# Patient Record
Sex: FEMALE | Race: WHITE | Employment: OTHER | URBAN - METROPOLITAN AREA
[De-identification: names, ages, dates, MRNs, and addresses within clinical notes are randomized per-mention and may not be internally consistent; named-entity substitution may affect disease eponyms.]

---

## 2017-01-30 PROBLEM — M81.0 OSTEOPOROSIS: Status: ACTIVE | Noted: 2017-01-30

## 2017-01-30 PROBLEM — Z13.820 OSTEOPOROSIS SCREENING: Status: ACTIVE | Noted: 2017-01-30

## 2017-02-14 ENCOUNTER — OFFICE VISIT (OUTPATIENT)
Dept: ORTHOPEDIC SURGERY | Age: 72
End: 2017-02-14

## 2017-02-14 DIAGNOSIS — M17.0 PRIMARY OSTEOARTHRITIS OF BOTH KNEES: Primary | Chronic | ICD-10-CM

## 2017-02-14 PROCEDURE — 1123F ACP DISCUSS/DSCN MKR DOCD: CPT | Performed by: ORTHOPAEDIC SURGERY

## 2017-02-14 PROCEDURE — 3017F COLORECTAL CA SCREEN DOC REV: CPT | Performed by: ORTHOPAEDIC SURGERY

## 2017-02-14 PROCEDURE — G8399 PT W/DXA RESULTS DOCUMENT: HCPCS | Performed by: ORTHOPAEDIC SURGERY

## 2017-02-14 PROCEDURE — 20610 DRAIN/INJ JOINT/BURSA W/O US: CPT | Performed by: ORTHOPAEDIC SURGERY

## 2017-02-14 PROCEDURE — 99213 OFFICE O/P EST LOW 20 MIN: CPT | Performed by: ORTHOPAEDIC SURGERY

## 2017-02-14 PROCEDURE — G8419 CALC BMI OUT NRM PARAM NOF/U: HCPCS | Performed by: ORTHOPAEDIC SURGERY

## 2017-02-14 PROCEDURE — 3014F SCREEN MAMMO DOC REV: CPT | Performed by: ORTHOPAEDIC SURGERY

## 2017-02-14 PROCEDURE — 4040F PNEUMOC VAC/ADMIN/RCVD: CPT | Performed by: ORTHOPAEDIC SURGERY

## 2017-02-14 PROCEDURE — G8484 FLU IMMUNIZE NO ADMIN: HCPCS | Performed by: ORTHOPAEDIC SURGERY

## 2017-02-14 PROCEDURE — G8598 ASA/ANTIPLAT THER USED: HCPCS | Performed by: ORTHOPAEDIC SURGERY

## 2017-02-14 PROCEDURE — 1090F PRES/ABSN URINE INCON ASSESS: CPT | Performed by: ORTHOPAEDIC SURGERY

## 2017-02-14 PROCEDURE — G8427 DOCREV CUR MEDS BY ELIG CLIN: HCPCS | Performed by: ORTHOPAEDIC SURGERY

## 2017-02-14 PROCEDURE — 1036F TOBACCO NON-USER: CPT | Performed by: ORTHOPAEDIC SURGERY

## 2017-02-15 ENCOUNTER — TELEPHONE (OUTPATIENT)
Dept: ORTHOPEDIC SURGERY | Age: 72
End: 2017-02-15

## 2017-02-21 ENCOUNTER — OFFICE VISIT (OUTPATIENT)
Dept: ORTHOPEDIC SURGERY | Age: 72
End: 2017-02-21

## 2017-02-21 DIAGNOSIS — M17.12 PRIMARY LOCALIZED OSTEOARTHROSIS, LOWER LEG, LEFT: Primary | ICD-10-CM

## 2017-02-21 DIAGNOSIS — M17.0 PRIMARY OSTEOARTHRITIS OF BOTH KNEES: Chronic | ICD-10-CM

## 2017-02-21 PROCEDURE — 1036F TOBACCO NON-USER: CPT | Performed by: ORTHOPAEDIC SURGERY

## 2017-02-21 PROCEDURE — 20610 DRAIN/INJ JOINT/BURSA W/O US: CPT | Performed by: ORTHOPAEDIC SURGERY

## 2017-02-28 ENCOUNTER — OFFICE VISIT (OUTPATIENT)
Dept: ORTHOPEDIC SURGERY | Age: 72
End: 2017-02-28

## 2017-02-28 DIAGNOSIS — M17.0 PRIMARY OSTEOARTHRITIS OF BOTH KNEES: Primary | ICD-10-CM

## 2017-02-28 PROCEDURE — 1036F TOBACCO NON-USER: CPT | Performed by: ORTHOPAEDIC SURGERY

## 2017-02-28 PROCEDURE — 20610 DRAIN/INJ JOINT/BURSA W/O US: CPT | Performed by: ORTHOPAEDIC SURGERY

## 2017-03-14 ENCOUNTER — OFFICE VISIT (OUTPATIENT)
Dept: ORTHOPEDIC SURGERY | Age: 72
End: 2017-03-14

## 2017-03-14 DIAGNOSIS — M17.0 PRIMARY OSTEOARTHRITIS OF BOTH KNEES: Primary | ICD-10-CM

## 2017-03-14 PROCEDURE — 20610 DRAIN/INJ JOINT/BURSA W/O US: CPT | Performed by: ORTHOPAEDIC SURGERY

## 2017-03-14 PROCEDURE — 1036F TOBACCO NON-USER: CPT | Performed by: ORTHOPAEDIC SURGERY

## 2017-03-21 ENCOUNTER — OFFICE VISIT (OUTPATIENT)
Dept: ORTHOPEDIC SURGERY | Age: 72
End: 2017-03-21

## 2017-03-21 DIAGNOSIS — M17.0 PRIMARY OSTEOARTHRITIS OF BOTH KNEES: Primary | Chronic | ICD-10-CM

## 2017-03-21 PROCEDURE — 1036F TOBACCO NON-USER: CPT | Performed by: ORTHOPAEDIC SURGERY

## 2017-03-21 PROCEDURE — 20610 DRAIN/INJ JOINT/BURSA W/O US: CPT | Performed by: ORTHOPAEDIC SURGERY

## 2017-07-31 PROBLEM — N95.2 VAGINAL ATROPHY: Status: ACTIVE | Noted: 2017-07-31

## 2017-08-18 RX ORDER — FLUTICASONE PROPIONATE 50 MCG
2 SPRAY, SUSPENSION (ML) NASAL DAILY
Qty: 1 BOTTLE | Refills: 5 | Status: ON HOLD | OUTPATIENT
Start: 2017-08-18 | End: 2019-01-01 | Stop reason: CLARIF

## 2017-10-03 ENCOUNTER — OFFICE VISIT (OUTPATIENT)
Dept: ORTHOPEDIC SURGERY | Age: 72
End: 2017-10-03

## 2017-10-03 VITALS — WEIGHT: 166.01 LBS | HEIGHT: 60 IN | BODY MASS INDEX: 32.59 KG/M2

## 2017-10-03 DIAGNOSIS — M17.0 PRIMARY OSTEOARTHRITIS OF BOTH KNEES: Primary | ICD-10-CM

## 2017-10-03 PROCEDURE — 20610 DRAIN/INJ JOINT/BURSA W/O US: CPT | Performed by: ORTHOPAEDIC SURGERY

## 2017-10-03 PROCEDURE — 1036F TOBACCO NON-USER: CPT | Performed by: ORTHOPAEDIC SURGERY

## 2017-10-03 NOTE — PROGRESS NOTES
Recommendation is for viscosupplementation using Supartz . Both knees were injected with 2 ml of Supartz from an anterolateral joint line approach using a 25-gauge needle under sterile Betadine prep, using ethyl chloride as a topical refrigerant, for a diagnosis of osteoarthritis. The patient appeared to tolerate it well. The patient should return here each week for the next four weeks also.

## 2017-10-03 NOTE — MR AVS SNAPSHOT
your BMI, the greater your risk of heart disease, high blood pressure, type 2 diabetes, stroke, gallstones, arthritis, sleep apnea, and certain cancers. BMI is not perfect. It may overestimate body fat in athletes and people who are more muscular. Even a small weight loss (between 5 and 10 percent of your current weight) by decreasing your calorie intake and becoming more physically active will help lower your risk of developing or worsening diseases associated with obesity. Learn more at: Travel.ruco.uk             Medications and Orders      Your Current Medications Are              fluticasone (FLONASE) 50 MCG/ACT nasal spray 2 sprays by Nasal route daily 2 sprays each side of nose    doxycycline monohydrate (MONODOX) 100 MG capsule Take 1 capsule by mouth 2 times daily    alendronate (FOSAMAX) 70 MG tablet Take 1 tablet by mouth every 7 days    guanFACINE (TENEX) 1 MG tablet Take 1 tablet by mouth nightly    isosorbide mononitrate (IMDUR) 60 MG CR tablet Take 60 mg by mouth daily    atorvastatin (LIPITOR) 40 MG tablet Take 40 mg by mouth daily    albuterol sulfate  (90 BASE) MCG/ACT inhaler Inhale 2 puffs into the lungs every 4 hours as needed    insulin glargine (LANTUS) 100 UNIT/ML injection vial Inject into the skin nightly    glucose blood VI test strips (ASCENSIA AUTODISC VI;ONE TOUCH ULTRA TEST VI) strip 1 each by Does not apply route    Insulin Pen Needle 31G X 5 MM MISC For use with Levermir and Novolog    valsartan (DIOVAN) 320 MG tablet Take 320 mg by mouth    Potassium Chloride ER 8 MEQ CPCR Take by mouth. insulin aspart (NOVOLOG) 100 UNIT/ML injection Inject 16 Units into the skin 3 times daily (before meals). nitroGLYCERIN (NITROSTAT) 0.4 MG SL tablet Place 1 tablet under the tongue every 5 minutes as needed for Chest pain. Cholecalciferol (VITAMIN D3) 2000 UNITS CAPS Take  by mouth. metoprolol (TOPROL XL) 100 MG XL tablet Take 50 mg by mouth daily. furosemide (LASIX) 20 MG tablet Take 20 mg by mouth daily. hydrOXYzine (VISTARIL) 50 MG capsule Take 50 mg by mouth 4 times daily as needed. diazepam (VALIUM) 5 MG tablet Take 5 mg by mouth as needed. B Complex-C-Iron (SUPER B-COMPLEX/IRON/VITAMIN C PO) Take 1 tablet by mouth daily. aspirin 81 MG EC tablet Take 81 mg by mouth daily.       Allergies              Dilaudid [Hydromorphone Hcl] Other (See Comments)    Pt states it made her crazy    Flexeril [Cyclobenzaprine Hcl]     Hydromorphone     Penicillins     Soma [Carisoprodol]     Sulfa Antibiotics          Additional Information        Basic Information     Date Of Birth Sex Race Ethnicity Preferred Language    1945 Female White Non-/Non  English      Problem List as of 10/3/2017  Date Reviewed: 10/3/2017                Near syncope    Chest pain    Coronary artery disease    Acute asthma exacerbation    Uncontrolled hypertension    Asthma    Vaginal atrophy    Primary osteoarthritis of both knees    Osteoporosis screening    Osteoporosis    History of endometrial cancer    Menopausal state    Chronic pain of left knee    Chronic pain of right knee    S/P total hysterectomy and BSO (bilateral salpingo-oophorectomy)    Post-operative state    Drainage from wound    S/P hysterectomy with oophorectomy    Endometrial adenocarcinoma (HCC)    Osteoarthritis of spine with radiculopathy, lumbar region    Pain of both hip joints    Spinal stenosis, lumbar region, with neurogenic claudication    Right sided sciatica    Tibialis tendinitis    Primary localized osteoarthrosis, lower leg    Perennial allergic rhinitis    Osteoarthritis of both knees (Chronic)    Chest pain    HTN (hypertension) (Chronic)    CAD (coronary artery disease) (Chronic)    Lipids abnormal (Chronic)    DM (diabetes mellitus) ([facility] [address]    Vertigo      Immunizations as of 10/3/2017     Name Date Influenza Virus Vaccine 9/1/2015, 10/14/2014, 10/23/2012    Influenza Whole 11/1/2011    Influenza, High Dose 10/13/2016    Pneumococcal 13-valent Conjugate ([id number]) 9/1/2015    Pneumococcal Conjugate 7-valent 11/1/2011, 10/1/2009    Tdap (Boostrix, Adacel) 5/13/2012      Preventive Care        Date Due    Diabetic Foot Exam 7/8/1954    Eye Exam By An Eye Doctor 7/8/1954    Colonoscopy 7/8/1994    Zoster Vaccine 7/8/2004    Urine Check For Kidney Problems 7/23/2013    Hemoglobin A1C (Test For Long-Term Glucose Control) 3/14/2015    Cholesterol Screening 3/14/2015    Yearly Flu Vaccine (1) 9/1/2017    Mammograms are recommended every 2 years for low/average risk patients aged 48 - 69, and every year for high risk patients per updated national guidelines. However these guidelines can be individualized by your provider. 2/8/2018    Tetanus Combination Vaccine (2 - Td) 5/13/2022            Stylefiet Signup           Our records indicate that you have an active [employer] account. You can view your After Visit Summary by going to [email or web address] and logging in with your Padcom username and password. If you don't have a Padcom username and password but a parent or guardian has access to your record, the parent or guardian should login with their own Pad[employer] username and password and access your record to view the After Visit Summary. Additional Information  If you have questions, please contact the physician practice where you receive care. Remember, Pad[employer] is NOT to be used for urgent needs. For medical emergencies, dial 911. For questions regarding your [employer] account call [phone]. If you have a clinical question, please call your doctor's office.

## 2017-10-05 ENCOUNTER — TELEPHONE (OUTPATIENT)
Dept: ORTHOPEDIC SURGERY | Age: 72
End: 2017-10-05

## 2017-10-10 ENCOUNTER — OFFICE VISIT (OUTPATIENT)
Dept: ORTHOPEDIC SURGERY | Age: 72
End: 2017-10-10

## 2017-10-10 DIAGNOSIS — M17.0 PRIMARY OSTEOARTHRITIS OF BOTH KNEES: Primary | Chronic | ICD-10-CM

## 2017-10-10 PROCEDURE — 20610 DRAIN/INJ JOINT/BURSA W/O US: CPT | Performed by: ORTHOPAEDIC SURGERY

## 2017-10-10 PROCEDURE — 1036F TOBACCO NON-USER: CPT | Performed by: ORTHOPAEDIC SURGERY

## 2017-10-17 ENCOUNTER — OFFICE VISIT (OUTPATIENT)
Dept: ORTHOPEDIC SURGERY | Age: 72
End: 2017-10-17

## 2017-10-17 DIAGNOSIS — M17.0 PRIMARY OSTEOARTHRITIS OF BOTH KNEES: Primary | ICD-10-CM

## 2017-10-17 PROCEDURE — 20610 DRAIN/INJ JOINT/BURSA W/O US: CPT | Performed by: ORTHOPAEDIC SURGERY

## 2017-10-17 PROCEDURE — 1036F TOBACCO NON-USER: CPT | Performed by: ORTHOPAEDIC SURGERY

## 2017-10-17 NOTE — PROGRESS NOTES
Yusuf Zhang  BILATERAL KNEES  [facility]#[id number]  [id number] # for supartz

## 2017-10-24 ENCOUNTER — OFFICE VISIT (OUTPATIENT)
Dept: ORTHOPEDIC SURGERY | Age: 72
End: 2017-10-24

## 2017-10-24 DIAGNOSIS — M17.0 PRIMARY OSTEOARTHRITIS OF BOTH KNEES: Primary | ICD-10-CM

## 2017-10-24 DIAGNOSIS — M47.26 OSTEOARTHRITIS OF SPINE WITH RADICULOPATHY, LUMBAR REGION: ICD-10-CM

## 2017-10-24 PROCEDURE — 20610 DRAIN/INJ JOINT/BURSA W/O US: CPT | Performed by: ORTHOPAEDIC SURGERY

## 2017-10-24 NOTE — PROGRESS NOTES
HISTORY OF PRESENT ILLNESS: Patient returns today for their fourth out of a series of five Supartz injections done to both knees that were performed  under a sterile Betadine prep, using ethyl chloride as a topical refrigerant, for a diagnosis of osteoarthritis. The injection of 2 ml of Supartz was done from an anterolateral joint line approach using a 25-gauge needle. It was done without incident and was tolerated well. PLAN: The patient should return next week to continue those injections. Additionally, she persists in having left sciatica. In the past.  She says it comes and goes but not been more persistent. For this I recommend she see one of our spine people.

## 2017-10-31 ENCOUNTER — OFFICE VISIT (OUTPATIENT)
Dept: ORTHOPEDIC SURGERY | Age: 72
End: 2017-10-31

## 2017-10-31 VITALS
WEIGHT: 166.01 LBS | SYSTOLIC BLOOD PRESSURE: 159 MMHG | BODY MASS INDEX: 32.59 KG/M2 | DIASTOLIC BLOOD PRESSURE: 75 MMHG | HEIGHT: 60 IN | HEART RATE: 61 BPM

## 2017-10-31 DIAGNOSIS — M54.50 LUMBAR SPINE PAIN: ICD-10-CM

## 2017-10-31 DIAGNOSIS — R20.2 LEFT LEG PARESTHESIAS: ICD-10-CM

## 2017-10-31 DIAGNOSIS — M17.0 PRIMARY OSTEOARTHRITIS OF BOTH KNEES: Primary | ICD-10-CM

## 2017-10-31 DIAGNOSIS — M43.16 SPONDYLOLISTHESIS OF LUMBAR REGION: Primary | ICD-10-CM

## 2017-10-31 DIAGNOSIS — M51.36 DDD (DEGENERATIVE DISC DISEASE), LUMBAR: ICD-10-CM

## 2017-10-31 PROCEDURE — 4040F PNEUMOC VAC/ADMIN/RCVD: CPT | Performed by: PHYSICAL MEDICINE & REHABILITATION

## 2017-10-31 PROCEDURE — 3017F COLORECTAL CA SCREEN DOC REV: CPT | Performed by: PHYSICAL MEDICINE & REHABILITATION

## 2017-10-31 PROCEDURE — 99203 OFFICE O/P NEW LOW 30 MIN: CPT | Performed by: PHYSICAL MEDICINE & REHABILITATION

## 2017-10-31 PROCEDURE — 3014F SCREEN MAMMO DOC REV: CPT | Performed by: PHYSICAL MEDICINE & REHABILITATION

## 2017-10-31 PROCEDURE — 1090F PRES/ABSN URINE INCON ASSESS: CPT | Performed by: PHYSICAL MEDICINE & REHABILITATION

## 2017-10-31 PROCEDURE — G8484 FLU IMMUNIZE NO ADMIN: HCPCS | Performed by: PHYSICAL MEDICINE & REHABILITATION

## 2017-10-31 PROCEDURE — G8417 CALC BMI ABV UP PARAM F/U: HCPCS | Performed by: PHYSICAL MEDICINE & REHABILITATION

## 2017-10-31 PROCEDURE — G8427 DOCREV CUR MEDS BY ELIG CLIN: HCPCS | Performed by: PHYSICAL MEDICINE & REHABILITATION

## 2017-10-31 PROCEDURE — 72100 X-RAY EXAM L-S SPINE 2/3 VWS: CPT | Performed by: PHYSICAL MEDICINE & REHABILITATION

## 2017-10-31 PROCEDURE — 20610 DRAIN/INJ JOINT/BURSA W/O US: CPT | Performed by: ORTHOPAEDIC SURGERY

## 2017-10-31 NOTE — PROGRESS NOTES
Flor Pierce BILATERAL KNEES  LOT# [id number]  [id number] [facility] # for supartz

## 2017-10-31 NOTE — PROGRESS NOTES
New Patient: SPINE    Referring Provider:  Alfreda May MD    CHIEF COMPLAINT:    Chief Complaint   Patient presents with    Back Pain     lumbar    Leg Pain     left leg       HISTORY OF PRESENT ILLNESS:      · The patient is being sent at the request of Alfreda May MD in consultation as a new spine patient for left leg pain. The patient is a 67 y.o. female whom reports she has had left leg pain for 3 years. Chronic left hip and lateral leg pain and new onset left anterior thigh pain. Any activity brings on the left thigh pain. This lasts for a few days at a time. No weakness is reported. She last had a MR of the lumbar spine in 2015. She tried heat with no benefit. She saw Dr Brandy Montez who recommended a MR in 2015 and she was diagnosed with endometrial cancer and had a hysterectomy. Pain Assessment  Location of Pain: Leg  Location Modifiers: Left  Severity of Pain: 6  Quality of Pain: Other (Comment)  Frequency of Pain: Intermittent  Aggravating Factors: Walking  Relieving Factors:  Other (Comment)      Associated signs and symptoms:   Neurogenic bowel or bladder symptoms:  no   Perceived weakness:  no   Difficulty walking:  yes    Recent Imaging (within past one year)   Xrays: no   MRI or CT of spine: no    Current/Past Treatment:   · Physical Therapy:  none  · Chiropractic:  none  · Injection:  none  · Medications:   NSAIDS:  none   Muscle relaxer:  none   Steriods:  none   Neuropathic medications:  none   Opioids:  none  · Previous surgery:  no  · Previous surgical consult:  no  · Other:  · Infection control  · Tested positive for MRSA in past 12 months:  no  · Tested positive for MSSA \"staph infection\" in past 12 months: no  · Tested positive for VRE (Vancomycin Resistant Enterococci) in past 12 months:   no  · Currently on any antibiotics for an infection: no  · Anticoagulants:  · On a blood thinner:  yes  - Asa  · Any history of bleeding disorder: no   · MRI Contraindication: no · Previous Pain Management: no                   Past Medical History:   Past Medical History:   Diagnosis Date    Allergic     Anemia     Angina     with exertion    Arthritis     Asthma 9/16/2010    Cancer ([facility] [address]     Coronary artery disease 9/16/2010    [facility]     Heart attack age 43    no problems since then    Hyperlipidemia     Hypertension     Obesity       Past Surgical History:     Past Surgical History:   Procedure Laterality Date    CARPAL TUNNEL RELEASE      right    CATARACT REMOVAL WITH IMPLANT      CORONARY ANGIOPLASTY WITH STENT PLACEMENT  4/2012    DIAGNOSTIC CARDIAC CATH LAB PROCEDURE      HYSTERECTOMY      11/30/15    KNEE ARTHROSCOPY      left knee.       Current Medications:     Current Outpatient Prescriptions:     fluticasone (FLONASE) 50 MCG/ACT nasal spray, 2 sprays by Nasal route daily 2 sprays each side of nose, Disp: 1 Bottle, Rfl: 5    doxycycline monohydrate (MONODOX) 100 MG capsule, Take 1 capsule by mouth 2 times daily, Disp: 20 capsule, Rfl: 0    alendronate (FOSAMAX) 70 MG tablet, Take 1 tablet by mouth every 7 days, Disp: 4 tablet, Rfl: 11    guanFACINE (TENEX) 1 MG tablet, Take 1 tablet by mouth nightly, Disp: 14 tablet, Rfl: 0    isosorbide mononitrate (IMDUR) 60 MG CR tablet, Take 60 mg by mouth daily, Disp: , Rfl:     atorvastatin (LIPITOR) 40 MG tablet, Take 40 mg by mouth daily, Disp: , Rfl:     albuterol sulfate  (90 BASE) MCG/ACT inhaler, Inhale 2 puffs into the lungs every 4 hours as needed, Disp: 1 Inhaler, Rfl: 3    insulin glargine (LANTUS) 100 UNIT/ML injection vial, Inject into the skin nightly, Disp: , Rfl:     glucose blood VI test strips (ASCENSIA AUTODISC VI;ONE TOUCH ULTRA TEST VI) strip, 1 each by Does not apply route, Disp: , Rfl:     Insulin Pen Needle 31G X 5 MM MISC, For use with Levermir and Novolog, Disp: , Rfl:     valsartan (DIOVAN) 320 MG tablet, Take 320 mg by mouth, Disp: , Rfl:     Potassium Chloride ER 8 MEQ CPCR, Take by mouth., Disp: , Rfl:     insulin aspart (NOVOLOG) 100 UNIT/ML injection, Inject 16 Units into the skin 3 times daily (before meals). , Disp: , Rfl:     nitroGLYCERIN (NITROSTAT) 0.4 MG SL tablet, Place 1 tablet under the tongue every 5 minutes as needed for Chest pain., Disp: 25 tablet, Rfl: 0    Cholecalciferol (VITAMIN D3) 2000 UNITS CAPS, Take  by mouth.  , Disp: , Rfl:     metoprolol (TOPROL XL) 100 MG XL tablet, Take 50 mg by mouth daily. , Disp: , Rfl:     furosemide (LASIX) 20 MG tablet, Take 20 mg by mouth daily. , Disp: , Rfl:     hydrOXYzine (VISTARIL) 50 MG capsule, Take 50 mg by mouth 4 times daily as needed. , Disp: , Rfl:     diazepam (VALIUM) 5 MG tablet, Take 5 mg by mouth as needed. , Disp: , Rfl:     B Complex-C-Iron (SUPER B-COMPLEX/IRON/VITAMIN C PO), Take 1 tablet by mouth daily. , Disp: , Rfl:     aspirin 81 MG EC tablet, Take 81 mg by mouth daily. , Disp: , Rfl:   Allergies:  Dilaudid [hydromorphone hcl]; Flexeril [cyclobenzaprine hcl]; Hydromorphone; Penicillins; Soma [carisoprodol]; and Sulfa antibiotics  Social History:    reports that she quit smoking about 32 years ago. Her smoking use included Cigarettes. She quit after 13.00 years of use. She has never used smokeless tobacco. She reports that she does not drink alcohol or use drugs.   Family History:   Family History   Problem Relation Age of Onset    Diabetes Mother     Heart Failure Mother     Heart Disease Mother     Stroke Mother     Diabetes Sister     Diabetes Brother     Diabetes Maternal Grandmother     Asthma Son     Asthma Son     Diabetes Daughter     Heart Disease Daughter     Irritable Bowel Syndrome Daughter     Diabetes Brother     Cancer Neg Hx     Emphysema Neg Hx     Hypertension Neg Hx          REVIEW OF SYSTEMS: Full ROS noted & scanned          PHYSICAL EXAM:    Vitals: Blood pressure (!) 159/75, pulse 61, height 4' 11.84\" (1.52 m), weight 166 lb 0.1 oz (75.3 kg), not

## 2017-11-21 ENCOUNTER — OFFICE VISIT (OUTPATIENT)
Dept: ORTHOPEDIC SURGERY | Age: 72
End: 2017-11-21

## 2017-11-21 ENCOUNTER — TELEPHONE (OUTPATIENT)
Dept: ORTHOPEDIC SURGERY | Age: 72
End: 2017-11-21

## 2017-11-21 VITALS
WEIGHT: 166.01 LBS | BODY MASS INDEX: 32.59 KG/M2 | HEIGHT: 60 IN | SYSTOLIC BLOOD PRESSURE: 135 MMHG | DIASTOLIC BLOOD PRESSURE: 78 MMHG

## 2017-11-21 DIAGNOSIS — M43.16 SPONDYLOLISTHESIS OF LUMBAR REGION: Primary | ICD-10-CM

## 2017-11-21 DIAGNOSIS — M48.062 LUMBAR STENOSIS WITH NEUROGENIC CLAUDICATION: ICD-10-CM

## 2017-11-21 DIAGNOSIS — M51.36 DDD (DEGENERATIVE DISC DISEASE), LUMBAR: ICD-10-CM

## 2017-11-21 PROCEDURE — G8598 ASA/ANTIPLAT THER USED: HCPCS | Performed by: PHYSICAL MEDICINE & REHABILITATION

## 2017-11-21 PROCEDURE — G8484 FLU IMMUNIZE NO ADMIN: HCPCS | Performed by: PHYSICAL MEDICINE & REHABILITATION

## 2017-11-21 PROCEDURE — G8427 DOCREV CUR MEDS BY ELIG CLIN: HCPCS | Performed by: PHYSICAL MEDICINE & REHABILITATION

## 2017-11-21 PROCEDURE — 1090F PRES/ABSN URINE INCON ASSESS: CPT | Performed by: PHYSICAL MEDICINE & REHABILITATION

## 2017-11-21 PROCEDURE — G8399 PT W/DXA RESULTS DOCUMENT: HCPCS | Performed by: PHYSICAL MEDICINE & REHABILITATION

## 2017-11-21 PROCEDURE — 1036F TOBACCO NON-USER: CPT | Performed by: PHYSICAL MEDICINE & REHABILITATION

## 2017-11-21 PROCEDURE — 4040F PNEUMOC VAC/ADMIN/RCVD: CPT | Performed by: PHYSICAL MEDICINE & REHABILITATION

## 2017-11-21 PROCEDURE — 3014F SCREEN MAMMO DOC REV: CPT | Performed by: PHYSICAL MEDICINE & REHABILITATION

## 2017-11-21 PROCEDURE — 99213 OFFICE O/P EST LOW 20 MIN: CPT | Performed by: PHYSICAL MEDICINE & REHABILITATION

## 2017-11-21 PROCEDURE — G8417 CALC BMI ABV UP PARAM F/U: HCPCS | Performed by: PHYSICAL MEDICINE & REHABILITATION

## 2017-11-21 PROCEDURE — 3017F COLORECTAL CA SCREEN DOC REV: CPT | Performed by: PHYSICAL MEDICINE & REHABILITATION

## 2017-11-21 PROCEDURE — 1123F ACP DISCUSS/DSCN MKR DOCD: CPT | Performed by: PHYSICAL MEDICINE & REHABILITATION

## 2017-11-21 NOTE — LETTER
Please schedule the following with:     Date:       Account: [id number]  Patient: Anai Roque    : 1945  Address:  [place]     Phone (H):  [phone] (home) [phone] (work)     ----------------------------------------------------------------------------------------------  Diagnosis:     ICD-10-CM ICD-9-CM    1. Spondylolisthesis of lumbar region M43.16 738.4    2. Lumbar stenosis with neurogenic claudication M48.062 724.03    3. DDD (degenerative disc disease), lumbar M51.36 722.52          Levels: L5  bilaterally  Transforaminal MAU    ----------------------------------------------------------------------------------------------  Injection [email or web address]    Attending Physician       Madelin Alonso.  Oscar Ortez MD.      ----------------------------------------------------------------------------------------------  Injection Scheduled For:    At:    1st Insurance:     Pre-Cert#    2nd Insurance:    Pre-Cert#    Comments:    · Infection control  · Tested positive for MRSA in past 12 months:  no  · Tested positive for MSSA \"staph infection\" in past 12 months: no  · Tested positive for VRE (Vancomycin Resistant Enterococci) in past 12 months:   no  · Currently on any antibiotics for an infection: no  · Anticoagulants:  · On a blood thinner:  no   · Any history of bleeding disorder: no   · Advanced Liver disease: no   · Advanced Renal disease: no   · Glaucoma: no   · Diabetes: no     Sedation:  No  -----------------------------------------------------------------------------------------------  Allergies   Allergen Reactions    Dilaudid [Hydromorphone Hcl] Other (See Comments)     Pt states it made her crazy    Flexeril [Cyclobenzaprine Hcl]     Hydromorphone     Penicillins     Soma [Carisoprodol]     Sulfa Antibiotics

## 2017-11-21 NOTE — PROGRESS NOTES
axillae and groin reveals all areas to be without enlargement or induration  · Sensation is intact without deficit in the upper and lower extremities to light touch and pinprick  · Coordination of the upper and lower extremities are normal.    LUMBAR/SACRAL EXAMINATION:  · Inspection: Local inspection shows no step-off or bruising. Lumbar alignment is normal. No instability is noted. · Palpation:   No evidence of tenderness at the midline. Mild tenderness bilaterally at the paraspinal, none at the trochanters. There is no paraspinal spasm. · Range of Motion: limited by 50% in all planes due to pain  · Strength:   Strength testing is 5/5 in all muscle groups tested. · Special Tests:   Straight leg raise and crossed SLR negative. Asim's testing is negative bilaterally. FADIR's testing is negative bilaterally. · Skin: There are no rashes, ulcerations or lesions. · Reflexes: Reflexes are symmetrically 1+ at the patellar and ankle tendons. Clonus absent bilaterally at the feet. · Gait & station: normal, patient ambulates without assistance  · Additional Examinations:  · RIGHT LOWER EXTREMITY: Inspection/examination of the right lower extremity does not show any tenderness, deformity or injury. Range of motion is normal and pain-free. There is no gross instability. There are no rashes, ulcerations or lesions. Strength and tone are normal. No atrophy or abnormal movements are noted. · LEFT LOWER EXTREMITY:  Inspection/examination of the left lower extremity does not show any tenderness, deformity or injury. Range of motion is normal and pain-free. There is no gross instability. There are no rashes, ulcerations or lesions. Strength and tone are normal. No atrophy or abnormal movements are noted. Diagnostic Testing:    MR Lumbar spine shows 11/16/17  Lumbar spondylosis as described above resulting in up to moderate   spinal canal and severe left neural foraminal stenosis at L4-5.  Findings   appear slightly progressed compared to CT dated 11/24/2015. Results for orders placed or performed in visit on 07/31/17   PAP with HPV reflex   Result Value Ref Range    Clinical Diagnosis Comment     Specimen adequacy: Comment     CYTOTECHNOLOGIST: Comment     Microscopic Observation . Note: Comment     clinical report Comment     See below: Comment      Impression:       1. Spondylolisthesis of lumbar region    2. Lumbar stenosis with neurogenic claudication    3. DDD (degenerative disc disease), lumbar        Plan:  Clinical Course: Worsening  1. Medications:  No new medications to add  2. PT:  Encouraged to continue with HEP. 3. Further studies:  No further studies. 4. Interventional:  We discussed pursuing a b/l L4 TF (1) and b/l L5 TF (2) epidural steroid injection to address the pain. Radiologic imaging and symptoms confirm the pain etiology. Risks, benefits and alternatives of interventional options were discussed. These include and are not limited to bleeding, infection, spinal headache, nerve injury and lack of pain relief. The patient verbalized understanding and would like to proceed. The patient will be scheduled accordingly. 5. Follow up:  4-6 weeks          Loli. Nick Morgan MD, VIVIANA, [facility]  Board Certified in [address] [place] [address]  Certified and Fellowship Trained in [facility])             This dictation was performed with a verbal recognition program [facility]) and it was checked for errors. It is possible that there are still dictated errors within this office note. If so, please bring any errors to my attention for an addendum. All efforts were made to ensure that this office note is accurate.

## 2018-03-03 PROBLEM — E16.2 HYPOGLYCEMIA: Status: ACTIVE | Noted: 2018-03-03

## 2018-03-03 PROBLEM — E16.0 HYPOGLYCEMIA DUE TO INSULIN: Status: ACTIVE | Noted: 2018-03-03

## 2018-03-03 PROBLEM — T38.3X1A OVERDOSE OF INSULIN, ACCIDENTAL OR UNINTENTIONAL, INITIAL ENCOUNTER: Status: ACTIVE | Noted: 2018-03-03

## 2018-03-03 PROBLEM — T38.3X5A HYPOGLYCEMIA DUE TO INSULIN: Status: ACTIVE | Noted: 2018-03-03

## 2018-09-27 PROBLEM — Z13.820 OSTEOPOROSIS SCREENING: Status: RESOLVED | Noted: 2017-01-30 | Resolved: 2018-09-27

## 2018-11-01 ENCOUNTER — OFFICE VISIT (OUTPATIENT)
Dept: ORTHOPEDIC SURGERY | Age: 73
End: 2018-11-01
Payer: MEDICARE

## 2018-11-01 VITALS
WEIGHT: 167.11 LBS | HEIGHT: 60 IN | DIASTOLIC BLOOD PRESSURE: 78 MMHG | HEART RATE: 85 BPM | BODY MASS INDEX: 32.81 KG/M2 | SYSTOLIC BLOOD PRESSURE: 162 MMHG

## 2018-11-01 DIAGNOSIS — S39.012A STRAIN OF LUMBAR REGION, INITIAL ENCOUNTER: ICD-10-CM

## 2018-11-01 DIAGNOSIS — M54.5 LOW BACK PAIN, UNSPECIFIED BACK PAIN LATERALITY, UNSPECIFIED CHRONICITY, WITH SCIATICA PRESENCE UNSPECIFIED: Primary | ICD-10-CM

## 2018-11-01 PROCEDURE — 1123F ACP DISCUSS/DSCN MKR DOCD: CPT | Performed by: PHYSICAL MEDICINE & REHABILITATION

## 2018-11-01 PROCEDURE — L0642 LO SAG RI AN/POS PNL PRE OTS: HCPCS | Performed by: PHYSICAL MEDICINE & REHABILITATION

## 2018-11-01 PROCEDURE — G8484 FLU IMMUNIZE NO ADMIN: HCPCS | Performed by: PHYSICAL MEDICINE & REHABILITATION

## 2018-11-01 PROCEDURE — 99214 OFFICE O/P EST MOD 30 MIN: CPT | Performed by: PHYSICAL MEDICINE & REHABILITATION

## 2018-11-01 PROCEDURE — 1036F TOBACCO NON-USER: CPT | Performed by: PHYSICAL MEDICINE & REHABILITATION

## 2018-11-01 PROCEDURE — 3017F COLORECTAL CA SCREEN DOC REV: CPT | Performed by: PHYSICAL MEDICINE & REHABILITATION

## 2018-11-01 PROCEDURE — G8427 DOCREV CUR MEDS BY ELIG CLIN: HCPCS | Performed by: PHYSICAL MEDICINE & REHABILITATION

## 2018-11-01 PROCEDURE — G8399 PT W/DXA RESULTS DOCUMENT: HCPCS | Performed by: PHYSICAL MEDICINE & REHABILITATION

## 2018-11-01 PROCEDURE — 1101F PT FALLS ASSESS-DOCD LE1/YR: CPT | Performed by: PHYSICAL MEDICINE & REHABILITATION

## 2018-11-01 PROCEDURE — G8417 CALC BMI ABV UP PARAM F/U: HCPCS | Performed by: PHYSICAL MEDICINE & REHABILITATION

## 2018-11-01 PROCEDURE — 4040F PNEUMOC VAC/ADMIN/RCVD: CPT | Performed by: PHYSICAL MEDICINE & REHABILITATION

## 2018-11-01 PROCEDURE — 1090F PRES/ABSN URINE INCON ASSESS: CPT | Performed by: PHYSICAL MEDICINE & REHABILITATION

## 2018-11-01 PROCEDURE — G8598 ASA/ANTIPLAT THER USED: HCPCS | Performed by: PHYSICAL MEDICINE & REHABILITATION

## 2018-11-06 ENCOUNTER — HOSPITAL ENCOUNTER (OUTPATIENT)
Dept: MRI IMAGING | Age: 73
Discharge: HOME | End: 2018-11-06
Payer: MEDICARE

## 2018-11-06 DIAGNOSIS — M54.5 LOW BACK PAIN, UNSPECIFIED BACK PAIN LATERALITY, UNSPECIFIED CHRONICITY, WITH SCIATICA PRESENCE UNSPECIFIED: ICD-10-CM

## 2018-11-06 PROCEDURE — 72148 MRI LUMBAR SPINE W/O DYE: CPT

## 2018-11-08 ENCOUNTER — TELEPHONE (OUTPATIENT)
Dept: ORTHOPEDIC SURGERY | Age: 73
End: 2018-11-08

## 2018-12-11 DIAGNOSIS — M54.50 LUMBAR SPINE PAIN: Primary | ICD-10-CM

## 2019-01-01 ENCOUNTER — APPOINTMENT (OUTPATIENT)
Dept: INTERVENTIONAL RADIOLOGY/VASCULAR | Age: 74
DRG: 207 | End: 2019-01-01
Payer: MEDICARE

## 2019-01-01 ENCOUNTER — APPOINTMENT (OUTPATIENT)
Dept: CT IMAGING | Age: 74
DRG: 207 | End: 2019-01-01
Payer: MEDICARE

## 2019-01-01 ENCOUNTER — APPOINTMENT (OUTPATIENT)
Dept: GENERAL RADIOLOGY | Age: 74
DRG: 207 | End: 2019-01-01
Payer: MEDICARE

## 2019-01-01 ENCOUNTER — APPOINTMENT (OUTPATIENT)
Dept: CT IMAGING | Age: 74
DRG: 871 | End: 2019-01-01
Payer: MEDICARE

## 2019-01-01 ENCOUNTER — APPOINTMENT (OUTPATIENT)
Dept: MRI IMAGING | Age: 74
DRG: 207 | End: 2019-01-01
Payer: MEDICARE

## 2019-01-01 ENCOUNTER — ANESTHESIA (OUTPATIENT)
Dept: ENDOSCOPY | Age: 74
DRG: 207 | End: 2019-01-01
Payer: MEDICARE

## 2019-01-01 ENCOUNTER — HOSPITAL ENCOUNTER (INPATIENT)
Age: 74
LOS: 2 days | Discharge: HOME | DRG: 871 | End: 2019-10-17
Attending: EMERGENCY MEDICINE | Admitting: INTERNAL MEDICINE
Payer: MEDICARE

## 2019-01-01 ENCOUNTER — HOSPITAL ENCOUNTER (INPATIENT)
Age: 74
LOS: 24 days | Discharge: NURSING, REHAB OR LONG-TERM CARE | DRG: 207 | End: 2019-12-05
Attending: EMERGENCY MEDICINE | Admitting: INTERNAL MEDICINE
Payer: MEDICARE

## 2019-01-01 ENCOUNTER — APPOINTMENT (OUTPATIENT)
Dept: GENERAL RADIOLOGY | Age: 74
DRG: 871 | End: 2019-01-01
Payer: MEDICARE

## 2019-01-01 ENCOUNTER — HOSPITAL ENCOUNTER (OUTPATIENT)
Dept: VASCULAR LAB | Age: 74
Discharge: HOME | End: 2019-11-07
Payer: MEDICARE

## 2019-01-01 ENCOUNTER — ANESTHESIA EVENT (OUTPATIENT)
Dept: ENDOSCOPY | Age: 74
DRG: 207 | End: 2019-01-01
Payer: MEDICARE

## 2019-01-01 ENCOUNTER — APPOINTMENT (OUTPATIENT)
Dept: ULTRASOUND IMAGING | Age: 74
DRG: 207 | End: 2019-01-01
Payer: MEDICARE

## 2019-01-01 VITALS
RESPIRATION RATE: 16 BRPM | HEIGHT: 60 IN | OXYGEN SATURATION: 96 % | BODY MASS INDEX: 30.64 KG/M2 | HEART RATE: 94 BPM | TEMPERATURE: 98 F | DIASTOLIC BLOOD PRESSURE: 59 MMHG | SYSTOLIC BLOOD PRESSURE: 117 MMHG | WEIGHT: 156.09 LBS

## 2019-01-01 VITALS
HEART RATE: 87 BPM | DIASTOLIC BLOOD PRESSURE: 65 MMHG | SYSTOLIC BLOOD PRESSURE: 150 MMHG | HEIGHT: 60 IN | BODY MASS INDEX: 33.26 KG/M2 | TEMPERATURE: 98.4 F | WEIGHT: 169.4 LBS | OXYGEN SATURATION: 93 % | RESPIRATION RATE: 16 BRPM

## 2019-01-01 VITALS — OXYGEN SATURATION: 99 % | DIASTOLIC BLOOD PRESSURE: 58 MMHG | SYSTOLIC BLOOD PRESSURE: 113 MMHG

## 2019-01-01 DIAGNOSIS — Z93.1 S/P PERCUTANEOUS ENDOSCOPIC GASTROSTOMY (PEG) TUBE PLACEMENT (HCC): ICD-10-CM

## 2019-01-01 DIAGNOSIS — R73.9 HYPERGLYCEMIA: ICD-10-CM

## 2019-01-01 DIAGNOSIS — J96.01 ACUTE RESPIRATORY FAILURE WITH HYPOXIA (HCC): Primary | ICD-10-CM

## 2019-01-01 DIAGNOSIS — M79.89 LEFT LEG SWELLING: ICD-10-CM

## 2019-01-01 DIAGNOSIS — A41.9 SEVERE SEPSIS (HCC): ICD-10-CM

## 2019-01-01 DIAGNOSIS — J18.9 MULTIFOCAL PNEUMONIA: ICD-10-CM

## 2019-01-01 DIAGNOSIS — J96.00 ACUTE RESPIRATORY FAILURE, UNSPECIFIED WHETHER WITH HYPOXIA OR HYPERCAPNIA (HCC): Primary | ICD-10-CM

## 2019-01-01 DIAGNOSIS — R91.1 PULMONARY NODULE: ICD-10-CM

## 2019-01-01 DIAGNOSIS — R65.20 SEVERE SEPSIS (HCC): ICD-10-CM

## 2019-01-01 DIAGNOSIS — J81.0 ACUTE PULMONARY EDEMA (HCC): ICD-10-CM

## 2019-01-01 LAB
A/G RATIO: 0.6 (ref 1.1–2.2)
A/G RATIO: 0.6 (ref 1.1–2.2)
A/G RATIO: 0.7 (ref 1.1–2.2)
A/G RATIO: 0.8 (ref 1.1–2.2)
A/G RATIO: 0.8 (ref 1.1–2.2)
A/G RATIO: 0.9 (ref 1.1–2.2)
A/G RATIO: 1 (ref 1.1–2.2)
A/G RATIO: 1 (ref 1.1–2.2)
A/G RATIO: 1.1 (ref 1.1–2.2)
A/G RATIO: 1.3 (ref 1.1–2.2)
A/G RATIO: 1.3 (ref 1.1–2.2)
A/G RATIO: 1.4 (ref 1.1–2.2)
A/G RATIO: 1.5 (ref 1.1–2.2)
A/G RATIO: 1.8 (ref 1.1–2.2)
A/G RATIO: 1.8 (ref 1.1–2.2)
ABO/RH: NORMAL
ABO/RH: NORMAL
ACANTHOCYTES: ABNORMAL
ALBUMIN SERPL-MCNC: 1.9 G/DL (ref 3.4–5)
ALBUMIN SERPL-MCNC: 2 G/DL (ref 3.4–5)
ALBUMIN SERPL-MCNC: 2.1 G/DL (ref 3.4–5)
ALBUMIN SERPL-MCNC: 2.2 G/DL (ref 3.4–5)
ALBUMIN SERPL-MCNC: 2.3 G/DL (ref 3.4–5)
ALBUMIN SERPL-MCNC: 2.5 G/DL (ref 3.4–5)
ALBUMIN SERPL-MCNC: 2.7 G/DL (ref 3.4–5)
ALBUMIN SERPL-MCNC: 2.9 G/DL (ref 3.4–5)
ALBUMIN SERPL-MCNC: 3.2 G/DL (ref 3.4–5)
ALBUMIN SERPL-MCNC: 3.2 G/DL (ref 3.4–5)
ALBUMIN SERPL-MCNC: 3.5 G/DL (ref 3.4–5)
ALBUMIN SERPL-MCNC: 3.5 G/DL (ref 3.4–5)
ALBUMIN SERPL-MCNC: 3.6 G/DL (ref 3.4–5)
ALBUMIN SERPL-MCNC: 3.6 G/DL (ref 3.4–5)
ALBUMIN SERPL-MCNC: 3.7 G/DL (ref 3.4–5)
ALBUMIN SERPL-MCNC: 3.8 G/DL (ref 3.4–5)
ALBUMIN SERPL-MCNC: 3.8 G/DL (ref 3.4–5)
ALBUMIN SERPL-MCNC: 3.9 G/DL (ref 3.4–5)
ALBUMIN SERPL-MCNC: 4 G/DL (ref 3.4–5)
ALBUMIN SERPL-MCNC: 4.1 G/DL (ref 3.4–5)
ALBUMIN SERPL-MCNC: 4.2 G/DL (ref 3.4–5)
ALBUMIN SERPL-MCNC: 4.4 G/DL (ref 3.4–5)
ALBUMIN SERPL-MCNC: 4.4 G/DL (ref 3.4–5)
ALBUMIN SERPL-MCNC: 4.5 G/DL (ref 3.4–5)
ALBUMIN SERPL-MCNC: 4.5 G/DL (ref 3.4–5)
ALBUMIN SERPL-MCNC: 4.9 G/DL (ref 3.4–5)
ALP BLD-CCNC: 112 U/L (ref 40–129)
ALP BLD-CCNC: 120 U/L (ref 40–129)
ALP BLD-CCNC: 121 U/L (ref 40–129)
ALP BLD-CCNC: 136 U/L (ref 40–129)
ALP BLD-CCNC: 143 U/L (ref 40–129)
ALP BLD-CCNC: 144 U/L (ref 40–129)
ALP BLD-CCNC: 146 U/L (ref 40–129)
ALP BLD-CCNC: 152 U/L (ref 40–129)
ALP BLD-CCNC: 152 U/L (ref 40–129)
ALP BLD-CCNC: 157 U/L (ref 40–129)
ALP BLD-CCNC: 163 U/L (ref 40–129)
ALP BLD-CCNC: 165 U/L (ref 40–129)
ALP BLD-CCNC: 171 U/L (ref 40–129)
ALP BLD-CCNC: 195 U/L (ref 40–129)
ALP BLD-CCNC: 220 U/L (ref 40–129)
ALP BLD-CCNC: 234 U/L (ref 40–129)
ALP BLD-CCNC: 245 U/L (ref 40–129)
ALP BLD-CCNC: 506 U/L (ref 40–129)
ALP BLD-CCNC: 51 U/L (ref 40–129)
ALP BLD-CCNC: 62 U/L (ref 40–129)
ALP BLD-CCNC: 72 U/L (ref 40–129)
ALP BLD-CCNC: 83 U/L (ref 40–129)
ALP BLD-CCNC: 88 U/L (ref 40–129)
ALP BLD-CCNC: 90 U/L (ref 40–129)
ALP BLD-CCNC: 95 U/L (ref 40–129)
ALT SERPL-CCNC: 10 U/L (ref 10–40)
ALT SERPL-CCNC: 10 U/L (ref 10–40)
ALT SERPL-CCNC: 108 U/L (ref 10–40)
ALT SERPL-CCNC: 11 U/L (ref 10–40)
ALT SERPL-CCNC: 12 U/L (ref 10–40)
ALT SERPL-CCNC: 124 U/L (ref 10–40)
ALT SERPL-CCNC: 14 U/L (ref 10–40)
ALT SERPL-CCNC: 15 U/L (ref 10–40)
ALT SERPL-CCNC: 169 U/L (ref 10–40)
ALT SERPL-CCNC: 19 U/L (ref 10–40)
ALT SERPL-CCNC: 20 U/L (ref 10–40)
ALT SERPL-CCNC: 21 U/L (ref 10–40)
ALT SERPL-CCNC: 244 U/L (ref 10–40)
ALT SERPL-CCNC: 33 U/L (ref 10–40)
ALT SERPL-CCNC: 36 U/L (ref 10–40)
ALT SERPL-CCNC: 39 U/L (ref 10–40)
ALT SERPL-CCNC: 46 U/L (ref 10–40)
ALT SERPL-CCNC: 53 U/L (ref 10–40)
ALT SERPL-CCNC: 531 U/L (ref 10–40)
ALT SERPL-CCNC: 6 U/L (ref 10–40)
ALT SERPL-CCNC: 69 U/L (ref 10–40)
ALT SERPL-CCNC: 730 U/L (ref 10–40)
ALT SERPL-CCNC: 743 U/L (ref 10–40)
ALT SERPL-CCNC: 8 U/L (ref 10–40)
ALT SERPL-CCNC: 86 U/L (ref 10–40)
AMMONIA: 42 UMOL/L (ref 11–51)
AMMONIA: 47 UMOL/L (ref 11–51)
AMYLASE: 108 U/L (ref 25–115)
ANION GAP SERPL CALCULATED.3IONS-SCNC: 10 MMOL/L (ref 3–16)
ANION GAP SERPL CALCULATED.3IONS-SCNC: 11 MMOL/L (ref 3–16)
ANION GAP SERPL CALCULATED.3IONS-SCNC: 12 MMOL/L (ref 3–16)
ANION GAP SERPL CALCULATED.3IONS-SCNC: 13 MMOL/L (ref 3–16)
ANION GAP SERPL CALCULATED.3IONS-SCNC: 14 MMOL/L (ref 3–16)
ANION GAP SERPL CALCULATED.3IONS-SCNC: 16 MMOL/L (ref 3–16)
ANION GAP SERPL CALCULATED.3IONS-SCNC: 17 MMOL/L (ref 3–16)
ANION GAP SERPL CALCULATED.3IONS-SCNC: 20 MMOL/L (ref 3–16)
ANION GAP SERPL CALCULATED.3IONS-SCNC: 20 MMOL/L (ref 3–16)
ANION GAP SERPL CALCULATED.3IONS-SCNC: 5 MMOL/L (ref 3–16)
ANION GAP SERPL CALCULATED.3IONS-SCNC: 6 MMOL/L (ref 3–16)
ANION GAP SERPL CALCULATED.3IONS-SCNC: 7 MMOL/L (ref 3–16)
ANION GAP SERPL CALCULATED.3IONS-SCNC: 8 MMOL/L (ref 3–16)
ANION GAP SERPL CALCULATED.3IONS-SCNC: 9 MMOL/L (ref 3–16)
ANISOCYTOSIS: ABNORMAL
ANTIBODY SCREEN: NORMAL
ANTIBODY SCREEN: NORMAL
APPEARANCE CSF: CLEAR
APTT: 28.1 SEC (ref 24.2–36.2)
APTT: 40.4 SEC (ref 24.2–36.2)
APTT: 42.2 SEC (ref 24.2–36.2)
APTT: 42.7 SEC (ref 24.2–36.2)
APTT: 50.9 SEC (ref 24.2–36.2)
APTT: 53.2 SEC (ref 24.2–36.2)
APTT: 54.8 SEC (ref 24.2–36.2)
APTT: 56 SEC (ref 24.2–36.2)
APTT: 58.5 SEC (ref 24.2–36.2)
APTT: 59.2 SEC (ref 24.2–36.2)
APTT: 76.3 SEC (ref 24.2–36.2)
AST SERPL-CCNC: 105 U/L (ref 15–37)
AST SERPL-CCNC: 13 U/L (ref 15–37)
AST SERPL-CCNC: 13 U/L (ref 15–37)
AST SERPL-CCNC: 1314 U/L (ref 15–37)
AST SERPL-CCNC: 1496 U/L (ref 15–37)
AST SERPL-CCNC: 152 U/L (ref 15–37)
AST SERPL-CCNC: 18 U/L (ref 15–37)
AST SERPL-CCNC: 20 U/L (ref 15–37)
AST SERPL-CCNC: 22 U/L (ref 15–37)
AST SERPL-CCNC: 23 U/L (ref 15–37)
AST SERPL-CCNC: 270 U/L (ref 15–37)
AST SERPL-CCNC: 28 U/L (ref 15–37)
AST SERPL-CCNC: 281 U/L (ref 15–37)
AST SERPL-CCNC: 30 U/L (ref 15–37)
AST SERPL-CCNC: 35 U/L (ref 15–37)
AST SERPL-CCNC: 36 U/L (ref 15–37)
AST SERPL-CCNC: 38 U/L (ref 15–37)
AST SERPL-CCNC: 39 U/L (ref 15–37)
AST SERPL-CCNC: 50 U/L (ref 15–37)
AST SERPL-CCNC: 61 U/L (ref 15–37)
AST SERPL-CCNC: 66 U/L (ref 15–37)
AST SERPL-CCNC: 822 U/L (ref 15–37)
AST SERPL-CCNC: 85 U/L (ref 15–37)
ATYPICAL LYMPHOCYTE RELATIVE PERCENT: 1 % (ref 0–6)
BACTERIA: ABNORMAL /HPF
BANDED NEUTROPHILS RELATIVE PERCENT: 10 % (ref 0–7)
BANDED NEUTROPHILS RELATIVE PERCENT: 12 % (ref 0–7)
BANDED NEUTROPHILS RELATIVE PERCENT: 15 % (ref 0–7)
BANDED NEUTROPHILS RELATIVE PERCENT: 16 % (ref 0–7)
BANDED NEUTROPHILS RELATIVE PERCENT: 18 % (ref 0–7)
BANDED NEUTROPHILS RELATIVE PERCENT: 24 % (ref 0–7)
BANDED NEUTROPHILS RELATIVE PERCENT: 25 % (ref 0–7)
BANDED NEUTROPHILS RELATIVE PERCENT: 25 % (ref 0–7)
BANDED NEUTROPHILS RELATIVE PERCENT: 40 % (ref 0–7)
BANDED NEUTROPHILS RELATIVE PERCENT: 5 % (ref 0–7)
BANDED NEUTROPHILS RELATIVE PERCENT: 9 % (ref 0–7)
BASE EXCESS ARTERIAL: -0.8 MMOL/L (ref -3–3)
BASE EXCESS ARTERIAL: -1 (ref -3–3)
BASE EXCESS ARTERIAL: -1.7 MMOL/L (ref -3–3)
BASE EXCESS ARTERIAL: -2 (ref -3–3)
BASE EXCESS ARTERIAL: -2 (ref -3–3)
BASE EXCESS ARTERIAL: -2.3 MMOL/L (ref -3–3)
BASE EXCESS ARTERIAL: -2.4 MMOL/L (ref -3–3)
BASE EXCESS ARTERIAL: -3 (ref -3–3)
BASE EXCESS ARTERIAL: -4 (ref -3–3)
BASE EXCESS ARTERIAL: -6 (ref -3–3)
BASE EXCESS ARTERIAL: -7 (ref -3–3)
BASE EXCESS ARTERIAL: -7 (ref -3–3)
BASE EXCESS ARTERIAL: 0 (ref -3–3)
BASE EXCESS ARTERIAL: 0 (ref -3–3)
BASE EXCESS ARTERIAL: 0.9 MMOL/L (ref -3–3)
BASE EXCESS ARTERIAL: 1 (ref -3–3)
BASE EXCESS ARTERIAL: 1.4 MMOL/L (ref -3–3)
BASE EXCESS ARTERIAL: 3 (ref -3–3)
BASE EXCESS ARTERIAL: 3 (ref -3–3)
BASE EXCESS VENOUS: -5.1 MMOL/L (ref -3–3)
BASE EXCESS VENOUS: -9.1 MMOL/L (ref -3–3)
BASOPHILS ABSOLUTE: 0 K/UL (ref 0–0.2)
BASOPHILS ABSOLUTE: 0.1 K/UL (ref 0–0.2)
BASOPHILS ABSOLUTE: 0.2 K/UL (ref 0–0.2)
BASOPHILS ABSOLUTE: 0.4 K/UL (ref 0–0.2)
BASOPHILS RELATIVE PERCENT: 0 %
BASOPHILS RELATIVE PERCENT: 0.1 %
BASOPHILS RELATIVE PERCENT: 0.2 %
BASOPHILS RELATIVE PERCENT: 0.4 %
BASOPHILS RELATIVE PERCENT: 0.5 %
BASOPHILS RELATIVE PERCENT: 0.6 %
BASOPHILS RELATIVE PERCENT: 0.7 %
BASOPHILS RELATIVE PERCENT: 0.7 %
BASOPHILS RELATIVE PERCENT: 0.8 %
BASOPHILS RELATIVE PERCENT: 0.9 %
BASOPHILS RELATIVE PERCENT: 1 %
BASOPHILS RELATIVE PERCENT: 1.2 %
BASOPHILS RELATIVE PERCENT: 1.2 %
BASOPHILS RELATIVE PERCENT: 1.4 %
BASOPHILS RELATIVE PERCENT: 1.7 %
BASOPHILS RELATIVE PERCENT: 2 %
BASOPHILS RELATIVE PERCENT: 3 %
BETA-HYDROXYBUTYRATE: 1.47 MMOL/L (ref 0–0.27)
BILIRUB SERPL-MCNC: 0.4 MG/DL (ref 0–1)
BILIRUB SERPL-MCNC: 0.5 MG/DL (ref 0–1)
BILIRUB SERPL-MCNC: 0.6 MG/DL (ref 0–1)
BILIRUB SERPL-MCNC: 0.7 MG/DL (ref 0–1)
BILIRUB SERPL-MCNC: 0.7 MG/DL (ref 0–1)
BILIRUB SERPL-MCNC: 0.8 MG/DL (ref 0–1)
BILIRUB SERPL-MCNC: 0.9 MG/DL (ref 0–1)
BILIRUB SERPL-MCNC: 1 MG/DL (ref 0–1)
BILIRUB SERPL-MCNC: 1.1 MG/DL (ref 0–1)
BILIRUB SERPL-MCNC: 1.2 MG/DL (ref 0–1)
BILIRUB SERPL-MCNC: 1.6 MG/DL (ref 0–1)
BILIRUB SERPL-MCNC: 1.7 MG/DL (ref 0–1)
BILIRUB SERPL-MCNC: 1.9 MG/DL (ref 0–1)
BILIRUB SERPL-MCNC: 2.1 MG/DL (ref 0–1)
BILIRUBIN URINE: NEGATIVE
BLOOD BANK DISPENSE STATUS: NORMAL
BLOOD BANK PRODUCT CODE: NORMAL
BLOOD CULTURE, ROUTINE: ABNORMAL
BLOOD CULTURE, ROUTINE: ABNORMAL
BLOOD CULTURE, ROUTINE: NORMAL
BLOOD, URINE: NEGATIVE
BPU ID: NORMAL
BUN BLDV-MCNC: 12 MG/DL (ref 7–20)
BUN BLDV-MCNC: 14 MG/DL (ref 7–20)
BUN BLDV-MCNC: 15 MG/DL (ref 7–20)
BUN BLDV-MCNC: 15 MG/DL (ref 7–20)
BUN BLDV-MCNC: 16 MG/DL (ref 7–20)
BUN BLDV-MCNC: 17 MG/DL (ref 7–20)
BUN BLDV-MCNC: 18 MG/DL (ref 7–20)
BUN BLDV-MCNC: 19 MG/DL (ref 7–20)
BUN BLDV-MCNC: 20 MG/DL (ref 7–20)
BUN BLDV-MCNC: 20 MG/DL (ref 7–20)
BUN BLDV-MCNC: 21 MG/DL (ref 7–20)
BUN BLDV-MCNC: 21 MG/DL (ref 7–20)
BUN BLDV-MCNC: 22 MG/DL (ref 7–20)
BUN BLDV-MCNC: 23 MG/DL (ref 7–20)
BUN BLDV-MCNC: 24 MG/DL (ref 7–20)
BUN BLDV-MCNC: 24 MG/DL (ref 7–20)
BUN BLDV-MCNC: 25 MG/DL (ref 7–20)
BUN BLDV-MCNC: 28 MG/DL (ref 7–20)
BUN BLDV-MCNC: 28 MG/DL (ref 7–20)
BUN BLDV-MCNC: 29 MG/DL (ref 7–20)
BUN BLDV-MCNC: 30 MG/DL (ref 7–20)
BUN BLDV-MCNC: 31 MG/DL (ref 7–20)
BUN BLDV-MCNC: 32 MG/DL (ref 7–20)
BUN BLDV-MCNC: 32 MG/DL (ref 7–20)
BUN BLDV-MCNC: 33 MG/DL (ref 7–20)
BUN BLDV-MCNC: 35 MG/DL (ref 7–20)
BUN BLDV-MCNC: 36 MG/DL (ref 7–20)
BUN BLDV-MCNC: 37 MG/DL (ref 7–20)
BUN BLDV-MCNC: 38 MG/DL (ref 7–20)
BUN BLDV-MCNC: 40 MG/DL (ref 7–20)
BUN BLDV-MCNC: 40 MG/DL (ref 7–20)
BUN BLDV-MCNC: 41 MG/DL (ref 7–20)
BUN BLDV-MCNC: 41 MG/DL (ref 7–20)
BUN BLDV-MCNC: 42 MG/DL (ref 7–20)
BUN BLDV-MCNC: 43 MG/DL (ref 7–20)
BUN BLDV-MCNC: 52 MG/DL (ref 7–20)
BUN BLDV-MCNC: 54 MG/DL (ref 7–20)
BUN BLDV-MCNC: 60 MG/DL (ref 7–20)
BUN BLDV-MCNC: 63 MG/DL (ref 7–20)
BUN BLDV-MCNC: 73 MG/DL (ref 7–20)
BUN BLDV-MCNC: 79 MG/DL (ref 7–20)
BUN BLDV-MCNC: 84 MG/DL (ref 7–20)
BURR CELLS: ABNORMAL
BURR CELLS: ABNORMAL
C-REACTIVE PROTEIN: 160.6 MG/L (ref 0–5.1)
C-REACTIVE PROTEIN: 70.1 MG/L (ref 0–5.1)
CALCIUM IONIZED: 0.91 MMOL/L (ref 1.12–1.32)
CALCIUM IONIZED: 1.03 MMOL/L (ref 1.12–1.32)
CALCIUM IONIZED: 1.04 MMOL/L (ref 1.12–1.32)
CALCIUM IONIZED: 1.04 MMOL/L (ref 1.12–1.32)
CALCIUM IONIZED: 1.06 MMOL/L (ref 1.12–1.32)
CALCIUM IONIZED: 1.07 MMOL/L (ref 1.12–1.32)
CALCIUM IONIZED: 1.08 MMOL/L (ref 1.12–1.32)
CALCIUM IONIZED: 1.09 MMOL/L (ref 1.12–1.32)
CALCIUM IONIZED: 1.1 MMOL/L (ref 1.12–1.32)
CALCIUM IONIZED: 1.1 MMOL/L (ref 1.12–1.32)
CALCIUM IONIZED: 1.11 MMOL/L (ref 1.12–1.32)
CALCIUM IONIZED: 1.13 MMOL/L (ref 1.12–1.32)
CALCIUM IONIZED: 1.14 MMOL/L (ref 1.12–1.32)
CALCIUM IONIZED: 1.15 MMOL/L (ref 1.12–1.32)
CALCIUM IONIZED: 1.19 MMOL/L (ref 1.12–1.32)
CALCIUM IONIZED: 1.23 MMOL/L (ref 1.12–1.32)
CALCIUM SERPL-MCNC: 10.2 MG/DL (ref 8.3–10.6)
CALCIUM SERPL-MCNC: 10.2 MG/DL (ref 8.3–10.6)
CALCIUM SERPL-MCNC: 5.2 MG/DL (ref 8.3–10.6)
CALCIUM SERPL-MCNC: 6.8 MG/DL (ref 8.3–10.6)
CALCIUM SERPL-MCNC: 7.5 MG/DL (ref 8.3–10.6)
CALCIUM SERPL-MCNC: 7.5 MG/DL (ref 8.3–10.6)
CALCIUM SERPL-MCNC: 7.6 MG/DL (ref 8.3–10.6)
CALCIUM SERPL-MCNC: 7.7 MG/DL (ref 8.3–10.6)
CALCIUM SERPL-MCNC: 7.8 MG/DL (ref 8.3–10.6)
CALCIUM SERPL-MCNC: 7.9 MG/DL (ref 8.3–10.6)
CALCIUM SERPL-MCNC: 7.9 MG/DL (ref 8.3–10.6)
CALCIUM SERPL-MCNC: 8 MG/DL (ref 8.3–10.6)
CALCIUM SERPL-MCNC: 8.1 MG/DL (ref 8.3–10.6)
CALCIUM SERPL-MCNC: 8.2 MG/DL (ref 8.3–10.6)
CALCIUM SERPL-MCNC: 8.3 MG/DL (ref 8.3–10.6)
CALCIUM SERPL-MCNC: 8.4 MG/DL (ref 8.3–10.6)
CALCIUM SERPL-MCNC: 8.5 MG/DL (ref 8.3–10.6)
CALCIUM SERPL-MCNC: 8.6 MG/DL (ref 8.3–10.6)
CALCIUM SERPL-MCNC: 8.7 MG/DL (ref 8.3–10.6)
CALCIUM SERPL-MCNC: 8.8 MG/DL (ref 8.3–10.6)
CALCIUM SERPL-MCNC: 9 MG/DL (ref 8.3–10.6)
CALCIUM SERPL-MCNC: 9 MG/DL (ref 8.3–10.6)
CALCIUM SERPL-MCNC: 9.2 MG/DL (ref 8.3–10.6)
CALCIUM SERPL-MCNC: 9.2 MG/DL (ref 8.3–10.6)
CALCIUM SERPL-MCNC: 9.4 MG/DL (ref 8.3–10.6)
CALCIUM SERPL-MCNC: 9.6 MG/DL (ref 8.3–10.6)
CARBOXYHEMOGLOBIN ARTERIAL: 0.1 % (ref 0–1.5)
CARBOXYHEMOGLOBIN ARTERIAL: 0.3 % (ref 0–1.5)
CARBOXYHEMOGLOBIN ARTERIAL: 0.4 % (ref 0–1.5)
CARBOXYHEMOGLOBIN ARTERIAL: 0.4 % (ref 0–1.5)
CARBOXYHEMOGLOBIN: 2.1 % (ref 0–1.5)
CARBOXYHEMOGLOBIN: 2.3 % (ref 0–1.5)
CASTS: ABNORMAL /LPF
CHLORIDE BLD-SCNC: 100 MMOL/L (ref 99–110)
CHLORIDE BLD-SCNC: 101 MMOL/L (ref 99–110)
CHLORIDE BLD-SCNC: 102 MMOL/L (ref 99–110)
CHLORIDE BLD-SCNC: 102 MMOL/L (ref 99–110)
CHLORIDE BLD-SCNC: 103 MMOL/L (ref 99–110)
CHLORIDE BLD-SCNC: 104 MMOL/L (ref 99–110)
CHLORIDE BLD-SCNC: 105 MMOL/L (ref 99–110)
CHLORIDE BLD-SCNC: 107 MMOL/L (ref 99–110)
CHLORIDE BLD-SCNC: 117 MMOL/L (ref 99–110)
CHLORIDE BLD-SCNC: 89 MMOL/L (ref 99–110)
CHLORIDE BLD-SCNC: 91 MMOL/L (ref 99–110)
CHLORIDE BLD-SCNC: 93 MMOL/L (ref 99–110)
CHLORIDE BLD-SCNC: 93 MMOL/L (ref 99–110)
CHLORIDE BLD-SCNC: 94 MMOL/L (ref 99–110)
CHLORIDE BLD-SCNC: 95 MMOL/L (ref 99–110)
CHLORIDE BLD-SCNC: 96 MMOL/L (ref 99–110)
CHLORIDE BLD-SCNC: 97 MMOL/L (ref 99–110)
CHLORIDE BLD-SCNC: 98 MMOL/L (ref 99–110)
CHLORIDE BLD-SCNC: 99 MMOL/L (ref 99–110)
CLARITY: CLEAR
CLOT EVALUATION CSF: NORMAL
CO2: 14 MMOL/L (ref 21–32)
CO2: 17 MMOL/L (ref 21–32)
CO2: 18 MMOL/L (ref 21–32)
CO2: 19 MMOL/L (ref 21–32)
CO2: 21 MMOL/L (ref 21–32)
CO2: 22 MMOL/L (ref 21–32)
CO2: 23 MMOL/L (ref 21–32)
CO2: 24 MMOL/L (ref 21–32)
CO2: 25 MMOL/L (ref 21–32)
CO2: 26 MMOL/L (ref 21–32)
CO2: 27 MMOL/L (ref 21–32)
CO2: 28 MMOL/L (ref 21–32)
COLOR CSF: COLORLESS
COLOR: YELLOW
CORTISOL TOTAL: 21.4 UG/DL
CREAT SERPL-MCNC: 0.7 MG/DL (ref 0.6–1.2)
CREAT SERPL-MCNC: 0.8 MG/DL (ref 0.6–1.2)
CREAT SERPL-MCNC: 0.9 MG/DL (ref 0.6–1.2)
CREAT SERPL-MCNC: 1 MG/DL (ref 0.6–1.2)
CREAT SERPL-MCNC: 1.1 MG/DL (ref 0.6–1.2)
CREAT SERPL-MCNC: 1.2 MG/DL (ref 0.6–1.2)
CREAT SERPL-MCNC: 1.3 MG/DL (ref 0.6–1.2)
CREAT SERPL-MCNC: 1.4 MG/DL (ref 0.6–1.2)
CREAT SERPL-MCNC: 1.5 MG/DL (ref 0.6–1.2)
CREAT SERPL-MCNC: 1.5 MG/DL (ref 0.6–1.2)
CREAT SERPL-MCNC: 1.6 MG/DL (ref 0.6–1.2)
CREAT SERPL-MCNC: 1.7 MG/DL (ref 0.6–1.2)
CREAT SERPL-MCNC: 1.8 MG/DL (ref 0.6–1.2)
CREAT SERPL-MCNC: 1.9 MG/DL (ref 0.6–1.2)
CREAT SERPL-MCNC: 2.2 MG/DL (ref 0.6–1.2)
CREAT SERPL-MCNC: 2.3 MG/DL (ref 0.6–1.2)
CREAT SERPL-MCNC: 2.3 MG/DL (ref 0.6–1.2)
CREAT SERPL-MCNC: 2.4 MG/DL (ref 0.6–1.2)
CREAT SERPL-MCNC: 2.5 MG/DL (ref 0.6–1.2)
CREAT SERPL-MCNC: 2.8 MG/DL (ref 0.6–1.2)
CREAT SERPL-MCNC: 3 MG/DL (ref 0.6–1.2)
CREAT SERPL-MCNC: 3.1 MG/DL (ref 0.6–1.2)
CREAT SERPL-MCNC: 3.2 MG/DL (ref 0.6–1.2)
CREAT SERPL-MCNC: 3.5 MG/DL (ref 0.6–1.2)
CREAT SERPL-MCNC: <0.5 MG/DL (ref 0.6–1.2)
CRYPTOCOCCAL ANTIGEN CSF: NEGATIVE
CSF CULTURE: NORMAL
CULTURE, BLOOD 2: ABNORMAL
CULTURE, BLOOD 2: NORMAL
CULTURE, RESPIRATORY: ABNORMAL
CULTURE, RESPIRATORY: ABNORMAL
CULTURE, RESPIRATORY: NORMAL
DESCRIPTION BLOOD BANK: NORMAL
EKG ATRIAL RATE: 84 BPM
EKG ATRIAL RATE: 91 BPM
EKG DIAGNOSIS: NORMAL
EKG DIAGNOSIS: NORMAL
EKG P AXIS: 82 DEGREES
EKG P AXIS: 84 DEGREES
EKG P-R INTERVAL: 170 MS
EKG P-R INTERVAL: 194 MS
EKG Q-T INTERVAL: 412 MS
EKG Q-T INTERVAL: 430 MS
EKG QRS DURATION: 134 MS
EKG QRS DURATION: 138 MS
EKG QTC CALCULATION (BAZETT): 506 MS
EKG QTC CALCULATION (BAZETT): 508 MS
EKG R AXIS: -21 DEGREES
EKG R AXIS: 0 DEGREES
EKG T AXIS: 109 DEGREES
EKG T AXIS: 87 DEGREES
EKG VENTRICULAR RATE: 84 BPM
EKG VENTRICULAR RATE: 91 BPM
EOSINOPHILS ABSOLUTE: 0 K/UL (ref 0–0.6)
EOSINOPHILS ABSOLUTE: 0.1 K/UL (ref 0–0.6)
EOSINOPHILS ABSOLUTE: 0.2 K/UL (ref 0–0.6)
EOSINOPHILS ABSOLUTE: 0.3 K/UL (ref 0–0.6)
EOSINOPHILS ABSOLUTE: 0.3 K/UL (ref 0–0.6)
EOSINOPHILS ABSOLUTE: 0.4 K/UL (ref 0–0.6)
EOSINOPHILS ABSOLUTE: 0.5 K/UL (ref 0–0.6)
EOSINOPHILS ABSOLUTE: 0.6 K/UL (ref 0–0.6)
EOSINOPHILS ABSOLUTE: 0.7 K/UL (ref 0–0.6)
EOSINOPHILS ABSOLUTE: 0.7 K/UL (ref 0–0.6)
EOSINOPHILS ABSOLUTE: 0.8 K/UL (ref 0–0.6)
EOSINOPHILS RELATIVE PERCENT: 0 %
EOSINOPHILS RELATIVE PERCENT: 0.7 %
EOSINOPHILS RELATIVE PERCENT: 1 %
EOSINOPHILS RELATIVE PERCENT: 1.2 %
EOSINOPHILS RELATIVE PERCENT: 1.5 %
EOSINOPHILS RELATIVE PERCENT: 2.3 %
EOSINOPHILS RELATIVE PERCENT: 2.5 %
EOSINOPHILS RELATIVE PERCENT: 2.8 %
EOSINOPHILS RELATIVE PERCENT: 3 %
EOSINOPHILS RELATIVE PERCENT: 3.6 %
EOSINOPHILS RELATIVE PERCENT: 4 %
EOSINOPHILS RELATIVE PERCENT: 4.3 %
EOSINOPHILS RELATIVE PERCENT: 4.8 %
EOSINOPHILS RELATIVE PERCENT: 4.9 %
EOSINOPHILS RELATIVE PERCENT: 5 %
EOSINOPHILS RELATIVE PERCENT: 6 %
EPITHELIAL CELLS, UA: ABNORMAL /HPF
ESTIMATED AVERAGE GLUCOSE: 197.3 MG/DL
FERRITIN: 449.8 NG/ML (ref 15–150)
FIBRINOGEN: 251 MG/DL (ref 200–397)
FIBRINOGEN: 370 MG/DL (ref 200–397)
FOLATE: 11.83 NG/ML (ref 4.78–24.2)
FUNGUS (MYCOLOGY) CULTURE: ABNORMAL
FUNGUS (MYCOLOGY) CULTURE: ABNORMAL
FUNGUS STAIN: ABNORMAL
GFR AFRICAN AMERICAN: 15
GFR AFRICAN AMERICAN: 17
GFR AFRICAN AMERICAN: 18
GFR AFRICAN AMERICAN: 18
GFR AFRICAN AMERICAN: 20
GFR AFRICAN AMERICAN: 23
GFR AFRICAN AMERICAN: 24
GFR AFRICAN AMERICAN: 25
GFR AFRICAN AMERICAN: 25
GFR AFRICAN AMERICAN: 26
GFR AFRICAN AMERICAN: 31
GFR AFRICAN AMERICAN: 33
GFR AFRICAN AMERICAN: 35
GFR AFRICAN AMERICAN: 38
GFR AFRICAN AMERICAN: 41
GFR AFRICAN AMERICAN: 41
GFR AFRICAN AMERICAN: 44
GFR AFRICAN AMERICAN: 48
GFR AFRICAN AMERICAN: 53
GFR AFRICAN AMERICAN: 59
GFR AFRICAN AMERICAN: >60
GFR NON-AFRICAN AMERICAN: 13
GFR NON-AFRICAN AMERICAN: 14
GFR NON-AFRICAN AMERICAN: 15
GFR NON-AFRICAN AMERICAN: 15
GFR NON-AFRICAN AMERICAN: 16
GFR NON-AFRICAN AMERICAN: 19
GFR NON-AFRICAN AMERICAN: 20
GFR NON-AFRICAN AMERICAN: 21
GFR NON-AFRICAN AMERICAN: 21
GFR NON-AFRICAN AMERICAN: 22
GFR NON-AFRICAN AMERICAN: 26
GFR NON-AFRICAN AMERICAN: 27
GFR NON-AFRICAN AMERICAN: 29
GFR NON-AFRICAN AMERICAN: 31
GFR NON-AFRICAN AMERICAN: 34
GFR NON-AFRICAN AMERICAN: 34
GFR NON-AFRICAN AMERICAN: 37
GFR NON-AFRICAN AMERICAN: 40
GFR NON-AFRICAN AMERICAN: 44
GFR NON-AFRICAN AMERICAN: 48
GFR NON-AFRICAN AMERICAN: 54
GFR NON-AFRICAN AMERICAN: >60
GLOBULIN: 2.3 G/DL
GLOBULIN: 2.3 G/DL
GLOBULIN: 2.4 G/DL
GLOBULIN: 2.6 G/DL
GLOBULIN: 2.7 G/DL
GLOBULIN: 2.8 G/DL
GLOBULIN: 2.8 G/DL
GLOBULIN: 2.9 G/DL
GLOBULIN: 3 G/DL
GLOBULIN: 3 G/DL
GLOBULIN: 3.2 G/DL
GLOBULIN: 3.3 G/DL
GLOBULIN: 3.4 G/DL
GLOBULIN: 3.5 G/DL
GLOBULIN: 3.6 G/DL
GLOBULIN: 3.9 G/DL
GLOBULIN: 4.1 G/DL
GLUCOSE BLD-MCNC: 100 MG/DL (ref 70–99)
GLUCOSE BLD-MCNC: 101 MG/DL (ref 70–99)
GLUCOSE BLD-MCNC: 102 MG/DL (ref 70–99)
GLUCOSE BLD-MCNC: 103 MG/DL (ref 70–99)
GLUCOSE BLD-MCNC: 103 MG/DL (ref 70–99)
GLUCOSE BLD-MCNC: 104 MG/DL (ref 70–99)
GLUCOSE BLD-MCNC: 105 MG/DL (ref 70–99)
GLUCOSE BLD-MCNC: 110 MG/DL (ref 70–99)
GLUCOSE BLD-MCNC: 111 MG/DL (ref 70–99)
GLUCOSE BLD-MCNC: 112 MG/DL (ref 70–99)
GLUCOSE BLD-MCNC: 113 MG/DL (ref 70–99)
GLUCOSE BLD-MCNC: 113 MG/DL (ref 70–99)
GLUCOSE BLD-MCNC: 116 MG/DL (ref 70–99)
GLUCOSE BLD-MCNC: 117 MG/DL (ref 70–99)
GLUCOSE BLD-MCNC: 122 MG/DL (ref 70–99)
GLUCOSE BLD-MCNC: 122 MG/DL (ref 70–99)
GLUCOSE BLD-MCNC: 123 MG/DL (ref 70–99)
GLUCOSE BLD-MCNC: 123 MG/DL (ref 70–99)
GLUCOSE BLD-MCNC: 124 MG/DL (ref 70–99)
GLUCOSE BLD-MCNC: 128 MG/DL (ref 70–99)
GLUCOSE BLD-MCNC: 128 MG/DL (ref 70–99)
GLUCOSE BLD-MCNC: 129 MG/DL (ref 70–99)
GLUCOSE BLD-MCNC: 130 MG/DL (ref 70–99)
GLUCOSE BLD-MCNC: 130 MG/DL (ref 70–99)
GLUCOSE BLD-MCNC: 131 MG/DL (ref 70–99)
GLUCOSE BLD-MCNC: 132 MG/DL (ref 70–99)
GLUCOSE BLD-MCNC: 132 MG/DL (ref 70–99)
GLUCOSE BLD-MCNC: 133 MG/DL (ref 70–99)
GLUCOSE BLD-MCNC: 134 MG/DL (ref 70–99)
GLUCOSE BLD-MCNC: 135 MG/DL (ref 70–99)
GLUCOSE BLD-MCNC: 136 MG/DL (ref 70–99)
GLUCOSE BLD-MCNC: 137 MG/DL (ref 70–99)
GLUCOSE BLD-MCNC: 137 MG/DL (ref 70–99)
GLUCOSE BLD-MCNC: 138 MG/DL (ref 70–99)
GLUCOSE BLD-MCNC: 139 MG/DL (ref 70–99)
GLUCOSE BLD-MCNC: 141 MG/DL (ref 70–99)
GLUCOSE BLD-MCNC: 141 MG/DL (ref 70–99)
GLUCOSE BLD-MCNC: 144 MG/DL (ref 70–99)
GLUCOSE BLD-MCNC: 144 MG/DL (ref 70–99)
GLUCOSE BLD-MCNC: 145 MG/DL (ref 70–99)
GLUCOSE BLD-MCNC: 146 MG/DL (ref 70–99)
GLUCOSE BLD-MCNC: 147 MG/DL (ref 70–99)
GLUCOSE BLD-MCNC: 148 MG/DL (ref 70–99)
GLUCOSE BLD-MCNC: 149 MG/DL (ref 70–99)
GLUCOSE BLD-MCNC: 150 MG/DL (ref 70–99)
GLUCOSE BLD-MCNC: 153 MG/DL (ref 70–99)
GLUCOSE BLD-MCNC: 155 MG/DL (ref 70–99)
GLUCOSE BLD-MCNC: 157 MG/DL (ref 70–99)
GLUCOSE BLD-MCNC: 159 MG/DL (ref 70–99)
GLUCOSE BLD-MCNC: 160 MG/DL (ref 70–99)
GLUCOSE BLD-MCNC: 160 MG/DL (ref 70–99)
GLUCOSE BLD-MCNC: 161 MG/DL (ref 70–99)
GLUCOSE BLD-MCNC: 162 MG/DL (ref 70–99)
GLUCOSE BLD-MCNC: 163 MG/DL (ref 70–99)
GLUCOSE BLD-MCNC: 165 MG/DL (ref 70–99)
GLUCOSE BLD-MCNC: 167 MG/DL (ref 70–99)
GLUCOSE BLD-MCNC: 167 MG/DL (ref 70–99)
GLUCOSE BLD-MCNC: 168 MG/DL (ref 70–99)
GLUCOSE BLD-MCNC: 169 MG/DL (ref 70–99)
GLUCOSE BLD-MCNC: 170 MG/DL (ref 70–99)
GLUCOSE BLD-MCNC: 171 MG/DL (ref 70–99)
GLUCOSE BLD-MCNC: 171 MG/DL (ref 70–99)
GLUCOSE BLD-MCNC: 172 MG/DL (ref 70–99)
GLUCOSE BLD-MCNC: 173 MG/DL (ref 70–99)
GLUCOSE BLD-MCNC: 174 MG/DL (ref 70–99)
GLUCOSE BLD-MCNC: 174 MG/DL (ref 70–99)
GLUCOSE BLD-MCNC: 175 MG/DL (ref 70–99)
GLUCOSE BLD-MCNC: 175 MG/DL (ref 70–99)
GLUCOSE BLD-MCNC: 176 MG/DL (ref 70–99)
GLUCOSE BLD-MCNC: 177 MG/DL (ref 70–99)
GLUCOSE BLD-MCNC: 178 MG/DL (ref 70–99)
GLUCOSE BLD-MCNC: 179 MG/DL (ref 70–99)
GLUCOSE BLD-MCNC: 179 MG/DL (ref 70–99)
GLUCOSE BLD-MCNC: 180 MG/DL (ref 70–99)
GLUCOSE BLD-MCNC: 180 MG/DL (ref 70–99)
GLUCOSE BLD-MCNC: 182 MG/DL (ref 70–99)
GLUCOSE BLD-MCNC: 182 MG/DL (ref 70–99)
GLUCOSE BLD-MCNC: 183 MG/DL (ref 70–99)
GLUCOSE BLD-MCNC: 183 MG/DL (ref 70–99)
GLUCOSE BLD-MCNC: 184 MG/DL (ref 70–99)
GLUCOSE BLD-MCNC: 185 MG/DL (ref 70–99)
GLUCOSE BLD-MCNC: 186 MG/DL (ref 70–99)
GLUCOSE BLD-MCNC: 187 MG/DL (ref 70–99)
GLUCOSE BLD-MCNC: 189 MG/DL (ref 70–99)
GLUCOSE BLD-MCNC: 190 MG/DL (ref 70–99)
GLUCOSE BLD-MCNC: 190 MG/DL (ref 70–99)
GLUCOSE BLD-MCNC: 191 MG/DL (ref 70–99)
GLUCOSE BLD-MCNC: 192 MG/DL (ref 70–99)
GLUCOSE BLD-MCNC: 195 MG/DL (ref 70–99)
GLUCOSE BLD-MCNC: 195 MG/DL (ref 70–99)
GLUCOSE BLD-MCNC: 196 MG/DL (ref 70–99)
GLUCOSE BLD-MCNC: 196 MG/DL (ref 70–99)
GLUCOSE BLD-MCNC: 197 MG/DL (ref 70–99)
GLUCOSE BLD-MCNC: 197 MG/DL (ref 70–99)
GLUCOSE BLD-MCNC: 198 MG/DL (ref 70–99)
GLUCOSE BLD-MCNC: 198 MG/DL (ref 70–99)
GLUCOSE BLD-MCNC: 199 MG/DL (ref 70–99)
GLUCOSE BLD-MCNC: 200 MG/DL (ref 70–99)
GLUCOSE BLD-MCNC: 202 MG/DL (ref 70–99)
GLUCOSE BLD-MCNC: 203 MG/DL (ref 70–99)
GLUCOSE BLD-MCNC: 204 MG/DL (ref 70–99)
GLUCOSE BLD-MCNC: 206 MG/DL (ref 70–99)
GLUCOSE BLD-MCNC: 207 MG/DL (ref 70–99)
GLUCOSE BLD-MCNC: 214 MG/DL (ref 70–99)
GLUCOSE BLD-MCNC: 215 MG/DL (ref 70–99)
GLUCOSE BLD-MCNC: 217 MG/DL (ref 70–99)
GLUCOSE BLD-MCNC: 217 MG/DL (ref 70–99)
GLUCOSE BLD-MCNC: 223 MG/DL (ref 70–99)
GLUCOSE BLD-MCNC: 224 MG/DL (ref 70–99)
GLUCOSE BLD-MCNC: 225 MG/DL (ref 70–99)
GLUCOSE BLD-MCNC: 226 MG/DL (ref 70–99)
GLUCOSE BLD-MCNC: 229 MG/DL (ref 70–99)
GLUCOSE BLD-MCNC: 229 MG/DL (ref 70–99)
GLUCOSE BLD-MCNC: 231 MG/DL (ref 70–99)
GLUCOSE BLD-MCNC: 231 MG/DL (ref 70–99)
GLUCOSE BLD-MCNC: 232 MG/DL (ref 70–99)
GLUCOSE BLD-MCNC: 232 MG/DL (ref 70–99)
GLUCOSE BLD-MCNC: 234 MG/DL (ref 70–99)
GLUCOSE BLD-MCNC: 235 MG/DL (ref 70–99)
GLUCOSE BLD-MCNC: 236 MG/DL (ref 70–99)
GLUCOSE BLD-MCNC: 238 MG/DL (ref 70–99)
GLUCOSE BLD-MCNC: 239 MG/DL (ref 70–99)
GLUCOSE BLD-MCNC: 245 MG/DL (ref 70–99)
GLUCOSE BLD-MCNC: 247 MG/DL (ref 70–99)
GLUCOSE BLD-MCNC: 248 MG/DL (ref 70–99)
GLUCOSE BLD-MCNC: 257 MG/DL (ref 70–99)
GLUCOSE BLD-MCNC: 257 MG/DL (ref 70–99)
GLUCOSE BLD-MCNC: 262 MG/DL (ref 70–99)
GLUCOSE BLD-MCNC: 263 MG/DL (ref 70–99)
GLUCOSE BLD-MCNC: 267 MG/DL (ref 70–99)
GLUCOSE BLD-MCNC: 271 MG/DL (ref 70–99)
GLUCOSE BLD-MCNC: 276 MG/DL (ref 70–99)
GLUCOSE BLD-MCNC: 280 MG/DL (ref 70–99)
GLUCOSE BLD-MCNC: 280 MG/DL (ref 70–99)
GLUCOSE BLD-MCNC: 286 MG/DL (ref 70–99)
GLUCOSE BLD-MCNC: 289 MG/DL (ref 70–99)
GLUCOSE BLD-MCNC: 298 MG/DL (ref 70–99)
GLUCOSE BLD-MCNC: 300 MG/DL (ref 70–99)
GLUCOSE BLD-MCNC: 302 MG/DL (ref 70–99)
GLUCOSE BLD-MCNC: 305 MG/DL (ref 70–99)
GLUCOSE BLD-MCNC: 354 MG/DL (ref 70–99)
GLUCOSE BLD-MCNC: 360 MG/DL (ref 70–99)
GLUCOSE BLD-MCNC: 386 MG/DL (ref 70–99)
GLUCOSE BLD-MCNC: 394 MG/DL (ref 70–99)
GLUCOSE BLD-MCNC: 419 MG/DL (ref 70–99)
GLUCOSE BLD-MCNC: 437 MG/DL (ref 70–99)
GLUCOSE BLD-MCNC: 443 MG/DL (ref 70–99)
GLUCOSE BLD-MCNC: 459 MG/DL (ref 70–99)
GLUCOSE BLD-MCNC: 467 MG/DL (ref 70–99)
GLUCOSE BLD-MCNC: 467 MG/DL (ref 70–99)
GLUCOSE BLD-MCNC: 486 MG/DL (ref 70–99)
GLUCOSE BLD-MCNC: 493 MG/DL (ref 70–99)
GLUCOSE BLD-MCNC: 498 MG/DL (ref 70–99)
GLUCOSE BLD-MCNC: 504 MG/DL (ref 70–99)
GLUCOSE BLD-MCNC: 508 MG/DL (ref 70–99)
GLUCOSE BLD-MCNC: 509 MG/DL (ref 70–99)
GLUCOSE BLD-MCNC: 517 MG/DL (ref 70–99)
GLUCOSE BLD-MCNC: 521 MG/DL (ref 70–99)
GLUCOSE BLD-MCNC: 525 MG/DL (ref 70–99)
GLUCOSE BLD-MCNC: 590 MG/DL (ref 70–99)
GLUCOSE BLD-MCNC: 647 MG/DL (ref 70–99)
GLUCOSE BLD-MCNC: 72 MG/DL (ref 70–99)
GLUCOSE BLD-MCNC: 75 MG/DL (ref 70–99)
GLUCOSE BLD-MCNC: 77 MG/DL (ref 70–99)
GLUCOSE BLD-MCNC: 80 MG/DL (ref 70–99)
GLUCOSE BLD-MCNC: 84 MG/DL (ref 70–99)
GLUCOSE BLD-MCNC: 88 MG/DL (ref 70–99)
GLUCOSE BLD-MCNC: 91 MG/DL (ref 70–99)
GLUCOSE BLD-MCNC: 93 MG/DL (ref 70–99)
GLUCOSE BLD-MCNC: 94 MG/DL (ref 70–99)
GLUCOSE BLD-MCNC: 95 MG/DL (ref 70–99)
GLUCOSE BLD-MCNC: 95 MG/DL (ref 70–99)
GLUCOSE BLD-MCNC: 96 MG/DL (ref 70–99)
GLUCOSE BLD-MCNC: 97 MG/DL (ref 70–99)
GLUCOSE URINE: 500 MG/DL
GLUCOSE, CSF: 84 MG/DL (ref 40–80)
GRAM STAIN RESULT: ABNORMAL
GRAM STAIN RESULT: NORMAL
GRAM STAIN RESULT: NORMAL
HAPTOGLOBIN: 60 MG/DL (ref 30–200)
HAV IGM SER IA-ACNC: NORMAL
HBA1C MFR BLD: 8.5 %
HCO3 ARTERIAL: 18.7 MMOL/L (ref 21–29)
HCO3 ARTERIAL: 19.2 MMOL/L (ref 21–29)
HCO3 ARTERIAL: 19.7 MMOL/L (ref 21–29)
HCO3 ARTERIAL: 23 MMOL/L (ref 21–29)
HCO3 ARTERIAL: 23 MMOL/L (ref 21–29)
HCO3 ARTERIAL: 23.3 MMOL/L (ref 21–29)
HCO3 ARTERIAL: 24 MMOL/L (ref 21–29)
HCO3 ARTERIAL: 24 MMOL/L (ref 21–29)
HCO3 ARTERIAL: 24.4 MMOL/L (ref 21–29)
HCO3 ARTERIAL: 24.8 MMOL/L (ref 21–29)
HCO3 ARTERIAL: 24.9 MMOL/L (ref 21–29)
HCO3 ARTERIAL: 25 MMOL/L (ref 21–29)
HCO3 ARTERIAL: 25 MMOL/L (ref 21–29)
HCO3 ARTERIAL: 25.2 MMOL/L (ref 21–29)
HCO3 ARTERIAL: 25.7 MMOL/L (ref 21–29)
HCO3 ARTERIAL: 26.1 MMOL/L (ref 21–29)
HCO3 ARTERIAL: 26.8 MMOL/L (ref 21–29)
HCO3 ARTERIAL: 27.1 MMOL/L (ref 21–29)
HCO3 ARTERIAL: 27.1 MMOL/L (ref 21–29)
HCO3 VENOUS: 22.2 MMOL/L (ref 23–29)
HCO3 VENOUS: 23.8 MMOL/L (ref 23–29)
HCT VFR BLD CALC: 18.3 % (ref 36–48)
HCT VFR BLD CALC: 18.8 % (ref 36–48)
HCT VFR BLD CALC: 19.9 % (ref 36–48)
HCT VFR BLD CALC: 20.5 % (ref 36–48)
HCT VFR BLD CALC: 21.1 % (ref 36–48)
HCT VFR BLD CALC: 21.4 % (ref 36–48)
HCT VFR BLD CALC: 21.4 % (ref 36–48)
HCT VFR BLD CALC: 22.1 % (ref 36–48)
HCT VFR BLD CALC: 22.4 % (ref 36–48)
HCT VFR BLD CALC: 22.6 % (ref 36–48)
HCT VFR BLD CALC: 22.6 % (ref 36–48)
HCT VFR BLD CALC: 22.9 % (ref 36–48)
HCT VFR BLD CALC: 23 % (ref 36–48)
HCT VFR BLD CALC: 23.2 % (ref 36–48)
HCT VFR BLD CALC: 23.3 % (ref 36–48)
HCT VFR BLD CALC: 23.4 % (ref 36–48)
HCT VFR BLD CALC: 23.6 % (ref 36–48)
HCT VFR BLD CALC: 23.8 % (ref 36–48)
HCT VFR BLD CALC: 23.8 % (ref 36–48)
HCT VFR BLD CALC: 24 % (ref 36–48)
HCT VFR BLD CALC: 24.1 % (ref 36–48)
HCT VFR BLD CALC: 24.3 % (ref 36–48)
HCT VFR BLD CALC: 24.4 % (ref 36–48)
HCT VFR BLD CALC: 24.7 % (ref 36–48)
HCT VFR BLD CALC: 25 % (ref 36–48)
HCT VFR BLD CALC: 25.1 % (ref 36–48)
HCT VFR BLD CALC: 25.7 % (ref 36–48)
HCT VFR BLD CALC: 26.3 % (ref 36–48)
HCT VFR BLD CALC: 31.5 % (ref 36–48)
HCT VFR BLD CALC: 31.6 % (ref 36–48)
HCT VFR BLD CALC: 34.9 % (ref 36–48)
HCT VFR BLD CALC: 39.7 % (ref 36–48)
HEMATOLOGY PATH CONSULT: NO
HEMATOLOGY PATH CONSULT: NORMAL
HEMATOLOGY PATH CONSULT: YES
HEMOGLOBIN, ART, EXTENDED: 8 G/DL (ref 12–16)
HEMOGLOBIN, ART, EXTENDED: 8 G/DL (ref 12–16)
HEMOGLOBIN, ART, EXTENDED: 8.1 G/DL (ref 12–16)
HEMOGLOBIN, ART, EXTENDED: 8.8 G/DL (ref 12–16)
HEMOGLOBIN, ART, EXTENDED: 8.9 G/DL (ref 12–16)
HEMOGLOBIN, ART, EXTENDED: 9.2 G/DL (ref 12–16)
HEMOGLOBIN: 10 G/DL (ref 12–16)
HEMOGLOBIN: 10.5 G/DL (ref 12–16)
HEMOGLOBIN: 10.7 GM/DL (ref 12–16)
HEMOGLOBIN: 11.2 G/DL (ref 12–16)
HEMOGLOBIN: 13 G/DL (ref 12–16)
HEMOGLOBIN: 5.9 G/DL (ref 12–16)
HEMOGLOBIN: 6.1 G/DL (ref 12–16)
HEMOGLOBIN: 6.5 G/DL (ref 12–16)
HEMOGLOBIN: 6.6 G/DL (ref 12–16)
HEMOGLOBIN: 6.9 G/DL (ref 12–16)
HEMOGLOBIN: 7 G/DL (ref 12–16)
HEMOGLOBIN: 7 G/DL (ref 12–16)
HEMOGLOBIN: 7.4 G/DL (ref 12–16)
HEMOGLOBIN: 7.5 G/DL (ref 12–16)
HEMOGLOBIN: 7.5 G/DL (ref 12–16)
HEMOGLOBIN: 7.6 G/DL (ref 12–16)
HEMOGLOBIN: 7.6 GM/DL (ref 12–16)
HEMOGLOBIN: 7.8 G/DL (ref 12–16)
HEMOGLOBIN: 8 G/DL (ref 12–16)
HEMOGLOBIN: 8 G/DL (ref 12–16)
HEMOGLOBIN: 8.1 G/DL (ref 12–16)
HEMOGLOBIN: 8.3 G/DL (ref 12–16)
HEMOGLOBIN: 8.4 G/DL (ref 12–16)
HEMOGLOBIN: 8.5 G/DL (ref 12–16)
HEMOGLOBIN: 8.5 GM/DL (ref 12–16)
HEPARIN INDUCED PLATELET ANTIBODY: POSITIVE
HEPARIN UNFRACTIONATED: NEGATIVE
HEPATITIS B CORE IGM ANTIBODY: NORMAL
HEPATITIS B SURFACE ANTIGEN INTERPRETATION: NORMAL
HYPOCHROMIA: ABNORMAL
INFLUENZA A BY PCR: NOT DETECTED
INFLUENZA B BY PCR: NOT DETECTED
INR BLD: 0.91 (ref 0.86–1.14)
INR BLD: 0.96 (ref 0.86–1.14)
INR BLD: 0.97 (ref 0.86–1.14)
INR BLD: 0.97 (ref 0.86–1.14)
INR BLD: 1.03 (ref 0.86–1.14)
INR BLD: 1.05 (ref 0.86–1.14)
INR BLD: 1.06 (ref 0.86–1.14)
INR BLD: 1.07 (ref 0.86–1.14)
INR BLD: 1.07 (ref 0.86–1.14)
INR BLD: 1.08 (ref 0.86–1.14)
INR BLD: 1.12 (ref 0.86–1.14)
INR BLD: 1.13 (ref 0.86–1.14)
INR BLD: 1.13 (ref 0.86–1.14)
INR BLD: 1.16 (ref 0.86–1.14)
INR BLD: 1.19 (ref 0.86–1.14)
INR BLD: 1.39 (ref 0.86–1.14)
INR BLD: 1.39 (ref 0.86–1.14)
INR BLD: 1.43 (ref 0.86–1.14)
INR BLD: 1.65 (ref 0.86–1.14)
INR BLD: 1.71 (ref 0.86–1.14)
INR BLD: 1.82 (ref 0.86–1.14)
INR BLD: 1.82 (ref 0.86–1.14)
INR BLD: 2.32 (ref 0.86–1.14)
IRON SATURATION: 13 % (ref 15–50)
IRON: 20 UG/DL (ref 37–145)
KETONES, URINE: NEGATIVE MG/DL
LACTATE DEHYDROGENASE: 649 U/L (ref 100–190)
LACTATE: 0.75 MMOL/L (ref 0.4–2)
LACTATE: 1 MMOL/L (ref 0.4–2)
LACTATE: 1.87 MMOL/L (ref 0.4–2)
LACTATE: 2.01 MMOL/L (ref 0.4–2)
LACTATE: 2.26 MMOL/L (ref 0.4–2)
LACTATE: 4.88 MMOL/L (ref 0.4–2)
LACTATE: 7.39 MMOL/L (ref 0.4–2)
LACTATE: 7.93 MMOL/L (ref 0.4–2)
LACTIC ACID, SEPSIS: 2 MMOL/L (ref 0.4–1.9)
LACTIC ACID, SEPSIS: 3.2 MMOL/L (ref 0.4–1.9)
LACTIC ACID, SEPSIS: 5 MMOL/L (ref 0.4–1.9)
LACTIC ACID, SEPSIS: 5.8 MMOL/L (ref 0.4–1.9)
LACTIC ACID: 0.9 MMOL/L (ref 0.4–2)
LACTIC ACID: 1.1 MMOL/L (ref 0.4–2)
LACTIC ACID: 1.4 MMOL/L (ref 0.4–2)
LACTIC ACID: 2.2 MMOL/L (ref 0.4–2)
LACTIC ACID: 3.6 MMOL/L (ref 0.4–2)
LACTIC ACID: 3.7 MMOL/L (ref 0.4–2)
LEUKOCYTE ESTERASE, URINE: NEGATIVE
LIPASE: 9 U/L (ref 13–60)
LV EF: 58 %
LVEF MODALITY: NORMAL
LYMPHOCYTES ABSOLUTE: 0.2 K/UL (ref 1–5.1)
LYMPHOCYTES ABSOLUTE: 0.3 K/UL (ref 1–5.1)
LYMPHOCYTES ABSOLUTE: 0.3 K/UL (ref 1–5.1)
LYMPHOCYTES ABSOLUTE: 0.4 K/UL (ref 1–5.1)
LYMPHOCYTES ABSOLUTE: 0.7 K/UL (ref 1–5.1)
LYMPHOCYTES ABSOLUTE: 0.8 K/UL (ref 1–5.1)
LYMPHOCYTES ABSOLUTE: 0.8 K/UL (ref 1–5.1)
LYMPHOCYTES ABSOLUTE: 0.9 K/UL (ref 1–5.1)
LYMPHOCYTES ABSOLUTE: 1 K/UL (ref 1–5.1)
LYMPHOCYTES ABSOLUTE: 1.1 K/UL (ref 1–5.1)
LYMPHOCYTES ABSOLUTE: 1.2 K/UL (ref 1–5.1)
LYMPHOCYTES ABSOLUTE: 1.3 K/UL (ref 1–5.1)
LYMPHOCYTES ABSOLUTE: 1.4 K/UL (ref 1–5.1)
LYMPHOCYTES ABSOLUTE: 1.4 K/UL (ref 1–5.1)
LYMPHOCYTES ABSOLUTE: 1.9 K/UL (ref 1–5.1)
LYMPHOCYTES ABSOLUTE: 2.1 K/UL (ref 1–5.1)
LYMPHOCYTES ABSOLUTE: 2.1 K/UL (ref 1–5.1)
LYMPHOCYTES RELATIVE PERCENT: 1 %
LYMPHOCYTES RELATIVE PERCENT: 11 %
LYMPHOCYTES RELATIVE PERCENT: 11 %
LYMPHOCYTES RELATIVE PERCENT: 11.2 %
LYMPHOCYTES RELATIVE PERCENT: 13.1 %
LYMPHOCYTES RELATIVE PERCENT: 13.9 %
LYMPHOCYTES RELATIVE PERCENT: 14.1 %
LYMPHOCYTES RELATIVE PERCENT: 16.5 %
LYMPHOCYTES RELATIVE PERCENT: 19 %
LYMPHOCYTES RELATIVE PERCENT: 3 %
LYMPHOCYTES RELATIVE PERCENT: 3.3 %
LYMPHOCYTES RELATIVE PERCENT: 4 %
LYMPHOCYTES RELATIVE PERCENT: 4.9 %
LYMPHOCYTES RELATIVE PERCENT: 5 %
LYMPHOCYTES RELATIVE PERCENT: 5.4 %
LYMPHOCYTES RELATIVE PERCENT: 5.8 %
LYMPHOCYTES RELATIVE PERCENT: 6 %
LYMPHOCYTES RELATIVE PERCENT: 7 %
LYMPHOCYTES RELATIVE PERCENT: 7 %
LYMPHOCYTES RELATIVE PERCENT: 7.1 %
LYMPHOCYTES RELATIVE PERCENT: 7.3 %
LYMPHOCYTES RELATIVE PERCENT: 7.4 %
LYMPHOCYTES RELATIVE PERCENT: 7.9 %
LYMPHOCYTES RELATIVE PERCENT: 8 %
LYMPHOCYTES RELATIVE PERCENT: 9.9 %
MACROCYTES: ABNORMAL
MAGNESIUM: 2 MG/DL (ref 1.8–2.4)
MAGNESIUM: 2.1 MG/DL (ref 1.8–2.4)
MAGNESIUM: 2.3 MG/DL (ref 1.8–2.4)
MAGNESIUM: 2.4 MG/DL (ref 1.8–2.4)
MAGNESIUM: 2.5 MG/DL (ref 1.8–2.4)
MAGNESIUM: 2.6 MG/DL (ref 1.8–2.4)
MAGNESIUM: 2.6 MG/DL (ref 1.8–2.4)
MAGNESIUM: 2.7 MG/DL (ref 1.8–2.4)
MAGNESIUM: 2.7 MG/DL (ref 1.8–2.4)
MAGNESIUM: 2.8 MG/DL (ref 1.8–2.4)
MAGNESIUM: 2.9 MG/DL (ref 1.8–2.4)
MAGNESIUM: 2.9 MG/DL (ref 1.8–2.4)
MAGNESIUM: 3.3 MG/DL (ref 1.8–2.4)
MCH RBC QN AUTO: 27.2 PG (ref 26–34)
MCH RBC QN AUTO: 27.4 PG (ref 26–34)
MCH RBC QN AUTO: 27.5 PG (ref 26–34)
MCH RBC QN AUTO: 27.7 PG (ref 26–34)
MCH RBC QN AUTO: 27.8 PG (ref 26–34)
MCH RBC QN AUTO: 28 PG (ref 26–34)
MCH RBC QN AUTO: 28.1 PG (ref 26–34)
MCH RBC QN AUTO: 28.1 PG (ref 26–34)
MCH RBC QN AUTO: 28.5 PG (ref 26–34)
MCH RBC QN AUTO: 28.8 PG (ref 26–34)
MCH RBC QN AUTO: 29.1 PG (ref 26–34)
MCH RBC QN AUTO: 29.1 PG (ref 26–34)
MCH RBC QN AUTO: 29.2 PG (ref 26–34)
MCH RBC QN AUTO: 29.2 PG (ref 26–34)
MCH RBC QN AUTO: 29.6 PG (ref 26–34)
MCH RBC QN AUTO: 29.6 PG (ref 26–34)
MCH RBC QN AUTO: 29.7 PG (ref 26–34)
MCH RBC QN AUTO: 29.8 PG (ref 26–34)
MCH RBC QN AUTO: 29.9 PG (ref 26–34)
MCH RBC QN AUTO: 30 PG (ref 26–34)
MCH RBC QN AUTO: 30.2 PG (ref 26–34)
MCHC RBC AUTO-ENTMCNC: 31.7 G/DL (ref 31–36)
MCHC RBC AUTO-ENTMCNC: 31.8 G/DL (ref 31–36)
MCHC RBC AUTO-ENTMCNC: 32 G/DL (ref 31–36)
MCHC RBC AUTO-ENTMCNC: 32 G/DL (ref 31–36)
MCHC RBC AUTO-ENTMCNC: 32.1 G/DL (ref 31–36)
MCHC RBC AUTO-ENTMCNC: 32.1 G/DL (ref 31–36)
MCHC RBC AUTO-ENTMCNC: 32.3 G/DL (ref 31–36)
MCHC RBC AUTO-ENTMCNC: 32.3 G/DL (ref 31–36)
MCHC RBC AUTO-ENTMCNC: 32.4 G/DL (ref 31–36)
MCHC RBC AUTO-ENTMCNC: 32.4 G/DL (ref 31–36)
MCHC RBC AUTO-ENTMCNC: 32.5 G/DL (ref 31–36)
MCHC RBC AUTO-ENTMCNC: 32.7 G/DL (ref 31–36)
MCHC RBC AUTO-ENTMCNC: 32.8 G/DL (ref 31–36)
MCHC RBC AUTO-ENTMCNC: 32.9 G/DL (ref 31–36)
MCHC RBC AUTO-ENTMCNC: 33 G/DL (ref 31–36)
MCHC RBC AUTO-ENTMCNC: 33.1 G/DL (ref 31–36)
MCHC RBC AUTO-ENTMCNC: 33.1 G/DL (ref 31–36)
MCHC RBC AUTO-ENTMCNC: 33.3 G/DL (ref 31–36)
MCHC RBC AUTO-ENTMCNC: 33.5 G/DL (ref 31–36)
MCHC RBC AUTO-ENTMCNC: 33.5 G/DL (ref 31–36)
MCV RBC AUTO: 82.8 FL (ref 80–100)
MCV RBC AUTO: 83.6 FL (ref 80–100)
MCV RBC AUTO: 83.9 FL (ref 80–100)
MCV RBC AUTO: 84.4 FL (ref 80–100)
MCV RBC AUTO: 84.6 FL (ref 80–100)
MCV RBC AUTO: 84.9 FL (ref 80–100)
MCV RBC AUTO: 85 FL (ref 80–100)
MCV RBC AUTO: 85.5 FL (ref 80–100)
MCV RBC AUTO: 85.7 FL (ref 80–100)
MCV RBC AUTO: 86.3 FL (ref 80–100)
MCV RBC AUTO: 86.5 FL (ref 80–100)
MCV RBC AUTO: 86.9 FL (ref 80–100)
MCV RBC AUTO: 87.2 FL (ref 80–100)
MCV RBC AUTO: 87.7 FL (ref 80–100)
MCV RBC AUTO: 87.8 FL (ref 80–100)
MCV RBC AUTO: 87.8 FL (ref 80–100)
MCV RBC AUTO: 88 FL (ref 80–100)
MCV RBC AUTO: 88 FL (ref 80–100)
MCV RBC AUTO: 88.1 FL (ref 80–100)
MCV RBC AUTO: 88.8 FL (ref 80–100)
MCV RBC AUTO: 89.4 FL (ref 80–100)
MCV RBC AUTO: 90.1 FL (ref 80–100)
MCV RBC AUTO: 90.3 FL (ref 80–100)
MCV RBC AUTO: 90.6 FL (ref 80–100)
MCV RBC AUTO: 91 FL (ref 80–100)
MCV RBC AUTO: 91 FL (ref 80–100)
MCV RBC AUTO: 91.6 FL (ref 80–100)
MCV RBC AUTO: 92.4 FL (ref 80–100)
MENINGITIS ENCEPHALITIS PANEL: NORMAL
METAMYELOCYTES RELATIVE PERCENT: 1 %
METAMYELOCYTES RELATIVE PERCENT: 1 %
METAMYELOCYTES RELATIVE PERCENT: 2 %
METHEMOGLOBIN ARTERIAL: 0.6 %
METHEMOGLOBIN ARTERIAL: 0.7 %
METHEMOGLOBIN ARTERIAL: 0.9 %
METHEMOGLOBIN ARTERIAL: 1.1 %
METHEMOGLOBIN VENOUS: 0.6 %
METHEMOGLOBIN VENOUS: 0.7 %
MICROCYTES: ABNORMAL
MICROSCOPIC EXAMINATION: YES
MONOCYTES ABSOLUTE: 0.2 K/UL (ref 0–1.3)
MONOCYTES ABSOLUTE: 0.3 K/UL (ref 0–1.3)
MONOCYTES ABSOLUTE: 0.5 K/UL (ref 0–1.3)
MONOCYTES ABSOLUTE: 0.5 K/UL (ref 0–1.3)
MONOCYTES ABSOLUTE: 0.6 K/UL (ref 0–1.3)
MONOCYTES ABSOLUTE: 0.7 K/UL (ref 0–1.3)
MONOCYTES ABSOLUTE: 0.8 K/UL (ref 0–1.3)
MONOCYTES ABSOLUTE: 0.9 K/UL (ref 0–1.3)
MONOCYTES ABSOLUTE: 1 K/UL (ref 0–1.3)
MONOCYTES ABSOLUTE: 1 K/UL (ref 0–1.3)
MONOCYTES ABSOLUTE: 1.1 K/UL (ref 0–1.3)
MONOCYTES ABSOLUTE: 1.2 K/UL (ref 0–1.3)
MONOCYTES ABSOLUTE: 1.2 K/UL (ref 0–1.3)
MONOCYTES ABSOLUTE: 1.3 K/UL (ref 0–1.3)
MONOCYTES ABSOLUTE: 1.5 K/UL (ref 0–1.3)
MONOCYTES ABSOLUTE: 1.6 K/UL (ref 0–1.3)
MONOCYTES ABSOLUTE: 2.2 K/UL (ref 0–1.3)
MONOCYTES ABSOLUTE: 3.4 K/UL (ref 0–1.3)
MONOCYTES RELATIVE PERCENT: 10 %
MONOCYTES RELATIVE PERCENT: 10.9 %
MONOCYTES RELATIVE PERCENT: 11 %
MONOCYTES RELATIVE PERCENT: 11.1 %
MONOCYTES RELATIVE PERCENT: 11.4 %
MONOCYTES RELATIVE PERCENT: 12 %
MONOCYTES RELATIVE PERCENT: 15 %
MONOCYTES RELATIVE PERCENT: 2 %
MONOCYTES RELATIVE PERCENT: 2 %
MONOCYTES RELATIVE PERCENT: 3.1 %
MONOCYTES RELATIVE PERCENT: 4 %
MONOCYTES RELATIVE PERCENT: 5 %
MONOCYTES RELATIVE PERCENT: 5.7 %
MONOCYTES RELATIVE PERCENT: 6.4 %
MONOCYTES RELATIVE PERCENT: 7 %
MONOCYTES RELATIVE PERCENT: 7.1 %
MONOCYTES RELATIVE PERCENT: 7.1 %
MONOCYTES RELATIVE PERCENT: 7.7 %
MONOCYTES RELATIVE PERCENT: 8 %
MONOCYTES RELATIVE PERCENT: 8.9 %
MONOCYTES RELATIVE PERCENT: 8.9 %
MONOCYTES RELATIVE PERCENT: 9 %
MYELOCYTE PERCENT: 1 %
MYELOCYTE PERCENT: 2 %
MYELOCYTE PERCENT: 4 %
NEUTROPHILS ABSOLUTE: 10.6 K/UL (ref 1.7–7.7)
NEUTROPHILS ABSOLUTE: 11.4 K/UL (ref 1.7–7.7)
NEUTROPHILS ABSOLUTE: 11.8 K/UL (ref 1.7–7.7)
NEUTROPHILS ABSOLUTE: 12.2 K/UL (ref 1.7–7.7)
NEUTROPHILS ABSOLUTE: 12.6 K/UL (ref 1.7–7.7)
NEUTROPHILS ABSOLUTE: 14.1 K/UL (ref 1.7–7.7)
NEUTROPHILS ABSOLUTE: 14.3 K/UL (ref 1.7–7.7)
NEUTROPHILS ABSOLUTE: 17.8 K/UL (ref 1.7–7.7)
NEUTROPHILS ABSOLUTE: 17.9 K/UL (ref 1.7–7.7)
NEUTROPHILS ABSOLUTE: 25.4 K/UL (ref 1.7–7.7)
NEUTROPHILS ABSOLUTE: 26.6 K/UL (ref 1.7–7.7)
NEUTROPHILS ABSOLUTE: 32.8 K/UL (ref 1.7–7.7)
NEUTROPHILS ABSOLUTE: 5 K/UL (ref 1.7–7.7)
NEUTROPHILS ABSOLUTE: 5.5 K/UL (ref 1.7–7.7)
NEUTROPHILS ABSOLUTE: 5.6 K/UL (ref 1.7–7.7)
NEUTROPHILS ABSOLUTE: 5.7 K/UL (ref 1.7–7.7)
NEUTROPHILS ABSOLUTE: 7.2 K/UL (ref 1.7–7.7)
NEUTROPHILS ABSOLUTE: 7.3 K/UL (ref 1.7–7.7)
NEUTROPHILS ABSOLUTE: 7.5 K/UL (ref 1.7–7.7)
NEUTROPHILS ABSOLUTE: 7.8 K/UL (ref 1.7–7.7)
NEUTROPHILS ABSOLUTE: 8.2 K/UL (ref 1.7–7.7)
NEUTROPHILS ABSOLUTE: 8.4 K/UL (ref 1.7–7.7)
NEUTROPHILS ABSOLUTE: 8.4 K/UL (ref 1.7–7.7)
NEUTROPHILS ABSOLUTE: 8.5 K/UL (ref 1.7–7.7)
NEUTROPHILS ABSOLUTE: 9 K/UL (ref 1.7–7.7)
NEUTROPHILS ABSOLUTE: 9.1 K/UL (ref 1.7–7.7)
NEUTROPHILS ABSOLUTE: 9.2 K/UL (ref 1.7–7.7)
NEUTROPHILS RELATIVE PERCENT: 47 %
NEUTROPHILS RELATIVE PERCENT: 53 %
NEUTROPHILS RELATIVE PERCENT: 55 %
NEUTROPHILS RELATIVE PERCENT: 60 %
NEUTROPHILS RELATIVE PERCENT: 61 %
NEUTROPHILS RELATIVE PERCENT: 63 %
NEUTROPHILS RELATIVE PERCENT: 63 %
NEUTROPHILS RELATIVE PERCENT: 66 %
NEUTROPHILS RELATIVE PERCENT: 66 %
NEUTROPHILS RELATIVE PERCENT: 69 %
NEUTROPHILS RELATIVE PERCENT: 71 %
NEUTROPHILS RELATIVE PERCENT: 73 %
NEUTROPHILS RELATIVE PERCENT: 73.1 %
NEUTROPHILS RELATIVE PERCENT: 73.2 %
NEUTROPHILS RELATIVE PERCENT: 74.3 %
NEUTROPHILS RELATIVE PERCENT: 75.3 %
NEUTROPHILS RELATIVE PERCENT: 75.6 %
NEUTROPHILS RELATIVE PERCENT: 76 %
NEUTROPHILS RELATIVE PERCENT: 77.3 %
NEUTROPHILS RELATIVE PERCENT: 78.9 %
NEUTROPHILS RELATIVE PERCENT: 81.7 %
NEUTROPHILS RELATIVE PERCENT: 82.1 %
NEUTROPHILS RELATIVE PERCENT: 82.8 %
NEUTROPHILS RELATIVE PERCENT: 85.1 %
NEUTROPHILS RELATIVE PERCENT: 86.5 %
NEUTROPHILS RELATIVE PERCENT: 87.6 %
NEUTROPHILS RELATIVE PERCENT: 93.4 %
NITRITE, URINE: NEGATIVE
NO DIFFERENTIAL CSF: NORMAL
NUCLEATED RED BLOOD CELLS: 1 /100 WBC
NUCLEATED RED BLOOD CELLS: 2 /100 WBC
NUCLEATED RED BLOOD CELLS: 2 /100 WBC
NUCLEATED RED BLOOD CELLS: 3 /100 WBC
NUCLEATED RED BLOOD CELLS: 3 /100 WBC
O2 CONTENT ARTERIAL: 11 ML/DL
O2 CONTENT ARTERIAL: 12 ML/DL
O2 CONTENT, VEN: 14 VOL %
O2 CONTENT, VEN: 17 VOL %
O2 SAT, ARTERIAL: 100 % (ref 93–100)
O2 SAT, ARTERIAL: 100 % (ref 93–100)
O2 SAT, ARTERIAL: 84.3 %
O2 SAT, ARTERIAL: 85 % (ref 93–100)
O2 SAT, ARTERIAL: 87 % (ref 93–100)
O2 SAT, ARTERIAL: 91.3 %
O2 SAT, ARTERIAL: 93 % (ref 93–100)
O2 SAT, ARTERIAL: 94.6 %
O2 SAT, ARTERIAL: 95 % (ref 93–100)
O2 SAT, ARTERIAL: 95 % (ref 93–100)
O2 SAT, ARTERIAL: 96 % (ref 93–100)
O2 SAT, ARTERIAL: 96 % (ref 93–100)
O2 SAT, ARTERIAL: 97 % (ref 93–100)
O2 SAT, ARTERIAL: 97.4 %
O2 SAT, ARTERIAL: 97.9 %
O2 SAT, ARTERIAL: 98.1 %
O2 SAT, ARTERIAL: 99 % (ref 93–100)
O2 SAT, VEN: 96 %
O2 SAT, VEN: 99 %
O2 THERAPY: ABNORMAL
OCCULT BLOOD SCREENING: ABNORMAL
ORGANISM: ABNORMAL
OVALOCYTES: ABNORMAL
PCO2 ARTERIAL: 34.5 MM HG (ref 35–45)
PCO2 ARTERIAL: 35.8 MMHG (ref 35–45)
PCO2 ARTERIAL: 36 MM HG (ref 35–45)
PCO2 ARTERIAL: 36.6 MM HG (ref 35–45)
PCO2 ARTERIAL: 37.2 MM HG (ref 35–45)
PCO2 ARTERIAL: 39.4 MMHG (ref 35–45)
PCO2 ARTERIAL: 39.6 MM HG (ref 35–45)
PCO2 ARTERIAL: 39.9 MM HG (ref 35–45)
PCO2 ARTERIAL: 41.2 MM HG (ref 35–45)
PCO2 ARTERIAL: 41.4 MM HG (ref 35–45)
PCO2 ARTERIAL: 43.8 MM HG (ref 35–45)
PCO2 ARTERIAL: 46.6 MM HG (ref 35–45)
PCO2 ARTERIAL: 47.3 MM HG (ref 35–45)
PCO2 ARTERIAL: 48.9 MMHG (ref 35–45)
PCO2 ARTERIAL: 49.5 MMHG (ref 35–45)
PCO2 ARTERIAL: 50.8 MMHG (ref 35–45)
PCO2 ARTERIAL: 52.1 MM HG (ref 35–45)
PCO2 ARTERIAL: 57.4 MMHG (ref 35–45)
PCO2 ARTERIAL: 60.7 MM HG (ref 35–45)
PCO2, VEN: 65.1 MMHG (ref 40–50)
PCO2, VEN: 80.2 MMHG (ref 40–50)
PDW BLD-RTO: 13.5 % (ref 12.4–15.4)
PDW BLD-RTO: 14.2 % (ref 12.4–15.4)
PDW BLD-RTO: 15.1 % (ref 12.4–15.4)
PDW BLD-RTO: 15.3 % (ref 12.4–15.4)
PDW BLD-RTO: 15.6 % (ref 12.4–15.4)
PDW BLD-RTO: 15.7 % (ref 12.4–15.4)
PDW BLD-RTO: 15.7 % (ref 12.4–15.4)
PDW BLD-RTO: 15.8 % (ref 12.4–15.4)
PDW BLD-RTO: 15.9 % (ref 12.4–15.4)
PDW BLD-RTO: 16 % (ref 12.4–15.4)
PDW BLD-RTO: 16.1 % (ref 12.4–15.4)
PDW BLD-RTO: 16.2 % (ref 12.4–15.4)
PDW BLD-RTO: 16.4 % (ref 12.4–15.4)
PDW BLD-RTO: 16.4 % (ref 12.4–15.4)
PDW BLD-RTO: 16.6 % (ref 12.4–15.4)
PDW BLD-RTO: 16.6 % (ref 12.4–15.4)
PDW BLD-RTO: 16.9 % (ref 12.4–15.4)
PDW BLD-RTO: 16.9 % (ref 12.4–15.4)
PDW BLD-RTO: 17.1 % (ref 12.4–15.4)
PDW BLD-RTO: 17.2 % (ref 12.4–15.4)
PDW BLD-RTO: 17.3 % (ref 12.4–15.4)
PDW BLD-RTO: 17.4 % (ref 12.4–15.4)
PDW BLD-RTO: 17.5 % (ref 12.4–15.4)
PDW BLD-RTO: 17.6 % (ref 12.4–15.4)
PDW BLD-RTO: 17.8 % (ref 12.4–15.4)
PDW BLD-RTO: 17.9 % (ref 12.4–15.4)
PERFORMED ON: ABNORMAL
PERFORMED ON: NORMAL
PH ARTERIAL: 7.21 (ref 7.35–7.45)
PH ARTERIAL: 7.26 (ref 7.35–7.45)
PH ARTERIAL: 7.29 (ref 7.35–7.45)
PH ARTERIAL: 7.3 (ref 7.35–7.45)
PH ARTERIAL: 7.31 (ref 7.35–7.45)
PH ARTERIAL: 7.31 (ref 7.35–7.45)
PH ARTERIAL: 7.32 (ref 7.35–7.45)
PH ARTERIAL: 7.33 (ref 7.35–7.45)
PH ARTERIAL: 7.35 (ref 7.35–7.45)
PH ARTERIAL: 7.35 (ref 7.35–7.45)
PH ARTERIAL: 7.37 (ref 7.35–7.45)
PH ARTERIAL: 7.37 (ref 7.35–7.45)
PH ARTERIAL: 7.39 (ref 7.35–7.45)
PH ARTERIAL: 7.39 (ref 7.35–7.45)
PH ARTERIAL: 7.41 (ref 7.35–7.45)
PH ARTERIAL: 7.42 (ref 7.35–7.45)
PH ARTERIAL: 7.43 (ref 7.35–7.45)
PH ARTERIAL: 7.43 (ref 7.35–7.45)
PH ARTERIAL: 7.44 (ref 7.35–7.45)
PH UA: 5.5 (ref 5–8)
PH VENOUS: 7.06 (ref 7.35–7.45)
PH VENOUS: 7.18 (ref 7.35–7.45)
PH VENOUS: 7.26 (ref 7.35–7.45)
PH VENOUS: 7.27 (ref 7.35–7.45)
PH VENOUS: 7.28 (ref 7.35–7.45)
PH VENOUS: 7.29 (ref 7.35–7.45)
PH VENOUS: 7.3 (ref 7.35–7.45)
PH VENOUS: 7.3 (ref 7.35–7.45)
PH VENOUS: 7.31 (ref 7.35–7.45)
PH VENOUS: 7.32 (ref 7.35–7.45)
PH VENOUS: 7.33 (ref 7.35–7.45)
PH VENOUS: 7.33 (ref 7.35–7.45)
PH VENOUS: 7.34 (ref 7.35–7.45)
PH VENOUS: 7.35 (ref 7.35–7.45)
PH VENOUS: 7.35 (ref 7.35–7.45)
PH VENOUS: 7.38 (ref 7.35–7.45)
PH VENOUS: 7.39 (ref 7.35–7.45)
PH VENOUS: 7.4 (ref 7.35–7.45)
PH VENOUS: 7.4 (ref 7.35–7.45)
PH VENOUS: 7.41 (ref 7.35–7.45)
PH VENOUS: 7.42 (ref 7.35–7.45)
PH VENOUS: 7.43 (ref 7.35–7.45)
PH VENOUS: 7.43 (ref 7.35–7.45)
PH VENOUS: 7.44 (ref 7.35–7.45)
PH VENOUS: 7.45 (ref 7.35–7.45)
PH VENOUS: 7.45 (ref 7.35–7.45)
PH VENOUS: 7.46 (ref 7.35–7.45)
PH VENOUS: 7.46 (ref 7.35–7.45)
PH VENOUS: 7.48 (ref 7.35–7.45)
PH VENOUS: 7.5 (ref 7.35–7.45)
PHOSPHORUS: 1.1 MG/DL (ref 2.5–4.9)
PHOSPHORUS: 1.5 MG/DL (ref 2.5–4.9)
PHOSPHORUS: 1.8 MG/DL (ref 2.5–4.9)
PHOSPHORUS: 1.8 MG/DL (ref 2.5–4.9)
PHOSPHORUS: 2 MG/DL (ref 2.5–4.9)
PHOSPHORUS: 2.1 MG/DL (ref 2.5–4.9)
PHOSPHORUS: 2.1 MG/DL (ref 2.5–4.9)
PHOSPHORUS: 2.2 MG/DL (ref 2.5–4.9)
PHOSPHORUS: 2.4 MG/DL (ref 2.5–4.9)
PHOSPHORUS: 2.5 MG/DL (ref 2.5–4.9)
PHOSPHORUS: 2.6 MG/DL (ref 2.5–4.9)
PHOSPHORUS: 2.6 MG/DL (ref 2.5–4.9)
PHOSPHORUS: 2.7 MG/DL (ref 2.5–4.9)
PHOSPHORUS: 2.7 MG/DL (ref 2.5–4.9)
PHOSPHORUS: 2.9 MG/DL (ref 2.5–4.9)
PHOSPHORUS: 3 MG/DL (ref 2.5–4.9)
PHOSPHORUS: 3 MG/DL (ref 2.5–4.9)
PHOSPHORUS: 3.1 MG/DL (ref 2.5–4.9)
PHOSPHORUS: 3.2 MG/DL (ref 2.5–4.9)
PHOSPHORUS: 3.2 MG/DL (ref 2.5–4.9)
PHOSPHORUS: 3.3 MG/DL (ref 2.5–4.9)
PHOSPHORUS: 3.4 MG/DL (ref 2.5–4.9)
PHOSPHORUS: 3.5 MG/DL (ref 2.5–4.9)
PHOSPHORUS: 3.5 MG/DL (ref 2.5–4.9)
PHOSPHORUS: 3.6 MG/DL (ref 2.5–4.9)
PHOSPHORUS: 3.6 MG/DL (ref 2.5–4.9)
PHOSPHORUS: 3.8 MG/DL (ref 2.5–4.9)
PHOSPHORUS: 3.9 MG/DL (ref 2.5–4.9)
PHOSPHORUS: 4.3 MG/DL (ref 2.5–4.9)
PHOSPHORUS: 4.8 MG/DL (ref 2.5–4.9)
PHOSPHORUS: 5.4 MG/DL (ref 2.5–4.9)
PLATELET # BLD: 111 K/UL (ref 135–450)
PLATELET # BLD: 122 K/UL (ref 135–450)
PLATELET # BLD: 123 K/UL (ref 135–450)
PLATELET # BLD: 160 K/UL (ref 135–450)
PLATELET # BLD: 176 K/UL (ref 135–450)
PLATELET # BLD: 200 K/UL (ref 135–450)
PLATELET # BLD: 217 K/UL (ref 135–450)
PLATELET # BLD: 222 K/UL (ref 135–450)
PLATELET # BLD: 225 K/UL (ref 135–450)
PLATELET # BLD: 237 K/UL (ref 135–450)
PLATELET # BLD: 243 K/UL (ref 135–450)
PLATELET # BLD: 254 K/UL (ref 135–450)
PLATELET # BLD: 265 K/UL (ref 135–450)
PLATELET # BLD: 286 K/UL (ref 135–450)
PLATELET # BLD: 292 K/UL (ref 135–450)
PLATELET # BLD: 295 K/UL (ref 135–450)
PLATELET # BLD: 301 K/UL (ref 135–450)
PLATELET # BLD: 310 K/UL (ref 135–450)
PLATELET # BLD: 315 K/UL (ref 135–450)
PLATELET # BLD: 335 K/UL (ref 135–450)
PLATELET # BLD: 344 K/UL (ref 135–450)
PLATELET # BLD: 352 K/UL (ref 135–450)
PLATELET # BLD: 356 K/UL (ref 135–450)
PLATELET # BLD: 386 K/UL (ref 135–450)
PLATELET # BLD: 414 K/UL (ref 135–450)
PLATELET # BLD: 417 K/UL (ref 135–450)
PLATELET # BLD: 418 K/UL (ref 135–450)
PLATELET # BLD: 96 K/UL (ref 135–450)
PLATELET SLIDE REVIEW: ABNORMAL
PLATELET SLIDE REVIEW: ADEQUATE
PMV BLD AUTO: 7.2 FL (ref 5–10.5)
PMV BLD AUTO: 7.2 FL (ref 5–10.5)
PMV BLD AUTO: 7.3 FL (ref 5–10.5)
PMV BLD AUTO: 7.3 FL (ref 5–10.5)
PMV BLD AUTO: 7.4 FL (ref 5–10.5)
PMV BLD AUTO: 7.6 FL (ref 5–10.5)
PMV BLD AUTO: 7.6 FL (ref 5–10.5)
PMV BLD AUTO: 7.8 FL (ref 5–10.5)
PMV BLD AUTO: 8 FL (ref 5–10.5)
PMV BLD AUTO: 8.1 FL (ref 5–10.5)
PMV BLD AUTO: 8.2 FL (ref 5–10.5)
PMV BLD AUTO: 8.3 FL (ref 5–10.5)
PMV BLD AUTO: 8.4 FL (ref 5–10.5)
PMV BLD AUTO: 8.4 FL (ref 5–10.5)
PMV BLD AUTO: 8.6 FL (ref 5–10.5)
PMV BLD AUTO: 8.8 FL (ref 5–10.5)
PMV BLD AUTO: 9 FL (ref 5–10.5)
PMV BLD AUTO: 9.1 FL (ref 5–10.5)
PO2 ARTERIAL: 103.6 MM HG (ref 75–108)
PO2 ARTERIAL: 104.9 MMHG (ref 75–108)
PO2 ARTERIAL: 125 MMHG (ref 75–108)
PO2 ARTERIAL: 126.4 MMHG (ref 75–108)
PO2 ARTERIAL: 138.3 MM HG (ref 75–108)
PO2 ARTERIAL: 167.7 MM HG (ref 75–108)
PO2 ARTERIAL: 254.3 MM HG (ref 75–108)
PO2 ARTERIAL: 54.3 MMHG (ref 75–108)
PO2 ARTERIAL: 56.7 MM HG (ref 75–108)
PO2 ARTERIAL: 57.1 MM HG (ref 75–108)
PO2 ARTERIAL: 66 MMHG (ref 75–108)
PO2 ARTERIAL: 70.4 MM HG (ref 75–108)
PO2 ARTERIAL: 78.7 MM HG (ref 75–108)
PO2 ARTERIAL: 79 MMHG (ref 75–108)
PO2 ARTERIAL: 84.1 MM HG (ref 75–108)
PO2 ARTERIAL: 84.6 MM HG (ref 75–108)
PO2 ARTERIAL: 85.9 MM HG (ref 75–108)
PO2 ARTERIAL: 93.5 MM HG (ref 75–108)
PO2 ARTERIAL: 95.3 MM HG (ref 75–108)
PO2, VEN: 207.4 MMHG (ref 25–40)
PO2, VEN: 98.8 MMHG (ref 25–40)
POC HEMATOCRIT: 22 % (ref 36–48)
POC HEMATOCRIT: 25 % (ref 36–48)
POC HEMATOCRIT: 32 % (ref 36–48)
POC POTASSIUM: 3.6 MMOL/L (ref 3.5–5.1)
POC POTASSIUM: 4.4 MMOL/L (ref 3.5–5.1)
POC POTASSIUM: 4.5 MMOL/L (ref 3.5–5.1)
POC POTASSIUM: 5.1 MMOL/L (ref 3.5–5.1)
POC POTASSIUM: 5.3 MMOL/L (ref 3.5–5.1)
POC SAMPLE TYPE: ABNORMAL
POC SODIUM: 137 MMOL/L (ref 136–145)
POC SODIUM: 138 MMOL/L (ref 136–145)
POC SODIUM: 140 MMOL/L (ref 136–145)
POIKILOCYTES: ABNORMAL
POLYCHROMASIA: ABNORMAL
POTASSIUM REFLEX MAGNESIUM: 3.9 MMOL/L (ref 3.5–5.1)
POTASSIUM REFLEX MAGNESIUM: 4.4 MMOL/L (ref 3.5–5.1)
POTASSIUM SERPL-SCNC: 2.5 MMOL/L (ref 3.5–5.1)
POTASSIUM SERPL-SCNC: 3.3 MMOL/L (ref 3.5–5.1)
POTASSIUM SERPL-SCNC: 3.6 MMOL/L (ref 3.5–5.1)
POTASSIUM SERPL-SCNC: 3.8 MMOL/L (ref 3.5–5.1)
POTASSIUM SERPL-SCNC: 3.9 MMOL/L (ref 3.5–5.1)
POTASSIUM SERPL-SCNC: 3.9 MMOL/L (ref 3.5–5.1)
POTASSIUM SERPL-SCNC: 4 MMOL/L (ref 3.5–5.1)
POTASSIUM SERPL-SCNC: 4.1 MMOL/L (ref 3.5–5.1)
POTASSIUM SERPL-SCNC: 4.2 MMOL/L (ref 3.5–5.1)
POTASSIUM SERPL-SCNC: 4.3 MMOL/L (ref 3.5–5.1)
POTASSIUM SERPL-SCNC: 4.4 MMOL/L (ref 3.5–5.1)
POTASSIUM SERPL-SCNC: 4.5 MMOL/L (ref 3.5–5.1)
POTASSIUM SERPL-SCNC: 4.6 MMOL/L (ref 3.5–5.1)
POTASSIUM SERPL-SCNC: 4.7 MMOL/L (ref 3.5–5.1)
POTASSIUM SERPL-SCNC: 4.8 MMOL/L (ref 3.5–5.1)
POTASSIUM SERPL-SCNC: 4.9 MMOL/L (ref 3.5–5.1)
POTASSIUM SERPL-SCNC: 5 MMOL/L (ref 3.5–5.1)
POTASSIUM SERPL-SCNC: 5 MMOL/L (ref 3.5–5.1)
POTASSIUM SERPL-SCNC: 5.1 MMOL/L (ref 3.5–5.1)
POTASSIUM SERPL-SCNC: 5.1 MMOL/L (ref 3.5–5.1)
POTASSIUM SERPL-SCNC: 5.3 MMOL/L (ref 3.5–5.1)
POTASSIUM SERPL-SCNC: 5.4 MMOL/L (ref 3.5–5.1)
POTASSIUM SERPL-SCNC: 5.4 MMOL/L (ref 3.5–5.1)
POTASSIUM SERPL-SCNC: 5.7 MMOL/L (ref 3.5–5.1)
PRO-BNP: 3989 PG/ML (ref 0–449)
PRO-BNP: 4308 PG/ML (ref 0–449)
PRO-BNP: 7138 PG/ML (ref 0–449)
PROCALCITONIN: 0.66 NG/ML (ref 0–0.15)
PROTEIN CSF: 22 MG/DL (ref 15–45)
PROTEIN UA: 30 MG/DL
PROTHROMBIN TIME: 10.6 SEC (ref 10–13.2)
PROTHROMBIN TIME: 11.1 SEC (ref 10–13.2)
PROTHROMBIN TIME: 11.2 SEC (ref 10–13.2)
PROTHROMBIN TIME: 11.3 SEC (ref 10–13.2)
PROTHROMBIN TIME: 11.9 SEC (ref 10–13.2)
PROTHROMBIN TIME: 12.2 SEC (ref 10–13.2)
PROTHROMBIN TIME: 12.2 SEC (ref 9.8–13)
PROTHROMBIN TIME: 12.3 SEC (ref 10–13.2)
PROTHROMBIN TIME: 12.3 SEC (ref 9.8–13)
PROTHROMBIN TIME: 12.4 SEC (ref 10–13.2)
PROTHROMBIN TIME: 12.8 SEC (ref 9.8–13)
PROTHROMBIN TIME: 12.9 SEC (ref 9.8–13)
PROTHROMBIN TIME: 12.9 SEC (ref 9.8–13)
PROTHROMBIN TIME: 13.2 SEC (ref 9.8–13)
PROTHROMBIN TIME: 13.6 SEC (ref 9.8–13)
PROTHROMBIN TIME: 15.8 SEC (ref 9.8–13)
PROTHROMBIN TIME: 16 SEC (ref 10–13.2)
PROTHROMBIN TIME: 16.6 SEC (ref 10–13.2)
PROTHROMBIN TIME: 19.2 SEC (ref 10–13.2)
PROTHROMBIN TIME: 19.9 SEC (ref 10–13.2)
PROTHROMBIN TIME: 20.7 SEC (ref 9.8–13)
PROTHROMBIN TIME: 21.2 SEC (ref 10–13.2)
PROTHROMBIN TIME: 27.1 SEC (ref 10–13.2)
RBC # BLD: 2.08 M/UL (ref 4–5.2)
RBC # BLD: 2.26 M/UL (ref 4–5.2)
RBC # BLD: 2.36 M/UL (ref 4–5.2)
RBC # BLD: 2.36 M/UL (ref 4–5.2)
RBC # BLD: 2.43 M/UL (ref 4–5.2)
RBC # BLD: 2.51 M/UL (ref 4–5.2)
RBC # BLD: 2.51 M/UL (ref 4–5.2)
RBC # BLD: 2.52 M/UL (ref 4–5.2)
RBC # BLD: 2.56 M/UL (ref 4–5.2)
RBC # BLD: 2.57 M/UL (ref 4–5.2)
RBC # BLD: 2.59 M/UL (ref 4–5.2)
RBC # BLD: 2.6 M/UL (ref 4–5.2)
RBC # BLD: 2.66 M/UL (ref 4–5.2)
RBC # BLD: 2.67 M/UL (ref 4–5.2)
RBC # BLD: 2.67 M/UL (ref 4–5.2)
RBC # BLD: 2.68 M/UL (ref 4–5.2)
RBC # BLD: 2.71 M/UL (ref 4–5.2)
RBC # BLD: 2.71 M/UL (ref 4–5.2)
RBC # BLD: 2.81 M/UL (ref 4–5.2)
RBC # BLD: 2.9 M/UL (ref 4–5.2)
RBC # BLD: 2.91 M/UL (ref 4–5.2)
RBC # BLD: 2.92 M/UL (ref 4–5.2)
RBC # BLD: 2.96 M/UL (ref 4–5.2)
RBC # BLD: 3.08 M/UL (ref 4–5.2)
RBC # BLD: 3.68 M/UL (ref 4–5.2)
RBC # BLD: 3.8 M/UL (ref 4–5.2)
RBC # BLD: 4 M/UL (ref 4–5.2)
RBC # BLD: 4.76 M/UL (ref 4–5.2)
RBC CSF: 0 /CUMM
RBC UA: ABNORMAL /HPF (ref 0–2)
REPORT: NORMAL
REPORT: NORMAL
RSV BY PCR: NOT DETECTED
RSV SOURCE: NORMAL
SEDIMENTATION RATE, ERYTHROCYTE: 25 MM/HR (ref 0–30)
SEDIMENTATION RATE, ERYTHROCYTE: 80 MM/HR (ref 0–30)
SLIDE REVIEW: ABNORMAL
SODIUM BLD-SCNC: 128 MMOL/L (ref 136–145)
SODIUM BLD-SCNC: 129 MMOL/L (ref 136–145)
SODIUM BLD-SCNC: 130 MMOL/L (ref 136–145)
SODIUM BLD-SCNC: 131 MMOL/L (ref 136–145)
SODIUM BLD-SCNC: 132 MMOL/L (ref 136–145)
SODIUM BLD-SCNC: 133 MMOL/L (ref 136–145)
SODIUM BLD-SCNC: 134 MMOL/L (ref 136–145)
SODIUM BLD-SCNC: 135 MMOL/L (ref 136–145)
SODIUM BLD-SCNC: 136 MMOL/L (ref 136–145)
SODIUM BLD-SCNC: 136 MMOL/L (ref 136–145)
SODIUM BLD-SCNC: 137 MMOL/L (ref 136–145)
SODIUM BLD-SCNC: 138 MMOL/L (ref 136–145)
SODIUM BLD-SCNC: 140 MMOL/L (ref 136–145)
SODIUM BLD-SCNC: 142 MMOL/L (ref 136–145)
SOLUBLE TRANSFERRIN RECEPT: 6.8 MG/L (ref 1.9–4.4)
SPECIFIC GRAVITY UA: 1.02 (ref 1–1.03)
SPECIMEN STATUS: NORMAL
SPHEROCYTES: ABNORMAL
SPHEROCYTES: ABNORMAL
SRA, UNFRACTIONATED HEPARIN INTERPRETATION: NORMAL
SRA, UNFRACTIONATED HEPARIN, HIGH DOSE: 0 %
SRA, UNFRACTIONATED HEPARIN, LOW DOSE: 0 %
STOMATOCYTES: ABNORMAL
TARGET CELLS: ABNORMAL
TARGET CELLS: ABNORMAL
TCO2 ARTERIAL: 20 MMOL/L
TCO2 ARTERIAL: 20 MMOL/L
TCO2 ARTERIAL: 21 MMOL/L
TCO2 ARTERIAL: 24 MMOL/L
TCO2 ARTERIAL: 24 MMOL/L
TCO2 ARTERIAL: 24.4 MMOL/L
TCO2 ARTERIAL: 25.5 MMOL/L
TCO2 ARTERIAL: 26 MMOL/L
TCO2 ARTERIAL: 26.3 MMOL/L
TCO2 ARTERIAL: 26.8 MMOL/L
TCO2 ARTERIAL: 27 MMOL/L
TCO2 ARTERIAL: 28 MMOL/L
TCO2 ARTERIAL: 28.3 MMOL/L
TCO2 CALC VENOUS: 25 MMOL/L
TCO2 CALC VENOUS: 26 MMOL/L
TOTAL CK: 194 U/L (ref 26–192)
TOTAL CK: 39 U/L (ref 26–192)
TOTAL CK: 4489 U/L (ref 26–192)
TOTAL IRON BINDING CAPACITY: 153 UG/DL (ref 260–445)
TOTAL PROTEIN: 4.6 G/DL (ref 6.4–8.2)
TOTAL PROTEIN: 4.8 G/DL (ref 6.4–8.2)
TOTAL PROTEIN: 4.8 G/DL (ref 6.4–8.2)
TOTAL PROTEIN: 4.9 G/DL (ref 6.4–8.2)
TOTAL PROTEIN: 4.9 G/DL (ref 6.4–8.2)
TOTAL PROTEIN: 5 G/DL (ref 6.4–8.2)
TOTAL PROTEIN: 5.1 G/DL (ref 6.4–8.2)
TOTAL PROTEIN: 5.3 G/DL (ref 6.4–8.2)
TOTAL PROTEIN: 5.3 G/DL (ref 6.4–8.2)
TOTAL PROTEIN: 5.5 G/DL (ref 6.4–8.2)
TOTAL PROTEIN: 5.6 G/DL (ref 6.4–8.2)
TOTAL PROTEIN: 5.7 G/DL (ref 6.4–8.2)
TOTAL PROTEIN: 5.9 G/DL (ref 6.4–8.2)
TOTAL PROTEIN: 6 G/DL (ref 6.4–8.2)
TOTAL PROTEIN: 6.3 G/DL (ref 6.4–8.2)
TOTAL PROTEIN: 6.4 G/DL (ref 6.4–8.2)
TOTAL PROTEIN: 6.5 G/DL (ref 6.4–8.2)
TOTAL PROTEIN: 6.6 G/DL (ref 6.4–8.2)
TOTAL PROTEIN: 6.8 G/DL (ref 6.4–8.2)
TOTAL PROTEIN: 7 G/DL (ref 6.4–8.2)
TOTAL PROTEIN: 7.1 G/DL (ref 6.4–8.2)
TOTAL PROTEIN: 8.6 G/DL (ref 6.4–8.2)
TOTAL PROTEIN: 8.8 G/DL (ref 6.4–8.2)
TOXIC GRANULATION: PRESENT
TRIGL SERPL-MCNC: 106 MG/DL (ref 0–150)
TRIGL SERPL-MCNC: 91 MG/DL (ref 0–150)
TROPONIN: <0.01 NG/ML
TROPONIN: <0.01 NG/ML
TSH REFLEX: 1.09 UIU/ML (ref 0.27–4.2)
TUBE NUMBER CSF: NORMAL
URINE TYPE: ABNORMAL
UROBILINOGEN, URINE: 0.2 E.U./DL
VACUOLATED NEUTROPHILS: PRESENT
VANCOMYCIN RANDOM: 14.6 UG/ML
VANCOMYCIN RANDOM: 17.3 UG/ML
VITAMIN B-12: >2000 PG/ML (ref 211–911)
WBC # BLD: 10.1 K/UL (ref 4–11)
WBC # BLD: 10.1 K/UL (ref 4–11)
WBC # BLD: 10.2 K/UL (ref 4–11)
WBC # BLD: 10.2 K/UL (ref 4–11)
WBC # BLD: 11.1 K/UL (ref 4–11)
WBC # BLD: 11.6 K/UL (ref 4–11)
WBC # BLD: 11.7 K/UL (ref 4–11)
WBC # BLD: 12.1 K/UL (ref 4–11)
WBC # BLD: 13.4 K/UL (ref 4–11)
WBC # BLD: 13.7 K/UL (ref 4–11)
WBC # BLD: 13.9 K/UL (ref 4–11)
WBC # BLD: 14.5 K/UL (ref 4–11)
WBC # BLD: 14.7 K/UL (ref 4–11)
WBC # BLD: 15.7 K/UL (ref 4–11)
WBC # BLD: 17.1 K/UL (ref 4–11)
WBC # BLD: 20.2 K/UL (ref 4–11)
WBC # BLD: 21 K/UL (ref 4–11)
WBC # BLD: 22.7 K/UL (ref 4–11)
WBC # BLD: 27.6 K/UL (ref 4–11)
WBC # BLD: 30.2 K/UL (ref 4–11)
WBC # BLD: 35.7 K/UL (ref 4–11)
WBC # BLD: 5.9 K/UL (ref 4–11)
WBC # BLD: 6.8 K/UL (ref 4–11)
WBC # BLD: 7.5 K/UL (ref 4–11)
WBC # BLD: 7.6 K/UL (ref 4–11)
WBC # BLD: 9.6 K/UL (ref 4–11)
WBC # BLD: 9.9 K/UL (ref 4–11)
WBC # BLD: 9.9 K/UL (ref 4–11)
WBC CSF: 0 /CUMM (ref 0–5)
WBC UA: ABNORMAL /HPF (ref 0–5)

## 2019-01-01 PROCEDURE — 2500000003 HC RX 250 WO HCPCS: Performed by: NURSE PRACTITIONER

## 2019-01-01 PROCEDURE — 2580000003 HC RX 258: Performed by: INTERNAL MEDICINE

## 2019-01-01 PROCEDURE — 99231 SBSQ HOSP IP/OBS SF/LOW 25: CPT | Performed by: INTERNAL MEDICINE

## 2019-01-01 PROCEDURE — 82330 ASSAY OF CALCIUM: CPT

## 2019-01-01 PROCEDURE — 85025 COMPLETE CBC W/AUTO DIFF WBC: CPT

## 2019-01-01 PROCEDURE — 84132 ASSAY OF SERUM POTASSIUM: CPT

## 2019-01-01 PROCEDURE — 93010 ELECTROCARDIOGRAM REPORT: CPT | Performed by: INTERNAL MEDICINE

## 2019-01-01 PROCEDURE — 90945 DIALYSIS ONE EVALUATION: CPT

## 2019-01-01 PROCEDURE — 2580000003 HC RX 258: Performed by: NURSE PRACTITIONER

## 2019-01-01 PROCEDURE — 6370000000 HC RX 637 (ALT 250 FOR IP): Performed by: INTERNAL MEDICINE

## 2019-01-01 PROCEDURE — 2700000000 HC OXYGEN THERAPY PER DAY

## 2019-01-01 PROCEDURE — 80069 RENAL FUNCTION PANEL: CPT

## 2019-01-01 PROCEDURE — 85610 PROTHROMBIN TIME: CPT

## 2019-01-01 PROCEDURE — 82803 BLOOD GASES ANY COMBINATION: CPT

## 2019-01-01 PROCEDURE — 3609011600 HC BRONCHOSCOPY FOREIGN BODY REMOVAL: Performed by: INTERNAL MEDICINE

## 2019-01-01 PROCEDURE — 80053 COMPREHEN METABOLIC PANEL: CPT

## 2019-01-01 PROCEDURE — 2500000003 HC RX 250 WO HCPCS: Performed by: INTERNAL MEDICINE

## 2019-01-01 PROCEDURE — 6370000000 HC RX 637 (ALT 250 FOR IP): Performed by: NURSE PRACTITIONER

## 2019-01-01 PROCEDURE — 2000000000 HC ICU R&B

## 2019-01-01 PROCEDURE — 36415 COLL VENOUS BLD VENIPUNCTURE: CPT

## 2019-01-01 PROCEDURE — 99291 CRITICAL CARE FIRST HOUR: CPT

## 2019-01-01 PROCEDURE — 82947 ASSAY GLUCOSE BLOOD QUANT: CPT

## 2019-01-01 PROCEDURE — 94761 N-INVAS EAR/PLS OXIMETRY MLT: CPT

## 2019-01-01 PROCEDURE — 83605 ASSAY OF LACTIC ACID: CPT

## 2019-01-01 PROCEDURE — 92526 ORAL FUNCTION THERAPY: CPT

## 2019-01-01 PROCEDURE — 31622 DX BRONCHOSCOPE/WASH: CPT | Performed by: INTERNAL MEDICINE

## 2019-01-01 PROCEDURE — 36592 COLLECT BLOOD FROM PICC: CPT

## 2019-01-01 PROCEDURE — 71045 X-RAY EXAM CHEST 1 VIEW: CPT

## 2019-01-01 PROCEDURE — 80202 ASSAY OF VANCOMYCIN: CPT

## 2019-01-01 PROCEDURE — 6360000002 HC RX W HCPCS: Performed by: INTERNAL MEDICINE

## 2019-01-01 PROCEDURE — 85014 HEMATOCRIT: CPT

## 2019-01-01 PROCEDURE — 87205 SMEAR GRAM STAIN: CPT

## 2019-01-01 PROCEDURE — 94002 VENT MGMT INPAT INIT DAY: CPT

## 2019-01-01 PROCEDURE — 84484 ASSAY OF TROPONIN QUANT: CPT

## 2019-01-01 PROCEDURE — 83615 LACTATE (LD) (LDH) ENZYME: CPT

## 2019-01-01 PROCEDURE — 99233 SBSQ HOSP IP/OBS HIGH 50: CPT | Performed by: INTERNAL MEDICINE

## 2019-01-01 PROCEDURE — 90935 HEMODIALYSIS ONE EVALUATION: CPT

## 2019-01-01 PROCEDURE — 94640 AIRWAY INHALATION TREATMENT: CPT

## 2019-01-01 PROCEDURE — 87483 CNS DNA AMP PROBE TYPE 12-25: CPT

## 2019-01-01 PROCEDURE — 76705 ECHO EXAM OF ABDOMEN: CPT

## 2019-01-01 PROCEDURE — 83735 ASSAY OF MAGNESIUM: CPT

## 2019-01-01 PROCEDURE — 99291 CRITICAL CARE FIRST HOUR: CPT | Performed by: INTERNAL MEDICINE

## 2019-01-01 PROCEDURE — 94750 HC PULMONARY COMPLIANCE STUDY: CPT

## 2019-01-01 PROCEDURE — 0B978ZZ DRAINAGE OF LEFT MAIN BRONCHUS, VIA NATURAL OR ARTIFICIAL OPENING ENDOSCOPIC: ICD-10-PCS | Performed by: INTERNAL MEDICINE

## 2019-01-01 PROCEDURE — 70450 CT HEAD/BRAIN W/O DYE: CPT

## 2019-01-01 PROCEDURE — 84100 ASSAY OF PHOSPHORUS: CPT

## 2019-01-01 PROCEDURE — 87106 FUNGI IDENTIFICATION YEAST: CPT

## 2019-01-01 PROCEDURE — 97110 THERAPEUTIC EXERCISES: CPT

## 2019-01-01 PROCEDURE — 36620 INSERTION CATHETER ARTERY: CPT | Performed by: NURSE PRACTITIONER

## 2019-01-01 PROCEDURE — 99223 1ST HOSP IP/OBS HIGH 75: CPT | Performed by: PSYCHIATRY & NEUROLOGY

## 2019-01-01 PROCEDURE — 87040 BLOOD CULTURE FOR BACTERIA: CPT

## 2019-01-01 PROCEDURE — 2060000000 HC ICU INTERMEDIATE R&B

## 2019-01-01 PROCEDURE — 85384 FIBRINOGEN ACTIVITY: CPT

## 2019-01-01 PROCEDURE — 36558 INSERT TUNNELED CV CATH: CPT

## 2019-01-01 PROCEDURE — 83880 ASSAY OF NATRIURETIC PEPTIDE: CPT

## 2019-01-01 PROCEDURE — 99233 SBSQ HOSP IP/OBS HIGH 50: CPT | Performed by: PSYCHIATRY & NEUROLOGY

## 2019-01-01 PROCEDURE — 62270 DX LMBR SPI PNXR: CPT

## 2019-01-01 PROCEDURE — 43762 RPLC GTUBE NO REVJ TRC: CPT

## 2019-01-01 PROCEDURE — 92610 EVALUATE SWALLOWING FUNCTION: CPT

## 2019-01-01 PROCEDURE — 86901 BLOOD TYPING SEROLOGIC RH(D): CPT

## 2019-01-01 PROCEDURE — 85730 THROMBOPLASTIN TIME PARTIAL: CPT

## 2019-01-01 PROCEDURE — 86705 HEP B CORE ANTIBODY IGM: CPT

## 2019-01-01 PROCEDURE — 94644 CONT INHLJ TX 1ST HOUR: CPT

## 2019-01-01 PROCEDURE — 94669 MECHANICAL CHEST WALL OSCILL: CPT

## 2019-01-01 PROCEDURE — 86140 C-REACTIVE PROTEIN: CPT

## 2019-01-01 PROCEDURE — P9016 RBC LEUKOCYTES REDUCED: HCPCS

## 2019-01-01 PROCEDURE — 2580000003 HC RX 258: Performed by: EMERGENCY MEDICINE

## 2019-01-01 PROCEDURE — 009U3ZX DRAINAGE OF SPINAL CANAL, PERCUTANEOUS APPROACH, DIAGNOSTIC: ICD-10-PCS | Performed by: RADIOLOGY

## 2019-01-01 PROCEDURE — 87070 CULTURE OTHR SPECIMN AEROBIC: CPT

## 2019-01-01 PROCEDURE — 82746 ASSAY OF FOLIC ACID SERUM: CPT

## 2019-01-01 PROCEDURE — 82010 KETONE BODYS QUAN: CPT

## 2019-01-01 PROCEDURE — 99232 SBSQ HOSP IP/OBS MODERATE 35: CPT | Performed by: INTERNAL MEDICINE

## 2019-01-01 PROCEDURE — 99292 CRITICAL CARE ADDL 30 MIN: CPT | Performed by: INTERNAL MEDICINE

## 2019-01-01 PROCEDURE — 82140 ASSAY OF AMMONIA: CPT

## 2019-01-01 PROCEDURE — 77003 FLUOROGUIDE FOR SPINE INJECT: CPT

## 2019-01-01 PROCEDURE — 82945 GLUCOSE OTHER FLUID: CPT

## 2019-01-01 PROCEDURE — 3609010900 HC BRONCHOSCOPY THERAPUTIC ASPIRATION INITIAL: Performed by: INTERNAL MEDICINE

## 2019-01-01 PROCEDURE — 6360000002 HC RX W HCPCS: Performed by: NURSE PRACTITIONER

## 2019-01-01 PROCEDURE — 02HV33Z INSERTION OF INFUSION DEVICE INTO SUPERIOR VENA CAVA, PERCUTANEOUS APPROACH: ICD-10-PCS | Performed by: INTERNAL MEDICINE

## 2019-01-01 PROCEDURE — 94003 VENT MGMT INPAT SUBQ DAY: CPT

## 2019-01-01 PROCEDURE — 93005 ELECTROCARDIOGRAM TRACING: CPT | Performed by: EMERGENCY MEDICINE

## 2019-01-01 PROCEDURE — 86850 RBC ANTIBODY SCREEN: CPT

## 2019-01-01 PROCEDURE — 87899 AGENT NOS ASSAY W/OPTIC: CPT

## 2019-01-01 PROCEDURE — 6360000002 HC RX W HCPCS

## 2019-01-01 PROCEDURE — 36600 WITHDRAWAL OF ARTERIAL BLOOD: CPT

## 2019-01-01 PROCEDURE — 5A1955Z RESPIRATORY VENTILATION, GREATER THAN 96 CONSECUTIVE HOURS: ICD-10-PCS | Performed by: INTERNAL MEDICINE

## 2019-01-01 PROCEDURE — 88112 CYTOPATH CELL ENHANCE TECH: CPT

## 2019-01-01 PROCEDURE — 6360000002 HC RX W HCPCS: Performed by: EMERGENCY MEDICINE

## 2019-01-01 PROCEDURE — 97162 PT EVAL MOD COMPLEX 30 MIN: CPT

## 2019-01-01 PROCEDURE — 96367 TX/PROPH/DG ADDL SEQ IV INF: CPT

## 2019-01-01 PROCEDURE — 82533 TOTAL CORTISOL: CPT

## 2019-01-01 PROCEDURE — 1200000000 HC SEMI PRIVATE

## 2019-01-01 PROCEDURE — 93971 EXTREMITY STUDY: CPT

## 2019-01-01 PROCEDURE — 6360000002 HC RX W HCPCS: Performed by: NURSE ANESTHETIST, CERTIFIED REGISTERED

## 2019-01-01 PROCEDURE — 97530 THERAPEUTIC ACTIVITIES: CPT

## 2019-01-01 PROCEDURE — 82728 ASSAY OF FERRITIN: CPT

## 2019-01-01 PROCEDURE — 70551 MRI BRAIN STEM W/O DYE: CPT

## 2019-01-01 PROCEDURE — 36556 INSERT NON-TUNNEL CV CATH: CPT

## 2019-01-01 PROCEDURE — 2500000003 HC RX 250 WO HCPCS: Performed by: RADIOLOGY

## 2019-01-01 PROCEDURE — 37799 UNLISTED PX VASCULAR SURGERY: CPT

## 2019-01-01 PROCEDURE — 94770 HC ETCO2 MONITOR DAILY: CPT

## 2019-01-01 PROCEDURE — 2580000003 HC RX 258

## 2019-01-01 PROCEDURE — 3700000000 HC ANESTHESIA ATTENDED CARE: Performed by: INTERNAL MEDICINE

## 2019-01-01 PROCEDURE — 83550 IRON BINDING TEST: CPT

## 2019-01-01 PROCEDURE — 4500000026 HC ED CRITICAL CARE PROCEDURE

## 2019-01-01 PROCEDURE — 74176 CT ABD & PELVIS W/O CONTRAST: CPT

## 2019-01-01 PROCEDURE — 87150 DNA/RNA AMPLIFIED PROBE: CPT

## 2019-01-01 PROCEDURE — 84295 ASSAY OF SERUM SODIUM: CPT

## 2019-01-01 PROCEDURE — 77001 FLUOROGUIDE FOR VEIN DEVICE: CPT

## 2019-01-01 PROCEDURE — 94760 N-INVAS EAR/PLS OXIMETRY 1: CPT

## 2019-01-01 PROCEDURE — 71250 CT THORAX DX C-: CPT

## 2019-01-01 PROCEDURE — 82150 ASSAY OF AMYLASE: CPT

## 2019-01-01 PROCEDURE — 31500 INSERT EMERGENCY AIRWAY: CPT

## 2019-01-01 PROCEDURE — 86923 COMPATIBILITY TEST ELECTRIC: CPT

## 2019-01-01 PROCEDURE — 85018 HEMOGLOBIN: CPT

## 2019-01-01 PROCEDURE — 31624 DX BRONCHOSCOPE/LAVAGE: CPT | Performed by: INTERNAL MEDICINE

## 2019-01-01 PROCEDURE — 74177 CT ABD & PELVIS W/CONTRAST: CPT

## 2019-01-01 PROCEDURE — P9047 ALBUMIN (HUMAN), 25%, 50ML: HCPCS | Performed by: INTERNAL MEDICINE

## 2019-01-01 PROCEDURE — C8923 2D TTE W OR W/O FOL W/CON,CO: HCPCS

## 2019-01-01 PROCEDURE — 6360000002 HC RX W HCPCS: Performed by: RADIOLOGY

## 2019-01-01 PROCEDURE — 86022 PLATELET ANTIBODIES: CPT

## 2019-01-01 PROCEDURE — 0B9F8ZX DRAINAGE OF RIGHT LOWER LUNG LOBE, VIA NATURAL OR ARTIFICIAL OPENING ENDOSCOPIC, DIAGNOSTIC: ICD-10-PCS | Performed by: INTERNAL MEDICINE

## 2019-01-01 PROCEDURE — 84157 ASSAY OF PROTEIN OTHER: CPT

## 2019-01-01 PROCEDURE — 99232 SBSQ HOSP IP/OBS MODERATE 35: CPT | Performed by: NURSE PRACTITIONER

## 2019-01-01 PROCEDURE — 2500000003 HC RX 250 WO HCPCS: Performed by: EMERGENCY MEDICINE

## 2019-01-01 PROCEDURE — 36556 INSERT NON-TUNNEL CV CATH: CPT | Performed by: NURSE PRACTITIONER

## 2019-01-01 PROCEDURE — 83010 ASSAY OF HAPTOGLOBIN QUANT: CPT

## 2019-01-01 PROCEDURE — 85027 COMPLETE CBC AUTOMATED: CPT

## 2019-01-01 PROCEDURE — 74018 RADEX ABDOMEN 1 VIEW: CPT

## 2019-01-01 PROCEDURE — 2580000003 HC RX 258: Performed by: RADIOLOGY

## 2019-01-01 PROCEDURE — 2709999900 HC NON-CHARGEABLE SUPPLY: Performed by: INTERNAL MEDICINE

## 2019-01-01 PROCEDURE — 87631 RESP VIRUS 3-5 TARGETS: CPT

## 2019-01-01 PROCEDURE — 85652 RBC SED RATE AUTOMATED: CPT

## 2019-01-01 PROCEDURE — 99223 1ST HOSP IP/OBS HIGH 75: CPT | Performed by: INTERNAL MEDICINE

## 2019-01-01 PROCEDURE — G0328 FECAL BLOOD SCRN IMMUNOASSAY: HCPCS

## 2019-01-01 PROCEDURE — 5A1D70Z PERFORMANCE OF URINARY FILTRATION, INTERMITTENT, LESS THAN 6 HOURS PER DAY: ICD-10-PCS | Performed by: INTERNAL MEDICINE

## 2019-01-01 PROCEDURE — 31720 CLEARANCE OF AIRWAYS: CPT

## 2019-01-01 PROCEDURE — 3E1M39Z IRRIGATION OF PERITONEAL CAVITY USING DIALYSATE, PERCUTANEOUS APPROACH: ICD-10-PCS | Performed by: INTERNAL MEDICINE

## 2019-01-01 PROCEDURE — 6360000004 HC RX CONTRAST MEDICATION: Performed by: INTERNAL MEDICINE

## 2019-01-01 PROCEDURE — 0BH18EZ INSERTION OF ENDOTRACHEAL AIRWAY INTO TRACHEA, VIA NATURAL OR ARTIFICIAL OPENING ENDOSCOPIC: ICD-10-PCS | Performed by: INTERNAL MEDICINE

## 2019-01-01 PROCEDURE — 83690 ASSAY OF LIPASE: CPT

## 2019-01-01 PROCEDURE — 82550 ASSAY OF CK (CPK): CPT

## 2019-01-01 PROCEDURE — 97167 OT EVAL HIGH COMPLEX 60 MIN: CPT

## 2019-01-01 PROCEDURE — 6370000000 HC RX 637 (ALT 250 FOR IP): Performed by: EMERGENCY MEDICINE

## 2019-01-01 PROCEDURE — 94660 CPAP INITIATION&MGMT: CPT

## 2019-01-01 PROCEDURE — 3609017100 HC EGD: Performed by: INTERNAL MEDICINE

## 2019-01-01 PROCEDURE — 96374 THER/PROPH/DIAG INJ IV PUSH: CPT

## 2019-01-01 PROCEDURE — 31500 INSERT EMERGENCY AIRWAY: CPT | Performed by: NURSE PRACTITIONER

## 2019-01-01 PROCEDURE — 87077 CULTURE AEROBIC IDENTIFY: CPT

## 2019-01-01 PROCEDURE — 80048 BASIC METABOLIC PNL TOTAL CA: CPT

## 2019-01-01 PROCEDURE — 87340 HEPATITIS B SURFACE AG IA: CPT

## 2019-01-01 PROCEDURE — 93306 TTE W/DOPPLER COMPLETE: CPT

## 2019-01-01 PROCEDURE — 89050 BODY FLUID CELL COUNT: CPT

## 2019-01-01 PROCEDURE — 96365 THER/PROPH/DIAG IV INF INIT: CPT

## 2019-01-01 PROCEDURE — 7100000010 HC PHASE II RECOVERY - FIRST 15 MIN: Performed by: INTERNAL MEDICINE

## 2019-01-01 PROCEDURE — 5A1945Z RESPIRATORY VENTILATION, 24-96 CONSECUTIVE HOURS: ICD-10-PCS | Performed by: INTERNAL MEDICINE

## 2019-01-01 PROCEDURE — 7100000011 HC PHASE II RECOVERY - ADDTL 15 MIN: Performed by: INTERNAL MEDICINE

## 2019-01-01 PROCEDURE — 86900 BLOOD TYPING SEROLOGIC ABO: CPT

## 2019-01-01 PROCEDURE — 84478 ASSAY OF TRIGLYCERIDES: CPT

## 2019-01-01 PROCEDURE — 3609010800 HC BRONCHOSCOPY ALVEOLAR LAVAGE: Performed by: INTERNAL MEDICINE

## 2019-01-01 PROCEDURE — 84238 ASSAY NONENDOCRINE RECEPTOR: CPT

## 2019-01-01 PROCEDURE — 0B938ZZ DRAINAGE OF RIGHT MAIN BRONCHUS, VIA NATURAL OR ARTIFICIAL OPENING ENDOSCOPIC: ICD-10-PCS | Performed by: INTERNAL MEDICINE

## 2019-01-01 PROCEDURE — 36430 TRANSFUSION BLD/BLD COMPNT: CPT

## 2019-01-01 PROCEDURE — 82607 VITAMIN B-12: CPT

## 2019-01-01 PROCEDURE — 87102 FUNGUS ISOLATION CULTURE: CPT

## 2019-01-01 PROCEDURE — 3700000001 HC ADD 15 MINUTES (ANESTHESIA): Performed by: INTERNAL MEDICINE

## 2019-01-01 PROCEDURE — 84145 PROCALCITONIN (PCT): CPT

## 2019-01-01 PROCEDURE — 81001 URINALYSIS AUTO W/SCOPE: CPT

## 2019-01-01 PROCEDURE — 6370000000 HC RX 637 (ALT 250 FOR IP)

## 2019-01-01 PROCEDURE — 96375 TX/PRO/DX INJ NEW DRUG ADDON: CPT

## 2019-01-01 PROCEDURE — 94150 VITAL CAPACITY TEST: CPT

## 2019-01-01 PROCEDURE — 02H633Z INSERTION OF INFUSION DEVICE INTO RIGHT ATRIUM, PERCUTANEOUS APPROACH: ICD-10-PCS | Performed by: INTERNAL MEDICINE

## 2019-01-01 PROCEDURE — C1881 DIALYSIS ACCESS SYSTEM: HCPCS

## 2019-01-01 PROCEDURE — 83036 HEMOGLOBIN GLYCOSYLATED A1C: CPT

## 2019-01-01 PROCEDURE — 3609027000 HC BRONCHOSCOPY: Performed by: INTERNAL MEDICINE

## 2019-01-01 PROCEDURE — 0DH63UZ INSERTION OF FEEDING DEVICE INTO STOMACH, PERCUTANEOUS APPROACH: ICD-10-PCS | Performed by: INTERNAL MEDICINE

## 2019-01-01 PROCEDURE — 93975 VASCULAR STUDY: CPT

## 2019-01-01 PROCEDURE — 86709 HEPATITIS A IGM ANTIBODY: CPT

## 2019-01-01 PROCEDURE — 71046 X-RAY EXAM CHEST 2 VIEWS: CPT

## 2019-01-01 PROCEDURE — 83540 ASSAY OF IRON: CPT

## 2019-01-01 PROCEDURE — 84443 ASSAY THYROID STIM HORMONE: CPT

## 2019-01-01 RX ORDER — OLMESARTAN MEDOXOMIL 20 MG/1
40 TABLET ORAL DAILY
Status: DISCONTINUED | OUTPATIENT
Start: 2019-01-01 | End: 2019-01-01 | Stop reason: HOSPADM

## 2019-01-01 RX ORDER — 0.9 % SODIUM CHLORIDE 0.9 %
250 INTRAVENOUS SOLUTION INTRAVENOUS ONCE
Status: DISCONTINUED | OUTPATIENT
Start: 2019-01-01 | End: 2019-01-01 | Stop reason: HOSPADM

## 2019-01-01 RX ORDER — SODIUM CHLORIDE 9 MG/ML
INJECTION, SOLUTION INTRAVENOUS
Status: COMPLETED
Start: 2019-01-01 | End: 2019-01-01

## 2019-01-01 RX ORDER — SENNA AND DOCUSATE SODIUM 50; 8.6 MG/1; MG/1
2 TABLET, FILM COATED ORAL DAILY
Status: DISCONTINUED | OUTPATIENT
Start: 2019-01-01 | End: 2019-01-01

## 2019-01-01 RX ORDER — LANOLIN ALCOHOL/MO/W.PET/CERES
100 CREAM (GRAM) TOPICAL DAILY
Qty: 30 TABLET | Refills: 3 | Status: ON HOLD | OUTPATIENT
Start: 2019-01-01 | End: 2020-01-01 | Stop reason: HOSPADM

## 2019-01-01 RX ORDER — INSULIN GLARGINE 100 [IU]/ML
30 INJECTION, SOLUTION SUBCUTANEOUS NIGHTLY
Status: DISCONTINUED | OUTPATIENT
Start: 2019-01-01 | End: 2019-01-01 | Stop reason: HOSPADM

## 2019-01-01 RX ORDER — DEXTROSE MONOHYDRATE 25 G/50ML
12.5 INJECTION, SOLUTION INTRAVENOUS PRN
Status: DISCONTINUED | OUTPATIENT
Start: 2019-01-01 | End: 2019-01-01 | Stop reason: HOSPADM

## 2019-01-01 RX ORDER — TRAMADOL HYDROCHLORIDE 50 MG/1
25 TABLET ORAL EVERY 8 HOURS PRN
Qty: 8 TABLET | Refills: 0 | Status: SHIPPED | OUTPATIENT
Start: 2019-01-01 | End: 2019-01-01

## 2019-01-01 RX ORDER — CASTOR OIL AND BALSAM, PERU 788; 87 MG/G; MG/G
OINTMENT TOPICAL 2 TIMES DAILY
Status: DISCONTINUED | OUTPATIENT
Start: 2019-01-01 | End: 2019-01-01 | Stop reason: HOSPADM

## 2019-01-01 RX ORDER — CHLORHEXIDINE GLUCONATE 0.12 MG/ML
15 RINSE ORAL 2 TIMES DAILY
Status: DISCONTINUED | OUTPATIENT
Start: 2019-01-01 | End: 2019-01-01

## 2019-01-01 RX ORDER — METHYLPREDNISOLONE SODIUM SUCCINATE 40 MG/ML
INJECTION, POWDER, LYOPHILIZED, FOR SOLUTION INTRAMUSCULAR; INTRAVENOUS DAILY PRN
Status: COMPLETED | OUTPATIENT
Start: 2019-01-01 | End: 2019-01-01

## 2019-01-01 RX ORDER — FENTANYL CITRATE 50 UG/ML
25 INJECTION, SOLUTION INTRAMUSCULAR; INTRAVENOUS
Status: DISCONTINUED | OUTPATIENT
Start: 2019-01-01 | End: 2019-01-01

## 2019-01-01 RX ORDER — PREDNISONE 20 MG/1
20 TABLET ORAL DAILY
Status: DISCONTINUED | OUTPATIENT
Start: 2019-01-01 | End: 2019-01-01 | Stop reason: HOSPADM

## 2019-01-01 RX ORDER — MIDAZOLAM HYDROCHLORIDE 5 MG/ML
INJECTION INTRAMUSCULAR; INTRAVENOUS
Status: DISPENSED
Start: 2019-01-01 | End: 2019-01-01

## 2019-01-01 RX ORDER — MIDAZOLAM HYDROCHLORIDE 1 MG/ML
5 INJECTION INTRAMUSCULAR; INTRAVENOUS ONCE
Status: DISCONTINUED | OUTPATIENT
Start: 2019-01-01 | End: 2019-01-01

## 2019-01-01 RX ORDER — LACTULOSE 10 G/15ML
30 SOLUTION ORAL 3 TIMES DAILY
Status: DISCONTINUED | OUTPATIENT
Start: 2019-01-01 | End: 2019-01-01

## 2019-01-01 RX ORDER — NICOTINE POLACRILEX 4 MG
15 LOZENGE BUCCAL PRN
Status: DISCONTINUED | OUTPATIENT
Start: 2019-01-01 | End: 2019-01-01 | Stop reason: HOSPADM

## 2019-01-01 RX ORDER — THIAMINE MONONITRATE (VIT B1) 100 MG
100 TABLET ORAL DAILY
Status: DISCONTINUED | OUTPATIENT
Start: 2019-01-01 | End: 2019-01-01 | Stop reason: HOSPADM

## 2019-01-01 RX ORDER — POTASSIUM CHLORIDE 7.45 MG/ML
10 INJECTION INTRAVENOUS
Status: COMPLETED | OUTPATIENT
Start: 2019-01-01 | End: 2019-01-01

## 2019-01-01 RX ORDER — ROCURONIUM BROMIDE 10 MG/ML
INJECTION, SOLUTION INTRAVENOUS DAILY PRN
Status: COMPLETED | OUTPATIENT
Start: 2019-01-01 | End: 2019-01-01

## 2019-01-01 RX ORDER — AZITHROMYCIN 250 MG/1
250 TABLET, FILM COATED ORAL DAILY
Status: DISCONTINUED | OUTPATIENT
Start: 2019-01-01 | End: 2019-01-01 | Stop reason: HOSPADM

## 2019-01-01 RX ORDER — PREDNISONE 10 MG/1
TABLET ORAL
Qty: 9 TABLET | Refills: 0 | Status: ON HOLD | OUTPATIENT
Start: 2019-01-01 | End: 2019-01-01 | Stop reason: CLARIF

## 2019-01-01 RX ORDER — BENZONATATE 100 MG/1
100 CAPSULE ORAL 3 TIMES DAILY PRN
Qty: 20 CAPSULE | Refills: 0 | Status: ON HOLD | OUTPATIENT
Start: 2019-01-01 | End: 2019-01-01 | Stop reason: CLARIF

## 2019-01-01 RX ORDER — TRAMADOL HYDROCHLORIDE 50 MG/1
25 TABLET ORAL EVERY 6 HOURS PRN
Status: DISCONTINUED | OUTPATIENT
Start: 2019-01-01 | End: 2019-01-01 | Stop reason: HOSPADM

## 2019-01-01 RX ORDER — ARGATROBAN 1 MG/ML
1 INJECTION, SOLUTION INTRAVENOUS CONTINUOUS
Status: DISCONTINUED | OUTPATIENT
Start: 2019-01-01 | End: 2019-01-01

## 2019-01-01 RX ORDER — BENZONATATE 100 MG/1
100 CAPSULE ORAL 3 TIMES DAILY PRN
Status: DISCONTINUED | OUTPATIENT
Start: 2019-01-01 | End: 2019-01-01 | Stop reason: HOSPADM

## 2019-01-01 RX ORDER — PROPOFOL 10 MG/ML
INJECTION, EMULSION INTRAVENOUS
Status: DISPENSED
Start: 2019-01-01 | End: 2019-01-01

## 2019-01-01 RX ORDER — ACETAMINOPHEN 325 MG/1
650 TABLET ORAL EVERY 4 HOURS PRN
Qty: 120 TABLET | Refills: 3 | Status: ON HOLD | OUTPATIENT
Start: 2019-01-01 | End: 2020-01-01

## 2019-01-01 RX ORDER — KETAMINE HYDROCHLORIDE 100 MG/ML
INJECTION, SOLUTION INTRAMUSCULAR; INTRAVENOUS DAILY PRN
Status: COMPLETED | OUTPATIENT
Start: 2019-01-01 | End: 2019-01-01

## 2019-01-01 RX ORDER — ALBUMIN (HUMAN) 12.5 G/50ML
SOLUTION INTRAVENOUS
Status: DISPENSED
Start: 2019-01-01 | End: 2019-01-01

## 2019-01-01 RX ORDER — SODIUM CHLORIDE 9 MG/ML
INJECTION, SOLUTION INTRAVENOUS CONTINUOUS
Status: DISCONTINUED | OUTPATIENT
Start: 2019-01-01 | End: 2019-01-01

## 2019-01-01 RX ORDER — 0.9 % SODIUM CHLORIDE 0.9 %
250 INTRAVENOUS SOLUTION INTRAVENOUS ONCE
Status: DISCONTINUED | OUTPATIENT
Start: 2019-01-01 | End: 2019-01-01

## 2019-01-01 RX ORDER — LIDOCAINE HYDROCHLORIDE 20 MG/ML
INJECTION, SOLUTION INFILTRATION; PERINEURAL PRN
Status: DISCONTINUED | OUTPATIENT
Start: 2019-01-01 | End: 2019-01-01 | Stop reason: ALTCHOICE

## 2019-01-01 RX ORDER — SODIUM CHLORIDE 0.9 % (FLUSH) 0.9 %
10 SYRINGE (ML) INJECTION PRN
Status: DISCONTINUED | OUTPATIENT
Start: 2019-01-01 | End: 2019-01-01 | Stop reason: HOSPADM

## 2019-01-01 RX ORDER — IPRATROPIUM BROMIDE AND ALBUTEROL SULFATE 2.5; .5 MG/3ML; MG/3ML
1 SOLUTION RESPIRATORY (INHALATION)
Status: DISCONTINUED | OUTPATIENT
Start: 2019-01-01 | End: 2019-01-01

## 2019-01-01 RX ORDER — POVIDONE-IODINE 10 MG/ML
SOLUTION TOPICAL PRN
Status: DISCONTINUED | OUTPATIENT
Start: 2019-01-01 | End: 2019-01-01 | Stop reason: HOSPADM

## 2019-01-01 RX ORDER — IPRATROPIUM BROMIDE AND ALBUTEROL SULFATE 2.5; .5 MG/3ML; MG/3ML
3 SOLUTION RESPIRATORY (INHALATION) ONCE
Status: COMPLETED | OUTPATIENT
Start: 2019-01-01 | End: 2019-01-01

## 2019-01-01 RX ORDER — PROPOFOL 10 MG/ML
INJECTION, EMULSION INTRAVENOUS PRN
Status: DISCONTINUED | OUTPATIENT
Start: 2019-01-01 | End: 2019-01-01 | Stop reason: SDUPTHER

## 2019-01-01 RX ORDER — FLUTICASONE PROPIONATE 220 UG/1
1 AEROSOL, METERED RESPIRATORY (INHALATION) 2 TIMES DAILY
Status: ON HOLD | COMMUNITY
End: 2020-01-01 | Stop reason: HOSPADM

## 2019-01-01 RX ORDER — ISOSORBIDE MONONITRATE 60 MG/1
60 TABLET, EXTENDED RELEASE ORAL DAILY
Status: DISCONTINUED | OUTPATIENT
Start: 2019-01-01 | End: 2019-01-01 | Stop reason: HOSPADM

## 2019-01-01 RX ORDER — POTASSIUM CHLORIDE 29.8 MG/ML
20 INJECTION INTRAVENOUS PRN
Status: DISCONTINUED | OUTPATIENT
Start: 2019-01-01 | End: 2019-01-01 | Stop reason: HOSPADM

## 2019-01-01 RX ORDER — METHYLPREDNISOLONE SODIUM SUCCINATE 40 MG/ML
40 INJECTION, POWDER, LYOPHILIZED, FOR SOLUTION INTRAMUSCULAR; INTRAVENOUS DAILY
Status: DISCONTINUED | OUTPATIENT
Start: 2019-01-01 | End: 2019-01-01

## 2019-01-01 RX ORDER — FUROSEMIDE 10 MG/ML
INJECTION INTRAMUSCULAR; INTRAVENOUS
Status: COMPLETED
Start: 2019-01-01 | End: 2019-01-01

## 2019-01-01 RX ORDER — FUROSEMIDE 10 MG/ML
40 INJECTION INTRAMUSCULAR; INTRAVENOUS EVERY 6 HOURS SCHEDULED
Status: DISCONTINUED | OUTPATIENT
Start: 2019-01-01 | End: 2019-01-01

## 2019-01-01 RX ORDER — 0.9 % SODIUM CHLORIDE 0.9 %
1000 INTRAVENOUS SOLUTION INTRAVENOUS ONCE
Status: COMPLETED | OUTPATIENT
Start: 2019-01-01 | End: 2019-01-01

## 2019-01-01 RX ORDER — ALBUTEROL SULFATE 90 UG/1
4 AEROSOL, METERED RESPIRATORY (INHALATION) EVERY 4 HOURS PRN
Status: DISCONTINUED | OUTPATIENT
Start: 2019-01-01 | End: 2019-01-01

## 2019-01-01 RX ORDER — ALBUTEROL SULFATE 2.5 MG/3ML
SOLUTION RESPIRATORY (INHALATION)
Status: COMPLETED
Start: 2019-01-01 | End: 2019-01-01

## 2019-01-01 RX ORDER — SODIUM CHLORIDE 0.9 % (FLUSH) 0.9 %
10 SYRINGE (ML) INJECTION EVERY 12 HOURS SCHEDULED
Status: DISCONTINUED | OUTPATIENT
Start: 2019-01-01 | End: 2019-01-01 | Stop reason: HOSPADM

## 2019-01-01 RX ORDER — PROPOFOL 10 MG/ML
10 INJECTION, EMULSION INTRAVENOUS
Status: DISCONTINUED | OUTPATIENT
Start: 2019-01-01 | End: 2019-01-01

## 2019-01-01 RX ORDER — IPRATROPIUM BROMIDE AND ALBUTEROL SULFATE 2.5; .5 MG/3ML; MG/3ML
1 SOLUTION RESPIRATORY (INHALATION)
Status: DISCONTINUED | OUTPATIENT
Start: 2019-01-01 | End: 2019-01-01 | Stop reason: HOSPADM

## 2019-01-01 RX ORDER — MORPHINE SULFATE 2 MG/ML
2 INJECTION, SOLUTION INTRAMUSCULAR; INTRAVENOUS ONCE
Status: COMPLETED | OUTPATIENT
Start: 2019-01-01 | End: 2019-01-01

## 2019-01-01 RX ORDER — FUROSEMIDE 10 MG/ML
20 INJECTION INTRAMUSCULAR; INTRAVENOUS ONCE
Status: COMPLETED | OUTPATIENT
Start: 2019-01-01 | End: 2019-01-01

## 2019-01-01 RX ORDER — ACETAMINOPHEN 325 MG/1
650 TABLET ORAL EVERY 4 HOURS PRN
Status: DISCONTINUED | OUTPATIENT
Start: 2019-01-01 | End: 2019-01-01 | Stop reason: HOSPADM

## 2019-01-01 RX ORDER — MIDAZOLAM HYDROCHLORIDE 5 MG/ML
INJECTION INTRAMUSCULAR; INTRAVENOUS
Status: COMPLETED | OUTPATIENT
Start: 2019-01-01 | End: 2019-01-01

## 2019-01-01 RX ORDER — ATORVASTATIN CALCIUM 40 MG/1
40 TABLET, FILM COATED ORAL NIGHTLY
Status: DISCONTINUED | OUTPATIENT
Start: 2019-01-01 | End: 2019-01-01 | Stop reason: HOSPADM

## 2019-01-01 RX ORDER — INSULIN GLARGINE 100 [IU]/ML
30 INJECTION, SOLUTION SUBCUTANEOUS DAILY
Status: DISCONTINUED | OUTPATIENT
Start: 2019-01-01 | End: 2019-01-01

## 2019-01-01 RX ORDER — SODIUM CHLORIDE 9 MG/ML
INJECTION, SOLUTION INTRAVENOUS
Status: DISPENSED
Start: 2019-01-01 | End: 2019-01-01

## 2019-01-01 RX ORDER — ACETAMINOPHEN 160 MG/5ML
650 SOLUTION ORAL EVERY 4 HOURS PRN
Status: DISCONTINUED | OUTPATIENT
Start: 2019-01-01 | End: 2019-01-01

## 2019-01-01 RX ORDER — HEPARIN SODIUM 5000 [USP'U]/ML
5000 INJECTION, SOLUTION INTRAVENOUS; SUBCUTANEOUS EVERY 8 HOURS SCHEDULED
Status: DISCONTINUED | OUTPATIENT
Start: 2019-01-01 | End: 2019-01-01

## 2019-01-01 RX ORDER — METOPROLOL TARTRATE 5 MG/5ML
2.5 INJECTION INTRAVENOUS EVERY 6 HOURS
Status: DISCONTINUED | OUTPATIENT
Start: 2019-01-01 | End: 2019-01-01

## 2019-01-01 RX ORDER — MAGNESIUM HYDROXIDE 1200 MG/15ML
LIQUID ORAL CONTINUOUS PRN
Status: COMPLETED | OUTPATIENT
Start: 2019-01-01 | End: 2019-01-01

## 2019-01-01 RX ORDER — FUROSEMIDE 10 MG/ML
40 INJECTION INTRAMUSCULAR; INTRAVENOUS ONCE
Status: COMPLETED | OUTPATIENT
Start: 2019-01-01 | End: 2019-01-01

## 2019-01-01 RX ORDER — INSULIN GLARGINE 100 [IU]/ML
25 INJECTION, SOLUTION SUBCUTANEOUS DAILY
Qty: 1 VIAL | Refills: 3 | Status: ON HOLD | OUTPATIENT
Start: 2019-01-01 | End: 2020-01-01 | Stop reason: HOSPADM

## 2019-01-01 RX ORDER — POTASSIUM CHLORIDE 7.45 MG/ML
10 INJECTION INTRAVENOUS PRN
Status: DISCONTINUED | OUTPATIENT
Start: 2019-01-01 | End: 2019-01-01 | Stop reason: SDUPTHER

## 2019-01-01 RX ORDER — LACTULOSE 10 G/15ML
30 SOLUTION ORAL ONCE
Status: DISCONTINUED | OUTPATIENT
Start: 2019-01-01 | End: 2019-01-01

## 2019-01-01 RX ORDER — SODIUM CHLORIDE 0.9 % (FLUSH) 0.9 %
10 SYRINGE (ML) INJECTION 2 TIMES DAILY
Status: DISCONTINUED | OUTPATIENT
Start: 2019-01-01 | End: 2019-01-01 | Stop reason: HOSPADM

## 2019-01-01 RX ORDER — ATORVASTATIN CALCIUM 40 MG/1
40 TABLET, FILM COATED ORAL NIGHTLY
Status: DISCONTINUED | OUTPATIENT
Start: 2019-01-01 | End: 2019-01-01

## 2019-01-01 RX ORDER — CEFDINIR 300 MG/1
300 CAPSULE ORAL EVERY 12 HOURS SCHEDULED
Status: DISCONTINUED | OUTPATIENT
Start: 2019-01-01 | End: 2019-01-01 | Stop reason: HOSPADM

## 2019-01-01 RX ORDER — FLUTICASONE PROPIONATE 50 MCG
1 SPRAY, SUSPENSION (ML) NASAL DAILY
Status: ON HOLD | COMMUNITY
End: 2019-01-01 | Stop reason: HOSPADM

## 2019-01-01 RX ORDER — SODIUM CHLORIDE, SODIUM GLUCONATE, SODIUM ACETATE, POTASSIUM CHLORIDE AND MAGNESIUM CHLORIDE 526; 502; 368; 37; 30 MG/100ML; MG/100ML; MG/100ML; MG/100ML; MG/100ML
1000 INJECTION, SOLUTION INTRAVENOUS CONTINUOUS
Status: DISCONTINUED | OUTPATIENT
Start: 2019-01-01 | End: 2019-01-01

## 2019-01-01 RX ORDER — ASPIRIN 81 MG/1
81 TABLET ORAL DAILY
Status: DISCONTINUED | OUTPATIENT
Start: 2019-01-01 | End: 2019-01-01 | Stop reason: HOSPADM

## 2019-01-01 RX ORDER — FAMOTIDINE 20 MG/1
10 TABLET, FILM COATED ORAL DAILY
Status: DISCONTINUED | OUTPATIENT
Start: 2019-01-01 | End: 2019-01-01 | Stop reason: HOSPADM

## 2019-01-01 RX ORDER — IPRATROPIUM BROMIDE AND ALBUTEROL SULFATE 2.5; .5 MG/3ML; MG/3ML
1 SOLUTION RESPIRATORY (INHALATION) EVERY 4 HOURS
Status: DISCONTINUED | OUTPATIENT
Start: 2019-01-01 | End: 2019-01-01

## 2019-01-01 RX ORDER — 0.9 % SODIUM CHLORIDE 0.9 %
500 INTRAVENOUS SOLUTION INTRAVENOUS ONCE
Status: COMPLETED | OUTPATIENT
Start: 2019-01-01 | End: 2019-01-01

## 2019-01-01 RX ORDER — CEFDINIR 300 MG/1
300 CAPSULE ORAL EVERY 12 HOURS SCHEDULED
Qty: 14 CAPSULE | Refills: 0 | Status: SHIPPED | OUTPATIENT
Start: 2019-01-01 | End: 2019-01-01

## 2019-01-01 RX ORDER — HEPARIN SODIUM 1000 [USP'U]/ML
2800 INJECTION, SOLUTION INTRAVENOUS; SUBCUTANEOUS PRN
Status: DISCONTINUED | OUTPATIENT
Start: 2019-01-01 | End: 2019-01-01 | Stop reason: CLARIF

## 2019-01-01 RX ORDER — HYDROXYZINE PAMOATE 25 MG/1
50 CAPSULE ORAL 4 TIMES DAILY PRN
Status: DISCONTINUED | OUTPATIENT
Start: 2019-01-01 | End: 2019-01-01 | Stop reason: HOSPADM

## 2019-01-01 RX ORDER — FENTANYL CITRATE 50 UG/ML
25 INJECTION, SOLUTION INTRAMUSCULAR; INTRAVENOUS
Status: DISCONTINUED | OUTPATIENT
Start: 2019-01-01 | End: 2019-01-01 | Stop reason: SDUPTHER

## 2019-01-01 RX ORDER — METOPROLOL SUCCINATE 50 MG/1
50 TABLET, EXTENDED RELEASE ORAL DAILY
Status: DISCONTINUED | OUTPATIENT
Start: 2019-01-01 | End: 2019-01-01 | Stop reason: HOSPADM

## 2019-01-01 RX ORDER — ACETAMINOPHEN 650 MG/1
650 SUPPOSITORY RECTAL EVERY 4 HOURS PRN
Status: DISCONTINUED | OUTPATIENT
Start: 2019-01-01 | End: 2019-01-01

## 2019-01-01 RX ORDER — FENTANYL CITRATE 50 UG/ML
INJECTION, SOLUTION INTRAMUSCULAR; INTRAVENOUS
Status: COMPLETED | OUTPATIENT
Start: 2019-01-01 | End: 2019-01-01

## 2019-01-01 RX ORDER — 0.9 % SODIUM CHLORIDE 0.9 %
250 INTRAVENOUS SOLUTION INTRAVENOUS ONCE
Status: COMPLETED | OUTPATIENT
Start: 2019-01-01 | End: 2019-01-01

## 2019-01-01 RX ORDER — INSULIN GLARGINE 100 [IU]/ML
25 INJECTION, SOLUTION SUBCUTANEOUS DAILY
Status: DISCONTINUED | OUTPATIENT
Start: 2019-01-01 | End: 2019-01-01 | Stop reason: HOSPADM

## 2019-01-01 RX ORDER — METHYLPREDNISOLONE SODIUM SUCCINATE 40 MG/ML
40 INJECTION, POWDER, LYOPHILIZED, FOR SOLUTION INTRAMUSCULAR; INTRAVENOUS EVERY 12 HOURS
Status: DISCONTINUED | OUTPATIENT
Start: 2019-01-01 | End: 2019-01-01

## 2019-01-01 RX ORDER — OLMESARTAN MEDOXOMIL 40 MG/1
40 TABLET ORAL DAILY
Status: ON HOLD | COMMUNITY
Start: 2019-08-27 | End: 2019-01-01 | Stop reason: HOSPADM

## 2019-01-01 RX ORDER — ALBUTEROL SULFATE 2.5 MG/3ML
2.5 SOLUTION RESPIRATORY (INHALATION) ONCE
Status: COMPLETED | OUTPATIENT
Start: 2019-01-01 | End: 2019-01-01

## 2019-01-01 RX ORDER — AMLODIPINE BESYLATE 10 MG/1
10 TABLET ORAL DAILY
Status: ON HOLD | COMMUNITY
End: 2019-01-01 | Stop reason: HOSPADM

## 2019-01-01 RX ORDER — MAGNESIUM SULFATE 1 G/100ML
1 INJECTION INTRAVENOUS PRN
Status: DISCONTINUED | OUTPATIENT
Start: 2019-01-01 | End: 2019-01-01 | Stop reason: HOSPADM

## 2019-01-01 RX ORDER — DEXTROSE MONOHYDRATE 50 MG/ML
100 INJECTION, SOLUTION INTRAVENOUS PRN
Status: DISCONTINUED | OUTPATIENT
Start: 2019-01-01 | End: 2019-01-01 | Stop reason: HOSPADM

## 2019-01-01 RX ORDER — ASPIRIN 81 MG/1
81 TABLET, CHEWABLE ORAL DAILY
Status: DISCONTINUED | OUTPATIENT
Start: 2019-01-01 | End: 2019-01-01

## 2019-01-01 RX ORDER — LORAZEPAM 0.5 MG/1
0.5 TABLET ORAL
Status: COMPLETED | OUTPATIENT
Start: 2019-01-01 | End: 2019-01-01

## 2019-01-01 RX ORDER — SODIUM CHLORIDE 9 MG/ML
INJECTION, SOLUTION INTRAVENOUS
Status: DISCONTINUED
Start: 2019-01-01 | End: 2019-01-01 | Stop reason: HOSPADM

## 2019-01-01 RX ORDER — BISACODYL 10 MG
10 SUPPOSITORY, RECTAL RECTAL ONCE
Status: COMPLETED | OUTPATIENT
Start: 2019-01-01 | End: 2019-01-01

## 2019-01-01 RX ORDER — WHITE PETROLATUM 57.7 %-MINERAL OIL 31.9 % EYE OINTMENT
EVERY 4 HOURS
Status: DISCONTINUED | OUTPATIENT
Start: 2019-01-01 | End: 2019-01-01

## 2019-01-01 RX ORDER — FUROSEMIDE 40 MG/1
40 TABLET ORAL DAILY
Status: DISCONTINUED | OUTPATIENT
Start: 2019-01-01 | End: 2019-01-01 | Stop reason: HOSPADM

## 2019-01-01 RX ORDER — BISACODYL 10 MG
10 SUPPOSITORY, RECTAL RECTAL DAILY PRN
Status: DISCONTINUED | OUTPATIENT
Start: 2019-01-01 | End: 2019-01-01 | Stop reason: HOSPADM

## 2019-01-01 RX ORDER — HEPARIN SODIUM 5000 [USP'U]/ML
5000 INJECTION, SOLUTION INTRAVENOUS; SUBCUTANEOUS EVERY 8 HOURS SCHEDULED
Status: DISCONTINUED | OUTPATIENT
Start: 2019-01-01 | End: 2019-01-01 | Stop reason: HOSPADM

## 2019-01-01 RX ORDER — AMLODIPINE BESYLATE 5 MG/1
10 TABLET ORAL DAILY
Status: DISCONTINUED | OUTPATIENT
Start: 2019-01-01 | End: 2019-01-01 | Stop reason: HOSPADM

## 2019-01-01 RX ORDER — MAGNESIUM SULFATE HEPTAHYDRATE 500 MG/ML
INJECTION, SOLUTION INTRAMUSCULAR; INTRAVENOUS DAILY PRN
Status: COMPLETED | OUTPATIENT
Start: 2019-01-01 | End: 2019-01-01

## 2019-01-01 RX ORDER — IPRATROPIUM BROMIDE AND ALBUTEROL SULFATE 2.5; .5 MG/3ML; MG/3ML
1 SOLUTION RESPIRATORY (INHALATION) EVERY 4 HOURS PRN
Status: DISCONTINUED | OUTPATIENT
Start: 2019-01-01 | End: 2019-01-01 | Stop reason: HOSPADM

## 2019-01-01 RX ORDER — CARBOXYMETHYLCELLULOSE SODIUM 10 MG/ML
1 GEL OPHTHALMIC 3 TIMES DAILY
Status: DISCONTINUED | OUTPATIENT
Start: 2019-01-01 | End: 2019-01-01

## 2019-01-01 RX ORDER — IPRATROPIUM BROMIDE AND ALBUTEROL SULFATE 2.5; .5 MG/3ML; MG/3ML
3 SOLUTION RESPIRATORY (INHALATION)
Qty: 360 ML | Refills: 0 | Status: ON HOLD | OUTPATIENT
Start: 2019-01-01 | End: 2019-01-01 | Stop reason: CLARIF

## 2019-01-01 RX ORDER — NITROGLYCERIN 0.4 MG/1
0.4 TABLET SUBLINGUAL EVERY 5 MIN PRN
Status: DISCONTINUED | OUTPATIENT
Start: 2019-01-01 | End: 2019-01-01 | Stop reason: HOSPADM

## 2019-01-01 RX ORDER — ALBUMIN (HUMAN) 12.5 G/50ML
25 SOLUTION INTRAVENOUS ONCE
Status: COMPLETED | OUTPATIENT
Start: 2019-01-01 | End: 2019-01-01

## 2019-01-01 RX ORDER — PROPOFOL 10 MG/ML
INJECTION, EMULSION INTRAVENOUS CONTINUOUS PRN
Status: COMPLETED | OUTPATIENT
Start: 2019-01-01 | End: 2019-01-01

## 2019-01-01 RX ORDER — SODIUM CHLORIDE, SODIUM LACTATE, POTASSIUM CHLORIDE, AND CALCIUM CHLORIDE .6; .31; .03; .02 G/100ML; G/100ML; G/100ML; G/100ML
1000 INJECTION, SOLUTION INTRAVENOUS ONCE
Status: DISCONTINUED | OUTPATIENT
Start: 2019-01-01 | End: 2019-01-01

## 2019-01-01 RX ORDER — ALBUMIN (HUMAN) 12.5 G/50ML
25 SOLUTION INTRAVENOUS EVERY 6 HOURS SCHEDULED
Status: COMPLETED | OUTPATIENT
Start: 2019-01-01 | End: 2019-01-01

## 2019-01-01 RX ORDER — AZITHROMYCIN 250 MG/1
250 TABLET, FILM COATED ORAL DAILY
Qty: 3 TABLET | Refills: 0 | Status: SHIPPED | OUTPATIENT
Start: 2019-01-01 | End: 2019-01-01

## 2019-01-01 RX ORDER — ONDANSETRON 2 MG/ML
4 INJECTION INTRAMUSCULAR; INTRAVENOUS EVERY 6 HOURS PRN
Status: DISCONTINUED | OUTPATIENT
Start: 2019-01-01 | End: 2019-01-01 | Stop reason: HOSPADM

## 2019-01-01 RX ORDER — ONDANSETRON 2 MG/ML
4 INJECTION INTRAMUSCULAR; INTRAVENOUS EVERY 4 HOURS PRN
Status: DISCONTINUED | OUTPATIENT
Start: 2019-01-01 | End: 2019-01-01 | Stop reason: HOSPADM

## 2019-01-01 RX ORDER — ONDANSETRON 4 MG/1
4 TABLET, ORALLY DISINTEGRATING ORAL EVERY 4 HOURS PRN
Status: DISCONTINUED | OUTPATIENT
Start: 2019-01-01 | End: 2019-01-01

## 2019-01-01 RX ORDER — POTASSIUM CHLORIDE 29.8 MG/ML
20 INJECTION INTRAVENOUS PRN
Status: DISCONTINUED | OUTPATIENT
Start: 2019-01-01 | End: 2019-01-01 | Stop reason: SDUPTHER

## 2019-01-01 RX ORDER — MAGNESIUM SULFATE 1 G/100ML
1 INJECTION INTRAVENOUS PRN
Status: DISCONTINUED | OUTPATIENT
Start: 2019-01-01 | End: 2019-01-01 | Stop reason: SDUPTHER

## 2019-01-01 RX ORDER — GUANFACINE 1 MG/1
1 TABLET ORAL NIGHTLY
Status: DISCONTINUED | OUTPATIENT
Start: 2019-01-01 | End: 2019-01-01 | Stop reason: HOSPADM

## 2019-01-01 RX ORDER — CARBOXYMETHYLCELLULOSE SODIUM 10 MG/ML
1 GEL OPHTHALMIC EVERY 4 HOURS
Status: DISCONTINUED | OUTPATIENT
Start: 2019-01-01 | End: 2019-01-01

## 2019-01-01 RX ORDER — DEXAMETHASONE SODIUM PHOSPHATE 4 MG/ML
8 INJECTION, SOLUTION INTRA-ARTICULAR; INTRALESIONAL; INTRAMUSCULAR; INTRAVENOUS; SOFT TISSUE ONCE
Status: COMPLETED | OUTPATIENT
Start: 2019-01-01 | End: 2019-01-01

## 2019-01-01 RX ADMIN — BENZONATATE 100 MG: 100 CAPSULE ORAL at 08:40

## 2019-01-01 RX ADMIN — Medication 500 ML/HR: at 20:55

## 2019-01-01 RX ADMIN — Medication 10 ML: at 21:36

## 2019-01-01 RX ADMIN — CHLORHEXIDINE GLUCONATE 15 ML: 1.2 RINSE ORAL at 08:09

## 2019-01-01 RX ADMIN — FAMOTIDINE 20 MG: 10 INJECTION, SOLUTION INTRAVENOUS at 08:24

## 2019-01-01 RX ADMIN — Medication 500 ML/HR: at 00:04

## 2019-01-01 RX ADMIN — SODIUM PHOSPHATE, MONOBASIC, MONOHYDRATE 15 MMOL: 276; 142 INJECTION, SOLUTION INTRAVENOUS at 16:15

## 2019-01-01 RX ADMIN — Medication 1000 ML/HR: at 03:08

## 2019-01-01 RX ADMIN — Medication 500 ML/HR: at 14:53

## 2019-01-01 RX ADMIN — Medication 500 ML/HR: at 11:21

## 2019-01-01 RX ADMIN — IPRATROPIUM BROMIDE AND ALBUTEROL SULFATE 1 AMPULE: 2.5; .5 SOLUTION RESPIRATORY (INHALATION) at 15:44

## 2019-01-01 RX ADMIN — IPRATROPIUM BROMIDE AND ALBUTEROL SULFATE 1 AMPULE: .5; 3 SOLUTION RESPIRATORY (INHALATION) at 15:21

## 2019-01-01 RX ADMIN — CASTOR OIL AND BALSAM, PERU: 788; 87 OINTMENT TOPICAL at 10:36

## 2019-01-01 RX ADMIN — METOPROLOL TARTRATE 12.5 MG: 25 TABLET, FILM COATED ORAL at 21:43

## 2019-01-01 RX ADMIN — ASPIRIN 81 MG 81 MG: 81 TABLET ORAL at 08:23

## 2019-01-01 RX ADMIN — IPRATROPIUM BROMIDE AND ALBUTEROL SULFATE 1 AMPULE: 2.5; .5 SOLUTION RESPIRATORY (INHALATION) at 12:22

## 2019-01-01 RX ADMIN — INSULIN LISPRO 4 UNITS: 100 INJECTION, SOLUTION INTRAVENOUS; SUBCUTANEOUS at 05:36

## 2019-01-01 RX ADMIN — SENNOSIDES AND DOCUSATE SODIUM 2 TABLET: 8.6; 5 TABLET ORAL at 09:21

## 2019-01-01 RX ADMIN — ATORVASTATIN CALCIUM 40 MG: 40 TABLET, FILM COATED ORAL at 21:21

## 2019-01-01 RX ADMIN — IPRATROPIUM BROMIDE AND ALBUTEROL SULFATE 1 AMPULE: 2.5; .5 SOLUTION RESPIRATORY (INHALATION) at 00:02

## 2019-01-01 RX ADMIN — HEPARIN SODIUM 5000 UNITS: 5000 INJECTION INTRAVENOUS; SUBCUTANEOUS at 22:01

## 2019-01-01 RX ADMIN — METOPROLOL TARTRATE 12.5 MG: 25 TABLET, FILM COATED ORAL at 20:22

## 2019-01-01 RX ADMIN — FENTANYL CITRATE 100 MCG/HR: 50 INJECTION, SOLUTION INTRAMUSCULAR; INTRAVENOUS at 05:00

## 2019-01-01 RX ADMIN — CASTOR OIL AND BALSAM, PERU: 788; 87 OINTMENT TOPICAL at 23:51

## 2019-01-01 RX ADMIN — Medication: at 06:02

## 2019-01-01 RX ADMIN — CASTOR OIL AND BALSAM, PERU: 788; 87 OINTMENT TOPICAL at 17:19

## 2019-01-01 RX ADMIN — Medication 1500 ML/HR: at 14:38

## 2019-01-01 RX ADMIN — CEFEPIME 2 G: 2 INJECTION, POWDER, FOR SOLUTION INTRAVENOUS at 11:35

## 2019-01-01 RX ADMIN — Medication 500 ML/HR: at 13:02

## 2019-01-01 RX ADMIN — INSULIN GLARGINE 30 UNITS: 100 INJECTION, SOLUTION SUBCUTANEOUS at 10:32

## 2019-01-01 RX ADMIN — IPRATROPIUM BROMIDE AND ALBUTEROL SULFATE 1 AMPULE: .5; 3 SOLUTION RESPIRATORY (INHALATION) at 11:37

## 2019-01-01 RX ADMIN — SODIUM CHLORIDE 250 ML: 9 INJECTION, SOLUTION INTRAVENOUS at 16:44

## 2019-01-01 RX ADMIN — Medication 100 MG: at 08:46

## 2019-01-01 RX ADMIN — IPRATROPIUM BROMIDE AND ALBUTEROL SULFATE 1 AMPULE: 2.5; .5 SOLUTION RESPIRATORY (INHALATION) at 20:08

## 2019-01-01 RX ADMIN — CARBOXYMETHYLCELLULOSE SODIUM 1 DROP: 10 GEL OPHTHALMIC at 18:08

## 2019-01-01 RX ADMIN — IPRATROPIUM BROMIDE AND ALBUTEROL SULFATE 1 AMPULE: 2.5; .5 SOLUTION RESPIRATORY (INHALATION) at 20:04

## 2019-01-01 RX ADMIN — IPRATROPIUM BROMIDE AND ALBUTEROL SULFATE 1 AMPULE: 2.5; .5 SOLUTION RESPIRATORY (INHALATION) at 15:57

## 2019-01-01 RX ADMIN — Medication 10 ML: at 11:38

## 2019-01-01 RX ADMIN — ATORVASTATIN CALCIUM 40 MG: 40 TABLET, FILM COATED ORAL at 21:16

## 2019-01-01 RX ADMIN — IPRATROPIUM BROMIDE AND ALBUTEROL SULFATE 1 AMPULE: 2.5; .5 SOLUTION RESPIRATORY (INHALATION) at 19:48

## 2019-01-01 RX ADMIN — WHITE PETROLATUM 57.7 %-MINERAL OIL 31.9 % EYE OINTMENT: at 22:07

## 2019-01-01 RX ADMIN — SODIUM CHLORIDE: 9 INJECTION, SOLUTION INTRAVENOUS at 05:45

## 2019-01-01 RX ADMIN — INSULIN LISPRO 2 UNITS: 100 INJECTION, SOLUTION INTRAVENOUS; SUBCUTANEOUS at 21:43

## 2019-01-01 RX ADMIN — Medication 500 ML/HR: at 15:12

## 2019-01-01 RX ADMIN — HEPARIN SODIUM 5000 UNITS: 5000 INJECTION INTRAVENOUS; SUBCUTANEOUS at 21:30

## 2019-01-01 RX ADMIN — IPRATROPIUM BROMIDE AND ALBUTEROL SULFATE 1 AMPULE: .5; 3 SOLUTION RESPIRATORY (INHALATION) at 08:30

## 2019-01-01 RX ADMIN — MEROPENEM 1 G: 1 INJECTION, POWDER, FOR SOLUTION INTRAVENOUS at 15:33

## 2019-01-01 RX ADMIN — INSULIN LISPRO 4 UNITS: 100 INJECTION, SOLUTION INTRAVENOUS; SUBCUTANEOUS at 14:00

## 2019-01-01 RX ADMIN — FUROSEMIDE 10 MG/HR: 10 INJECTION, SOLUTION INTRAVENOUS at 12:11

## 2019-01-01 RX ADMIN — MEROPENEM 1 G: 1 INJECTION, POWDER, FOR SOLUTION INTRAVENOUS at 21:16

## 2019-01-01 RX ADMIN — WHITE PETROLATUM 57.7 %-MINERAL OIL 31.9 % EYE OINTMENT: at 18:55

## 2019-01-01 RX ADMIN — SENNOSIDES AND DOCUSATE SODIUM 2 TABLET: 8.6; 5 TABLET ORAL at 12:34

## 2019-01-01 RX ADMIN — HEPARIN SODIUM 5000 UNITS: 5000 INJECTION INTRAVENOUS; SUBCUTANEOUS at 05:39

## 2019-01-01 RX ADMIN — INSULIN GLARGINE 25 UNITS: 100 INJECTION, SOLUTION SUBCUTANEOUS at 21:57

## 2019-01-01 RX ADMIN — DARBEPOETIN ALFA 100 MCG: 100 INJECTION, SOLUTION INTRAVENOUS; SUBCUTANEOUS at 00:56

## 2019-01-01 RX ADMIN — CEFTRIAXONE SODIUM 1 G: 1 INJECTION, POWDER, FOR SOLUTION INTRAMUSCULAR; INTRAVENOUS at 12:54

## 2019-01-01 RX ADMIN — Medication 100 MG: at 14:56

## 2019-01-01 RX ADMIN — Medication 10 ML: at 22:12

## 2019-01-01 RX ADMIN — METOPROLOL TARTRATE 12.5 MG: 25 TABLET, FILM COATED ORAL at 09:06

## 2019-01-01 RX ADMIN — FAMOTIDINE 20 MG: 10 INJECTION, SOLUTION INTRAVENOUS at 15:50

## 2019-01-01 RX ADMIN — IPRATROPIUM BROMIDE AND ALBUTEROL SULFATE 1 AMPULE: .5; 3 SOLUTION RESPIRATORY (INHALATION) at 15:13

## 2019-01-01 RX ADMIN — METOPROLOL TARTRATE 2.5 MG: 5 INJECTION INTRAVENOUS at 23:55

## 2019-01-01 RX ADMIN — IPRATROPIUM BROMIDE AND ALBUTEROL SULFATE 1 AMPULE: .5; 3 SOLUTION RESPIRATORY (INHALATION) at 12:01

## 2019-01-01 RX ADMIN — HEPARIN SODIUM 5000 UNITS: 5000 INJECTION INTRAVENOUS; SUBCUTANEOUS at 14:46

## 2019-01-01 RX ADMIN — Medication 500 ML/HR: at 12:33

## 2019-01-01 RX ADMIN — Medication 10 ML: at 10:28

## 2019-01-01 RX ADMIN — IPRATROPIUM BROMIDE AND ALBUTEROL SULFATE 1 AMPULE: 2.5; .5 SOLUTION RESPIRATORY (INHALATION) at 03:45

## 2019-01-01 RX ADMIN — FENTANYL CITRATE 100 MCG/KG/HR: 50 INJECTION, SOLUTION INTRAMUSCULAR; INTRAVENOUS at 08:21

## 2019-01-01 RX ADMIN — METOPROLOL TARTRATE 12.5 MG: 25 TABLET, FILM COATED ORAL at 09:43

## 2019-01-01 RX ADMIN — Medication 1500 ML/HR: at 08:20

## 2019-01-01 RX ADMIN — CALCIUM GLUCONATE 1 G: 98 INJECTION, SOLUTION INTRAVENOUS at 01:07

## 2019-01-01 RX ADMIN — Medication 10 ML: at 00:38

## 2019-01-01 RX ADMIN — Medication 10 ML: at 09:55

## 2019-01-01 RX ADMIN — CEFEPIME 2 G: 2 INJECTION, POWDER, FOR SOLUTION INTRAVENOUS at 08:31

## 2019-01-01 RX ADMIN — Medication 15 ML: at 08:24

## 2019-01-01 RX ADMIN — CARBOXYMETHYLCELLULOSE SODIUM 1 DROP: 10 GEL OPHTHALMIC at 22:36

## 2019-01-01 RX ADMIN — CARBOXYMETHYLCELLULOSE SODIUM 1 DROP: 10 GEL OPHTHALMIC at 01:57

## 2019-01-01 RX ADMIN — IPRATROPIUM BROMIDE AND ALBUTEROL SULFATE 1 AMPULE: 2.5; .5 SOLUTION RESPIRATORY (INHALATION) at 11:29

## 2019-01-01 RX ADMIN — IPRATROPIUM BROMIDE AND ALBUTEROL SULFATE 1 AMPULE: 2.5; .5 SOLUTION RESPIRATORY (INHALATION) at 04:03

## 2019-01-01 RX ADMIN — IPRATROPIUM BROMIDE AND ALBUTEROL SULFATE 1 AMPULE: 2.5; .5 SOLUTION RESPIRATORY (INHALATION) at 03:43

## 2019-01-01 RX ADMIN — SENNOSIDES AND DOCUSATE SODIUM 2 TABLET: 8.6; 5 TABLET ORAL at 15:57

## 2019-01-01 RX ADMIN — INSULIN LISPRO 2 UNITS: 100 INJECTION, SOLUTION INTRAVENOUS; SUBCUTANEOUS at 00:52

## 2019-01-01 RX ADMIN — PROPOFOL 20 MCG/KG/MIN: 10 INJECTION, EMULSION INTRAVENOUS at 23:44

## 2019-01-01 RX ADMIN — CEFEPIME 2 G: 2 INJECTION, POWDER, FOR SOLUTION INTRAVENOUS at 20:29

## 2019-01-01 RX ADMIN — INSULIN LISPRO 2 UNITS: 100 INJECTION, SOLUTION INTRAVENOUS; SUBCUTANEOUS at 11:20

## 2019-01-01 RX ADMIN — INSULIN LISPRO 9 UNITS: 100 INJECTION, SOLUTION INTRAVENOUS; SUBCUTANEOUS at 21:23

## 2019-01-01 RX ADMIN — CALCIUM GLUCONATE 1 G: 98 INJECTION, SOLUTION INTRAVENOUS at 18:07

## 2019-01-01 RX ADMIN — FAMOTIDINE 20 MG: 10 INJECTION, SOLUTION INTRAVENOUS at 08:30

## 2019-01-01 RX ADMIN — IPRATROPIUM BROMIDE AND ALBUTEROL SULFATE 1 AMPULE: 2.5; .5 SOLUTION RESPIRATORY (INHALATION) at 20:26

## 2019-01-01 RX ADMIN — IPRATROPIUM BROMIDE AND ALBUTEROL SULFATE 1 AMPULE: 2.5; .5 SOLUTION RESPIRATORY (INHALATION) at 20:41

## 2019-01-01 RX ADMIN — IPRATROPIUM BROMIDE AND ALBUTEROL SULFATE 1 AMPULE: .5; 3 SOLUTION RESPIRATORY (INHALATION) at 20:49

## 2019-01-01 RX ADMIN — CALCIUM GLUCONATE 1 G: 98 INJECTION, SOLUTION INTRAVENOUS at 11:44

## 2019-01-01 RX ADMIN — FUROSEMIDE 40 MG: 10 INJECTION, SOLUTION INTRAMUSCULAR; INTRAVENOUS at 23:55

## 2019-01-01 RX ADMIN — Medication 500 ML/HR: at 04:19

## 2019-01-01 RX ADMIN — PROPOFOL 35 MCG/KG/MIN: 10 INJECTION, EMULSION INTRAVENOUS at 13:11

## 2019-01-01 RX ADMIN — CALCIUM GLUCONATE 1 G: 98 INJECTION, SOLUTION INTRAVENOUS at 07:34

## 2019-01-01 RX ADMIN — METOPROLOL TARTRATE 12.5 MG: 25 TABLET, FILM COATED ORAL at 20:12

## 2019-01-01 RX ADMIN — Medication 15 ML: at 08:10

## 2019-01-01 RX ADMIN — IPRATROPIUM BROMIDE AND ALBUTEROL SULFATE 1 AMPULE: 2.5; .5 SOLUTION RESPIRATORY (INHALATION) at 16:29

## 2019-01-01 RX ADMIN — ONDANSETRON 4 MG: 2 INJECTION INTRAMUSCULAR; INTRAVENOUS at 15:09

## 2019-01-01 RX ADMIN — IPRATROPIUM BROMIDE AND ALBUTEROL SULFATE 1 AMPULE: 2.5; .5 SOLUTION RESPIRATORY (INHALATION) at 23:28

## 2019-01-01 RX ADMIN — HEPARIN SODIUM 5000 UNITS: 5000 INJECTION INTRAVENOUS; SUBCUTANEOUS at 14:30

## 2019-01-01 RX ADMIN — CALCIUM GLUCONATE 1 G: 98 INJECTION, SOLUTION INTRAVENOUS at 16:43

## 2019-01-01 RX ADMIN — IPRATROPIUM BROMIDE AND ALBUTEROL SULFATE 1 AMPULE: .5; 3 SOLUTION RESPIRATORY (INHALATION) at 09:03

## 2019-01-01 RX ADMIN — PROPOFOL 45 MCG/KG/MIN: 10 INJECTION, EMULSION INTRAVENOUS at 19:18

## 2019-01-01 RX ADMIN — INSULIN LISPRO 4 UNITS: 100 INJECTION, SOLUTION INTRAVENOUS; SUBCUTANEOUS at 23:06

## 2019-01-01 RX ADMIN — FAMOTIDINE 20 MG: 10 INJECTION, SOLUTION INTRAVENOUS at 08:09

## 2019-01-01 RX ADMIN — DEXTROSE MONOHYDRATE 100 MG: 50 INJECTION, SOLUTION INTRAVENOUS at 18:36

## 2019-01-01 RX ADMIN — IPRATROPIUM BROMIDE AND ALBUTEROL SULFATE 1 AMPULE: 2.5; .5 SOLUTION RESPIRATORY (INHALATION) at 12:07

## 2019-01-01 RX ADMIN — Medication 10 ML: at 20:53

## 2019-01-01 RX ADMIN — HEPARIN SODIUM 5000 UNITS: 5000 INJECTION INTRAVENOUS; SUBCUTANEOUS at 14:59

## 2019-01-01 RX ADMIN — Medication 100 MG: at 10:21

## 2019-01-01 RX ADMIN — Medication 1500 ML/HR: at 23:08

## 2019-01-01 RX ADMIN — SODIUM PHOSPHATE, MONOBASIC, MONOHYDRATE 6 MMOL: 276; 142 INJECTION, SOLUTION INTRAVENOUS at 03:29

## 2019-01-01 RX ADMIN — Medication 1000 ML/HR: at 10:35

## 2019-01-01 RX ADMIN — INSULIN LISPRO 2 UNITS: 100 INJECTION, SOLUTION INTRAVENOUS; SUBCUTANEOUS at 09:26

## 2019-01-01 RX ADMIN — METOPROLOL TARTRATE 12.5 MG: 25 TABLET, FILM COATED ORAL at 21:52

## 2019-01-01 RX ADMIN — CALCIUM GLUCONATE 1 G: 98 INJECTION, SOLUTION INTRAVENOUS at 03:29

## 2019-01-01 RX ADMIN — MUPIROCIN: 20 OINTMENT TOPICAL at 21:18

## 2019-01-01 RX ADMIN — IPRATROPIUM BROMIDE AND ALBUTEROL SULFATE 1 AMPULE: .5; 3 SOLUTION RESPIRATORY (INHALATION) at 12:17

## 2019-01-01 RX ADMIN — IPRATROPIUM BROMIDE AND ALBUTEROL SULFATE 1 AMPULE: .5; 3 SOLUTION RESPIRATORY (INHALATION) at 11:53

## 2019-01-01 RX ADMIN — FENTANYL CITRATE 75 MCG/HR: 50 INJECTION, SOLUTION INTRAMUSCULAR; INTRAVENOUS at 23:32

## 2019-01-01 RX ADMIN — Medication 10 ML: at 21:18

## 2019-01-01 RX ADMIN — INSULIN HUMAN 10 UNITS: 100 INJECTION, SOLUTION PARENTERAL at 20:44

## 2019-01-01 RX ADMIN — SODIUM CHLORIDE 25.9 UNITS/HR: 9 INJECTION, SOLUTION INTRAVENOUS at 03:04

## 2019-01-01 RX ADMIN — Medication 500 ML/HR: at 17:58

## 2019-01-01 RX ADMIN — SODIUM CHLORIDE, SODIUM GLUCONATE, SODIUM ACETATE, POTASSIUM CHLORIDE AND MAGNESIUM CHLORIDE 1000 ML: 526; 502; 368; 37; 30 INJECTION, SOLUTION INTRAVENOUS at 10:33

## 2019-01-01 RX ADMIN — Medication 500 ML/HR: at 10:35

## 2019-01-01 RX ADMIN — CASTOR OIL AND BALSAM, PERU: 788; 87 OINTMENT TOPICAL at 22:00

## 2019-01-01 RX ADMIN — Medication 1500 ML/HR: at 16:00

## 2019-01-01 RX ADMIN — INSULIN LISPRO 2 UNITS: 100 INJECTION, SOLUTION INTRAVENOUS; SUBCUTANEOUS at 11:33

## 2019-01-01 RX ADMIN — INSULIN GLARGINE 30 UNITS: 100 INJECTION, SOLUTION SUBCUTANEOUS at 09:11

## 2019-01-01 RX ADMIN — METOPROLOL TARTRATE 12.5 MG: 25 TABLET, FILM COATED ORAL at 07:26

## 2019-01-01 RX ADMIN — INSULIN GLARGINE 30 UNITS: 100 INJECTION, SOLUTION SUBCUTANEOUS at 10:22

## 2019-01-01 RX ADMIN — PROPOFOL 45 MCG/KG/MIN: 10 INJECTION, EMULSION INTRAVENOUS at 07:00

## 2019-01-01 RX ADMIN — CARBOXYMETHYLCELLULOSE SODIUM 1 DROP: 10 GEL OPHTHALMIC at 20:50

## 2019-01-01 RX ADMIN — METOPROLOL TARTRATE 12.5 MG: 25 TABLET, FILM COATED ORAL at 10:31

## 2019-01-01 RX ADMIN — HEPARIN SODIUM 5000 UNITS: 5000 INJECTION INTRAVENOUS; SUBCUTANEOUS at 14:50

## 2019-01-01 RX ADMIN — Medication 10 ML: at 21:06

## 2019-01-01 RX ADMIN — Medication 1000 ML/HR: at 13:02

## 2019-01-01 RX ADMIN — Medication 10 ML: at 08:18

## 2019-01-01 RX ADMIN — VANCOMYCIN HYDROCHLORIDE 1000 MG: 10 INJECTION, POWDER, LYOPHILIZED, FOR SOLUTION INTRAVENOUS at 09:25

## 2019-01-01 RX ADMIN — IPRATROPIUM BROMIDE AND ALBUTEROL SULFATE 1 AMPULE: 2.5; .5 SOLUTION RESPIRATORY (INHALATION) at 23:30

## 2019-01-01 RX ADMIN — Medication 15 ML: at 20:50

## 2019-01-01 RX ADMIN — Medication 10 ML: at 21:32

## 2019-01-01 RX ADMIN — VANCOMYCIN HYDROCHLORIDE 1000 MG: 10 INJECTION, POWDER, LYOPHILIZED, FOR SOLUTION INTRAVENOUS at 08:38

## 2019-01-01 RX ADMIN — Medication 500 ML/HR: at 02:13

## 2019-01-01 RX ADMIN — IPRATROPIUM BROMIDE AND ALBUTEROL SULFATE 1 AMPULE: 2.5; .5 SOLUTION RESPIRATORY (INHALATION) at 19:25

## 2019-01-01 RX ADMIN — HEPARIN SODIUM: 1000 INJECTION INTRAVENOUS; SUBCUTANEOUS at 19:04

## 2019-01-01 RX ADMIN — PROPOFOL 40 MCG/KG/MIN: 10 INJECTION, EMULSION INTRAVENOUS at 17:02

## 2019-01-01 RX ADMIN — ACETAMINOPHEN 650 MG: 325 TABLET ORAL at 20:49

## 2019-01-01 RX ADMIN — Medication 100 MG: at 09:06

## 2019-01-01 RX ADMIN — Medication 10 ML: at 09:19

## 2019-01-01 RX ADMIN — SENNOSIDES AND DOCUSATE SODIUM 2 TABLET: 8.6; 5 TABLET ORAL at 09:10

## 2019-01-01 RX ADMIN — ALBUMIN (HUMAN) 25 G: 0.25 INJECTION, SOLUTION INTRAVENOUS at 05:42

## 2019-01-01 RX ADMIN — MUPIROCIN: 20 OINTMENT TOPICAL at 08:28

## 2019-01-01 RX ADMIN — POTASSIUM CHLORIDE 10 MEQ: 7.46 INJECTION, SOLUTION INTRAVENOUS at 12:41

## 2019-01-01 RX ADMIN — Medication 1000 ML/HR: at 20:55

## 2019-01-01 RX ADMIN — METOPROLOL TARTRATE 12.5 MG: 25 TABLET, FILM COATED ORAL at 21:11

## 2019-01-01 RX ADMIN — HEPARIN SODIUM 5000 UNITS: 5000 INJECTION INTRAVENOUS; SUBCUTANEOUS at 21:10

## 2019-01-01 RX ADMIN — IPRATROPIUM BROMIDE AND ALBUTEROL SULFATE 1 AMPULE: 2.5; .5 SOLUTION RESPIRATORY (INHALATION) at 08:36

## 2019-01-01 RX ADMIN — Medication 1000 ML/HR: at 12:34

## 2019-01-01 RX ADMIN — Medication 10 ML: at 20:22

## 2019-01-01 RX ADMIN — Medication 500 ML/HR: at 00:17

## 2019-01-01 RX ADMIN — ALBUMIN (HUMAN) 25 G: 0.25 INJECTION, SOLUTION INTRAVENOUS at 18:07

## 2019-01-01 RX ADMIN — DIBASIC SODIUM PHOSPHATE, MONOBASIC POTASSIUM PHOSPHATE AND MONOBASIC SODIUM PHOSPHATE 1 TABLET: 852; 155; 130 TABLET ORAL at 20:45

## 2019-01-01 RX ADMIN — IPRATROPIUM BROMIDE AND ALBUTEROL SULFATE 1 AMPULE: 2.5; .5 SOLUTION RESPIRATORY (INHALATION) at 07:42

## 2019-01-01 RX ADMIN — INSULIN LISPRO 4 UNITS: 100 INJECTION, SOLUTION INTRAVENOUS; SUBCUTANEOUS at 16:37

## 2019-01-01 RX ADMIN — INSULIN LISPRO 10 UNITS: 100 INJECTION, SOLUTION INTRAVENOUS; SUBCUTANEOUS at 12:05

## 2019-01-01 RX ADMIN — DIBASIC SODIUM PHOSPHATE, MONOBASIC POTASSIUM PHOSPHATE AND MONOBASIC SODIUM PHOSPHATE 1 TABLET: 852; 155; 130 TABLET ORAL at 08:11

## 2019-01-01 RX ADMIN — VANCOMYCIN HYDROCHLORIDE 1000 MG: 10 INJECTION, POWDER, LYOPHILIZED, FOR SOLUTION INTRAVENOUS at 10:49

## 2019-01-01 RX ADMIN — ONDANSETRON 4 MG: 2 INJECTION INTRAMUSCULAR; INTRAVENOUS at 18:30

## 2019-01-01 RX ADMIN — METOPROLOL TARTRATE 12.5 MG: 25 TABLET, FILM COATED ORAL at 21:48

## 2019-01-01 RX ADMIN — INSULIN GLARGINE 30 UNITS: 100 INJECTION, SOLUTION SUBCUTANEOUS at 09:26

## 2019-01-01 RX ADMIN — INSULIN GLARGINE 30 UNITS: 100 INJECTION, SOLUTION SUBCUTANEOUS at 09:05

## 2019-01-01 RX ADMIN — IPRATROPIUM BROMIDE AND ALBUTEROL SULFATE 1 AMPULE: 2.5; .5 SOLUTION RESPIRATORY (INHALATION) at 20:09

## 2019-01-01 RX ADMIN — RIFAXIMIN 550 MG: 550 TABLET ORAL at 10:42

## 2019-01-01 RX ADMIN — ASPIRIN 81 MG 81 MG: 81 TABLET ORAL at 08:09

## 2019-01-01 RX ADMIN — CARBOXYMETHYLCELLULOSE SODIUM 1 DROP: 10 GEL OPHTHALMIC at 21:11

## 2019-01-01 RX ADMIN — FAMOTIDINE 20 MG: 10 INJECTION, SOLUTION INTRAVENOUS at 08:11

## 2019-01-01 RX ADMIN — Medication 10 ML: at 00:49

## 2019-01-01 RX ADMIN — Medication 1000 ML/HR: at 12:46

## 2019-01-01 RX ADMIN — DIBASIC SODIUM PHOSPHATE, MONOBASIC POTASSIUM PHOSPHATE AND MONOBASIC SODIUM PHOSPHATE 1 TABLET: 852; 155; 130 TABLET ORAL at 20:22

## 2019-01-01 RX ADMIN — INSULIN GLARGINE 30 UNITS: 100 INJECTION, SOLUTION SUBCUTANEOUS at 08:43

## 2019-01-01 RX ADMIN — METOPROLOL TARTRATE 12.5 MG: 25 TABLET, FILM COATED ORAL at 09:55

## 2019-01-01 RX ADMIN — Medication 500 ML/HR: at 15:01

## 2019-01-01 RX ADMIN — IPRATROPIUM BROMIDE AND ALBUTEROL SULFATE 1 AMPULE: 2.5; .5 SOLUTION RESPIRATORY (INHALATION) at 08:28

## 2019-01-01 RX ADMIN — CARBOXYMETHYLCELLULOSE SODIUM 1 DROP: 10 GEL OPHTHALMIC at 14:05

## 2019-01-01 RX ADMIN — MEROPENEM 1 G: 1 INJECTION, POWDER, FOR SOLUTION INTRAVENOUS at 20:45

## 2019-01-01 RX ADMIN — ATORVASTATIN CALCIUM 40 MG: 40 TABLET, FILM COATED ORAL at 20:29

## 2019-01-01 RX ADMIN — FENTANYL CITRATE 50 MCG/HR: 50 INJECTION, SOLUTION INTRAMUSCULAR; INTRAVENOUS at 22:04

## 2019-01-01 RX ADMIN — INSULIN LISPRO 6 UNITS: 100 INJECTION, SOLUTION INTRAVENOUS; SUBCUTANEOUS at 12:26

## 2019-01-01 RX ADMIN — AZITHROMYCIN MONOHYDRATE 500 MG: 500 INJECTION, POWDER, LYOPHILIZED, FOR SOLUTION INTRAVENOUS at 14:16

## 2019-01-01 RX ADMIN — ASPIRIN 81 MG 81 MG: 81 TABLET ORAL at 08:50

## 2019-01-01 RX ADMIN — THIAMINE HYDROCHLORIDE 100 MG: 100 INJECTION, SOLUTION INTRAMUSCULAR; INTRAVENOUS at 10:09

## 2019-01-01 RX ADMIN — INSULIN LISPRO 4 UNITS: 100 INJECTION, SOLUTION INTRAVENOUS; SUBCUTANEOUS at 11:50

## 2019-01-01 RX ADMIN — INSULIN GLARGINE 30 UNITS: 100 INJECTION, SOLUTION SUBCUTANEOUS at 15:53

## 2019-01-01 RX ADMIN — Medication 10 ML: at 21:11

## 2019-01-01 RX ADMIN — DEXTROSE MONOHYDRATE 100 MG: 50 INJECTION, SOLUTION INTRAVENOUS at 23:27

## 2019-01-01 RX ADMIN — WHITE PETROLATUM 57.7 %-MINERAL OIL 31.9 % EYE OINTMENT: at 01:57

## 2019-01-01 RX ADMIN — PROPOFOL 20 MCG/KG/MIN: 10 INJECTION, EMULSION INTRAVENOUS at 20:30

## 2019-01-01 RX ADMIN — IPRATROPIUM BROMIDE AND ALBUTEROL SULFATE 1 AMPULE: .5; 3 SOLUTION RESPIRATORY (INHALATION) at 07:36

## 2019-01-01 RX ADMIN — ISOSORBIDE MONONITRATE 60 MG: 60 TABLET, EXTENDED RELEASE ORAL at 08:40

## 2019-01-01 RX ADMIN — CEFEPIME 2 G: 2 INJECTION, POWDER, FOR SOLUTION INTRAVENOUS at 08:22

## 2019-01-01 RX ADMIN — Medication 10 ML: at 10:34

## 2019-01-01 RX ADMIN — IPRATROPIUM BROMIDE AND ALBUTEROL SULFATE 1 AMPULE: 2.5; .5 SOLUTION RESPIRATORY (INHALATION) at 03:48

## 2019-01-01 RX ADMIN — GUANFACINE 1 MG: 1 TABLET ORAL at 21:21

## 2019-01-01 RX ADMIN — MEROPENEM 500 MG: 500 INJECTION, POWDER, FOR SOLUTION INTRAVENOUS at 13:52

## 2019-01-01 RX ADMIN — KETAMINE HYDROCHLORIDE 2 MCG/KG/MIN: 50 INJECTION, SOLUTION INTRAMUSCULAR; INTRAVENOUS at 21:03

## 2019-01-01 RX ADMIN — METOPROLOL TARTRATE 12.5 MG: 25 TABLET, FILM COATED ORAL at 08:24

## 2019-01-01 RX ADMIN — IPRATROPIUM BROMIDE AND ALBUTEROL SULFATE 1 AMPULE: .5; 3 SOLUTION RESPIRATORY (INHALATION) at 15:30

## 2019-01-01 RX ADMIN — MEROPENEM 1 G: 1 INJECTION, POWDER, FOR SOLUTION INTRAVENOUS at 08:22

## 2019-01-01 RX ADMIN — Medication 10 ML: at 21:21

## 2019-01-01 RX ADMIN — FAMOTIDINE 20 MG: 10 INJECTION, SOLUTION INTRAVENOUS at 10:42

## 2019-01-01 RX ADMIN — IPRATROPIUM BROMIDE AND ALBUTEROL SULFATE 1 AMPULE: .5; 3 SOLUTION RESPIRATORY (INHALATION) at 12:27

## 2019-01-01 RX ADMIN — CARBOXYMETHYLCELLULOSE SODIUM 1 DROP: 10 GEL OPHTHALMIC at 01:35

## 2019-01-01 RX ADMIN — HEPARIN SODIUM 5000 UNITS: 5000 INJECTION INTRAVENOUS; SUBCUTANEOUS at 14:56

## 2019-01-01 RX ADMIN — SENNOSIDES AND DOCUSATE SODIUM 2 TABLET: 8.6; 5 TABLET ORAL at 08:11

## 2019-01-01 RX ADMIN — INSULIN LISPRO 4 UNITS: 100 INJECTION, SOLUTION INTRAVENOUS; SUBCUTANEOUS at 08:32

## 2019-01-01 RX ADMIN — SODIUM CHLORIDE 250 ML: 9 INJECTION, SOLUTION INTRAVENOUS at 08:24

## 2019-01-01 RX ADMIN — Medication 500 ML/HR: at 16:57

## 2019-01-01 RX ADMIN — WHITE PETROLATUM 57.7 %-MINERAL OIL 31.9 % EYE OINTMENT: at 03:26

## 2019-01-01 RX ADMIN — PROPOFOL 35 MCG/KG/MIN: 10 INJECTION, EMULSION INTRAVENOUS at 05:43

## 2019-01-01 RX ADMIN — HEPARIN SODIUM 5000 UNITS: 5000 INJECTION INTRAVENOUS; SUBCUTANEOUS at 15:25

## 2019-01-01 RX ADMIN — ASPIRIN 81 MG 81 MG: 81 TABLET ORAL at 15:57

## 2019-01-01 RX ADMIN — HYDROXYZINE PAMOATE 50 MG: 25 CAPSULE ORAL at 21:25

## 2019-01-01 RX ADMIN — PROPOFOL 20 MCG/KG/MIN: 10 INJECTION, EMULSION INTRAVENOUS at 05:10

## 2019-01-01 RX ADMIN — ATORVASTATIN CALCIUM 40 MG: 40 TABLET, FILM COATED ORAL at 21:11

## 2019-01-01 RX ADMIN — Medication 1500 ML/HR: at 04:34

## 2019-01-01 RX ADMIN — ASPIRIN 81 MG 81 MG: 81 TABLET ORAL at 12:34

## 2019-01-01 RX ADMIN — ALBUMIN (HUMAN) 25 G: 0.25 INJECTION, SOLUTION INTRAVENOUS at 05:01

## 2019-01-01 RX ADMIN — INSULIN LISPRO 7 UNITS: 100 INJECTION, SOLUTION INTRAVENOUS; SUBCUTANEOUS at 21:22

## 2019-01-01 RX ADMIN — CALCIUM GLUCONATE 1 G: 98 INJECTION, SOLUTION INTRAVENOUS at 20:52

## 2019-01-01 RX ADMIN — IPRATROPIUM BROMIDE AND ALBUTEROL SULFATE 1 AMPULE: 2.5; .5 SOLUTION RESPIRATORY (INHALATION) at 23:50

## 2019-01-01 RX ADMIN — HEPARIN SODIUM 5000 UNITS: 5000 INJECTION INTRAVENOUS; SUBCUTANEOUS at 21:00

## 2019-01-01 RX ADMIN — Medication 1500 ML/HR: at 10:04

## 2019-01-01 RX ADMIN — MEROPENEM 1 G: 1 INJECTION, POWDER, FOR SOLUTION INTRAVENOUS at 20:06

## 2019-01-01 RX ADMIN — FAMOTIDINE 10 MG: 20 TABLET ORAL at 14:55

## 2019-01-01 RX ADMIN — CARBOXYMETHYLCELLULOSE SODIUM 1 DROP: 10 GEL OPHTHALMIC at 11:37

## 2019-01-01 RX ADMIN — METOPROLOL TARTRATE 12.5 MG: 25 TABLET, FILM COATED ORAL at 20:50

## 2019-01-01 RX ADMIN — MAGNESIUM SULFATE HEPTAHYDRATE 2 G: 500 INJECTION, SOLUTION INTRAMUSCULAR; INTRAVENOUS at 19:57

## 2019-01-01 RX ADMIN — Medication 10 ML: at 10:22

## 2019-01-01 RX ADMIN — SODIUM PHOSPHATE, MONOBASIC, MONOHYDRATE 6 MMOL: 276; 142 INJECTION, SOLUTION INTRAVENOUS at 18:35

## 2019-01-01 RX ADMIN — METHYLPREDNISOLONE SODIUM SUCCINATE 40 MG: 40 INJECTION, POWDER, FOR SOLUTION INTRAMUSCULAR; INTRAVENOUS at 08:40

## 2019-01-01 RX ADMIN — Medication 500 ML/HR: at 00:03

## 2019-01-01 RX ADMIN — IPRATROPIUM BROMIDE AND ALBUTEROL SULFATE 1 AMPULE: .5; 3 SOLUTION RESPIRATORY (INHALATION) at 16:12

## 2019-01-01 RX ADMIN — INSULIN LISPRO 2 UNITS: 100 INJECTION, SOLUTION INTRAVENOUS; SUBCUTANEOUS at 10:04

## 2019-01-01 RX ADMIN — Medication 500 ML/HR: at 21:00

## 2019-01-01 RX ADMIN — INSULIN LISPRO 2 UNITS: 100 INJECTION, SOLUTION INTRAVENOUS; SUBCUTANEOUS at 21:37

## 2019-01-01 RX ADMIN — FAMOTIDINE 20 MG: 10 INJECTION, SOLUTION INTRAVENOUS at 08:58

## 2019-01-01 RX ADMIN — IPRATROPIUM BROMIDE AND ALBUTEROL SULFATE 1 AMPULE: .5; 3 SOLUTION RESPIRATORY (INHALATION) at 03:51

## 2019-01-01 RX ADMIN — METHYLPREDNISOLONE SODIUM SUCCINATE 40 MG: 40 INJECTION, POWDER, FOR SOLUTION INTRAMUSCULAR; INTRAVENOUS at 08:33

## 2019-01-01 RX ADMIN — Medication 10 ML: at 12:34

## 2019-01-01 RX ADMIN — DEXTROSE MONOHYDRATE 16 MCG/MIN: 50 INJECTION, SOLUTION INTRAVENOUS at 20:58

## 2019-01-01 RX ADMIN — IPRATROPIUM BROMIDE AND ALBUTEROL SULFATE 1 AMPULE: 2.5; .5 SOLUTION RESPIRATORY (INHALATION) at 12:29

## 2019-01-01 RX ADMIN — HEPARIN SODIUM: 1000 INJECTION INTRAVENOUS; SUBCUTANEOUS at 18:21

## 2019-01-01 RX ADMIN — METOPROLOL TARTRATE 12.5 MG: 25 TABLET, FILM COATED ORAL at 21:57

## 2019-01-01 RX ADMIN — INSULIN GLARGINE 30 UNITS: 100 INJECTION, SOLUTION SUBCUTANEOUS at 21:24

## 2019-01-01 RX ADMIN — PROPOFOL 30 MCG/KG/MIN: 10 INJECTION, EMULSION INTRAVENOUS at 11:10

## 2019-01-01 RX ADMIN — FUROSEMIDE 20 MG/HR: 10 INJECTION, SOLUTION INTRAVENOUS at 02:55

## 2019-01-01 RX ADMIN — IPRATROPIUM BROMIDE AND ALBUTEROL SULFATE 1 AMPULE: .5; 3 SOLUTION RESPIRATORY (INHALATION) at 19:46

## 2019-01-01 RX ADMIN — INSULIN LISPRO 4 UNITS: 100 INJECTION, SOLUTION INTRAVENOUS; SUBCUTANEOUS at 08:57

## 2019-01-01 RX ADMIN — METOPROLOL TARTRATE 12.5 MG: 25 TABLET, FILM COATED ORAL at 08:46

## 2019-01-01 RX ADMIN — INSULIN LISPRO 2 UNITS: 100 INJECTION, SOLUTION INTRAVENOUS; SUBCUTANEOUS at 20:50

## 2019-01-01 RX ADMIN — DEXTROSE MONOHYDRATE 2 MCG/MIN: 50 INJECTION, SOLUTION INTRAVENOUS at 22:22

## 2019-01-01 RX ADMIN — AZITHROMYCIN MONOHYDRATE 500 MG: 500 INJECTION, POWDER, LYOPHILIZED, FOR SOLUTION INTRAVENOUS at 10:51

## 2019-01-01 RX ADMIN — IPRATROPIUM BROMIDE AND ALBUTEROL SULFATE 1 AMPULE: 2.5; .5 SOLUTION RESPIRATORY (INHALATION) at 15:54

## 2019-01-01 RX ADMIN — FAMOTIDINE 10 MG: 20 TABLET ORAL at 10:31

## 2019-01-01 RX ADMIN — INSULIN LISPRO 2 UNITS: 100 INJECTION, SOLUTION INTRAVENOUS; SUBCUTANEOUS at 00:31

## 2019-01-01 RX ADMIN — IPRATROPIUM BROMIDE AND ALBUTEROL SULFATE 1 AMPULE: .5; 3 SOLUTION RESPIRATORY (INHALATION) at 08:20

## 2019-01-01 RX ADMIN — Medication 10 ML: at 21:43

## 2019-01-01 RX ADMIN — ARGATROBAN 0.5 MCG/KG/MIN: 50 INJECTION INTRAVENOUS at 20:52

## 2019-01-01 RX ADMIN — ACETAMINOPHEN 650 MG: 325 TABLET ORAL at 20:52

## 2019-01-01 RX ADMIN — CALCIUM GLUCONATE 1 G: 98 INJECTION, SOLUTION INTRAVENOUS at 21:52

## 2019-01-01 RX ADMIN — VANCOMYCIN HYDROCHLORIDE 1000 MG: 10 INJECTION, POWDER, LYOPHILIZED, FOR SOLUTION INTRAVENOUS at 08:56

## 2019-01-01 RX ADMIN — IPRATROPIUM BROMIDE AND ALBUTEROL SULFATE 1 AMPULE: .5; 3 SOLUTION RESPIRATORY (INHALATION) at 16:04

## 2019-01-01 RX ADMIN — HEPARIN SODIUM 5000 UNITS: 5000 INJECTION INTRAVENOUS; SUBCUTANEOUS at 05:00

## 2019-01-01 RX ADMIN — DARBEPOETIN ALFA 100 MCG: 100 INJECTION, SOLUTION INTRAVENOUS; SUBCUTANEOUS at 09:55

## 2019-01-01 RX ADMIN — CALCIUM GLUCONATE 1 G: 98 INJECTION, SOLUTION INTRAVENOUS at 16:34

## 2019-01-01 RX ADMIN — IPRATROPIUM BROMIDE AND ALBUTEROL SULFATE 1 AMPULE: 2.5; .5 SOLUTION RESPIRATORY (INHALATION) at 08:44

## 2019-01-01 RX ADMIN — IPRATROPIUM BROMIDE AND ALBUTEROL SULFATE 1 AMPULE: 2.5; .5 SOLUTION RESPIRATORY (INHALATION) at 00:09

## 2019-01-01 RX ADMIN — FENTANYL CITRATE 50 MCG/HR: 50 INJECTION, SOLUTION INTRAMUSCULAR; INTRAVENOUS at 06:28

## 2019-01-01 RX ADMIN — SODIUM CHLORIDE: 9 INJECTION, SOLUTION INTRAVENOUS at 23:26

## 2019-01-01 RX ADMIN — INSULIN GLARGINE 30 UNITS: 100 INJECTION, SOLUTION SUBCUTANEOUS at 12:34

## 2019-01-01 RX ADMIN — IPRATROPIUM BROMIDE AND ALBUTEROL SULFATE 1 AMPULE: .5; 3 SOLUTION RESPIRATORY (INHALATION) at 08:32

## 2019-01-01 RX ADMIN — IPRATROPIUM BROMIDE AND ALBUTEROL SULFATE 1 AMPULE: 2.5; .5 SOLUTION RESPIRATORY (INHALATION) at 11:44

## 2019-01-01 RX ADMIN — IPRATROPIUM BROMIDE AND ALBUTEROL SULFATE 1 AMPULE: .5; 3 SOLUTION RESPIRATORY (INHALATION) at 12:04

## 2019-01-01 RX ADMIN — HEPARIN SODIUM 5000 UNITS: 5000 INJECTION INTRAVENOUS; SUBCUTANEOUS at 07:00

## 2019-01-01 RX ADMIN — METOPROLOL TARTRATE 12.5 MG: 25 TABLET, FILM COATED ORAL at 23:17

## 2019-01-01 RX ADMIN — FUROSEMIDE 20 MG: 10 INJECTION, SOLUTION INTRAMUSCULAR; INTRAVENOUS at 15:38

## 2019-01-01 RX ADMIN — CALCIUM GLUCONATE 1 G: 98 INJECTION, SOLUTION INTRAVENOUS at 03:09

## 2019-01-01 RX ADMIN — IPRATROPIUM BROMIDE AND ALBUTEROL SULFATE 1 AMPULE: 2.5; .5 SOLUTION RESPIRATORY (INHALATION) at 08:54

## 2019-01-01 RX ADMIN — VANCOMYCIN HYDROCHLORIDE 1000 MG: 10 INJECTION, POWDER, LYOPHILIZED, FOR SOLUTION INTRAVENOUS at 15:59

## 2019-01-01 RX ADMIN — CARBOXYMETHYLCELLULOSE SODIUM 1 DROP: 10 GEL OPHTHALMIC at 11:27

## 2019-01-01 RX ADMIN — INSULIN LISPRO 2 UNITS: 100 INJECTION, SOLUTION INTRAVENOUS; SUBCUTANEOUS at 05:04

## 2019-01-01 RX ADMIN — ROCURONIUM BROMIDE 50 MG: 10 SOLUTION INTRAVENOUS at 19:41

## 2019-01-01 RX ADMIN — FAMOTIDINE 20 MG: 10 INJECTION, SOLUTION INTRAVENOUS at 12:34

## 2019-01-01 RX ADMIN — CEFTRIAXONE SODIUM 1 G: 1 INJECTION, POWDER, FOR SOLUTION INTRAMUSCULAR; INTRAVENOUS at 10:08

## 2019-01-01 RX ADMIN — MUPIROCIN: 20 OINTMENT TOPICAL at 11:39

## 2019-01-01 RX ADMIN — CEFTRIAXONE SODIUM 1 G: 1 INJECTION, POWDER, FOR SOLUTION INTRAMUSCULAR; INTRAVENOUS at 10:01

## 2019-01-01 RX ADMIN — WHITE PETROLATUM 57.7 %-MINERAL OIL 31.9 % EYE OINTMENT: at 23:06

## 2019-01-01 RX ADMIN — DEXTROSE MONOHYDRATE 300 MG: 50 INJECTION, SOLUTION INTRAVENOUS at 09:30

## 2019-01-01 RX ADMIN — METOPROLOL TARTRATE 12.5 MG: 25 TABLET, FILM COATED ORAL at 22:10

## 2019-01-01 RX ADMIN — INSULIN LISPRO 10 UNITS: 100 INJECTION, SOLUTION INTRAVENOUS; SUBCUTANEOUS at 17:53

## 2019-01-01 RX ADMIN — SODIUM PHOSPHATE, MONOBASIC, MONOHYDRATE 6 MMOL: 276; 142 INJECTION, SOLUTION INTRAVENOUS at 03:00

## 2019-01-01 RX ADMIN — SODIUM CHLORIDE 34 UNITS/HR: 9 INJECTION, SOLUTION INTRAVENOUS at 06:08

## 2019-01-01 RX ADMIN — SENNOSIDES AND DOCUSATE SODIUM 2 TABLET: 8.6; 5 TABLET ORAL at 08:30

## 2019-01-01 RX ADMIN — Medication 500 ML/HR: at 07:54

## 2019-01-01 RX ADMIN — HEPARIN SODIUM 5000 UNITS: 5000 INJECTION INTRAVENOUS; SUBCUTANEOUS at 13:54

## 2019-01-01 RX ADMIN — IPRATROPIUM BROMIDE AND ALBUTEROL SULFATE 1 AMPULE: 2.5; .5 SOLUTION RESPIRATORY (INHALATION) at 07:29

## 2019-01-01 RX ADMIN — Medication 10 ML: at 10:31

## 2019-01-01 RX ADMIN — SODIUM CHLORIDE 1000 ML: 9 INJECTION, SOLUTION INTRAVENOUS at 12:50

## 2019-01-01 RX ADMIN — VANCOMYCIN HYDROCHLORIDE 1250 MG: 10 INJECTION, POWDER, LYOPHILIZED, FOR SOLUTION INTRAVENOUS at 03:11

## 2019-01-01 RX ADMIN — INSULIN LISPRO 2 UNITS: 100 INJECTION, SOLUTION INTRAVENOUS; SUBCUTANEOUS at 21:09

## 2019-01-01 RX ADMIN — Medication 500 ML/HR: at 14:58

## 2019-01-01 RX ADMIN — PROPOFOL 50 MCG/KG/MIN: 10 INJECTION, EMULSION INTRAVENOUS at 01:31

## 2019-01-01 RX ADMIN — IPRATROPIUM BROMIDE AND ALBUTEROL SULFATE 1 AMPULE: 2.5; .5 SOLUTION RESPIRATORY (INHALATION) at 12:40

## 2019-01-01 RX ADMIN — IPRATROPIUM BROMIDE AND ALBUTEROL SULFATE 1 AMPULE: 2.5; .5 SOLUTION RESPIRATORY (INHALATION) at 08:12

## 2019-01-01 RX ADMIN — Medication 10 ML: at 08:59

## 2019-01-01 RX ADMIN — ACETAMINOPHEN 650 MG: 325 TABLET ORAL at 12:33

## 2019-01-01 RX ADMIN — Medication 1500 ML/HR: at 07:41

## 2019-01-01 RX ADMIN — IPRATROPIUM BROMIDE AND ALBUTEROL SULFATE 1 AMPULE: .5; 3 SOLUTION RESPIRATORY (INHALATION) at 08:33

## 2019-01-01 RX ADMIN — IPRATROPIUM BROMIDE AND ALBUTEROL SULFATE 1 AMPULE: 2.5; .5 SOLUTION RESPIRATORY (INHALATION) at 23:56

## 2019-01-01 RX ADMIN — Medication 10 ML: at 21:45

## 2019-01-01 RX ADMIN — ARGATROBAN 1 MCG/KG/MIN: 50 INJECTION INTRAVENOUS at 05:14

## 2019-01-01 RX ADMIN — METOPROLOL TARTRATE 12.5 MG: 25 TABLET, FILM COATED ORAL at 08:31

## 2019-01-01 RX ADMIN — INSULIN LISPRO 9 UNITS: 100 INJECTION, SOLUTION INTRAVENOUS; SUBCUTANEOUS at 12:05

## 2019-01-01 RX ADMIN — INSULIN LISPRO 2 UNITS: 100 INJECTION, SOLUTION INTRAVENOUS; SUBCUTANEOUS at 21:41

## 2019-01-01 RX ADMIN — Medication 500 ML/HR: at 14:38

## 2019-01-01 RX ADMIN — INSULIN LISPRO 15 UNITS: 100 INJECTION, SOLUTION INTRAVENOUS; SUBCUTANEOUS at 16:38

## 2019-01-01 RX ADMIN — METOPROLOL TARTRATE 12.5 MG: 25 TABLET, FILM COATED ORAL at 08:22

## 2019-01-01 RX ADMIN — FUROSEMIDE 40 MG: 40 TABLET ORAL at 08:33

## 2019-01-01 RX ADMIN — ASPIRIN 81 MG 81 MG: 81 TABLET ORAL at 08:22

## 2019-01-01 RX ADMIN — CALCIUM GLUCONATE 1 G: 98 INJECTION, SOLUTION INTRAVENOUS at 20:16

## 2019-01-01 RX ADMIN — INSULIN LISPRO 2 UNITS: 100 INJECTION, SOLUTION INTRAVENOUS; SUBCUTANEOUS at 02:50

## 2019-01-01 RX ADMIN — IPRATROPIUM BROMIDE AND ALBUTEROL SULFATE 1 AMPULE: .5; 3 SOLUTION RESPIRATORY (INHALATION) at 19:35

## 2019-01-01 RX ADMIN — METOPROLOL TARTRATE 12.5 MG: 25 TABLET, FILM COATED ORAL at 20:03

## 2019-01-01 RX ADMIN — FENTANYL CITRATE 50 MCG/HR: 50 INJECTION, SOLUTION INTRAMUSCULAR; INTRAVENOUS at 11:18

## 2019-01-01 RX ADMIN — CALCIUM GLUCONATE 1 G: 98 INJECTION, SOLUTION INTRAVENOUS at 02:33

## 2019-01-01 RX ADMIN — HEPARIN SODIUM 5000 UNITS: 5000 INJECTION INTRAVENOUS; SUBCUTANEOUS at 05:04

## 2019-01-01 RX ADMIN — IPRATROPIUM BROMIDE AND ALBUTEROL SULFATE 1 AMPULE: 2.5; .5 SOLUTION RESPIRATORY (INHALATION) at 23:53

## 2019-01-01 RX ADMIN — DEXTROSE MONOHYDRATE 100 MG: 50 INJECTION, SOLUTION INTRAVENOUS at 16:37

## 2019-01-01 RX ADMIN — FENTANYL CITRATE 25 MCG: 50 INJECTION INTRAMUSCULAR; INTRAVENOUS at 09:05

## 2019-01-01 RX ADMIN — IPRATROPIUM BROMIDE AND ALBUTEROL SULFATE 1 AMPULE: 2.5; .5 SOLUTION RESPIRATORY (INHALATION) at 03:41

## 2019-01-01 RX ADMIN — HEPARIN SODIUM 5000 UNITS: 5000 INJECTION INTRAVENOUS; SUBCUTANEOUS at 21:52

## 2019-01-01 RX ADMIN — DEXTROSE MONOHYDRATE 300 MG: 50 INJECTION, SOLUTION INTRAVENOUS at 09:53

## 2019-01-01 RX ADMIN — INSULIN LISPRO 2 UNITS: 100 INJECTION, SOLUTION INTRAVENOUS; SUBCUTANEOUS at 17:04

## 2019-01-01 RX ADMIN — IPRATROPIUM BROMIDE AND ALBUTEROL SULFATE 1 AMPULE: 2.5; .5 SOLUTION RESPIRATORY (INHALATION) at 00:11

## 2019-01-01 RX ADMIN — ARGATROBAN 1 MCG/KG/MIN: 50 INJECTION INTRAVENOUS at 08:28

## 2019-01-01 RX ADMIN — INSULIN LISPRO 10 UNITS: 100 INJECTION, SOLUTION INTRAVENOUS; SUBCUTANEOUS at 17:09

## 2019-01-01 RX ADMIN — SENNOSIDES AND DOCUSATE SODIUM 2 TABLET: 8.6; 5 TABLET ORAL at 10:51

## 2019-01-01 RX ADMIN — HEPARIN SODIUM 5000 UNITS: 5000 INJECTION INTRAVENOUS; SUBCUTANEOUS at 23:32

## 2019-01-01 RX ADMIN — CASTOR OIL AND BALSAM, PERU: 788; 87 OINTMENT TOPICAL at 21:43

## 2019-01-01 RX ADMIN — HEPARIN SODIUM: 1000 INJECTION INTRAVENOUS; SUBCUTANEOUS at 03:30

## 2019-01-01 RX ADMIN — MEROPENEM 1 G: 1 INJECTION, POWDER, FOR SOLUTION INTRAVENOUS at 10:30

## 2019-01-01 RX ADMIN — SODIUM CHLORIDE 500 ML: 9 INJECTION, SOLUTION INTRAVENOUS at 10:17

## 2019-01-01 RX ADMIN — PROPOFOL 20 MCG/KG/MIN: 10 INJECTION, EMULSION INTRAVENOUS at 20:08

## 2019-01-01 RX ADMIN — DIBASIC SODIUM PHOSPHATE, MONOBASIC POTASSIUM PHOSPHATE AND MONOBASIC SODIUM PHOSPHATE 250 MG: 852; 155; 130 TABLET ORAL at 09:23

## 2019-01-01 RX ADMIN — INSULIN LISPRO 2 UNITS: 100 INJECTION, SOLUTION INTRAVENOUS; SUBCUTANEOUS at 08:11

## 2019-01-01 RX ADMIN — INSULIN LISPRO 10 UNITS: 100 INJECTION, SOLUTION INTRAVENOUS; SUBCUTANEOUS at 16:38

## 2019-01-01 RX ADMIN — SODIUM PHOSPHATE, MONOBASIC, MONOHYDRATE 6 MMOL: 276; 142 INJECTION, SOLUTION INTRAVENOUS at 02:33

## 2019-01-01 RX ADMIN — ATORVASTATIN CALCIUM 40 MG: 40 TABLET, FILM COATED ORAL at 20:50

## 2019-01-01 RX ADMIN — INSULIN LISPRO 2 UNITS: 100 INJECTION, SOLUTION INTRAVENOUS; SUBCUTANEOUS at 11:54

## 2019-01-01 RX ADMIN — CALCIUM GLUCONATE 1 G: 98 INJECTION, SOLUTION INTRAVENOUS at 02:02

## 2019-01-01 RX ADMIN — INSULIN GLARGINE 30 UNITS: 100 INJECTION, SOLUTION SUBCUTANEOUS at 08:23

## 2019-01-01 RX ADMIN — Medication 1500 ML/HR: at 19:29

## 2019-01-01 RX ADMIN — METOPROLOL TARTRATE 12.5 MG: 25 TABLET, FILM COATED ORAL at 08:23

## 2019-01-01 RX ADMIN — IPRATROPIUM BROMIDE AND ALBUTEROL SULFATE 1 AMPULE: 2.5; .5 SOLUTION RESPIRATORY (INHALATION) at 04:01

## 2019-01-01 RX ADMIN — Medication 500 ML/HR: at 14:37

## 2019-01-01 RX ADMIN — IPRATROPIUM BROMIDE AND ALBUTEROL SULFATE 1 AMPULE: 2.5; .5 SOLUTION RESPIRATORY (INHALATION) at 20:34

## 2019-01-01 RX ADMIN — INSULIN LISPRO 4 UNITS: 100 INJECTION, SOLUTION INTRAVENOUS; SUBCUTANEOUS at 04:33

## 2019-01-01 RX ADMIN — AMLODIPINE BESYLATE 10 MG: 5 TABLET ORAL at 08:33

## 2019-01-01 RX ADMIN — TRAMADOL HYDROCHLORIDE 25 MG: 50 TABLET, FILM COATED ORAL at 13:15

## 2019-01-01 RX ADMIN — INSULIN GLARGINE 30 UNITS: 100 INJECTION, SOLUTION SUBCUTANEOUS at 10:49

## 2019-01-01 RX ADMIN — CARBOXYMETHYLCELLULOSE SODIUM 1 DROP: 10 GEL OPHTHALMIC at 14:30

## 2019-01-01 RX ADMIN — IRON SUCROSE 200 MG: 20 INJECTION, SOLUTION INTRAVENOUS at 13:47

## 2019-01-01 RX ADMIN — AZITHROMYCIN MONOHYDRATE 500 MG: 500 INJECTION, POWDER, LYOPHILIZED, FOR SOLUTION INTRAVENOUS at 10:41

## 2019-01-01 RX ADMIN — Medication 1000 ML/HR: at 01:58

## 2019-01-01 RX ADMIN — FAMOTIDINE 20 MG: 10 INJECTION, SOLUTION INTRAVENOUS at 08:23

## 2019-01-01 RX ADMIN — FAMOTIDINE 10 MG: 20 TABLET ORAL at 09:54

## 2019-01-01 RX ADMIN — INSULIN LISPRO 4 UNITS: 100 INJECTION, SOLUTION INTRAVENOUS; SUBCUTANEOUS at 16:40

## 2019-01-01 RX ADMIN — Medication 10 ML: at 08:09

## 2019-01-01 RX ADMIN — POTASSIUM CHLORIDE 10 MEQ: 7.46 INJECTION, SOLUTION INTRAVENOUS at 11:41

## 2019-01-01 RX ADMIN — HEPARIN SODIUM 5000 UNITS: 5000 INJECTION INTRAVENOUS; SUBCUTANEOUS at 14:20

## 2019-01-01 RX ADMIN — HEPARIN SODIUM 5000 UNITS: 5000 INJECTION INTRAVENOUS; SUBCUTANEOUS at 05:48

## 2019-01-01 RX ADMIN — Medication 1500 ML/HR: at 21:07

## 2019-01-01 RX ADMIN — INSULIN GLARGINE 30 UNITS: 100 INJECTION, SOLUTION SUBCUTANEOUS at 09:59

## 2019-01-01 RX ADMIN — SENNOSIDES AND DOCUSATE SODIUM 2 TABLET: 8.6; 5 TABLET ORAL at 08:37

## 2019-01-01 RX ADMIN — ASPIRIN 81 MG: 81 TABLET ORAL at 08:33

## 2019-01-01 RX ADMIN — IPRATROPIUM BROMIDE AND ALBUTEROL SULFATE 1 AMPULE: 2.5; .5 SOLUTION RESPIRATORY (INHALATION) at 12:15

## 2019-01-01 RX ADMIN — INSULIN LISPRO 2 UNITS: 100 INJECTION, SOLUTION INTRAVENOUS; SUBCUTANEOUS at 00:24

## 2019-01-01 RX ADMIN — SENNOSIDES AND DOCUSATE SODIUM 2 TABLET: 8.6; 5 TABLET ORAL at 08:22

## 2019-01-01 RX ADMIN — Medication 500 ML/HR: at 07:33

## 2019-01-01 RX ADMIN — DEXTROSE 3 MCG/MIN: 5 SOLUTION INTRAVENOUS at 11:18

## 2019-01-01 RX ADMIN — CARBOXYMETHYLCELLULOSE SODIUM 1 DROP: 10 GEL OPHTHALMIC at 15:14

## 2019-01-01 RX ADMIN — HEPARIN SODIUM 5000 UNITS: 5000 INJECTION INTRAVENOUS; SUBCUTANEOUS at 17:18

## 2019-01-01 RX ADMIN — INSULIN LISPRO 9 UNITS: 100 INJECTION, SOLUTION INTRAVENOUS; SUBCUTANEOUS at 11:52

## 2019-01-01 RX ADMIN — ALBUMIN (HUMAN) 25 G: 0.25 INJECTION, SOLUTION INTRAVENOUS at 23:55

## 2019-01-01 RX ADMIN — ENOXAPARIN SODIUM 40 MG: 40 INJECTION SUBCUTANEOUS at 08:40

## 2019-01-01 RX ADMIN — IPRATROPIUM BROMIDE AND ALBUTEROL SULFATE 1 AMPULE: 2.5; .5 SOLUTION RESPIRATORY (INHALATION) at 12:14

## 2019-01-01 RX ADMIN — Medication 2 G: at 14:47

## 2019-01-01 RX ADMIN — Medication 1000 ML/HR: at 23:21

## 2019-01-01 RX ADMIN — PROPOFOL 45 MCG/KG/MIN: 10 INJECTION, EMULSION INTRAVENOUS at 00:23

## 2019-01-01 RX ADMIN — INSULIN LISPRO 2 UNITS: 100 INJECTION, SOLUTION INTRAVENOUS; SUBCUTANEOUS at 15:39

## 2019-01-01 RX ADMIN — FAMOTIDINE 20 MG: 10 INJECTION, SOLUTION INTRAVENOUS at 08:32

## 2019-01-01 RX ADMIN — ATORVASTATIN CALCIUM 40 MG: 40 TABLET, FILM COATED ORAL at 23:22

## 2019-01-01 RX ADMIN — INSULIN GLARGINE 30 UNITS: 100 INJECTION, SOLUTION SUBCUTANEOUS at 10:21

## 2019-01-01 RX ADMIN — INSULIN LISPRO 12 UNITS: 100 INJECTION, SOLUTION INTRAVENOUS; SUBCUTANEOUS at 08:10

## 2019-01-01 RX ADMIN — Medication 1500 ML/HR: at 00:33

## 2019-01-01 RX ADMIN — MIDAZOLAM HYDROCHLORIDE 0.5 MG: 5 INJECTION, SOLUTION INTRAMUSCULAR; INTRAVENOUS at 09:05

## 2019-01-01 RX ADMIN — IPRATROPIUM BROMIDE AND ALBUTEROL SULFATE 1 AMPULE: .5; 3 SOLUTION RESPIRATORY (INHALATION) at 11:28

## 2019-01-01 RX ADMIN — IPRATROPIUM BROMIDE AND ALBUTEROL SULFATE 1 AMPULE: 2.5; .5 SOLUTION RESPIRATORY (INHALATION) at 15:39

## 2019-01-01 RX ADMIN — ASPIRIN 81 MG 81 MG: 81 TABLET ORAL at 09:14

## 2019-01-01 RX ADMIN — WHITE PETROLATUM 57.7 %-MINERAL OIL 31.9 % EYE OINTMENT: at 07:20

## 2019-01-01 RX ADMIN — INSULIN LISPRO 4 UNITS: 100 INJECTION, SOLUTION INTRAVENOUS; SUBCUTANEOUS at 05:46

## 2019-01-01 RX ADMIN — SODIUM CHLORIDE: 9 INJECTION, SOLUTION INTRAVENOUS at 14:00

## 2019-01-01 RX ADMIN — CALCIUM GLUCONATE 1 G: 98 INJECTION, SOLUTION INTRAVENOUS at 08:31

## 2019-01-01 RX ADMIN — Medication 10 ML: at 21:25

## 2019-01-01 RX ADMIN — CARBOXYMETHYLCELLULOSE SODIUM 1 DROP: 10 GEL OPHTHALMIC at 05:44

## 2019-01-01 RX ADMIN — IPRATROPIUM BROMIDE AND ALBUTEROL SULFATE 1 AMPULE: .5; 3 SOLUTION RESPIRATORY (INHALATION) at 16:03

## 2019-01-01 RX ADMIN — INSULIN LISPRO 2 UNITS: 100 INJECTION, SOLUTION INTRAVENOUS; SUBCUTANEOUS at 09:05

## 2019-01-01 RX ADMIN — ATORVASTATIN CALCIUM 40 MG: 40 TABLET, FILM COATED ORAL at 21:57

## 2019-01-01 RX ADMIN — Medication 1500 ML/HR: at 20:30

## 2019-01-01 RX ADMIN — INSULIN LISPRO 2 UNITS: 100 INJECTION, SOLUTION INTRAVENOUS; SUBCUTANEOUS at 04:33

## 2019-01-01 RX ADMIN — Medication 100 MG: at 09:55

## 2019-01-01 RX ADMIN — CALCIUM GLUCONATE 1 G: 98 INJECTION, SOLUTION INTRAVENOUS at 08:04

## 2019-01-01 RX ADMIN — INSULIN LISPRO 2 UNITS: 100 INJECTION, SOLUTION INTRAVENOUS; SUBCUTANEOUS at 21:36

## 2019-01-01 RX ADMIN — INSULIN LISPRO 6 UNITS: 100 INJECTION, SOLUTION INTRAVENOUS; SUBCUTANEOUS at 08:40

## 2019-01-01 RX ADMIN — CALCIUM GLUCONATE 1 G: 98 INJECTION, SOLUTION INTRAVENOUS at 20:04

## 2019-01-01 RX ADMIN — ATORVASTATIN CALCIUM 40 MG: 40 TABLET, FILM COATED ORAL at 21:19

## 2019-01-01 RX ADMIN — Medication 15 ML: at 11:39

## 2019-01-01 RX ADMIN — AMLODIPINE BESYLATE 10 MG: 5 TABLET ORAL at 08:40

## 2019-01-01 RX ADMIN — DEXTROSE MONOHYDRATE 200 MG: 50 INJECTION, SOLUTION INTRAVENOUS at 20:28

## 2019-01-01 RX ADMIN — METHYLPREDNISOLONE SODIUM SUCCINATE 125 MG: 40 INJECTION, POWDER, LYOPHILIZED, FOR SOLUTION INTRAMUSCULAR; INTRAVENOUS at 20:05

## 2019-01-01 RX ADMIN — ASPIRIN 81 MG 81 MG: 81 TABLET ORAL at 08:24

## 2019-01-01 RX ADMIN — IPRATROPIUM BROMIDE AND ALBUTEROL SULFATE 1 AMPULE: .5; 3 SOLUTION RESPIRATORY (INHALATION) at 19:23

## 2019-01-01 RX ADMIN — ENOXAPARIN SODIUM 30 MG: 30 INJECTION SUBCUTANEOUS at 09:06

## 2019-01-01 RX ADMIN — Medication 1500 ML/HR: at 11:21

## 2019-01-01 RX ADMIN — THIAMINE HYDROCHLORIDE 100 MG: 100 INJECTION, SOLUTION INTRAMUSCULAR; INTRAVENOUS at 09:23

## 2019-01-01 RX ADMIN — CARBOXYMETHYLCELLULOSE SODIUM 1 DROP: 10 GEL OPHTHALMIC at 15:39

## 2019-01-01 RX ADMIN — Medication 10 ML: at 15:07

## 2019-01-01 RX ADMIN — ASPIRIN 81 MG 81 MG: 81 TABLET ORAL at 09:06

## 2019-01-01 RX ADMIN — INSULIN LISPRO 10 UNITS: 100 INJECTION, SOLUTION INTRAVENOUS; SUBCUTANEOUS at 08:10

## 2019-01-01 RX ADMIN — INSULIN LISPRO 9 UNITS: 100 INJECTION, SOLUTION INTRAVENOUS; SUBCUTANEOUS at 17:09

## 2019-01-01 RX ADMIN — Medication 15 ML: at 21:11

## 2019-01-01 RX ADMIN — HEPARIN SODIUM: 1000 INJECTION INTRAVENOUS; SUBCUTANEOUS at 07:11

## 2019-01-01 RX ADMIN — ARGATROBAN 1 MCG/KG/MIN: 50 INJECTION INTRAVENOUS at 21:11

## 2019-01-01 RX ADMIN — SODIUM CHLORIDE 250 ML: 9 INJECTION, SOLUTION INTRAVENOUS at 11:49

## 2019-01-01 RX ADMIN — INSULIN LISPRO 4 UNITS: 100 INJECTION, SOLUTION INTRAVENOUS; SUBCUTANEOUS at 13:23

## 2019-01-01 RX ADMIN — INSULIN LISPRO 4 UNITS: 100 INJECTION, SOLUTION INTRAVENOUS; SUBCUTANEOUS at 23:37

## 2019-01-01 RX ADMIN — METOPROLOL TARTRATE 12.5 MG: 25 TABLET, FILM COATED ORAL at 23:08

## 2019-01-01 RX ADMIN — LACTULOSE 30 G: 20 SOLUTION ORAL at 10:42

## 2019-01-01 RX ADMIN — CARBOXYMETHYLCELLULOSE SODIUM 1 DROP: 10 GEL OPHTHALMIC at 07:54

## 2019-01-01 RX ADMIN — Medication 10 ML: at 09:44

## 2019-01-01 RX ADMIN — HEPARIN SODIUM 5000 UNITS: 5000 INJECTION INTRAVENOUS; SUBCUTANEOUS at 05:59

## 2019-01-01 RX ADMIN — SODIUM CHLORIDE: 9 INJECTION, SOLUTION INTRAVENOUS at 06:36

## 2019-01-01 RX ADMIN — IPRATROPIUM BROMIDE AND ALBUTEROL SULFATE 1 AMPULE: .5; 3 SOLUTION RESPIRATORY (INHALATION) at 08:24

## 2019-01-01 RX ADMIN — IPRATROPIUM BROMIDE AND ALBUTEROL SULFATE 1 AMPULE: 2.5; .5 SOLUTION RESPIRATORY (INHALATION) at 17:16

## 2019-01-01 RX ADMIN — ACETAMINOPHEN 650 MG: 325 TABLET ORAL at 00:19

## 2019-01-01 RX ADMIN — DEXAMETHASONE SODIUM PHOSPHATE 8 MG: 4 INJECTION, SOLUTION INTRAMUSCULAR; INTRAVENOUS at 10:17

## 2019-01-01 RX ADMIN — CHLORHEXIDINE GLUCONATE 15 ML: 1.2 RINSE ORAL at 22:07

## 2019-01-01 RX ADMIN — CARBOXYMETHYLCELLULOSE SODIUM 1 DROP: 10 GEL OPHTHALMIC at 12:45

## 2019-01-01 RX ADMIN — METOPROLOL SUCCINATE 50 MG: 50 TABLET, EXTENDED RELEASE ORAL at 08:40

## 2019-01-01 RX ADMIN — INSULIN LISPRO 2 UNITS: 100 INJECTION, SOLUTION INTRAVENOUS; SUBCUTANEOUS at 09:11

## 2019-01-01 RX ADMIN — ASPIRIN 81 MG 81 MG: 81 TABLET ORAL at 08:08

## 2019-01-01 RX ADMIN — THIAMINE HYDROCHLORIDE 100 MG: 100 INJECTION, SOLUTION INTRAMUSCULAR; INTRAVENOUS at 10:02

## 2019-01-01 RX ADMIN — CARBOXYMETHYLCELLULOSE SODIUM 1 DROP: 10 GEL OPHTHALMIC at 14:00

## 2019-01-01 RX ADMIN — IPRATROPIUM BROMIDE AND ALBUTEROL SULFATE 1 AMPULE: 2.5; .5 SOLUTION RESPIRATORY (INHALATION) at 20:10

## 2019-01-01 RX ADMIN — ATORVASTATIN CALCIUM 40 MG: 40 TABLET, FILM COATED ORAL at 23:08

## 2019-01-01 RX ADMIN — INSULIN LISPRO 2 UNITS: 100 INJECTION, SOLUTION INTRAVENOUS; SUBCUTANEOUS at 08:40

## 2019-01-01 RX ADMIN — Medication 10 ML: at 21:57

## 2019-01-01 RX ADMIN — VANCOMYCIN HYDROCHLORIDE 1000 MG: 10 INJECTION, POWDER, LYOPHILIZED, FOR SOLUTION INTRAVENOUS at 12:24

## 2019-01-01 RX ADMIN — ASPIRIN 81 MG 81 MG: 81 TABLET ORAL at 08:46

## 2019-01-01 RX ADMIN — KETAMINE HYDROCHLORIDE 200 MG: 100 INJECTION INTRAMUSCULAR; INTRAVENOUS at 19:40

## 2019-01-01 RX ADMIN — SODIUM PHOSPHATE, MONOBASIC, MONOHYDRATE 12 MMOL: 276; 142 INJECTION, SOLUTION INTRAVENOUS at 18:09

## 2019-01-01 RX ADMIN — Medication 10 ML: at 23:23

## 2019-01-01 RX ADMIN — Medication 10 ML: at 08:00

## 2019-01-01 RX ADMIN — Medication 1500 ML/HR: at 04:19

## 2019-01-01 RX ADMIN — INSULIN LISPRO 2 UNITS: 100 INJECTION, SOLUTION INTRAVENOUS; SUBCUTANEOUS at 23:49

## 2019-01-01 RX ADMIN — METOPROLOL TARTRATE 12.5 MG: 25 TABLET, FILM COATED ORAL at 22:01

## 2019-01-01 RX ADMIN — ACETAMINOPHEN 650 MG: 325 TABLET ORAL at 03:52

## 2019-01-01 RX ADMIN — CEFEPIME HYDROCHLORIDE 1 G: 1 INJECTION, POWDER, FOR SOLUTION INTRAMUSCULAR; INTRAVENOUS at 09:06

## 2019-01-01 RX ADMIN — INSULIN LISPRO 4 UNITS: 100 INJECTION, SOLUTION INTRAVENOUS; SUBCUTANEOUS at 23:40

## 2019-01-01 RX ADMIN — IPRATROPIUM BROMIDE AND ALBUTEROL SULFATE 1 AMPULE: .5; 3 SOLUTION RESPIRATORY (INHALATION) at 16:52

## 2019-01-01 RX ADMIN — FUROSEMIDE 20 MG: 10 INJECTION INTRAMUSCULAR; INTRAVENOUS at 15:38

## 2019-01-01 RX ADMIN — FENTANYL CITRATE 100 MCG/HR: 50 INJECTION, SOLUTION INTRAMUSCULAR; INTRAVENOUS at 19:11

## 2019-01-01 RX ADMIN — PROPOFOL 40 MCG/KG/MIN: 10 INJECTION, EMULSION INTRAVENOUS at 21:26

## 2019-01-01 RX ADMIN — Medication 10 ML: at 20:50

## 2019-01-01 RX ADMIN — PROPOFOL 45 MCG/KG/MIN: 10 INJECTION, EMULSION INTRAVENOUS at 06:08

## 2019-01-01 RX ADMIN — IPRATROPIUM BROMIDE AND ALBUTEROL SULFATE 1 AMPULE: 2.5; .5 SOLUTION RESPIRATORY (INHALATION) at 19:53

## 2019-01-01 RX ADMIN — IPRATROPIUM BROMIDE AND ALBUTEROL SULFATE 1 AMPULE: 2.5; .5 SOLUTION RESPIRATORY (INHALATION) at 00:13

## 2019-01-01 RX ADMIN — IPRATROPIUM BROMIDE AND ALBUTEROL SULFATE 1 AMPULE: .5; 3 SOLUTION RESPIRATORY (INHALATION) at 07:58

## 2019-01-01 RX ADMIN — IPRATROPIUM BROMIDE AND ALBUTEROL SULFATE 1 AMPULE: 2.5; .5 SOLUTION RESPIRATORY (INHALATION) at 23:36

## 2019-01-01 RX ADMIN — ALBUMIN (HUMAN) 25 G: 0.25 INJECTION, SOLUTION INTRAVENOUS at 12:26

## 2019-01-01 RX ADMIN — INSULIN GLARGINE 30 UNITS: 100 INJECTION, SOLUTION SUBCUTANEOUS at 09:10

## 2019-01-01 RX ADMIN — IPRATROPIUM BROMIDE AND ALBUTEROL SULFATE 1 AMPULE: 2.5; .5 SOLUTION RESPIRATORY (INHALATION) at 23:46

## 2019-01-01 RX ADMIN — Medication 1000 ML/HR: at 07:34

## 2019-01-01 RX ADMIN — IPRATROPIUM BROMIDE AND ALBUTEROL SULFATE 1 AMPULE: 2.5; .5 SOLUTION RESPIRATORY (INHALATION) at 12:02

## 2019-01-01 RX ADMIN — IPRATROPIUM BROMIDE AND ALBUTEROL SULFATE 1 AMPULE: .5; 3 SOLUTION RESPIRATORY (INHALATION) at 20:50

## 2019-01-01 RX ADMIN — INSULIN GLARGINE 30 UNITS: 100 INJECTION, SOLUTION SUBCUTANEOUS at 10:04

## 2019-01-01 RX ADMIN — Medication 10 ML: at 08:37

## 2019-01-01 RX ADMIN — INSULIN LISPRO 2 UNITS: 100 INJECTION, SOLUTION INTRAVENOUS; SUBCUTANEOUS at 02:33

## 2019-01-01 RX ADMIN — HEPARIN SODIUM: 1000 INJECTION INTRAVENOUS; SUBCUTANEOUS at 16:10

## 2019-01-01 RX ADMIN — SODIUM CHLORIDE 13.44 UNITS/HR: 9 INJECTION, SOLUTION INTRAVENOUS at 22:04

## 2019-01-01 RX ADMIN — HEPARIN SODIUM: 1000 INJECTION INTRAVENOUS; SUBCUTANEOUS at 01:21

## 2019-01-01 RX ADMIN — INSULIN LISPRO 10 UNITS: 100 INJECTION, SOLUTION INTRAVENOUS; SUBCUTANEOUS at 11:52

## 2019-01-01 RX ADMIN — CEFEPIME 2 G: 2 INJECTION, POWDER, FOR SOLUTION INTRAVENOUS at 08:24

## 2019-01-01 RX ADMIN — FENTANYL CITRATE 50 MCG/HR: 50 INJECTION, SOLUTION INTRAMUSCULAR; INTRAVENOUS at 16:22

## 2019-01-01 RX ADMIN — Medication 1500 ML/HR: at 03:16

## 2019-01-01 RX ADMIN — WHITE PETROLATUM 57.7 %-MINERAL OIL 31.9 % EYE OINTMENT: at 05:44

## 2019-01-01 RX ADMIN — ATORVASTATIN CALCIUM 40 MG: 40 TABLET, FILM COATED ORAL at 21:47

## 2019-01-01 RX ADMIN — CALCIUM GLUCONATE 1 G: 98 INJECTION, SOLUTION INTRAVENOUS at 19:34

## 2019-01-01 RX ADMIN — ONDANSETRON 4 MG: 2 INJECTION INTRAMUSCULAR; INTRAVENOUS at 18:45

## 2019-01-01 RX ADMIN — IPRATROPIUM BROMIDE AND ALBUTEROL SULFATE 1 AMPULE: 2.5; .5 SOLUTION RESPIRATORY (INHALATION) at 16:20

## 2019-01-01 RX ADMIN — CASTOR OIL AND BALSAM, PERU: 788; 87 OINTMENT TOPICAL at 15:07

## 2019-01-01 RX ADMIN — VANCOMYCIN HYDROCHLORIDE 1000 MG: 10 INJECTION, POWDER, LYOPHILIZED, FOR SOLUTION INTRAVENOUS at 13:56

## 2019-01-01 RX ADMIN — Medication 500 ML/HR: at 00:18

## 2019-01-01 RX ADMIN — Medication 10 ML: at 09:00

## 2019-01-01 RX ADMIN — IOPAMIDOL 75 ML: 755 INJECTION, SOLUTION INTRAVENOUS at 10:37

## 2019-01-01 RX ADMIN — FUROSEMIDE 40 MG: 40 TABLET ORAL at 08:40

## 2019-01-01 RX ADMIN — ACETAMINOPHEN 650 MG: 325 TABLET ORAL at 11:48

## 2019-01-01 RX ADMIN — SENNOSIDES AND DOCUSATE SODIUM 2 TABLET: 8.6; 5 TABLET ORAL at 11:37

## 2019-01-01 RX ADMIN — INSULIN LISPRO 2 UNITS: 100 INJECTION, SOLUTION INTRAVENOUS; SUBCUTANEOUS at 16:55

## 2019-01-01 RX ADMIN — CARBOXYMETHYLCELLULOSE SODIUM 1 DROP: 10 GEL OPHTHALMIC at 22:19

## 2019-01-01 RX ADMIN — Medication 500 ML/HR: at 16:00

## 2019-01-01 RX ADMIN — FAMOTIDINE 20 MG: 10 INJECTION, SOLUTION INTRAVENOUS at 08:46

## 2019-01-01 RX ADMIN — IPRATROPIUM BROMIDE AND ALBUTEROL SULFATE 1 AMPULE: .5; 3 SOLUTION RESPIRATORY (INHALATION) at 19:31

## 2019-01-01 RX ADMIN — INSULIN LISPRO 18 UNITS: 100 INJECTION, SOLUTION INTRAVENOUS; SUBCUTANEOUS at 17:53

## 2019-01-01 RX ADMIN — IPRATROPIUM BROMIDE AND ALBUTEROL SULFATE 1 AMPULE: .5; 3 SOLUTION RESPIRATORY (INHALATION) at 20:23

## 2019-01-01 RX ADMIN — Medication 500 ML/HR: at 16:58

## 2019-01-01 RX ADMIN — INSULIN LISPRO 2 UNITS: 100 INJECTION, SOLUTION INTRAVENOUS; SUBCUTANEOUS at 10:09

## 2019-01-01 RX ADMIN — HEPARIN SODIUM 5000 UNITS: 5000 INJECTION INTRAVENOUS; SUBCUTANEOUS at 06:08

## 2019-01-01 RX ADMIN — METOPROLOL TARTRATE 2.5 MG: 5 INJECTION INTRAVENOUS at 05:43

## 2019-01-01 RX ADMIN — IPRATROPIUM BROMIDE AND ALBUTEROL SULFATE 1 AMPULE: 2.5; .5 SOLUTION RESPIRATORY (INHALATION) at 16:17

## 2019-01-01 RX ADMIN — CARBOXYMETHYLCELLULOSE SODIUM 1 DROP: 10 GEL OPHTHALMIC at 15:49

## 2019-01-01 RX ADMIN — SODIUM CHLORIDE 1000 ML: 9 INJECTION, SOLUTION INTRAVENOUS at 16:45

## 2019-01-01 RX ADMIN — HEPARIN SODIUM: 1000 INJECTION INTRAVENOUS; SUBCUTANEOUS at 09:00

## 2019-01-01 RX ADMIN — IPRATROPIUM BROMIDE AND ALBUTEROL SULFATE 1 AMPULE: .5; 3 SOLUTION RESPIRATORY (INHALATION) at 16:42

## 2019-01-01 RX ADMIN — ALBUTEROL SULFATE 2.5 MG: 2.5 SOLUTION RESPIRATORY (INHALATION) at 15:31

## 2019-01-01 RX ADMIN — ARGATROBAN 1 MCG/KG/MIN: 50 INJECTION INTRAVENOUS at 10:16

## 2019-01-01 RX ADMIN — Medication 10 ML: at 09:06

## 2019-01-01 RX ADMIN — FUROSEMIDE 40 MG: 10 INJECTION, SOLUTION INTRAMUSCULAR; INTRAVENOUS at 05:43

## 2019-01-01 RX ADMIN — INSULIN GLARGINE 30 UNITS: 100 INJECTION, SOLUTION SUBCUTANEOUS at 10:19

## 2019-01-01 RX ADMIN — HEPARIN SODIUM 5000 UNITS: 5000 INJECTION INTRAVENOUS; SUBCUTANEOUS at 22:07

## 2019-01-01 RX ADMIN — IPRATROPIUM BROMIDE AND ALBUTEROL SULFATE 1 AMPULE: .5; 3 SOLUTION RESPIRATORY (INHALATION) at 16:34

## 2019-01-01 RX ADMIN — CASTOR OIL AND BALSAM, PERU: 788; 87 OINTMENT TOPICAL at 22:11

## 2019-01-01 RX ADMIN — FAMOTIDINE 10 MG: 20 TABLET ORAL at 10:21

## 2019-01-01 RX ADMIN — Medication 1500 ML/HR: at 14:53

## 2019-01-01 RX ADMIN — Medication 10 ML: at 08:42

## 2019-01-01 RX ADMIN — MEROPENEM 1 G: 1 INJECTION, POWDER, FOR SOLUTION INTRAVENOUS at 20:21

## 2019-01-01 RX ADMIN — IPRATROPIUM BROMIDE AND ALBUTEROL SULFATE 3 AMPULE: .5; 3 SOLUTION RESPIRATORY (INHALATION) at 11:06

## 2019-01-01 RX ADMIN — INSULIN LISPRO 1 UNITS: 100 INJECTION, SOLUTION INTRAVENOUS; SUBCUTANEOUS at 05:00

## 2019-01-01 RX ADMIN — HEPARIN SODIUM 5000 UNITS: 5000 INJECTION INTRAVENOUS; SUBCUTANEOUS at 14:48

## 2019-01-01 RX ADMIN — ALBUMIN (HUMAN) 25 G: 0.25 INJECTION, SOLUTION INTRAVENOUS at 01:56

## 2019-01-01 RX ADMIN — INSULIN LISPRO 4 UNITS: 100 INJECTION, SOLUTION INTRAVENOUS; SUBCUTANEOUS at 17:27

## 2019-01-01 RX ADMIN — INSULIN LISPRO 2 UNITS: 100 INJECTION, SOLUTION INTRAVENOUS; SUBCUTANEOUS at 09:51

## 2019-01-01 RX ADMIN — INSULIN LISPRO 4 UNITS: 100 INJECTION, SOLUTION INTRAVENOUS; SUBCUTANEOUS at 12:14

## 2019-01-01 RX ADMIN — BISACODYL 10 MG: 10 SUPPOSITORY RECTAL at 20:28

## 2019-01-01 RX ADMIN — METOPROLOL TARTRATE 12.5 MG: 25 TABLET, FILM COATED ORAL at 09:13

## 2019-01-01 RX ADMIN — ARGATROBAN 1 MCG/KG/MIN: 50 INJECTION INTRAVENOUS at 08:50

## 2019-01-01 RX ADMIN — IPRATROPIUM BROMIDE AND ALBUTEROL SULFATE 1 AMPULE: 2.5; .5 SOLUTION RESPIRATORY (INHALATION) at 03:55

## 2019-01-01 RX ADMIN — PROPOFOL 45 MCG/KG/MIN: 10 INJECTION, EMULSION INTRAVENOUS at 02:26

## 2019-01-01 RX ADMIN — ARGATROBAN 1 MCG/KG/MIN: 50 INJECTION INTRAVENOUS at 17:00

## 2019-01-01 RX ADMIN — HEPARIN SODIUM 5000 UNITS: 5000 INJECTION INTRAVENOUS; SUBCUTANEOUS at 06:54

## 2019-01-01 RX ADMIN — Medication 1500 ML/HR: at 06:23

## 2019-01-01 RX ADMIN — PROPOFOL 120 MG: 10 INJECTION, EMULSION INTRAVENOUS at 14:51

## 2019-01-01 RX ADMIN — CASTOR OIL AND BALSAM, PERU: 788; 87 OINTMENT TOPICAL at 10:40

## 2019-01-01 RX ADMIN — IPRATROPIUM BROMIDE AND ALBUTEROL SULFATE 1 AMPULE: 2.5; .5 SOLUTION RESPIRATORY (INHALATION) at 08:37

## 2019-01-01 RX ADMIN — CARBOXYMETHYLCELLULOSE SODIUM 1 DROP: 10 GEL OPHTHALMIC at 21:09

## 2019-01-01 RX ADMIN — WHITE PETROLATUM 57.7 %-MINERAL OIL 31.9 % EYE OINTMENT: at 12:12

## 2019-01-01 RX ADMIN — Medication 1000 ML/HR: at 15:48

## 2019-01-01 RX ADMIN — HYDROXYZINE PAMOATE 50 MG: 25 CAPSULE ORAL at 10:12

## 2019-01-01 RX ADMIN — MORPHINE SULFATE 2 MG: 2 INJECTION, SOLUTION INTRAMUSCULAR; INTRAVENOUS at 18:03

## 2019-01-01 RX ADMIN — PROPOFOL 35 MCG/KG/MIN: 10 INJECTION, EMULSION INTRAVENOUS at 09:57

## 2019-01-01 RX ADMIN — IPRATROPIUM BROMIDE AND ALBUTEROL SULFATE 1 AMPULE: 2.5; .5 SOLUTION RESPIRATORY (INHALATION) at 00:06

## 2019-01-01 RX ADMIN — HEPARIN SODIUM 5000 UNITS: 5000 INJECTION INTRAVENOUS; SUBCUTANEOUS at 16:26

## 2019-01-01 RX ADMIN — CARBOXYMETHYLCELLULOSE SODIUM 1 DROP: 10 GEL OPHTHALMIC at 06:11

## 2019-01-01 RX ADMIN — LORAZEPAM 0.5 MG: 0.5 TABLET ORAL at 12:28

## 2019-01-01 RX ADMIN — Medication 1500 ML/HR: at 15:00

## 2019-01-01 RX ADMIN — INSULIN LISPRO 2 UNITS: 100 INJECTION, SOLUTION INTRAVENOUS; SUBCUTANEOUS at 16:38

## 2019-01-01 RX ADMIN — CARBOXYMETHYLCELLULOSE SODIUM 1 DROP: 10 GEL OPHTHALMIC at 08:32

## 2019-01-01 RX ADMIN — FAMOTIDINE 20 MG: 10 INJECTION, SOLUTION INTRAVENOUS at 08:49

## 2019-01-01 RX ADMIN — HEPARIN SODIUM 5000 UNITS: 5000 INJECTION INTRAVENOUS; SUBCUTANEOUS at 22:11

## 2019-01-01 RX ADMIN — HEPARIN SODIUM: 1000 INJECTION INTRAVENOUS; SUBCUTANEOUS at 05:03

## 2019-01-01 RX ADMIN — INSULIN LISPRO 2 UNITS: 100 INJECTION, SOLUTION INTRAVENOUS; SUBCUTANEOUS at 04:40

## 2019-01-01 RX ADMIN — METOPROLOL TARTRATE 12.5 MG: 25 TABLET, FILM COATED ORAL at 10:21

## 2019-01-01 RX ADMIN — Medication 15 ML: at 07:47

## 2019-01-01 RX ADMIN — INSULIN LISPRO 2 UNITS: 100 INJECTION, SOLUTION INTRAVENOUS; SUBCUTANEOUS at 08:41

## 2019-01-01 RX ADMIN — Medication 100 MG: at 10:31

## 2019-01-01 RX ADMIN — METOPROLOL SUCCINATE 50 MG: 50 TABLET, EXTENDED RELEASE ORAL at 08:33

## 2019-01-01 RX ADMIN — CALCIUM GLUCONATE 1 G: 98 INJECTION, SOLUTION INTRAVENOUS at 13:36

## 2019-01-01 RX ADMIN — IPRATROPIUM BROMIDE AND ALBUTEROL SULFATE 1 AMPULE: 2.5; .5 SOLUTION RESPIRATORY (INHALATION) at 16:28

## 2019-01-01 RX ADMIN — Medication 10 ML: at 21:49

## 2019-01-01 RX ADMIN — DEXTROSE MONOHYDRATE 8 MCG/MIN: 50 INJECTION, SOLUTION INTRAVENOUS at 02:11

## 2019-01-01 RX ADMIN — SODIUM CHLORIDE: 9 INJECTION, SOLUTION INTRAVENOUS at 13:36

## 2019-01-01 RX ADMIN — ACETAMINOPHEN 650 MG: 325 TABLET ORAL at 21:47

## 2019-01-01 RX ADMIN — HEPARIN SODIUM 5000 UNITS: 5000 INJECTION INTRAVENOUS; SUBCUTANEOUS at 06:00

## 2019-01-01 RX ADMIN — FENTANYL CITRATE 50 MCG/HR: 50 INJECTION, SOLUTION INTRAMUSCULAR; INTRAVENOUS at 11:32

## 2019-01-01 RX ADMIN — PROPOFOL 50 MCG/KG/MIN: 10 INJECTION, EMULSION INTRAVENOUS at 04:51

## 2019-01-01 RX ADMIN — ISOSORBIDE MONONITRATE 60 MG: 60 TABLET, EXTENDED RELEASE ORAL at 08:33

## 2019-01-01 RX ADMIN — IPRATROPIUM BROMIDE AND ALBUTEROL SULFATE 1 AMPULE: 2.5; .5 SOLUTION RESPIRATORY (INHALATION) at 16:23

## 2019-01-01 RX ADMIN — Medication 1000 ML/HR: at 20:00

## 2019-01-01 RX ADMIN — IPRATROPIUM BROMIDE AND ALBUTEROL SULFATE 1 AMPULE: 2.5; .5 SOLUTION RESPIRATORY (INHALATION) at 20:15

## 2019-01-01 RX ADMIN — CALCIUM GLUCONATE 1 G: 98 INJECTION, SOLUTION INTRAVENOUS at 03:46

## 2019-01-01 RX ADMIN — Medication 500 ML/HR: at 22:00

## 2019-01-01 RX ADMIN — IPRATROPIUM BROMIDE AND ALBUTEROL SULFATE 1 AMPULE: .5; 3 SOLUTION RESPIRATORY (INHALATION) at 16:59

## 2019-01-01 RX ADMIN — Medication 10 ML: at 00:37

## 2019-01-01 RX ADMIN — INSULIN GLARGINE 30 UNITS: 100 INJECTION, SOLUTION SUBCUTANEOUS at 09:23

## 2019-01-01 RX ADMIN — INSULIN GLARGINE 30 UNITS: 100 INJECTION, SOLUTION SUBCUTANEOUS at 21:20

## 2019-01-01 RX ADMIN — Medication 1000 ML/HR: at 15:12

## 2019-01-01 RX ADMIN — FUROSEMIDE 40 MG: 10 INJECTION, SOLUTION INTRAMUSCULAR; INTRAVENOUS at 12:11

## 2019-01-01 RX ADMIN — INSULIN LISPRO 15 UNITS: 100 INJECTION, SOLUTION INTRAVENOUS; SUBCUTANEOUS at 00:25

## 2019-01-01 RX ADMIN — INSULIN LISPRO 1 UNITS: 100 INJECTION, SOLUTION INTRAVENOUS; SUBCUTANEOUS at 21:24

## 2019-01-01 RX ADMIN — HEPARIN SODIUM 5000 UNITS: 5000 INJECTION INTRAVENOUS; SUBCUTANEOUS at 21:56

## 2019-01-01 RX ADMIN — INSULIN LISPRO 2 UNITS: 100 INJECTION, SOLUTION INTRAVENOUS; SUBCUTANEOUS at 21:45

## 2019-01-01 RX ADMIN — INSULIN LISPRO 4 UNITS: 100 INJECTION, SOLUTION INTRAVENOUS; SUBCUTANEOUS at 12:32

## 2019-01-01 RX ADMIN — FAMOTIDINE 20 MG: 10 INJECTION, SOLUTION INTRAVENOUS at 09:43

## 2019-01-01 RX ADMIN — HYDROXYZINE PAMOATE 50 MG: 25 CAPSULE ORAL at 21:21

## 2019-01-01 RX ADMIN — HEPARIN SODIUM 5000 UNITS: 5000 INJECTION INTRAVENOUS; SUBCUTANEOUS at 08:23

## 2019-01-01 RX ADMIN — INSULIN LISPRO 10 UNITS: 100 INJECTION, SOLUTION INTRAVENOUS; SUBCUTANEOUS at 06:26

## 2019-01-01 RX ADMIN — INSULIN LISPRO 4 UNITS: 100 INJECTION, SOLUTION INTRAVENOUS; SUBCUTANEOUS at 06:17

## 2019-01-01 RX ADMIN — IPRATROPIUM BROMIDE AND ALBUTEROL SULFATE 1 AMPULE: .5; 3 SOLUTION RESPIRATORY (INHALATION) at 12:09

## 2019-01-01 RX ADMIN — ASPIRIN 81 MG 81 MG: 81 TABLET ORAL at 08:12

## 2019-01-01 RX ADMIN — CALCIUM GLUCONATE 1 G: 98 INJECTION, SOLUTION INTRAVENOUS at 07:10

## 2019-01-01 RX ADMIN — IPRATROPIUM BROMIDE AND ALBUTEROL SULFATE 1 AMPULE: 2.5; .5 SOLUTION RESPIRATORY (INHALATION) at 15:58

## 2019-01-01 RX ADMIN — MUPIROCIN: 20 OINTMENT TOPICAL at 08:11

## 2019-01-01 RX ADMIN — HEPARIN SODIUM: 1000 INJECTION INTRAVENOUS; SUBCUTANEOUS at 14:28

## 2019-01-01 RX ADMIN — FUROSEMIDE 20 MG: 10 INJECTION, SOLUTION INTRAMUSCULAR; INTRAVENOUS at 21:17

## 2019-01-01 RX ADMIN — Medication 100 MG: at 11:54

## 2019-01-01 RX ADMIN — ENOXAPARIN SODIUM 40 MG: 40 INJECTION SUBCUTANEOUS at 17:54

## 2019-01-01 RX ADMIN — Medication 10 ML: at 07:47

## 2019-01-01 RX ADMIN — ASPIRIN 81 MG 81 MG: 81 TABLET ORAL at 10:46

## 2019-01-01 RX ADMIN — PROPOFOL 30 MCG/KG/MIN: 10 INJECTION, EMULSION INTRAVENOUS at 10:20

## 2019-01-01 RX ADMIN — Medication 15 ML: at 12:45

## 2019-01-01 RX ADMIN — Medication 1500 ML/HR: at 18:01

## 2019-01-01 RX ADMIN — INSULIN LISPRO 4 UNITS: 100 INJECTION, SOLUTION INTRAVENOUS; SUBCUTANEOUS at 04:13

## 2019-01-01 RX ADMIN — IPRATROPIUM BROMIDE AND ALBUTEROL SULFATE 1 AMPULE: .5; 3 SOLUTION RESPIRATORY (INHALATION) at 16:27

## 2019-01-01 RX ADMIN — CEFEPIME 2 G: 2 INJECTION, POWDER, FOR SOLUTION INTRAVENOUS at 21:09

## 2019-01-01 RX ADMIN — MUPIROCIN: 20 OINTMENT TOPICAL at 11:29

## 2019-01-01 RX ADMIN — BISACODYL 10 MG: 10 SUPPOSITORY RECTAL at 15:25

## 2019-01-01 RX ADMIN — CEFEPIME HYDROCHLORIDE 1 G: 1 INJECTION, POWDER, FOR SOLUTION INTRAMUSCULAR; INTRAVENOUS at 01:57

## 2019-01-01 RX ADMIN — PROPOFOL 35 MCG/KG/MIN: 10 INJECTION, EMULSION INTRAVENOUS at 15:54

## 2019-01-01 RX ADMIN — MEROPENEM 1 G: 1 INJECTION, POWDER, FOR SOLUTION INTRAVENOUS at 21:48

## 2019-01-01 RX ADMIN — ATORVASTATIN CALCIUM 40 MG: 40 TABLET, FILM COATED ORAL at 21:18

## 2019-01-01 RX ADMIN — MUPIROCIN: 20 OINTMENT TOPICAL at 20:50

## 2019-01-01 RX ADMIN — METOPROLOL TARTRATE 12.5 MG: 25 TABLET, FILM COATED ORAL at 21:31

## 2019-01-01 RX ADMIN — FENTANYL CITRATE 75 MCG/HR: 50 INJECTION, SOLUTION INTRAMUSCULAR; INTRAVENOUS at 21:23

## 2019-01-01 RX ADMIN — METOPROLOL TARTRATE 12.5 MG: 25 TABLET, FILM COATED ORAL at 09:48

## 2019-01-01 RX ADMIN — MUPIROCIN: 20 OINTMENT TOPICAL at 07:47

## 2019-01-01 RX ADMIN — Medication 1000 ML/HR: at 18:13

## 2019-01-01 RX ADMIN — IPRATROPIUM BROMIDE AND ALBUTEROL SULFATE 1 AMPULE: 2.5; .5 SOLUTION RESPIRATORY (INHALATION) at 04:20

## 2019-01-01 RX ADMIN — HEPARIN SODIUM 5000 UNITS: 5000 INJECTION INTRAVENOUS; SUBCUTANEOUS at 20:50

## 2019-01-01 RX ADMIN — Medication 1500 ML/HR: at 11:26

## 2019-01-01 RX ADMIN — Medication 1500 ML/HR: at 11:16

## 2019-01-01 RX ADMIN — MEROPENEM 1 G: 1 INJECTION, POWDER, FOR SOLUTION INTRAVENOUS at 08:12

## 2019-01-01 RX ADMIN — MEROPENEM 1 G: 1 INJECTION, POWDER, FOR SOLUTION INTRAVENOUS at 12:17

## 2019-01-01 RX ADMIN — FAMOTIDINE 20 MG: 10 INJECTION, SOLUTION INTRAVENOUS at 09:06

## 2019-01-01 RX ADMIN — DEXTROSE MONOHYDRATE 8 MCG/MIN: 50 INJECTION, SOLUTION INTRAVENOUS at 12:56

## 2019-01-01 RX ADMIN — CASTOR OIL AND BALSAM, PERU: 788; 87 OINTMENT TOPICAL at 22:01

## 2019-01-01 RX ADMIN — CASTOR OIL AND BALSAM, PERU: 788; 87 OINTMENT TOPICAL at 09:55

## 2019-01-01 RX ADMIN — Medication 100 MG: at 09:44

## 2019-01-01 RX ADMIN — DEXTROSE MONOHYDRATE 100 MG: 50 INJECTION, SOLUTION INTRAVENOUS at 17:23

## 2019-01-01 RX ADMIN — SODIUM CHLORIDE 2.64 UNITS/HR: 9 INJECTION, SOLUTION INTRAVENOUS at 12:47

## 2019-01-01 RX ADMIN — ONDANSETRON 4 MG: 2 INJECTION INTRAMUSCULAR; INTRAVENOUS at 17:50

## 2019-01-01 RX ADMIN — FAMOTIDINE 20 MG: 10 INJECTION, SOLUTION INTRAVENOUS at 08:22

## 2019-01-01 RX ADMIN — HEPARIN SODIUM 5000 UNITS: 5000 INJECTION INTRAVENOUS; SUBCUTANEOUS at 06:23

## 2019-01-01 RX ADMIN — IPRATROPIUM BROMIDE AND ALBUTEROL SULFATE 1 AMPULE: 2.5; .5 SOLUTION RESPIRATORY (INHALATION) at 08:27

## 2019-01-01 RX ADMIN — CALCIUM GLUCONATE 1 G: 98 INJECTION, SOLUTION INTRAVENOUS at 16:20

## 2019-01-01 RX ADMIN — IPRATROPIUM BROMIDE AND ALBUTEROL SULFATE 1 AMPULE: 2.5; .5 SOLUTION RESPIRATORY (INHALATION) at 04:24

## 2019-01-01 RX ADMIN — Medication 15 ML: at 20:29

## 2019-01-01 RX ADMIN — ATORVASTATIN CALCIUM 40 MG: 40 TABLET, FILM COATED ORAL at 20:04

## 2019-01-01 RX ADMIN — SODIUM CHLORIDE: 9 INJECTION, SOLUTION INTRAVENOUS at 09:02

## 2019-01-01 RX ADMIN — IPRATROPIUM BROMIDE AND ALBUTEROL SULFATE 1 AMPULE: 2.5; .5 SOLUTION RESPIRATORY (INHALATION) at 11:34

## 2019-01-01 RX ADMIN — IPRATROPIUM BROMIDE AND ALBUTEROL SULFATE 1 AMPULE: 2.5; .5 SOLUTION RESPIRATORY (INHALATION) at 16:08

## 2019-01-01 RX ADMIN — IPRATROPIUM BROMIDE AND ALBUTEROL SULFATE 1 AMPULE: 2.5; .5 SOLUTION RESPIRATORY (INHALATION) at 03:26

## 2019-01-01 RX ADMIN — IPRATROPIUM BROMIDE AND ALBUTEROL SULFATE 1 AMPULE: 2.5; .5 SOLUTION RESPIRATORY (INHALATION) at 19:47

## 2019-01-01 RX ADMIN — FUROSEMIDE 40 MG: 10 INJECTION, SOLUTION INTRAMUSCULAR; INTRAVENOUS at 20:46

## 2019-01-01 RX ADMIN — CARBOXYMETHYLCELLULOSE SODIUM 1 DROP: 10 GEL OPHTHALMIC at 20:04

## 2019-01-01 RX ADMIN — PROPOFOL 30 MCG/KG/MIN: 10 INJECTION, EMULSION INTRAVENOUS at 18:09

## 2019-01-01 RX ADMIN — MEROPENEM 1 G: 1 INJECTION, POWDER, FOR SOLUTION INTRAVENOUS at 08:40

## 2019-01-01 RX ADMIN — KETAMINE HYDROCHLORIDE 100 MG: 100 INJECTION INTRAMUSCULAR; INTRAVENOUS at 19:47

## 2019-01-01 RX ADMIN — IPRATROPIUM BROMIDE AND ALBUTEROL SULFATE 1 AMPULE: 2.5; .5 SOLUTION RESPIRATORY (INHALATION) at 12:20

## 2019-01-01 RX ADMIN — SODIUM PHOSPHATE, MONOBASIC, MONOHYDRATE 6 MMOL: 276; 142 INJECTION, SOLUTION INTRAVENOUS at 03:09

## 2019-01-01 RX ADMIN — IPRATROPIUM BROMIDE AND ALBUTEROL SULFATE 1 AMPULE: .5; 3 SOLUTION RESPIRATORY (INHALATION) at 19:41

## 2019-01-01 RX ADMIN — METOPROLOL TARTRATE 12.5 MG: 25 TABLET, FILM COATED ORAL at 21:08

## 2019-01-01 RX ADMIN — CARBOXYMETHYLCELLULOSE SODIUM 1 DROP: 10 GEL OPHTHALMIC at 20:29

## 2019-01-01 RX ADMIN — ARGATROBAN 1 MCG/KG/MIN: 50 INJECTION INTRAVENOUS at 17:27

## 2019-01-01 RX ADMIN — INSULIN LISPRO 6 UNITS: 100 INJECTION, SOLUTION INTRAVENOUS; SUBCUTANEOUS at 11:26

## 2019-01-01 RX ADMIN — SODIUM PHOSPHATE, MONOBASIC, MONOHYDRATE 6 MMOL: 276; 142 INJECTION, SOLUTION INTRAVENOUS at 09:23

## 2019-01-01 RX ADMIN — FENTANYL CITRATE 50 MCG/HR: 50 INJECTION, SOLUTION INTRAMUSCULAR; INTRAVENOUS at 21:21

## 2019-01-01 RX ADMIN — INSULIN LISPRO 2 UNITS: 100 INJECTION, SOLUTION INTRAVENOUS; SUBCUTANEOUS at 20:51

## 2019-01-01 RX ADMIN — IPRATROPIUM BROMIDE AND ALBUTEROL SULFATE 1 AMPULE: .5; 3 SOLUTION RESPIRATORY (INHALATION) at 19:34

## 2019-01-01 RX ADMIN — INSULIN GLARGINE 30 UNITS: 100 INJECTION, SOLUTION SUBCUTANEOUS at 08:42

## 2019-01-01 RX ADMIN — VANCOMYCIN HYDROCHLORIDE 1000 MG: 10 INJECTION, POWDER, LYOPHILIZED, FOR SOLUTION INTRAVENOUS at 08:43

## 2019-01-01 RX ADMIN — Medication 1000 ML/HR: at 15:49

## 2019-01-01 RX ADMIN — METOPROLOL TARTRATE 12.5 MG: 25 TABLET, FILM COATED ORAL at 08:06

## 2019-01-01 RX ADMIN — DEXTROSE MONOHYDRATE 100 MG: 50 INJECTION, SOLUTION INTRAVENOUS at 17:06

## 2019-01-01 RX ADMIN — Medication 10 ML: at 20:13

## 2019-01-01 RX ADMIN — IPRATROPIUM BROMIDE AND ALBUTEROL SULFATE 1 AMPULE: .5; 3 SOLUTION RESPIRATORY (INHALATION) at 16:17

## 2019-01-01 RX ADMIN — PROPOFOL 5 MCG/KG/MIN: 10 INJECTION, EMULSION INTRAVENOUS at 09:56

## 2019-01-01 RX ADMIN — MUPIROCIN: 20 OINTMENT TOPICAL at 20:30

## 2019-01-01 RX ADMIN — INSULIN GLARGINE 30 UNITS: 100 INJECTION, SOLUTION SUBCUTANEOUS at 13:52

## 2019-01-01 RX ADMIN — ASPIRIN 81 MG 81 MG: 81 TABLET ORAL at 11:34

## 2019-01-01 RX ADMIN — IPRATROPIUM BROMIDE AND ALBUTEROL SULFATE 1 AMPULE: 2.5; .5 SOLUTION RESPIRATORY (INHALATION) at 08:31

## 2019-01-01 RX ADMIN — Medication 1500 ML/HR: at 16:58

## 2019-01-01 RX ADMIN — Medication 500 ML/HR: at 10:34

## 2019-01-01 RX ADMIN — HYDROXYZINE PAMOATE 50 MG: 25 CAPSULE ORAL at 08:48

## 2019-01-01 RX ADMIN — ATORVASTATIN CALCIUM 40 MG: 40 TABLET, FILM COATED ORAL at 21:43

## 2019-01-01 RX ADMIN — CHLORHEXIDINE GLUCONATE 15 ML: 1.2 RINSE ORAL at 09:07

## 2019-01-01 RX ADMIN — METOPROLOL TARTRATE 12.5 MG: 25 TABLET, FILM COATED ORAL at 08:11

## 2019-01-01 RX ADMIN — IPRATROPIUM BROMIDE AND ALBUTEROL SULFATE 1 AMPULE: 2.5; .5 SOLUTION RESPIRATORY (INHALATION) at 03:29

## 2019-01-01 RX ADMIN — INSULIN LISPRO 2 UNITS: 100 INJECTION, SOLUTION INTRAVENOUS; SUBCUTANEOUS at 21:33

## 2019-01-01 RX ADMIN — SODIUM CHLORIDE 1000 ML: 9 INJECTION, SOLUTION INTRAVENOUS at 11:45

## 2019-01-01 RX ADMIN — Medication 1000 ML/HR: at 17:58

## 2019-01-01 RX ADMIN — DEXTROSE MONOHYDRATE 4 MCG/MIN: 50 INJECTION, SOLUTION INTRAVENOUS at 09:16

## 2019-01-01 RX ADMIN — ATORVASTATIN CALCIUM 40 MG: 40 TABLET, FILM COATED ORAL at 22:10

## 2019-01-01 RX ADMIN — PROPOFOL 10 MCG/KG/MIN: 10 INJECTION, EMULSION INTRAVENOUS at 12:00

## 2019-01-01 RX ADMIN — MUPIROCIN: 20 OINTMENT TOPICAL at 21:11

## 2019-01-01 RX ADMIN — FAMOTIDINE 20 MG: 10 INJECTION, SOLUTION INTRAVENOUS at 22:36

## 2019-01-01 RX ADMIN — INSULIN LISPRO 2 UNITS: 100 INJECTION, SOLUTION INTRAVENOUS; SUBCUTANEOUS at 00:59

## 2019-01-01 RX ADMIN — CALCIUM GLUCONATE 1 G: 98 INJECTION, SOLUTION INTRAVENOUS at 17:54

## 2019-01-01 RX ADMIN — IPRATROPIUM BROMIDE AND ALBUTEROL SULFATE 1 AMPULE: 2.5; .5 SOLUTION RESPIRATORY (INHALATION) at 04:22

## 2019-01-01 RX ADMIN — METOPROLOL TARTRATE 12.5 MG: 25 TABLET, FILM COATED ORAL at 20:52

## 2019-01-01 RX ADMIN — Medication 10 ML: at 08:46

## 2019-01-01 RX ADMIN — INSULIN LISPRO 2 UNITS: 100 INJECTION, SOLUTION INTRAVENOUS; SUBCUTANEOUS at 12:18

## 2019-01-01 RX ADMIN — IRON SUCROSE 200 MG: 20 INJECTION, SOLUTION INTRAVENOUS at 08:50

## 2019-01-01 RX ADMIN — Medication 10 ML: at 08:12

## 2019-01-01 RX ADMIN — IPRATROPIUM BROMIDE AND ALBUTEROL SULFATE 1 AMPULE: 2.5; .5 SOLUTION RESPIRATORY (INHALATION) at 23:51

## 2019-01-01 RX ADMIN — HEPARIN SODIUM 5000 UNITS: 5000 INJECTION INTRAVENOUS; SUBCUTANEOUS at 06:17

## 2019-01-01 RX ADMIN — ASPIRIN 81 MG 81 MG: 81 TABLET ORAL at 09:43

## 2019-01-01 RX ADMIN — IPRATROPIUM BROMIDE AND ALBUTEROL SULFATE 1 AMPULE: 2.5; .5 SOLUTION RESPIRATORY (INHALATION) at 04:10

## 2019-01-01 RX ADMIN — IPRATROPIUM BROMIDE AND ALBUTEROL SULFATE 1 AMPULE: 2.5; .5 SOLUTION RESPIRATORY (INHALATION) at 08:50

## 2019-01-01 RX ADMIN — Medication 15 ML: at 21:09

## 2019-01-01 RX ADMIN — CARBOXYMETHYLCELLULOSE SODIUM 1 DROP: 10 GEL OPHTHALMIC at 08:09

## 2019-01-01 RX ADMIN — ATORVASTATIN CALCIUM 40 MG: 40 TABLET, FILM COATED ORAL at 21:09

## 2019-01-01 RX ADMIN — FAMOTIDINE 20 MG: 10 INJECTION, SOLUTION INTRAVENOUS at 11:35

## 2019-01-01 RX ADMIN — ATORVASTATIN CALCIUM 40 MG: 40 TABLET, FILM COATED ORAL at 20:52

## 2019-01-01 RX ADMIN — SODIUM CHLORIDE 2.1 UNITS/HR: 9 INJECTION, SOLUTION INTRAVENOUS at 07:28

## 2019-01-01 RX ADMIN — MUPIROCIN: 20 OINTMENT TOPICAL at 21:09

## 2019-01-01 RX ADMIN — IRON SUCROSE 200 MG: 20 INJECTION, SOLUTION INTRAVENOUS at 09:06

## 2019-01-01 RX ADMIN — MEROPENEM 1 G: 1 INJECTION, POWDER, FOR SOLUTION INTRAVENOUS at 21:33

## 2019-01-01 RX ADMIN — INSULIN LISPRO 2 UNITS: 100 INJECTION, SOLUTION INTRAVENOUS; SUBCUTANEOUS at 17:03

## 2019-01-01 RX ADMIN — Medication 15 ML: at 20:04

## 2019-01-01 RX ADMIN — IPRATROPIUM BROMIDE AND ALBUTEROL SULFATE 1 AMPULE: .5; 3 SOLUTION RESPIRATORY (INHALATION) at 08:25

## 2019-01-01 RX ADMIN — IPRATROPIUM BROMIDE AND ALBUTEROL SULFATE 1 AMPULE: 2.5; .5 SOLUTION RESPIRATORY (INHALATION) at 07:47

## 2019-01-01 RX ADMIN — INSULIN LISPRO 2 UNITS: 100 INJECTION, SOLUTION INTRAVENOUS; SUBCUTANEOUS at 13:51

## 2019-01-01 RX ADMIN — METOPROLOL TARTRATE 12.5 MG: 25 TABLET, FILM COATED ORAL at 11:38

## 2019-01-01 RX ADMIN — Medication 1000 ML/HR: at 09:45

## 2019-01-01 RX ADMIN — SENNOSIDES AND DOCUSATE SODIUM 2 TABLET: 8.6; 5 TABLET ORAL at 08:08

## 2019-01-01 RX ADMIN — THIAMINE HYDROCHLORIDE 100 MG: 100 INJECTION, SOLUTION INTRAMUSCULAR; INTRAVENOUS at 10:24

## 2019-01-01 RX ADMIN — SODIUM PHOSPHATE, MONOBASIC, MONOHYDRATE 12 MMOL: 276; 142 INJECTION, SOLUTION INTRAVENOUS at 18:08

## 2019-01-01 RX ADMIN — ENOXAPARIN SODIUM 40 MG: 40 INJECTION SUBCUTANEOUS at 08:33

## 2019-01-01 RX ADMIN — WHITE PETROLATUM 57.7 %-MINERAL OIL 31.9 % EYE OINTMENT: at 16:22

## 2019-01-01 RX ADMIN — IPRATROPIUM BROMIDE AND ALBUTEROL SULFATE 1 AMPULE: .5; 3 SOLUTION RESPIRATORY (INHALATION) at 07:52

## 2019-01-01 RX ADMIN — INSULIN LISPRO 4 UNITS: 100 INJECTION, SOLUTION INTRAVENOUS; SUBCUTANEOUS at 16:58

## 2019-01-01 RX ADMIN — PHYTONADIONE 10 MG: 10 INJECTION, EMULSION INTRAMUSCULAR; INTRAVENOUS; SUBCUTANEOUS at 09:04

## 2019-01-01 RX ADMIN — INSULIN GLARGINE 30 UNITS: 100 INJECTION, SOLUTION SUBCUTANEOUS at 11:20

## 2019-01-01 RX ADMIN — HEPARIN SODIUM 5000 UNITS: 5000 INJECTION INTRAVENOUS; SUBCUTANEOUS at 21:23

## 2019-01-01 RX ADMIN — ASPIRIN 81 MG: 81 TABLET ORAL at 08:40

## 2019-01-01 RX ADMIN — Medication 1500 ML/HR: at 00:04

## 2019-01-01 RX ADMIN — CEFEPIME 2 G: 2 INJECTION, POWDER, FOR SOLUTION INTRAVENOUS at 21:19

## 2019-01-01 RX ADMIN — INSULIN LISPRO 2 UNITS: 100 INJECTION, SOLUTION INTRAVENOUS; SUBCUTANEOUS at 04:38

## 2019-01-01 RX ADMIN — CASTOR OIL AND BALSAM, PERU: 788; 87 OINTMENT TOPICAL at 20:51

## 2019-01-01 RX ADMIN — INSULIN LISPRO 2 UNITS: 100 INJECTION, SOLUTION INTRAVENOUS; SUBCUTANEOUS at 02:15

## 2019-01-01 RX ADMIN — INSULIN GLARGINE 30 UNITS: 100 INJECTION, SOLUTION SUBCUTANEOUS at 08:38

## 2019-01-01 RX ADMIN — CHLORHEXIDINE GLUCONATE 15 ML: 1.2 RINSE ORAL at 21:18

## 2019-01-01 RX ADMIN — FUROSEMIDE 20 MG/HR: 10 INJECTION, SOLUTION INTRAVENOUS at 08:25

## 2019-01-01 RX ADMIN — METOPROLOL TARTRATE 12.5 MG: 25 TABLET, FILM COATED ORAL at 20:45

## 2019-01-01 RX ADMIN — Medication 100 MG: at 08:31

## 2019-01-01 RX ADMIN — ASPIRIN 81 MG 81 MG: 81 TABLET ORAL at 08:31

## 2019-01-01 RX ADMIN — PROPOFOL 45 MCG/KG/MIN: 10 INJECTION, EMULSION INTRAVENOUS at 01:37

## 2019-01-01 RX ADMIN — IPRATROPIUM BROMIDE AND ALBUTEROL SULFATE 1 AMPULE: .5; 3 SOLUTION RESPIRATORY (INHALATION) at 15:38

## 2019-01-01 RX ADMIN — VANCOMYCIN HYDROCHLORIDE 1000 MG: 10 INJECTION, POWDER, LYOPHILIZED, FOR SOLUTION INTRAVENOUS at 12:10

## 2019-01-01 RX ADMIN — CARBOXYMETHYLCELLULOSE SODIUM 1 DROP: 10 GEL OPHTHALMIC at 13:54

## 2019-01-01 ASSESSMENT — PULMONARY FUNCTION TESTS
PIF_VALUE: 19
PIF_VALUE: 18
PIF_VALUE: 14
PIF_VALUE: 13
PIF_VALUE: 16
PIF_VALUE: 10
PIF_VALUE: 24
PIF_VALUE: 15
PIF_VALUE: 21
PIF_VALUE: 32
PIF_VALUE: 11
PIF_VALUE: 15
PIF_VALUE: 23
PIF_VALUE: 15
PIF_VALUE: 36
PIF_VALUE: 10
PIF_VALUE: 24
PIF_VALUE: 23
PIF_VALUE: 31
PIF_VALUE: 30
PIF_VALUE: 15
PIF_VALUE: 14
PIF_VALUE: 14
PIF_VALUE: 27
PIF_VALUE: 16
PIF_VALUE: 15
PIF_VALUE: 27
PIF_VALUE: 18
PIF_VALUE: 14
PIF_VALUE: 23
PIF_VALUE: 23
PIF_VALUE: 12
PIF_VALUE: 14
PIF_VALUE: 23
PIF_VALUE: 15
PIF_VALUE: 16
PIF_VALUE: 16
PIF_VALUE: 15
PIF_VALUE: 29
PIF_VALUE: 40
PIF_VALUE: 15
PIF_VALUE: 13
PIF_VALUE: 18
PIF_VALUE: 20
PIF_VALUE: 19
PIF_VALUE: 15
PIF_VALUE: 12
PIF_VALUE: 15
PIF_VALUE: 13
PIF_VALUE: 16
PIF_VALUE: 17
PIF_VALUE: 30
PIF_VALUE: 25
PIF_VALUE: 14
PIF_VALUE: 18
PIF_VALUE: 26
PIF_VALUE: 24
PIF_VALUE: 26
PIF_VALUE: 11
PIF_VALUE: 21
PIF_VALUE: 30
PIF_VALUE: 22
PIF_VALUE: 17
PIF_VALUE: 18
PIF_VALUE: 11
PIF_VALUE: 26
PIF_VALUE: 22
PIF_VALUE: 14
PIF_VALUE: 15
PIF_VALUE: 29
PIF_VALUE: 25
PIF_VALUE: 23
PIF_VALUE: 13
PIF_VALUE: 13
PIF_VALUE: 14
PIF_VALUE: 18
PIF_VALUE: 22
PIF_VALUE: 18
PIF_VALUE: 17
PIF_VALUE: 31
PIF_VALUE: 15
PIF_VALUE: 16
PIF_VALUE: 25
PIF_VALUE: 19
PIF_VALUE: 16
PIF_VALUE: 25
PIF_VALUE: 30
PIF_VALUE: 25
PIF_VALUE: 13
PIF_VALUE: 17
PIF_VALUE: 26
PIF_VALUE: 21
PIF_VALUE: 25
PIF_VALUE: 23
PIF_VALUE: 15
PIF_VALUE: 24
PIF_VALUE: 14
PIF_VALUE: 14
PIF_VALUE: 24
PIF_VALUE: 22
PIF_VALUE: 26
PIF_VALUE: 28
PIF_VALUE: 15
PIF_VALUE: 14
PIF_VALUE: 15
PIF_VALUE: 26
PIF_VALUE: 16
PIF_VALUE: 28
PIF_VALUE: 24
PIF_VALUE: 13
PIF_VALUE: 21
PIF_VALUE: 17
PIF_VALUE: 20
PIF_VALUE: 21
PIF_VALUE: 21
PIF_VALUE: 17
PIF_VALUE: 26
PIF_VALUE: 20
PIF_VALUE: 22
PIF_VALUE: 22
PIF_VALUE: 13
PIF_VALUE: 17
PIF_VALUE: 25
PIF_VALUE: 16
PIF_VALUE: 20
PIF_VALUE: 25
PIF_VALUE: 14
PIF_VALUE: 13
PIF_VALUE: 15
PIF_VALUE: 24
PIF_VALUE: 17
PIF_VALUE: 27
PIF_VALUE: 12
PIF_VALUE: 24
PIF_VALUE: 17
PIF_VALUE: 14
PIF_VALUE: 31
PIF_VALUE: 15
PIF_VALUE: 29
PIF_VALUE: 25
PIF_VALUE: 26
PIF_VALUE: 14
PIF_VALUE: 14
PIF_VALUE: 21
PIF_VALUE: 27
PIF_VALUE: 33
PIF_VALUE: 21
PIF_VALUE: 23
PIF_VALUE: 13
PIF_VALUE: 15
PIF_VALUE: 13
PIF_VALUE: 25
PIF_VALUE: 42
PIF_VALUE: 16
PIF_VALUE: 13
PIF_VALUE: 21
PIF_VALUE: 14
PIF_VALUE: 13
PIF_VALUE: 15
PIF_VALUE: 15
PIF_VALUE: 20
PIF_VALUE: 21
PIF_VALUE: 14
PIF_VALUE: 16
PIF_VALUE: 14
PIF_VALUE: 16
PIF_VALUE: 14
PIF_VALUE: 22

## 2019-01-01 ASSESSMENT — PAIN SCALES - GENERAL
PAINLEVEL_OUTOF10: 10
PAINLEVEL_OUTOF10: 0
PAINLEVEL_OUTOF10: 8
PAINLEVEL_OUTOF10: 0
PAINLEVEL_OUTOF10: 8
PAINLEVEL_OUTOF10: 0
PAINLEVEL_OUTOF10: 7
PAINLEVEL_OUTOF10: 0
PAINLEVEL_OUTOF10: 7
PAINLEVEL_OUTOF10: 0

## 2019-01-01 ASSESSMENT — ENCOUNTER SYMPTOMS
DIARRHEA: 0
VOMITING: 0
COUGH: 1
CHOKING: 1
STRIDOR: 0
SORE THROAT: 0
RHINORRHEA: 1
SHORTNESS OF BREATH: 1
COLOR CHANGE: 0
NAUSEA: 0
BACK PAIN: 0
WHEEZING: 1
APNEA: 0
CHEST TIGHTNESS: 1
ABDOMINAL PAIN: 0

## 2019-01-01 ASSESSMENT — PAIN DESCRIPTION - PAIN TYPE: TYPE: ACUTE PAIN

## 2019-01-01 ASSESSMENT — PAIN DESCRIPTION - LOCATION
LOCATION: LEG
LOCATION: OTHER (COMMENT)

## 2019-01-01 ASSESSMENT — PAIN DESCRIPTION - ORIENTATION: ORIENTATION: LEFT

## 2019-03-12 DIAGNOSIS — M54.50 LUMBAR SPINE PAIN: Primary | ICD-10-CM

## 2019-10-15 PROBLEM — J96.00 ACUTE RESPIRATORY FAILURE (HCC): Status: ACTIVE | Noted: 2019-01-01

## 2019-10-15 PROBLEM — R91.1 PULMONARY NODULE: Status: ACTIVE | Noted: 2019-01-01

## 2019-11-11 PROBLEM — C49.9 SPINDLE CELL SARCOMA (HCC): Status: ACTIVE | Noted: 2019-01-01

## 2019-11-11 PROBLEM — C49.9 SPINDLE CELL SARCOMA (HCC): Chronic | Status: ACTIVE | Noted: 2019-01-01

## 2019-11-11 PROBLEM — R09.2 RESPIRATORY ARREST BEFORE CARDIAC ARREST (HCC): Status: ACTIVE | Noted: 2019-01-01

## 2019-11-11 PROBLEM — J96.02 ACUTE RESPIRATORY FAILURE WITH HYPOXIA AND HYPERCAPNIA (HCC): Status: ACTIVE | Noted: 2019-01-01

## 2019-11-11 PROBLEM — J96.01 ACUTE RESPIRATORY FAILURE WITH HYPOXIA AND HYPERCAPNIA (HCC): Status: ACTIVE | Noted: 2019-01-01

## 2019-11-11 PROBLEM — I46.9 RESPIRATORY ARREST BEFORE CARDIAC ARREST (HCC): Status: ACTIVE | Noted: 2019-01-01

## 2019-11-12 PROBLEM — N18.30 CKD (CHRONIC KIDNEY DISEASE) STAGE 3, GFR 30-59 ML/MIN (HCC): Chronic | Status: ACTIVE | Noted: 2019-01-01

## 2019-11-19 PROBLEM — E43 SEVERE MALNUTRITION (HCC): Status: ACTIVE | Noted: 2019-01-01

## 2019-11-22 PROBLEM — D64.9 NORMOCYTIC NORMOCHROMIC ANEMIA: Status: ACTIVE | Noted: 2019-01-01

## 2019-11-22 PROBLEM — D72.829 LEUKOCYTOSIS: Status: ACTIVE | Noted: 2019-01-01

## 2019-11-22 PROBLEM — E44.0 MODERATE PROTEIN-CALORIE MALNUTRITION (HCC): Status: ACTIVE | Noted: 2019-01-01

## 2019-11-22 PROBLEM — R79.89 ELEVATED LFTS: Status: ACTIVE | Noted: 2019-01-01

## 2019-11-22 PROBLEM — D69.6 THROMBOCYTOPENIA (HCC): Status: ACTIVE | Noted: 2019-01-01

## 2019-11-24 PROBLEM — D75.829 HEPARIN INDUCED THROMBOCYTOPENIA (HIT): Status: ACTIVE | Noted: 2019-01-01

## 2020-01-01 ENCOUNTER — APPOINTMENT (OUTPATIENT)
Dept: GENERAL RADIOLOGY | Age: 75
DRG: 981 | End: 2020-01-01
Payer: MEDICARE

## 2020-01-01 ENCOUNTER — TELEPHONE (OUTPATIENT)
Dept: VASCULAR SURGERY | Age: 75
End: 2020-01-01

## 2020-01-01 ENCOUNTER — CARE COORDINATION (OUTPATIENT)
Dept: CASE MANAGEMENT | Age: 75
End: 2020-01-01

## 2020-01-01 ENCOUNTER — APPOINTMENT (OUTPATIENT)
Dept: VASCULAR LAB | Age: 75
DRG: 603 | End: 2020-01-01
Payer: MEDICARE

## 2020-01-01 ENCOUNTER — APPOINTMENT (OUTPATIENT)
Dept: GENERAL RADIOLOGY | Age: 75
DRG: 291 | End: 2020-01-01
Payer: MEDICARE

## 2020-01-01 ENCOUNTER — APPOINTMENT (OUTPATIENT)
Dept: MRI IMAGING | Age: 75
DRG: 040 | End: 2020-01-01
Attending: PHYSICAL MEDICINE & REHABILITATION
Payer: MEDICARE

## 2020-01-01 ENCOUNTER — APPOINTMENT (OUTPATIENT)
Dept: GENERAL RADIOLOGY | Age: 75
DRG: 040 | End: 2020-01-01
Attending: PHYSICAL MEDICINE & REHABILITATION
Payer: MEDICARE

## 2020-01-01 ENCOUNTER — ANESTHESIA (OUTPATIENT)
Dept: OPERATING ROOM | Age: 75
End: 2020-01-01
Payer: MEDICARE

## 2020-01-01 ENCOUNTER — HOSPITAL ENCOUNTER (INPATIENT)
Age: 75
LOS: 5 days | Discharge: ANOTHER HOSPITAL | DRG: 264 | End: 2020-03-28
Attending: PHYSICAL MEDICINE & REHABILITATION | Admitting: PHYSICAL MEDICINE & REHABILITATION
Payer: MEDICARE

## 2020-01-01 ENCOUNTER — APPOINTMENT (OUTPATIENT)
Dept: GENERAL RADIOLOGY | Age: 75
DRG: 871 | End: 2020-01-01
Payer: MEDICARE

## 2020-01-01 ENCOUNTER — APPOINTMENT (OUTPATIENT)
Dept: INTERVENTIONAL RADIOLOGY/VASCULAR | Age: 75
DRG: 291 | End: 2020-01-01
Payer: MEDICARE

## 2020-01-01 ENCOUNTER — APPOINTMENT (OUTPATIENT)
Dept: GENERAL RADIOLOGY | Age: 75
DRG: 264 | End: 2020-01-01
Attending: PHYSICAL MEDICINE & REHABILITATION
Payer: MEDICARE

## 2020-01-01 ENCOUNTER — ANESTHESIA EVENT (OUTPATIENT)
Dept: OPERATING ROOM | Age: 75
End: 2020-01-01
Payer: MEDICARE

## 2020-01-01 ENCOUNTER — HOSPITAL ENCOUNTER (INPATIENT)
Age: 75
LOS: 13 days | Discharge: HOME HEALTH | DRG: 947 | End: 2020-04-14
Attending: PHYSICAL MEDICINE & REHABILITATION | Admitting: PHYSICAL MEDICINE & REHABILITATION
Payer: MEDICARE

## 2020-01-01 ENCOUNTER — HOSPITAL ENCOUNTER (INPATIENT)
Age: 75
LOS: 4 days | Discharge: NURSING, REHAB OR LONG-TERM CARE | DRG: 177 | End: 2020-04-01
Attending: INTERNAL MEDICINE | Admitting: INTERNAL MEDICINE
Payer: MEDICARE

## 2020-01-01 ENCOUNTER — HOSPITAL ENCOUNTER (INPATIENT)
Age: 75
LOS: 2 days | DRG: 871 | End: 2020-09-18
Attending: EMERGENCY MEDICINE | Admitting: INTERNAL MEDICINE
Payer: MEDICARE

## 2020-01-01 ENCOUNTER — ANESTHESIA (OUTPATIENT)
Dept: OPERATING ROOM | Age: 75
DRG: 040 | End: 2020-01-01
Payer: MEDICARE

## 2020-01-01 ENCOUNTER — HOSPITAL ENCOUNTER (OUTPATIENT)
Age: 75
Setting detail: SPECIMEN
Discharge: HOME | End: 2020-01-27
Payer: MEDICARE

## 2020-01-01 ENCOUNTER — ANESTHESIA EVENT (OUTPATIENT)
Dept: OPERATING ROOM | Age: 75
DRG: 040 | End: 2020-01-01
Payer: MEDICARE

## 2020-01-01 ENCOUNTER — HOSPITAL ENCOUNTER (OUTPATIENT)
Dept: WOUND CARE | Age: 75
Discharge: HOME | End: 2020-08-20
Payer: MEDICARE

## 2020-01-01 ENCOUNTER — HOSPITAL ENCOUNTER (EMERGENCY)
Age: 75
Discharge: HOME | DRG: 981 | End: 2020-08-26
Attending: EMERGENCY MEDICINE
Payer: MEDICARE

## 2020-01-01 ENCOUNTER — HOSPITAL ENCOUNTER (OUTPATIENT)
Age: 75
Discharge: HOME | End: 2020-06-25
Attending: SURGERY
Payer: MEDICARE

## 2020-01-01 ENCOUNTER — OFFICE VISIT (OUTPATIENT)
Dept: VASCULAR SURGERY | Age: 75
End: 2020-01-01
Payer: MEDICARE

## 2020-01-01 ENCOUNTER — APPOINTMENT (OUTPATIENT)
Dept: CT IMAGING | Age: 75
DRG: 291 | End: 2020-01-01
Payer: MEDICARE

## 2020-01-01 ENCOUNTER — HOSPITAL ENCOUNTER (INPATIENT)
Age: 75
LOS: 20 days | Discharge: HOME | DRG: 040 | End: 2020-01-23
Attending: PHYSICAL MEDICINE & REHABILITATION | Admitting: PHYSICAL MEDICINE & REHABILITATION
Payer: MEDICARE

## 2020-01-01 ENCOUNTER — HOSPITAL ENCOUNTER (INPATIENT)
Age: 75
LOS: 4 days | Discharge: HOME HEALTH | DRG: 371 | End: 2020-04-28
Attending: EMERGENCY MEDICINE | Admitting: INTERNAL MEDICINE
Payer: MEDICARE

## 2020-01-01 ENCOUNTER — HOSPITAL ENCOUNTER (OUTPATIENT)
Dept: WOUND CARE | Age: 75
Discharge: HOME | End: 2020-09-10
Payer: MEDICARE

## 2020-01-01 ENCOUNTER — APPOINTMENT (OUTPATIENT)
Dept: GENERAL RADIOLOGY | Age: 75
End: 2020-01-01
Payer: MEDICARE

## 2020-01-01 ENCOUNTER — APPOINTMENT (OUTPATIENT)
Dept: CT IMAGING | Age: 75
DRG: 371 | End: 2020-01-01
Payer: MEDICARE

## 2020-01-01 ENCOUNTER — APPOINTMENT (OUTPATIENT)
Dept: NUCLEAR MEDICINE | Age: 75
DRG: 291 | End: 2020-01-01
Payer: MEDICARE

## 2020-01-01 ENCOUNTER — PREP FOR PROCEDURE (OUTPATIENT)
Dept: VASCULAR SURGERY | Age: 75
End: 2020-01-01

## 2020-01-01 ENCOUNTER — TELEPHONE (OUTPATIENT)
Dept: PHARMACY | Age: 75
End: 2020-01-01

## 2020-01-01 ENCOUNTER — HOSPITAL ENCOUNTER (INPATIENT)
Age: 75
LOS: 16 days | Discharge: NURSING, REHAB OR LONG-TERM CARE | DRG: 291 | End: 2020-03-23
Attending: EMERGENCY MEDICINE | Admitting: FAMILY MEDICINE
Payer: MEDICARE

## 2020-01-01 ENCOUNTER — OFFICE VISIT (OUTPATIENT)
Dept: VASCULAR SURGERY | Age: 75
End: 2020-01-01

## 2020-01-01 ENCOUNTER — OFFICE VISIT (OUTPATIENT)
Dept: PRIMARY CARE CLINIC | Age: 75
End: 2020-01-01
Payer: MEDICARE

## 2020-01-01 ENCOUNTER — APPOINTMENT (OUTPATIENT)
Dept: CT IMAGING | Age: 75
DRG: 981 | End: 2020-01-01
Payer: MEDICARE

## 2020-01-01 ENCOUNTER — HOSPITAL ENCOUNTER (INPATIENT)
Age: 75
LOS: 3 days | Discharge: HOME | DRG: 981 | End: 2020-08-31
Attending: EMERGENCY MEDICINE | Admitting: PEDIATRICS
Payer: MEDICARE

## 2020-01-01 ENCOUNTER — HOSPITAL ENCOUNTER (OUTPATIENT)
Dept: WOUND CARE | Age: 75
Discharge: HOME | End: 2020-08-13
Payer: MEDICARE

## 2020-01-01 ENCOUNTER — HOSPITAL ENCOUNTER (OUTPATIENT)
Dept: VASCULAR LAB | Age: 75
Discharge: HOME | End: 2020-05-19
Payer: MEDICARE

## 2020-01-01 ENCOUNTER — HOSPITAL ENCOUNTER (INPATIENT)
Age: 75
LOS: 2 days | Discharge: NURSING, REHAB OR LONG-TERM CARE | DRG: 603 | End: 2020-02-23
Attending: EMERGENCY MEDICINE | Admitting: INTERNAL MEDICINE
Payer: MEDICARE

## 2020-01-01 ENCOUNTER — HOSPITAL ENCOUNTER (EMERGENCY)
Age: 75
Discharge: HOME | End: 2020-07-29
Payer: MEDICARE

## 2020-01-01 ENCOUNTER — HOSPITAL ENCOUNTER (OUTPATIENT)
Age: 75
Setting detail: SPECIMEN
Discharge: HOME | End: 2020-01-25
Payer: MEDICARE

## 2020-01-01 ENCOUNTER — APPOINTMENT (OUTPATIENT)
Dept: GENERAL RADIOLOGY | Age: 75
DRG: 603 | End: 2020-01-01
Payer: MEDICARE

## 2020-01-01 ENCOUNTER — HOSPITAL ENCOUNTER (OUTPATIENT)
Dept: WOUND CARE | Age: 75
Discharge: HOME | End: 2020-09-03
Payer: MEDICARE

## 2020-01-01 VITALS
HEIGHT: 59 IN | TEMPERATURE: 97.8 F | OXYGEN SATURATION: 93 % | DIASTOLIC BLOOD PRESSURE: 62 MMHG | WEIGHT: 134.04 LBS | BODY MASS INDEX: 27.02 KG/M2 | HEART RATE: 58 BPM | SYSTOLIC BLOOD PRESSURE: 111 MMHG | RESPIRATION RATE: 18 BRPM

## 2020-01-01 VITALS
OXYGEN SATURATION: 95 % | DIASTOLIC BLOOD PRESSURE: 68 MMHG | HEIGHT: 60 IN | SYSTOLIC BLOOD PRESSURE: 161 MMHG | BODY MASS INDEX: 33.2 KG/M2 | TEMPERATURE: 98.1 F | WEIGHT: 169.09 LBS | RESPIRATION RATE: 16 BRPM | HEART RATE: 66 BPM

## 2020-01-01 VITALS
TEMPERATURE: 97.7 F | WEIGHT: 137 LBS | HEART RATE: 56 BPM | RESPIRATION RATE: 18 BRPM | BODY MASS INDEX: 26.9 KG/M2 | HEIGHT: 60 IN | SYSTOLIC BLOOD PRESSURE: 103 MMHG | DIASTOLIC BLOOD PRESSURE: 48 MMHG

## 2020-01-01 VITALS
WEIGHT: 137.35 LBS | HEIGHT: 60 IN | OXYGEN SATURATION: 93 % | SYSTOLIC BLOOD PRESSURE: 153 MMHG | DIASTOLIC BLOOD PRESSURE: 66 MMHG | BODY MASS INDEX: 26.97 KG/M2 | TEMPERATURE: 98 F | HEART RATE: 63 BPM | RESPIRATION RATE: 16 BRPM

## 2020-01-01 VITALS
TEMPERATURE: 98.4 F | WEIGHT: 121.7 LBS | RESPIRATION RATE: 16 BRPM | SYSTOLIC BLOOD PRESSURE: 167 MMHG | DIASTOLIC BLOOD PRESSURE: 76 MMHG | HEART RATE: 76 BPM | HEIGHT: 59 IN | OXYGEN SATURATION: 95 % | BODY MASS INDEX: 24.53 KG/M2

## 2020-01-01 VITALS
DIASTOLIC BLOOD PRESSURE: 73 MMHG | SYSTOLIC BLOOD PRESSURE: 169 MMHG | RESPIRATION RATE: 16 BRPM | TEMPERATURE: 97.9 F | HEART RATE: 80 BPM | WEIGHT: 145.06 LBS | HEIGHT: 60 IN | BODY MASS INDEX: 28.48 KG/M2 | OXYGEN SATURATION: 96 %

## 2020-01-01 VITALS
OXYGEN SATURATION: 92 % | HEIGHT: 59 IN | TEMPERATURE: 91.5 F | SYSTOLIC BLOOD PRESSURE: 92 MMHG | RESPIRATION RATE: 20 BRPM | WEIGHT: 144.62 LBS | HEART RATE: 71 BPM | BODY MASS INDEX: 29.16 KG/M2 | DIASTOLIC BLOOD PRESSURE: 54 MMHG

## 2020-01-01 VITALS
HEIGHT: 60 IN | WEIGHT: 143.3 LBS | RESPIRATION RATE: 20 BRPM | HEART RATE: 65 BPM | SYSTOLIC BLOOD PRESSURE: 101 MMHG | DIASTOLIC BLOOD PRESSURE: 65 MMHG | BODY MASS INDEX: 28.13 KG/M2 | OXYGEN SATURATION: 97 % | TEMPERATURE: 99.8 F

## 2020-01-01 VITALS
HEART RATE: 75 BPM | BODY MASS INDEX: 24.71 KG/M2 | SYSTOLIC BLOOD PRESSURE: 132 MMHG | OXYGEN SATURATION: 93 % | DIASTOLIC BLOOD PRESSURE: 64 MMHG | HEIGHT: 60 IN | TEMPERATURE: 98.3 F | WEIGHT: 125.88 LBS | RESPIRATION RATE: 17 BRPM

## 2020-01-01 VITALS
BODY MASS INDEX: 22.78 KG/M2 | HEIGHT: 60 IN | WEIGHT: 116 LBS | SYSTOLIC BLOOD PRESSURE: 128 MMHG | DIASTOLIC BLOOD PRESSURE: 40 MMHG

## 2020-01-01 VITALS
DIASTOLIC BLOOD PRESSURE: 70 MMHG | TEMPERATURE: 97.9 F | WEIGHT: 130.6 LBS | BODY MASS INDEX: 25.64 KG/M2 | SYSTOLIC BLOOD PRESSURE: 140 MMHG | HEIGHT: 60 IN | HEART RATE: 56 BPM | RESPIRATION RATE: 18 BRPM

## 2020-01-01 VITALS
SYSTOLIC BLOOD PRESSURE: 130 MMHG | DIASTOLIC BLOOD PRESSURE: 52 MMHG | HEART RATE: 62 BPM | BODY MASS INDEX: 25.87 KG/M2 | WEIGHT: 131.8 LBS | HEIGHT: 60 IN | RESPIRATION RATE: 18 BRPM | TEMPERATURE: 97.7 F

## 2020-01-01 VITALS
DIASTOLIC BLOOD PRESSURE: 50 MMHG | BODY MASS INDEX: 22.58 KG/M2 | HEIGHT: 60 IN | SYSTOLIC BLOOD PRESSURE: 110 MMHG | WEIGHT: 115 LBS

## 2020-01-01 VITALS
OXYGEN SATURATION: 100 % | RESPIRATION RATE: 4 BRPM | SYSTOLIC BLOOD PRESSURE: 178 MMHG | DIASTOLIC BLOOD PRESSURE: 85 MMHG

## 2020-01-01 VITALS
HEIGHT: 60 IN | HEART RATE: 62 BPM | RESPIRATION RATE: 16 BRPM | WEIGHT: 149.47 LBS | TEMPERATURE: 97.9 F | SYSTOLIC BLOOD PRESSURE: 154 MMHG | BODY MASS INDEX: 29.35 KG/M2 | OXYGEN SATURATION: 99 % | DIASTOLIC BLOOD PRESSURE: 56 MMHG

## 2020-01-01 VITALS
DIASTOLIC BLOOD PRESSURE: 52 MMHG | SYSTOLIC BLOOD PRESSURE: 131 MMHG | HEART RATE: 66 BPM | HEIGHT: 59 IN | TEMPERATURE: 98.1 F | WEIGHT: 141 LBS | RESPIRATION RATE: 18 BRPM | BODY MASS INDEX: 28.43 KG/M2

## 2020-01-01 VITALS — OXYGEN SATURATION: 100 % | DIASTOLIC BLOOD PRESSURE: 41 MMHG | SYSTOLIC BLOOD PRESSURE: 92 MMHG

## 2020-01-01 VITALS
RESPIRATION RATE: 16 BRPM | HEART RATE: 100 BPM | OXYGEN SATURATION: 98 % | TEMPERATURE: 98.5 F | SYSTOLIC BLOOD PRESSURE: 155 MMHG | WEIGHT: 118 LBS | HEIGHT: 59 IN | DIASTOLIC BLOOD PRESSURE: 68 MMHG | BODY MASS INDEX: 23.79 KG/M2

## 2020-01-01 VITALS
DIASTOLIC BLOOD PRESSURE: 60 MMHG | TEMPERATURE: 100 F | WEIGHT: 116 LBS | RESPIRATION RATE: 17 BRPM | BODY MASS INDEX: 22.65 KG/M2 | SYSTOLIC BLOOD PRESSURE: 145 MMHG | OXYGEN SATURATION: 95 % | HEART RATE: 88 BPM

## 2020-01-01 VITALS
DIASTOLIC BLOOD PRESSURE: 47 MMHG | RESPIRATION RATE: 19 BRPM | SYSTOLIC BLOOD PRESSURE: 123 MMHG | OXYGEN SATURATION: 100 % | TEMPERATURE: 99 F | HEART RATE: 78 BPM

## 2020-01-01 VITALS
DIASTOLIC BLOOD PRESSURE: 60 MMHG | BODY MASS INDEX: 26.85 KG/M2 | WEIGHT: 116 LBS | HEIGHT: 55 IN | SYSTOLIC BLOOD PRESSURE: 150 MMHG

## 2020-01-01 LAB
A/G RATIO: 0.6 (ref 1.1–2.2)
A/G RATIO: 0.7 (ref 1.1–2.2)
A/G RATIO: 0.8 (ref 1.1–2.2)
A/G RATIO: 0.8 (ref 1.1–2.2)
A/G RATIO: 0.9 (ref 1.1–2.2)
A/G RATIO: 0.9 (ref 1.1–2.2)
A/G RATIO: 1 (ref 1.1–2.2)
A/G RATIO: 1.1 (ref 1.1–2.2)
A/G RATIO: 1.2 (ref 1.1–2.2)
A/G RATIO: 1.2 (ref 1.1–2.2)
A/G RATIO: 1.3 (ref 1.1–2.2)
A/G RATIO: 1.4 (ref 1.1–2.2)
ALBUMIN SERPL-MCNC: 1.7 G/DL (ref 3.4–5)
ALBUMIN SERPL-MCNC: 2 G/DL (ref 3.4–5)
ALBUMIN SERPL-MCNC: 2.2 G/DL (ref 3.4–5)
ALBUMIN SERPL-MCNC: 2.3 G/DL (ref 3.4–5)
ALBUMIN SERPL-MCNC: 2.4 G/DL (ref 3.4–5)
ALBUMIN SERPL-MCNC: 2.5 G/DL (ref 3.4–5)
ALBUMIN SERPL-MCNC: 2.6 G/DL (ref 3.4–5)
ALBUMIN SERPL-MCNC: 2.7 G/DL (ref 3.4–5)
ALBUMIN SERPL-MCNC: 2.8 G/DL (ref 3.4–5)
ALBUMIN SERPL-MCNC: 2.9 G/DL (ref 3.4–5)
ALBUMIN SERPL-MCNC: 3 G/DL (ref 3.4–5)
ALBUMIN SERPL-MCNC: 3.1 G/DL (ref 3.4–5)
ALBUMIN SERPL-MCNC: 3.2 G/DL (ref 3.4–5)
ALBUMIN SERPL-MCNC: 3.2 G/DL (ref 3.4–5)
ALBUMIN SERPL-MCNC: 3.3 G/DL (ref 3.4–5)
ALBUMIN SERPL-MCNC: 3.4 G/DL (ref 3.4–5)
ALBUMIN SERPL-MCNC: 3.5 G/DL (ref 3.4–5)
ALBUMIN SERPL-MCNC: 3.6 G/DL (ref 3.4–5)
ALBUMIN SERPL-MCNC: 3.7 G/DL (ref 3.4–5)
ALBUMIN SERPL-MCNC: 3.7 G/DL (ref 3.4–5)
ALBUMIN SERPL-MCNC: 3.8 G/DL (ref 3.4–5)
ALBUMIN SERPL-MCNC: 3.9 G/DL (ref 3.4–5)
ALBUMIN SERPL-MCNC: 3.9 G/DL (ref 3.4–5)
ALBUMIN SERPL-MCNC: 4.2 G/DL (ref 3.4–5)
ALBUMIN SERPL-MCNC: 4.2 G/DL (ref 3.4–5)
ALBUMIN SERPL-MCNC: 4.6 G/DL (ref 3.4–5)
ALP BLD-CCNC: 100 U/L (ref 40–129)
ALP BLD-CCNC: 112 U/L (ref 40–129)
ALP BLD-CCNC: 114 U/L (ref 40–129)
ALP BLD-CCNC: 123 U/L (ref 40–129)
ALP BLD-CCNC: 134 U/L (ref 40–129)
ALP BLD-CCNC: 137 U/L (ref 40–129)
ALP BLD-CCNC: 154 U/L (ref 40–129)
ALP BLD-CCNC: 165 U/L (ref 40–129)
ALP BLD-CCNC: 183 U/L (ref 40–129)
ALP BLD-CCNC: 192 U/L (ref 40–129)
ALP BLD-CCNC: 77 U/L (ref 40–129)
ALP BLD-CCNC: 82 U/L (ref 40–129)
ALP BLD-CCNC: 84 U/L (ref 40–129)
ALP BLD-CCNC: 95 U/L (ref 40–129)
ALP BLD-CCNC: 97 U/L (ref 40–129)
ALT SERPL-CCNC: 10 U/L (ref 10–40)
ALT SERPL-CCNC: 11 U/L (ref 10–40)
ALT SERPL-CCNC: 12 U/L (ref 10–40)
ALT SERPL-CCNC: 15 U/L (ref 10–40)
ALT SERPL-CCNC: 15 U/L (ref 10–40)
ALT SERPL-CCNC: 16 U/L (ref 10–40)
ALT SERPL-CCNC: 16 U/L (ref 10–40)
ALT SERPL-CCNC: 17 U/L (ref 10–40)
ALT SERPL-CCNC: 19 U/L (ref 10–40)
ALT SERPL-CCNC: 8 U/L (ref 10–40)
ALT SERPL-CCNC: 9 U/L (ref 10–40)
ANAEROBIC CULTURE: ABNORMAL
ANION GAP SERPL CALCULATED.3IONS-SCNC: 10 MMOL/L (ref 3–16)
ANION GAP SERPL CALCULATED.3IONS-SCNC: 11 MMOL/L (ref 3–16)
ANION GAP SERPL CALCULATED.3IONS-SCNC: 12 MMOL/L (ref 3–16)
ANION GAP SERPL CALCULATED.3IONS-SCNC: 13 MMOL/L (ref 3–16)
ANION GAP SERPL CALCULATED.3IONS-SCNC: 14 MMOL/L (ref 3–16)
ANION GAP SERPL CALCULATED.3IONS-SCNC: 15 MMOL/L (ref 3–16)
ANION GAP SERPL CALCULATED.3IONS-SCNC: 16 MMOL/L (ref 3–16)
ANION GAP SERPL CALCULATED.3IONS-SCNC: 17 MMOL/L (ref 3–16)
ANION GAP SERPL CALCULATED.3IONS-SCNC: 18 MMOL/L (ref 3–16)
ANION GAP SERPL CALCULATED.3IONS-SCNC: 19 MMOL/L (ref 3–16)
ANION GAP SERPL CALCULATED.3IONS-SCNC: 22 MMOL/L (ref 3–16)
ANION GAP SERPL CALCULATED.3IONS-SCNC: 9 MMOL/L (ref 3–16)
ANISOCYTOSIS: ABNORMAL
ANISOCYTOSIS: ABNORMAL
APTT: 27.2 SEC (ref 24.2–36.2)
APTT: 64.5 SEC (ref 24.2–36.2)
APTT: 69.1 SEC (ref 24.2–36.2)
APTT: 81 SEC (ref 24.2–36.2)
APTT: 86 SEC (ref 24.2–36.2)
AST SERPL-CCNC: 112 U/L (ref 15–37)
AST SERPL-CCNC: 14 U/L (ref 15–37)
AST SERPL-CCNC: 16 U/L (ref 15–37)
AST SERPL-CCNC: 19 U/L (ref 15–37)
AST SERPL-CCNC: 21 U/L (ref 15–37)
AST SERPL-CCNC: 21 U/L (ref 15–37)
AST SERPL-CCNC: 22 U/L (ref 15–37)
AST SERPL-CCNC: 23 U/L (ref 15–37)
AST SERPL-CCNC: 25 U/L (ref 15–37)
AST SERPL-CCNC: 25 U/L (ref 15–37)
AST SERPL-CCNC: 26 U/L (ref 15–37)
AST SERPL-CCNC: 30 U/L (ref 15–37)
AST SERPL-CCNC: 31 U/L (ref 15–37)
AST SERPL-CCNC: 32 U/L (ref 15–37)
AST SERPL-CCNC: 33 U/L (ref 15–37)
ATYPICAL LYMPHOCYTE RELATIVE PERCENT: 2 % (ref 0–6)
BACTERIA: ABNORMAL /HPF
BACTERIA: ABNORMAL /HPF
BANDED NEUTROPHILS RELATIVE PERCENT: 22 % (ref 0–7)
BANDED NEUTROPHILS RELATIVE PERCENT: 37 % (ref 0–7)
BANDED NEUTROPHILS RELATIVE PERCENT: 44 % (ref 0–7)
BASE EXCESS ARTERIAL: -12.2 MMOL/L (ref -3–3)
BASE EXCESS ARTERIAL: -14.1 MMOL/L (ref -3–3)
BASE EXCESS ARTERIAL: -7 (ref -3–3)
BASE EXCESS ARTERIAL: -7 (ref -3–3)
BASE EXCESS VENOUS: -3.2 MMOL/L (ref -3–3)
BASE EXCESS VENOUS: -3.5 MMOL/L (ref -3–3)
BASOPHILS ABSOLUTE: 0 K/UL (ref 0–0.2)
BASOPHILS ABSOLUTE: 0.1 K/UL (ref 0–0.2)
BASOPHILS ABSOLUTE: 0.2 K/UL (ref 0–0.2)
BASOPHILS RELATIVE PERCENT: 0 %
BASOPHILS RELATIVE PERCENT: 0.3 %
BASOPHILS RELATIVE PERCENT: 0.4 %
BASOPHILS RELATIVE PERCENT: 0.5 %
BASOPHILS RELATIVE PERCENT: 0.6 %
BASOPHILS RELATIVE PERCENT: 0.8 %
BASOPHILS RELATIVE PERCENT: 0.9 %
BASOPHILS RELATIVE PERCENT: 0.9 %
BASOPHILS RELATIVE PERCENT: 1 %
BASOPHILS RELATIVE PERCENT: 1 %
BASOPHILS RELATIVE PERCENT: 1.2 %
BASOPHILS RELATIVE PERCENT: 1.3 %
BASOPHILS RELATIVE PERCENT: 1.3 %
BETA-HYDROXYBUTYRATE: 0.13 MMOL/L (ref 0–0.27)
BILIRUB SERPL-MCNC: 0.5 MG/DL (ref 0–1)
BILIRUB SERPL-MCNC: 0.6 MG/DL (ref 0–1)
BILIRUB SERPL-MCNC: 0.6 MG/DL (ref 0–1)
BILIRUB SERPL-MCNC: 0.7 MG/DL (ref 0–1)
BILIRUB SERPL-MCNC: 0.8 MG/DL (ref 0–1)
BILIRUB SERPL-MCNC: 0.8 MG/DL (ref 0–1)
BILIRUB SERPL-MCNC: 0.9 MG/DL (ref 0–1)
BILIRUB SERPL-MCNC: 0.9 MG/DL (ref 0–1)
BILIRUB SERPL-MCNC: 1.3 MG/DL (ref 0–1)
BILIRUB SERPL-MCNC: 5.9 MG/DL (ref 0–1)
BILIRUBIN DIRECT: <0.2 MG/DL (ref 0–0.3)
BILIRUBIN URINE: NEGATIVE
BILIRUBIN URINE: NEGATIVE
BILIRUBIN, INDIRECT: ABNORMAL MG/DL (ref 0–1)
BLOOD CULTURE, ROUTINE: ABNORMAL
BLOOD CULTURE, ROUTINE: NORMAL
BLOOD, URINE: ABNORMAL
BLOOD, URINE: NEGATIVE
BUN BLDV-MCNC: 12 MG/DL (ref 7–20)
BUN BLDV-MCNC: 13 MG/DL (ref 7–20)
BUN BLDV-MCNC: 15 MG/DL (ref 7–20)
BUN BLDV-MCNC: 15 MG/DL (ref 7–20)
BUN BLDV-MCNC: 16 MG/DL (ref 7–20)
BUN BLDV-MCNC: 16 MG/DL (ref 7–20)
BUN BLDV-MCNC: 17 MG/DL (ref 7–20)
BUN BLDV-MCNC: 17 MG/DL (ref 7–20)
BUN BLDV-MCNC: 18 MG/DL (ref 7–20)
BUN BLDV-MCNC: 18 MG/DL (ref 7–20)
BUN BLDV-MCNC: 19 MG/DL (ref 7–20)
BUN BLDV-MCNC: 20 MG/DL (ref 7–20)
BUN BLDV-MCNC: 20 MG/DL (ref 7–20)
BUN BLDV-MCNC: 21 MG/DL (ref 7–20)
BUN BLDV-MCNC: 22 MG/DL (ref 7–20)
BUN BLDV-MCNC: 23 MG/DL (ref 7–20)
BUN BLDV-MCNC: 24 MG/DL (ref 7–20)
BUN BLDV-MCNC: 25 MG/DL (ref 7–20)
BUN BLDV-MCNC: 25 MG/DL (ref 7–20)
BUN BLDV-MCNC: 26 MG/DL (ref 7–20)
BUN BLDV-MCNC: 27 MG/DL (ref 7–20)
BUN BLDV-MCNC: 29 MG/DL (ref 7–20)
BUN BLDV-MCNC: 30 MG/DL (ref 7–20)
BUN BLDV-MCNC: 31 MG/DL (ref 7–20)
BUN BLDV-MCNC: 32 MG/DL (ref 7–20)
BUN BLDV-MCNC: 33 MG/DL (ref 7–20)
BUN BLDV-MCNC: 35 MG/DL (ref 7–20)
BUN BLDV-MCNC: 36 MG/DL (ref 7–20)
BUN BLDV-MCNC: 37 MG/DL (ref 7–20)
BUN BLDV-MCNC: 38 MG/DL (ref 7–20)
BUN BLDV-MCNC: 39 MG/DL (ref 7–20)
BUN BLDV-MCNC: 40 MG/DL (ref 7–20)
BUN BLDV-MCNC: 40 MG/DL (ref 7–20)
BUN BLDV-MCNC: 41 MG/DL (ref 7–20)
BUN BLDV-MCNC: 42 MG/DL (ref 7–20)
BUN BLDV-MCNC: 43 MG/DL (ref 7–20)
BUN BLDV-MCNC: 46 MG/DL (ref 7–20)
BUN BLDV-MCNC: 47 MG/DL (ref 7–20)
BUN BLDV-MCNC: 47 MG/DL (ref 7–20)
BUN BLDV-MCNC: 50 MG/DL (ref 7–20)
BUN BLDV-MCNC: 55 MG/DL (ref 7–20)
BUN BLDV-MCNC: 60 MG/DL (ref 7–20)
BUN BLDV-MCNC: 61 MG/DL (ref 7–20)
C DIFF TOXIN/ANTIGEN: ABNORMAL
C DIFF TOXIN/ANTIGEN: ABNORMAL
C DIFF TOXIN/ANTIGEN: NORMAL
C-REACTIVE PROTEIN: 20.9 MG/L (ref 0–5.1)
C. DIFFICILE TOXIN MOLECULAR: ABNORMAL
CALCIUM IONIZED: 0.99 MMOL/L (ref 1.12–1.32)
CALCIUM IONIZED: 1.01 MMOL/L (ref 1.12–1.32)
CALCIUM IONIZED: 1.03 MMOL/L (ref 1.12–1.32)
CALCIUM IONIZED: 1.14 MMOL/L (ref 1.12–1.32)
CALCIUM SERPL-MCNC: 10 MG/DL (ref 8.3–10.6)
CALCIUM SERPL-MCNC: 7.3 MG/DL (ref 8.3–10.6)
CALCIUM SERPL-MCNC: 7.4 MG/DL (ref 8.3–10.6)
CALCIUM SERPL-MCNC: 7.6 MG/DL (ref 8.3–10.6)
CALCIUM SERPL-MCNC: 8 MG/DL (ref 8.3–10.6)
CALCIUM SERPL-MCNC: 8.1 MG/DL (ref 8.3–10.6)
CALCIUM SERPL-MCNC: 8.2 MG/DL (ref 8.3–10.6)
CALCIUM SERPL-MCNC: 8.4 MG/DL (ref 8.3–10.6)
CALCIUM SERPL-MCNC: 8.4 MG/DL (ref 8.3–10.6)
CALCIUM SERPL-MCNC: 8.5 MG/DL (ref 8.3–10.6)
CALCIUM SERPL-MCNC: 8.6 MG/DL (ref 8.3–10.6)
CALCIUM SERPL-MCNC: 8.7 MG/DL (ref 8.3–10.6)
CALCIUM SERPL-MCNC: 8.8 MG/DL (ref 8.3–10.6)
CALCIUM SERPL-MCNC: 8.9 MG/DL (ref 8.3–10.6)
CALCIUM SERPL-MCNC: 9 MG/DL (ref 8.3–10.6)
CALCIUM SERPL-MCNC: 9.1 MG/DL (ref 8.3–10.6)
CALCIUM SERPL-MCNC: 9.2 MG/DL (ref 8.3–10.6)
CALCIUM SERPL-MCNC: 9.3 MG/DL (ref 8.3–10.6)
CALCIUM SERPL-MCNC: 9.3 MG/DL (ref 8.3–10.6)
CALCIUM SERPL-MCNC: 9.4 MG/DL (ref 8.3–10.6)
CALCIUM SERPL-MCNC: 9.5 MG/DL (ref 8.3–10.6)
CALCIUM SERPL-MCNC: 9.6 MG/DL (ref 8.3–10.6)
CALCIUM SERPL-MCNC: 9.6 MG/DL (ref 8.3–10.6)
CAMPYLOBACTER JEJUNI/COLI PCR: NOT DETECTED
CAMPYLOBACTER UPSALIENSIS: NOT DETECTED
CARBOXYHEMOGLOBIN ARTERIAL: 0.9 % (ref 0–1.5)
CARBOXYHEMOGLOBIN ARTERIAL: 1.2 % (ref 0–1.5)
CARBOXYHEMOGLOBIN: 2.8 % (ref 0–1.5)
CARBOXYHEMOGLOBIN: 4.9 % (ref 0–1.5)
CHLORIDE BLD-SCNC: 100 MMOL/L (ref 99–110)
CHLORIDE BLD-SCNC: 102 MMOL/L (ref 99–110)
CHLORIDE BLD-SCNC: 103 MMOL/L (ref 99–110)
CHLORIDE BLD-SCNC: 103 MMOL/L (ref 99–110)
CHLORIDE BLD-SCNC: 104 MMOL/L (ref 99–110)
CHLORIDE BLD-SCNC: 104 MMOL/L (ref 99–110)
CHLORIDE BLD-SCNC: 105 MMOL/L (ref 99–110)
CHLORIDE BLD-SCNC: 88 MMOL/L (ref 99–110)
CHLORIDE BLD-SCNC: 89 MMOL/L (ref 99–110)
CHLORIDE BLD-SCNC: 90 MMOL/L (ref 99–110)
CHLORIDE BLD-SCNC: 91 MMOL/L (ref 99–110)
CHLORIDE BLD-SCNC: 92 MMOL/L (ref 99–110)
CHLORIDE BLD-SCNC: 93 MMOL/L (ref 99–110)
CHLORIDE BLD-SCNC: 94 MMOL/L (ref 99–110)
CHLORIDE BLD-SCNC: 95 MMOL/L (ref 99–110)
CHLORIDE BLD-SCNC: 96 MMOL/L (ref 99–110)
CHLORIDE BLD-SCNC: 97 MMOL/L (ref 99–110)
CHLORIDE BLD-SCNC: 98 MMOL/L (ref 99–110)
CHLORIDE BLD-SCNC: 99 MMOL/L (ref 99–110)
CLARITY: ABNORMAL
CLARITY: ABNORMAL
CO2: 13 MMOL/L (ref 21–32)
CO2: 15 MMOL/L (ref 21–32)
CO2: 18 MMOL/L (ref 21–32)
CO2: 19 MMOL/L (ref 21–32)
CO2: 20 MMOL/L (ref 21–32)
CO2: 21 MMOL/L (ref 21–32)
CO2: 22 MMOL/L (ref 21–32)
CO2: 23 MMOL/L (ref 21–32)
CO2: 24 MMOL/L (ref 21–32)
CO2: 25 MMOL/L (ref 21–32)
CO2: 26 MMOL/L (ref 21–32)
CO2: 27 MMOL/L (ref 21–32)
COLOR: ABNORMAL
COLOR: YELLOW
CREAT SERPL-MCNC: 1.2 MG/DL (ref 0.6–1.2)
CREAT SERPL-MCNC: 1.9 MG/DL (ref 0.6–1.2)
CREAT SERPL-MCNC: 2 MG/DL (ref 0.6–1.2)
CREAT SERPL-MCNC: 2.1 MG/DL (ref 0.6–1.2)
CREAT SERPL-MCNC: 2.1 MG/DL (ref 0.6–1.2)
CREAT SERPL-MCNC: 2.2 MG/DL (ref 0.6–1.2)
CREAT SERPL-MCNC: 2.3 MG/DL (ref 0.6–1.2)
CREAT SERPL-MCNC: 2.4 MG/DL (ref 0.6–1.2)
CREAT SERPL-MCNC: 2.4 MG/DL (ref 0.6–1.2)
CREAT SERPL-MCNC: 2.5 MG/DL (ref 0.6–1.2)
CREAT SERPL-MCNC: 2.6 MG/DL (ref 0.6–1.2)
CREAT SERPL-MCNC: 2.7 MG/DL (ref 0.6–1.2)
CREAT SERPL-MCNC: 2.8 MG/DL (ref 0.6–1.2)
CREAT SERPL-MCNC: 2.9 MG/DL (ref 0.6–1.2)
CREAT SERPL-MCNC: 3 MG/DL (ref 0.6–1.2)
CREAT SERPL-MCNC: 3 MG/DL (ref 0.6–1.2)
CREAT SERPL-MCNC: 3.2 MG/DL (ref 0.6–1.2)
CREAT SERPL-MCNC: 3.3 MG/DL (ref 0.6–1.2)
CREAT SERPL-MCNC: 3.3 MG/DL (ref 0.6–1.2)
CREAT SERPL-MCNC: 3.4 MG/DL (ref 0.6–1.2)
CREAT SERPL-MCNC: 3.5 MG/DL (ref 0.6–1.2)
CREAT SERPL-MCNC: 3.6 MG/DL (ref 0.6–1.2)
CREAT SERPL-MCNC: 3.6 MG/DL (ref 0.6–1.2)
CREAT SERPL-MCNC: 3.7 MG/DL (ref 0.6–1.2)
CREAT SERPL-MCNC: 3.8 MG/DL (ref 0.6–1.2)
CREAT SERPL-MCNC: 3.8 MG/DL (ref 0.6–1.2)
CREAT SERPL-MCNC: 4 MG/DL (ref 0.6–1.2)
CREAT SERPL-MCNC: 4.1 MG/DL (ref 0.6–1.2)
CREAT SERPL-MCNC: 4.3 MG/DL (ref 0.6–1.2)
CREAT SERPL-MCNC: 4.3 MG/DL (ref 0.6–1.2)
CREAT SERPL-MCNC: 4.4 MG/DL (ref 0.6–1.2)
CREAT SERPL-MCNC: 4.4 MG/DL (ref 0.6–1.2)
CREAT SERPL-MCNC: 4.5 MG/DL (ref 0.6–1.2)
CREAT SERPL-MCNC: 4.6 MG/DL (ref 0.6–1.2)
CREAT SERPL-MCNC: 4.6 MG/DL (ref 0.6–1.2)
CREAT SERPL-MCNC: 4.8 MG/DL (ref 0.6–1.2)
CREAT SERPL-MCNC: 4.9 MG/DL (ref 0.6–1.2)
CREAT SERPL-MCNC: 4.9 MG/DL (ref 0.6–1.2)
CREATININE URINE: 43.3 MG/DL (ref 28–259)
CULTURE SURGICAL: ABNORMAL
CULTURE, BLOOD 2: ABNORMAL
CULTURE, BLOOD 2: NORMAL
E COLI SHIGELLA/ENTEROINVASIVE PCR: NOT DETECTED
EKG ATRIAL RATE: 58 BPM
EKG ATRIAL RATE: 64 BPM
EKG ATRIAL RATE: 71 BPM
EKG ATRIAL RATE: 74 BPM
EKG ATRIAL RATE: 75 BPM
EKG ATRIAL RATE: 77 BPM
EKG ATRIAL RATE: 81 BPM
EKG DIAGNOSIS: NORMAL
EKG P AXIS: 35 DEGREES
EKG P AXIS: 43 DEGREES
EKG P AXIS: 67 DEGREES
EKG P AXIS: 74 DEGREES
EKG P AXIS: 75 DEGREES
EKG P AXIS: 76 DEGREES
EKG P AXIS: 79 DEGREES
EKG P-R INTERVAL: 150 MS
EKG P-R INTERVAL: 154 MS
EKG P-R INTERVAL: 154 MS
EKG P-R INTERVAL: 156 MS
EKG P-R INTERVAL: 174 MS
EKG P-R INTERVAL: 178 MS
EKG P-R INTERVAL: 180 MS
EKG Q-T INTERVAL: 432 MS
EKG Q-T INTERVAL: 434 MS
EKG Q-T INTERVAL: 458 MS
EKG Q-T INTERVAL: 458 MS
EKG Q-T INTERVAL: 464 MS
EKG Q-T INTERVAL: 472 MS
EKG Q-T INTERVAL: 502 MS
EKG QRS DURATION: 122 MS
EKG QRS DURATION: 122 MS
EKG QRS DURATION: 126 MS
EKG QRS DURATION: 130 MS
EKG QRS DURATION: 134 MS
EKG QRS DURATION: 140 MS
EKG QRS DURATION: 144 MS
EKG QTC CALCULATION (BAZETT): 478 MS
EKG QTC CALCULATION (BAZETT): 488 MS
EKG QTC CALCULATION (BAZETT): 492 MS
EKG QTC CALCULATION (BAZETT): 504 MS
EKG QTC CALCULATION (BAZETT): 508 MS
EKG QTC CALCULATION (BAZETT): 511 MS
EKG QTC CALCULATION (BAZETT): 512 MS
EKG R AXIS: -44 DEGREES
EKG R AXIS: -45 DEGREES
EKG R AXIS: -49 DEGREES
EKG R AXIS: -61 DEGREES
EKG R AXIS: -64 DEGREES
EKG R AXIS: -74 DEGREES
EKG R AXIS: 242 DEGREES
EKG T AXIS: 109 DEGREES
EKG T AXIS: 110 DEGREES
EKG T AXIS: 118 DEGREES
EKG T AXIS: 74 DEGREES
EKG T AXIS: 85 DEGREES
EKG T AXIS: 87 DEGREES
EKG T AXIS: 92 DEGREES
EKG VENTRICULAR RATE: 58 BPM
EKG VENTRICULAR RATE: 64 BPM
EKG VENTRICULAR RATE: 71 BPM
EKG VENTRICULAR RATE: 74 BPM
EKG VENTRICULAR RATE: 75 BPM
EKG VENTRICULAR RATE: 77 BPM
EKG VENTRICULAR RATE: 81 BPM
EOSINOPHILS ABSOLUTE: 0 K/UL (ref 0–0.6)
EOSINOPHILS ABSOLUTE: 0.1 K/UL (ref 0–0.6)
EOSINOPHILS ABSOLUTE: 1 K/UL (ref 0–0.6)
EOSINOPHILS RELATIVE PERCENT: 0 %
EOSINOPHILS RELATIVE PERCENT: 0.2 %
EOSINOPHILS RELATIVE PERCENT: 0.2 %
EOSINOPHILS RELATIVE PERCENT: 0.4 %
EOSINOPHILS RELATIVE PERCENT: 0.5 %
EOSINOPHILS RELATIVE PERCENT: 0.6 %
EOSINOPHILS RELATIVE PERCENT: 0.7 %
EOSINOPHILS RELATIVE PERCENT: 0.8 %
EOSINOPHILS RELATIVE PERCENT: 1 %
EOSINOPHILS RELATIVE PERCENT: 1.1 %
EOSINOPHILS RELATIVE PERCENT: 1.4 %
EOSINOPHILS RELATIVE PERCENT: 1.4 %
EOSINOPHILS RELATIVE PERCENT: 1.5 %
EOSINOPHILS RELATIVE PERCENT: 1.8 %
EOSINOPHILS RELATIVE PERCENT: 11.5 %
EOSINOPHILS RELATIVE PERCENT: 2.3 %
EOSINOPHILS RELATIVE PERCENT: 2.8 %
EPITHELIAL CELLS, UA: ABNORMAL /HPF
ESTIMATED AVERAGE GLUCOSE: 128.4 MG/DL
ESTIMATED AVERAGE GLUCOSE: 142.7 MG/DL
FERRITIN: 88 NG/ML (ref 15–150)
GFR AFRICAN AMERICAN: 10
GFR AFRICAN AMERICAN: 10
GFR AFRICAN AMERICAN: 11
GFR AFRICAN AMERICAN: 12
GFR AFRICAN AMERICAN: 13
GFR AFRICAN AMERICAN: 13
GFR AFRICAN AMERICAN: 14
GFR AFRICAN AMERICAN: 15
GFR AFRICAN AMERICAN: 16
GFR AFRICAN AMERICAN: 17
GFR AFRICAN AMERICAN: 18
GFR AFRICAN AMERICAN: 18
GFR AFRICAN AMERICAN: 19
GFR AFRICAN AMERICAN: 20
GFR AFRICAN AMERICAN: 21
GFR AFRICAN AMERICAN: 22
GFR AFRICAN AMERICAN: 23
GFR AFRICAN AMERICAN: 24
GFR AFRICAN AMERICAN: 24
GFR AFRICAN AMERICAN: 25
GFR AFRICAN AMERICAN: 26
GFR AFRICAN AMERICAN: 28
GFR AFRICAN AMERICAN: 28
GFR AFRICAN AMERICAN: 29
GFR AFRICAN AMERICAN: 31
GFR AFRICAN AMERICAN: 53
GFR NON-AFRICAN AMERICAN: 10
GFR NON-AFRICAN AMERICAN: 11
GFR NON-AFRICAN AMERICAN: 11
GFR NON-AFRICAN AMERICAN: 12
GFR NON-AFRICAN AMERICAN: 13
GFR NON-AFRICAN AMERICAN: 14
GFR NON-AFRICAN AMERICAN: 15
GFR NON-AFRICAN AMERICAN: 15
GFR NON-AFRICAN AMERICAN: 16
GFR NON-AFRICAN AMERICAN: 17
GFR NON-AFRICAN AMERICAN: 18
GFR NON-AFRICAN AMERICAN: 19
GFR NON-AFRICAN AMERICAN: 20
GFR NON-AFRICAN AMERICAN: 20
GFR NON-AFRICAN AMERICAN: 21
GFR NON-AFRICAN AMERICAN: 22
GFR NON-AFRICAN AMERICAN: 23
GFR NON-AFRICAN AMERICAN: 23
GFR NON-AFRICAN AMERICAN: 24
GFR NON-AFRICAN AMERICAN: 26
GFR NON-AFRICAN AMERICAN: 44
GFR NON-AFRICAN AMERICAN: 9
GIARDIA ANTIGEN STOOL: NORMAL
GLOBULIN: 2.6 G/DL
GLOBULIN: 2.8 G/DL
GLOBULIN: 2.8 G/DL
GLOBULIN: 2.9 G/DL
GLOBULIN: 2.9 G/DL
GLOBULIN: 3.2 G/DL
GLOBULIN: 3.4 G/DL
GLOBULIN: 3.5 G/DL
GLOBULIN: 3.6 G/DL
GLOBULIN: 3.8 G/DL
GLOBULIN: 4 G/DL
GLOBULIN: 4.3 G/DL
GLUCOSE BLD-MCNC: 100 MG/DL (ref 70–99)
GLUCOSE BLD-MCNC: 101 MG/DL (ref 70–99)
GLUCOSE BLD-MCNC: 102 MG/DL (ref 70–99)
GLUCOSE BLD-MCNC: 102 MG/DL (ref 70–99)
GLUCOSE BLD-MCNC: 103 MG/DL (ref 70–99)
GLUCOSE BLD-MCNC: 104 MG/DL (ref 70–99)
GLUCOSE BLD-MCNC: 105 MG/DL (ref 70–99)
GLUCOSE BLD-MCNC: 106 MG/DL (ref 70–99)
GLUCOSE BLD-MCNC: 107 MG/DL (ref 70–99)
GLUCOSE BLD-MCNC: 108 MG/DL (ref 70–99)
GLUCOSE BLD-MCNC: 109 MG/DL (ref 70–99)
GLUCOSE BLD-MCNC: 110 MG/DL (ref 70–99)
GLUCOSE BLD-MCNC: 111 MG/DL (ref 70–99)
GLUCOSE BLD-MCNC: 112 MG/DL (ref 70–99)
GLUCOSE BLD-MCNC: 112 MG/DL (ref 70–99)
GLUCOSE BLD-MCNC: 113 MG/DL (ref 70–99)
GLUCOSE BLD-MCNC: 114 MG/DL (ref 70–99)
GLUCOSE BLD-MCNC: 115 MG/DL (ref 70–99)
GLUCOSE BLD-MCNC: 116 MG/DL (ref 70–99)
GLUCOSE BLD-MCNC: 117 MG/DL (ref 70–99)
GLUCOSE BLD-MCNC: 118 MG/DL (ref 70–99)
GLUCOSE BLD-MCNC: 119 MG/DL (ref 70–99)
GLUCOSE BLD-MCNC: 120 MG/DL (ref 70–99)
GLUCOSE BLD-MCNC: 121 MG/DL (ref 70–99)
GLUCOSE BLD-MCNC: 122 MG/DL (ref 70–99)
GLUCOSE BLD-MCNC: 123 MG/DL (ref 70–99)
GLUCOSE BLD-MCNC: 124 MG/DL (ref 70–99)
GLUCOSE BLD-MCNC: 125 MG/DL (ref 70–99)
GLUCOSE BLD-MCNC: 126 MG/DL (ref 70–99)
GLUCOSE BLD-MCNC: 127 MG/DL (ref 70–99)
GLUCOSE BLD-MCNC: 128 MG/DL (ref 70–99)
GLUCOSE BLD-MCNC: 129 MG/DL (ref 70–99)
GLUCOSE BLD-MCNC: 130 MG/DL (ref 70–99)
GLUCOSE BLD-MCNC: 131 MG/DL (ref 70–99)
GLUCOSE BLD-MCNC: 132 MG/DL (ref 70–99)
GLUCOSE BLD-MCNC: 133 MG/DL (ref 70–99)
GLUCOSE BLD-MCNC: 134 MG/DL (ref 70–99)
GLUCOSE BLD-MCNC: 135 MG/DL (ref 70–99)
GLUCOSE BLD-MCNC: 136 MG/DL (ref 70–99)
GLUCOSE BLD-MCNC: 137 MG/DL (ref 70–99)
GLUCOSE BLD-MCNC: 138 MG/DL (ref 70–99)
GLUCOSE BLD-MCNC: 139 MG/DL (ref 70–99)
GLUCOSE BLD-MCNC: 140 MG/DL (ref 70–99)
GLUCOSE BLD-MCNC: 140 MG/DL (ref 70–99)
GLUCOSE BLD-MCNC: 143 MG/DL (ref 70–99)
GLUCOSE BLD-MCNC: 144 MG/DL (ref 70–99)
GLUCOSE BLD-MCNC: 145 MG/DL (ref 70–99)
GLUCOSE BLD-MCNC: 146 MG/DL (ref 70–99)
GLUCOSE BLD-MCNC: 146 MG/DL (ref 70–99)
GLUCOSE BLD-MCNC: 147 MG/DL (ref 70–99)
GLUCOSE BLD-MCNC: 148 MG/DL (ref 70–99)
GLUCOSE BLD-MCNC: 149 MG/DL (ref 70–99)
GLUCOSE BLD-MCNC: 150 MG/DL (ref 70–99)
GLUCOSE BLD-MCNC: 151 MG/DL (ref 70–99)
GLUCOSE BLD-MCNC: 152 MG/DL (ref 70–99)
GLUCOSE BLD-MCNC: 153 MG/DL (ref 70–99)
GLUCOSE BLD-MCNC: 154 MG/DL (ref 70–99)
GLUCOSE BLD-MCNC: 155 MG/DL (ref 70–99)
GLUCOSE BLD-MCNC: 156 MG/DL (ref 70–99)
GLUCOSE BLD-MCNC: 157 MG/DL (ref 70–99)
GLUCOSE BLD-MCNC: 158 MG/DL (ref 70–99)
GLUCOSE BLD-MCNC: 159 MG/DL (ref 70–99)
GLUCOSE BLD-MCNC: 159 MG/DL (ref 70–99)
GLUCOSE BLD-MCNC: 160 MG/DL (ref 70–99)
GLUCOSE BLD-MCNC: 160 MG/DL (ref 70–99)
GLUCOSE BLD-MCNC: 161 MG/DL (ref 70–99)
GLUCOSE BLD-MCNC: 162 MG/DL (ref 70–99)
GLUCOSE BLD-MCNC: 163 MG/DL (ref 70–99)
GLUCOSE BLD-MCNC: 163 MG/DL (ref 70–99)
GLUCOSE BLD-MCNC: 164 MG/DL (ref 70–99)
GLUCOSE BLD-MCNC: 165 MG/DL (ref 70–99)
GLUCOSE BLD-MCNC: 165 MG/DL (ref 70–99)
GLUCOSE BLD-MCNC: 166 MG/DL (ref 70–99)
GLUCOSE BLD-MCNC: 168 MG/DL (ref 70–99)
GLUCOSE BLD-MCNC: 169 MG/DL (ref 70–99)
GLUCOSE BLD-MCNC: 169 MG/DL (ref 70–99)
GLUCOSE BLD-MCNC: 170 MG/DL (ref 70–99)
GLUCOSE BLD-MCNC: 171 MG/DL (ref 70–99)
GLUCOSE BLD-MCNC: 172 MG/DL (ref 70–99)
GLUCOSE BLD-MCNC: 172 MG/DL (ref 70–99)
GLUCOSE BLD-MCNC: 175 MG/DL (ref 70–99)
GLUCOSE BLD-MCNC: 176 MG/DL (ref 70–99)
GLUCOSE BLD-MCNC: 177 MG/DL (ref 70–99)
GLUCOSE BLD-MCNC: 178 MG/DL (ref 70–99)
GLUCOSE BLD-MCNC: 179 MG/DL (ref 70–99)
GLUCOSE BLD-MCNC: 179 MG/DL (ref 70–99)
GLUCOSE BLD-MCNC: 180 MG/DL (ref 70–99)
GLUCOSE BLD-MCNC: 181 MG/DL (ref 70–99)
GLUCOSE BLD-MCNC: 181 MG/DL (ref 70–99)
GLUCOSE BLD-MCNC: 182 MG/DL (ref 70–99)
GLUCOSE BLD-MCNC: 183 MG/DL (ref 70–99)
GLUCOSE BLD-MCNC: 184 MG/DL (ref 70–99)
GLUCOSE BLD-MCNC: 185 MG/DL (ref 70–99)
GLUCOSE BLD-MCNC: 186 MG/DL (ref 70–99)
GLUCOSE BLD-MCNC: 186 MG/DL (ref 70–99)
GLUCOSE BLD-MCNC: 187 MG/DL (ref 70–99)
GLUCOSE BLD-MCNC: 188 MG/DL (ref 70–99)
GLUCOSE BLD-MCNC: 190 MG/DL (ref 70–99)
GLUCOSE BLD-MCNC: 191 MG/DL (ref 70–99)
GLUCOSE BLD-MCNC: 192 MG/DL (ref 70–99)
GLUCOSE BLD-MCNC: 193 MG/DL (ref 70–99)
GLUCOSE BLD-MCNC: 194 MG/DL (ref 70–99)
GLUCOSE BLD-MCNC: 195 MG/DL (ref 70–99)
GLUCOSE BLD-MCNC: 197 MG/DL (ref 70–99)
GLUCOSE BLD-MCNC: 199 MG/DL (ref 70–99)
GLUCOSE BLD-MCNC: 200 MG/DL (ref 70–99)
GLUCOSE BLD-MCNC: 201 MG/DL (ref 70–99)
GLUCOSE BLD-MCNC: 205 MG/DL (ref 70–99)
GLUCOSE BLD-MCNC: 207 MG/DL (ref 70–99)
GLUCOSE BLD-MCNC: 209 MG/DL (ref 70–99)
GLUCOSE BLD-MCNC: 209 MG/DL (ref 70–99)
GLUCOSE BLD-MCNC: 210 MG/DL (ref 70–99)
GLUCOSE BLD-MCNC: 211 MG/DL (ref 70–99)
GLUCOSE BLD-MCNC: 211 MG/DL (ref 70–99)
GLUCOSE BLD-MCNC: 213 MG/DL (ref 70–99)
GLUCOSE BLD-MCNC: 214 MG/DL (ref 70–99)
GLUCOSE BLD-MCNC: 216 MG/DL (ref 70–99)
GLUCOSE BLD-MCNC: 217 MG/DL (ref 70–99)
GLUCOSE BLD-MCNC: 219 MG/DL (ref 70–99)
GLUCOSE BLD-MCNC: 220 MG/DL (ref 70–99)
GLUCOSE BLD-MCNC: 221 MG/DL (ref 70–99)
GLUCOSE BLD-MCNC: 221 MG/DL (ref 70–99)
GLUCOSE BLD-MCNC: 223 MG/DL (ref 70–99)
GLUCOSE BLD-MCNC: 224 MG/DL (ref 70–99)
GLUCOSE BLD-MCNC: 224 MG/DL (ref 70–99)
GLUCOSE BLD-MCNC: 225 MG/DL (ref 70–99)
GLUCOSE BLD-MCNC: 226 MG/DL (ref 70–99)
GLUCOSE BLD-MCNC: 227 MG/DL (ref 70–99)
GLUCOSE BLD-MCNC: 228 MG/DL (ref 70–99)
GLUCOSE BLD-MCNC: 230 MG/DL (ref 70–99)
GLUCOSE BLD-MCNC: 231 MG/DL (ref 70–99)
GLUCOSE BLD-MCNC: 232 MG/DL (ref 70–99)
GLUCOSE BLD-MCNC: 233 MG/DL (ref 70–99)
GLUCOSE BLD-MCNC: 234 MG/DL (ref 70–99)
GLUCOSE BLD-MCNC: 236 MG/DL (ref 70–99)
GLUCOSE BLD-MCNC: 237 MG/DL (ref 70–99)
GLUCOSE BLD-MCNC: 238 MG/DL (ref 70–99)
GLUCOSE BLD-MCNC: 240 MG/DL (ref 70–99)
GLUCOSE BLD-MCNC: 241 MG/DL (ref 70–99)
GLUCOSE BLD-MCNC: 242 MG/DL (ref 70–99)
GLUCOSE BLD-MCNC: 244 MG/DL (ref 70–99)
GLUCOSE BLD-MCNC: 245 MG/DL (ref 70–99)
GLUCOSE BLD-MCNC: 247 MG/DL (ref 70–99)
GLUCOSE BLD-MCNC: 248 MG/DL (ref 70–99)
GLUCOSE BLD-MCNC: 249 MG/DL (ref 70–99)
GLUCOSE BLD-MCNC: 251 MG/DL (ref 70–99)
GLUCOSE BLD-MCNC: 251 MG/DL (ref 70–99)
GLUCOSE BLD-MCNC: 254 MG/DL (ref 70–99)
GLUCOSE BLD-MCNC: 257 MG/DL (ref 70–99)
GLUCOSE BLD-MCNC: 263 MG/DL (ref 70–99)
GLUCOSE BLD-MCNC: 266 MG/DL (ref 70–99)
GLUCOSE BLD-MCNC: 266 MG/DL (ref 70–99)
GLUCOSE BLD-MCNC: 268 MG/DL (ref 70–99)
GLUCOSE BLD-MCNC: 268 MG/DL (ref 70–99)
GLUCOSE BLD-MCNC: 272 MG/DL (ref 70–99)
GLUCOSE BLD-MCNC: 272 MG/DL (ref 70–99)
GLUCOSE BLD-MCNC: 274 MG/DL (ref 70–99)
GLUCOSE BLD-MCNC: 275 MG/DL (ref 70–99)
GLUCOSE BLD-MCNC: 276 MG/DL (ref 70–99)
GLUCOSE BLD-MCNC: 280 MG/DL (ref 70–99)
GLUCOSE BLD-MCNC: 281 MG/DL (ref 70–99)
GLUCOSE BLD-MCNC: 285 MG/DL (ref 70–99)
GLUCOSE BLD-MCNC: 294 MG/DL (ref 70–99)
GLUCOSE BLD-MCNC: 296 MG/DL (ref 70–99)
GLUCOSE BLD-MCNC: 299 MG/DL (ref 70–99)
GLUCOSE BLD-MCNC: 299 MG/DL (ref 70–99)
GLUCOSE BLD-MCNC: 300 MG/DL (ref 70–99)
GLUCOSE BLD-MCNC: 310 MG/DL (ref 70–99)
GLUCOSE BLD-MCNC: 318 MG/DL (ref 70–99)
GLUCOSE BLD-MCNC: 325 MG/DL (ref 70–99)
GLUCOSE BLD-MCNC: 331 MG/DL (ref 70–99)
GLUCOSE BLD-MCNC: 340 MG/DL (ref 70–99)
GLUCOSE BLD-MCNC: 365 MG/DL (ref 70–99)
GLUCOSE BLD-MCNC: 404 MG/DL (ref 70–99)
GLUCOSE BLD-MCNC: 45 MG/DL (ref 70–99)
GLUCOSE BLD-MCNC: 477 MG/DL (ref 70–99)
GLUCOSE BLD-MCNC: 49 MG/DL (ref 70–99)
GLUCOSE BLD-MCNC: 52 MG/DL (ref 70–99)
GLUCOSE BLD-MCNC: 57 MG/DL (ref 70–99)
GLUCOSE BLD-MCNC: 59 MG/DL (ref 70–99)
GLUCOSE BLD-MCNC: 61 MG/DL (ref 70–99)
GLUCOSE BLD-MCNC: 61 MG/DL (ref 70–99)
GLUCOSE BLD-MCNC: 65 MG/DL (ref 70–99)
GLUCOSE BLD-MCNC: 66 MG/DL (ref 70–99)
GLUCOSE BLD-MCNC: 67 MG/DL (ref 70–99)
GLUCOSE BLD-MCNC: 68 MG/DL (ref 70–99)
GLUCOSE BLD-MCNC: 69 MG/DL (ref 70–99)
GLUCOSE BLD-MCNC: 70 MG/DL (ref 70–99)
GLUCOSE BLD-MCNC: 72 MG/DL (ref 70–99)
GLUCOSE BLD-MCNC: 73 MG/DL (ref 70–99)
GLUCOSE BLD-MCNC: 73 MG/DL (ref 70–99)
GLUCOSE BLD-MCNC: 74 MG/DL (ref 70–99)
GLUCOSE BLD-MCNC: 75 MG/DL (ref 70–99)
GLUCOSE BLD-MCNC: 76 MG/DL (ref 70–99)
GLUCOSE BLD-MCNC: 76 MG/DL (ref 70–99)
GLUCOSE BLD-MCNC: 77 MG/DL (ref 70–99)
GLUCOSE BLD-MCNC: 78 MG/DL (ref 70–99)
GLUCOSE BLD-MCNC: 80 MG/DL (ref 70–99)
GLUCOSE BLD-MCNC: 81 MG/DL (ref 70–99)
GLUCOSE BLD-MCNC: 82 MG/DL (ref 70–99)
GLUCOSE BLD-MCNC: 83 MG/DL (ref 70–99)
GLUCOSE BLD-MCNC: 83 MG/DL (ref 70–99)
GLUCOSE BLD-MCNC: 84 MG/DL (ref 70–99)
GLUCOSE BLD-MCNC: 85 MG/DL (ref 70–99)
GLUCOSE BLD-MCNC: 85 MG/DL (ref 70–99)
GLUCOSE BLD-MCNC: 86 MG/DL (ref 70–99)
GLUCOSE BLD-MCNC: 86 MG/DL (ref 70–99)
GLUCOSE BLD-MCNC: 87 MG/DL (ref 70–99)
GLUCOSE BLD-MCNC: 87 MG/DL (ref 70–99)
GLUCOSE BLD-MCNC: 88 MG/DL (ref 70–99)
GLUCOSE BLD-MCNC: 88 MG/DL (ref 70–99)
GLUCOSE BLD-MCNC: 89 MG/DL (ref 70–99)
GLUCOSE BLD-MCNC: 90 MG/DL (ref 70–99)
GLUCOSE BLD-MCNC: 91 MG/DL (ref 70–99)
GLUCOSE BLD-MCNC: 91 MG/DL (ref 70–99)
GLUCOSE BLD-MCNC: 92 MG/DL (ref 70–99)
GLUCOSE BLD-MCNC: 93 MG/DL (ref 70–99)
GLUCOSE BLD-MCNC: 94 MG/DL (ref 70–99)
GLUCOSE BLD-MCNC: 95 MG/DL (ref 70–99)
GLUCOSE BLD-MCNC: 96 MG/DL (ref 70–99)
GLUCOSE BLD-MCNC: 97 MG/DL (ref 70–99)
GLUCOSE BLD-MCNC: 99 MG/DL (ref 70–99)
GLUCOSE BLD-MCNC: 99 MG/DL (ref 70–99)
GLUCOSE URINE: NEGATIVE MG/DL
GLUCOSE URINE: NEGATIVE MG/DL
GRAM STAIN RESULT: ABNORMAL
GRAM STAIN RESULT: ABNORMAL
HAV IGM SER IA-ACNC: NORMAL
HBA1C MFR BLD: 6.1 %
HBA1C MFR BLD: 6.6 %
HBV SURFACE AB TITR SER: <3.5 MIU/ML
HBV SURFACE AB TITR SER: <3.5 MIU/ML
HCO3 ARTERIAL: 13.7 MMOL/L (ref 21–29)
HCO3 ARTERIAL: 15.2 MMOL/L (ref 21–29)
HCO3 ARTERIAL: 19.9 MMOL/L (ref 21–29)
HCO3 ARTERIAL: 21.2 MMOL/L (ref 21–29)
HCO3 VENOUS: 20.8 MMOL/L (ref 23–29)
HCO3 VENOUS: 23.4 MMOL/L (ref 23–29)
HCT VFR BLD CALC: 22.4 % (ref 36–48)
HCT VFR BLD CALC: 22.7 % (ref 36–48)
HCT VFR BLD CALC: 23 % (ref 36–48)
HCT VFR BLD CALC: 24.4 % (ref 36–48)
HCT VFR BLD CALC: 26.4 % (ref 36–48)
HCT VFR BLD CALC: 27.2 % (ref 36–48)
HCT VFR BLD CALC: 27.7 % (ref 36–48)
HCT VFR BLD CALC: 28 % (ref 36–48)
HCT VFR BLD CALC: 28.2 % (ref 36–48)
HCT VFR BLD CALC: 28.4 % (ref 36–48)
HCT VFR BLD CALC: 28.7 % (ref 36–48)
HCT VFR BLD CALC: 29.2 % (ref 36–48)
HCT VFR BLD CALC: 29.8 % (ref 36–48)
HCT VFR BLD CALC: 29.8 % (ref 36–48)
HCT VFR BLD CALC: 29.9 % (ref 36–48)
HCT VFR BLD CALC: 30.1 % (ref 36–48)
HCT VFR BLD CALC: 30.5 % (ref 36–48)
HCT VFR BLD CALC: 30.6 % (ref 36–48)
HCT VFR BLD CALC: 30.8 % (ref 36–48)
HCT VFR BLD CALC: 30.8 % (ref 36–48)
HCT VFR BLD CALC: 31.1 % (ref 36–48)
HCT VFR BLD CALC: 31.4 % (ref 36–48)
HCT VFR BLD CALC: 31.9 % (ref 36–48)
HCT VFR BLD CALC: 32 % (ref 36–48)
HCT VFR BLD CALC: 32.3 % (ref 36–48)
HCT VFR BLD CALC: 32.5 % (ref 36–48)
HCT VFR BLD CALC: 33 % (ref 36–48)
HCT VFR BLD CALC: 33.2 % (ref 36–48)
HCT VFR BLD CALC: 34.2 % (ref 36–48)
HCT VFR BLD CALC: 34.4 % (ref 36–48)
HCT VFR BLD CALC: 34.4 % (ref 36–48)
HCT VFR BLD CALC: 34.5 % (ref 36–48)
HCT VFR BLD CALC: 34.7 % (ref 36–48)
HCT VFR BLD CALC: 34.8 % (ref 36–48)
HCT VFR BLD CALC: 34.9 % (ref 36–48)
HCT VFR BLD CALC: 35.1 % (ref 36–48)
HCT VFR BLD CALC: 35.6 % (ref 36–48)
HCT VFR BLD CALC: 35.7 % (ref 36–48)
HCT VFR BLD CALC: 36.1 % (ref 36–48)
HCT VFR BLD CALC: 36.2 % (ref 36–48)
HCT VFR BLD CALC: 36.7 % (ref 36–48)
HCT VFR BLD CALC: 37.4 % (ref 36–48)
HCT VFR BLD CALC: 37.7 % (ref 36–48)
HCT VFR BLD CALC: 37.9 % (ref 36–48)
HEMATOLOGY PATH CONSULT: NORMAL
HEMATOLOGY PATH CONSULT: NORMAL
HEMATOLOGY PATH CONSULT: YES
HEMATOLOGY PATH CONSULT: YES
HEMOGLOBIN, ART, EXTENDED: 11.8 G/DL (ref 12–16)
HEMOGLOBIN, ART, EXTENDED: 12.6 G/DL (ref 12–16)
HEMOGLOBIN: 10 G/DL (ref 12–16)
HEMOGLOBIN: 10.2 G/DL (ref 12–16)
HEMOGLOBIN: 10.4 G/DL (ref 12–16)
HEMOGLOBIN: 10.4 G/DL (ref 12–16)
HEMOGLOBIN: 10.5 G/DL (ref 12–16)
HEMOGLOBIN: 10.5 G/DL (ref 12–16)
HEMOGLOBIN: 10.7 G/DL (ref 12–16)
HEMOGLOBIN: 10.7 G/DL (ref 12–16)
HEMOGLOBIN: 10.8 G/DL (ref 12–16)
HEMOGLOBIN: 10.9 G/DL (ref 12–16)
HEMOGLOBIN: 11 G/DL (ref 12–16)
HEMOGLOBIN: 11 G/DL (ref 12–16)
HEMOGLOBIN: 11.1 G/DL (ref 12–16)
HEMOGLOBIN: 11.3 G/DL (ref 12–16)
HEMOGLOBIN: 11.4 G/DL (ref 12–16)
HEMOGLOBIN: 11.6 G/DL (ref 12–16)
HEMOGLOBIN: 11.6 G/DL (ref 12–16)
HEMOGLOBIN: 11.8 G/DL (ref 12–16)
HEMOGLOBIN: 11.9 GM/DL (ref 12–16)
HEMOGLOBIN: 12 G/DL (ref 12–16)
HEMOGLOBIN: 7 G/DL (ref 12–16)
HEMOGLOBIN: 7.3 G/DL (ref 12–16)
HEMOGLOBIN: 7.4 G/DL (ref 12–16)
HEMOGLOBIN: 7.7 G/DL (ref 12–16)
HEMOGLOBIN: 8.2 G/DL (ref 12–16)
HEMOGLOBIN: 8.7 G/DL (ref 12–16)
HEMOGLOBIN: 8.8 G/DL (ref 12–16)
HEMOGLOBIN: 9 G/DL (ref 12–16)
HEMOGLOBIN: 9.1 G/DL (ref 12–16)
HEMOGLOBIN: 9.3 G/DL (ref 12–16)
HEMOGLOBIN: 9.6 G/DL (ref 12–16)
HEMOGLOBIN: 9.6 G/DL (ref 12–16)
HEMOGLOBIN: 9.7 G/DL (ref 12–16)
HEMOGLOBIN: 9.8 G/DL (ref 12–16)
HEMOGLOBIN: 9.9 G/DL (ref 12–16)
HEMOGLOBIN: 9.9 G/DL (ref 12–16)
HEPARIN INDUCED PLATELET ANTIBODY: NEGATIVE
HEPATITIS B CORE IGM ANTIBODY: NORMAL
HEPATITIS B CORE TOTAL ANTIBODY: NEGATIVE
HEPATITIS B SURFACE ANTIGEN INTERPRETATION: NORMAL
HEPATITIS C ANTIBODY INTERPRETATION: NORMAL
HYPOCHROMIA: ABNORMAL
HYPOCHROMIA: ABNORMAL
INR BLD: 1.17 (ref 0.86–1.14)
INR BLD: 1.22 (ref 0.86–1.14)
INR BLD: 1.22 (ref 0.86–1.14)
INR BLD: 1.27 (ref 0.86–1.14)
INR BLD: 1.28 (ref 0.86–1.14)
INR BLD: 1.32 (ref 0.86–1.14)
INR BLD: 1.45 (ref 0.86–1.14)
INR BLD: 1.46 (ref 0.86–1.14)
INR BLD: 1.52 (ref 0.86–1.14)
INR BLD: 1.53 (ref 0.86–1.14)
INR BLD: 1.62 (ref 0.86–1.14)
INR BLD: 1.62 (ref 0.86–1.14)
INR BLD: 1.64 (ref 0.86–1.14)
INR BLD: 1.64 (ref 0.86–1.14)
INR BLD: 1.65 (ref 0.86–1.14)
INR BLD: 1.66 (ref 0.86–1.14)
INR BLD: 1.69 (ref 0.86–1.14)
INR BLD: 1.71 (ref 0.86–1.14)
INR BLD: 1.71 (ref 0.86–1.14)
INR BLD: 1.72 (ref 0.86–1.14)
INR BLD: 1.72 (ref 0.86–1.14)
INR BLD: 1.77 (ref 0.86–1.14)
INR BLD: 1.78 (ref 0.86–1.14)
INR BLD: 1.84 (ref 0.86–1.14)
INR BLD: 1.85 (ref 0.86–1.14)
INR BLD: 1.91 (ref 0.86–1.14)
INR BLD: 1.94 (ref 0.86–1.14)
INR BLD: 1.95 (ref 0.86–1.14)
INR BLD: 1.95 (ref 0.86–1.14)
INR BLD: 1.99 (ref 0.86–1.14)
INR BLD: 1.99 (ref 0.86–1.14)
INR BLD: 2.01 (ref 0.86–1.14)
INR BLD: 2.05 (ref 0.86–1.14)
INR BLD: 2.06 (ref 0.86–1.14)
INR BLD: 2.08 (ref 0.86–1.14)
INR BLD: 2.1 (ref 0.86–1.14)
INR BLD: 2.15 (ref 0.86–1.14)
INR BLD: 2.16 (ref 0.86–1.14)
INR BLD: 2.19 (ref 0.86–1.14)
INR BLD: 2.21 (ref 0.86–1.14)
INR BLD: 2.21 (ref 0.86–1.14)
INR BLD: 2.27 (ref 0.86–1.14)
INR BLD: 2.28 (ref 0.86–1.14)
INR BLD: 2.28 (ref 0.86–1.14)
INR BLD: 2.35 (ref 0.86–1.14)
INR BLD: 2.36 (ref 0.86–1.14)
INR BLD: 2.41 (ref 0.86–1.14)
INR BLD: 2.41 (ref 0.86–1.14)
INR BLD: 2.43 (ref 0.86–1.14)
INR BLD: 2.43 (ref 0.86–1.14)
INR BLD: 2.44 (ref 0.86–1.14)
INR BLD: 2.45 (ref 0.86–1.14)
INR BLD: 2.52 (ref 0.86–1.14)
INR BLD: 2.55 (ref 0.86–1.14)
INR BLD: 2.6 (ref 0.86–1.14)
INR BLD: 2.61 (ref 0.86–1.14)
INR BLD: 2.64 (ref 0.86–1.14)
INR BLD: 2.85 (ref 0.86–1.14)
INR BLD: 3.05 (ref 0.86–1.14)
INR BLD: 3.15 (ref 0.86–1.14)
INR BLD: 3.16 (ref 0.86–1.14)
INR BLD: 3.24 (ref 0.86–1.14)
INR BLD: 3.25 (ref 0.86–1.14)
INR BLD: 3.4 (ref 0.86–1.14)
INR BLD: 3.46 (ref 0.86–1.14)
INR BLD: 3.46 (ref 0.86–1.14)
INR BLD: 3.87 (ref 0.86–1.14)
INR BLD: 3.99 (ref 0.86–1.14)
INR BLD: 4.61 (ref 0.86–1.14)
INTERPRETATION: NEGATIVE
KETONES, URINE: ABNORMAL MG/DL
KETONES, URINE: NEGATIVE MG/DL
LACTATE: 4.86 MMOL/L (ref 0.4–2)
LACTIC ACID, SEPSIS: 0.7 MMOL/L (ref 0.4–1.9)
LACTIC ACID, SEPSIS: 0.8 MMOL/L (ref 0.4–1.9)
LACTIC ACID: 1.3 MMOL/L (ref 0.4–2)
LACTIC ACID: 10.3 MMOL/L (ref 0.4–2)
LACTIC ACID: 13.2 MMOL/L (ref 0.4–2)
LACTIC ACID: 3 MMOL/L (ref 0.4–2)
LACTIC ACID: 8.9 MMOL/L (ref 0.4–2)
LEUKOCYTE ESTERASE, URINE: ABNORMAL
LEUKOCYTE ESTERASE, URINE: NEGATIVE
LIPASE: 13 U/L (ref 13–60)
LIPASE: 16 U/L (ref 13–60)
LIPASE: 292 U/L (ref 13–60)
LIPASE: 4 U/L (ref 13–60)
LIPASE: 4 U/L (ref 13–60)
LV EF: 56 %
LVEF MODALITY: NORMAL
LYMPHOCYTES ABSOLUTE: 0.3 K/UL (ref 1–5.1)
LYMPHOCYTES ABSOLUTE: 0.3 K/UL (ref 1–5.1)
LYMPHOCYTES ABSOLUTE: 0.4 K/UL (ref 1–5.1)
LYMPHOCYTES ABSOLUTE: 0.4 K/UL (ref 1–5.1)
LYMPHOCYTES ABSOLUTE: 0.7 K/UL (ref 1–5.1)
LYMPHOCYTES ABSOLUTE: 0.8 K/UL (ref 1–5.1)
LYMPHOCYTES ABSOLUTE: 0.8 K/UL (ref 1–5.1)
LYMPHOCYTES ABSOLUTE: 1 K/UL (ref 1–5.1)
LYMPHOCYTES ABSOLUTE: 1.1 K/UL (ref 1–5.1)
LYMPHOCYTES ABSOLUTE: 1.2 K/UL (ref 1–5.1)
LYMPHOCYTES ABSOLUTE: 1.3 K/UL (ref 1–5.1)
LYMPHOCYTES ABSOLUTE: 1.4 K/UL (ref 1–5.1)
LYMPHOCYTES ABSOLUTE: 1.7 K/UL (ref 1–5.1)
LYMPHOCYTES ABSOLUTE: 1.8 K/UL (ref 1–5.1)
LYMPHOCYTES ABSOLUTE: 2.4 K/UL (ref 1–5.1)
LYMPHOCYTES RELATIVE PERCENT: 10 %
LYMPHOCYTES RELATIVE PERCENT: 10.8 %
LYMPHOCYTES RELATIVE PERCENT: 11.5 %
LYMPHOCYTES RELATIVE PERCENT: 16.2 %
LYMPHOCYTES RELATIVE PERCENT: 18.8 %
LYMPHOCYTES RELATIVE PERCENT: 19.7 %
LYMPHOCYTES RELATIVE PERCENT: 2.7 %
LYMPHOCYTES RELATIVE PERCENT: 20.3 %
LYMPHOCYTES RELATIVE PERCENT: 21.6 %
LYMPHOCYTES RELATIVE PERCENT: 22.2 %
LYMPHOCYTES RELATIVE PERCENT: 23.4 %
LYMPHOCYTES RELATIVE PERCENT: 23.5 %
LYMPHOCYTES RELATIVE PERCENT: 25.8 %
LYMPHOCYTES RELATIVE PERCENT: 26.8 %
LYMPHOCYTES RELATIVE PERCENT: 3.4 %
LYMPHOCYTES RELATIVE PERCENT: 32.5 %
LYMPHOCYTES RELATIVE PERCENT: 4.3 %
LYMPHOCYTES RELATIVE PERCENT: 5 %
LYMPHOCYTES RELATIVE PERCENT: 5.7 %
LYMPHOCYTES RELATIVE PERCENT: 8 %
LYMPHOCYTES RELATIVE PERCENT: 9.7 %
Lab: NORMAL
MACROCYTES: ABNORMAL
MACROCYTES: ABNORMAL
MAGNESIUM: 1.7 MG/DL (ref 1.8–2.4)
MAGNESIUM: 1.9 MG/DL (ref 1.8–2.4)
MAGNESIUM: 2 MG/DL (ref 1.8–2.4)
MAGNESIUM: 2.1 MG/DL (ref 1.8–2.4)
MAGNESIUM: 2.1 MG/DL (ref 1.8–2.4)
MCH RBC QN AUTO: 25.4 PG (ref 26–34)
MCH RBC QN AUTO: 25.8 PG (ref 26–34)
MCH RBC QN AUTO: 25.8 PG (ref 26–34)
MCH RBC QN AUTO: 25.9 PG (ref 26–34)
MCH RBC QN AUTO: 26.1 PG (ref 26–34)
MCH RBC QN AUTO: 26.1 PG (ref 26–34)
MCH RBC QN AUTO: 26.4 PG (ref 26–34)
MCH RBC QN AUTO: 26.7 PG (ref 26–34)
MCH RBC QN AUTO: 26.7 PG (ref 26–34)
MCH RBC QN AUTO: 26.8 PG (ref 26–34)
MCH RBC QN AUTO: 26.8 PG (ref 26–34)
MCH RBC QN AUTO: 26.9 PG (ref 26–34)
MCH RBC QN AUTO: 26.9 PG (ref 26–34)
MCH RBC QN AUTO: 27 PG (ref 26–34)
MCH RBC QN AUTO: 27.1 PG (ref 26–34)
MCH RBC QN AUTO: 27.2 PG (ref 26–34)
MCH RBC QN AUTO: 27.3 PG (ref 26–34)
MCH RBC QN AUTO: 27.4 PG (ref 26–34)
MCH RBC QN AUTO: 27.4 PG (ref 26–34)
MCH RBC QN AUTO: 27.5 PG (ref 26–34)
MCH RBC QN AUTO: 27.6 PG (ref 26–34)
MCH RBC QN AUTO: 27.7 PG (ref 26–34)
MCH RBC QN AUTO: 27.7 PG (ref 26–34)
MCH RBC QN AUTO: 27.8 PG (ref 26–34)
MCH RBC QN AUTO: 27.9 PG (ref 26–34)
MCH RBC QN AUTO: 28 PG (ref 26–34)
MCH RBC QN AUTO: 28.1 PG (ref 26–34)
MCH RBC QN AUTO: 28.2 PG (ref 26–34)
MCH RBC QN AUTO: 28.3 PG (ref 26–34)
MCH RBC QN AUTO: 28.4 PG (ref 26–34)
MCH RBC QN AUTO: 28.4 PG (ref 26–34)
MCH RBC QN AUTO: 28.7 PG (ref 26–34)
MCH RBC QN AUTO: 29.1 PG (ref 26–34)
MCH RBC QN AUTO: 30.3 PG (ref 26–34)
MCHC RBC AUTO-ENTMCNC: 30.3 G/DL (ref 31–36)
MCHC RBC AUTO-ENTMCNC: 30.7 G/DL (ref 31–36)
MCHC RBC AUTO-ENTMCNC: 30.7 G/DL (ref 31–36)
MCHC RBC AUTO-ENTMCNC: 30.9 G/DL (ref 31–36)
MCHC RBC AUTO-ENTMCNC: 30.9 G/DL (ref 31–36)
MCHC RBC AUTO-ENTMCNC: 31.1 G/DL (ref 31–36)
MCHC RBC AUTO-ENTMCNC: 31.2 G/DL (ref 31–36)
MCHC RBC AUTO-ENTMCNC: 31.2 G/DL (ref 31–36)
MCHC RBC AUTO-ENTMCNC: 31.3 G/DL (ref 31–36)
MCHC RBC AUTO-ENTMCNC: 31.3 G/DL (ref 31–36)
MCHC RBC AUTO-ENTMCNC: 31.4 G/DL (ref 31–36)
MCHC RBC AUTO-ENTMCNC: 31.4 G/DL (ref 31–36)
MCHC RBC AUTO-ENTMCNC: 31.5 G/DL (ref 31–36)
MCHC RBC AUTO-ENTMCNC: 31.5 G/DL (ref 31–36)
MCHC RBC AUTO-ENTMCNC: 31.6 G/DL (ref 31–36)
MCHC RBC AUTO-ENTMCNC: 31.6 G/DL (ref 31–36)
MCHC RBC AUTO-ENTMCNC: 31.7 G/DL (ref 31–36)
MCHC RBC AUTO-ENTMCNC: 31.7 G/DL (ref 31–36)
MCHC RBC AUTO-ENTMCNC: 31.8 G/DL (ref 31–36)
MCHC RBC AUTO-ENTMCNC: 31.9 G/DL (ref 31–36)
MCHC RBC AUTO-ENTMCNC: 32 G/DL (ref 31–36)
MCHC RBC AUTO-ENTMCNC: 32 G/DL (ref 31–36)
MCHC RBC AUTO-ENTMCNC: 32.1 G/DL (ref 31–36)
MCHC RBC AUTO-ENTMCNC: 32.2 G/DL (ref 31–36)
MCHC RBC AUTO-ENTMCNC: 32.2 G/DL (ref 31–36)
MCHC RBC AUTO-ENTMCNC: 32.3 G/DL (ref 31–36)
MCHC RBC AUTO-ENTMCNC: 32.4 G/DL (ref 31–36)
MCHC RBC AUTO-ENTMCNC: 32.5 G/DL (ref 31–36)
MCHC RBC AUTO-ENTMCNC: 32.5 G/DL (ref 31–36)
MCHC RBC AUTO-ENTMCNC: 32.6 G/DL (ref 31–36)
MCHC RBC AUTO-ENTMCNC: 32.8 G/DL (ref 31–36)
MCHC RBC AUTO-ENTMCNC: 32.8 G/DL (ref 31–36)
MCHC RBC AUTO-ENTMCNC: 32.9 G/DL (ref 31–36)
MCHC RBC AUTO-ENTMCNC: 32.9 G/DL (ref 31–36)
MCHC RBC AUTO-ENTMCNC: 33 G/DL (ref 31–36)
MCHC RBC AUTO-ENTMCNC: 33.1 G/DL (ref 31–36)
MCV RBC AUTO: 80.6 FL (ref 80–100)
MCV RBC AUTO: 81.3 FL (ref 80–100)
MCV RBC AUTO: 81.5 FL (ref 80–100)
MCV RBC AUTO: 81.5 FL (ref 80–100)
MCV RBC AUTO: 82.8 FL (ref 80–100)
MCV RBC AUTO: 82.9 FL (ref 80–100)
MCV RBC AUTO: 83.6 FL (ref 80–100)
MCV RBC AUTO: 83.6 FL (ref 80–100)
MCV RBC AUTO: 83.9 FL (ref 80–100)
MCV RBC AUTO: 83.9 FL (ref 80–100)
MCV RBC AUTO: 84 FL (ref 80–100)
MCV RBC AUTO: 84.1 FL (ref 80–100)
MCV RBC AUTO: 84.1 FL (ref 80–100)
MCV RBC AUTO: 84.3 FL (ref 80–100)
MCV RBC AUTO: 84.4 FL (ref 80–100)
MCV RBC AUTO: 84.5 FL (ref 80–100)
MCV RBC AUTO: 84.7 FL (ref 80–100)
MCV RBC AUTO: 84.8 FL (ref 80–100)
MCV RBC AUTO: 84.9 FL (ref 80–100)
MCV RBC AUTO: 85.2 FL (ref 80–100)
MCV RBC AUTO: 85.4 FL (ref 80–100)
MCV RBC AUTO: 85.6 FL (ref 80–100)
MCV RBC AUTO: 85.7 FL (ref 80–100)
MCV RBC AUTO: 85.7 FL (ref 80–100)
MCV RBC AUTO: 85.8 FL (ref 80–100)
MCV RBC AUTO: 86 FL (ref 80–100)
MCV RBC AUTO: 86.3 FL (ref 80–100)
MCV RBC AUTO: 89.6 FL (ref 80–100)
MCV RBC AUTO: 90.5 FL (ref 80–100)
MCV RBC AUTO: 90.5 FL (ref 80–100)
MCV RBC AUTO: 90.7 FL (ref 80–100)
MCV RBC AUTO: 90.9 FL (ref 80–100)
MCV RBC AUTO: 91.3 FL (ref 80–100)
MCV RBC AUTO: 91.7 FL (ref 80–100)
MCV RBC AUTO: 92.2 FL (ref 80–100)
MCV RBC AUTO: 92.9 FL (ref 80–100)
METAMYELOCYTES RELATIVE PERCENT: 2 %
METHEMOGLOBIN ARTERIAL: 0.4 %
METHEMOGLOBIN ARTERIAL: 0.5 %
METHEMOGLOBIN VENOUS: 0.3 %
METHEMOGLOBIN VENOUS: 0.4 %
MICROCYTES: ABNORMAL
MICROCYTES: ABNORMAL
MICROSCOPIC EXAMINATION: YES
MICROSCOPIC EXAMINATION: YES
MONOCYTES ABSOLUTE: 0.3 K/UL (ref 0–1.3)
MONOCYTES ABSOLUTE: 0.5 K/UL (ref 0–1.3)
MONOCYTES ABSOLUTE: 0.6 K/UL (ref 0–1.3)
MONOCYTES ABSOLUTE: 0.7 K/UL (ref 0–1.3)
MONOCYTES ABSOLUTE: 0.7 K/UL (ref 0–1.3)
MONOCYTES ABSOLUTE: 0.9 K/UL (ref 0–1.3)
MONOCYTES ABSOLUTE: 0.9 K/UL (ref 0–1.3)
MONOCYTES ABSOLUTE: 1 K/UL (ref 0–1.3)
MONOCYTES ABSOLUTE: 1.1 K/UL (ref 0–1.3)
MONOCYTES ABSOLUTE: 1.2 K/UL (ref 0–1.3)
MONOCYTES ABSOLUTE: 1.2 K/UL (ref 0–1.3)
MONOCYTES ABSOLUTE: 1.3 K/UL (ref 0–1.3)
MONOCYTES ABSOLUTE: 1.4 K/UL (ref 0–1.3)
MONOCYTES ABSOLUTE: 1.7 K/UL (ref 0–1.3)
MONOCYTES RELATIVE PERCENT: 10 %
MONOCYTES RELATIVE PERCENT: 11 %
MONOCYTES RELATIVE PERCENT: 11.7 %
MONOCYTES RELATIVE PERCENT: 12.1 %
MONOCYTES RELATIVE PERCENT: 12.3 %
MONOCYTES RELATIVE PERCENT: 12.5 %
MONOCYTES RELATIVE PERCENT: 12.8 %
MONOCYTES RELATIVE PERCENT: 13.6 %
MONOCYTES RELATIVE PERCENT: 15.1 %
MONOCYTES RELATIVE PERCENT: 15.9 %
MONOCYTES RELATIVE PERCENT: 19.1 %
MONOCYTES RELATIVE PERCENT: 19.7 %
MONOCYTES RELATIVE PERCENT: 20.7 %
MONOCYTES RELATIVE PERCENT: 20.8 %
MONOCYTES RELATIVE PERCENT: 3.3 %
MONOCYTES RELATIVE PERCENT: 4 %
MONOCYTES RELATIVE PERCENT: 6 %
MONOCYTES RELATIVE PERCENT: 6.9 %
MONOCYTES RELATIVE PERCENT: 7.6 %
MONOCYTES RELATIVE PERCENT: 8.8 %
MONOCYTES RELATIVE PERCENT: 9.5 %
MRSA SCREEN RT-PCR: NORMAL
NEUTROPHILS ABSOLUTE: 1.9 K/UL (ref 1.7–7.7)
NEUTROPHILS ABSOLUTE: 11.1 K/UL (ref 1.7–7.7)
NEUTROPHILS ABSOLUTE: 12.5 K/UL (ref 1.7–7.7)
NEUTROPHILS ABSOLUTE: 12.7 K/UL (ref 1.7–7.7)
NEUTROPHILS ABSOLUTE: 16.1 K/UL (ref 1.7–7.7)
NEUTROPHILS ABSOLUTE: 2.7 K/UL (ref 1.7–7.7)
NEUTROPHILS ABSOLUTE: 2.9 K/UL (ref 1.7–7.7)
NEUTROPHILS ABSOLUTE: 2.9 K/UL (ref 1.7–7.7)
NEUTROPHILS ABSOLUTE: 3 K/UL (ref 1.7–7.7)
NEUTROPHILS ABSOLUTE: 3.3 K/UL (ref 1.7–7.7)
NEUTROPHILS ABSOLUTE: 3.5 K/UL (ref 1.7–7.7)
NEUTROPHILS ABSOLUTE: 4.1 K/UL (ref 1.7–7.7)
NEUTROPHILS ABSOLUTE: 4.3 K/UL (ref 1.7–7.7)
NEUTROPHILS ABSOLUTE: 4.6 K/UL (ref 1.7–7.7)
NEUTROPHILS ABSOLUTE: 5.4 K/UL (ref 1.7–7.7)
NEUTROPHILS ABSOLUTE: 5.6 K/UL (ref 1.7–7.7)
NEUTROPHILS ABSOLUTE: 5.6 K/UL (ref 1.7–7.7)
NEUTROPHILS ABSOLUTE: 7 K/UL (ref 1.7–7.7)
NEUTROPHILS ABSOLUTE: 7.2 K/UL (ref 1.7–7.7)
NEUTROPHILS ABSOLUTE: 8.3 K/UL (ref 1.7–7.7)
NEUTROPHILS ABSOLUTE: 8.9 K/UL (ref 1.7–7.7)
NEUTROPHILS RELATIVE PERCENT: 38 %
NEUTROPHILS RELATIVE PERCENT: 43 %
NEUTROPHILS RELATIVE PERCENT: 47.9 %
NEUTROPHILS RELATIVE PERCENT: 51.7 %
NEUTROPHILS RELATIVE PERCENT: 55.9 %
NEUTROPHILS RELATIVE PERCENT: 57.2 %
NEUTROPHILS RELATIVE PERCENT: 59 %
NEUTROPHILS RELATIVE PERCENT: 59.3 %
NEUTROPHILS RELATIVE PERCENT: 59.8 %
NEUTROPHILS RELATIVE PERCENT: 63 %
NEUTROPHILS RELATIVE PERCENT: 64.7 %
NEUTROPHILS RELATIVE PERCENT: 66.4 %
NEUTROPHILS RELATIVE PERCENT: 67.3 %
NEUTROPHILS RELATIVE PERCENT: 67.5 %
NEUTROPHILS RELATIVE PERCENT: 69.1 %
NEUTROPHILS RELATIVE PERCENT: 75.9 %
NEUTROPHILS RELATIVE PERCENT: 80.7 %
NEUTROPHILS RELATIVE PERCENT: 81.4 %
NEUTROPHILS RELATIVE PERCENT: 85.9 %
NEUTROPHILS RELATIVE PERCENT: 87.8 %
NEUTROPHILS RELATIVE PERCENT: 92 %
NITRITE, URINE: NEGATIVE
NITRITE, URINE: POSITIVE
NUCLEATED RED BLOOD CELLS: 12 /100 WBC
NUCLEATED RED BLOOD CELLS: 12 /100 WBC
O2 CONTENT ARTERIAL: 15 ML/DL
O2 CONTENT ARTERIAL: 17 ML/DL
O2 CONTENT, VEN: 10 VOL %
O2 CONTENT, VEN: 16 VOL %
O2 SAT, ARTERIAL: 100 % (ref 93–100)
O2 SAT, ARTERIAL: 100 % (ref 93–100)
O2 SAT, ARTERIAL: 92.5 %
O2 SAT, ARTERIAL: 94.5 %
O2 SAT, VEN: 66 %
O2 SAT, VEN: 97 %
O2 THERAPY: ABNORMAL
OCCULT BLOOD SCREENING: NORMAL
ORGANISM: ABNORMAL
PARATHYROID HORMONE INTACT: 78.2 PG/ML (ref 14–72)
PCO2 ARTERIAL: 39.1 MMHG (ref 35–45)
PCO2 ARTERIAL: 40.5 MMHG (ref 35–45)
PCO2 ARTERIAL: 43.7 MM HG (ref 35–45)
PCO2 ARTERIAL: 55.6 MM HG (ref 35–45)
PCO2, VEN: 35 MMHG (ref 40–50)
PCO2, VEN: 48.8 MMHG (ref 40–50)
PDW BLD-RTO: 16.5 % (ref 12.4–15.4)
PDW BLD-RTO: 17.5 % (ref 12.4–15.4)
PDW BLD-RTO: 18.2 % (ref 12.4–15.4)
PDW BLD-RTO: 18.3 % (ref 12.4–15.4)
PDW BLD-RTO: 18.4 % (ref 12.4–15.4)
PDW BLD-RTO: 18.4 % (ref 12.4–15.4)
PDW BLD-RTO: 18.5 % (ref 12.4–15.4)
PDW BLD-RTO: 18.7 % (ref 12.4–15.4)
PDW BLD-RTO: 18.8 % (ref 12.4–15.4)
PDW BLD-RTO: 18.9 % (ref 12.4–15.4)
PDW BLD-RTO: 19.3 % (ref 12.4–15.4)
PDW BLD-RTO: 19.6 % (ref 12.4–15.4)
PDW BLD-RTO: 19.7 % (ref 12.4–15.4)
PDW BLD-RTO: 20.2 % (ref 12.4–15.4)
PDW BLD-RTO: 20.3 % (ref 12.4–15.4)
PDW BLD-RTO: 20.5 % (ref 12.4–15.4)
PDW BLD-RTO: 20.9 % (ref 12.4–15.4)
PDW BLD-RTO: 21 % (ref 12.4–15.4)
PDW BLD-RTO: 21.1 % (ref 12.4–15.4)
PDW BLD-RTO: 21.2 % (ref 12.4–15.4)
PDW BLD-RTO: 21.2 % (ref 12.4–15.4)
PDW BLD-RTO: 21.4 % (ref 12.4–15.4)
PDW BLD-RTO: 21.6 % (ref 12.4–15.4)
PDW BLD-RTO: 21.7 % (ref 12.4–15.4)
PDW BLD-RTO: 21.8 % (ref 12.4–15.4)
PDW BLD-RTO: 21.9 % (ref 12.4–15.4)
PDW BLD-RTO: 22 % (ref 12.4–15.4)
PDW BLD-RTO: 22.1 % (ref 12.4–15.4)
PDW BLD-RTO: 22.3 % (ref 12.4–15.4)
PDW BLD-RTO: 22.3 % (ref 12.4–15.4)
PDW BLD-RTO: 22.4 % (ref 12.4–15.4)
PDW BLD-RTO: 22.5 % (ref 12.4–15.4)
PDW BLD-RTO: 22.6 % (ref 12.4–15.4)
PDW BLD-RTO: 22.7 % (ref 12.4–15.4)
PDW BLD-RTO: 22.8 % (ref 12.4–15.4)
PDW BLD-RTO: 22.8 % (ref 12.4–15.4)
PDW BLD-RTO: 22.9 % (ref 12.4–15.4)
PDW BLD-RTO: 23 % (ref 12.4–15.4)
PERFORMED ON: ABNORMAL
PERFORMED ON: NORMAL
PH ARTERIAL: 7.16 (ref 7.35–7.45)
PH ARTERIAL: 7.19 (ref 7.35–7.45)
PH ARTERIAL: 7.19 (ref 7.35–7.45)
PH ARTERIAL: 7.27 (ref 7.35–7.45)
PH UA: 5 (ref 5–8)
PH UA: 5 (ref 5–8)
PH VENOUS: 7.21 (ref 7.35–7.45)
PH VENOUS: 7.25 (ref 7.35–7.45)
PH VENOUS: 7.29 (ref 7.35–7.45)
PH VENOUS: 7.3 (ref 7.35–7.45)
PH VENOUS: 7.39 (ref 7.35–7.45)
PHOSPHORUS: 2.2 MG/DL (ref 2.5–4.9)
PHOSPHORUS: 2.3 MG/DL (ref 2.5–4.9)
PHOSPHORUS: 2.5 MG/DL (ref 2.5–4.9)
PHOSPHORUS: 2.5 MG/DL (ref 2.5–4.9)
PHOSPHORUS: 2.7 MG/DL (ref 2.5–4.9)
PHOSPHORUS: 2.7 MG/DL (ref 2.5–4.9)
PHOSPHORUS: 2.8 MG/DL (ref 2.5–4.9)
PHOSPHORUS: 2.8 MG/DL (ref 2.5–4.9)
PHOSPHORUS: 3 MG/DL (ref 2.5–4.9)
PHOSPHORUS: 3.2 MG/DL (ref 2.5–4.9)
PHOSPHORUS: 3.3 MG/DL (ref 2.5–4.9)
PHOSPHORUS: 3.4 MG/DL (ref 2.5–4.9)
PHOSPHORUS: 3.4 MG/DL (ref 2.5–4.9)
PHOSPHORUS: 3.5 MG/DL (ref 2.5–4.9)
PHOSPHORUS: 3.7 MG/DL (ref 2.5–4.9)
PHOSPHORUS: 3.8 MG/DL (ref 2.5–4.9)
PHOSPHORUS: 4 MG/DL (ref 2.5–4.9)
PHOSPHORUS: 4.1 MG/DL (ref 2.5–4.9)
PHOSPHORUS: 4.2 MG/DL (ref 2.5–4.9)
PHOSPHORUS: 4.2 MG/DL (ref 2.5–4.9)
PHOSPHORUS: 4.3 MG/DL (ref 2.5–4.9)
PHOSPHORUS: 4.3 MG/DL (ref 2.5–4.9)
PHOSPHORUS: 4.4 MG/DL (ref 2.5–4.9)
PHOSPHORUS: 4.5 MG/DL (ref 2.5–4.9)
PHOSPHORUS: 4.6 MG/DL (ref 2.5–4.9)
PHOSPHORUS: 4.8 MG/DL (ref 2.5–4.9)
PHOSPHORUS: 4.8 MG/DL (ref 2.5–4.9)
PHOSPHORUS: 5 MG/DL (ref 2.5–4.9)
PHOSPHORUS: 5.1 MG/DL (ref 2.5–4.9)
PHOSPHORUS: 5.2 MG/DL (ref 2.5–4.9)
PHOSPHORUS: 5.4 MG/DL (ref 2.5–4.9)
PHOSPHORUS: 5.4 MG/DL (ref 2.5–4.9)
PHOSPHORUS: 5.8 MG/DL (ref 2.5–4.9)
PHOSPHORUS: 6 MG/DL (ref 2.5–4.9)
PHOSPHORUS: 6.1 MG/DL (ref 2.5–4.9)
PHOSPHORUS: 6.3 MG/DL (ref 2.5–4.9)
PLATELET # BLD: 12 K/UL (ref 135–450)
PLATELET # BLD: 130 K/UL (ref 135–450)
PLATELET # BLD: 143 K/UL (ref 135–450)
PLATELET # BLD: 149 K/UL (ref 135–450)
PLATELET # BLD: 152 K/UL (ref 135–450)
PLATELET # BLD: 165 K/UL (ref 135–450)
PLATELET # BLD: 165 K/UL (ref 135–450)
PLATELET # BLD: 167 K/UL (ref 135–450)
PLATELET # BLD: 170 K/UL (ref 135–450)
PLATELET # BLD: 171 K/UL (ref 135–450)
PLATELET # BLD: 175 K/UL (ref 135–450)
PLATELET # BLD: 177 K/UL (ref 135–450)
PLATELET # BLD: 179 K/UL (ref 135–450)
PLATELET # BLD: 181 K/UL (ref 135–450)
PLATELET # BLD: 189 K/UL (ref 135–450)
PLATELET # BLD: 191 K/UL (ref 135–450)
PLATELET # BLD: 203 K/UL (ref 135–450)
PLATELET # BLD: 204 K/UL (ref 135–450)
PLATELET # BLD: 213 K/UL (ref 135–450)
PLATELET # BLD: 228 K/UL (ref 135–450)
PLATELET # BLD: 231 K/UL (ref 135–450)
PLATELET # BLD: 259 K/UL (ref 135–450)
PLATELET # BLD: 266 K/UL (ref 135–450)
PLATELET # BLD: 269 K/UL (ref 135–450)
PLATELET # BLD: 270 K/UL (ref 135–450)
PLATELET # BLD: 273 K/UL (ref 135–450)
PLATELET # BLD: 279 K/UL (ref 135–450)
PLATELET # BLD: 280 K/UL (ref 135–450)
PLATELET # BLD: 280 K/UL (ref 135–450)
PLATELET # BLD: 282 K/UL (ref 135–450)
PLATELET # BLD: 283 K/UL (ref 135–450)
PLATELET # BLD: 296 K/UL (ref 135–450)
PLATELET # BLD: 308 K/UL (ref 135–450)
PLATELET # BLD: 324 K/UL (ref 135–450)
PLATELET # BLD: 328 K/UL (ref 135–450)
PLATELET # BLD: 329 K/UL (ref 135–450)
PLATELET # BLD: 341 K/UL (ref 135–450)
PLATELET # BLD: 355 K/UL (ref 135–450)
PLATELET # BLD: 380 K/UL (ref 135–450)
PLATELET # BLD: 380 K/UL (ref 135–450)
PLATELET # BLD: 388 K/UL (ref 135–450)
PLATELET # BLD: 390 K/UL (ref 135–450)
PLATELET # BLD: 397 K/UL (ref 135–450)
PLATELET # BLD: 404 K/UL (ref 135–450)
PLATELET # BLD: 63 K/UL (ref 135–450)
PLATELET SLIDE REVIEW: ABNORMAL
PLATELET SLIDE REVIEW: ABNORMAL
PLATELET SLIDE REVIEW: ADEQUATE
PLATELET SLIDE REVIEW: ADEQUATE
PMV BLD AUTO: 10.3 FL (ref 5–10.5)
PMV BLD AUTO: 12.6 FL (ref 5–10.5)
PMV BLD AUTO: 7.2 FL (ref 5–10.5)
PMV BLD AUTO: 7.3 FL (ref 5–10.5)
PMV BLD AUTO: 7.4 FL (ref 5–10.5)
PMV BLD AUTO: 7.5 FL (ref 5–10.5)
PMV BLD AUTO: 7.6 FL (ref 5–10.5)
PMV BLD AUTO: 7.7 FL (ref 5–10.5)
PMV BLD AUTO: 7.8 FL (ref 5–10.5)
PMV BLD AUTO: 7.9 FL (ref 5–10.5)
PMV BLD AUTO: 8 FL (ref 5–10.5)
PMV BLD AUTO: 8 FL (ref 5–10.5)
PMV BLD AUTO: 8.1 FL (ref 5–10.5)
PMV BLD AUTO: 8.2 FL (ref 5–10.5)
PMV BLD AUTO: 8.2 FL (ref 5–10.5)
PMV BLD AUTO: 8.3 FL (ref 5–10.5)
PMV BLD AUTO: 8.5 FL (ref 5–10.5)
PMV BLD AUTO: 9.2 FL (ref 5–10.5)
PO2 ARTERIAL: 234.2 MM HG (ref 75–108)
PO2 ARTERIAL: 368.4 MM HG (ref 75–108)
PO2 ARTERIAL: 77.1 MMHG (ref 75–108)
PO2 ARTERIAL: 82.7 MMHG (ref 75–108)
PO2, VEN: 37.9 MMHG (ref 25–40)
PO2, VEN: 83.8 MMHG (ref 25–40)
POC HEMATOCRIT: 35 % (ref 36–48)
POC POTASSIUM: 3.6 MMOL/L (ref 3.5–5.1)
POC SAMPLE TYPE: ABNORMAL
POC SAMPLE TYPE: ABNORMAL
POC SODIUM: 132 MMOL/L (ref 136–145)
POIKILOCYTES: ABNORMAL
POIKILOCYTES: ABNORMAL
POLYCHROMASIA: ABNORMAL
POTASSIUM REFLEX MAGNESIUM: 3.3 MMOL/L (ref 3.5–5.1)
POTASSIUM REFLEX MAGNESIUM: 3.4 MMOL/L (ref 3.5–5.1)
POTASSIUM REFLEX MAGNESIUM: 3.9 MMOL/L (ref 3.5–5.1)
POTASSIUM REFLEX MAGNESIUM: 4 MMOL/L (ref 3.5–5.1)
POTASSIUM REFLEX MAGNESIUM: 4 MMOL/L (ref 3.5–5.1)
POTASSIUM REFLEX MAGNESIUM: 4.1 MMOL/L (ref 3.5–5.1)
POTASSIUM REFLEX MAGNESIUM: 4.2 MMOL/L (ref 3.5–5.1)
POTASSIUM REFLEX MAGNESIUM: 4.3 MMOL/L (ref 3.5–5.1)
POTASSIUM REFLEX MAGNESIUM: 4.5 MMOL/L (ref 3.5–5.1)
POTASSIUM REFLEX MAGNESIUM: 4.6 MMOL/L (ref 3.5–5.1)
POTASSIUM REFLEX MAGNESIUM: 4.7 MMOL/L (ref 3.5–5.1)
POTASSIUM REFLEX MAGNESIUM: 4.8 MMOL/L (ref 3.5–5.1)
POTASSIUM REFLEX MAGNESIUM: 4.8 MMOL/L (ref 3.5–5.1)
POTASSIUM REFLEX MAGNESIUM: 5 MMOL/L (ref 3.5–5.1)
POTASSIUM REFLEX MAGNESIUM: 5 MMOL/L (ref 3.5–5.1)
POTASSIUM SERPL-SCNC: 3.2 MMOL/L (ref 3.5–5.1)
POTASSIUM SERPL-SCNC: 3.6 MMOL/L (ref 3.5–5.1)
POTASSIUM SERPL-SCNC: 3.6 MMOL/L (ref 3.5–5.1)
POTASSIUM SERPL-SCNC: 3.7 MMOL/L (ref 3.5–5.1)
POTASSIUM SERPL-SCNC: 3.8 MMOL/L (ref 3.5–5.1)
POTASSIUM SERPL-SCNC: 3.8 MMOL/L (ref 3.5–5.1)
POTASSIUM SERPL-SCNC: 3.9 MMOL/L (ref 3.5–5.1)
POTASSIUM SERPL-SCNC: 4 MMOL/L (ref 3.5–5.1)
POTASSIUM SERPL-SCNC: 4.1 MMOL/L (ref 3.5–5.1)
POTASSIUM SERPL-SCNC: 4.1 MMOL/L (ref 3.5–5.1)
POTASSIUM SERPL-SCNC: 4.2 MMOL/L (ref 3.5–5.1)
POTASSIUM SERPL-SCNC: 4.3 MMOL/L (ref 3.5–5.1)
POTASSIUM SERPL-SCNC: 4.3 MMOL/L (ref 3.5–5.1)
POTASSIUM SERPL-SCNC: 4.4 MMOL/L (ref 3.5–5.1)
POTASSIUM SERPL-SCNC: 4.5 MMOL/L (ref 3.5–5.1)
POTASSIUM SERPL-SCNC: 4.6 MMOL/L (ref 3.5–5.1)
POTASSIUM SERPL-SCNC: 4.6 MMOL/L (ref 3.5–5.1)
POTASSIUM SERPL-SCNC: 4.7 MMOL/L (ref 3.5–5.1)
POTASSIUM SERPL-SCNC: 4.8 MMOL/L (ref 3.5–5.1)
POTASSIUM SERPL-SCNC: 4.9 MMOL/L (ref 3.5–5.1)
POTASSIUM SERPL-SCNC: 5 MMOL/L (ref 3.5–5.1)
POTASSIUM SERPL-SCNC: 5.1 MMOL/L (ref 3.5–5.1)
POTASSIUM SERPL-SCNC: 5.1 MMOL/L (ref 3.5–5.1)
POTASSIUM SERPL-SCNC: 5.2 MMOL/L (ref 3.5–5.1)
POTASSIUM SERPL-SCNC: 5.3 MMOL/L (ref 3.5–5.1)
POTASSIUM SERPL-SCNC: 5.3 MMOL/L (ref 3.5–5.1)
POTASSIUM SERPL-SCNC: 5.4 MMOL/L (ref 3.5–5.1)
POTASSIUM SERPL-SCNC: 5.4 MMOL/L (ref 3.5–5.1)
POTASSIUM SERPL-SCNC: 5.6 MMOL/L (ref 3.5–5.1)
POTASSIUM SERPL-SCNC: 5.7 MMOL/L (ref 3.5–5.1)
POTASSIUM SERPL-SCNC: 5.8 MMOL/L (ref 3.5–5.1)
POTASSIUM SERPL-SCNC: 5.9 MMOL/L (ref 3.5–5.1)
PRO-BNP: ABNORMAL PG/ML (ref 0–449)
PROCALCITONIN: 1.23 NG/ML (ref 0–0.15)
PROCALCITONIN: 19.42 NG/ML (ref 0–0.15)
PROTEIN PROTEIN: 30 MG/DL
PROTEIN UA: ABNORMAL MG/DL
PROTEIN UA: ABNORMAL MG/DL
PROTEIN/CREAT RATIO: 0.7 MG/DL
PROTHROMBIN TIME: 13.6 SEC (ref 10–13.2)
PROTHROMBIN TIME: 14.2 SEC (ref 10–13.2)
PROTHROMBIN TIME: 14.2 SEC (ref 10–13.2)
PROTHROMBIN TIME: 14.8 SEC (ref 10–13.2)
PROTHROMBIN TIME: 14.9 SEC (ref 10–13.2)
PROTHROMBIN TIME: 15.4 SEC (ref 10–13.2)
PROTHROMBIN TIME: 16.6 SEC (ref 10–13.2)
PROTHROMBIN TIME: 17 SEC (ref 10–13.2)
PROTHROMBIN TIME: 17.7 SEC (ref 10–13.2)
PROTHROMBIN TIME: 17.8 SEC (ref 10–13.2)
PROTHROMBIN TIME: 18.9 SEC (ref 10–13.2)
PROTHROMBIN TIME: 18.9 SEC (ref 10–13.2)
PROTHROMBIN TIME: 19.1 SEC (ref 10–13.2)
PROTHROMBIN TIME: 19.1 SEC (ref 10–13.2)
PROTHROMBIN TIME: 19.2 SEC (ref 10–13.2)
PROTHROMBIN TIME: 19.3 SEC (ref 10–13.2)
PROTHROMBIN TIME: 19.7 SEC (ref 10–13.2)
PROTHROMBIN TIME: 19.9 SEC (ref 10–13.2)
PROTHROMBIN TIME: 20 SEC (ref 10–13.2)
PROTHROMBIN TIME: 20.1 SEC (ref 10–13.2)
PROTHROMBIN TIME: 20.1 SEC (ref 10–13.2)
PROTHROMBIN TIME: 20.6 SEC (ref 10–13.2)
PROTHROMBIN TIME: 20.8 SEC (ref 10–13.2)
PROTHROMBIN TIME: 21.5 SEC (ref 10–13.2)
PROTHROMBIN TIME: 21.6 SEC (ref 10–13.2)
PROTHROMBIN TIME: 22.3 SEC (ref 10–13.2)
PROTHROMBIN TIME: 22.7 SEC (ref 10–13.2)
PROTHROMBIN TIME: 22.8 SEC (ref 10–13.2)
PROTHROMBIN TIME: 22.8 SEC (ref 10–13.2)
PROTHROMBIN TIME: 23.2 SEC (ref 10–13.2)
PROTHROMBIN TIME: 23.2 SEC (ref 10–13.2)
PROTHROMBIN TIME: 23.5 SEC (ref 10–13.2)
PROTHROMBIN TIME: 23.9 SEC (ref 10–13.2)
PROTHROMBIN TIME: 24.1 SEC (ref 10–13.2)
PROTHROMBIN TIME: 24.3 SEC (ref 10–13.2)
PROTHROMBIN TIME: 24.5 SEC (ref 10–13.2)
PROTHROMBIN TIME: 25.1 SEC (ref 10–13.2)
PROTHROMBIN TIME: 25.2 SEC (ref 10–13.2)
PROTHROMBIN TIME: 25.6 SEC (ref 10–13.2)
PROTHROMBIN TIME: 25.8 SEC (ref 10–13.2)
PROTHROMBIN TIME: 25.8 SEC (ref 10–13.2)
PROTHROMBIN TIME: 26.6 SEC (ref 10–13.2)
PROTHROMBIN TIME: 26.7 SEC (ref 10–13.2)
PROTHROMBIN TIME: 26.7 SEC (ref 10–13.2)
PROTHROMBIN TIME: 27.5 SEC (ref 10–13.2)
PROTHROMBIN TIME: 27.6 SEC (ref 10–13.2)
PROTHROMBIN TIME: 28.2 SEC (ref 10–13.2)
PROTHROMBIN TIME: 28.2 SEC (ref 10–13.2)
PROTHROMBIN TIME: 28.4 SEC (ref 10–13.2)
PROTHROMBIN TIME: 28.5 SEC (ref 10–13.2)
PROTHROMBIN TIME: 28.6 SEC (ref 10–13.2)
PROTHROMBIN TIME: 28.7 SEC (ref 10–13.2)
PROTHROMBIN TIME: 29.5 SEC (ref 10–13.2)
PROTHROMBIN TIME: 29.9 SEC (ref 10–13.2)
PROTHROMBIN TIME: 30.4 SEC (ref 10–13.2)
PROTHROMBIN TIME: 30.6 SEC (ref 10–13.2)
PROTHROMBIN TIME: 30.9 SEC (ref 10–13.2)
PROTHROMBIN TIME: 33.4 SEC (ref 10–13.2)
PROTHROMBIN TIME: 35.8 SEC (ref 10–13.2)
PROTHROMBIN TIME: 37 SEC (ref 10–13.2)
PROTHROMBIN TIME: 37.1 SEC (ref 10–13.2)
PROTHROMBIN TIME: 38 SEC (ref 10–13.2)
PROTHROMBIN TIME: 38.2 SEC (ref 10–13.2)
PROTHROMBIN TIME: 39.9 SEC (ref 10–13.2)
PROTHROMBIN TIME: 40.6 SEC (ref 10–13.2)
PROTHROMBIN TIME: 40.7 SEC (ref 10–13.2)
PROTHROMBIN TIME: 45.5 SEC (ref 10–13.2)
PROTHROMBIN TIME: 46.9 SEC (ref 10–13.2)
PROTHROMBIN TIME: 54.3 SEC (ref 10–13.2)
RAPID INFLUENZA  B AGN: NEGATIVE
RAPID INFLUENZA A AGN: NEGATIVE
RBC # BLD: 2.75 M/UL (ref 4–5.2)
RBC # BLD: 2.81 M/UL (ref 4–5.2)
RBC # BLD: 2.82 M/UL (ref 4–5.2)
RBC # BLD: 3.01 M/UL (ref 4–5.2)
RBC # BLD: 3.01 M/UL (ref 4–5.2)
RBC # BLD: 3.19 M/UL (ref 4–5.2)
RBC # BLD: 3.24 M/UL (ref 4–5.2)
RBC # BLD: 3.33 M/UL (ref 4–5.2)
RBC # BLD: 3.39 M/UL (ref 4–5.2)
RBC # BLD: 3.4 M/UL (ref 4–5.2)
RBC # BLD: 3.42 M/UL (ref 4–5.2)
RBC # BLD: 3.48 M/UL (ref 4–5.2)
RBC # BLD: 3.49 M/UL (ref 4–5.2)
RBC # BLD: 3.52 M/UL (ref 4–5.2)
RBC # BLD: 3.52 M/UL (ref 4–5.2)
RBC # BLD: 3.53 M/UL (ref 4–5.2)
RBC # BLD: 3.53 M/UL (ref 4–5.2)
RBC # BLD: 3.54 M/UL (ref 4–5.2)
RBC # BLD: 3.56 M/UL (ref 4–5.2)
RBC # BLD: 3.57 M/UL (ref 4–5.2)
RBC # BLD: 3.57 M/UL (ref 4–5.2)
RBC # BLD: 3.6 M/UL (ref 4–5.2)
RBC # BLD: 3.65 M/UL (ref 4–5.2)
RBC # BLD: 3.67 M/UL (ref 4–5.2)
RBC # BLD: 3.72 M/UL (ref 4–5.2)
RBC # BLD: 3.74 M/UL (ref 4–5.2)
RBC # BLD: 3.83 M/UL (ref 4–5.2)
RBC # BLD: 3.84 M/UL (ref 4–5.2)
RBC # BLD: 3.85 M/UL (ref 4–5.2)
RBC # BLD: 3.86 M/UL (ref 4–5.2)
RBC # BLD: 3.95 M/UL (ref 4–5.2)
RBC # BLD: 4 M/UL (ref 4–5.2)
RBC # BLD: 4.03 M/UL (ref 4–5.2)
RBC # BLD: 4.03 M/UL (ref 4–5.2)
RBC # BLD: 4.04 M/UL (ref 4–5.2)
RBC # BLD: 4.04 M/UL (ref 4–5.2)
RBC # BLD: 4.05 M/UL (ref 4–5.2)
RBC # BLD: 4.06 M/UL (ref 4–5.2)
RBC # BLD: 4.1 M/UL (ref 4–5.2)
RBC # BLD: 4.16 M/UL (ref 4–5.2)
RBC # BLD: 4.16 M/UL (ref 4–5.2)
RBC # BLD: 4.22 M/UL (ref 4–5.2)
RBC # BLD: 4.23 M/UL (ref 4–5.2)
RBC # BLD: 4.27 M/UL (ref 4–5.2)
RBC # BLD: 4.27 M/UL (ref 4–5.2)
RBC UA: ABNORMAL /HPF (ref 0–2)
RBC UA: ABNORMAL /HPF (ref 0–4)
REPORT: NORMAL
RESPIRATORY PANEL PCR: NORMAL
SALMONELLA PCR: NOT DETECTED
SARS-COV-2, PCR: NOT DETECTED
SARS-COV-2: NOT DETECTED
SARS-COV-2: NOT DETECTED
SCHISTOCYTES: ABNORMAL
SCHISTOCYTES: ABNORMAL
SEDIMENTATION RATE, ERYTHROCYTE: 10 MM/HR (ref 0–30)
SHIGA TOXIN I: NOT DETECTED
SHIGA TOXIN II: NOT DETECTED
SLIDE REVIEW: ABNORMAL
SODIUM BLD-SCNC: 125 MMOL/L (ref 136–145)
SODIUM BLD-SCNC: 126 MMOL/L (ref 136–145)
SODIUM BLD-SCNC: 126 MMOL/L (ref 136–145)
SODIUM BLD-SCNC: 127 MMOL/L (ref 136–145)
SODIUM BLD-SCNC: 128 MMOL/L (ref 136–145)
SODIUM BLD-SCNC: 129 MMOL/L (ref 136–145)
SODIUM BLD-SCNC: 130 MMOL/L (ref 136–145)
SODIUM BLD-SCNC: 131 MMOL/L (ref 136–145)
SODIUM BLD-SCNC: 132 MMOL/L (ref 136–145)
SODIUM BLD-SCNC: 133 MMOL/L (ref 136–145)
SODIUM BLD-SCNC: 134 MMOL/L (ref 136–145)
SODIUM BLD-SCNC: 135 MMOL/L (ref 136–145)
SODIUM BLD-SCNC: 136 MMOL/L (ref 136–145)
SODIUM BLD-SCNC: 137 MMOL/L (ref 136–145)
SODIUM BLD-SCNC: 138 MMOL/L (ref 136–145)
SODIUM BLD-SCNC: 139 MMOL/L (ref 136–145)
SOURCE: NORMAL
SPECIFIC GRAVITY UA: 1.02 (ref 1–1.03)
SPECIFIC GRAVITY UA: >=1.03 (ref 1–1.03)
TARGET CELLS: ABNORMAL
TARGET CELLS: ABNORMAL
TCO2 ARTERIAL: 14.9 MMOL/L
TCO2 ARTERIAL: 16.5 MMOL/L
TCO2 ARTERIAL: 21 MMOL/L
TCO2 ARTERIAL: 23 MMOL/L
TCO2 CALC VENOUS: 22 MMOL/L
TCO2 CALC VENOUS: 25 MMOL/L
THIS TEST SENT TO: NORMAL
TOTAL PROTEIN: 4.3 G/DL (ref 6.4–8.2)
TOTAL PROTEIN: 5.8 G/DL (ref 6.4–8.2)
TOTAL PROTEIN: 5.9 G/DL (ref 6.4–8.2)
TOTAL PROTEIN: 6.2 G/DL (ref 6.4–8.2)
TOTAL PROTEIN: 6.3 G/DL (ref 6.4–8.2)
TOTAL PROTEIN: 6.3 G/DL (ref 6.4–8.2)
TOTAL PROTEIN: 6.5 G/DL (ref 6.4–8.2)
TOTAL PROTEIN: 6.6 G/DL (ref 6.4–8.2)
TOTAL PROTEIN: 6.7 G/DL (ref 6.4–8.2)
TOTAL PROTEIN: 6.8 G/DL (ref 6.4–8.2)
TOTAL PROTEIN: 6.9 G/DL (ref 6.4–8.2)
TOTAL PROTEIN: 6.9 G/DL (ref 6.4–8.2)
TOTAL PROTEIN: 7.1 G/DL (ref 6.4–8.2)
TOTAL PROTEIN: 7.1 G/DL (ref 6.4–8.2)
TOTAL PROTEIN: 7.2 G/DL (ref 6.4–8.2)
TOXIC GRANULATION: PRESENT
TROPONIN: 0.07 NG/ML
TROPONIN: 0.12 NG/ML
TROPONIN: 0.13 NG/ML
TROPONIN: 0.14 NG/ML
URINE CULTURE, ROUTINE: ABNORMAL
URINE TYPE: ABNORMAL
URINE TYPE: ABNORMAL
UROBILINOGEN, URINE: 0.2 E.U./DL
UROBILINOGEN, URINE: 0.2 E.U./DL
VANCOMYCIN RANDOM: 11.2 UG/ML
VANCOMYCIN RANDOM: 12.7 UG/ML
VANCOMYCIN RANDOM: 13.3 UG/ML
VANCOMYCIN RANDOM: 13.4 UG/ML
VANCOMYCIN RANDOM: 13.6 UG/ML
VANCOMYCIN RANDOM: 13.7 UG/ML
VANCOMYCIN RANDOM: 14.2 UG/ML
VANCOMYCIN RANDOM: 14.2 UG/ML
VANCOMYCIN RANDOM: 14.4 UG/ML
VANCOMYCIN RANDOM: 15.1 UG/ML
VANCOMYCIN RANDOM: 15.8 UG/ML
VANCOMYCIN RANDOM: 15.8 UG/ML
VANCOMYCIN RANDOM: 16 UG/ML
VANCOMYCIN RANDOM: 16.9 UG/ML
VANCOMYCIN RANDOM: 17 UG/ML
VANCOMYCIN RANDOM: 17.2 UG/ML
VANCOMYCIN RANDOM: 17.6 UG/ML
VANCOMYCIN RANDOM: 17.7 UG/ML
VANCOMYCIN RANDOM: 17.7 UG/ML
VANCOMYCIN RANDOM: 18.8 UG/ML
VANCOMYCIN RANDOM: 19.3 UG/ML
VANCOMYCIN RANDOM: 21 UG/ML
VANCOMYCIN RANDOM: 22 UG/ML
VANCOMYCIN RANDOM: 9.3 UG/ML
VANCOMYCIN TROUGH: 14.3 UG/ML (ref 10–20)
VANCOMYCIN TROUGH: 17.1 UG/ML (ref 10–20)
WBC # BLD: 10.1 K/UL (ref 4–11)
WBC # BLD: 10.2 K/UL (ref 4–11)
WBC # BLD: 10.3 K/UL (ref 4–11)
WBC # BLD: 10.5 K/UL (ref 4–11)
WBC # BLD: 11.7 K/UL (ref 4–11)
WBC # BLD: 12.9 K/UL (ref 4–11)
WBC # BLD: 13.3 K/UL (ref 4–11)
WBC # BLD: 13.6 K/UL (ref 4–11)
WBC # BLD: 15.3 K/UL (ref 4–11)
WBC # BLD: 15.9 K/UL (ref 4–11)
WBC # BLD: 19.6 K/UL (ref 4–11)
WBC # BLD: 3.6 K/UL (ref 4–11)
WBC # BLD: 3.9 K/UL (ref 4–11)
WBC # BLD: 4.4 K/UL (ref 4–11)
WBC # BLD: 4.6 K/UL (ref 4–11)
WBC # BLD: 5.1 K/UL (ref 4–11)
WBC # BLD: 5.2 K/UL (ref 4–11)
WBC # BLD: 5.3 K/UL (ref 4–11)
WBC # BLD: 5.5 K/UL (ref 4–11)
WBC # BLD: 5.5 K/UL (ref 4–11)
WBC # BLD: 5.6 K/UL (ref 4–11)
WBC # BLD: 5.7 K/UL (ref 4–11)
WBC # BLD: 5.8 K/UL (ref 4–11)
WBC # BLD: 6 K/UL (ref 4–11)
WBC # BLD: 6.1 K/UL (ref 4–11)
WBC # BLD: 6.4 K/UL (ref 4–11)
WBC # BLD: 6.5 K/UL (ref 4–11)
WBC # BLD: 6.7 K/UL (ref 4–11)
WBC # BLD: 6.8 K/UL (ref 4–11)
WBC # BLD: 6.8 K/UL (ref 4–11)
WBC # BLD: 6.9 K/UL (ref 4–11)
WBC # BLD: 7.2 K/UL (ref 4–11)
WBC # BLD: 7.3 K/UL (ref 4–11)
WBC # BLD: 7.3 K/UL (ref 4–11)
WBC # BLD: 7.4 K/UL (ref 4–11)
WBC # BLD: 7.5 K/UL (ref 4–11)
WBC # BLD: 8 K/UL (ref 4–11)
WBC # BLD: 8.2 K/UL (ref 4–11)
WBC # BLD: 8.3 K/UL (ref 4–11)
WBC # BLD: 8.6 K/UL (ref 4–11)
WBC # BLD: 8.7 K/UL (ref 4–11)
WBC # BLD: 8.7 K/UL (ref 4–11)
WBC # BLD: 9 K/UL (ref 4–11)
WBC # BLD: 9.2 K/UL (ref 4–11)
WBC # BLD: 9.8 K/UL (ref 4–11)
WBC UA: ABNORMAL /HPF (ref 0–5)
WBC UA: ABNORMAL /HPF (ref 0–5)
WOUND/ABSCESS: ABNORMAL
WOUND/ABSCESS: ABNORMAL
YEAST: PRESENT /HPF

## 2020-01-01 PROCEDURE — 97110 THERAPEUTIC EXERCISES: CPT

## 2020-01-01 PROCEDURE — 80069 RENAL FUNCTION PANEL: CPT

## 2020-01-01 PROCEDURE — 29581 APPL MULTLAYER CMPRN SYS LEG: CPT

## 2020-01-01 PROCEDURE — 6370000000 HC RX 637 (ALT 250 FOR IP): Performed by: NURSE PRACTITIONER

## 2020-01-01 PROCEDURE — 82947 ASSAY GLUCOSE BLOOD QUANT: CPT

## 2020-01-01 PROCEDURE — 97535 SELF CARE MNGMENT TRAINING: CPT

## 2020-01-01 PROCEDURE — 87506 IADNA-DNA/RNA PROBE TQ 6-11: CPT

## 2020-01-01 PROCEDURE — 97530 THERAPEUTIC ACTIVITIES: CPT

## 2020-01-01 PROCEDURE — 6370000000 HC RX 637 (ALT 250 FOR IP): Performed by: INTERNAL MEDICINE

## 2020-01-01 PROCEDURE — 97129 THER IVNTJ 1ST 15 MIN: CPT

## 2020-01-01 PROCEDURE — 2700000000 HC OXYGEN THERAPY PER DAY

## 2020-01-01 PROCEDURE — 6370000000 HC RX 637 (ALT 250 FOR IP): Performed by: FAMILY MEDICINE

## 2020-01-01 PROCEDURE — 97130 THER IVNTJ EA ADDL 15 MIN: CPT

## 2020-01-01 PROCEDURE — 36415 COLL VENOUS BLD VENIPUNCTURE: CPT

## 2020-01-01 PROCEDURE — 6370000000 HC RX 637 (ALT 250 FOR IP): Performed by: PHYSICAL MEDICINE & REHABILITATION

## 2020-01-01 PROCEDURE — 2500000003 HC RX 250 WO HCPCS: Performed by: INTERNAL MEDICINE

## 2020-01-01 PROCEDURE — 3609010800 HC BRONCHOSCOPY ALVEOLAR LAVAGE: Performed by: INTERNAL MEDICINE

## 2020-01-01 PROCEDURE — 85610 PROTHROMBIN TIME: CPT

## 2020-01-01 PROCEDURE — 1280000000 HC REHAB R&B

## 2020-01-01 PROCEDURE — 99231 SBSQ HOSP IP/OBS SF/LOW 25: CPT | Performed by: SURGERY

## 2020-01-01 PROCEDURE — 1200000000 HC SEMI PRIVATE

## 2020-01-01 PROCEDURE — 80202 ASSAY OF VANCOMYCIN: CPT

## 2020-01-01 PROCEDURE — 85025 COMPLETE CBC W/AUTO DIFF WBC: CPT

## 2020-01-01 PROCEDURE — 97116 GAIT TRAINING THERAPY: CPT

## 2020-01-01 PROCEDURE — 02PAX3Z REMOVAL OF INFUSION DEVICE FROM HEART, EXTERNAL APPROACH: ICD-10-PCS | Performed by: RADIOLOGY

## 2020-01-01 PROCEDURE — 85027 COMPLETE CBC AUTOMATED: CPT

## 2020-01-01 PROCEDURE — 2580000003 HC RX 258: Performed by: INTERNAL MEDICINE

## 2020-01-01 PROCEDURE — 87040 BLOOD CULTURE FOR BACTERIA: CPT

## 2020-01-01 PROCEDURE — 87324 CLOSTRIDIUM AG IA: CPT

## 2020-01-01 PROCEDURE — A9502 TC99M TETROFOSMIN: HCPCS | Performed by: INTERNAL MEDICINE

## 2020-01-01 PROCEDURE — 2580000003 HC RX 258: Performed by: NURSE PRACTITIONER

## 2020-01-01 PROCEDURE — 84484 ASSAY OF TROPONIN QUANT: CPT

## 2020-01-01 PROCEDURE — 92526 ORAL FUNCTION THERAPY: CPT

## 2020-01-01 PROCEDURE — 94002 VENT MGMT INPAT INIT DAY: CPT

## 2020-01-01 PROCEDURE — 6360000002 HC RX W HCPCS

## 2020-01-01 PROCEDURE — 94750 HC PULMONARY COMPLIANCE STUDY: CPT

## 2020-01-01 PROCEDURE — 6360000002 HC RX W HCPCS: Performed by: PHYSICIAN ASSISTANT

## 2020-01-01 PROCEDURE — C9113 INJ PANTOPRAZOLE SODIUM, VIA: HCPCS | Performed by: INTERNAL MEDICINE

## 2020-01-01 PROCEDURE — 94761 N-INVAS EAR/PLS OXIMETRY MLT: CPT

## 2020-01-01 PROCEDURE — 93010 ELECTROCARDIOGRAM REPORT: CPT | Performed by: INTERNAL MEDICINE

## 2020-01-01 PROCEDURE — 0JBQ0ZZ EXCISION OF RIGHT FOOT SUBCUTANEOUS TISSUE AND FASCIA, OPEN APPROACH: ICD-10-PCS | Performed by: PODIATRIST

## 2020-01-01 PROCEDURE — 94003 VENT MGMT INPAT SUBQ DAY: CPT

## 2020-01-01 PROCEDURE — 90935 HEMODIALYSIS ONE EVALUATION: CPT

## 2020-01-01 PROCEDURE — 93923 UPR/LXTR ART STDY 3+ LVLS: CPT

## 2020-01-01 PROCEDURE — 99214 OFFICE O/P EST MOD 30 MIN: CPT | Performed by: SURGERY

## 2020-01-01 PROCEDURE — 97162 PT EVAL MOD COMPLEX 30 MIN: CPT

## 2020-01-01 PROCEDURE — 71045 X-RAY EXAM CHEST 1 VIEW: CPT

## 2020-01-01 PROCEDURE — 97166 OT EVAL MOD COMPLEX 45 MIN: CPT

## 2020-01-01 PROCEDURE — 2500000003 HC RX 250 WO HCPCS

## 2020-01-01 PROCEDURE — 2500000003 HC RX 250 WO HCPCS: Performed by: PHYSICAL MEDICINE & REHABILITATION

## 2020-01-01 PROCEDURE — 82803 BLOOD GASES ANY COMBINATION: CPT

## 2020-01-01 PROCEDURE — 2580000003 HC RX 258

## 2020-01-01 PROCEDURE — 83605 ASSAY OF LACTIC ACID: CPT

## 2020-01-01 PROCEDURE — 5A1D70Z PERFORMANCE OF URINARY FILTRATION, INTERMITTENT, LESS THAN 6 HOURS PER DAY: ICD-10-PCS | Performed by: INTERNAL MEDICINE

## 2020-01-01 PROCEDURE — 77001 FLUOROGUIDE FOR VEIN DEVICE: CPT

## 2020-01-01 PROCEDURE — 83735 ASSAY OF MAGNESIUM: CPT

## 2020-01-01 PROCEDURE — 78452 HT MUSCLE IMAGE SPECT MULT: CPT

## 2020-01-01 PROCEDURE — B548ZZA ULTRASONOGRAPHY OF SUPERIOR VENA CAVA, GUIDANCE: ICD-10-PCS | Performed by: INTERNAL MEDICINE

## 2020-01-01 PROCEDURE — 99285 EMERGENCY DEPT VISIT HI MDM: CPT

## 2020-01-01 PROCEDURE — 97597 DBRDMT OPN WND 1ST 20 CM/<: CPT | Performed by: INTERNAL MEDICINE

## 2020-01-01 PROCEDURE — 93005 ELECTROCARDIOGRAM TRACING: CPT | Performed by: PHYSICIAN ASSISTANT

## 2020-01-01 PROCEDURE — 6360000002 HC RX W HCPCS: Performed by: NURSE ANESTHETIST, CERTIFIED REGISTERED

## 2020-01-01 PROCEDURE — 2580000003 HC RX 258: Performed by: FAMILY MEDICINE

## 2020-01-01 PROCEDURE — 2580000003 HC RX 258: Performed by: SURGERY

## 2020-01-01 PROCEDURE — 3700000000 HC ANESTHESIA ATTENDED CARE: Performed by: PODIATRIST

## 2020-01-01 PROCEDURE — 87340 HEPATITIS B SURFACE AG IA: CPT

## 2020-01-01 PROCEDURE — 86706 HEP B SURFACE ANTIBODY: CPT

## 2020-01-01 PROCEDURE — 92523 SPEECH SOUND LANG COMPREHEN: CPT

## 2020-01-01 PROCEDURE — 99214 OFFICE O/P EST MOD 30 MIN: CPT | Performed by: INTERNAL MEDICINE

## 2020-01-01 PROCEDURE — 73620 X-RAY EXAM OF FOOT: CPT

## 2020-01-01 PROCEDURE — G8428 CUR MEDS NOT DOCUMENT: HCPCS | Performed by: PHYSICIAN ASSISTANT

## 2020-01-01 PROCEDURE — 36589 REMOVAL TUNNELED CV CATH: CPT

## 2020-01-01 PROCEDURE — 71250 CT THORAX DX C-: CPT

## 2020-01-01 PROCEDURE — 87070 CULTURE OTHR SPECIMN AEROBIC: CPT

## 2020-01-01 PROCEDURE — 84145 PROCALCITONIN (PCT): CPT

## 2020-01-01 PROCEDURE — 6360000002 HC RX W HCPCS: Performed by: INTERNAL MEDICINE

## 2020-01-01 PROCEDURE — 2500000003 HC RX 250 WO HCPCS: Performed by: NURSE PRACTITIONER

## 2020-01-01 PROCEDURE — 93005 ELECTROCARDIOGRAM TRACING: CPT | Performed by: FAMILY MEDICINE

## 2020-01-01 PROCEDURE — 87206 SMEAR FLUORESCENT/ACID STAI: CPT

## 2020-01-01 PROCEDURE — U0003 INFECTIOUS AGENT DETECTION BY NUCLEIC ACID (DNA OR RNA); SEVERE ACUTE RESPIRATORY SYNDROME CORONAVIRUS 2 (SARS-COV-2) (CORONAVIRUS DISEASE [COVID-19]), AMPLIFIED PROBE TECHNIQUE, MAKING USE OF HIGH THROUGHPUT TECHNOLOGIES AS DESCRIBED BY CMS-2020-01-R: HCPCS

## 2020-01-01 PROCEDURE — 87177 OVA AND PARASITES SMEARS: CPT

## 2020-01-01 PROCEDURE — 84100 ASSAY OF PHOSPHORUS: CPT

## 2020-01-01 PROCEDURE — 93005 ELECTROCARDIOGRAM TRACING: CPT | Performed by: EMERGENCY MEDICINE

## 2020-01-01 PROCEDURE — 97165 OT EVAL LOW COMPLEX 30 MIN: CPT

## 2020-01-01 PROCEDURE — 82330 ASSAY OF CALCIUM: CPT

## 2020-01-01 PROCEDURE — 51702 INSERT TEMP BLADDER CATH: CPT

## 2020-01-01 PROCEDURE — 2709999900 HC NON-CHARGEABLE SUPPLY: Performed by: PODIATRIST

## 2020-01-01 PROCEDURE — 6360000002 HC RX W HCPCS: Performed by: NURSE PRACTITIONER

## 2020-01-01 PROCEDURE — 97167 OT EVAL HIGH COMPLEX 60 MIN: CPT

## 2020-01-01 PROCEDURE — 87150 DNA/RNA AMPLIFIED PROBE: CPT

## 2020-01-01 PROCEDURE — 87329 GIARDIA AG IA: CPT

## 2020-01-01 PROCEDURE — 96365 THER/PROPH/DIAG IV INF INIT: CPT

## 2020-01-01 PROCEDURE — 3430000000 HC RX DIAGNOSTIC RADIOPHARMACEUTICAL: Performed by: INTERNAL MEDICINE

## 2020-01-01 PROCEDURE — 99284 EMERGENCY DEPT VISIT MOD MDM: CPT

## 2020-01-01 PROCEDURE — 80074 ACUTE HEPATITIS PANEL: CPT

## 2020-01-01 PROCEDURE — 85730 THROMBOPLASTIN TIME PARTIAL: CPT

## 2020-01-01 PROCEDURE — 2580000003 HC RX 258: Performed by: PHYSICIAN ASSISTANT

## 2020-01-01 PROCEDURE — 2580000003 HC RX 258: Performed by: PEDIATRICS

## 2020-01-01 PROCEDURE — P9047 ALBUMIN (HUMAN), 25%, 50ML: HCPCS | Performed by: INTERNAL MEDICINE

## 2020-01-01 PROCEDURE — 11042 DBRDMT SUBQ TIS 1ST 20SQCM/<: CPT | Performed by: INTERNAL MEDICINE

## 2020-01-01 PROCEDURE — 97542 WHEELCHAIR MNGMENT TRAINING: CPT

## 2020-01-01 PROCEDURE — 90945 DIALYSIS ONE EVALUATION: CPT

## 2020-01-01 PROCEDURE — G8417 CALC BMI ABV UP PARAM F/U: HCPCS | Performed by: SURGERY

## 2020-01-01 PROCEDURE — 92610 EVALUATE SWALLOWING FUNCTION: CPT

## 2020-01-01 PROCEDURE — 99211 OFF/OP EST MAY X REQ PHY/QHP: CPT | Performed by: PHYSICIAN ASSISTANT

## 2020-01-01 PROCEDURE — 87205 SMEAR GRAM STAIN: CPT

## 2020-01-01 PROCEDURE — B5181ZA FLUOROSCOPY OF SUPERIOR VENA CAVA USING LOW OSMOLAR CONTRAST, GUIDANCE: ICD-10-PCS | Performed by: INTERNAL MEDICINE

## 2020-01-01 PROCEDURE — 7100000010 HC PHASE II RECOVERY - FIRST 15 MIN: Performed by: SURGERY

## 2020-01-01 PROCEDURE — 99291 CRITICAL CARE FIRST HOUR: CPT | Performed by: INTERNAL MEDICINE

## 2020-01-01 PROCEDURE — 2500000003 HC RX 250 WO HCPCS: Performed by: REGISTERED NURSE

## 2020-01-01 PROCEDURE — 87449 NOS EACH ORGANISM AG IA: CPT

## 2020-01-01 PROCEDURE — 2500000003 HC RX 250 WO HCPCS: Performed by: NURSE ANESTHETIST, CERTIFIED REGISTERED

## 2020-01-01 PROCEDURE — 93308 TTE F-UP OR LMTD: CPT

## 2020-01-01 PROCEDURE — 82010 KETONE BODYS QUAN: CPT

## 2020-01-01 PROCEDURE — 36556 INSERT NON-TUNNEL CV CATH: CPT | Performed by: NURSE PRACTITIONER

## 2020-01-01 PROCEDURE — 83690 ASSAY OF LIPASE: CPT

## 2020-01-01 PROCEDURE — 047C04Z DILATION OF RIGHT COMMON ILIAC ARTERY WITH DRUG-ELUTING INTRALUMINAL DEVICE, OPEN APPROACH: ICD-10-PCS | Performed by: SURGERY

## 2020-01-01 PROCEDURE — 92611 MOTION FLUOROSCOPY/SWALLOW: CPT

## 2020-01-01 PROCEDURE — 6360000002 HC RX W HCPCS: Performed by: SURGERY

## 2020-01-01 PROCEDURE — 93005 ELECTROCARDIOGRAM TRACING: CPT | Performed by: NURSE PRACTITIONER

## 2020-01-01 PROCEDURE — 80048 BASIC METABOLIC PNL TOTAL CA: CPT

## 2020-01-01 PROCEDURE — 80053 COMPREHEN METABOLIC PANEL: CPT

## 2020-01-01 PROCEDURE — 83880 ASSAY OF NATRIURETIC PEPTIDE: CPT

## 2020-01-01 PROCEDURE — 2500000003 HC RX 250 WO HCPCS: Performed by: PODIATRIST

## 2020-01-01 PROCEDURE — 99223 1ST HOSP IP/OBS HIGH 75: CPT | Performed by: INTERNAL MEDICINE

## 2020-01-01 PROCEDURE — 29581 APPL MULTLAYER CMPRN SYS LEG: CPT | Performed by: INTERNAL MEDICINE

## 2020-01-01 PROCEDURE — 87641 MR-STAPH DNA AMP PROBE: CPT

## 2020-01-01 PROCEDURE — 88112 CYTOPATH CELL ENHANCE TECH: CPT

## 2020-01-01 PROCEDURE — 6370000000 HC RX 637 (ALT 250 FOR IP): Performed by: PEDIATRICS

## 2020-01-01 PROCEDURE — 6360000002 HC RX W HCPCS: Performed by: PHYSICAL MEDICINE & REHABILITATION

## 2020-01-01 PROCEDURE — 36620 INSERTION CATHETER ARTERY: CPT | Performed by: NURSE PRACTITIONER

## 2020-01-01 PROCEDURE — 74176 CT ABD & PELVIS W/O CONTRAST: CPT

## 2020-01-01 PROCEDURE — 6360000002 HC RX W HCPCS: Performed by: FAMILY MEDICINE

## 2020-01-01 PROCEDURE — 97597 DBRDMT OPN WND 1ST 20 CM/<: CPT

## 2020-01-01 PROCEDURE — 6360000002 HC RX W HCPCS: Performed by: RADIOLOGY

## 2020-01-01 PROCEDURE — C1768 GRAFT, VASCULAR: HCPCS | Performed by: SURGERY

## 2020-01-01 PROCEDURE — 73630 X-RAY EXAM OF FOOT: CPT

## 2020-01-01 PROCEDURE — 02H633Z INSERTION OF INFUSION DEVICE INTO RIGHT ATRIUM, PERCUTANEOUS APPROACH: ICD-10-PCS | Performed by: INTERNAL MEDICINE

## 2020-01-01 PROCEDURE — 97161 PT EVAL LOW COMPLEX 20 MIN: CPT

## 2020-01-01 PROCEDURE — 31645 BRNCHSC W/THER ASPIR 1ST: CPT | Performed by: INTERNAL MEDICINE

## 2020-01-01 PROCEDURE — 99204 OFFICE O/P NEW MOD 45 MIN: CPT | Performed by: INTERNAL MEDICINE

## 2020-01-01 PROCEDURE — 0B9F8ZX DRAINAGE OF RIGHT LOWER LUNG LOBE, VIA NATURAL OR ARTIFICIAL OPENING ENDOSCOPIC, DIAGNOSTIC: ICD-10-PCS | Performed by: INTERNAL MEDICINE

## 2020-01-01 PROCEDURE — 2000000000 HC ICU R&B

## 2020-01-01 PROCEDURE — 7100000000 HC PACU RECOVERY - FIRST 15 MIN: Performed by: SURGERY

## 2020-01-01 PROCEDURE — 2500000003 HC RX 250 WO HCPCS: Performed by: RADIOLOGY

## 2020-01-01 PROCEDURE — 99232 SBSQ HOSP IP/OBS MODERATE 35: CPT | Performed by: INTERNAL MEDICINE

## 2020-01-01 PROCEDURE — 99233 SBSQ HOSP IP/OBS HIGH 50: CPT | Performed by: NURSE PRACTITIONER

## 2020-01-01 PROCEDURE — 96374 THER/PROPH/DIAG INJ IV PUSH: CPT

## 2020-01-01 PROCEDURE — G8420 CALC BMI NORM PARAMETERS: HCPCS | Performed by: PHYSICIAN ASSISTANT

## 2020-01-01 PROCEDURE — 84295 ASSAY OF SERUM SODIUM: CPT

## 2020-01-01 PROCEDURE — 2580000003 HC RX 258: Performed by: EMERGENCY MEDICINE

## 2020-01-01 PROCEDURE — 02HV33Z INSERTION OF INFUSION DEVICE INTO SUPERIOR VENA CAVA, PERCUTANEOUS APPROACH: ICD-10-PCS | Performed by: INTERNAL MEDICINE

## 2020-01-01 PROCEDURE — 99292 CRITICAL CARE ADDL 30 MIN: CPT | Performed by: INTERNAL MEDICINE

## 2020-01-01 PROCEDURE — 2500000003 HC RX 250 WO HCPCS: Performed by: EMERGENCY MEDICINE

## 2020-01-01 PROCEDURE — 7100000001 HC PACU RECOVERY - ADDTL 15 MIN: Performed by: SURGERY

## 2020-01-01 PROCEDURE — B543ZZA ULTRASONOGRAPHY OF RIGHT JUGULAR VEINS, GUIDANCE: ICD-10-PCS | Performed by: INTERNAL MEDICINE

## 2020-01-01 PROCEDURE — 88305 TISSUE EXAM BY PATHOLOGIST: CPT

## 2020-01-01 PROCEDURE — 99024 POSTOP FOLLOW-UP VISIT: CPT | Performed by: SURGERY

## 2020-01-01 PROCEDURE — 5A1945Z RESPIRATORY VENTILATION, 24-96 CONSECUTIVE HOURS: ICD-10-PCS | Performed by: INTERNAL MEDICINE

## 2020-01-01 PROCEDURE — 93985 DUP-SCAN HEMO COMPL BI STD: CPT

## 2020-01-01 PROCEDURE — 0BH18EZ INSERTION OF ENDOTRACHEAL AIRWAY INTO TRACHEA, VIA NATURAL OR ARTIFICIAL OPENING ENDOSCOPIC: ICD-10-PCS | Performed by: INTERNAL MEDICINE

## 2020-01-01 PROCEDURE — 94760 N-INVAS EAR/PLS OXIMETRY 1: CPT

## 2020-01-01 PROCEDURE — 87631 RESP VIRUS 3-5 TARGETS: CPT

## 2020-01-01 PROCEDURE — P9047 ALBUMIN (HUMAN), 25%, 50ML: HCPCS

## 2020-01-01 PROCEDURE — C1881 DIALYSIS ACCESS SYSTEM: HCPCS

## 2020-01-01 PROCEDURE — 2580000003 HC RX 258: Performed by: HOSPITALIST

## 2020-01-01 PROCEDURE — 93970 EXTREMITY STUDY: CPT

## 2020-01-01 PROCEDURE — 36558 INSERT TUNNELED CV CATH: CPT

## 2020-01-01 PROCEDURE — 6370000000 HC RX 637 (ALT 250 FOR IP): Performed by: REGISTERED NURSE

## 2020-01-01 PROCEDURE — 99232 SBSQ HOSP IP/OBS MODERATE 35: CPT | Performed by: NURSE PRACTITIONER

## 2020-01-01 PROCEDURE — 86022 PLATELET ANTIBODIES: CPT

## 2020-01-01 PROCEDURE — 87077 CULTURE AEROBIC IDENTIFY: CPT

## 2020-01-01 PROCEDURE — 31500 INSERT EMERGENCY AIRWAY: CPT | Performed by: INTERNAL MEDICINE

## 2020-01-01 PROCEDURE — 02PA33Z REMOVAL OF INFUSION DEVICE FROM HEART, PERCUTANEOUS APPROACH: ICD-10-PCS | Performed by: INTERNAL MEDICINE

## 2020-01-01 PROCEDURE — 80076 HEPATIC FUNCTION PANEL: CPT

## 2020-01-01 PROCEDURE — 3600000002 HC SURGERY LEVEL 2 BASE: Performed by: SURGERY

## 2020-01-01 PROCEDURE — 97112 NEUROMUSCULAR REEDUCATION: CPT

## 2020-01-01 PROCEDURE — 7100000011 HC PHASE II RECOVERY - ADDTL 15 MIN: Performed by: SURGERY

## 2020-01-01 PROCEDURE — 2580000003 HC RX 258: Performed by: PHYSICAL MEDICINE & REHABILITATION

## 2020-01-01 PROCEDURE — 87186 SC STD MICRODIL/AGAR DIL: CPT

## 2020-01-01 PROCEDURE — 85014 HEMATOCRIT: CPT

## 2020-01-01 PROCEDURE — 87075 CULTR BACTERIA EXCEPT BLOOD: CPT

## 2020-01-01 PROCEDURE — 82570 ASSAY OF URINE CREATININE: CPT

## 2020-01-01 PROCEDURE — 85652 RBC SED RATE AUTOMATED: CPT

## 2020-01-01 PROCEDURE — 83036 HEMOGLOBIN GLYCOSYLATED A1C: CPT

## 2020-01-01 PROCEDURE — 31500 INSERT EMERGENCY AIRWAY: CPT

## 2020-01-01 PROCEDURE — 0DP6XUZ REMOVAL OF FEEDING DEVICE FROM STOMACH, EXTERNAL APPROACH: ICD-10-PCS | Performed by: PHYSICAL MEDICINE & REHABILITATION

## 2020-01-01 PROCEDURE — 2580000003 HC RX 258: Performed by: RADIOLOGY

## 2020-01-01 PROCEDURE — 2500000003 HC RX 250 WO HCPCS: Performed by: PHYSICIAN ASSISTANT

## 2020-01-01 PROCEDURE — 0Y6R0Z0 DETACHMENT AT RIGHT 2ND TOE, COMPLETE, OPEN APPROACH: ICD-10-PCS | Performed by: PODIATRIST

## 2020-01-01 PROCEDURE — 85018 HEMOGLOBIN: CPT

## 2020-01-01 PROCEDURE — 2580000003 HC RX 258: Performed by: ANESTHESIOLOGY

## 2020-01-01 PROCEDURE — 87493 C DIFF AMPLIFIED PROBE: CPT

## 2020-01-01 PROCEDURE — 86704 HEP B CORE ANTIBODY TOTAL: CPT

## 2020-01-01 PROCEDURE — 71046 X-RAY EXAM CHEST 2 VIEWS: CPT

## 2020-01-01 PROCEDURE — 87209 SMEAR COMPLEX STAIN: CPT

## 2020-01-01 PROCEDURE — 73718 MRI LOWER EXTREMITY W/O DYE: CPT

## 2020-01-01 PROCEDURE — 7100000001 HC PACU RECOVERY - ADDTL 15 MIN: Performed by: PODIATRIST

## 2020-01-01 PROCEDURE — 76937 US GUIDE VASCULAR ACCESS: CPT | Performed by: NURSE PRACTITIONER

## 2020-01-01 PROCEDURE — 76937 US GUIDE VASCULAR ACCESS: CPT

## 2020-01-01 PROCEDURE — 6360000004 HC RX CONTRAST MEDICATION: Performed by: EMERGENCY MEDICINE

## 2020-01-01 PROCEDURE — 87804 INFLUENZA ASSAY W/OPTIC: CPT

## 2020-01-01 PROCEDURE — 96375 TX/PRO/DX INJ NEW DRUG ADDON: CPT

## 2020-01-01 PROCEDURE — 74230 X-RAY XM SWLNG FUNCJ C+: CPT

## 2020-01-01 PROCEDURE — 36830 ARTERY-VEIN NONAUTOGRAFT: CPT | Performed by: SURGERY

## 2020-01-01 PROCEDURE — 0HBRXZZ EXCISION OF TOE NAIL, EXTERNAL APPROACH: ICD-10-PCS | Performed by: PODIATRIST

## 2020-01-01 PROCEDURE — 88311 DECALCIFY TISSUE: CPT

## 2020-01-01 PROCEDURE — 0Y6T0Z0 DETACHMENT AT RIGHT 3RD TOE, COMPLETE, OPEN APPROACH: ICD-10-PCS | Performed by: PODIATRIST

## 2020-01-01 PROCEDURE — G8427 DOCREV CUR MEDS BY ELIG CLIN: HCPCS | Performed by: SURGERY

## 2020-01-01 PROCEDURE — 99222 1ST HOSP IP/OBS MODERATE 55: CPT | Performed by: INTERNAL MEDICINE

## 2020-01-01 PROCEDURE — 36569 INSJ PICC 5 YR+ W/O IMAGING: CPT

## 2020-01-01 PROCEDURE — 6360000002 HC RX W HCPCS: Performed by: REGISTERED NURSE

## 2020-01-01 PROCEDURE — 86140 C-REACTIVE PROTEIN: CPT

## 2020-01-01 PROCEDURE — 6360000002 HC RX W HCPCS: Performed by: EMERGENCY MEDICINE

## 2020-01-01 PROCEDURE — 99213 OFFICE O/P EST LOW 20 MIN: CPT | Performed by: INTERNAL MEDICINE

## 2020-01-01 PROCEDURE — 87015 SPECIMEN INFECT AGNT CONCNTJ: CPT

## 2020-01-01 PROCEDURE — 2060000000 HC ICU INTERMEDIATE R&B

## 2020-01-01 PROCEDURE — 05HM33Z INSERTION OF INFUSION DEVICE INTO RIGHT INTERNAL JUGULAR VEIN, PERCUTANEOUS APPROACH: ICD-10-PCS | Performed by: INTERNAL MEDICINE

## 2020-01-01 PROCEDURE — 6360000002 HC RX W HCPCS: Performed by: HOSPITALIST

## 2020-01-01 PROCEDURE — 3700000001 HC ADD 15 MINUTES (ANESTHESIA): Performed by: SURGERY

## 2020-01-01 PROCEDURE — 0100U HC RESPIRPTHGN MULT REV TRANS & AMP PRB TECH 21 TRGT: CPT

## 2020-01-01 PROCEDURE — 2709999900 HC NON-CHARGEABLE SUPPLY: Performed by: SURGERY

## 2020-01-01 PROCEDURE — 99213 OFFICE O/P EST LOW 20 MIN: CPT | Performed by: SURGERY

## 2020-01-01 PROCEDURE — 7100000000 HC PACU RECOVERY - FIRST 15 MIN: Performed by: PODIATRIST

## 2020-01-01 PROCEDURE — 3700000001 HC ADD 15 MINUTES (ANESTHESIA): Performed by: PODIATRIST

## 2020-01-01 PROCEDURE — 87102 FUNGUS ISOLATION CULTURE: CPT

## 2020-01-01 PROCEDURE — 99231 SBSQ HOSP IP/OBS SF/LOW 25: CPT | Performed by: INTERNAL MEDICINE

## 2020-01-01 PROCEDURE — 2500000003 HC RX 250 WO HCPCS: Performed by: ANESTHESIOLOGY

## 2020-01-01 PROCEDURE — 93925 LOWER EXTREMITY STUDY: CPT

## 2020-01-01 PROCEDURE — 36620 INSERTION CATHETER ARTERY: CPT | Performed by: INTERNAL MEDICINE

## 2020-01-01 PROCEDURE — 81001 URINALYSIS AUTO W/SCOPE: CPT

## 2020-01-01 PROCEDURE — 3600000012 HC SURGERY LEVEL 2 ADDTL 15MIN: Performed by: SURGERY

## 2020-01-01 PROCEDURE — 87505 NFCT AGENT DETECTION GI: CPT

## 2020-01-01 PROCEDURE — 2709999900 HC NON-CHARGEABLE SUPPLY: Performed by: INTERNAL MEDICINE

## 2020-01-01 PROCEDURE — 31624 DX BRONCHOSCOPE/LAVAGE: CPT | Performed by: INTERNAL MEDICINE

## 2020-01-01 PROCEDURE — 36556 INSERT NON-TUNNEL CV CATH: CPT

## 2020-01-01 PROCEDURE — 1090F PRES/ABSN URINE INCON ASSESS: CPT | Performed by: SURGERY

## 2020-01-01 PROCEDURE — 74177 CT ABD & PELVIS W/CONTRAST: CPT

## 2020-01-01 PROCEDURE — G0328 FECAL BLOOD SCRN IMMUNOASSAY: HCPCS

## 2020-01-01 PROCEDURE — 83970 ASSAY OF PARATHORMONE: CPT

## 2020-01-01 PROCEDURE — 84156 ASSAY OF PROTEIN URINE: CPT

## 2020-01-01 PROCEDURE — 2580000003 HC RX 258: Performed by: NURSE ANESTHETIST, CERTIFIED REGISTERED

## 2020-01-01 PROCEDURE — 11042 DBRDMT SUBQ TIS 1ST 20SQCM/<: CPT

## 2020-01-01 PROCEDURE — 93017 CV STRESS TEST TRACING ONLY: CPT

## 2020-01-01 PROCEDURE — 84132 ASSAY OF SERUM POTASSIUM: CPT

## 2020-01-01 PROCEDURE — 87116 MYCOBACTERIA CULTURE: CPT

## 2020-01-01 PROCEDURE — U0002 COVID-19 LAB TEST NON-CDC: HCPCS

## 2020-01-01 PROCEDURE — 36556 INSERT NON-TUNNEL CV CATH: CPT | Performed by: INTERNAL MEDICINE

## 2020-01-01 PROCEDURE — 99215 OFFICE O/P EST HI 40 MIN: CPT

## 2020-01-01 PROCEDURE — 0JH63XZ INSERTION OF TUNNELED VASCULAR ACCESS DEVICE INTO CHEST SUBCUTANEOUS TISSUE AND FASCIA, PERCUTANEOUS APPROACH: ICD-10-PCS | Performed by: INTERNAL MEDICINE

## 2020-01-01 PROCEDURE — 0JDQ0ZZ EXTRACTION OF RIGHT FOOT SUBCUTANEOUS TISSUE AND FASCIA, OPEN APPROACH: ICD-10-PCS | Performed by: PODIATRIST

## 2020-01-01 PROCEDURE — 3700000000 HC ANESTHESIA ATTENDED CARE: Performed by: SURGERY

## 2020-01-01 PROCEDURE — 6370000000 HC RX 637 (ALT 250 FOR IP): Performed by: HOSPITALIST

## 2020-01-01 PROCEDURE — 3609010900 HC BRONCHOSCOPY THERAPUTIC ASPIRATION INITIAL: Performed by: INTERNAL MEDICINE

## 2020-01-01 PROCEDURE — G8420 CALC BMI NORM PARAMETERS: HCPCS | Performed by: SURGERY

## 2020-01-01 PROCEDURE — 3600000003 HC SURGERY LEVEL 3 BASE: Performed by: PODIATRIST

## 2020-01-01 PROCEDURE — 82728 ASSAY OF FERRITIN: CPT

## 2020-01-01 PROCEDURE — 87086 URINE CULTURE/COLONY COUNT: CPT

## 2020-01-01 PROCEDURE — 3600000013 HC SURGERY LEVEL 3 ADDTL 15MIN: Performed by: PODIATRIST

## 2020-01-01 PROCEDURE — C1752 CATH,HEMODIALYSIS,SHORT-TERM: HCPCS

## 2020-01-01 PROCEDURE — 047D04Z DILATION OF LEFT COMMON ILIAC ARTERY WITH DRUG-ELUTING INTRALUMINAL DEVICE, OPEN APPROACH: ICD-10-PCS | Performed by: SURGERY

## 2020-01-01 PROCEDURE — 6360000002 HC RX W HCPCS: Performed by: PEDIATRICS

## 2020-01-01 DEVICE — GRAFT VASC L45CM DIA4-7MM PTFE CBAS HEP SURF STD WALLED: Type: IMPLANTABLE DEVICE | Site: ARM | Status: FUNCTIONAL

## 2020-01-01 RX ORDER — INSULIN GLARGINE 100 [IU]/ML
15 INJECTION, SOLUTION SUBCUTANEOUS NIGHTLY
Status: DISCONTINUED | OUTPATIENT
Start: 2020-01-01 | End: 2020-01-01 | Stop reason: HOSPADM

## 2020-01-01 RX ORDER — ACETAMINOPHEN 325 MG/1
650 TABLET ORAL EVERY 6 HOURS PRN
Status: DISCONTINUED | OUTPATIENT
Start: 2020-01-01 | End: 2020-01-01 | Stop reason: HOSPADM

## 2020-01-01 RX ORDER — ONDANSETRON 2 MG/ML
4 INJECTION INTRAMUSCULAR; INTRAVENOUS EVERY 6 HOURS PRN
Status: DISCONTINUED | OUTPATIENT
Start: 2020-01-01 | End: 2020-01-01 | Stop reason: HOSPADM

## 2020-01-01 RX ORDER — DEXTROSE MONOHYDRATE 25 G/50ML
12.5 INJECTION, SOLUTION INTRAVENOUS PRN
Status: DISCONTINUED | OUTPATIENT
Start: 2020-01-01 | End: 2020-01-01 | Stop reason: HOSPADM

## 2020-01-01 RX ORDER — SODIUM CHLORIDE 0.9 % (FLUSH) 0.9 %
10 SYRINGE (ML) INJECTION PRN
Status: CANCELLED | OUTPATIENT
Start: 2020-01-01

## 2020-01-01 RX ORDER — ATORVASTATIN CALCIUM 10 MG/1
20 TABLET, FILM COATED ORAL NIGHTLY
Status: DISCONTINUED | OUTPATIENT
Start: 2020-01-01 | End: 2020-01-01 | Stop reason: HOSPADM

## 2020-01-01 RX ORDER — SODIUM CHLORIDE 9 MG/ML
INJECTION, SOLUTION INTRAVENOUS
Status: COMPLETED
Start: 2020-01-01 | End: 2020-01-01

## 2020-01-01 RX ORDER — ASPIRIN 81 MG/1
81 TABLET, CHEWABLE ORAL DAILY
Status: DISCONTINUED | OUTPATIENT
Start: 2020-01-01 | End: 2020-01-01 | Stop reason: HOSPADM

## 2020-01-01 RX ORDER — DEXTROSE MONOHYDRATE 50 MG/ML
100 INJECTION, SOLUTION INTRAVENOUS PRN
Status: DISCONTINUED | OUTPATIENT
Start: 2020-01-01 | End: 2020-01-01 | Stop reason: HOSPADM

## 2020-01-01 RX ORDER — ACETAMINOPHEN 650 MG/1
650 SUPPOSITORY RECTAL EVERY 6 HOURS PRN
Status: CANCELLED | OUTPATIENT
Start: 2020-01-01

## 2020-01-01 RX ORDER — NICOTINE POLACRILEX 4 MG
15 LOZENGE BUCCAL PRN
Status: CANCELLED | OUTPATIENT
Start: 2020-01-01

## 2020-01-01 RX ORDER — ONDANSETRON 2 MG/ML
4 INJECTION INTRAMUSCULAR; INTRAVENOUS EVERY 30 MIN PRN
Status: DISCONTINUED | OUTPATIENT
Start: 2020-01-01 | End: 2020-01-01 | Stop reason: HOSPADM

## 2020-01-01 RX ORDER — LOSARTAN POTASSIUM 25 MG/1
50 TABLET ORAL DAILY
Status: CANCELLED | OUTPATIENT
Start: 2020-01-01

## 2020-01-01 RX ORDER — SODIUM CHLORIDE 0.9 % (FLUSH) 0.9 %
10 SYRINGE (ML) INJECTION EVERY 12 HOURS SCHEDULED
Status: DISCONTINUED | OUTPATIENT
Start: 2020-01-01 | End: 2020-01-01 | Stop reason: HOSPADM

## 2020-01-01 RX ORDER — DONEPEZIL HYDROCHLORIDE 5 MG/1
5 TABLET, FILM COATED ORAL NIGHTLY
Status: DISCONTINUED | OUTPATIENT
Start: 2020-01-01 | End: 2020-01-01 | Stop reason: HOSPADM

## 2020-01-01 RX ORDER — SODIUM CHLORIDE 9 MG/ML
INJECTION, SOLUTION INTRAVENOUS CONTINUOUS
Status: DISCONTINUED | OUTPATIENT
Start: 2020-01-01 | End: 2020-01-01 | Stop reason: HOSPADM

## 2020-01-01 RX ORDER — POLYETHYLENE GLYCOL 3350 17 G/17G
17 POWDER, FOR SOLUTION ORAL DAILY PRN
Status: CANCELLED | OUTPATIENT
Start: 2020-01-01

## 2020-01-01 RX ORDER — FUROSEMIDE 10 MG/ML
40 INJECTION INTRAMUSCULAR; INTRAVENOUS ONCE
Status: COMPLETED | OUTPATIENT
Start: 2020-01-01 | End: 2020-01-01

## 2020-01-01 RX ORDER — BENZONATATE 100 MG/1
100 CAPSULE ORAL 3 TIMES DAILY PRN
Status: DISCONTINUED | OUTPATIENT
Start: 2020-01-01 | End: 2020-01-01 | Stop reason: HOSPADM

## 2020-01-01 RX ORDER — WARFARIN SODIUM 5 MG/1
5 TABLET ORAL
Status: COMPLETED | OUTPATIENT
Start: 2020-01-01 | End: 2020-01-01

## 2020-01-01 RX ORDER — WARFARIN SODIUM 1 MG/1
1 TABLET ORAL
Status: COMPLETED | OUTPATIENT
Start: 2020-01-01 | End: 2020-01-01

## 2020-01-01 RX ORDER — WARFARIN SODIUM 2 MG/1
4 TABLET ORAL
Status: COMPLETED | OUTPATIENT
Start: 2020-01-01 | End: 2020-01-01

## 2020-01-01 RX ORDER — FERROUS SULFATE 325(65) MG
325 TABLET ORAL 2 TIMES DAILY
Status: DISCONTINUED | OUTPATIENT
Start: 2020-01-01 | End: 2020-01-01 | Stop reason: HOSPADM

## 2020-01-01 RX ORDER — PANTOPRAZOLE SODIUM 40 MG/1
40 TABLET, DELAYED RELEASE ORAL
Status: DISCONTINUED | OUTPATIENT
Start: 2020-01-01 | End: 2020-01-01 | Stop reason: HOSPADM

## 2020-01-01 RX ORDER — LOSARTAN POTASSIUM 25 MG/1
25 TABLET ORAL DAILY
Status: DISCONTINUED | OUTPATIENT
Start: 2020-01-01 | End: 2020-01-01

## 2020-01-01 RX ORDER — MIDODRINE HYDROCHLORIDE 5 MG/1
10 TABLET ORAL
Status: DISCONTINUED | OUTPATIENT
Start: 2020-01-01 | End: 2020-09-19 | Stop reason: HOSPADM

## 2020-01-01 RX ORDER — GAUZE BANDAGE 2" X 2"
1 BANDAGE TOPICAL DAILY
Status: CANCELLED | OUTPATIENT
Start: 2020-01-01

## 2020-01-01 RX ORDER — DONEPEZIL HYDROCHLORIDE 5 MG/1
5 TABLET, FILM COATED ORAL NIGHTLY
Status: ON HOLD | COMMUNITY
End: 2020-01-01 | Stop reason: SDUPTHER

## 2020-01-01 RX ORDER — LACTOBACILLUS RHAMNOSUS GG 10B CELL
1 CAPSULE ORAL
Status: DISCONTINUED | OUTPATIENT
Start: 2020-01-01 | End: 2020-01-01 | Stop reason: HOSPADM

## 2020-01-01 RX ORDER — DEXTROSE MONOHYDRATE 25 G/50ML
12.5 INJECTION, SOLUTION INTRAVENOUS PRN
Status: DISCONTINUED | OUTPATIENT
Start: 2020-01-01 | End: 2020-01-01

## 2020-01-01 RX ORDER — SODIUM CHLORIDE, SODIUM LACTATE, POTASSIUM CHLORIDE, CALCIUM CHLORIDE 600; 310; 30; 20 MG/100ML; MG/100ML; MG/100ML; MG/100ML
INJECTION, SOLUTION INTRAVENOUS CONTINUOUS
Status: DISCONTINUED | OUTPATIENT
Start: 2020-01-01 | End: 2020-01-01 | Stop reason: HOSPADM

## 2020-01-01 RX ORDER — 0.9 % SODIUM CHLORIDE 0.9 %
500 INTRAVENOUS SOLUTION INTRAVENOUS ONCE
Status: DISCONTINUED | OUTPATIENT
Start: 2020-01-01 | End: 2020-01-01

## 2020-01-01 RX ORDER — POLYETHYLENE GLYCOL 3350 17 G/17G
17 POWDER, FOR SOLUTION ORAL DAILY PRN
Status: DISCONTINUED | OUTPATIENT
Start: 2020-01-01 | End: 2020-01-01 | Stop reason: SDUPTHER

## 2020-01-01 RX ORDER — INSULIN GLARGINE 100 [IU]/ML
15 INJECTION, SOLUTION SUBCUTANEOUS NIGHTLY
Qty: 1 VIAL | Refills: 3 | DISCHARGE
Start: 2020-01-01 | End: 2020-01-01

## 2020-01-01 RX ORDER — MIRTAZAPINE 15 MG/1
15 TABLET, FILM COATED ORAL NIGHTLY
Status: DISCONTINUED | OUTPATIENT
Start: 2020-01-01 | End: 2020-01-01

## 2020-01-01 RX ORDER — TRAMADOL HYDROCHLORIDE 50 MG/1
50 TABLET ORAL EVERY 6 HOURS PRN
Status: CANCELLED | OUTPATIENT
Start: 2020-01-01

## 2020-01-01 RX ORDER — INSULIN GLARGINE 100 [IU]/ML
19 INJECTION, SOLUTION SUBCUTANEOUS NIGHTLY
Status: DISCONTINUED | OUTPATIENT
Start: 2020-01-01 | End: 2020-01-01 | Stop reason: HOSPADM

## 2020-01-01 RX ORDER — METOPROLOL TARTRATE 50 MG/1
50 TABLET, FILM COATED ORAL 2 TIMES DAILY
Status: DISCONTINUED | OUTPATIENT
Start: 2020-01-01 | End: 2020-01-01 | Stop reason: HOSPADM

## 2020-01-01 RX ORDER — HYDROCODONE BITARTRATE AND ACETAMINOPHEN 5; 325 MG/1; MG/1
1 TABLET ORAL EVERY 4 HOURS PRN
Status: DISCONTINUED | OUTPATIENT
Start: 2020-01-01 | End: 2020-01-01

## 2020-01-01 RX ORDER — WARFARIN SODIUM 2.5 MG/1
2.5 TABLET ORAL
Status: ACTIVE | OUTPATIENT
Start: 2020-01-01 | End: 2020-01-01

## 2020-01-01 RX ORDER — ALBUMIN (HUMAN) 12.5 G/50ML
25 SOLUTION INTRAVENOUS ONCE
Status: COMPLETED | OUTPATIENT
Start: 2020-01-01 | End: 2020-01-01

## 2020-01-01 RX ORDER — LINEZOLID 600 MG/1
600 TABLET, FILM COATED ORAL EVERY 12 HOURS SCHEDULED
Status: DISCONTINUED | OUTPATIENT
Start: 2020-01-01 | End: 2020-01-01

## 2020-01-01 RX ORDER — DONEPEZIL HYDROCHLORIDE 5 MG/1
5 TABLET, FILM COATED ORAL NIGHTLY
Qty: 30 TABLET | Refills: 3 | Status: SHIPPED | OUTPATIENT
Start: 2020-01-01

## 2020-01-01 RX ORDER — FUROSEMIDE 10 MG/ML
40 INJECTION INTRAMUSCULAR; INTRAVENOUS 2 TIMES DAILY
Status: DISCONTINUED | OUTPATIENT
Start: 2020-01-01 | End: 2020-01-01

## 2020-01-01 RX ORDER — SERTRALINE HYDROCHLORIDE 100 MG/1
100 TABLET, FILM COATED ORAL DAILY
Qty: 30 TABLET | Refills: 0 | Status: SHIPPED | OUTPATIENT
Start: 2020-01-01

## 2020-01-01 RX ORDER — SODIUM CHLORIDE 9 MG/ML
INJECTION, SOLUTION INTRAVENOUS
Status: DISPENSED
Start: 2020-01-01 | End: 2020-01-01

## 2020-01-01 RX ORDER — DEXTROSE MONOHYDRATE 25 G/50ML
12.5 INJECTION, SOLUTION INTRAVENOUS PRN
Status: CANCELLED | OUTPATIENT
Start: 2020-01-01

## 2020-01-01 RX ORDER — FENTANYL CITRATE 50 UG/ML
INJECTION, SOLUTION INTRAMUSCULAR; INTRAVENOUS
Status: COMPLETED | OUTPATIENT
Start: 2020-01-01 | End: 2020-01-01

## 2020-01-01 RX ORDER — ACETAMINOPHEN 650 MG/1
650 SUPPOSITORY RECTAL EVERY 6 HOURS PRN
Status: DISCONTINUED | OUTPATIENT
Start: 2020-01-01 | End: 2020-01-01 | Stop reason: HOSPADM

## 2020-01-01 RX ORDER — PANTOPRAZOLE SODIUM 40 MG/1
40 TABLET, DELAYED RELEASE ORAL
Qty: 30 TABLET | Refills: 3 | DISCHARGE
Start: 2020-01-01

## 2020-01-01 RX ORDER — POLYETHYLENE GLYCOL 3350 17 G/17G
17 POWDER, FOR SOLUTION ORAL DAILY PRN
Status: DISCONTINUED | OUTPATIENT
Start: 2020-01-01 | End: 2020-01-01 | Stop reason: HOSPADM

## 2020-01-01 RX ORDER — SERTRALINE HYDROCHLORIDE 100 MG/1
100 TABLET, FILM COATED ORAL DAILY
Qty: 30 TABLET | Refills: 0 | Status: ON HOLD
Start: 2020-01-01 | End: 2020-01-01 | Stop reason: SDUPTHER

## 2020-01-01 RX ORDER — WARFARIN SODIUM 2 MG/1
2 TABLET ORAL DAILY
Qty: 100 TABLET | Refills: 3 | Status: ON HOLD | OUTPATIENT
Start: 2020-01-01 | End: 2020-01-01

## 2020-01-01 RX ORDER — ROSUVASTATIN CALCIUM 10 MG/1
5 TABLET, COATED ORAL NIGHTLY
Status: DISCONTINUED | OUTPATIENT
Start: 2020-01-01 | End: 2020-01-01 | Stop reason: HOSPADM

## 2020-01-01 RX ORDER — TRAMADOL HYDROCHLORIDE 50 MG/1
50 TABLET ORAL EVERY 6 HOURS PRN
Status: DISCONTINUED | OUTPATIENT
Start: 2020-01-01 | End: 2020-01-01 | Stop reason: HOSPADM

## 2020-01-01 RX ORDER — SODIUM CHLORIDE 0.9 % (FLUSH) 0.9 %
10 SYRINGE (ML) INJECTION PRN
Status: DISCONTINUED | OUTPATIENT
Start: 2020-01-01 | End: 2020-01-01 | Stop reason: HOSPADM

## 2020-01-01 RX ORDER — WARFARIN SODIUM 2 MG/1
2 TABLET ORAL
Status: DISCONTINUED | OUTPATIENT
Start: 2020-01-01 | End: 2020-01-01 | Stop reason: HOSPADM

## 2020-01-01 RX ORDER — ONDANSETRON 2 MG/ML
4 INJECTION INTRAMUSCULAR; INTRAVENOUS ONCE
Status: COMPLETED | OUTPATIENT
Start: 2020-01-01 | End: 2020-01-01

## 2020-01-01 RX ORDER — DONEPEZIL HYDROCHLORIDE 5 MG/1
5 TABLET, FILM COATED ORAL NIGHTLY
Status: CANCELLED | OUTPATIENT
Start: 2020-01-01

## 2020-01-01 RX ORDER — 0.9 % SODIUM CHLORIDE 0.9 %
500 INTRAVENOUS SOLUTION INTRAVENOUS ONCE
Status: COMPLETED | OUTPATIENT
Start: 2020-01-01 | End: 2020-01-01

## 2020-01-01 RX ORDER — NICOTINE POLACRILEX 4 MG
15 LOZENGE BUCCAL PRN
Status: DISCONTINUED | OUTPATIENT
Start: 2020-01-01 | End: 2020-01-01 | Stop reason: HOSPADM

## 2020-01-01 RX ORDER — ONDANSETRON 4 MG/1
4 TABLET, ORALLY DISINTEGRATING ORAL EVERY 8 HOURS PRN
Status: DISCONTINUED | OUTPATIENT
Start: 2020-01-01 | End: 2020-01-01 | Stop reason: HOSPADM

## 2020-01-01 RX ORDER — TRAZODONE HYDROCHLORIDE 50 MG/1
50 TABLET ORAL NIGHTLY PRN
Status: DISCONTINUED | OUTPATIENT
Start: 2020-01-01 | End: 2020-01-01 | Stop reason: HOSPADM

## 2020-01-01 RX ORDER — HYDRALAZINE HYDROCHLORIDE 50 MG/1
50 TABLET, FILM COATED ORAL EVERY 8 HOURS SCHEDULED
Status: DISCONTINUED | OUTPATIENT
Start: 2020-01-01 | End: 2020-01-01

## 2020-01-01 RX ORDER — INSULIN GLARGINE 100 [IU]/ML
5 INJECTION, SOLUTION SUBCUTANEOUS ONCE
Status: DISCONTINUED | OUTPATIENT
Start: 2020-01-01 | End: 2020-01-01

## 2020-01-01 RX ORDER — TRAZODONE HYDROCHLORIDE 50 MG/1
50 TABLET ORAL NIGHTLY PRN
Status: CANCELLED | OUTPATIENT
Start: 2020-01-01

## 2020-01-01 RX ORDER — METOPROLOL TARTRATE 50 MG/1
50 TABLET, FILM COATED ORAL 2 TIMES DAILY
Status: CANCELLED | OUTPATIENT
Start: 2020-01-01

## 2020-01-01 RX ORDER — LOSARTAN POTASSIUM 25 MG/1
50 TABLET ORAL DAILY
Status: DISCONTINUED | OUTPATIENT
Start: 2020-01-01 | End: 2020-01-01 | Stop reason: HOSPADM

## 2020-01-01 RX ORDER — INSULIN GLARGINE 100 [IU]/ML
19 INJECTION, SOLUTION SUBCUTANEOUS NIGHTLY
Qty: 1 VIAL | Refills: 3 | Status: ON HOLD | OUTPATIENT
Start: 2020-01-01 | End: 2020-01-01 | Stop reason: SDUPTHER

## 2020-01-01 RX ORDER — DEXTROSE MONOHYDRATE 50 MG/ML
100 INJECTION, SOLUTION INTRAVENOUS PRN
Status: CANCELLED | OUTPATIENT
Start: 2020-01-01

## 2020-01-01 RX ORDER — DOXYCYCLINE HYCLATE 100 MG/1
100 CAPSULE ORAL 2 TIMES DAILY
COMMUNITY
End: 2020-01-01

## 2020-01-01 RX ORDER — METRONIDAZOLE 250 MG/1
500 TABLET ORAL EVERY 8 HOURS SCHEDULED
Status: COMPLETED | OUTPATIENT
Start: 2020-01-01 | End: 2020-01-01

## 2020-01-01 RX ORDER — ROSUVASTATIN CALCIUM 5 MG/1
5 TABLET, COATED ORAL NIGHTLY
COMMUNITY

## 2020-01-01 RX ORDER — MORPHINE SULFATE 2 MG/ML
1 INJECTION, SOLUTION INTRAMUSCULAR; INTRAVENOUS EVERY 5 MIN PRN
Status: DISCONTINUED | OUTPATIENT
Start: 2020-01-01 | End: 2020-01-01 | Stop reason: HOSPADM

## 2020-01-01 RX ORDER — HYDROXYZINE HYDROCHLORIDE 50 MG/ML
25 INJECTION, SOLUTION INTRAMUSCULAR EVERY 6 HOURS PRN
Status: DISCONTINUED | OUTPATIENT
Start: 2020-01-01 | End: 2020-01-01 | Stop reason: HOSPADM

## 2020-01-01 RX ORDER — METOPROLOL TARTRATE 50 MG/1
50 TABLET, FILM COATED ORAL 2 TIMES DAILY
COMMUNITY

## 2020-01-01 RX ORDER — HEPARIN SODIUM 5000 [USP'U]/ML
5000 INJECTION, SOLUTION INTRAVENOUS; SUBCUTANEOUS EVERY 8 HOURS SCHEDULED
Status: DISCONTINUED | OUTPATIENT
Start: 2020-01-01 | End: 2020-01-01

## 2020-01-01 RX ORDER — ONDANSETRON 4 MG/1
4 TABLET, ORALLY DISINTEGRATING ORAL EVERY 8 HOURS PRN
Status: CANCELLED | OUTPATIENT
Start: 2020-01-01

## 2020-01-01 RX ORDER — CARBOXYMETHYLCELLULOSE SODIUM 10 MG/ML
1 GEL OPHTHALMIC
Status: DISCONTINUED | OUTPATIENT
Start: 2020-01-01 | End: 2020-09-19 | Stop reason: HOSPADM

## 2020-01-01 RX ORDER — SENNA AND DOCUSATE SODIUM 50; 8.6 MG/1; MG/1
1 TABLET, FILM COATED ORAL 2 TIMES DAILY
Status: DISCONTINUED | OUTPATIENT
Start: 2020-01-01 | End: 2020-01-01

## 2020-01-01 RX ORDER — ONDANSETRON 4 MG/1
8 TABLET, ORALLY DISINTEGRATING ORAL EVERY 8 HOURS PRN
Status: CANCELLED | OUTPATIENT
Start: 2020-01-01

## 2020-01-01 RX ORDER — HYDROPHILIC CREAM
PASTE (GRAM) TOPICAL DAILY
Status: DISCONTINUED | OUTPATIENT
Start: 2020-01-01 | End: 2020-01-01 | Stop reason: HOSPADM

## 2020-01-01 RX ORDER — TRAMADOL HYDROCHLORIDE 50 MG/1
50 TABLET ORAL EVERY 6 HOURS PRN
Qty: 15 TABLET | Refills: 0 | Status: SHIPPED | OUTPATIENT
Start: 2020-01-01 | End: 2020-01-01

## 2020-01-01 RX ORDER — PROPOFOL 10 MG/ML
10 INJECTION, EMULSION INTRAVENOUS
Status: DISCONTINUED | OUTPATIENT
Start: 2020-01-01 | End: 2020-09-19 | Stop reason: HOSPADM

## 2020-01-01 RX ORDER — PANTOPRAZOLE SODIUM 40 MG/1
40 TABLET, DELAYED RELEASE ORAL
Status: CANCELLED | OUTPATIENT
Start: 2020-01-01

## 2020-01-01 RX ORDER — HYDRALAZINE HYDROCHLORIDE 50 MG/1
50 TABLET, FILM COATED ORAL EVERY 8 HOURS SCHEDULED
Status: CANCELLED | OUTPATIENT
Start: 2020-01-01

## 2020-01-01 RX ORDER — FERROUS SULFATE 325(65) MG
325 TABLET ORAL 2 TIMES DAILY
COMMUNITY
End: 2020-01-01

## 2020-01-01 RX ORDER — MAGNESIUM HYDROXIDE 1200 MG/15ML
LIQUID ORAL CONTINUOUS PRN
Status: COMPLETED | OUTPATIENT
Start: 2020-01-01 | End: 2020-01-01

## 2020-01-01 RX ORDER — METHYLPREDNISOLONE SODIUM SUCCINATE 125 MG/2ML
60 INJECTION, POWDER, LYOPHILIZED, FOR SOLUTION INTRAMUSCULAR; INTRAVENOUS ONCE
Status: COMPLETED | OUTPATIENT
Start: 2020-01-01 | End: 2020-01-01

## 2020-01-01 RX ORDER — ROSUVASTATIN CALCIUM 10 MG/1
5 TABLET, COATED ORAL DAILY
Status: DISCONTINUED | OUTPATIENT
Start: 2020-01-01 | End: 2020-01-01 | Stop reason: HOSPADM

## 2020-01-01 RX ORDER — SENNA AND DOCUSATE SODIUM 50; 8.6 MG/1; MG/1
1 TABLET, FILM COATED ORAL 2 TIMES DAILY
Qty: 1 TABLET | Refills: 0 | Status: ON HOLD
Start: 2020-01-01 | End: 2020-01-01

## 2020-01-01 RX ORDER — LIDOCAINE 40 MG/G
CREAM TOPICAL ONCE
Status: DISCONTINUED | OUTPATIENT
Start: 2020-01-01 | End: 2020-01-01 | Stop reason: HOSPADM

## 2020-01-01 RX ORDER — PANTOPRAZOLE SODIUM 40 MG/1
40 TABLET, DELAYED RELEASE ORAL
Status: DISCONTINUED | OUTPATIENT
Start: 2020-01-01 | End: 2020-01-01

## 2020-01-01 RX ORDER — LIDOCAINE 40 MG/G
CREAM TOPICAL PRN
Status: DISCONTINUED | OUTPATIENT
Start: 2020-01-01 | End: 2020-01-01 | Stop reason: HOSPADM

## 2020-01-01 RX ORDER — TRAMADOL HYDROCHLORIDE 50 MG/1
50 TABLET ORAL EVERY 6 HOURS PRN
COMMUNITY

## 2020-01-01 RX ORDER — METRONIDAZOLE 250 MG/1
500 TABLET ORAL EVERY 8 HOURS SCHEDULED
Status: DISCONTINUED | OUTPATIENT
Start: 2020-01-01 | End: 2020-01-01 | Stop reason: HOSPADM

## 2020-01-01 RX ORDER — FERROUS SULFATE 325(65) MG
325 TABLET ORAL 2 TIMES DAILY
Status: CANCELLED | OUTPATIENT
Start: 2020-01-01

## 2020-01-01 RX ORDER — ALPRAZOLAM 0.5 MG/1
0.5 TABLET ORAL 3 TIMES DAILY PRN
Qty: 9 TABLET | Refills: 0 | Status: SHIPPED | OUTPATIENT
Start: 2020-01-01 | End: 2020-01-01

## 2020-01-01 RX ORDER — HYDRALAZINE HYDROCHLORIDE 25 MG/1
25 TABLET, FILM COATED ORAL EVERY 12 HOURS SCHEDULED
Status: DISCONTINUED | OUTPATIENT
Start: 2020-01-01 | End: 2020-01-01

## 2020-01-01 RX ORDER — ONDANSETRON 4 MG/1
4 TABLET, ORALLY DISINTEGRATING ORAL EVERY 8 HOURS PRN
Qty: 20 TABLET | Refills: 0 | Status: SHIPPED | OUTPATIENT
Start: 2020-01-01

## 2020-01-01 RX ORDER — THIAMINE MONONITRATE (VIT B1) 100 MG
100 TABLET ORAL DAILY
Status: DISCONTINUED | OUTPATIENT
Start: 2020-01-01 | End: 2020-01-01 | Stop reason: HOSPADM

## 2020-01-01 RX ORDER — ALPRAZOLAM 0.25 MG/1
0.5 TABLET ORAL 3 TIMES DAILY PRN
Status: DISCONTINUED | OUTPATIENT
Start: 2020-01-01 | End: 2020-01-01 | Stop reason: HOSPADM

## 2020-01-01 RX ORDER — WARFARIN SODIUM 2 MG/1
4 TABLET ORAL DAILY
Status: COMPLETED | OUTPATIENT
Start: 2020-01-01 | End: 2020-01-01

## 2020-01-01 RX ORDER — POVIDONE-IODINE 10 MG/ML
SOLUTION TOPICAL PRN
Status: DISCONTINUED | OUTPATIENT
Start: 2020-01-01 | End: 2020-01-01

## 2020-01-01 RX ORDER — LOSARTAN POTASSIUM 25 MG/1
50 TABLET ORAL EVERY EVENING
Status: DISCONTINUED | OUTPATIENT
Start: 2020-01-01 | End: 2020-01-01 | Stop reason: HOSPADM

## 2020-01-01 RX ORDER — POVIDONE-IODINE 10 MG/ML
SOLUTION TOPICAL DAILY
Status: CANCELLED | OUTPATIENT
Start: 2020-01-01

## 2020-01-01 RX ORDER — ALBUMIN (HUMAN) 12.5 G/50ML
25 SOLUTION INTRAVENOUS EVERY 8 HOURS
Status: COMPLETED | OUTPATIENT
Start: 2020-01-01 | End: 2020-01-01

## 2020-01-01 RX ORDER — NICOTINE POLACRILEX 4 MG
15 LOZENGE BUCCAL PRN
Status: DISCONTINUED | OUTPATIENT
Start: 2020-01-01 | End: 2020-01-01

## 2020-01-01 RX ORDER — HYDROPHILIC CREAM
1 PASTE (GRAM) TOPICAL DAILY
Qty: 1 TUBE | Refills: 0 | Status: ON HOLD
Start: 2020-01-01 | End: 2020-01-01 | Stop reason: HOSPADM

## 2020-01-01 RX ORDER — FAMOTIDINE 20 MG/1
20 TABLET, FILM COATED ORAL 2 TIMES DAILY
Status: DISCONTINUED | OUTPATIENT
Start: 2020-01-01 | End: 2020-01-01

## 2020-01-01 RX ORDER — HYDRALAZINE HYDROCHLORIDE 50 MG/1
50 TABLET, FILM COATED ORAL EVERY 8 HOURS SCHEDULED
Status: DISCONTINUED | OUTPATIENT
Start: 2020-01-01 | End: 2020-01-01 | Stop reason: HOSPADM

## 2020-01-01 RX ORDER — HYDROCODONE BITARTRATE AND ACETAMINOPHEN 5; 325 MG/1; MG/1
1 TABLET ORAL EVERY 4 HOURS PRN
Qty: 20 TABLET | Refills: 0 | Status: SHIPPED | OUTPATIENT
Start: 2020-01-01 | End: 2020-01-01

## 2020-01-01 RX ORDER — PROMETHAZINE HYDROCHLORIDE 25 MG/1
12.5 TABLET ORAL EVERY 6 HOURS PRN
Status: DISCONTINUED | OUTPATIENT
Start: 2020-01-01 | End: 2020-01-01 | Stop reason: HOSPADM

## 2020-01-01 RX ORDER — NICOTINE POLACRILEX 4 MG
15 LOZENGE BUCCAL PRN
Status: DISCONTINUED | OUTPATIENT
Start: 2020-01-01 | End: 2020-09-19 | Stop reason: HOSPADM

## 2020-01-01 RX ORDER — MAGNESIUM SULFATE IN WATER 40 MG/ML
2 INJECTION, SOLUTION INTRAVENOUS ONCE
Status: COMPLETED | OUTPATIENT
Start: 2020-01-01 | End: 2020-01-01

## 2020-01-01 RX ORDER — SENNA AND DOCUSATE SODIUM 50; 8.6 MG/1; MG/1
1 TABLET, FILM COATED ORAL 2 TIMES DAILY
Status: CANCELLED | OUTPATIENT
Start: 2020-01-01

## 2020-01-01 RX ORDER — ASPIRIN 81 MG/1
81 TABLET, CHEWABLE ORAL DAILY
Status: CANCELLED | OUTPATIENT
Start: 2020-01-01

## 2020-01-01 RX ORDER — HEPARIN SODIUM 1000 [USP'U]/ML
INJECTION, SOLUTION INTRAVENOUS; SUBCUTANEOUS
Status: DISPENSED
Start: 2020-01-01 | End: 2020-01-01

## 2020-01-01 RX ORDER — PROMETHAZINE HYDROCHLORIDE 25 MG/1
25 TABLET ORAL EVERY 6 HOURS PRN
Status: DISCONTINUED | OUTPATIENT
Start: 2020-01-01 | End: 2020-01-01 | Stop reason: HOSPADM

## 2020-01-01 RX ORDER — WARFARIN SODIUM 2.5 MG/1
2.5 TABLET ORAL
Status: DISCONTINUED | OUTPATIENT
Start: 2020-01-01 | End: 2020-01-01 | Stop reason: SDUPTHER

## 2020-01-01 RX ORDER — BENZONATATE 100 MG/1
100 CAPSULE ORAL 3 TIMES DAILY PRN
Status: CANCELLED | OUTPATIENT
Start: 2020-01-01

## 2020-01-01 RX ORDER — SENNA AND DOCUSATE SODIUM 50; 8.6 MG/1; MG/1
1 TABLET, FILM COATED ORAL 2 TIMES DAILY
Status: DISCONTINUED | OUTPATIENT
Start: 2020-01-01 | End: 2020-01-01 | Stop reason: HOSPADM

## 2020-01-01 RX ORDER — VANCOMYCIN HYDROCHLORIDE 125 MG/1
125 CAPSULE ORAL
Status: DISCONTINUED | OUTPATIENT
Start: 2020-01-01 | End: 2020-01-01

## 2020-01-01 RX ORDER — HYDRALAZINE HYDROCHLORIDE 50 MG/1
50 TABLET, FILM COATED ORAL EVERY 8 HOURS SCHEDULED
Qty: 90 TABLET | Refills: 3 | Status: ON HOLD | DISCHARGE
Start: 2020-01-01 | End: 2020-01-01 | Stop reason: HOSPADM

## 2020-01-01 RX ORDER — POVIDONE-IODINE 10 MG/ML
SOLUTION TOPICAL PRN
Status: DISCONTINUED | OUTPATIENT
Start: 2020-01-01 | End: 2020-01-01 | Stop reason: HOSPADM

## 2020-01-01 RX ORDER — ROSUVASTATIN CALCIUM 10 MG/1
5 TABLET, COATED ORAL DAILY
Status: CANCELLED | OUTPATIENT
Start: 2020-01-01

## 2020-01-01 RX ORDER — ONDANSETRON 4 MG/1
8 TABLET, ORALLY DISINTEGRATING ORAL EVERY 8 HOURS PRN
Status: DISCONTINUED | OUTPATIENT
Start: 2020-01-01 | End: 2020-01-01 | Stop reason: HOSPADM

## 2020-01-01 RX ORDER — WARFARIN SODIUM 1 MG/1
1 TABLET ORAL
Status: DISCONTINUED | OUTPATIENT
Start: 2020-01-01 | End: 2020-01-01 | Stop reason: HOSPADM

## 2020-01-01 RX ORDER — SENNA AND DOCUSATE SODIUM 50; 8.6 MG/1; MG/1
1 TABLET, FILM COATED ORAL 2 TIMES DAILY
Status: ON HOLD | COMMUNITY
End: 2020-01-01 | Stop reason: SDUPTHER

## 2020-01-01 RX ORDER — ACETYLCYSTEINE 200 MG/ML
SOLUTION ORAL; RESPIRATORY (INHALATION) PRN
Status: DISCONTINUED | OUTPATIENT
Start: 2020-01-01 | End: 2020-01-01 | Stop reason: ALTCHOICE

## 2020-01-01 RX ORDER — WARFARIN SODIUM 1 MG/1
1.5 TABLET ORAL
Status: COMPLETED | OUTPATIENT
Start: 2020-01-01 | End: 2020-01-01

## 2020-01-01 RX ORDER — MAGNESIUM SULFATE 1 G/100ML
1 INJECTION INTRAVENOUS PRN
Status: DISCONTINUED | OUTPATIENT
Start: 2020-01-01 | End: 2020-09-19 | Stop reason: HOSPADM

## 2020-01-01 RX ORDER — FUROSEMIDE 10 MG/ML
80 INJECTION INTRAMUSCULAR; INTRAVENOUS ONCE
Status: COMPLETED | OUTPATIENT
Start: 2020-01-01 | End: 2020-01-01

## 2020-01-01 RX ORDER — OXYCODONE HYDROCHLORIDE AND ACETAMINOPHEN 5; 325 MG/1; MG/1
2 TABLET ORAL PRN
Status: DISCONTINUED | OUTPATIENT
Start: 2020-01-01 | End: 2020-01-01 | Stop reason: HOSPADM

## 2020-01-01 RX ORDER — POVIDONE-IODINE 10 MG/ML
SOLUTION TOPICAL DAILY
Status: DISCONTINUED | OUTPATIENT
Start: 2020-01-01 | End: 2020-01-01 | Stop reason: HOSPADM

## 2020-01-01 RX ORDER — DEXTROSE MONOHYDRATE 50 MG/ML
100 INJECTION, SOLUTION INTRAVENOUS PRN
Status: DISCONTINUED | OUTPATIENT
Start: 2020-01-01 | End: 2020-01-01

## 2020-01-01 RX ORDER — WARFARIN SODIUM 2 MG/1
2 TABLET ORAL
Status: COMPLETED | OUTPATIENT
Start: 2020-01-01 | End: 2020-01-01

## 2020-01-01 RX ORDER — LOPERAMIDE HYDROCHLORIDE 2 MG/1
2 CAPSULE ORAL 4 TIMES DAILY PRN
Status: DISCONTINUED | OUTPATIENT
Start: 2020-01-01 | End: 2020-01-01 | Stop reason: HOSPADM

## 2020-01-01 RX ORDER — INSULIN GLARGINE 100 [IU]/ML
15 INJECTION, SOLUTION SUBCUTANEOUS NIGHTLY
Status: CANCELLED | OUTPATIENT
Start: 2020-01-01

## 2020-01-01 RX ORDER — ANTICOAGULANT CITRATE DEXTROSE SOLUTION FORMULA A 12.25; 11; 3.65 G/500ML; G/500ML; G/500ML
3.8 SOLUTION INTRAVENOUS
Status: DISCONTINUED | OUTPATIENT
Start: 2020-01-01 | End: 2020-01-01 | Stop reason: HOSPADM

## 2020-01-01 RX ORDER — OMEPRAZOLE 40 MG/1
40 CAPSULE, DELAYED RELEASE ORAL
Qty: 30 CAPSULE | Refills: 5 | Status: ON HOLD | OUTPATIENT
Start: 2020-01-01 | End: 2020-01-01

## 2020-01-01 RX ORDER — INSULIN GLARGINE 100 [IU]/ML
15 INJECTION, SOLUTION SUBCUTANEOUS NIGHTLY
Status: DISCONTINUED | OUTPATIENT
Start: 2020-01-01 | End: 2020-01-01

## 2020-01-01 RX ORDER — POTASSIUM CHLORIDE 20 MEQ/1
20 TABLET, EXTENDED RELEASE ORAL ONCE
Status: COMPLETED | OUTPATIENT
Start: 2020-01-01 | End: 2020-01-01

## 2020-01-01 RX ORDER — POVIDONE-IODINE 10 MG/ML
SOLUTION TOPICAL
Status: ON HOLD | DISCHARGE
Start: 2020-01-01 | End: 2020-01-01 | Stop reason: HOSPADM

## 2020-01-01 RX ORDER — OXYCODONE HYDROCHLORIDE AND ACETAMINOPHEN 5; 325 MG/1; MG/1
1 TABLET ORAL PRN
Status: DISCONTINUED | OUTPATIENT
Start: 2020-01-01 | End: 2020-01-01 | Stop reason: HOSPADM

## 2020-01-01 RX ORDER — METOLAZONE 2.5 MG/1
5 TABLET ORAL 2 TIMES DAILY
Status: DISCONTINUED | OUTPATIENT
Start: 2020-01-01 | End: 2020-01-01

## 2020-01-01 RX ORDER — POVIDONE-IODINE 10 MG/ML
SOLUTION TOPICAL PRN
Status: CANCELLED | OUTPATIENT
Start: 2020-01-01

## 2020-01-01 RX ORDER — DIPHENHYDRAMINE HYDROCHLORIDE 50 MG/ML
6.25 INJECTION INTRAMUSCULAR; INTRAVENOUS
Status: DISCONTINUED | OUTPATIENT
Start: 2020-01-01 | End: 2020-01-01 | Stop reason: HOSPADM

## 2020-01-01 RX ORDER — CALCIUM GLUCONATE 20 MG/ML
1 INJECTION, SOLUTION INTRAVENOUS PRN
Status: DISCONTINUED | OUTPATIENT
Start: 2020-01-01 | End: 2020-09-19 | Stop reason: HOSPADM

## 2020-01-01 RX ORDER — METRONIDAZOLE 250 MG/1
500 TABLET ORAL EVERY 8 HOURS SCHEDULED
Status: DISCONTINUED | OUTPATIENT
Start: 2020-01-01 | End: 2020-01-01

## 2020-01-01 RX ORDER — CEFUROXIME AXETIL 250 MG/1
250 TABLET ORAL DAILY
Status: CANCELLED | OUTPATIENT
Start: 2020-01-01 | End: 2020-01-01

## 2020-01-01 RX ORDER — MIDAZOLAM HYDROCHLORIDE 5 MG/ML
INJECTION INTRAMUSCULAR; INTRAVENOUS
Status: COMPLETED | OUTPATIENT
Start: 2020-01-01 | End: 2020-01-01

## 2020-01-01 RX ORDER — ALPRAZOLAM 0.5 MG/1
0.5 TABLET ORAL 3 TIMES DAILY PRN
Status: ON HOLD | COMMUNITY
End: 2020-01-01 | Stop reason: SDUPTHER

## 2020-01-01 RX ORDER — ACETAMINOPHEN 650 MG
TABLET, EXTENDED RELEASE ORAL DAILY
Status: DISCONTINUED | OUTPATIENT
Start: 2020-01-01 | End: 2020-01-01 | Stop reason: HOSPADM

## 2020-01-01 RX ORDER — ACETAMINOPHEN 325 MG/1
650 TABLET ORAL EVERY 6 HOURS PRN
Status: DISCONTINUED | OUTPATIENT
Start: 2020-01-01 | End: 2020-09-19 | Stop reason: HOSPADM

## 2020-01-01 RX ORDER — HYDROXYZINE PAMOATE 25 MG/1
25 CAPSULE ORAL 3 TIMES DAILY PRN
Status: DISCONTINUED | OUTPATIENT
Start: 2020-01-01 | End: 2020-01-01 | Stop reason: HOSPADM

## 2020-01-01 RX ORDER — LEVOFLOXACIN 5 MG/ML
750 INJECTION, SOLUTION INTRAVENOUS EVERY 24 HOURS
Status: DISCONTINUED | OUTPATIENT
Start: 2020-01-01 | End: 2020-01-01

## 2020-01-01 RX ORDER — HYDROPHILIC CREAM
PASTE (GRAM) TOPICAL DAILY
Status: CANCELLED | OUTPATIENT
Start: 2020-01-01

## 2020-01-01 RX ORDER — TRAZODONE HYDROCHLORIDE 50 MG/1
50 TABLET ORAL NIGHTLY PRN
Status: DISCONTINUED | OUTPATIENT
Start: 2020-01-01 | End: 2020-01-01 | Stop reason: SDUPTHER

## 2020-01-01 RX ORDER — ETOMIDATE 2 MG/ML
INJECTION INTRAVENOUS PRN
Status: DISCONTINUED | OUTPATIENT
Start: 2020-01-01 | End: 2020-01-01 | Stop reason: SDUPTHER

## 2020-01-01 RX ORDER — HYDRALAZINE HYDROCHLORIDE 50 MG/1
50 TABLET, FILM COATED ORAL EVERY 6 HOURS PRN
Status: CANCELLED | OUTPATIENT
Start: 2020-01-01

## 2020-01-01 RX ORDER — ALBUMIN (HUMAN) 12.5 G/50ML
25 SOLUTION INTRAVENOUS ONCE
Status: DISCONTINUED | OUTPATIENT
Start: 2020-01-01 | End: 2020-09-19 | Stop reason: HOSPADM

## 2020-01-01 RX ORDER — ANTICOAGULANT CITRATE DEXTROSE SOLUTION FORMULA A 12.25; 11; 3.65 G/500ML; G/500ML; G/500ML
1.9 SOLUTION INTRAVENOUS
Status: DISCONTINUED | OUTPATIENT
Start: 2020-01-01 | End: 2020-01-01

## 2020-01-01 RX ORDER — ALBUMIN (HUMAN) 12.5 G/50ML
25 SOLUTION INTRAVENOUS 2 TIMES DAILY
Status: DISCONTINUED | OUTPATIENT
Start: 2020-01-01 | End: 2020-01-01

## 2020-01-01 RX ORDER — ONDANSETRON 8 MG/1
8 TABLET, ORALLY DISINTEGRATING ORAL EVERY 8 HOURS PRN
Status: DISCONTINUED | OUTPATIENT
Start: 2020-01-01 | End: 2020-01-01 | Stop reason: SDUPTHER

## 2020-01-01 RX ORDER — SODIUM CHLORIDE 0.9 % (FLUSH) 0.9 %
10 SYRINGE (ML) INJECTION PRN
Status: DISCONTINUED | OUTPATIENT
Start: 2020-01-01 | End: 2020-09-19 | Stop reason: HOSPADM

## 2020-01-01 RX ORDER — ATORVASTATIN CALCIUM 20 MG/1
20 TABLET, FILM COATED ORAL NIGHTLY
Qty: 30 TABLET | Refills: 3 | Status: ON HOLD | OUTPATIENT
Start: 2020-01-01 | End: 2020-01-01

## 2020-01-01 RX ORDER — HYDROCORTISONE ACETATE 25 MG/1
25 SUPPOSITORY RECTAL 2 TIMES DAILY
Status: DISCONTINUED | OUTPATIENT
Start: 2020-01-01 | End: 2020-01-01 | Stop reason: HOSPADM

## 2020-01-01 RX ORDER — HYDRALAZINE HYDROCHLORIDE 25 MG/1
50 TABLET, FILM COATED ORAL EVERY 8 HOURS SCHEDULED
Status: DISCONTINUED | OUTPATIENT
Start: 2020-01-01 | End: 2020-01-01 | Stop reason: HOSPADM

## 2020-01-01 RX ORDER — ONDANSETRON 4 MG/1
4 TABLET, FILM COATED ORAL EVERY 6 HOURS PRN
Status: ON HOLD | COMMUNITY
End: 2020-01-01 | Stop reason: HOSPADM

## 2020-01-01 RX ORDER — ACETAMINOPHEN 650 MG/1
650 SUPPOSITORY RECTAL EVERY 6 HOURS PRN
Status: DISCONTINUED | OUTPATIENT
Start: 2020-01-01 | End: 2020-09-19 | Stop reason: HOSPADM

## 2020-01-01 RX ORDER — WARFARIN SODIUM 1 MG/1
1 TABLET ORAL DAILY
Status: COMPLETED | OUTPATIENT
Start: 2020-01-01 | End: 2020-01-01

## 2020-01-01 RX ORDER — LABETALOL HYDROCHLORIDE 5 MG/ML
5 INJECTION, SOLUTION INTRAVENOUS
Status: DISCONTINUED | OUTPATIENT
Start: 2020-01-01 | End: 2020-01-01 | Stop reason: HOSPADM

## 2020-01-01 RX ORDER — LIDOCAINE HYDROCHLORIDE 10 MG/ML
2 INJECTION, SOLUTION INFILTRATION; PERINEURAL
Status: DISCONTINUED | OUTPATIENT
Start: 2020-01-01 | End: 2020-01-01 | Stop reason: HOSPADM

## 2020-01-01 RX ORDER — PHYTONADIONE 5 MG/1
5 TABLET ORAL ONCE
Status: COMPLETED | OUTPATIENT
Start: 2020-01-01 | End: 2020-01-01

## 2020-01-01 RX ORDER — MORPHINE SULFATE 2 MG/ML
1 INJECTION, SOLUTION INTRAMUSCULAR; INTRAVENOUS
Status: DISCONTINUED | OUTPATIENT
Start: 2020-01-01 | End: 2020-01-01

## 2020-01-01 RX ORDER — PROMETHAZINE HYDROCHLORIDE 25 MG/1
12.5 TABLET ORAL EVERY 6 HOURS PRN
Status: DISCONTINUED | OUTPATIENT
Start: 2020-01-01 | End: 2020-01-01 | Stop reason: SDUPTHER

## 2020-01-01 RX ORDER — ANTICOAGULANT CITRATE DEXTROSE SOLUTION FORMULA A 12.25; 11; 3.65 G/500ML; G/500ML; G/500ML
SOLUTION INTRAVENOUS PRN
Status: DISCONTINUED | OUTPATIENT
Start: 2020-01-01 | End: 2020-01-01 | Stop reason: HOSPADM

## 2020-01-01 RX ORDER — SODIUM CHLORIDE 0.9 % (FLUSH) 0.9 %
SYRINGE (ML) INJECTION
Status: DISPENSED
Start: 2020-01-01 | End: 2020-01-01

## 2020-01-01 RX ORDER — LIDOCAINE HYDROCHLORIDE 10 MG/ML
0.4 INJECTION, SOLUTION EPIDURAL; INFILTRATION; INTRACAUDAL; PERINEURAL PRN
Status: DISCONTINUED | OUTPATIENT
Start: 2020-01-01 | End: 2020-01-01 | Stop reason: HOSPADM

## 2020-01-01 RX ORDER — ONDANSETRON 8 MG/1
8 TABLET, ORALLY DISINTEGRATING ORAL EVERY 8 HOURS PRN
Status: DISCONTINUED | OUTPATIENT
Start: 2020-01-01 | End: 2020-01-01 | Stop reason: HOSPADM

## 2020-01-01 RX ORDER — ANTICOAGULANT CITRATE DEXTROSE SOLUTION FORMULA A 12.25; 11; 3.65 G/500ML; G/500ML; G/500ML
SOLUTION INTRAVENOUS PRN
Status: CANCELLED | OUTPATIENT
Start: 2020-01-01

## 2020-01-01 RX ORDER — ALBUMIN (HUMAN) 12.5 G/50ML
SOLUTION INTRAVENOUS
Status: COMPLETED
Start: 2020-01-01 | End: 2020-01-01

## 2020-01-01 RX ORDER — ACETAMINOPHEN 325 MG/1
650 TABLET ORAL EVERY 4 HOURS PRN
Status: DISCONTINUED | OUTPATIENT
Start: 2020-01-01 | End: 2020-01-01 | Stop reason: SDUPTHER

## 2020-01-01 RX ORDER — LOSARTAN POTASSIUM 50 MG/1
50 TABLET ORAL DAILY
Qty: 30 TABLET | Refills: 3 | Status: SHIPPED | OUTPATIENT
Start: 2020-01-01

## 2020-01-01 RX ORDER — METRONIDAZOLE 500 MG/1
500 TABLET ORAL EVERY 8 HOURS SCHEDULED
Qty: 9 TABLET | Refills: 0 | Status: ON HOLD
Start: 2020-01-01 | End: 2020-01-01 | Stop reason: HOSPADM

## 2020-01-01 RX ORDER — HYDROXYZINE PAMOATE 25 MG/1
25 CAPSULE ORAL 3 TIMES DAILY PRN
Status: CANCELLED | OUTPATIENT
Start: 2020-01-01

## 2020-01-01 RX ORDER — ASPIRIN 81 MG/1
81 TABLET, CHEWABLE ORAL DAILY
Status: DISCONTINUED | OUTPATIENT
Start: 2020-01-01 | End: 2020-01-01

## 2020-01-01 RX ORDER — INSULIN GLARGINE 100 [IU]/ML
20 INJECTION, SOLUTION SUBCUTANEOUS NIGHTLY
Status: DISCONTINUED | OUTPATIENT
Start: 2020-01-01 | End: 2020-01-01

## 2020-01-01 RX ORDER — LINEZOLID 2 MG/ML
600 INJECTION, SOLUTION INTRAVENOUS EVERY 12 HOURS
Status: DISCONTINUED | OUTPATIENT
Start: 2020-01-01 | End: 2020-01-01

## 2020-01-01 RX ORDER — HYDRALAZINE HYDROCHLORIDE 25 MG/1
50 TABLET, FILM COATED ORAL EVERY 6 HOURS PRN
Status: DISCONTINUED | OUTPATIENT
Start: 2020-01-01 | End: 2020-01-01 | Stop reason: HOSPADM

## 2020-01-01 RX ORDER — PROMETHAZINE HYDROCHLORIDE 25 MG/1
12.5 TABLET ORAL EVERY 6 HOURS PRN
Status: DISCONTINUED | OUTPATIENT
Start: 2020-01-01 | End: 2020-01-01

## 2020-01-01 RX ORDER — ANTICOAGULANT CITRATE DEXTROSE SOLUTION FORMULA A 12.25; 11; 3.65 G/500ML; G/500ML; G/500ML
5 SOLUTION INTRAVENOUS PRN
Status: DISCONTINUED | OUTPATIENT
Start: 2020-01-01 | End: 2020-01-01 | Stop reason: HOSPADM

## 2020-01-01 RX ORDER — HYDRALAZINE HYDROCHLORIDE 20 MG/ML
5 INJECTION INTRAMUSCULAR; INTRAVENOUS EVERY 30 MIN PRN
Status: DISCONTINUED | OUTPATIENT
Start: 2020-01-01 | End: 2020-01-01 | Stop reason: HOSPADM

## 2020-01-01 RX ORDER — SODIUM CHLORIDE, SODIUM LACTATE, POTASSIUM CHLORIDE, CALCIUM CHLORIDE 600; 310; 30; 20 MG/100ML; MG/100ML; MG/100ML; MG/100ML
INJECTION, SOLUTION INTRAVENOUS CONTINUOUS PRN
Status: DISCONTINUED | OUTPATIENT
Start: 2020-01-01 | End: 2020-01-01 | Stop reason: SDUPTHER

## 2020-01-01 RX ORDER — SODIUM CHLORIDE 9 MG/ML
INJECTION, SOLUTION INTRAVENOUS CONTINUOUS
Status: DISPENSED | OUTPATIENT
Start: 2020-01-01 | End: 2020-01-01

## 2020-01-01 RX ORDER — ALPRAZOLAM 0.25 MG/1
0.5 TABLET ORAL 3 TIMES DAILY PRN
Status: CANCELLED | OUTPATIENT
Start: 2020-01-01

## 2020-01-01 RX ORDER — TRAMADOL HYDROCHLORIDE 50 MG/1
50 TABLET ORAL EVERY 6 HOURS PRN
Status: ON HOLD | COMMUNITY
End: 2020-01-01 | Stop reason: SDUPTHER

## 2020-01-01 RX ORDER — LIDOCAINE HYDROCHLORIDE 10 MG/ML
INJECTION, SOLUTION INFILTRATION; PERINEURAL
Status: COMPLETED
Start: 2020-01-01 | End: 2020-01-01

## 2020-01-01 RX ORDER — SODIUM CHLORIDE 9 MG/ML
INJECTION, SOLUTION INTRAVENOUS
Status: DISCONTINUED
Start: 2020-01-01 | End: 2020-01-01 | Stop reason: WASHOUT

## 2020-01-01 RX ORDER — ANTICOAGULANT CITRATE DEXTROSE SOLUTION FORMULA A 12.25; 11; 3.65 G/500ML; G/500ML; G/500ML
SOLUTION INTRAVENOUS PRN
Status: DISCONTINUED | OUTPATIENT
Start: 2020-01-01 | End: 2020-01-01

## 2020-01-01 RX ORDER — SODIUM CHLORIDE 0.9 % (FLUSH) 0.9 %
10 SYRINGE (ML) INJECTION EVERY 12 HOURS SCHEDULED
Status: DISCONTINUED | OUTPATIENT
Start: 2020-01-01 | End: 2020-09-19 | Stop reason: HOSPADM

## 2020-01-01 RX ORDER — SODIUM CHLORIDE 9 MG/ML
INJECTION, SOLUTION INTRAVENOUS CONTINUOUS
Status: DISCONTINUED | OUTPATIENT
Start: 2020-01-01 | End: 2020-01-01

## 2020-01-01 RX ORDER — WARFARIN SODIUM 3 MG/1
3 TABLET ORAL DAILY
Status: ON HOLD | COMMUNITY
End: 2020-01-01 | Stop reason: HOSPADM

## 2020-01-01 RX ORDER — ONDANSETRON 2 MG/ML
INJECTION INTRAMUSCULAR; INTRAVENOUS PRN
Status: DISCONTINUED | OUTPATIENT
Start: 2020-01-01 | End: 2020-01-01 | Stop reason: SDUPTHER

## 2020-01-01 RX ORDER — CLOPIDOGREL BISULFATE 75 MG/1
75 TABLET ORAL DAILY
Status: CANCELLED | OUTPATIENT
Start: 2020-01-01

## 2020-01-01 RX ORDER — WARFARIN SODIUM 2.5 MG/1
2.5 TABLET ORAL
Status: COMPLETED | OUTPATIENT
Start: 2020-01-01 | End: 2020-01-01

## 2020-01-01 RX ORDER — DEXTROSE MONOHYDRATE 50 MG/ML
100 INJECTION, SOLUTION INTRAVENOUS PRN
Status: DISCONTINUED | OUTPATIENT
Start: 2020-01-01 | End: 2020-09-19 | Stop reason: HOSPADM

## 2020-01-01 RX ORDER — FERROUS SULFATE 325(65) MG
325 TABLET ORAL 2 TIMES DAILY WITH MEALS
Status: DISCONTINUED | OUTPATIENT
Start: 2020-01-01 | End: 2020-01-01 | Stop reason: HOSPADM

## 2020-01-01 RX ORDER — SODIUM CHLORIDE 0.9 % (FLUSH) 0.9 %
10 SYRINGE (ML) INJECTION EVERY 12 HOURS SCHEDULED
Status: DISCONTINUED | OUTPATIENT
Start: 2020-01-01 | End: 2020-01-01

## 2020-01-01 RX ORDER — CHLORHEXIDINE GLUCONATE 0.12 MG/ML
15 RINSE ORAL 2 TIMES DAILY
Status: DISCONTINUED | OUTPATIENT
Start: 2020-01-01 | End: 2020-09-19 | Stop reason: HOSPADM

## 2020-01-01 RX ORDER — METOPROLOL TARTRATE 50 MG/1
50 TABLET, FILM COATED ORAL 2 TIMES DAILY
Status: DISCONTINUED | OUTPATIENT
Start: 2020-01-01 | End: 2020-01-01

## 2020-01-01 RX ORDER — ALPRAZOLAM 0.5 MG/1
0.5 TABLET ORAL 3 TIMES DAILY PRN
Qty: 20 TABLET | Refills: 0 | Status: SHIPPED | OUTPATIENT
Start: 2020-01-01 | End: 2020-01-01

## 2020-01-01 RX ORDER — WARFARIN SODIUM 2 MG/1
2 TABLET ORAL
Status: CANCELLED | OUTPATIENT
Start: 2020-01-01 | End: 2020-01-01

## 2020-01-01 RX ORDER — ROCURONIUM BROMIDE 10 MG/ML
INJECTION, SOLUTION INTRAVENOUS PRN
Status: DISCONTINUED | OUTPATIENT
Start: 2020-01-01 | End: 2020-01-01 | Stop reason: SDUPTHER

## 2020-01-01 RX ORDER — PROMETHAZINE HYDROCHLORIDE 25 MG/1
25 TABLET ORAL EVERY 6 HOURS PRN
Status: CANCELLED | OUTPATIENT
Start: 2020-01-01

## 2020-01-01 RX ORDER — LACTOBACILLUS RHAMNOSUS GG 10B CELL
1 CAPSULE ORAL
Status: CANCELLED | OUTPATIENT
Start: 2020-01-01

## 2020-01-01 RX ORDER — TRAMADOL HYDROCHLORIDE 50 MG/1
50 TABLET ORAL EVERY 6 HOURS PRN
Qty: 28 TABLET | Refills: 0 | Status: SHIPPED | OUTPATIENT
Start: 2020-01-01 | End: 2020-01-01

## 2020-01-01 RX ORDER — PROPOFOL 10 MG/ML
INJECTION, EMULSION INTRAVENOUS PRN
Status: DISCONTINUED | OUTPATIENT
Start: 2020-01-01 | End: 2020-01-01 | Stop reason: SDUPTHER

## 2020-01-01 RX ORDER — MIDODRINE HYDROCHLORIDE 5 MG/1
10 TABLET ORAL
Status: COMPLETED | OUTPATIENT
Start: 2020-01-01 | End: 2020-01-01

## 2020-01-01 RX ORDER — ACETAMINOPHEN 650 MG
TABLET, EXTENDED RELEASE ORAL DAILY
Status: CANCELLED | OUTPATIENT
Start: 2020-01-01

## 2020-01-01 RX ORDER — WARFARIN SODIUM 5 MG/1
5 TABLET ORAL DAILY
Status: DISCONTINUED | OUTPATIENT
Start: 2020-01-01 | End: 2020-01-01 | Stop reason: SDUPTHER

## 2020-01-01 RX ORDER — ACETAMINOPHEN 325 MG/1
650 TABLET ORAL EVERY 6 HOURS PRN
Status: CANCELLED | OUTPATIENT
Start: 2020-01-01

## 2020-01-01 RX ORDER — ROSUVASTATIN CALCIUM 10 MG/1
5 TABLET, COATED ORAL DAILY
Status: DISCONTINUED | OUTPATIENT
Start: 2020-01-01 | End: 2020-01-01

## 2020-01-01 RX ORDER — CEFUROXIME AXETIL 250 MG/1
250 TABLET ORAL DAILY
Status: COMPLETED | OUTPATIENT
Start: 2020-01-01 | End: 2020-01-01

## 2020-01-01 RX ORDER — METRONIDAZOLE 250 MG/1
500 TABLET ORAL EVERY 8 HOURS SCHEDULED
Status: CANCELLED | OUTPATIENT
Start: 2020-01-01 | End: 2020-01-01

## 2020-01-01 RX ORDER — CIPROFLOXACIN 2 MG/ML
400 INJECTION, SOLUTION INTRAVENOUS EVERY 24 HOURS
Status: DISCONTINUED | OUTPATIENT
Start: 2020-01-01 | End: 2020-01-01

## 2020-01-01 RX ORDER — DONEPEZIL HYDROCHLORIDE 5 MG/1
5 TABLET, FILM COATED ORAL NIGHTLY
Status: DISCONTINUED | OUTPATIENT
Start: 2020-01-01 | End: 2020-01-01

## 2020-01-01 RX ORDER — ONDANSETRON 2 MG/ML
4 INJECTION INTRAMUSCULAR; INTRAVENOUS EVERY 6 HOURS PRN
Status: DISCONTINUED | OUTPATIENT
Start: 2020-01-01 | End: 2020-01-01

## 2020-01-01 RX ORDER — AMINOPHYLLINE DIHYDRATE 25 MG/ML
75 INJECTION, SOLUTION INTRAVENOUS ONCE
Status: COMPLETED | OUTPATIENT
Start: 2020-01-01 | End: 2020-01-01

## 2020-01-01 RX ORDER — ARGATROBAN 1 MG/ML
1 INJECTION INTRAVENOUS CONTINUOUS
Status: DISCONTINUED | OUTPATIENT
Start: 2020-01-01 | End: 2020-01-01

## 2020-01-01 RX ORDER — ANTICOAGULANT CITRATE DEXTROSE SOLUTION FORMULA A 12.25; 11; 3.65 G/500ML; G/500ML; G/500ML
SOLUTION INTRAVENOUS PRN
Status: DISCONTINUED | OUTPATIENT
Start: 2020-01-01 | End: 2020-01-01 | Stop reason: SDUPTHER

## 2020-01-01 RX ORDER — LANOLIN ALCOHOL/MO/W.PET/CERES
3 CREAM (GRAM) TOPICAL NIGHTLY
COMMUNITY
End: 2020-01-01

## 2020-01-01 RX ORDER — MORPHINE SULFATE 2 MG/ML
2 INJECTION, SOLUTION INTRAMUSCULAR; INTRAVENOUS EVERY 5 MIN PRN
Status: DISCONTINUED | OUTPATIENT
Start: 2020-01-01 | End: 2020-01-01 | Stop reason: HOSPADM

## 2020-01-01 RX ORDER — INSULIN GLARGINE 100 [IU]/ML
0.15 INJECTION, SOLUTION SUBCUTANEOUS NIGHTLY
Status: CANCELLED | OUTPATIENT
Start: 2020-01-01

## 2020-01-01 RX ORDER — ACETAMINOPHEN 325 MG/1
650 TABLET ORAL EVERY 4 HOURS PRN
Status: CANCELLED | OUTPATIENT
Start: 2020-01-01

## 2020-01-01 RX ORDER — MIRTAZAPINE 15 MG/1
15 TABLET, FILM COATED ORAL NIGHTLY
Status: ON HOLD | COMMUNITY
End: 2020-01-01 | Stop reason: HOSPADM

## 2020-01-01 RX ORDER — ONDANSETRON 2 MG/ML
4 INJECTION INTRAMUSCULAR; INTRAVENOUS
Status: DISCONTINUED | OUTPATIENT
Start: 2020-01-01 | End: 2020-01-01 | Stop reason: HOSPADM

## 2020-01-01 RX ORDER — CEFUROXIME AXETIL 250 MG/1
250 TABLET ORAL DAILY
Status: DISCONTINUED | OUTPATIENT
Start: 2020-01-01 | End: 2020-01-01 | Stop reason: HOSPADM

## 2020-01-01 RX ORDER — POTASSIUM CHLORIDE 750 MG/1
10 TABLET, EXTENDED RELEASE ORAL DAILY
Status: ON HOLD | COMMUNITY
End: 2020-01-01 | Stop reason: HOSPADM

## 2020-01-01 RX ORDER — CHOLECALCIFEROL (VITAMIN D3) 125 MCG
5 CAPSULE ORAL NIGHTLY PRN
Status: DISCONTINUED | OUTPATIENT
Start: 2020-01-01 | End: 2020-01-01 | Stop reason: HOSPADM

## 2020-01-01 RX ORDER — LIDOCAINE HYDROCHLORIDE 10 MG/ML
INJECTION, SOLUTION EPIDURAL; INFILTRATION; INTRACAUDAL; PERINEURAL
Status: DISPENSED
Start: 2020-01-01 | End: 2020-01-01

## 2020-01-01 RX ORDER — ROSUVASTATIN CALCIUM 10 MG/1
5 TABLET, COATED ORAL NIGHTLY
Status: CANCELLED | OUTPATIENT
Start: 2020-01-01

## 2020-01-01 RX ORDER — EPHEDRINE SULFATE 50 MG/ML
INJECTION INTRAVENOUS PRN
Status: DISCONTINUED | OUTPATIENT
Start: 2020-01-01 | End: 2020-01-01 | Stop reason: SDUPTHER

## 2020-01-01 RX ORDER — PROCHLORPERAZINE MALEATE 5 MG/1
5 TABLET ORAL EVERY 6 HOURS PRN
Status: DISCONTINUED | OUTPATIENT
Start: 2020-01-01 | End: 2020-01-01 | Stop reason: HOSPADM

## 2020-01-01 RX ORDER — SODIUM CHLORIDE 0.9 % (FLUSH) 0.9 %
10 SYRINGE (ML) INJECTION EVERY 12 HOURS SCHEDULED
Status: CANCELLED | OUTPATIENT
Start: 2020-01-01

## 2020-01-01 RX ORDER — WARFARIN SODIUM 2.5 MG/1
2.5 TABLET ORAL
Status: CANCELLED | OUTPATIENT
Start: 2020-01-01 | End: 2020-01-01

## 2020-01-01 RX ORDER — FERROUS SULFATE 325(65) MG
325 TABLET ORAL 2 TIMES DAILY
Status: DISCONTINUED | OUTPATIENT
Start: 2020-01-01 | End: 2020-01-01

## 2020-01-01 RX ORDER — ASPIRIN 81 MG/1
81 TABLET, CHEWABLE ORAL DAILY
Qty: 30 TABLET | Refills: 3 | DISCHARGE
Start: 2020-01-01

## 2020-01-01 RX ORDER — POLYETHYLENE GLYCOL 3350 17 G/17G
17 POWDER, FOR SOLUTION ORAL DAILY PRN
Status: DISCONTINUED | OUTPATIENT
Start: 2020-01-01 | End: 2020-01-01

## 2020-01-01 RX ORDER — BENZONATATE 100 MG/1
100 CAPSULE ORAL 3 TIMES DAILY PRN
Qty: 5 CAPSULE | Refills: 0 | Status: ON HOLD
Start: 2020-01-01 | End: 2020-01-01 | Stop reason: HOSPADM

## 2020-01-01 RX ORDER — HYDROXYZINE PAMOATE 25 MG/1
25 CAPSULE ORAL 3 TIMES DAILY PRN
COMMUNITY

## 2020-01-01 RX ORDER — ACETAMINOPHEN 325 MG/1
650 TABLET ORAL EVERY 4 HOURS PRN
Status: DISCONTINUED | OUTPATIENT
Start: 2020-01-01 | End: 2020-01-01 | Stop reason: HOSPADM

## 2020-01-01 RX ORDER — INSULIN GLARGINE 100 [IU]/ML
10 INJECTION, SOLUTION SUBCUTANEOUS NIGHTLY
Status: DISCONTINUED | OUTPATIENT
Start: 2020-01-01 | End: 2020-01-01

## 2020-01-01 RX ORDER — ALPRAZOLAM 0.5 MG/1
0.5 TABLET ORAL EVERY 6 HOURS PRN
Status: ON HOLD | COMMUNITY
End: 2020-01-01 | Stop reason: HOSPADM

## 2020-01-01 RX ORDER — PANTOPRAZOLE SODIUM 40 MG/10ML
40 INJECTION, POWDER, LYOPHILIZED, FOR SOLUTION INTRAVENOUS DAILY
Status: DISCONTINUED | OUTPATIENT
Start: 2020-01-01 | End: 2020-09-19 | Stop reason: HOSPADM

## 2020-01-01 RX ORDER — CEFUROXIME AXETIL 250 MG/1
250 TABLET ORAL DAILY
Qty: 3 TABLET | Refills: 0 | Status: ON HOLD
Start: 2020-01-01 | End: 2020-01-01 | Stop reason: HOSPADM

## 2020-01-01 RX ORDER — PROMETHAZINE HYDROCHLORIDE 25 MG/1
25 TABLET ORAL EVERY 8 HOURS PRN
Status: DISCONTINUED | OUTPATIENT
Start: 2020-01-01 | End: 2020-01-01

## 2020-01-01 RX ORDER — VANCOMYCIN HYDROCHLORIDE 125 MG/1
CAPSULE ORAL
Qty: 112 CAPSULE | Refills: 0 | Status: SHIPPED | OUTPATIENT
Start: 2020-01-01

## 2020-01-01 RX ORDER — DOXYCYCLINE HYCLATE 100 MG
100 TABLET ORAL EVERY 12 HOURS SCHEDULED
Status: DISCONTINUED | OUTPATIENT
Start: 2020-01-01 | End: 2020-01-01

## 2020-01-01 RX ORDER — CLOPIDOGREL BISULFATE 75 MG/1
75 TABLET ORAL DAILY
Status: DISCONTINUED | OUTPATIENT
Start: 2020-01-01 | End: 2020-01-01 | Stop reason: HOSPADM

## 2020-01-01 RX ORDER — HYDRALAZINE HYDROCHLORIDE 25 MG/1
50 TABLET, FILM COATED ORAL EVERY 8 HOURS SCHEDULED
Status: DISCONTINUED | OUTPATIENT
Start: 2020-01-01 | End: 2020-01-01

## 2020-01-01 RX ORDER — ALPRAZOLAM 0.25 MG/1
0.5 TABLET ORAL 3 TIMES DAILY PRN
Status: DISCONTINUED | OUTPATIENT
Start: 2020-01-01 | End: 2020-01-01

## 2020-01-01 RX ORDER — WARFARIN SODIUM 2 MG/1
2 TABLET ORAL DAILY
Status: COMPLETED | OUTPATIENT
Start: 2020-01-01 | End: 2020-01-01

## 2020-01-01 RX ORDER — IPRATROPIUM BROMIDE AND ALBUTEROL SULFATE 2.5; .5 MG/3ML; MG/3ML
1 SOLUTION RESPIRATORY (INHALATION) EVERY 4 HOURS PRN
Status: DISCONTINUED | OUTPATIENT
Start: 2020-01-01 | End: 2020-09-19 | Stop reason: HOSPADM

## 2020-01-01 RX ORDER — LACTOBACILLUS RHAMNOSUS GG 10B CELL
1 CAPSULE ORAL DAILY
Status: CANCELLED | OUTPATIENT
Start: 2020-01-01

## 2020-01-01 RX ORDER — FENTANYL CITRATE 50 UG/ML
INJECTION, SOLUTION INTRAMUSCULAR; INTRAVENOUS PRN
Status: DISCONTINUED | OUTPATIENT
Start: 2020-01-01 | End: 2020-01-01 | Stop reason: SDUPTHER

## 2020-01-01 RX ORDER — BUPIVACAINE HYDROCHLORIDE 5 MG/ML
INJECTION, SOLUTION EPIDURAL; INTRACAUDAL PRN
Status: DISCONTINUED | OUTPATIENT
Start: 2020-01-01 | End: 2020-01-01 | Stop reason: ALTCHOICE

## 2020-01-01 RX ORDER — FUROSEMIDE 10 MG/ML
80 INJECTION INTRAMUSCULAR; INTRAVENOUS 2 TIMES DAILY
Status: DISCONTINUED | OUTPATIENT
Start: 2020-01-01 | End: 2020-01-01

## 2020-01-01 RX ORDER — 0.9 % SODIUM CHLORIDE 0.9 %
1000 INTRAVENOUS SOLUTION INTRAVENOUS ONCE
Status: DISCONTINUED | OUTPATIENT
Start: 2020-01-01 | End: 2020-01-01

## 2020-01-01 RX ORDER — METOLAZONE 2.5 MG/1
5 TABLET ORAL ONCE
Status: COMPLETED | OUTPATIENT
Start: 2020-01-01 | End: 2020-01-01

## 2020-01-01 RX ORDER — PROMETHAZINE HYDROCHLORIDE 25 MG/1
25 TABLET ORAL EVERY 6 HOURS PRN
COMMUNITY

## 2020-01-01 RX ORDER — PROMETHAZINE HYDROCHLORIDE 25 MG/1
TABLET ORAL
Status: DISPENSED
Start: 2020-01-01 | End: 2020-01-01

## 2020-01-01 RX ORDER — LACTOBACILLUS RHAMNOSUS GG 10B CELL
1 CAPSULE ORAL
Qty: 90 CAPSULE | Refills: 2 | Status: SHIPPED | OUTPATIENT
Start: 2020-01-01

## 2020-01-01 RX ORDER — DEXTROSE MONOHYDRATE 25 G/50ML
12.5 INJECTION, SOLUTION INTRAVENOUS PRN
Status: DISCONTINUED | OUTPATIENT
Start: 2020-01-01 | End: 2020-09-19 | Stop reason: HOSPADM

## 2020-01-01 RX ORDER — HYDROCODONE BITARTRATE AND ACETAMINOPHEN 5; 325 MG/1; MG/1
1 TABLET ORAL EVERY 4 HOURS PRN
Status: DISCONTINUED | OUTPATIENT
Start: 2020-01-01 | End: 2020-01-01 | Stop reason: HOSPADM

## 2020-01-01 RX ADMIN — FERROUS SULFATE TAB 325 MG (65 MG ELEMENTAL FE) 325 MG: 325 (65 FE) TAB at 10:02

## 2020-01-01 RX ADMIN — INSULIN LISPRO 2 UNITS: 100 INJECTION, SOLUTION INTRAVENOUS; SUBCUTANEOUS at 11:35

## 2020-01-01 RX ADMIN — HYDROCORTISONE ACETATE 25 MG: 25 SUPPOSITORY RECTAL at 08:32

## 2020-01-01 RX ADMIN — DONEPEZIL HYDROCHLORIDE 5 MG: 5 TABLET, FILM COATED ORAL at 21:36

## 2020-01-01 RX ADMIN — DONEPEZIL HYDROCHLORIDE 5 MG: 5 TABLET, FILM COATED ORAL at 21:58

## 2020-01-01 RX ADMIN — ALPRAZOLAM 0.5 MG: 0.25 TABLET ORAL at 15:00

## 2020-01-01 RX ADMIN — MAGNESIUM HYDROXIDE 30 ML: 2400 SUSPENSION ORAL at 05:50

## 2020-01-01 RX ADMIN — Medication: at 11:06

## 2020-01-01 RX ADMIN — Medication: at 10:50

## 2020-01-01 RX ADMIN — METOPROLOL TARTRATE 25 MG: 25 TABLET ORAL at 21:13

## 2020-01-01 RX ADMIN — FERROUS SULFATE TAB 325 MG (65 MG ELEMENTAL FE) 325 MG: 325 (65 FE) TAB at 08:07

## 2020-01-01 RX ADMIN — FERROUS SULFATE TAB 325 MG (65 MG ELEMENTAL FE) 325 MG: 325 (65 FE) TAB at 10:48

## 2020-01-01 RX ADMIN — ANTICOAGULANT CITRATE DEXTROSE SOLUTION FORMULA A: 12.25; 11; 3.65 SOLUTION INTRAVENOUS at 11:47

## 2020-01-01 RX ADMIN — PANTOPRAZOLE SODIUM 40 MG: 40 TABLET, DELAYED RELEASE ORAL at 09:24

## 2020-01-01 RX ADMIN — Medication 125 MG: at 01:09

## 2020-01-01 RX ADMIN — FUROSEMIDE 80 MG: 10 INJECTION, SOLUTION INTRAMUSCULAR; INTRAVENOUS at 12:19

## 2020-01-01 RX ADMIN — Medication 3 MG: at 22:55

## 2020-01-01 RX ADMIN — TRAZODONE HYDROCHLORIDE 50 MG: 50 TABLET ORAL at 22:02

## 2020-01-01 RX ADMIN — Medication: at 10:20

## 2020-01-01 RX ADMIN — INSULIN GLARGINE 19 UNITS: 100 INJECTION, SOLUTION SUBCUTANEOUS at 21:25

## 2020-01-01 RX ADMIN — WARFARIN SODIUM 2.5 MG: 2.5 TABLET ORAL at 17:30

## 2020-01-01 RX ADMIN — DONEPEZIL HYDROCHLORIDE 5 MG: 5 TABLET, FILM COATED ORAL at 22:27

## 2020-01-01 RX ADMIN — METOPROLOL TARTRATE 50 MG: 50 TABLET, FILM COATED ORAL at 09:04

## 2020-01-01 RX ADMIN — ALPRAZOLAM 0.5 MG: 0.25 TABLET ORAL at 08:00

## 2020-01-01 RX ADMIN — INSULIN LISPRO 2 UNITS: 100 INJECTION, SOLUTION INTRAVENOUS; SUBCUTANEOUS at 21:18

## 2020-01-01 RX ADMIN — LOSARTAN POTASSIUM 25 MG: 25 TABLET, FILM COATED ORAL at 09:25

## 2020-01-01 RX ADMIN — PROMETHAZINE HYDROCHLORIDE 12.5 MG: 25 TABLET ORAL at 12:50

## 2020-01-01 RX ADMIN — INSULIN GLARGINE 15 UNITS: 100 INJECTION, SOLUTION SUBCUTANEOUS at 21:29

## 2020-01-01 RX ADMIN — ATORVASTATIN CALCIUM 20 MG: 10 TABLET, FILM COATED ORAL at 21:13

## 2020-01-01 RX ADMIN — METOPROLOL TARTRATE 50 MG: 50 TABLET, FILM COATED ORAL at 22:08

## 2020-01-01 RX ADMIN — PROMETHAZINE HYDROCHLORIDE 12.5 MG: 25 TABLET ORAL at 15:12

## 2020-01-01 RX ADMIN — FERROUS SULFATE TAB 325 MG (65 MG ELEMENTAL FE) 325 MG: 325 (65 FE) TAB at 22:10

## 2020-01-01 RX ADMIN — ROSUVASTATIN CALCIUM 5 MG: 10 TABLET, FILM COATED ORAL at 07:47

## 2020-01-01 RX ADMIN — MICONAZOLE NITRATE: 20 POWDER TOPICAL at 21:33

## 2020-01-01 RX ADMIN — HYDRALAZINE HYDROCHLORIDE 75 MG: 50 TABLET, FILM COATED ORAL at 21:53

## 2020-01-01 RX ADMIN — METOPROLOL TARTRATE 50 MG: 50 TABLET, FILM COATED ORAL at 20:37

## 2020-01-01 RX ADMIN — POVIDONE-IODINE: 10 SOLUTION TOPICAL at 11:06

## 2020-01-01 RX ADMIN — ASPIRIN 81 MG: 81 TABLET, CHEWABLE ORAL at 09:50

## 2020-01-01 RX ADMIN — INSULIN GLARGINE 19 UNITS: 100 INJECTION, SOLUTION SUBCUTANEOUS at 19:52

## 2020-01-01 RX ADMIN — DONEPEZIL HYDROCHLORIDE 5 MG: 5 TABLET, FILM COATED ORAL at 22:01

## 2020-01-01 RX ADMIN — FERROUS SULFATE TAB 325 MG (65 MG ELEMENTAL FE) 325 MG: 325 (65 FE) TAB at 09:36

## 2020-01-01 RX ADMIN — METOPROLOL TARTRATE 25 MG: 25 TABLET ORAL at 08:32

## 2020-01-01 RX ADMIN — FERROUS SULFATE TAB 325 MG (65 MG ELEMENTAL FE) 325 MG: 325 (65 FE) TAB at 20:58

## 2020-01-01 RX ADMIN — ROSUVASTATIN CALCIUM 5 MG: 10 TABLET, FILM COATED ORAL at 12:56

## 2020-01-01 RX ADMIN — INSULIN GLARGINE 15 UNITS: 100 INJECTION, SOLUTION SUBCUTANEOUS at 20:59

## 2020-01-01 RX ADMIN — Medication 3 MG: at 20:46

## 2020-01-01 RX ADMIN — Medication 125 MG: at 19:49

## 2020-01-01 RX ADMIN — PROMETHAZINE HYDROCHLORIDE 25 MG: 25 TABLET ORAL at 18:19

## 2020-01-01 RX ADMIN — CEFUROXIME AXETIL 250 MG: 250 TABLET ORAL at 09:25

## 2020-01-01 RX ADMIN — WARFARIN SODIUM 4 MG: 2 TABLET ORAL at 18:01

## 2020-01-01 RX ADMIN — INSULIN LISPRO 2 UNITS: 100 INJECTION, SOLUTION INTRAVENOUS; SUBCUTANEOUS at 21:27

## 2020-01-01 RX ADMIN — WARFARIN SODIUM 4 MG: 2 TABLET ORAL at 16:56

## 2020-01-01 RX ADMIN — ANTICOAGULANT CITRATE DEXTROSE SOLUTION FORMULA A: 12.25; 11; 3.65 SOLUTION INTRAVENOUS at 13:04

## 2020-01-01 RX ADMIN — Medication 3 MG: at 20:51

## 2020-01-01 RX ADMIN — INSULIN GLARGINE 15 UNITS: 100 INJECTION, SOLUTION SUBCUTANEOUS at 20:12

## 2020-01-01 RX ADMIN — ASCORBIC ACID 1500 MG: 500 INJECTION INTRAVENOUS at 02:52

## 2020-01-01 RX ADMIN — ANTICOAGULANT CITRATE DEXTROSE SOLUTION FORMULA A 5 ML: 12.25; 11; 3.65 SOLUTION INTRAVENOUS at 17:38

## 2020-01-01 RX ADMIN — PROMETHAZINE HYDROCHLORIDE 12.5 MG: 25 TABLET ORAL at 18:33

## 2020-01-01 RX ADMIN — Medication 100 MG: at 08:49

## 2020-01-01 RX ADMIN — SODIUM CHLORIDE: 9 INJECTION, SOLUTION INTRAVENOUS at 17:49

## 2020-01-01 RX ADMIN — METOPROLOL TARTRATE 50 MG: 50 TABLET, FILM COATED ORAL at 09:50

## 2020-01-01 RX ADMIN — SENNOSIDES AND DOCUSATE SODIUM 1 TABLET: 8.6; 5 TABLET ORAL at 09:59

## 2020-01-01 RX ADMIN — MICONAZOLE NITRATE: 20 POWDER TOPICAL at 22:02

## 2020-01-01 RX ADMIN — THIAMINE HYDROCHLORIDE 200 MG: 100 INJECTION, SOLUTION INTRAMUSCULAR; INTRAVENOUS at 14:43

## 2020-01-01 RX ADMIN — FAMOTIDINE 20 MG: 10 INJECTION, SOLUTION INTRAVENOUS at 09:34

## 2020-01-01 RX ADMIN — Medication 10 ML: at 20:24

## 2020-01-01 RX ADMIN — TRAMADOL HYDROCHLORIDE 50 MG: 50 TABLET, FILM COATED ORAL at 21:13

## 2020-01-01 RX ADMIN — WARFARIN SODIUM 2 MG: 2 TABLET ORAL at 18:04

## 2020-01-01 RX ADMIN — Medication 3 MG: at 22:11

## 2020-01-01 RX ADMIN — PANTOPRAZOLE SODIUM 40 MG: 40 TABLET, DELAYED RELEASE ORAL at 06:28

## 2020-01-01 RX ADMIN — HYDRALAZINE HYDROCHLORIDE 50 MG: 50 TABLET, FILM COATED ORAL at 06:03

## 2020-01-01 RX ADMIN — METOPROLOL TARTRATE 25 MG: 25 TABLET ORAL at 09:32

## 2020-01-01 RX ADMIN — ATORVASTATIN CALCIUM 20 MG: 10 TABLET, FILM COATED ORAL at 20:39

## 2020-01-01 RX ADMIN — ACETAMINOPHEN 650 MG: 325 TABLET ORAL at 12:06

## 2020-01-01 RX ADMIN — HYDRALAZINE HYDROCHLORIDE 50 MG: 50 TABLET, FILM COATED ORAL at 05:16

## 2020-01-01 RX ADMIN — HYDRALAZINE HYDROCHLORIDE 50 MG: 50 TABLET, FILM COATED ORAL at 14:32

## 2020-01-01 RX ADMIN — DONEPEZIL HYDROCHLORIDE 5 MG: 5 TABLET, FILM COATED ORAL at 20:47

## 2020-01-01 RX ADMIN — ONDANSETRON 8 MG: 4 TABLET, ORALLY DISINTEGRATING ORAL at 16:21

## 2020-01-01 RX ADMIN — ASPIRIN 81 MG 81 MG: 81 TABLET ORAL at 07:53

## 2020-01-01 RX ADMIN — METOPROLOL TARTRATE 25 MG: 25 TABLET ORAL at 08:00

## 2020-01-01 RX ADMIN — Medication 125 MG: at 00:04

## 2020-01-01 RX ADMIN — METRONIDAZOLE 500 MG: 250 TABLET ORAL at 07:07

## 2020-01-01 RX ADMIN — INSULIN LISPRO 2 UNITS: 100 INJECTION, SOLUTION INTRAVENOUS; SUBCUTANEOUS at 09:49

## 2020-01-01 RX ADMIN — SERTRALINE HYDROCHLORIDE 50 MG: 50 TABLET ORAL at 22:27

## 2020-01-01 RX ADMIN — VANCOMYCIN HYDROCHLORIDE 1250 MG: 10 INJECTION, POWDER, LYOPHILIZED, FOR SOLUTION INTRAVENOUS at 16:30

## 2020-01-01 RX ADMIN — SERTRALINE HYDROCHLORIDE 100 MG: 50 TABLET ORAL at 07:56

## 2020-01-01 RX ADMIN — PANTOPRAZOLE SODIUM 40 MG: 40 TABLET, DELAYED RELEASE ORAL at 06:12

## 2020-01-01 RX ADMIN — TRAMADOL HYDROCHLORIDE 50 MG: 50 TABLET, FILM COATED ORAL at 08:00

## 2020-01-01 RX ADMIN — INSULIN LISPRO 1 UNITS: 100 INJECTION, SOLUTION INTRAVENOUS; SUBCUTANEOUS at 20:31

## 2020-01-01 RX ADMIN — SODIUM BICARBONATE 100 MEQ: 84 INJECTION, SOLUTION INTRAVENOUS at 09:59

## 2020-01-01 RX ADMIN — ARGATROBAN 2 MCG/KG/MIN: 50 INJECTION, SOLUTION INTRAVENOUS at 00:35

## 2020-01-01 RX ADMIN — Medication 3 MG: at 21:53

## 2020-01-01 RX ADMIN — WARFARIN SODIUM 1 MG: 1 TABLET ORAL at 18:35

## 2020-01-01 RX ADMIN — METOPROLOL TARTRATE 50 MG: 50 TABLET, FILM COATED ORAL at 20:29

## 2020-01-01 RX ADMIN — Medication: at 14:58

## 2020-01-01 RX ADMIN — Medication 3 MG: at 21:03

## 2020-01-01 RX ADMIN — ASPIRIN 81 MG 81 MG: 81 TABLET ORAL at 09:39

## 2020-01-01 RX ADMIN — SENNOSIDES AND DOCUSATE SODIUM 1 TABLET: 8.6; 5 TABLET ORAL at 14:26

## 2020-01-01 RX ADMIN — METOPROLOL TARTRATE 50 MG: 50 TABLET, FILM COATED ORAL at 08:24

## 2020-01-01 RX ADMIN — ALPRAZOLAM 0.5 MG: 0.25 TABLET ORAL at 21:13

## 2020-01-01 RX ADMIN — Medication 125 MG: at 06:03

## 2020-01-01 RX ADMIN — INSULIN LISPRO 4 UNITS: 100 INJECTION, SOLUTION INTRAVENOUS; SUBCUTANEOUS at 14:16

## 2020-01-01 RX ADMIN — Medication 125 MG: at 06:25

## 2020-01-01 RX ADMIN — ROSUVASTATIN CALCIUM 5 MG: 10 TABLET, FILM COATED ORAL at 13:11

## 2020-01-01 RX ADMIN — Medication 15 ML: at 14:55

## 2020-01-01 RX ADMIN — FUROSEMIDE 20 MG/HR: 10 INJECTION, SOLUTION INTRAMUSCULAR; INTRAVENOUS at 03:56

## 2020-01-01 RX ADMIN — Medication 10 ML: at 21:03

## 2020-01-01 RX ADMIN — Medication 3 MG: at 21:33

## 2020-01-01 RX ADMIN — INSULIN GLARGINE 15 UNITS: 100 INJECTION, SOLUTION SUBCUTANEOUS at 21:18

## 2020-01-01 RX ADMIN — SERTRALINE HYDROCHLORIDE 100 MG: 50 TABLET ORAL at 09:59

## 2020-01-01 RX ADMIN — SODIUM CHLORIDE 500 ML: 9 INJECTION, SOLUTION INTRAVENOUS at 15:47

## 2020-01-01 RX ADMIN — PANTOPRAZOLE SODIUM 40 MG: 40 TABLET, DELAYED RELEASE ORAL at 06:22

## 2020-01-01 RX ADMIN — Medication 1 CAPSULE: at 09:25

## 2020-01-01 RX ADMIN — Medication 1 CAPSULE: at 08:20

## 2020-01-01 RX ADMIN — ALPRAZOLAM 0.5 MG: 0.25 TABLET ORAL at 20:58

## 2020-01-01 RX ADMIN — ROSUVASTATIN CALCIUM 5 MG: 10 TABLET, FILM COATED ORAL at 09:14

## 2020-01-01 RX ADMIN — SODIUM ZIRCONIUM CYCLOSILICATE 10 G: 5 POWDER, FOR SUSPENSION ORAL at 08:13

## 2020-01-01 RX ADMIN — HYDRALAZINE HYDROCHLORIDE 75 MG: 50 TABLET, FILM COATED ORAL at 09:04

## 2020-01-01 RX ADMIN — INSULIN LISPRO 2 UNITS: 100 INJECTION, SOLUTION INTRAVENOUS; SUBCUTANEOUS at 18:09

## 2020-01-01 RX ADMIN — Medication 125 MG: at 17:51

## 2020-01-01 RX ADMIN — SERTRALINE HYDROCHLORIDE 100 MG: 50 TABLET ORAL at 13:19

## 2020-01-01 RX ADMIN — Medication 125 MG: at 17:16

## 2020-01-01 RX ADMIN — HYDRALAZINE HYDROCHLORIDE 50 MG: 50 TABLET, FILM COATED ORAL at 22:06

## 2020-01-01 RX ADMIN — SERTRALINE HYDROCHLORIDE 100 MG: 50 TABLET ORAL at 09:24

## 2020-01-01 RX ADMIN — Medication: at 09:17

## 2020-01-01 RX ADMIN — ASPIRIN 81 MG 81 MG: 81 TABLET ORAL at 12:57

## 2020-01-01 RX ADMIN — FERROUS SULFATE TAB 325 MG (65 MG ELEMENTAL FE) 325 MG: 325 (65 FE) TAB at 21:38

## 2020-01-01 RX ADMIN — SERTRALINE HYDROCHLORIDE 100 MG: 50 TABLET ORAL at 09:48

## 2020-01-01 RX ADMIN — METOPROLOL TARTRATE 25 MG: 25 TABLET ORAL at 22:27

## 2020-01-01 RX ADMIN — Medication 125 MG: at 18:29

## 2020-01-01 RX ADMIN — ROSUVASTATIN CALCIUM 5 MG: 10 TABLET, FILM COATED ORAL at 21:18

## 2020-01-01 RX ADMIN — DONEPEZIL HYDROCHLORIDE 5 MG: 5 TABLET, FILM COATED ORAL at 22:02

## 2020-01-01 RX ADMIN — SENNOSIDES AND DOCUSATE SODIUM 1 TABLET: 8.6; 5 TABLET ORAL at 20:03

## 2020-01-01 RX ADMIN — ONDANSETRON 8 MG: 8 TABLET, ORALLY DISINTEGRATING ORAL at 04:45

## 2020-01-01 RX ADMIN — ASPIRIN 81 MG 81 MG: 81 TABLET ORAL at 07:54

## 2020-01-01 RX ADMIN — WARFARIN SODIUM 6 MG: 5 TABLET ORAL at 17:33

## 2020-01-01 RX ADMIN — PANTOPRAZOLE SODIUM 40 MG: 40 TABLET, DELAYED RELEASE ORAL at 06:19

## 2020-01-01 RX ADMIN — METOPROLOL TARTRATE 50 MG: 50 TABLET, FILM COATED ORAL at 20:57

## 2020-01-01 RX ADMIN — MICONAZOLE NITRATE: 20 POWDER TOPICAL at 22:25

## 2020-01-01 RX ADMIN — FERROUS SULFATE TAB 325 MG (65 MG ELEMENTAL FE) 325 MG: 325 (65 FE) TAB at 22:06

## 2020-01-01 RX ADMIN — HYDRALAZINE HYDROCHLORIDE 50 MG: 25 TABLET, FILM COATED ORAL at 06:19

## 2020-01-01 RX ADMIN — PANTOPRAZOLE SODIUM 40 MG: 40 TABLET, DELAYED RELEASE ORAL at 05:57

## 2020-01-01 RX ADMIN — METOPROLOL TARTRATE 25 MG: 25 TABLET ORAL at 10:40

## 2020-01-01 RX ADMIN — ROSUVASTATIN CALCIUM 5 MG: 10 TABLET, FILM COATED ORAL at 15:19

## 2020-01-01 RX ADMIN — HYDRALAZINE HYDROCHLORIDE 50 MG: 50 TABLET, FILM COATED ORAL at 21:33

## 2020-01-01 RX ADMIN — ROSUVASTATIN CALCIUM 5 MG: 10 TABLET, FILM COATED ORAL at 20:06

## 2020-01-01 RX ADMIN — MICONAZOLE NITRATE: 20 POWDER TOPICAL at 21:56

## 2020-01-01 RX ADMIN — INSULIN LISPRO 3 UNITS: 100 INJECTION, SOLUTION INTRAVENOUS; SUBCUTANEOUS at 20:17

## 2020-01-01 RX ADMIN — LINEZOLID 600 MG: 600 TABLET, FILM COATED ORAL at 10:40

## 2020-01-01 RX ADMIN — HYDRALAZINE HYDROCHLORIDE 50 MG: 50 TABLET, FILM COATED ORAL at 20:53

## 2020-01-01 RX ADMIN — DARBEPOETIN ALFA 60 MCG: 60 INJECTION, SOLUTION INTRAVENOUS; SUBCUTANEOUS at 19:08

## 2020-01-01 RX ADMIN — INSULIN LISPRO 2 UNITS: 100 INJECTION, SOLUTION INTRAVENOUS; SUBCUTANEOUS at 07:27

## 2020-01-01 RX ADMIN — FUROSEMIDE 20 MG/HR: 10 INJECTION, SOLUTION INTRAMUSCULAR; INTRAVENOUS at 19:53

## 2020-01-01 RX ADMIN — Medication 1000 ML/HR: at 04:29

## 2020-01-01 RX ADMIN — ALPRAZOLAM 0.5 MG: 0.25 TABLET ORAL at 14:13

## 2020-01-01 RX ADMIN — ROSUVASTATIN CALCIUM 5 MG: 10 TABLET, FILM COATED ORAL at 07:54

## 2020-01-01 RX ADMIN — PANTOPRAZOLE SODIUM 40 MG: 40 TABLET, DELAYED RELEASE ORAL at 05:48

## 2020-01-01 RX ADMIN — HYDRALAZINE HYDROCHLORIDE 50 MG: 25 TABLET, FILM COATED ORAL at 06:21

## 2020-01-01 RX ADMIN — ASPIRIN 81 MG 81 MG: 81 TABLET ORAL at 13:08

## 2020-01-01 RX ADMIN — METRONIDAZOLE 500 MG: 250 TABLET ORAL at 06:36

## 2020-01-01 RX ADMIN — WARFARIN SODIUM 2.5 MG: 2.5 TABLET ORAL at 17:29

## 2020-01-01 RX ADMIN — INSULIN GLARGINE 19 UNITS: 100 INJECTION, SOLUTION SUBCUTANEOUS at 21:13

## 2020-01-01 RX ADMIN — WARFARIN SODIUM 4 MG: 2 TABLET ORAL at 19:33

## 2020-01-01 RX ADMIN — METOLAZONE 5 MG: 2.5 TABLET ORAL at 09:47

## 2020-01-01 RX ADMIN — PANTOPRAZOLE SODIUM 40 MG: 40 TABLET, DELAYED RELEASE ORAL at 06:34

## 2020-01-01 RX ADMIN — FERROUS SULFATE TAB 325 MG (65 MG ELEMENTAL FE) 325 MG: 325 (65 FE) TAB at 21:34

## 2020-01-01 RX ADMIN — Medication 1 CAPSULE: at 10:31

## 2020-01-01 RX ADMIN — INSULIN GLARGINE 19 UNITS: 100 INJECTION, SOLUTION SUBCUTANEOUS at 20:46

## 2020-01-01 RX ADMIN — TRAMADOL HYDROCHLORIDE 50 MG: 50 TABLET, FILM COATED ORAL at 20:33

## 2020-01-01 RX ADMIN — INSULIN GLARGINE 15 UNITS: 100 INJECTION, SOLUTION SUBCUTANEOUS at 20:51

## 2020-01-01 RX ADMIN — POVIDONE-IODINE: 10 SOLUTION TOPICAL at 09:38

## 2020-01-01 RX ADMIN — Medication 10 ML: at 21:21

## 2020-01-01 RX ADMIN — MICONAZOLE NITRATE: 20 POWDER TOPICAL at 21:17

## 2020-01-01 RX ADMIN — Medication: at 21:13

## 2020-01-01 RX ADMIN — TRAMADOL HYDROCHLORIDE 50 MG: 50 TABLET, FILM COATED ORAL at 21:16

## 2020-01-01 RX ADMIN — INSULIN LISPRO 1 UNITS: 100 INJECTION, SOLUTION INTRAVENOUS; SUBCUTANEOUS at 22:11

## 2020-01-01 RX ADMIN — Medication 3 MG: at 21:58

## 2020-01-01 RX ADMIN — SODIUM CHLORIDE: 9 INJECTION, SOLUTION INTRAVENOUS at 05:45

## 2020-01-01 RX ADMIN — HYDRALAZINE HYDROCHLORIDE 50 MG: 50 TABLET, FILM COATED ORAL at 15:51

## 2020-01-01 RX ADMIN — SERTRALINE HYDROCHLORIDE 100 MG: 50 TABLET, FILM COATED ORAL at 16:03

## 2020-01-01 RX ADMIN — DONEPEZIL HYDROCHLORIDE 5 MG: 5 TABLET, FILM COATED ORAL at 21:03

## 2020-01-01 RX ADMIN — ALPRAZOLAM 0.5 MG: 0.25 TABLET ORAL at 09:32

## 2020-01-01 RX ADMIN — HYDRALAZINE HYDROCHLORIDE 50 MG: 50 TABLET, FILM COATED ORAL at 06:04

## 2020-01-01 RX ADMIN — TRAMADOL HYDROCHLORIDE 50 MG: 50 TABLET, FILM COATED ORAL at 16:39

## 2020-01-01 RX ADMIN — INSULIN LISPRO 4 UNITS: 100 INJECTION, SOLUTION INTRAVENOUS; SUBCUTANEOUS at 17:18

## 2020-01-01 RX ADMIN — TRAMADOL HYDROCHLORIDE 50 MG: 50 TABLET, FILM COATED ORAL at 20:40

## 2020-01-01 RX ADMIN — PANTOPRAZOLE SODIUM 40 MG: 40 TABLET, DELAYED RELEASE ORAL at 06:14

## 2020-01-01 RX ADMIN — MICONAZOLE NITRATE: 20 POWDER TOPICAL at 09:16

## 2020-01-01 RX ADMIN — ATORVASTATIN CALCIUM 20 MG: 10 TABLET, FILM COATED ORAL at 22:03

## 2020-01-01 RX ADMIN — B-COMPLEX W/ C & FOLIC ACID TAB 1 TABLET: TAB at 09:36

## 2020-01-01 RX ADMIN — POVIDONE-IODINE: 10 SOLUTION TOPICAL at 10:51

## 2020-01-01 RX ADMIN — METOPROLOL TARTRATE 50 MG: 50 TABLET, FILM COATED ORAL at 20:59

## 2020-01-01 RX ADMIN — SERTRALINE HYDROCHLORIDE 100 MG: 50 TABLET ORAL at 14:25

## 2020-01-01 RX ADMIN — VANCOMYCIN HYDROCHLORIDE 1000 MG: 10 INJECTION, POWDER, LYOPHILIZED, FOR SOLUTION INTRAVENOUS at 12:50

## 2020-01-01 RX ADMIN — FERROUS SULFATE TAB 325 MG (65 MG ELEMENTAL FE) 325 MG: 325 (65 FE) TAB at 09:00

## 2020-01-01 RX ADMIN — METRONIDAZOLE 500 MG: 250 TABLET ORAL at 06:06

## 2020-01-01 RX ADMIN — ALPRAZOLAM 0.5 MG: 0.25 TABLET ORAL at 13:35

## 2020-01-01 RX ADMIN — FERROUS SULFATE TAB 325 MG (65 MG ELEMENTAL FE) 325 MG: 325 (65 FE) TAB at 22:55

## 2020-01-01 RX ADMIN — TRAZODONE HYDROCHLORIDE 50 MG: 50 TABLET ORAL at 21:05

## 2020-01-01 RX ADMIN — TRAMADOL HYDROCHLORIDE 50 MG: 50 TABLET, FILM COATED ORAL at 10:12

## 2020-01-01 RX ADMIN — METOPROLOL TARTRATE 50 MG: 50 TABLET, FILM COATED ORAL at 20:58

## 2020-01-01 RX ADMIN — TRAMADOL HYDROCHLORIDE 50 MG: 50 TABLET, FILM COATED ORAL at 09:42

## 2020-01-01 RX ADMIN — INSULIN LISPRO 1 UNITS: 100 INJECTION, SOLUTION INTRAVENOUS; SUBCUTANEOUS at 21:10

## 2020-01-01 RX ADMIN — INSULIN GLARGINE 15 UNITS: 100 INJECTION, SOLUTION SUBCUTANEOUS at 22:14

## 2020-01-01 RX ADMIN — ASPIRIN 81 MG 81 MG: 81 TABLET ORAL at 09:05

## 2020-01-01 RX ADMIN — Medication: at 14:06

## 2020-01-01 RX ADMIN — PANTOPRAZOLE SODIUM 40 MG: 40 TABLET, DELAYED RELEASE ORAL at 06:53

## 2020-01-01 RX ADMIN — WARFARIN SODIUM 4 MG: 2 TABLET ORAL at 22:03

## 2020-01-01 RX ADMIN — Medication 10 ML: at 22:40

## 2020-01-01 RX ADMIN — Medication 1 CAPSULE: at 08:51

## 2020-01-01 RX ADMIN — INSULIN LISPRO 1 UNITS: 100 INJECTION, SOLUTION INTRAVENOUS; SUBCUTANEOUS at 21:38

## 2020-01-01 RX ADMIN — PANTOPRAZOLE SODIUM 40 MG: 40 TABLET, DELAYED RELEASE ORAL at 05:45

## 2020-01-01 RX ADMIN — Medication 10 ML: at 20:10

## 2020-01-01 RX ADMIN — Medication 100 MG: at 08:43

## 2020-01-01 RX ADMIN — HYDRALAZINE HYDROCHLORIDE 75 MG: 50 TABLET, FILM COATED ORAL at 15:30

## 2020-01-01 RX ADMIN — MICONAZOLE NITRATE: 20 POWDER TOPICAL at 10:49

## 2020-01-01 RX ADMIN — ROSUVASTATIN CALCIUM 5 MG: 10 TABLET, FILM COATED ORAL at 07:56

## 2020-01-01 RX ADMIN — METOPROLOL TARTRATE 50 MG: 50 TABLET, FILM COATED ORAL at 21:03

## 2020-01-01 RX ADMIN — VANCOMYCIN HYDROCHLORIDE 500 MG: 500 INJECTION, POWDER, LYOPHILIZED, FOR SOLUTION INTRAVENOUS at 15:21

## 2020-01-01 RX ADMIN — POTASSIUM CHLORIDE 20 MEQ: 20 TABLET, EXTENDED RELEASE ORAL at 10:41

## 2020-01-01 RX ADMIN — PROCHLORPERAZINE MALEATE 5 MG: 5 TABLET ORAL at 23:23

## 2020-01-01 RX ADMIN — DONEPEZIL HYDROCHLORIDE 5 MG: 5 TABLET, FILM COATED ORAL at 21:32

## 2020-01-01 RX ADMIN — Medication 1 CAPSULE: at 07:40

## 2020-01-01 RX ADMIN — FERROUS SULFATE TAB 325 MG (65 MG ELEMENTAL FE) 325 MG: 325 (65 FE) TAB at 09:14

## 2020-01-01 RX ADMIN — ASPIRIN 81 MG 81 MG: 81 TABLET ORAL at 10:31

## 2020-01-01 RX ADMIN — FERROUS SULFATE TAB 325 MG (65 MG ELEMENTAL FE) 325 MG: 325 (65 FE) TAB at 20:21

## 2020-01-01 RX ADMIN — ASPIRIN 81 MG 81 MG: 81 TABLET ORAL at 09:26

## 2020-01-01 RX ADMIN — WARFARIN SODIUM 5 MG: 5 TABLET ORAL at 17:49

## 2020-01-01 RX ADMIN — METOPROLOL TARTRATE 50 MG: 50 TABLET, FILM COATED ORAL at 22:10

## 2020-01-01 RX ADMIN — ALPRAZOLAM 0.5 MG: 0.25 TABLET ORAL at 22:27

## 2020-01-01 RX ADMIN — LINEZOLID 600 MG: 600 TABLET, FILM COATED ORAL at 08:48

## 2020-01-01 RX ADMIN — Medication 1 CAPSULE: at 08:49

## 2020-01-01 RX ADMIN — WARFARIN SODIUM 1.5 MG: 1 TABLET ORAL at 17:56

## 2020-01-01 RX ADMIN — METOPROLOL TARTRATE 50 MG: 50 TABLET, FILM COATED ORAL at 13:12

## 2020-01-01 RX ADMIN — B-COMPLEX W/ C & FOLIC ACID TAB 1 TABLET: TAB at 09:46

## 2020-01-01 RX ADMIN — DONEPEZIL HYDROCHLORIDE 5 MG: 5 TABLET, FILM COATED ORAL at 20:58

## 2020-01-01 RX ADMIN — Medication 500 ML/HR: at 13:00

## 2020-01-01 RX ADMIN — ASPIRIN 81 MG 81 MG: 81 TABLET ORAL at 08:46

## 2020-01-01 RX ADMIN — METOPROLOL TARTRATE 25 MG: 25 TABLET ORAL at 22:14

## 2020-01-01 RX ADMIN — METOPROLOL TARTRATE 50 MG: 50 TABLET, FILM COATED ORAL at 09:06

## 2020-01-01 RX ADMIN — Medication 10 ML: at 20:35

## 2020-01-01 RX ADMIN — PANTOPRAZOLE SODIUM 40 MG: 40 TABLET, DELAYED RELEASE ORAL at 06:00

## 2020-01-01 RX ADMIN — INSULIN LISPRO 2 UNITS: 100 INJECTION, SOLUTION INTRAVENOUS; SUBCUTANEOUS at 09:36

## 2020-01-01 RX ADMIN — FERROUS SULFATE TAB 325 MG (65 MG ELEMENTAL FE) 325 MG: 325 (65 FE) TAB at 20:29

## 2020-01-01 RX ADMIN — Medication 1 CAPSULE: at 13:04

## 2020-01-01 RX ADMIN — POVIDONE-IODINE: 10 SOLUTION TOPICAL at 05:49

## 2020-01-01 RX ADMIN — METOPROLOL TARTRATE 50 MG: 50 TABLET, FILM COATED ORAL at 08:48

## 2020-01-01 RX ADMIN — INSULIN LISPRO 2 UNITS: 100 INJECTION, SOLUTION INTRAVENOUS; SUBCUTANEOUS at 11:57

## 2020-01-01 RX ADMIN — B-COMPLEX W/ C & FOLIC ACID TAB 1 TABLET: TAB at 10:48

## 2020-01-01 RX ADMIN — Medication 10 ML: at 22:33

## 2020-01-01 RX ADMIN — METOPROLOL TARTRATE 50 MG: 50 TABLET, FILM COATED ORAL at 09:05

## 2020-01-01 RX ADMIN — SERTRALINE HYDROCHLORIDE 100 MG: 50 TABLET, FILM COATED ORAL at 09:50

## 2020-01-01 RX ADMIN — TRAMADOL HYDROCHLORIDE 50 MG: 50 TABLET, FILM COATED ORAL at 20:54

## 2020-01-01 RX ADMIN — WARFARIN SODIUM 3 MG: 2 TABLET ORAL at 19:22

## 2020-01-01 RX ADMIN — Medication 125 MG: at 18:48

## 2020-01-01 RX ADMIN — TRAZODONE HYDROCHLORIDE 50 MG: 50 TABLET ORAL at 21:13

## 2020-01-01 RX ADMIN — Medication: at 09:16

## 2020-01-01 RX ADMIN — FERROUS SULFATE TAB 325 MG (65 MG ELEMENTAL FE) 325 MG: 325 (65 FE) TAB at 20:48

## 2020-01-01 RX ADMIN — INSULIN GLARGINE 19 UNITS: 100 INJECTION, SOLUTION SUBCUTANEOUS at 22:06

## 2020-01-01 RX ADMIN — FUROSEMIDE 20 MG/HR: 10 INJECTION, SOLUTION INTRAMUSCULAR; INTRAVENOUS at 22:10

## 2020-01-01 RX ADMIN — B-COMPLEX W/ C & FOLIC ACID TAB 1 TABLET: TAB at 13:12

## 2020-01-01 RX ADMIN — ONDANSETRON 8 MG: 4 TABLET, ORALLY DISINTEGRATING ORAL at 20:39

## 2020-01-01 RX ADMIN — PROPOFOL 300 MG: 10 INJECTION, EMULSION INTRAVENOUS at 17:02

## 2020-01-01 RX ADMIN — HYDRALAZINE HYDROCHLORIDE 50 MG: 50 TABLET, FILM COATED ORAL at 22:18

## 2020-01-01 RX ADMIN — DEXTROSE MONOHYDRATE 12.5 G: 25 INJECTION, SOLUTION INTRAVENOUS at 17:12

## 2020-01-01 RX ADMIN — Medication 125 MG: at 00:00

## 2020-01-01 RX ADMIN — PANTOPRAZOLE SODIUM 40 MG: 40 INJECTION, POWDER, FOR SOLUTION INTRAVENOUS at 10:00

## 2020-01-01 RX ADMIN — HYDRALAZINE HYDROCHLORIDE 50 MG: 50 TABLET, FILM COATED ORAL at 05:36

## 2020-01-01 RX ADMIN — DONEPEZIL HYDROCHLORIDE 5 MG: 5 TABLET, FILM COATED ORAL at 20:56

## 2020-01-01 RX ADMIN — DARBEPOETIN ALFA 25 MCG: 25 INJECTION, SOLUTION INTRAVENOUS; SUBCUTANEOUS at 15:44

## 2020-01-01 RX ADMIN — METOPROLOL TARTRATE 50 MG: 50 TABLET, FILM COATED ORAL at 07:47

## 2020-01-01 RX ADMIN — Medication 100 MG: at 09:00

## 2020-01-01 RX ADMIN — Medication 1 CAPSULE: at 10:48

## 2020-01-01 RX ADMIN — INSULIN LISPRO 4 UNITS: 100 INJECTION, SOLUTION INTRAVENOUS; SUBCUTANEOUS at 17:19

## 2020-01-01 RX ADMIN — SODIUM ZIRCONIUM CYCLOSILICATE 10 G: 5 POWDER, FOR SUSPENSION ORAL at 15:50

## 2020-01-01 RX ADMIN — VANCOMYCIN HYDROCHLORIDE 500 MG: 500 INJECTION, POWDER, LYOPHILIZED, FOR SOLUTION INTRAVENOUS at 09:40

## 2020-01-01 RX ADMIN — SERTRALINE HYDROCHLORIDE 100 MG: 50 TABLET, FILM COATED ORAL at 09:06

## 2020-01-01 RX ADMIN — METOPROLOL TARTRATE 25 MG: 25 TABLET ORAL at 21:23

## 2020-01-01 RX ADMIN — Medication 10 ML: at 09:06

## 2020-01-01 RX ADMIN — ONDANSETRON 8 MG: 8 TABLET, ORALLY DISINTEGRATING ORAL at 03:24

## 2020-01-01 RX ADMIN — Medication 125 MG: at 11:32

## 2020-01-01 RX ADMIN — SODIUM CHLORIDE 250 ML: 9 INJECTION, SOLUTION INTRAVENOUS at 21:45

## 2020-01-01 RX ADMIN — Medication 125 MG: at 14:27

## 2020-01-01 RX ADMIN — VANCOMYCIN HYDROCHLORIDE 125 MG: 5 INJECTION, POWDER, LYOPHILIZED, FOR SOLUTION INTRAVENOUS at 09:59

## 2020-01-01 RX ADMIN — HYDRALAZINE HYDROCHLORIDE 50 MG: 50 TABLET, FILM COATED ORAL at 20:22

## 2020-01-01 RX ADMIN — DONEPEZIL HYDROCHLORIDE 5 MG: 5 TABLET, FILM COATED ORAL at 21:05

## 2020-01-01 RX ADMIN — ROSUVASTATIN CALCIUM 5 MG: 10 TABLET, FILM COATED ORAL at 09:05

## 2020-01-01 RX ADMIN — LOSARTAN POTASSIUM 50 MG: 25 TABLET, FILM COATED ORAL at 23:35

## 2020-01-01 RX ADMIN — INSULIN LISPRO 4 UNITS: 100 INJECTION, SOLUTION INTRAVENOUS; SUBCUTANEOUS at 18:03

## 2020-01-01 RX ADMIN — ALBUMIN (HUMAN) 25 G: 0.25 INJECTION, SOLUTION INTRAVENOUS at 10:33

## 2020-01-01 RX ADMIN — Medication 10 ML: at 14:28

## 2020-01-01 RX ADMIN — ALPRAZOLAM 0.5 MG: 0.25 TABLET ORAL at 21:40

## 2020-01-01 RX ADMIN — INSULIN GLARGINE 15 UNITS: 100 INJECTION, SOLUTION SUBCUTANEOUS at 20:37

## 2020-01-01 RX ADMIN — MICONAZOLE NITRATE: 20 POWDER TOPICAL at 11:00

## 2020-01-01 RX ADMIN — METOPROLOL TARTRATE 50 MG: 50 TABLET, FILM COATED ORAL at 10:00

## 2020-01-01 RX ADMIN — DONEPEZIL HYDROCHLORIDE 5 MG: 5 TABLET, FILM COATED ORAL at 21:13

## 2020-01-01 RX ADMIN — ATORVASTATIN CALCIUM 20 MG: 10 TABLET, FILM COATED ORAL at 21:40

## 2020-01-01 RX ADMIN — LOSARTAN POTASSIUM 50 MG: 25 TABLET, FILM COATED ORAL at 20:54

## 2020-01-01 RX ADMIN — METOPROLOL TARTRATE 50 MG: 50 TABLET, FILM COATED ORAL at 21:59

## 2020-01-01 RX ADMIN — HYDROCORTISONE SODIUM SUCCINATE 50 MG: 100 INJECTION, POWDER, FOR SOLUTION INTRAMUSCULAR; INTRAVENOUS at 01:30

## 2020-01-01 RX ADMIN — HYDRALAZINE HYDROCHLORIDE 75 MG: 50 TABLET, FILM COATED ORAL at 22:48

## 2020-01-01 RX ADMIN — FERROUS SULFATE TAB 325 MG (65 MG ELEMENTAL FE) 325 MG: 325 (65 FE) TAB at 08:18

## 2020-01-01 RX ADMIN — FERROUS SULFATE TAB 325 MG (65 MG ELEMENTAL FE) 325 MG: 325 (65 FE) TAB at 07:47

## 2020-01-01 RX ADMIN — METOPROLOL TARTRATE 25 MG: 25 TABLET ORAL at 22:02

## 2020-01-01 RX ADMIN — FERROUS SULFATE TAB 325 MG (65 MG ELEMENTAL FE) 325 MG: 325 (65 FE) TAB at 08:46

## 2020-01-01 RX ADMIN — Medication 1 CAPSULE: at 07:53

## 2020-01-01 RX ADMIN — ASPIRIN 81 MG 81 MG: 81 TABLET ORAL at 09:48

## 2020-01-01 RX ADMIN — VANCOMYCIN HYDROCHLORIDE 500 MG: 500 INJECTION, POWDER, LYOPHILIZED, FOR SOLUTION INTRAVENOUS at 11:37

## 2020-01-01 RX ADMIN — EPHEDRINE SULFATE 10 MG: 50 INJECTION INTRAVENOUS at 13:52

## 2020-01-01 RX ADMIN — Medication 125 MG: at 23:24

## 2020-01-01 RX ADMIN — SENNOSIDES AND DOCUSATE SODIUM 1 TABLET: 8.6; 5 TABLET ORAL at 09:04

## 2020-01-01 RX ADMIN — Medication 125 MG: at 23:22

## 2020-01-01 RX ADMIN — HYDROCORTISONE ACETATE 25 MG: 25 SUPPOSITORY RECTAL at 08:49

## 2020-01-01 RX ADMIN — METOPROLOL TARTRATE 50 MG: 50 TABLET, FILM COATED ORAL at 10:49

## 2020-01-01 RX ADMIN — METRONIDAZOLE 500 MG: 250 TABLET ORAL at 20:57

## 2020-01-01 RX ADMIN — Medication 1 CAPSULE: at 07:54

## 2020-01-01 RX ADMIN — Medication 10 ML: at 21:09

## 2020-01-01 RX ADMIN — METOPROLOL TARTRATE 50 MG: 50 TABLET, FILM COATED ORAL at 08:38

## 2020-01-01 RX ADMIN — FIDAXOMICIN 200 MG: 200 TABLET, FILM COATED ORAL at 10:32

## 2020-01-01 RX ADMIN — INSULIN LISPRO 1 UNITS: 100 INJECTION, SOLUTION INTRAVENOUS; SUBCUTANEOUS at 21:46

## 2020-01-01 RX ADMIN — LINEZOLID 600 MG: 600 TABLET, FILM COATED ORAL at 20:28

## 2020-01-01 RX ADMIN — INSULIN GLARGINE 15 UNITS: 100 INJECTION, SOLUTION SUBCUTANEOUS at 20:54

## 2020-01-01 RX ADMIN — Medication 125 MG: at 12:09

## 2020-01-01 RX ADMIN — INSULIN LISPRO 2 UNITS: 100 INJECTION, SOLUTION INTRAVENOUS; SUBCUTANEOUS at 21:38

## 2020-01-01 RX ADMIN — INSULIN LISPRO 2 UNITS: 100 INJECTION, SOLUTION INTRAVENOUS; SUBCUTANEOUS at 07:54

## 2020-01-01 RX ADMIN — METOPROLOL TARTRATE 50 MG: 50 TABLET, FILM COATED ORAL at 21:58

## 2020-01-01 RX ADMIN — FERROUS SULFATE TAB 325 MG (65 MG ELEMENTAL FE) 325 MG: 325 (65 FE) TAB at 09:25

## 2020-01-01 RX ADMIN — METRONIDAZOLE 500 MG: 500 INJECTION, SOLUTION INTRAVENOUS at 14:17

## 2020-01-01 RX ADMIN — MICONAZOLE NITRATE: 20 POWDER TOPICAL at 08:50

## 2020-01-01 RX ADMIN — Medication 10 ML: at 08:22

## 2020-01-01 RX ADMIN — FERROUS SULFATE TAB 325 MG (65 MG ELEMENTAL FE) 325 MG: 325 (65 FE) TAB at 08:20

## 2020-01-01 RX ADMIN — METOPROLOL TARTRATE 50 MG: 50 TABLET, FILM COATED ORAL at 08:22

## 2020-01-01 RX ADMIN — ROSUVASTATIN CALCIUM 5 MG: 10 TABLET, FILM COATED ORAL at 09:35

## 2020-01-01 RX ADMIN — TRAMADOL HYDROCHLORIDE 50 MG: 50 TABLET, FILM COATED ORAL at 05:37

## 2020-01-01 RX ADMIN — ALPRAZOLAM 0.5 MG: 0.25 TABLET ORAL at 08:46

## 2020-01-01 RX ADMIN — PANTOPRAZOLE SODIUM 40 MG: 40 TABLET, DELAYED RELEASE ORAL at 05:50

## 2020-01-01 RX ADMIN — SERTRALINE HYDROCHLORIDE 50 MG: 50 TABLET ORAL at 20:21

## 2020-01-01 RX ADMIN — ROSUVASTATIN CALCIUM 5 MG: 10 TABLET, FILM COATED ORAL at 10:48

## 2020-01-01 RX ADMIN — WARFARIN SODIUM 2 MG: 2 TABLET ORAL at 16:36

## 2020-01-01 RX ADMIN — INSULIN LISPRO 2 UNITS: 100 INJECTION, SOLUTION INTRAVENOUS; SUBCUTANEOUS at 12:11

## 2020-01-01 RX ADMIN — DONEPEZIL HYDROCHLORIDE 5 MG: 5 TABLET, FILM COATED ORAL at 23:36

## 2020-01-01 RX ADMIN — Medication 125 MG: at 23:57

## 2020-01-01 RX ADMIN — Medication 3 MG: at 21:20

## 2020-01-01 RX ADMIN — HYDRALAZINE HYDROCHLORIDE 50 MG: 25 TABLET, FILM COATED ORAL at 13:35

## 2020-01-01 RX ADMIN — HYDRALAZINE HYDROCHLORIDE 50 MG: 25 TABLET, FILM COATED ORAL at 14:57

## 2020-01-01 RX ADMIN — Medication 125 MG: at 16:36

## 2020-01-01 RX ADMIN — FERROUS SULFATE TAB 325 MG (65 MG ELEMENTAL FE) 325 MG: 325 (65 FE) TAB at 21:05

## 2020-01-01 RX ADMIN — PANTOPRAZOLE SODIUM 40 MG: 40 TABLET, DELAYED RELEASE ORAL at 05:42

## 2020-01-01 RX ADMIN — FUROSEMIDE 80 MG: 10 INJECTION, SOLUTION INTRAMUSCULAR; INTRAVENOUS at 18:34

## 2020-01-01 RX ADMIN — ONDANSETRON 8 MG: 8 TABLET, ORALLY DISINTEGRATING ORAL at 14:44

## 2020-01-01 RX ADMIN — INSULIN LISPRO 1 UNITS: 100 INJECTION, SOLUTION INTRAVENOUS; SUBCUTANEOUS at 20:37

## 2020-01-01 RX ADMIN — DEXTROSE MONOHYDRATE 30 MCG/MIN: 50 INJECTION, SOLUTION INTRAVENOUS at 20:47

## 2020-01-01 RX ADMIN — ATORVASTATIN CALCIUM 20 MG: 10 TABLET, FILM COATED ORAL at 21:16

## 2020-01-01 RX ADMIN — Medication 10 ML: at 08:24

## 2020-01-01 RX ADMIN — INSULIN LISPRO 2 UNITS: 100 INJECTION, SOLUTION INTRAVENOUS; SUBCUTANEOUS at 12:21

## 2020-01-01 RX ADMIN — SERTRALINE 100 MG: 50 TABLET, FILM COATED ORAL at 09:12

## 2020-01-01 RX ADMIN — HYDRALAZINE HYDROCHLORIDE 50 MG: 50 TABLET, FILM COATED ORAL at 15:08

## 2020-01-01 RX ADMIN — SERTRALINE HYDROCHLORIDE 50 MG: 50 TABLET ORAL at 21:40

## 2020-01-01 RX ADMIN — FERROUS SULFATE TAB 325 MG (65 MG ELEMENTAL FE) 325 MG: 325 (65 FE) TAB at 07:54

## 2020-01-01 RX ADMIN — ALPRAZOLAM 0.5 MG: 0.25 TABLET ORAL at 22:01

## 2020-01-01 RX ADMIN — Medication 500 ML/HR: at 23:24

## 2020-01-01 RX ADMIN — INSULIN GLARGINE 15 UNITS: 100 INJECTION, SOLUTION SUBCUTANEOUS at 21:27

## 2020-01-01 RX ADMIN — VANCOMYCIN HYDROCHLORIDE 1000 MG: 1 INJECTION, POWDER, LYOPHILIZED, FOR SOLUTION INTRAVENOUS at 16:58

## 2020-01-01 RX ADMIN — INSULIN GLARGINE 15 UNITS: 100 INJECTION, SOLUTION SUBCUTANEOUS at 20:29

## 2020-01-01 RX ADMIN — CEFEPIME 1 G: 1 INJECTION, POWDER, FOR SOLUTION INTRAMUSCULAR; INTRAVENOUS at 23:57

## 2020-01-01 RX ADMIN — Medication 10 ML: at 09:48

## 2020-01-01 RX ADMIN — MICONAZOLE NITRATE: 20 POWDER TOPICAL at 00:38

## 2020-01-01 RX ADMIN — INSULIN LISPRO 6 UNITS: 100 INJECTION, SOLUTION INTRAVENOUS; SUBCUTANEOUS at 16:56

## 2020-01-01 RX ADMIN — TRAMADOL HYDROCHLORIDE 50 MG: 50 TABLET, FILM COATED ORAL at 09:14

## 2020-01-01 RX ADMIN — INSULIN LISPRO 2 UNITS: 100 INJECTION, SOLUTION INTRAVENOUS; SUBCUTANEOUS at 11:29

## 2020-01-01 RX ADMIN — HYDRALAZINE HYDROCHLORIDE 50 MG: 50 TABLET, FILM COATED ORAL at 13:07

## 2020-01-01 RX ADMIN — Medication 125 MG: at 12:03

## 2020-01-01 RX ADMIN — LOSARTAN POTASSIUM 50 MG: 25 TABLET, FILM COATED ORAL at 18:28

## 2020-01-01 RX ADMIN — WARFARIN SODIUM 4 MG: 2 TABLET ORAL at 17:57

## 2020-01-01 RX ADMIN — ROSUVASTATIN CALCIUM 5 MG: 10 TABLET, FILM COATED ORAL at 10:31

## 2020-01-01 RX ADMIN — PANTOPRAZOLE SODIUM 40 MG: 40 TABLET, DELAYED RELEASE ORAL at 06:29

## 2020-01-01 RX ADMIN — PROMETHAZINE HYDROCHLORIDE 12.5 MG: 25 TABLET ORAL at 08:54

## 2020-01-01 RX ADMIN — WARFARIN SODIUM 4 MG: 2 TABLET ORAL at 18:06

## 2020-01-01 RX ADMIN — TRAMADOL HYDROCHLORIDE 50 MG: 50 TABLET, FILM COATED ORAL at 01:20

## 2020-01-01 RX ADMIN — TRAMADOL HYDROCHLORIDE 50 MG: 50 TABLET, FILM COATED ORAL at 10:26

## 2020-01-01 RX ADMIN — DOXYCYCLINE HYCLATE 100 MG: 100 TABLET, COATED ORAL at 22:50

## 2020-01-01 RX ADMIN — INSULIN GLARGINE 15 UNITS: 100 INJECTION, SOLUTION SUBCUTANEOUS at 22:04

## 2020-01-01 RX ADMIN — METOPROLOL TARTRATE 50 MG: 50 TABLET, FILM COATED ORAL at 21:28

## 2020-01-01 RX ADMIN — METOPROLOL TARTRATE 50 MG: 50 TABLET, FILM COATED ORAL at 09:25

## 2020-01-01 RX ADMIN — LOSARTAN POTASSIUM 50 MG: 25 TABLET, FILM COATED ORAL at 08:25

## 2020-01-01 RX ADMIN — Medication 3 MG: at 20:43

## 2020-01-01 RX ADMIN — INSULIN LISPRO 1 UNITS: 100 INJECTION, SOLUTION INTRAVENOUS; SUBCUTANEOUS at 21:43

## 2020-01-01 RX ADMIN — MICONAZOLE NITRATE: 20 POWDER TOPICAL at 21:41

## 2020-01-01 RX ADMIN — FERROUS SULFATE TAB 325 MG (65 MG ELEMENTAL FE) 325 MG: 325 (65 FE) TAB at 17:09

## 2020-01-01 RX ADMIN — Medication 10 ML: at 08:50

## 2020-01-01 RX ADMIN — INSULIN LISPRO 1 UNITS: 100 INJECTION, SOLUTION INTRAVENOUS; SUBCUTANEOUS at 21:53

## 2020-01-01 RX ADMIN — Medication 125 MG: at 06:23

## 2020-01-01 RX ADMIN — FENTANYL CITRATE 50 MCG: 50 INJECTION INTRAMUSCULAR; INTRAVENOUS at 13:18

## 2020-01-01 RX ADMIN — ROSUVASTATIN CALCIUM 5 MG: 10 TABLET, FILM COATED ORAL at 08:52

## 2020-01-01 RX ADMIN — WARFARIN SODIUM 4 MG: 2 TABLET ORAL at 17:06

## 2020-01-01 RX ADMIN — HYDRALAZINE HYDROCHLORIDE 50 MG: 50 TABLET, FILM COATED ORAL at 14:17

## 2020-01-01 RX ADMIN — SERTRALINE HYDROCHLORIDE 50 MG: 50 TABLET ORAL at 21:16

## 2020-01-01 RX ADMIN — TRAMADOL HYDROCHLORIDE 50 MG: 50 TABLET, FILM COATED ORAL at 06:50

## 2020-01-01 RX ADMIN — Medication 3 MG: at 20:36

## 2020-01-01 RX ADMIN — INSULIN LISPRO 1 UNITS: 100 INJECTION, SOLUTION INTRAVENOUS; SUBCUTANEOUS at 21:02

## 2020-01-01 RX ADMIN — CEFUROXIME AXETIL 250 MG: 250 TABLET ORAL at 08:19

## 2020-01-01 RX ADMIN — Medication 15 G: at 10:33

## 2020-01-01 RX ADMIN — ASPIRIN 81 MG 81 MG: 81 TABLET ORAL at 10:49

## 2020-01-01 RX ADMIN — FERROUS SULFATE TAB 325 MG (65 MG ELEMENTAL FE) 325 MG: 325 (65 FE) TAB at 09:06

## 2020-01-01 RX ADMIN — ATORVASTATIN CALCIUM 20 MG: 10 TABLET, FILM COATED ORAL at 20:17

## 2020-01-01 RX ADMIN — INSULIN LISPRO 4 UNITS: 100 INJECTION, SOLUTION INTRAVENOUS; SUBCUTANEOUS at 11:35

## 2020-01-01 RX ADMIN — SERTRALINE 100 MG: 50 TABLET, FILM COATED ORAL at 07:54

## 2020-01-01 RX ADMIN — METOPROLOL TARTRATE 25 MG: 25 TABLET ORAL at 09:14

## 2020-01-01 RX ADMIN — Medication 10 ML: at 09:44

## 2020-01-01 RX ADMIN — Medication 10 ML: at 13:17

## 2020-01-01 RX ADMIN — METOPROLOL TARTRATE 25 MG: 25 TABLET ORAL at 21:35

## 2020-01-01 RX ADMIN — INSULIN LISPRO 4 UNITS: 100 INJECTION, SOLUTION INTRAVENOUS; SUBCUTANEOUS at 16:58

## 2020-01-01 RX ADMIN — Medication 125 MG: at 06:19

## 2020-01-01 RX ADMIN — POVIDONE-IODINE: 10 SOLUTION TOPICAL at 08:19

## 2020-01-01 RX ADMIN — Medication: at 07:47

## 2020-01-01 RX ADMIN — POLYETHYLENE GLYCOL 3350 17 G: 17 POWDER, FOR SOLUTION ORAL at 17:24

## 2020-01-01 RX ADMIN — FERROUS SULFATE TAB 325 MG (65 MG ELEMENTAL FE) 325 MG: 325 (65 FE) TAB at 09:24

## 2020-01-01 RX ADMIN — ROSUVASTATIN CALCIUM 5 MG: 10 TABLET, FILM COATED ORAL at 09:25

## 2020-01-01 RX ADMIN — ROSUVASTATIN CALCIUM 5 MG: 10 TABLET, FILM COATED ORAL at 08:18

## 2020-01-01 RX ADMIN — MELATONIN TAB 5 MG 5 MG: 5 TAB at 21:14

## 2020-01-01 RX ADMIN — MICONAZOLE NITRATE: 20 POWDER TOPICAL at 08:53

## 2020-01-01 RX ADMIN — INSULIN LISPRO 2 UNITS: 100 INJECTION, SOLUTION INTRAVENOUS; SUBCUTANEOUS at 21:13

## 2020-01-01 RX ADMIN — ACETAMINOPHEN 650 MG: 325 TABLET ORAL at 21:05

## 2020-01-01 RX ADMIN — ATORVASTATIN CALCIUM 20 MG: 10 TABLET, FILM COATED ORAL at 21:54

## 2020-01-01 RX ADMIN — WARFARIN SODIUM 2 MG: 2 TABLET ORAL at 17:52

## 2020-01-01 RX ADMIN — ONDANSETRON 8 MG: 4 TABLET, ORALLY DISINTEGRATING ORAL at 19:35

## 2020-01-01 RX ADMIN — METOLAZONE 5 MG: 2.5 TABLET ORAL at 22:19

## 2020-01-01 RX ADMIN — ALPRAZOLAM 0.5 MG: 0.25 TABLET ORAL at 19:51

## 2020-01-01 RX ADMIN — METOPROLOL TARTRATE 50 MG: 50 TABLET, FILM COATED ORAL at 21:08

## 2020-01-01 RX ADMIN — METOPROLOL TARTRATE 25 MG: 25 TABLET ORAL at 20:39

## 2020-01-01 RX ADMIN — ALPRAZOLAM 0.5 MG: 0.25 TABLET ORAL at 21:23

## 2020-01-01 RX ADMIN — PANTOPRAZOLE SODIUM 40 MG: 40 TABLET, DELAYED RELEASE ORAL at 06:13

## 2020-01-01 RX ADMIN — SERTRALINE HYDROCHLORIDE 100 MG: 50 TABLET ORAL at 12:56

## 2020-01-01 RX ADMIN — DONEPEZIL HYDROCHLORIDE 5 MG: 5 TABLET, FILM COATED ORAL at 20:48

## 2020-01-01 RX ADMIN — WARFARIN SODIUM 1 MG: 1 TABLET ORAL at 18:48

## 2020-01-01 RX ADMIN — DONEPEZIL HYDROCHLORIDE 5 MG: 5 TABLET, FILM COATED ORAL at 21:11

## 2020-01-01 RX ADMIN — INSULIN GLARGINE 15 UNITS: 100 INJECTION, SOLUTION SUBCUTANEOUS at 21:28

## 2020-01-01 RX ADMIN — CARBOXYMETHYLCELLULOSE SODIUM 1 DROP: 10 GEL OPHTHALMIC at 14:46

## 2020-01-01 RX ADMIN — INSULIN LISPRO 2 UNITS: 100 INJECTION, SOLUTION INTRAVENOUS; SUBCUTANEOUS at 12:15

## 2020-01-01 RX ADMIN — ALPRAZOLAM 0.5 MG: 0.25 TABLET ORAL at 08:18

## 2020-01-01 RX ADMIN — ONDANSETRON 8 MG: 8 TABLET, ORALLY DISINTEGRATING ORAL at 08:44

## 2020-01-01 RX ADMIN — SODIUM BICARBONATE: 84 INJECTION, SOLUTION INTRAVENOUS at 11:41

## 2020-01-01 RX ADMIN — SERTRALINE 100 MG: 50 TABLET, FILM COATED ORAL at 10:44

## 2020-01-01 RX ADMIN — FERROUS SULFATE TAB 325 MG (65 MG ELEMENTAL FE) 325 MG: 325 (65 FE) TAB at 22:33

## 2020-01-01 RX ADMIN — LIDOCAINE HYDROCHLORIDE 200 MG: 10 INJECTION, SOLUTION INFILTRATION; PERINEURAL at 10:05

## 2020-01-01 RX ADMIN — VANCOMYCIN HYDROCHLORIDE 500 MG: 500 INJECTION, POWDER, LYOPHILIZED, FOR SOLUTION INTRAVENOUS at 11:35

## 2020-01-01 RX ADMIN — ALPRAZOLAM 0.5 MG: 0.25 TABLET ORAL at 06:16

## 2020-01-01 RX ADMIN — TRAZODONE HYDROCHLORIDE 50 MG: 50 TABLET ORAL at 20:42

## 2020-01-01 RX ADMIN — HYDROCORTISONE ACETATE 25 MG: 25 SUPPOSITORY RECTAL at 20:33

## 2020-01-01 RX ADMIN — VANCOMYCIN HYDROCHLORIDE 500 MG: 500 INJECTION, POWDER, LYOPHILIZED, FOR SOLUTION INTRAVENOUS at 12:40

## 2020-01-01 RX ADMIN — INSULIN LISPRO 2 UNITS: 100 INJECTION, SOLUTION INTRAVENOUS; SUBCUTANEOUS at 22:46

## 2020-01-01 RX ADMIN — INSULIN LISPRO 6 UNITS: 100 INJECTION, SOLUTION INTRAVENOUS; SUBCUTANEOUS at 16:37

## 2020-01-01 RX ADMIN — VANCOMYCIN HYDROCHLORIDE 1000 MG: 1 INJECTION, POWDER, LYOPHILIZED, FOR SOLUTION INTRAVENOUS at 15:31

## 2020-01-01 RX ADMIN — VANCOMYCIN HYDROCHLORIDE 500 MG: 500 INJECTION, POWDER, LYOPHILIZED, FOR SOLUTION INTRAVENOUS at 06:39

## 2020-01-01 RX ADMIN — Medication 1 CAPSULE: at 19:54

## 2020-01-01 RX ADMIN — METRONIDAZOLE 500 MG: 250 TABLET ORAL at 23:57

## 2020-01-01 RX ADMIN — MICONAZOLE NITRATE: 20 POWDER TOPICAL at 10:13

## 2020-01-01 RX ADMIN — PROMETHAZINE HYDROCHLORIDE 12.5 MG: 25 TABLET ORAL at 11:38

## 2020-01-01 RX ADMIN — TRAMADOL HYDROCHLORIDE 50 MG: 50 TABLET, FILM COATED ORAL at 11:51

## 2020-01-01 RX ADMIN — METOPROLOL TARTRATE 50 MG: 50 TABLET, FILM COATED ORAL at 21:12

## 2020-01-01 RX ADMIN — ALPRAZOLAM 0.5 MG: 0.25 TABLET ORAL at 20:42

## 2020-01-01 RX ADMIN — INSULIN GLARGINE 15 UNITS: 100 INJECTION, SOLUTION SUBCUTANEOUS at 22:06

## 2020-01-01 RX ADMIN — METOPROLOL TARTRATE 25 MG: 25 TABLET ORAL at 08:59

## 2020-01-01 RX ADMIN — Medication 125 MG: at 06:32

## 2020-01-01 RX ADMIN — INSULIN GLARGINE 19 UNITS: 100 INJECTION, SOLUTION SUBCUTANEOUS at 20:31

## 2020-01-01 RX ADMIN — INSULIN LISPRO 2 UNITS: 100 INJECTION, SOLUTION INTRAVENOUS; SUBCUTANEOUS at 21:24

## 2020-01-01 RX ADMIN — METOLAZONE 5 MG: 2.5 TABLET ORAL at 21:53

## 2020-01-01 RX ADMIN — ASPIRIN 81 MG 81 MG: 81 TABLET ORAL at 07:41

## 2020-01-01 RX ADMIN — HYDRALAZINE HYDROCHLORIDE 50 MG: 50 TABLET, FILM COATED ORAL at 21:53

## 2020-01-01 RX ADMIN — FERROUS SULFATE TAB 325 MG (65 MG ELEMENTAL FE) 325 MG: 325 (65 FE) TAB at 09:48

## 2020-01-01 RX ADMIN — SERTRALINE HYDROCHLORIDE 100 MG: 50 TABLET ORAL at 09:05

## 2020-01-01 RX ADMIN — DONEPEZIL HYDROCHLORIDE 5 MG: 5 TABLET, FILM COATED ORAL at 21:33

## 2020-01-01 RX ADMIN — Medication 10 ML: at 23:00

## 2020-01-01 RX ADMIN — HYDRALAZINE HYDROCHLORIDE 50 MG: 50 TABLET, FILM COATED ORAL at 21:34

## 2020-01-01 RX ADMIN — Medication 10 ML: at 09:21

## 2020-01-01 RX ADMIN — Medication 10 ML: at 07:56

## 2020-01-01 RX ADMIN — PANTOPRAZOLE SODIUM 40 MG: 40 TABLET, DELAYED RELEASE ORAL at 06:01

## 2020-01-01 RX ADMIN — DONEPEZIL HYDROCHLORIDE 5 MG: 5 TABLET, FILM COATED ORAL at 22:22

## 2020-01-01 RX ADMIN — SERTRALINE 100 MG: 50 TABLET, FILM COATED ORAL at 09:56

## 2020-01-01 RX ADMIN — METOPROLOL TARTRATE 25 MG: 25 TABLET ORAL at 21:34

## 2020-01-01 RX ADMIN — METRONIDAZOLE 500 MG: 250 TABLET ORAL at 23:23

## 2020-01-01 RX ADMIN — ROSUVASTATIN CALCIUM 5 MG: 10 TABLET, FILM COATED ORAL at 08:46

## 2020-01-01 RX ADMIN — ALPRAZOLAM 0.5 MG: 0.25 TABLET ORAL at 22:06

## 2020-01-01 RX ADMIN — ROSUVASTATIN CALCIUM 5 MG: 10 TABLET, FILM COATED ORAL at 09:39

## 2020-01-01 RX ADMIN — Medication 125 MG: at 23:39

## 2020-01-01 RX ADMIN — SENNOSIDES AND DOCUSATE SODIUM 1 TABLET: 8.6; 5 TABLET ORAL at 13:11

## 2020-01-01 RX ADMIN — INSULIN LISPRO 3 UNITS: 100 INJECTION, SOLUTION INTRAVENOUS; SUBCUTANEOUS at 20:54

## 2020-01-01 RX ADMIN — ALPRAZOLAM 0.5 MG: 0.25 TABLET ORAL at 21:05

## 2020-01-01 RX ADMIN — MEROPENEM 1 G: 1 INJECTION, POWDER, FOR SOLUTION INTRAVENOUS at 01:30

## 2020-01-01 RX ADMIN — ASCORBIC ACID 1500 MG: 500 INJECTION INTRAVENOUS at 12:16

## 2020-01-01 RX ADMIN — Medication 125 MG: at 23:59

## 2020-01-01 RX ADMIN — MICONAZOLE NITRATE: 20 POWDER TOPICAL at 21:13

## 2020-01-01 RX ADMIN — DONEPEZIL HYDROCHLORIDE 5 MG: 5 TABLET, FILM COATED ORAL at 20:03

## 2020-01-01 RX ADMIN — TETROFOSMIN 31.5 MILLICURIE: 1.38 INJECTION, POWDER, LYOPHILIZED, FOR SOLUTION INTRAVENOUS at 12:08

## 2020-01-01 RX ADMIN — ASPIRIN 81 MG 81 MG: 81 TABLET ORAL at 09:36

## 2020-01-01 RX ADMIN — ROSUVASTATIN CALCIUM 5 MG: 10 TABLET, FILM COATED ORAL at 08:20

## 2020-01-01 RX ADMIN — MICONAZOLE NITRATE: 20 POWDER TOPICAL at 08:21

## 2020-01-01 RX ADMIN — ALPRAZOLAM 0.5 MG: 0.25 TABLET ORAL at 20:17

## 2020-01-01 RX ADMIN — SODIUM CHLORIDE 500 ML: 9 INJECTION, SOLUTION INTRAVENOUS at 15:45

## 2020-01-01 RX ADMIN — PROMETHAZINE HYDROCHLORIDE 12.5 MG: 25 TABLET ORAL at 12:43

## 2020-01-01 RX ADMIN — INSULIN GLARGINE 19 UNITS: 100 INJECTION, SOLUTION SUBCUTANEOUS at 21:36

## 2020-01-01 RX ADMIN — HYDRALAZINE HYDROCHLORIDE 50 MG: 50 TABLET, FILM COATED ORAL at 21:08

## 2020-01-01 RX ADMIN — METOPROLOL TARTRATE 50 MG: 50 TABLET, FILM COATED ORAL at 21:14

## 2020-01-01 RX ADMIN — DONEPEZIL HYDROCHLORIDE 5 MG: 5 TABLET, FILM COATED ORAL at 19:51

## 2020-01-01 RX ADMIN — SODIUM CHLORIDE, POTASSIUM CHLORIDE, SODIUM LACTATE AND CALCIUM CHLORIDE: 600; 310; 30; 20 INJECTION, SOLUTION INTRAVENOUS at 13:11

## 2020-01-01 RX ADMIN — SERTRALINE HYDROCHLORIDE 100 MG: 50 TABLET ORAL at 14:32

## 2020-01-01 RX ADMIN — METOPROLOL TARTRATE 50 MG: 50 TABLET, FILM COATED ORAL at 08:18

## 2020-01-01 RX ADMIN — INSULIN LISPRO 4 UNITS: 100 INJECTION, SOLUTION INTRAVENOUS; SUBCUTANEOUS at 12:05

## 2020-01-01 RX ADMIN — Medication: at 08:22

## 2020-01-01 RX ADMIN — MELATONIN TAB 5 MG 5 MG: 5 TAB at 21:16

## 2020-01-01 RX ADMIN — Medication 1 CAPSULE: at 10:44

## 2020-01-01 RX ADMIN — METRONIDAZOLE 500 MG: 250 TABLET ORAL at 15:49

## 2020-01-01 RX ADMIN — ATORVASTATIN CALCIUM 20 MG: 10 TABLET, FILM COATED ORAL at 21:36

## 2020-01-01 RX ADMIN — SODIUM ZIRCONIUM CYCLOSILICATE 10 G: 5 POWDER, FOR SUSPENSION ORAL at 21:41

## 2020-01-01 RX ADMIN — VANCOMYCIN HYDROCHLORIDE 125 MG: 5 INJECTION, POWDER, LYOPHILIZED, FOR SOLUTION INTRAVENOUS at 10:30

## 2020-01-01 RX ADMIN — ANTICOAGULANT CITRATE DEXTROSE SOLUTION FORMULA A: 12.25; 11; 3.65 SOLUTION INTRAVENOUS at 12:25

## 2020-01-01 RX ADMIN — INSULIN LISPRO 1 UNITS: 100 INJECTION, SOLUTION INTRAVENOUS; SUBCUTANEOUS at 21:04

## 2020-01-01 RX ADMIN — DONEPEZIL HYDROCHLORIDE 5 MG: 5 TABLET, FILM COATED ORAL at 20:43

## 2020-01-01 RX ADMIN — Medication 100 MG: at 08:32

## 2020-01-01 RX ADMIN — HYDRALAZINE HYDROCHLORIDE 75 MG: 50 TABLET, FILM COATED ORAL at 22:09

## 2020-01-01 RX ADMIN — INSULIN LISPRO 4 UNITS: 100 INJECTION, SOLUTION INTRAVENOUS; SUBCUTANEOUS at 11:24

## 2020-01-01 RX ADMIN — Medication 100 MG: at 08:18

## 2020-01-01 RX ADMIN — DEXTROSE MONOHYDRATE 12.5 G: 25 INJECTION, SOLUTION INTRAVENOUS at 00:51

## 2020-01-01 RX ADMIN — ANTICOAGULANT CITRATE DEXTROSE SOLUTION FORMULA A: 12.25; 11; 3.65 SOLUTION INTRAVENOUS at 15:44

## 2020-01-01 RX ADMIN — TRAZODONE HYDROCHLORIDE 50 MG: 50 TABLET ORAL at 20:52

## 2020-01-01 RX ADMIN — ALPRAZOLAM 0.5 MG: 0.25 TABLET ORAL at 08:42

## 2020-01-01 RX ADMIN — ALPRAZOLAM 0.5 MG: 0.25 TABLET ORAL at 08:22

## 2020-01-01 RX ADMIN — ATORVASTATIN CALCIUM 20 MG: 10 TABLET, FILM COATED ORAL at 21:23

## 2020-01-01 RX ADMIN — Medication: at 09:27

## 2020-01-01 RX ADMIN — SERTRALINE 100 MG: 50 TABLET, FILM COATED ORAL at 09:36

## 2020-01-01 RX ADMIN — WARFARIN SODIUM 5 MG: 5 TABLET ORAL at 17:32

## 2020-01-01 RX ADMIN — ALPRAZOLAM 0.5 MG: 0.25 TABLET ORAL at 07:57

## 2020-01-01 RX ADMIN — Medication 100 MG: at 08:41

## 2020-01-01 RX ADMIN — SODIUM ZIRCONIUM CYCLOSILICATE 10 G: 5 POWDER, FOR SUSPENSION ORAL at 09:48

## 2020-01-01 RX ADMIN — SERTRALINE 100 MG: 50 TABLET, FILM COATED ORAL at 09:14

## 2020-01-01 RX ADMIN — ATORVASTATIN CALCIUM 20 MG: 10 TABLET, FILM COATED ORAL at 21:05

## 2020-01-01 RX ADMIN — VANCOMYCIN HYDROCHLORIDE 500 MG: 500 INJECTION, POWDER, LYOPHILIZED, FOR SOLUTION INTRAVENOUS at 05:42

## 2020-01-01 RX ADMIN — FERROUS SULFATE TAB 325 MG (65 MG ELEMENTAL FE) 325 MG: 325 (65 FE) TAB at 20:37

## 2020-01-01 RX ADMIN — WARFARIN SODIUM 4 MG: 2 TABLET ORAL at 18:21

## 2020-01-01 RX ADMIN — CARBOXYMETHYLCELLULOSE SODIUM 1 DROP: 10 GEL OPHTHALMIC at 16:00

## 2020-01-01 RX ADMIN — FERROUS SULFATE TAB 325 MG (65 MG ELEMENTAL FE) 325 MG: 325 (65 FE) TAB at 21:08

## 2020-01-01 RX ADMIN — INSULIN LISPRO 3 UNITS: 100 INJECTION, SOLUTION INTRAVENOUS; SUBCUTANEOUS at 22:04

## 2020-01-01 RX ADMIN — Medication 1000 ML/HR: at 13:00

## 2020-01-01 RX ADMIN — MIDAZOLAM HYDROCHLORIDE 0.5 MG: 5 INJECTION, SOLUTION INTRAMUSCULAR; INTRAVENOUS at 15:00

## 2020-01-01 RX ADMIN — FERROUS SULFATE TAB 325 MG (65 MG ELEMENTAL FE) 325 MG: 325 (65 FE) TAB at 08:49

## 2020-01-01 RX ADMIN — Medication: at 07:56

## 2020-01-01 RX ADMIN — FERROUS SULFATE TAB 325 MG (65 MG ELEMENTAL FE) 325 MG: 325 (65 FE) TAB at 21:28

## 2020-01-01 RX ADMIN — SODIUM CHLORIDE: 900 INJECTION, SOLUTION INTRAVENOUS at 09:15

## 2020-01-01 RX ADMIN — DONEPEZIL HYDROCHLORIDE 5 MG: 5 TABLET, FILM COATED ORAL at 20:22

## 2020-01-01 RX ADMIN — FERROUS SULFATE TAB 325 MG (65 MG ELEMENTAL FE) 325 MG: 325 (65 FE) TAB at 17:27

## 2020-01-01 RX ADMIN — SERTRALINE 100 MG: 50 TABLET, FILM COATED ORAL at 08:20

## 2020-01-01 RX ADMIN — DEXTROSE MONOHYDRATE 20 MCG/MIN: 50 INJECTION, SOLUTION INTRAVENOUS at 18:27

## 2020-01-01 RX ADMIN — VANCOMYCIN HYDROCHLORIDE 125 MG: 5 INJECTION, POWDER, LYOPHILIZED, FOR SOLUTION INTRAVENOUS at 23:18

## 2020-01-01 RX ADMIN — Medication 125 MG: at 06:06

## 2020-01-01 RX ADMIN — NITROGLYCERIN 1 INCH: 20 OINTMENT TOPICAL at 15:51

## 2020-01-01 RX ADMIN — ASPIRIN 81 MG 81 MG: 81 TABLET ORAL at 08:20

## 2020-01-01 RX ADMIN — FENTANYL CITRATE 25 MCG/HR: 50 INJECTION, SOLUTION INTRAMUSCULAR; INTRAVENOUS at 18:13

## 2020-01-01 RX ADMIN — Medication 125 MG: at 11:15

## 2020-01-01 RX ADMIN — VANCOMYCIN HYDROCHLORIDE 1000 MG: 1 INJECTION, POWDER, LYOPHILIZED, FOR SOLUTION INTRAVENOUS at 18:02

## 2020-01-01 RX ADMIN — B-COMPLEX W/ C & FOLIC ACID TAB 1 TABLET: TAB at 17:35

## 2020-01-01 RX ADMIN — HYDRALAZINE HYDROCHLORIDE 50 MG: 25 TABLET, FILM COATED ORAL at 06:12

## 2020-01-01 RX ADMIN — Medication 3 MG: at 22:33

## 2020-01-01 RX ADMIN — ANTICOAGULANT CITRATE DEXTROSE SOLUTION FORMULA A 3.8 ML: 12.25; 11; 3.65 SOLUTION INTRAVENOUS at 17:04

## 2020-01-01 RX ADMIN — METOPROLOL TARTRATE 25 MG: 25 TABLET ORAL at 08:17

## 2020-01-01 RX ADMIN — Medication 10 ML: at 09:05

## 2020-01-01 RX ADMIN — ALPRAZOLAM 0.5 MG: 0.25 TABLET ORAL at 13:44

## 2020-01-01 RX ADMIN — Medication 10 ML: at 20:59

## 2020-01-01 RX ADMIN — FERROUS SULFATE TAB 325 MG (65 MG ELEMENTAL FE) 325 MG: 325 (65 FE) TAB at 16:35

## 2020-01-01 RX ADMIN — INSULIN LISPRO 1 UNITS: 100 INJECTION, SOLUTION INTRAVENOUS; SUBCUTANEOUS at 21:52

## 2020-01-01 RX ADMIN — FERROUS SULFATE TAB 325 MG (65 MG ELEMENTAL FE) 325 MG: 325 (65 FE) TAB at 17:56

## 2020-01-01 RX ADMIN — INSULIN GLARGINE 19 UNITS: 100 INJECTION, SOLUTION SUBCUTANEOUS at 22:14

## 2020-01-01 RX ADMIN — ETOMIDATE 20 MG: 2 INJECTION INTRAVENOUS at 13:18

## 2020-01-01 RX ADMIN — SODIUM CHLORIDE, POTASSIUM CHLORIDE, SODIUM LACTATE AND CALCIUM CHLORIDE: 600; 310; 30; 20 INJECTION, SOLUTION INTRAVENOUS at 09:34

## 2020-01-01 RX ADMIN — METOPROLOL TARTRATE 50 MG: 50 TABLET, FILM COATED ORAL at 12:57

## 2020-01-01 RX ADMIN — CEFUROXIME AXETIL 250 MG: 250 TABLET ORAL at 07:40

## 2020-01-01 RX ADMIN — INSULIN LISPRO 2 UNITS: 100 INJECTION, SOLUTION INTRAVENOUS; SUBCUTANEOUS at 20:22

## 2020-01-01 RX ADMIN — ALPRAZOLAM 0.5 MG: 0.25 TABLET ORAL at 21:36

## 2020-01-01 RX ADMIN — Medication 10 ML: at 07:53

## 2020-01-01 RX ADMIN — SERTRALINE 100 MG: 50 TABLET, FILM COATED ORAL at 09:39

## 2020-01-01 RX ADMIN — Medication 125 MG: at 06:04

## 2020-01-01 RX ADMIN — INSULIN LISPRO 2 UNITS: 100 INJECTION, SOLUTION INTRAVENOUS; SUBCUTANEOUS at 16:55

## 2020-01-01 RX ADMIN — B-COMPLEX W/ C & FOLIC ACID TAB 1 TABLET: TAB at 09:29

## 2020-01-01 RX ADMIN — METOPROLOL TARTRATE 50 MG: 50 TABLET, FILM COATED ORAL at 09:48

## 2020-01-01 RX ADMIN — TRAMADOL HYDROCHLORIDE 50 MG: 50 TABLET, FILM COATED ORAL at 21:53

## 2020-01-01 RX ADMIN — Medication 1 CAPSULE: at 11:51

## 2020-01-01 RX ADMIN — HYDRALAZINE HYDROCHLORIDE 25 MG: 25 TABLET, FILM COATED ORAL at 09:48

## 2020-01-01 RX ADMIN — TRAMADOL HYDROCHLORIDE 50 MG: 50 TABLET, FILM COATED ORAL at 00:46

## 2020-01-01 RX ADMIN — Medication 125 MG: at 18:39

## 2020-01-01 RX ADMIN — SERTRALINE HYDROCHLORIDE 100 MG: 50 TABLET ORAL at 13:08

## 2020-01-01 RX ADMIN — Medication 125 MG: at 18:05

## 2020-01-01 RX ADMIN — FERROUS SULFATE TAB 325 MG (65 MG ELEMENTAL FE) 325 MG: 325 (65 FE) TAB at 18:06

## 2020-01-01 RX ADMIN — Medication 3 MG: at 20:53

## 2020-01-01 RX ADMIN — PANTOPRAZOLE SODIUM 40 MG: 40 TABLET, DELAYED RELEASE ORAL at 06:25

## 2020-01-01 RX ADMIN — SODIUM ZIRCONIUM CYCLOSILICATE 10 G: 5 POWDER, FOR SUSPENSION ORAL at 13:18

## 2020-01-01 RX ADMIN — TRAZODONE HYDROCHLORIDE 50 MG: 50 TABLET ORAL at 19:50

## 2020-01-01 RX ADMIN — INSULIN LISPRO 1 UNITS: 100 INJECTION, SOLUTION INTRAVENOUS; SUBCUTANEOUS at 21:20

## 2020-01-01 RX ADMIN — TRAMADOL HYDROCHLORIDE 50 MG: 50 TABLET, FILM COATED ORAL at 19:50

## 2020-01-01 RX ADMIN — FERROUS SULFATE TAB 325 MG (65 MG ELEMENTAL FE) 325 MG: 325 (65 FE) TAB at 08:32

## 2020-01-01 RX ADMIN — Medication 125 MG: at 17:31

## 2020-01-01 RX ADMIN — TRAMADOL HYDROCHLORIDE 50 MG: 50 TABLET, FILM COATED ORAL at 06:21

## 2020-01-01 RX ADMIN — ASPIRIN 81 MG 81 MG: 81 TABLET ORAL at 07:47

## 2020-01-01 RX ADMIN — METOPROLOL TARTRATE 50 MG: 50 TABLET, FILM COATED ORAL at 22:33

## 2020-01-01 RX ADMIN — ALPRAZOLAM 0.5 MG: 0.25 TABLET ORAL at 20:33

## 2020-01-01 RX ADMIN — Medication 10 ML: at 21:14

## 2020-01-01 RX ADMIN — HYDRALAZINE HYDROCHLORIDE 75 MG: 50 TABLET, FILM COATED ORAL at 05:40

## 2020-01-01 RX ADMIN — Medication: at 08:21

## 2020-01-01 RX ADMIN — PANTOPRAZOLE SODIUM 40 MG: 40 TABLET, DELAYED RELEASE ORAL at 06:39

## 2020-01-01 RX ADMIN — METOPROLOL TARTRATE 50 MG: 50 TABLET, FILM COATED ORAL at 13:07

## 2020-01-01 RX ADMIN — PROPOFOL 20 MCG/KG/MIN: 10 INJECTION, EMULSION INTRAVENOUS at 18:24

## 2020-01-01 RX ADMIN — MICONAZOLE NITRATE: 20 POWDER TOPICAL at 20:07

## 2020-01-01 RX ADMIN — Medication 125 MG: at 02:32

## 2020-01-01 RX ADMIN — ONDANSETRON 8 MG: 4 TABLET, ORALLY DISINTEGRATING ORAL at 07:00

## 2020-01-01 RX ADMIN — Medication 1000 ML/HR: at 18:23

## 2020-01-01 RX ADMIN — METOPROLOL TARTRATE 25 MG: 25 TABLET ORAL at 20:45

## 2020-01-01 RX ADMIN — SERTRALINE 100 MG: 50 TABLET, FILM COATED ORAL at 10:31

## 2020-01-01 RX ADMIN — METOPROLOL TARTRATE 50 MG: 50 TABLET, FILM COATED ORAL at 20:22

## 2020-01-01 RX ADMIN — Medication: at 09:57

## 2020-01-01 RX ADMIN — METOPROLOL TARTRATE 50 MG: 50 TABLET, FILM COATED ORAL at 23:35

## 2020-01-01 RX ADMIN — Medication 3 MG: at 21:38

## 2020-01-01 RX ADMIN — INSULIN GLARGINE 15 UNITS: 100 INJECTION, SOLUTION SUBCUTANEOUS at 20:05

## 2020-01-01 RX ADMIN — ASPIRIN 81 MG 81 MG: 81 TABLET ORAL at 09:14

## 2020-01-01 RX ADMIN — VANCOMYCIN HYDROCHLORIDE 500 MG: 500 INJECTION, POWDER, LYOPHILIZED, FOR SOLUTION INTRAVENOUS at 18:46

## 2020-01-01 RX ADMIN — Medication 125 MG: at 18:04

## 2020-01-01 RX ADMIN — METRONIDAZOLE 500 MG: 500 INJECTION, SOLUTION INTRAVENOUS at 21:07

## 2020-01-01 RX ADMIN — METOPROLOL TARTRATE 50 MG: 50 TABLET, FILM COATED ORAL at 21:19

## 2020-01-01 RX ADMIN — METOPROLOL TARTRATE 50 MG: 50 TABLET, FILM COATED ORAL at 09:24

## 2020-01-01 RX ADMIN — Medication 1 CAPSULE: at 08:24

## 2020-01-01 RX ADMIN — HYDRALAZINE HYDROCHLORIDE 50 MG: 50 TABLET, FILM COATED ORAL at 14:25

## 2020-01-01 RX ADMIN — PROCHLORPERAZINE MALEATE 5 MG: 5 TABLET ORAL at 16:59

## 2020-01-01 RX ADMIN — Medication 1 CAPSULE: at 08:46

## 2020-01-01 RX ADMIN — Medication 10 ML: at 16:04

## 2020-01-01 RX ADMIN — METRONIDAZOLE 500 MG: 250 TABLET ORAL at 21:38

## 2020-01-01 RX ADMIN — SODIUM CHLORIDE 500 ML: 9 INJECTION, SOLUTION INTRAVENOUS at 18:27

## 2020-01-01 RX ADMIN — ALBUMIN (HUMAN) 25 G: 0.25 INJECTION, SOLUTION INTRAVENOUS at 18:21

## 2020-01-01 RX ADMIN — DONEPEZIL HYDROCHLORIDE 5 MG: 5 TABLET, FILM COATED ORAL at 21:53

## 2020-01-01 RX ADMIN — MICONAZOLE NITRATE: 20 POWDER TOPICAL at 22:10

## 2020-01-01 RX ADMIN — DARBEPOETIN ALFA 25 MCG: 25 INJECTION, SOLUTION INTRAVENOUS; SUBCUTANEOUS at 19:23

## 2020-01-01 RX ADMIN — INSULIN LISPRO 4 UNITS: 100 INJECTION, SOLUTION INTRAVENOUS; SUBCUTANEOUS at 12:40

## 2020-01-01 RX ADMIN — METOPROLOL TARTRATE 25 MG: 25 TABLET ORAL at 20:42

## 2020-01-01 RX ADMIN — INSULIN LISPRO 2 UNITS: 100 INJECTION, SOLUTION INTRAVENOUS; SUBCUTANEOUS at 11:41

## 2020-01-01 RX ADMIN — ALPRAZOLAM 0.5 MG: 0.25 TABLET ORAL at 15:01

## 2020-01-01 RX ADMIN — B-COMPLEX W/ C & FOLIC ACID TAB 1 TABLET: TAB at 08:52

## 2020-01-01 RX ADMIN — LOSARTAN POTASSIUM 50 MG: 25 TABLET ORAL at 08:51

## 2020-01-01 RX ADMIN — INSULIN LISPRO 1 UNITS: 100 INJECTION, SOLUTION INTRAVENOUS; SUBCUTANEOUS at 22:13

## 2020-01-01 RX ADMIN — Medication 10 ML: at 10:46

## 2020-01-01 RX ADMIN — SERTRALINE 100 MG: 50 TABLET, FILM COATED ORAL at 09:35

## 2020-01-01 RX ADMIN — FUROSEMIDE 20 MG/HR: 10 INJECTION, SOLUTION INTRAMUSCULAR; INTRAVENOUS at 14:31

## 2020-01-01 RX ADMIN — VANCOMYCIN HYDROCHLORIDE 500 MG: 500 INJECTION, POWDER, LYOPHILIZED, FOR SOLUTION INTRAVENOUS at 11:53

## 2020-01-01 RX ADMIN — HYDRALAZINE HYDROCHLORIDE 50 MG: 50 TABLET, FILM COATED ORAL at 15:18

## 2020-01-01 RX ADMIN — HYDRALAZINE HYDROCHLORIDE 50 MG: 25 TABLET, FILM COATED ORAL at 20:43

## 2020-01-01 RX ADMIN — METRONIDAZOLE 500 MG: 250 TABLET ORAL at 06:25

## 2020-01-01 RX ADMIN — Medication 10 ML: at 08:47

## 2020-01-01 RX ADMIN — PROPOFOL 5 MCG/KG/MIN: 10 INJECTION, EMULSION INTRAVENOUS at 18:31

## 2020-01-01 RX ADMIN — ONDANSETRON 8 MG: 4 TABLET, ORALLY DISINTEGRATING ORAL at 09:46

## 2020-01-01 RX ADMIN — Medication 125 MG: at 23:51

## 2020-01-01 RX ADMIN — MICONAZOLE NITRATE: 20 POWDER TOPICAL at 07:56

## 2020-01-01 RX ADMIN — DARBEPOETIN ALFA 100 MCG: 100 INJECTION, SOLUTION INTRAVENOUS; SUBCUTANEOUS at 17:45

## 2020-01-01 RX ADMIN — INSULIN LISPRO 2 UNITS: 100 INJECTION, SOLUTION INTRAVENOUS; SUBCUTANEOUS at 20:49

## 2020-01-01 RX ADMIN — DARBEPOETIN ALFA 60 MCG: 60 INJECTION, SOLUTION INTRAVENOUS; SUBCUTANEOUS at 21:52

## 2020-01-01 RX ADMIN — ROSUVASTATIN CALCIUM 5 MG: 10 TABLET, FILM COATED ORAL at 09:59

## 2020-01-01 RX ADMIN — PANTOPRAZOLE SODIUM 40 MG: 40 TABLET, DELAYED RELEASE ORAL at 06:18

## 2020-01-01 RX ADMIN — FERROUS SULFATE TAB 325 MG (65 MG ELEMENTAL FE) 325 MG: 325 (65 FE) TAB at 08:51

## 2020-01-01 RX ADMIN — ATORVASTATIN CALCIUM 20 MG: 10 TABLET, FILM COATED ORAL at 22:10

## 2020-01-01 RX ADMIN — ALPRAZOLAM 0.5 MG: 0.25 TABLET ORAL at 21:39

## 2020-01-01 RX ADMIN — METRONIDAZOLE 500 MG: 250 TABLET ORAL at 18:05

## 2020-01-01 RX ADMIN — Medication: at 07:41

## 2020-01-01 RX ADMIN — FERROUS SULFATE TAB 325 MG (65 MG ELEMENTAL FE) 325 MG: 325 (65 FE) TAB at 16:44

## 2020-01-01 RX ADMIN — CARBOXYMETHYLCELLULOSE SODIUM 1 DROP: 10 GEL OPHTHALMIC at 12:17

## 2020-01-01 RX ADMIN — METOPROLOL TARTRATE 50 MG: 50 TABLET, FILM COATED ORAL at 21:33

## 2020-01-01 RX ADMIN — Medication 125 MG: at 12:37

## 2020-01-01 RX ADMIN — DONEPEZIL HYDROCHLORIDE 5 MG: 5 TABLET, FILM COATED ORAL at 20:45

## 2020-01-01 RX ADMIN — TRAZODONE HYDROCHLORIDE 50 MG: 50 TABLET ORAL at 20:32

## 2020-01-01 RX ADMIN — FERROUS SULFATE TAB 325 MG (65 MG ELEMENTAL FE) 325 MG: 325 (65 FE) TAB at 16:57

## 2020-01-01 RX ADMIN — METOPROLOL TARTRATE 25 MG: 25 TABLET ORAL at 21:39

## 2020-01-01 RX ADMIN — PANTOPRAZOLE SODIUM 40 MG: 40 TABLET, DELAYED RELEASE ORAL at 05:07

## 2020-01-01 RX ADMIN — Medication 10 ML: at 23:36

## 2020-01-01 RX ADMIN — METOPROLOL TARTRATE 25 MG: 25 TABLET ORAL at 21:54

## 2020-01-01 RX ADMIN — DONEPEZIL HYDROCHLORIDE 5 MG: 5 TABLET, FILM COATED ORAL at 20:37

## 2020-01-01 RX ADMIN — TRAMADOL HYDROCHLORIDE 50 MG: 50 TABLET, FILM COATED ORAL at 21:14

## 2020-01-01 RX ADMIN — METOPROLOL TARTRATE 50 MG: 50 TABLET, FILM COATED ORAL at 20:53

## 2020-01-01 RX ADMIN — Medication 1 CAPSULE: at 12:42

## 2020-01-01 RX ADMIN — Medication 1 CAPSULE: at 09:06

## 2020-01-01 RX ADMIN — Medication 100 MG: at 09:14

## 2020-01-01 RX ADMIN — ASPIRIN 81 MG 81 MG: 81 TABLET ORAL at 07:56

## 2020-01-01 RX ADMIN — METOPROLOL TARTRATE 50 MG: 50 TABLET, FILM COATED ORAL at 09:56

## 2020-01-01 RX ADMIN — ATORVASTATIN CALCIUM 20 MG: 10 TABLET, FILM COATED ORAL at 20:45

## 2020-01-01 RX ADMIN — SENNOSIDES AND DOCUSATE SODIUM 1 TABLET: 8.6; 5 TABLET ORAL at 12:56

## 2020-01-01 RX ADMIN — INSULIN LISPRO 2 UNITS: 100 INJECTION, SOLUTION INTRAVENOUS; SUBCUTANEOUS at 11:54

## 2020-01-01 RX ADMIN — INSULIN LISPRO 4 UNITS: 100 INJECTION, SOLUTION INTRAVENOUS; SUBCUTANEOUS at 11:16

## 2020-01-01 RX ADMIN — ATORVASTATIN CALCIUM 20 MG: 10 TABLET, FILM COATED ORAL at 22:14

## 2020-01-01 RX ADMIN — DARBEPOETIN ALFA 25 MCG: 25 INJECTION, SOLUTION INTRAVENOUS; SUBCUTANEOUS at 17:42

## 2020-01-01 RX ADMIN — MIDODRINE HYDROCHLORIDE 10 MG: 5 TABLET ORAL at 15:33

## 2020-01-01 RX ADMIN — ALPRAZOLAM 0.5 MG: 0.25 TABLET ORAL at 08:11

## 2020-01-01 RX ADMIN — VANCOMYCIN HYDROCHLORIDE 1000 MG: 10 INJECTION, POWDER, LYOPHILIZED, FOR SOLUTION INTRAVENOUS at 09:13

## 2020-01-01 RX ADMIN — METRONIDAZOLE 500 MG: 250 TABLET ORAL at 06:23

## 2020-01-01 RX ADMIN — Medication 1000 ML/HR: at 23:21

## 2020-01-01 RX ADMIN — PANTOPRAZOLE SODIUM 40 MG: 40 TABLET, DELAYED RELEASE ORAL at 05:38

## 2020-01-01 RX ADMIN — INSULIN LISPRO 2 UNITS: 100 INJECTION, SOLUTION INTRAVENOUS; SUBCUTANEOUS at 20:27

## 2020-01-01 RX ADMIN — ROSUVASTATIN CALCIUM 5 MG: 10 TABLET, FILM COATED ORAL at 10:02

## 2020-01-01 RX ADMIN — HYDRALAZINE HYDROCHLORIDE 50 MG: 50 TABLET, FILM COATED ORAL at 06:02

## 2020-01-01 RX ADMIN — INSULIN LISPRO 1 UNITS: 100 INJECTION, SOLUTION INTRAVENOUS; SUBCUTANEOUS at 20:46

## 2020-01-01 RX ADMIN — MICONAZOLE NITRATE: 20 POWDER TOPICAL at 20:57

## 2020-01-01 RX ADMIN — INSULIN LISPRO 2 UNITS: 100 INJECTION, SOLUTION INTRAVENOUS; SUBCUTANEOUS at 09:51

## 2020-01-01 RX ADMIN — Medication 1 ML: at 12:00

## 2020-01-01 RX ADMIN — B-COMPLEX W/ C & FOLIC ACID TAB 1 TABLET: TAB at 10:02

## 2020-01-01 RX ADMIN — SERTRALINE 100 MG: 50 TABLET, FILM COATED ORAL at 08:49

## 2020-01-01 RX ADMIN — DONEPEZIL HYDROCHLORIDE 5 MG: 5 TABLET, FILM COATED ORAL at 20:53

## 2020-01-01 RX ADMIN — ANTICOAGULANT CITRATE DEXTROSE SOLUTION FORMULA A: 12.25; 11; 3.65 SOLUTION INTRAVENOUS at 17:20

## 2020-01-01 RX ADMIN — METOPROLOL TARTRATE 50 MG: 50 TABLET, FILM COATED ORAL at 22:55

## 2020-01-01 RX ADMIN — INSULIN LISPRO 2 UNITS: 100 INJECTION, SOLUTION INTRAVENOUS; SUBCUTANEOUS at 17:30

## 2020-01-01 RX ADMIN — ALPRAZOLAM 0.5 MG: 0.25 TABLET ORAL at 20:24

## 2020-01-01 RX ADMIN — MEROPENEM 500 MG: 500 INJECTION, POWDER, FOR SOLUTION INTRAVENOUS at 16:53

## 2020-01-01 RX ADMIN — FERROUS SULFATE TAB 325 MG (65 MG ELEMENTAL FE) 325 MG: 325 (65 FE) TAB at 20:06

## 2020-01-01 RX ADMIN — Medication 10 ML: at 20:04

## 2020-01-01 RX ADMIN — MICONAZOLE NITRATE: 20 POWDER TOPICAL at 09:26

## 2020-01-01 RX ADMIN — WARFARIN SODIUM 4 MG: 2 TABLET ORAL at 19:13

## 2020-01-01 RX ADMIN — ALPRAZOLAM 0.5 MG: 0.25 TABLET ORAL at 08:12

## 2020-01-01 RX ADMIN — Medication 1 CAPSULE: at 08:18

## 2020-01-01 RX ADMIN — SERTRALINE HYDROCHLORIDE 100 MG: 50 TABLET ORAL at 08:46

## 2020-01-01 RX ADMIN — FERROUS SULFATE TAB 325 MG (65 MG ELEMENTAL FE) 325 MG: 325 (65 FE) TAB at 21:03

## 2020-01-01 RX ADMIN — INSULIN LISPRO 2 UNITS: 100 INJECTION, SOLUTION INTRAVENOUS; SUBCUTANEOUS at 12:00

## 2020-01-01 RX ADMIN — PANTOPRAZOLE SODIUM 40 MG: 40 TABLET, DELAYED RELEASE ORAL at 06:32

## 2020-01-01 RX ADMIN — METOPROLOL TARTRATE 50 MG: 50 TABLET, FILM COATED ORAL at 09:14

## 2020-01-01 RX ADMIN — SODIUM CHLORIDE: 9 INJECTION, SOLUTION INTRAVENOUS at 18:17

## 2020-01-01 RX ADMIN — Medication 3 MG: at 22:06

## 2020-01-01 RX ADMIN — METRONIDAZOLE 500 MG: 250 TABLET ORAL at 18:39

## 2020-01-01 RX ADMIN — ONDANSETRON 4 MG: 2 INJECTION INTRAMUSCULAR; INTRAVENOUS at 15:13

## 2020-01-01 RX ADMIN — SERTRALINE HYDROCHLORIDE 50 MG: 50 TABLET ORAL at 21:34

## 2020-01-01 RX ADMIN — METOPROLOL TARTRATE 50 MG: 50 TABLET, FILM COATED ORAL at 22:06

## 2020-01-01 RX ADMIN — SERTRALINE HYDROCHLORIDE 50 MG: 50 TABLET ORAL at 22:14

## 2020-01-01 RX ADMIN — Medication 125 MG: at 06:12

## 2020-01-01 RX ADMIN — FERROUS SULFATE TAB 325 MG (65 MG ELEMENTAL FE) 325 MG: 325 (65 FE) TAB at 07:49

## 2020-01-01 RX ADMIN — ALBUMIN (HUMAN) 25 G: 0.25 INJECTION, SOLUTION INTRAVENOUS at 09:48

## 2020-01-01 RX ADMIN — FERROUS SULFATE TAB 325 MG (65 MG ELEMENTAL FE) 325 MG: 325 (65 FE) TAB at 20:53

## 2020-01-01 RX ADMIN — FERROUS SULFATE TAB 325 MG (65 MG ELEMENTAL FE) 325 MG: 325 (65 FE) TAB at 14:32

## 2020-01-01 RX ADMIN — HYDROCORTISONE SODIUM SUCCINATE 50 MG: 100 INJECTION, POWDER, FOR SOLUTION INTRAMUSCULAR; INTRAVENOUS at 07:17

## 2020-01-01 RX ADMIN — PANTOPRAZOLE SODIUM 40 MG: 40 TABLET, DELAYED RELEASE ORAL at 05:40

## 2020-01-01 RX ADMIN — ONDANSETRON 4 MG: 2 INJECTION INTRAMUSCULAR; INTRAVENOUS at 01:28

## 2020-01-01 RX ADMIN — Medication 125 MG: at 21:28

## 2020-01-01 RX ADMIN — DONEPEZIL HYDROCHLORIDE 5 MG: 5 TABLET, FILM COATED ORAL at 21:40

## 2020-01-01 RX ADMIN — PANTOPRAZOLE SODIUM 40 MG: 40 TABLET, DELAYED RELEASE ORAL at 06:21

## 2020-01-01 RX ADMIN — MELATONIN TAB 5 MG 5 MG: 5 TAB at 19:51

## 2020-01-01 RX ADMIN — FERROUS SULFATE TAB 325 MG (65 MG ELEMENTAL FE) 325 MG: 325 (65 FE) TAB at 17:32

## 2020-01-01 RX ADMIN — FERROUS SULFATE TAB 325 MG (65 MG ELEMENTAL FE) 325 MG: 325 (65 FE) TAB at 08:12

## 2020-01-01 RX ADMIN — ASPIRIN 81 MG 81 MG: 81 TABLET ORAL at 07:48

## 2020-01-01 RX ADMIN — ATORVASTATIN CALCIUM 20 MG: 10 TABLET, FILM COATED ORAL at 20:33

## 2020-01-01 RX ADMIN — ALPRAZOLAM 0.5 MG: 0.25 TABLET ORAL at 10:04

## 2020-01-01 RX ADMIN — Medication 125 MG: at 17:47

## 2020-01-01 RX ADMIN — VANCOMYCIN HYDROCHLORIDE 500 MG: 500 INJECTION, POWDER, LYOPHILIZED, FOR SOLUTION INTRAVENOUS at 12:37

## 2020-01-01 RX ADMIN — VASOPRESSIN 0.03 UNITS/MIN: 20 INJECTION INTRAVENOUS at 21:46

## 2020-01-01 RX ADMIN — ALPRAZOLAM 0.5 MG: 0.25 TABLET ORAL at 12:55

## 2020-01-01 RX ADMIN — TRAZODONE HYDROCHLORIDE 50 MG: 50 TABLET ORAL at 22:06

## 2020-01-01 RX ADMIN — TRAMADOL HYDROCHLORIDE 50 MG: 50 TABLET, FILM COATED ORAL at 22:03

## 2020-01-01 RX ADMIN — Medication 1000 ML/HR: at 23:23

## 2020-01-01 RX ADMIN — FERROUS SULFATE TAB 325 MG (65 MG ELEMENTAL FE) 325 MG: 325 (65 FE) TAB at 20:47

## 2020-01-01 RX ADMIN — ACETAMINOPHEN 650 MG: 325 TABLET ORAL at 23:53

## 2020-01-01 RX ADMIN — ROSUVASTATIN CALCIUM 5 MG: 10 TABLET, FILM COATED ORAL at 13:19

## 2020-01-01 RX ADMIN — B-COMPLEX W/ C & FOLIC ACID TAB 1 TABLET: TAB at 09:14

## 2020-01-01 RX ADMIN — Medication 10 ML: at 21:48

## 2020-01-01 RX ADMIN — B-COMPLEX W/ C & FOLIC ACID TAB 1 TABLET: TAB at 07:55

## 2020-01-01 RX ADMIN — METOPROLOL TARTRATE 25 MG: 25 TABLET ORAL at 08:16

## 2020-01-01 RX ADMIN — MICONAZOLE NITRATE: 20 POWDER TOPICAL at 07:47

## 2020-01-01 RX ADMIN — Medication 125 MG: at 00:12

## 2020-01-01 RX ADMIN — ASPIRIN 81 MG 81 MG: 81 TABLET ORAL at 08:38

## 2020-01-01 RX ADMIN — MIRTAZAPINE 15 MG: 15 TABLET, FILM COATED ORAL at 22:02

## 2020-01-01 RX ADMIN — Medication 3 MG: at 20:48

## 2020-01-01 RX ADMIN — MELATONIN TAB 5 MG 5 MG: 5 TAB at 22:03

## 2020-01-01 RX ADMIN — INSULIN LISPRO 2 UNITS: 100 INJECTION, SOLUTION INTRAVENOUS; SUBCUTANEOUS at 10:42

## 2020-01-01 RX ADMIN — WARFARIN SODIUM 4 MG: 2 TABLET ORAL at 16:50

## 2020-01-01 RX ADMIN — Medication 10 ML: at 22:01

## 2020-01-01 RX ADMIN — WARFARIN SODIUM 5 MG: 5 TABLET ORAL at 16:57

## 2020-01-01 RX ADMIN — WARFARIN SODIUM 2 MG: 2 TABLET ORAL at 22:07

## 2020-01-01 RX ADMIN — FERROUS SULFATE TAB 325 MG (65 MG ELEMENTAL FE) 325 MG: 325 (65 FE) TAB at 17:33

## 2020-01-01 RX ADMIN — TRAZODONE HYDROCHLORIDE 50 MG: 50 TABLET ORAL at 21:40

## 2020-01-01 RX ADMIN — SERTRALINE HYDROCHLORIDE 50 MG: 50 TABLET ORAL at 20:42

## 2020-01-01 RX ADMIN — LINEZOLID 600 MG: 600 TABLET, FILM COATED ORAL at 22:22

## 2020-01-01 RX ADMIN — FERROUS SULFATE TAB 325 MG (65 MG ELEMENTAL FE) 325 MG: 325 (65 FE) TAB at 09:05

## 2020-01-01 RX ADMIN — ROSUVASTATIN CALCIUM 5 MG: 10 TABLET, FILM COATED ORAL at 14:25

## 2020-01-01 RX ADMIN — WARFARIN SODIUM 1 MG: 1 TABLET ORAL at 17:16

## 2020-01-01 RX ADMIN — Medication 125 MG: at 20:02

## 2020-01-01 RX ADMIN — INSULIN LISPRO 4 UNITS: 100 INJECTION, SOLUTION INTRAVENOUS; SUBCUTANEOUS at 18:11

## 2020-01-01 RX ADMIN — INSULIN LISPRO 4 UNITS: 100 INJECTION, SOLUTION INTRAVENOUS; SUBCUTANEOUS at 12:43

## 2020-01-01 RX ADMIN — WARFARIN SODIUM 4 MG: 2 TABLET ORAL at 17:31

## 2020-01-01 RX ADMIN — HYDRALAZINE HYDROCHLORIDE 50 MG: 50 TABLET, FILM COATED ORAL at 10:01

## 2020-01-01 RX ADMIN — ALPRAZOLAM 0.5 MG: 0.25 TABLET ORAL at 09:00

## 2020-01-01 RX ADMIN — TRAMADOL HYDROCHLORIDE 50 MG: 50 TABLET, FILM COATED ORAL at 21:40

## 2020-01-01 RX ADMIN — FERROUS SULFATE TAB 325 MG (65 MG ELEMENTAL FE) 325 MG: 325 (65 FE) TAB at 08:44

## 2020-01-01 RX ADMIN — METOPROLOL TARTRATE 50 MG: 50 TABLET, FILM COATED ORAL at 07:56

## 2020-01-01 RX ADMIN — INSULIN LISPRO 2 UNITS: 100 INJECTION, SOLUTION INTRAVENOUS; SUBCUTANEOUS at 18:21

## 2020-01-01 RX ADMIN — Medication 125 MG: at 19:54

## 2020-01-01 RX ADMIN — Medication 3 MG: at 20:58

## 2020-01-01 RX ADMIN — FERROUS SULFATE TAB 325 MG (65 MG ELEMENTAL FE) 325 MG: 325 (65 FE) TAB at 21:58

## 2020-01-01 RX ADMIN — HYDRALAZINE HYDROCHLORIDE 50 MG: 25 TABLET, FILM COATED ORAL at 05:50

## 2020-01-01 RX ADMIN — Medication 125 MG: at 06:36

## 2020-01-01 RX ADMIN — INSULIN LISPRO 6 UNITS: 100 INJECTION, SOLUTION INTRAVENOUS; SUBCUTANEOUS at 16:38

## 2020-01-01 RX ADMIN — METOPROLOL TARTRATE 50 MG: 50 TABLET, FILM COATED ORAL at 09:13

## 2020-01-01 RX ADMIN — TRAMADOL HYDROCHLORIDE 50 MG: 50 TABLET, FILM COATED ORAL at 16:03

## 2020-01-01 RX ADMIN — ROSUVASTATIN CALCIUM 5 MG: 10 TABLET, FILM COATED ORAL at 09:36

## 2020-01-01 RX ADMIN — INSULIN LISPRO 2 UNITS: 100 INJECTION, SOLUTION INTRAVENOUS; SUBCUTANEOUS at 16:36

## 2020-01-01 RX ADMIN — SERTRALINE HYDROCHLORIDE 50 MG: 50 TABLET ORAL at 21:36

## 2020-01-01 RX ADMIN — FERROUS SULFATE TAB 325 MG (65 MG ELEMENTAL FE) 325 MG: 325 (65 FE) TAB at 18:20

## 2020-01-01 RX ADMIN — POVIDONE-IODINE: 10 SOLUTION TOPICAL at 10:20

## 2020-01-01 RX ADMIN — HYDRALAZINE HYDROCHLORIDE 50 MG: 50 TABLET, FILM COATED ORAL at 13:49

## 2020-01-01 RX ADMIN — ALPRAZOLAM 0.5 MG: 0.25 TABLET ORAL at 21:16

## 2020-01-01 RX ADMIN — METRONIDAZOLE 500 MG: 250 TABLET ORAL at 14:35

## 2020-01-01 RX ADMIN — METOPROLOL TARTRATE 50 MG: 50 TABLET, FILM COATED ORAL at 20:03

## 2020-01-01 RX ADMIN — LOSARTAN POTASSIUM 50 MG: 25 TABLET, FILM COATED ORAL at 10:31

## 2020-01-01 RX ADMIN — ACETAMINOPHEN 650 MG: 325 TABLET ORAL at 16:21

## 2020-01-01 RX ADMIN — WARFARIN SODIUM 3 MG: 2 TABLET ORAL at 18:19

## 2020-01-01 RX ADMIN — FERROUS SULFATE TAB 325 MG (65 MG ELEMENTAL FE) 325 MG: 325 (65 FE) TAB at 08:48

## 2020-01-01 RX ADMIN — SERTRALINE 100 MG: 50 TABLET, FILM COATED ORAL at 07:40

## 2020-01-01 RX ADMIN — Medication 10 ML: at 10:31

## 2020-01-01 RX ADMIN — Medication 10 ML: at 20:47

## 2020-01-01 RX ADMIN — TRAZODONE HYDROCHLORIDE 50 MG: 50 TABLET ORAL at 22:14

## 2020-01-01 RX ADMIN — ALPRAZOLAM 0.5 MG: 0.25 TABLET ORAL at 08:59

## 2020-01-01 RX ADMIN — Medication 125 MG: at 05:45

## 2020-01-01 RX ADMIN — INSULIN LISPRO 4 UNITS: 100 INJECTION, SOLUTION INTRAVENOUS; SUBCUTANEOUS at 04:00

## 2020-01-01 RX ADMIN — POVIDONE-IODINE: 10 SOLUTION TOPICAL at 12:01

## 2020-01-01 RX ADMIN — ALBUMIN (HUMAN) 25 G: 0.25 INJECTION, SOLUTION INTRAVENOUS at 09:49

## 2020-01-01 RX ADMIN — METOPROLOL TARTRATE 50 MG: 50 TABLET, FILM COATED ORAL at 20:06

## 2020-01-01 RX ADMIN — METRONIDAZOLE 500 MG: 250 TABLET ORAL at 15:36

## 2020-01-01 RX ADMIN — Medication 125 MG: at 21:45

## 2020-01-01 RX ADMIN — Medication 125 MG: at 17:06

## 2020-01-01 RX ADMIN — ALBUMIN (HUMAN): 0.25 INJECTION, SOLUTION INTRAVENOUS at 15:32

## 2020-01-01 RX ADMIN — ATORVASTATIN CALCIUM 20 MG: 10 TABLET, FILM COATED ORAL at 22:27

## 2020-01-01 RX ADMIN — CEFEPIME HYDROCHLORIDE 1 G: 1 INJECTION, POWDER, FOR SOLUTION INTRAMUSCULAR; INTRAVENOUS at 00:47

## 2020-01-01 RX ADMIN — Medication 125 MG: at 11:44

## 2020-01-01 RX ADMIN — CARBOXYMETHYLCELLULOSE SODIUM 1 DROP: 10 GEL OPHTHALMIC at 20:54

## 2020-01-01 RX ADMIN — SERTRALINE HYDROCHLORIDE 50 MG: 50 TABLET ORAL at 21:23

## 2020-01-01 RX ADMIN — Medication 125 MG: at 11:37

## 2020-01-01 RX ADMIN — SERTRALINE 100 MG: 50 TABLET, FILM COATED ORAL at 07:48

## 2020-01-01 RX ADMIN — Medication 10 ML: at 14:27

## 2020-01-01 RX ADMIN — METOPROLOL TARTRATE 50 MG: 50 TABLET, FILM COATED ORAL at 20:52

## 2020-01-01 RX ADMIN — SERTRALINE 100 MG: 50 TABLET, FILM COATED ORAL at 09:25

## 2020-01-01 RX ADMIN — SENNOSIDES AND DOCUSATE SODIUM 1 TABLET: 8.6; 5 TABLET ORAL at 07:54

## 2020-01-01 RX ADMIN — INSULIN LISPRO 2 UNITS: 100 INJECTION, SOLUTION INTRAVENOUS; SUBCUTANEOUS at 22:11

## 2020-01-01 RX ADMIN — Medication 3 MG: at 21:28

## 2020-01-01 RX ADMIN — METOPROLOL TARTRATE 25 MG: 25 TABLET ORAL at 08:44

## 2020-01-01 RX ADMIN — FIDAXOMICIN 200 MG: 200 TABLET, FILM COATED ORAL at 08:55

## 2020-01-01 RX ADMIN — INSULIN LISPRO 2 UNITS: 100 INJECTION, SOLUTION INTRAVENOUS; SUBCUTANEOUS at 12:02

## 2020-01-01 RX ADMIN — DEXTROSE MONOHYDRATE 45 MCG/MIN: 50 INJECTION, SOLUTION INTRAVENOUS at 13:13

## 2020-01-01 RX ADMIN — PHYTONADIONE 5 MG: 5 TABLET ORAL at 22:30

## 2020-01-01 RX ADMIN — METOPROLOL TARTRATE 50 MG: 50 TABLET, FILM COATED ORAL at 21:53

## 2020-01-01 RX ADMIN — POVIDONE-IODINE 1 APPLICATOR: 10 SOLUTION TOPICAL at 12:00

## 2020-01-01 RX ADMIN — TRAMADOL HYDROCHLORIDE 50 MG: 50 TABLET, FILM COATED ORAL at 00:18

## 2020-01-01 RX ADMIN — Medication 10 ML: at 15:28

## 2020-01-01 RX ADMIN — PANTOPRAZOLE SODIUM 40 MG: 40 TABLET, DELAYED RELEASE ORAL at 06:06

## 2020-01-01 RX ADMIN — ROSUVASTATIN CALCIUM 5 MG: 10 TABLET, FILM COATED ORAL at 07:55

## 2020-01-01 RX ADMIN — MIRTAZAPINE 15 MG: 15 TABLET, FILM COATED ORAL at 19:50

## 2020-01-01 RX ADMIN — PANTOPRAZOLE SODIUM 40 MG: 40 TABLET, DELAYED RELEASE ORAL at 06:24

## 2020-01-01 RX ADMIN — INSULIN GLARGINE 19 UNITS: 100 INJECTION, SOLUTION SUBCUTANEOUS at 20:22

## 2020-01-01 RX ADMIN — ASPIRIN 81 MG 81 MG: 81 TABLET ORAL at 13:12

## 2020-01-01 RX ADMIN — SENNOSIDES AND DOCUSATE SODIUM 1 TABLET: 8.6; 5 TABLET ORAL at 20:22

## 2020-01-01 RX ADMIN — METOPROLOL TARTRATE 25 MG: 25 TABLET ORAL at 08:41

## 2020-01-01 RX ADMIN — TRAMADOL HYDROCHLORIDE 50 MG: 50 TABLET, FILM COATED ORAL at 10:23

## 2020-01-01 RX ADMIN — SENNOSIDES AND DOCUSATE SODIUM 1 TABLET: 8.6; 5 TABLET ORAL at 08:46

## 2020-01-01 RX ADMIN — ASPIRIN 81 MG 81 MG: 81 TABLET ORAL at 09:56

## 2020-01-01 RX ADMIN — ALPRAZOLAM 0.5 MG: 0.25 TABLET ORAL at 14:00

## 2020-01-01 RX ADMIN — INSULIN LISPRO 2 UNITS: 100 INJECTION, SOLUTION INTRAVENOUS; SUBCUTANEOUS at 21:04

## 2020-01-01 RX ADMIN — Medication 3 MG: at 21:12

## 2020-01-01 RX ADMIN — ROSUVASTATIN CALCIUM 5 MG: 10 TABLET, FILM COATED ORAL at 09:12

## 2020-01-01 RX ADMIN — ALPRAZOLAM 0.5 MG: 0.25 TABLET ORAL at 14:04

## 2020-01-01 RX ADMIN — Medication 10 ML: at 22:06

## 2020-01-01 RX ADMIN — DONEPEZIL HYDROCHLORIDE 5 MG: 5 TABLET, FILM COATED ORAL at 21:39

## 2020-01-01 RX ADMIN — Medication: at 11:05

## 2020-01-01 RX ADMIN — LOSARTAN POTASSIUM 25 MG: 25 TABLET, FILM COATED ORAL at 09:35

## 2020-01-01 RX ADMIN — Medication 100 MG: at 08:08

## 2020-01-01 RX ADMIN — SODIUM CHLORIDE: 9 INJECTION, SOLUTION INTRAVENOUS at 07:12

## 2020-01-01 RX ADMIN — INSULIN LISPRO 2 UNITS: 100 INJECTION, SOLUTION INTRAVENOUS; SUBCUTANEOUS at 11:39

## 2020-01-01 RX ADMIN — FENTANYL CITRATE 25 MCG: 50 INJECTION INTRAMUSCULAR; INTRAVENOUS at 15:00

## 2020-01-01 RX ADMIN — ROSUVASTATIN CALCIUM 5 MG: 10 TABLET, FILM COATED ORAL at 21:00

## 2020-01-01 RX ADMIN — B-COMPLEX W/ C & FOLIC ACID TAB 1 TABLET: TAB at 09:05

## 2020-01-01 RX ADMIN — REGADENOSON 0.4 MG: 0.08 INJECTION, SOLUTION INTRAVENOUS at 12:08

## 2020-01-01 RX ADMIN — ALPRAZOLAM 0.5 MG: 0.25 TABLET ORAL at 21:12

## 2020-01-01 RX ADMIN — WARFARIN SODIUM 3 MG: 1 TABLET ORAL at 21:28

## 2020-01-01 RX ADMIN — FERROUS SULFATE TAB 325 MG (65 MG ELEMENTAL FE) 325 MG: 325 (65 FE) TAB at 17:06

## 2020-01-01 RX ADMIN — TRAMADOL HYDROCHLORIDE 50 MG: 50 TABLET, FILM COATED ORAL at 07:52

## 2020-01-01 RX ADMIN — ALPRAZOLAM 0.5 MG: 0.25 TABLET ORAL at 11:19

## 2020-01-01 RX ADMIN — ALBUMIN (HUMAN) 25 G: 0.25 INJECTION, SOLUTION INTRAVENOUS at 18:00

## 2020-01-01 RX ADMIN — ASPIRIN 81 MG 81 MG: 81 TABLET ORAL at 08:45

## 2020-01-01 RX ADMIN — DONEPEZIL HYDROCHLORIDE 5 MG: 5 TABLET, FILM COATED ORAL at 21:34

## 2020-01-01 RX ADMIN — HYDRALAZINE HYDROCHLORIDE 75 MG: 50 TABLET, FILM COATED ORAL at 16:36

## 2020-01-01 RX ADMIN — B-COMPLEX W/ C & FOLIC ACID TAB 1 TABLET: TAB at 09:13

## 2020-01-01 RX ADMIN — HYDRALAZINE HYDROCHLORIDE 50 MG: 50 TABLET, FILM COATED ORAL at 07:04

## 2020-01-01 RX ADMIN — WARFARIN SODIUM 3 MG: 2 TABLET ORAL at 22:05

## 2020-01-01 RX ADMIN — INSULIN GLARGINE 19 UNITS: 100 INJECTION, SOLUTION SUBCUTANEOUS at 21:17

## 2020-01-01 RX ADMIN — LOSARTAN POTASSIUM 50 MG: 25 TABLET ORAL at 09:56

## 2020-01-01 RX ADMIN — TRAZODONE HYDROCHLORIDE 50 MG: 50 TABLET ORAL at 20:44

## 2020-01-01 RX ADMIN — ROSUVASTATIN CALCIUM 5 MG: 10 TABLET, FILM COATED ORAL at 13:07

## 2020-01-01 RX ADMIN — Medication: at 08:38

## 2020-01-01 RX ADMIN — MEROPENEM 1 G: 1 INJECTION, POWDER, FOR SOLUTION INTRAVENOUS at 14:47

## 2020-01-01 RX ADMIN — ANTICOAGULANT CITRATE DEXTROSE SOLUTION FORMULA A 4.8 ML: 12.25; 11; 3.65 SOLUTION INTRAVENOUS at 17:58

## 2020-01-01 RX ADMIN — ROSUVASTATIN CALCIUM 5 MG: 10 TABLET, FILM COATED ORAL at 09:24

## 2020-01-01 RX ADMIN — PANTOPRAZOLE SODIUM 40 MG: 40 TABLET, DELAYED RELEASE ORAL at 06:36

## 2020-01-01 RX ADMIN — Medication 3 MG: at 21:07

## 2020-01-01 RX ADMIN — ASPIRIN 81 MG 81 MG: 81 TABLET ORAL at 08:24

## 2020-01-01 RX ADMIN — PANTOPRAZOLE SODIUM 40 MG: 40 TABLET, DELAYED RELEASE ORAL at 06:09

## 2020-01-01 RX ADMIN — SERTRALINE 100 MG: 50 TABLET, FILM COATED ORAL at 07:52

## 2020-01-01 RX ADMIN — Medication 10 ML: at 20:46

## 2020-01-01 RX ADMIN — SERTRALINE HYDROCHLORIDE 100 MG: 50 TABLET ORAL at 13:16

## 2020-01-01 RX ADMIN — INSULIN LISPRO 2 UNITS: 100 INJECTION, SOLUTION INTRAVENOUS; SUBCUTANEOUS at 20:46

## 2020-01-01 RX ADMIN — ASPIRIN 81 MG 81 MG: 81 TABLET ORAL at 09:24

## 2020-01-01 RX ADMIN — B-COMPLEX W/ C & FOLIC ACID TAB 1 TABLET: TAB at 07:41

## 2020-01-01 RX ADMIN — PANTOPRAZOLE SODIUM 40 MG: 40 TABLET, DELAYED RELEASE ORAL at 06:37

## 2020-01-01 RX ADMIN — Medication 1 APPLICATOR: at 12:00

## 2020-01-01 RX ADMIN — METOPROLOL TARTRATE 25 MG: 25 TABLET ORAL at 21:12

## 2020-01-01 RX ADMIN — HYDRALAZINE HYDROCHLORIDE 50 MG: 50 TABLET, FILM COATED ORAL at 06:24

## 2020-01-01 RX ADMIN — FERROUS SULFATE TAB 325 MG (65 MG ELEMENTAL FE) 325 MG: 325 (65 FE) TAB at 20:52

## 2020-01-01 RX ADMIN — INSULIN LISPRO 4 UNITS: 100 INJECTION, SOLUTION INTRAVENOUS; SUBCUTANEOUS at 06:24

## 2020-01-01 RX ADMIN — DONEPEZIL HYDROCHLORIDE 5 MG: 5 TABLET, FILM COATED ORAL at 20:42

## 2020-01-01 RX ADMIN — DONEPEZIL HYDROCHLORIDE 5 MG: 5 TABLET, FILM COATED ORAL at 22:33

## 2020-01-01 RX ADMIN — METOPROLOL TARTRATE 50 MG: 50 TABLET, FILM COATED ORAL at 09:35

## 2020-01-01 RX ADMIN — ANTICOAGULANT CITRATE DEXTROSE SOLUTION FORMULA A 5 ML: 12.25; 11; 3.65 SOLUTION INTRAVENOUS at 18:28

## 2020-01-01 RX ADMIN — WARFARIN SODIUM 4 MG: 2 TABLET ORAL at 19:50

## 2020-01-01 RX ADMIN — FERROUS SULFATE TAB 325 MG (65 MG ELEMENTAL FE) 325 MG: 325 (65 FE) TAB at 08:19

## 2020-01-01 RX ADMIN — INSULIN GLARGINE 15 UNITS: 100 INJECTION, SOLUTION SUBCUTANEOUS at 21:08

## 2020-01-01 RX ADMIN — SODIUM CHLORIDE: 9 INJECTION, SOLUTION INTRAVENOUS at 22:06

## 2020-01-01 RX ADMIN — ASPIRIN 81 MG 81 MG: 81 TABLET ORAL at 08:49

## 2020-01-01 RX ADMIN — FERROUS SULFATE TAB 325 MG (65 MG ELEMENTAL FE) 325 MG: 325 (65 FE) TAB at 09:32

## 2020-01-01 RX ADMIN — CARBOXYMETHYLCELLULOSE SODIUM 1 DROP: 10 GEL OPHTHALMIC at 00:27

## 2020-01-01 RX ADMIN — HYDRALAZINE HYDROCHLORIDE 25 MG: 25 TABLET, FILM COATED ORAL at 20:22

## 2020-01-01 RX ADMIN — SENNOSIDES AND DOCUSATE SODIUM 1 TABLET: 8.6; 5 TABLET ORAL at 22:07

## 2020-01-01 RX ADMIN — CLOPIDOGREL BISULFATE 75 MG: 75 TABLET ORAL at 09:50

## 2020-01-01 RX ADMIN — DONEPEZIL HYDROCHLORIDE 5 MG: 5 TABLET, FILM COATED ORAL at 22:14

## 2020-01-01 RX ADMIN — B-COMPLEX W/ C & FOLIC ACID TAB 1 TABLET: TAB at 09:24

## 2020-01-01 RX ADMIN — ATORVASTATIN CALCIUM 20 MG: 10 TABLET, FILM COATED ORAL at 21:39

## 2020-01-01 RX ADMIN — ONDANSETRON 4 MG: 2 INJECTION INTRAMUSCULAR; INTRAVENOUS at 22:58

## 2020-01-01 RX ADMIN — INSULIN LISPRO 2 UNITS: 100 INJECTION, SOLUTION INTRAVENOUS; SUBCUTANEOUS at 21:26

## 2020-01-01 RX ADMIN — ASPIRIN 81 MG 81 MG: 81 TABLET ORAL at 10:44

## 2020-01-01 RX ADMIN — DONEPEZIL HYDROCHLORIDE 5 MG: 5 TABLET, FILM COATED ORAL at 20:06

## 2020-01-01 RX ADMIN — LOSARTAN POTASSIUM 25 MG: 25 TABLET, FILM COATED ORAL at 10:02

## 2020-01-01 RX ADMIN — METOPROLOL TARTRATE 50 MG: 50 TABLET, FILM COATED ORAL at 07:40

## 2020-01-01 RX ADMIN — Medication 10 ML: at 20:55

## 2020-01-01 RX ADMIN — INSULIN GLARGINE 19 UNITS: 100 INJECTION, SOLUTION SUBCUTANEOUS at 21:00

## 2020-01-01 RX ADMIN — METOPROLOL TARTRATE 25 MG: 25 TABLET ORAL at 20:17

## 2020-01-01 RX ADMIN — SENNOSIDES AND DOCUSATE SODIUM 1 TABLET: 8.6; 5 TABLET ORAL at 13:19

## 2020-01-01 RX ADMIN — ROSUVASTATIN CALCIUM 5 MG: 10 TABLET, FILM COATED ORAL at 14:32

## 2020-01-01 RX ADMIN — ROSUVASTATIN CALCIUM 5 MG: 10 TABLET, FILM COATED ORAL at 08:38

## 2020-01-01 RX ADMIN — ONDANSETRON 8 MG: 8 TABLET, ORALLY DISINTEGRATING ORAL at 01:11

## 2020-01-01 RX ADMIN — INSULIN LISPRO 2 UNITS: 100 INJECTION, SOLUTION INTRAVENOUS; SUBCUTANEOUS at 16:58

## 2020-01-01 RX ADMIN — ASPIRIN 81 MG: 81 TABLET, CHEWABLE ORAL at 16:03

## 2020-01-01 RX ADMIN — ROSUVASTATIN CALCIUM 5 MG: 10 TABLET, FILM COATED ORAL at 09:48

## 2020-01-01 RX ADMIN — HYDRALAZINE HYDROCHLORIDE 75 MG: 50 TABLET, FILM COATED ORAL at 09:46

## 2020-01-01 RX ADMIN — INSULIN LISPRO 2 UNITS: 100 INJECTION, SOLUTION INTRAVENOUS; SUBCUTANEOUS at 06:21

## 2020-01-01 RX ADMIN — METOPROLOL TARTRATE 25 MG: 25 TABLET ORAL at 21:05

## 2020-01-01 RX ADMIN — Medication 3 MG: at 21:05

## 2020-01-01 RX ADMIN — ALPRAZOLAM 0.5 MG: 0.25 TABLET ORAL at 20:44

## 2020-01-01 RX ADMIN — HYDRALAZINE HYDROCHLORIDE 50 MG: 50 TABLET, FILM COATED ORAL at 13:34

## 2020-01-01 RX ADMIN — INSULIN GLARGINE 15 UNITS: 100 INJECTION, SOLUTION SUBCUTANEOUS at 20:50

## 2020-01-01 RX ADMIN — METOPROLOL TARTRATE 50 MG: 50 TABLET, FILM COATED ORAL at 07:55

## 2020-01-01 RX ADMIN — FERROUS SULFATE TAB 325 MG (65 MG ELEMENTAL FE) 325 MG: 325 (65 FE) TAB at 21:33

## 2020-01-01 RX ADMIN — TRAMADOL HYDROCHLORIDE 50 MG: 50 TABLET, FILM COATED ORAL at 21:03

## 2020-01-01 RX ADMIN — LOSARTAN POTASSIUM 25 MG: 25 TABLET, FILM COATED ORAL at 10:48

## 2020-01-01 RX ADMIN — CALCIUM GLUCONATE 1 G: 20 INJECTION, SOLUTION INTRAVENOUS at 07:17

## 2020-01-01 RX ADMIN — METOPROLOL TARTRATE 25 MG: 25 TABLET ORAL at 08:18

## 2020-01-01 RX ADMIN — MAGNESIUM SULFATE HEPTAHYDRATE 2 G: 40 INJECTION, SOLUTION INTRAVENOUS at 10:40

## 2020-01-01 RX ADMIN — DOXYCYCLINE HYCLATE 100 MG: 100 TABLET, COATED ORAL at 12:11

## 2020-01-01 RX ADMIN — Medication 1 CAPSULE: at 16:59

## 2020-01-01 RX ADMIN — HYDRALAZINE HYDROCHLORIDE 50 MG: 50 TABLET, FILM COATED ORAL at 06:00

## 2020-01-01 RX ADMIN — ROSUVASTATIN CALCIUM 5 MG: 10 TABLET, FILM COATED ORAL at 23:35

## 2020-01-01 RX ADMIN — MIRTAZAPINE 15 MG: 15 TABLET, FILM COATED ORAL at 21:13

## 2020-01-01 RX ADMIN — TRAMADOL HYDROCHLORIDE 50 MG: 50 TABLET, FILM COATED ORAL at 10:08

## 2020-01-01 RX ADMIN — ASPIRIN 81 MG 81 MG: 81 TABLET ORAL at 10:03

## 2020-01-01 RX ADMIN — Medication 125 MG: at 23:55

## 2020-01-01 RX ADMIN — ONDANSETRON 4 MG: 2 INJECTION INTRAMUSCULAR; INTRAVENOUS at 22:17

## 2020-01-01 RX ADMIN — DONEPEZIL HYDROCHLORIDE 5 MG: 5 TABLET, FILM COATED ORAL at 20:33

## 2020-01-01 RX ADMIN — PANTOPRAZOLE SODIUM 40 MG: 40 TABLET, DELAYED RELEASE ORAL at 06:45

## 2020-01-01 RX ADMIN — HYDRALAZINE HYDROCHLORIDE 50 MG: 25 TABLET, FILM COATED ORAL at 22:06

## 2020-01-01 RX ADMIN — ROCURONIUM BROMIDE 30 MG: 10 SOLUTION INTRAVENOUS at 13:18

## 2020-01-01 RX ADMIN — FERROUS SULFATE TAB 325 MG (65 MG ELEMENTAL FE) 325 MG: 325 (65 FE) TAB at 16:34

## 2020-01-01 RX ADMIN — INSULIN GLARGINE 10 UNITS: 100 INJECTION, SOLUTION SUBCUTANEOUS at 21:05

## 2020-01-01 RX ADMIN — ANTICOAGULANT CITRATE DEXTROSE SOLUTION FORMULA A 5 ML: 12.25; 11; 3.65 SOLUTION INTRAVENOUS at 18:07

## 2020-01-01 RX ADMIN — B-COMPLEX W/ C & FOLIC ACID TAB 1 TABLET: TAB at 09:25

## 2020-01-01 RX ADMIN — FIDAXOMICIN 200 MG: 200 TABLET, FILM COATED ORAL at 21:03

## 2020-01-01 RX ADMIN — ALPRAZOLAM 0.5 MG: 0.25 TABLET ORAL at 21:35

## 2020-01-01 RX ADMIN — Medication 500 ML/HR: at 06:20

## 2020-01-01 RX ADMIN — DONEPEZIL HYDROCHLORIDE 5 MG: 5 TABLET, FILM COATED ORAL at 21:16

## 2020-01-01 RX ADMIN — METOPROLOL TARTRATE 50 MG: 50 TABLET, FILM COATED ORAL at 20:10

## 2020-01-01 RX ADMIN — ALPRAZOLAM 0.5 MG: 0.25 TABLET ORAL at 22:02

## 2020-01-01 RX ADMIN — PANTOPRAZOLE SODIUM 40 MG: 40 TABLET, DELAYED RELEASE ORAL at 12:57

## 2020-01-01 RX ADMIN — Medication 10 ML: at 21:34

## 2020-01-01 RX ADMIN — FERROUS SULFATE TAB 325 MG (65 MG ELEMENTAL FE) 325 MG: 325 (65 FE) TAB at 08:43

## 2020-01-01 RX ADMIN — INSULIN LISPRO 8 UNITS: 100 INJECTION, SOLUTION INTRAVENOUS; SUBCUTANEOUS at 17:16

## 2020-01-01 RX ADMIN — DONEPEZIL HYDROCHLORIDE 5 MG: 5 TABLET, FILM COATED ORAL at 20:51

## 2020-01-01 RX ADMIN — FERROUS SULFATE TAB 325 MG (65 MG ELEMENTAL FE) 325 MG: 325 (65 FE) TAB at 21:53

## 2020-01-01 RX ADMIN — FERROUS SULFATE TAB 325 MG (65 MG ELEMENTAL FE) 325 MG: 325 (65 FE) TAB at 21:19

## 2020-01-01 RX ADMIN — AMINOPHYLLINE 75 MG: 25 INJECTION, SOLUTION INTRAVENOUS at 12:09

## 2020-01-01 RX ADMIN — INSULIN LISPRO 2 UNITS: 100 INJECTION, SOLUTION INTRAVENOUS; SUBCUTANEOUS at 16:35

## 2020-01-01 RX ADMIN — METOPROLOL TARTRATE 25 MG: 25 TABLET ORAL at 09:00

## 2020-01-01 RX ADMIN — METOPROLOL TARTRATE 50 MG: 50 TABLET, FILM COATED ORAL at 20:48

## 2020-01-01 RX ADMIN — Medication: at 20:02

## 2020-01-01 RX ADMIN — DEXTROSE MONOHYDRATE 600 MG: 50 INJECTION, SOLUTION INTRAVENOUS at 16:13

## 2020-01-01 RX ADMIN — ALPRAZOLAM 0.5 MG: 0.25 TABLET ORAL at 21:21

## 2020-01-01 RX ADMIN — MIRTAZAPINE 15 MG: 15 TABLET, FILM COATED ORAL at 21:12

## 2020-01-01 RX ADMIN — SERTRALINE 100 MG: 50 TABLET, FILM COATED ORAL at 09:02

## 2020-01-01 RX ADMIN — MICONAZOLE NITRATE: 20 POWDER TOPICAL at 09:36

## 2020-01-01 RX ADMIN — FERROUS SULFATE TAB 325 MG (65 MG ELEMENTAL FE) 325 MG: 325 (65 FE) TAB at 08:16

## 2020-01-01 RX ADMIN — Medication 15 ML: at 20:54

## 2020-01-01 RX ADMIN — INSULIN LISPRO 2 UNITS: 100 INJECTION, SOLUTION INTRAVENOUS; SUBCUTANEOUS at 12:54

## 2020-01-01 RX ADMIN — PANTOPRAZOLE SODIUM 40 MG: 40 TABLET, DELAYED RELEASE ORAL at 07:12

## 2020-01-01 RX ADMIN — Medication: at 09:36

## 2020-01-01 RX ADMIN — PANTOPRAZOLE SODIUM 40 MG: 40 TABLET, DELAYED RELEASE ORAL at 06:44

## 2020-01-01 RX ADMIN — INSULIN LISPRO 4 UNITS: 100 INJECTION, SOLUTION INTRAVENOUS; SUBCUTANEOUS at 11:54

## 2020-01-01 RX ADMIN — WARFARIN SODIUM 3 MG: 2 TABLET ORAL at 21:03

## 2020-01-01 RX ADMIN — SERTRALINE 100 MG: 50 TABLET, FILM COATED ORAL at 08:46

## 2020-01-01 RX ADMIN — METOPROLOL TARTRATE 50 MG: 50 TABLET, FILM COATED ORAL at 14:25

## 2020-01-01 RX ADMIN — CLOPIDOGREL BISULFATE 75 MG: 75 TABLET ORAL at 16:03

## 2020-01-01 RX ADMIN — Medication 1 CAPSULE: at 07:47

## 2020-01-01 RX ADMIN — INSULIN LISPRO 4 UNITS: 100 INJECTION, SOLUTION INTRAVENOUS; SUBCUTANEOUS at 16:48

## 2020-01-01 RX ADMIN — FERROUS SULFATE TAB 325 MG (65 MG ELEMENTAL FE) 325 MG: 325 (65 FE) TAB at 13:11

## 2020-01-01 RX ADMIN — METOPROLOL TARTRATE 50 MG: 50 TABLET, FILM COATED ORAL at 10:44

## 2020-01-01 RX ADMIN — FUROSEMIDE 40 MG: 10 INJECTION, SOLUTION INTRAMUSCULAR; INTRAVENOUS at 20:53

## 2020-01-01 RX ADMIN — INSULIN GLARGINE 15 UNITS: 100 INJECTION, SOLUTION SUBCUTANEOUS at 21:02

## 2020-01-01 RX ADMIN — FERROUS SULFATE TAB 325 MG (65 MG ELEMENTAL FE) 325 MG: 325 (65 FE) TAB at 20:46

## 2020-01-01 RX ADMIN — INSULIN LISPRO 6 UNITS: 100 INJECTION, SOLUTION INTRAVENOUS; SUBCUTANEOUS at 11:25

## 2020-01-01 RX ADMIN — Medication 3 MG: at 20:21

## 2020-01-01 RX ADMIN — ASPIRIN 81 MG 81 MG: 81 TABLET ORAL at 09:04

## 2020-01-01 RX ADMIN — METOPROLOL TARTRATE 25 MG: 25 TABLET ORAL at 21:16

## 2020-01-01 RX ADMIN — WARFARIN SODIUM 6 MG: 5 TABLET ORAL at 17:27

## 2020-01-01 RX ADMIN — Medication 3 MG: at 21:11

## 2020-01-01 RX ADMIN — INSULIN LISPRO 2 UNITS: 100 INJECTION, SOLUTION INTRAVENOUS; SUBCUTANEOUS at 16:50

## 2020-01-01 RX ADMIN — SODIUM CHLORIDE 250 ML: 9 INJECTION, SOLUTION INTRAVENOUS at 15:21

## 2020-01-01 RX ADMIN — ATORVASTATIN CALCIUM 20 MG: 10 TABLET, FILM COATED ORAL at 19:50

## 2020-01-01 RX ADMIN — FERROUS SULFATE TAB 325 MG (65 MG ELEMENTAL FE) 325 MG: 325 (65 FE) TAB at 08:41

## 2020-01-01 RX ADMIN — DONEPEZIL HYDROCHLORIDE 5 MG: 5 TABLET, FILM COATED ORAL at 22:55

## 2020-01-01 RX ADMIN — SODIUM BICARBONATE 50 MEQ: 84 INJECTION, SOLUTION INTRAVENOUS at 08:58

## 2020-01-01 RX ADMIN — PHYTONADIONE 5 MG: 5 TABLET ORAL at 13:20

## 2020-01-01 RX ADMIN — CARBOXYMETHYLCELLULOSE SODIUM 1 DROP: 10 GEL OPHTHALMIC at 10:00

## 2020-01-01 RX ADMIN — FERROUS SULFATE TAB 325 MG (65 MG ELEMENTAL FE) 325 MG: 325 (65 FE) TAB at 09:35

## 2020-01-01 RX ADMIN — PANTOPRAZOLE SODIUM 40 MG: 40 TABLET, DELAYED RELEASE ORAL at 06:04

## 2020-01-01 RX ADMIN — WARFARIN SODIUM 4 MG: 2 TABLET ORAL at 18:29

## 2020-01-01 RX ADMIN — WARFARIN SODIUM 3 MG: 2 TABLET ORAL at 16:34

## 2020-01-01 RX ADMIN — ALPRAZOLAM 0.5 MG: 0.25 TABLET ORAL at 21:08

## 2020-01-01 RX ADMIN — DONEPEZIL HYDROCHLORIDE 5 MG: 5 TABLET, FILM COATED ORAL at 22:10

## 2020-01-01 RX ADMIN — FERROUS SULFATE TAB 325 MG (65 MG ELEMENTAL FE) 325 MG: 325 (65 FE) TAB at 21:11

## 2020-01-01 RX ADMIN — ASPIRIN 81 MG 81 MG: 81 TABLET ORAL at 14:25

## 2020-01-01 RX ADMIN — METRONIDAZOLE 500 MG: 250 TABLET ORAL at 22:01

## 2020-01-01 RX ADMIN — DONEPEZIL HYDROCHLORIDE 5 MG: 5 TABLET, FILM COATED ORAL at 20:54

## 2020-01-01 RX ADMIN — FERROUS SULFATE TAB 325 MG (65 MG ELEMENTAL FE) 325 MG: 325 (65 FE) TAB at 08:25

## 2020-01-01 RX ADMIN — B-COMPLEX W/ C & FOLIC ACID TAB 1 TABLET: TAB at 07:53

## 2020-01-01 RX ADMIN — METOPROLOL TARTRATE 50 MG: 50 TABLET, FILM COATED ORAL at 07:54

## 2020-01-01 RX ADMIN — FERROUS SULFATE TAB 325 MG (65 MG ELEMENTAL FE) 325 MG: 325 (65 FE) TAB at 16:50

## 2020-01-01 RX ADMIN — SUGAMMADEX 200 MG: 100 INJECTION, SOLUTION INTRAVENOUS at 14:07

## 2020-01-01 RX ADMIN — HYDROCODONE BITARTRATE AND ACETAMINOPHEN 1 TABLET: 5; 325 TABLET ORAL at 18:19

## 2020-01-01 RX ADMIN — B-COMPLEX W/ C & FOLIC ACID TAB 1 TABLET: TAB at 13:15

## 2020-01-01 RX ADMIN — METRONIDAZOLE 500 MG: 500 INJECTION, SOLUTION INTRAVENOUS at 17:51

## 2020-01-01 RX ADMIN — SERTRALINE HYDROCHLORIDE 50 MG: 50 TABLET ORAL at 21:39

## 2020-01-01 RX ADMIN — INSULIN GLARGINE 15 UNITS: 100 INJECTION, SOLUTION SUBCUTANEOUS at 21:20

## 2020-01-01 RX ADMIN — ALPRAZOLAM 0.5 MG: 0.25 TABLET ORAL at 08:08

## 2020-01-01 RX ADMIN — PANTOPRAZOLE SODIUM 40 MG: 40 TABLET, DELAYED RELEASE ORAL at 05:31

## 2020-01-01 RX ADMIN — CEFUROXIME AXETIL 250 MG: 250 TABLET ORAL at 08:20

## 2020-01-01 RX ADMIN — Medication 100 MG: at 07:49

## 2020-01-01 RX ADMIN — Medication 3 MG: at 22:18

## 2020-01-01 RX ADMIN — ASPIRIN 81 MG 81 MG: 81 TABLET ORAL at 08:51

## 2020-01-01 RX ADMIN — METOPROLOL TARTRATE 50 MG: 50 TABLET, FILM COATED ORAL at 13:15

## 2020-01-01 RX ADMIN — MICONAZOLE NITRATE: 20 POWDER TOPICAL at 21:05

## 2020-01-01 RX ADMIN — METOPROLOL TARTRATE 50 MG: 50 TABLET, FILM COATED ORAL at 10:31

## 2020-01-01 RX ADMIN — COLLAGENASE SANTYL 1 G: 250 OINTMENT TOPICAL at 21:04

## 2020-01-01 RX ADMIN — FERROUS SULFATE TAB 325 MG (65 MG ELEMENTAL FE) 325 MG: 325 (65 FE) TAB at 16:56

## 2020-01-01 RX ADMIN — ROSUVASTATIN CALCIUM 5 MG: 10 TABLET, FILM COATED ORAL at 08:25

## 2020-01-01 RX ADMIN — ONDANSETRON 4 MG: 2 INJECTION INTRAMUSCULAR; INTRAVENOUS at 13:59

## 2020-01-01 RX ADMIN — ONDANSETRON 8 MG: 4 TABLET, ORALLY DISINTEGRATING ORAL at 17:35

## 2020-01-01 RX ADMIN — LOSARTAN POTASSIUM 25 MG: 25 TABLET, FILM COATED ORAL at 09:14

## 2020-01-01 RX ADMIN — ALBUMIN (HUMAN) 25 G: 0.25 INJECTION, SOLUTION INTRAVENOUS at 16:16

## 2020-01-01 RX ADMIN — SERTRALINE HYDROCHLORIDE 100 MG: 50 TABLET ORAL at 15:18

## 2020-01-01 RX ADMIN — INSULIN GLARGINE 19 UNITS: 100 INJECTION, SOLUTION SUBCUTANEOUS at 21:02

## 2020-01-01 RX ADMIN — CARBOXYMETHYLCELLULOSE SODIUM 1 DROP: 10 GEL OPHTHALMIC at 04:23

## 2020-01-01 RX ADMIN — PANTOPRAZOLE SODIUM 40 MG: 40 TABLET, DELAYED RELEASE ORAL at 07:02

## 2020-01-01 RX ADMIN — ATORVASTATIN CALCIUM 20 MG: 10 TABLET, FILM COATED ORAL at 20:29

## 2020-01-01 RX ADMIN — METOPROLOL TARTRATE 25 MG: 25 TABLET ORAL at 19:50

## 2020-01-01 RX ADMIN — LOSARTAN POTASSIUM 50 MG: 25 TABLET, FILM COATED ORAL at 18:05

## 2020-01-01 RX ADMIN — FERROUS SULFATE TAB 325 MG (65 MG ELEMENTAL FE) 325 MG: 325 (65 FE) TAB at 09:13

## 2020-01-01 RX ADMIN — INSULIN LISPRO 2 UNITS: 100 INJECTION, SOLUTION INTRAVENOUS; SUBCUTANEOUS at 16:28

## 2020-01-01 RX ADMIN — METOPROLOL TARTRATE 25 MG: 25 TABLET ORAL at 08:42

## 2020-01-01 RX ADMIN — TRAZODONE HYDROCHLORIDE 50 MG: 50 TABLET ORAL at 22:27

## 2020-01-01 RX ADMIN — SODIUM CHLORIDE 1000 ML: 9 INJECTION, SOLUTION INTRAVENOUS at 18:32

## 2020-01-01 RX ADMIN — ALPRAZOLAM 0.5 MG: 0.25 TABLET ORAL at 08:15

## 2020-01-01 RX ADMIN — THIAMINE HYDROCHLORIDE 200 MG: 100 INJECTION, SOLUTION INTRAMUSCULAR; INTRAVENOUS at 01:53

## 2020-01-01 RX ADMIN — FERROUS SULFATE TAB 325 MG (65 MG ELEMENTAL FE) 325 MG: 325 (65 FE) TAB at 20:57

## 2020-01-01 RX ADMIN — METOPROLOL TARTRATE 25 MG: 25 TABLET ORAL at 22:20

## 2020-01-01 RX ADMIN — ASPIRIN 81 MG 81 MG: 81 TABLET ORAL at 08:18

## 2020-01-01 RX ADMIN — VANCOMYCIN HYDROCHLORIDE 500 MG: 500 INJECTION, POWDER, LYOPHILIZED, FOR SOLUTION INTRAVENOUS at 10:14

## 2020-01-01 RX ADMIN — Medication 125 MG: at 18:36

## 2020-01-01 RX ADMIN — MIRTAZAPINE 15 MG: 15 TABLET, FILM COATED ORAL at 20:33

## 2020-01-01 RX ADMIN — INSULIN LISPRO 3 UNITS: 100 INJECTION, SOLUTION INTRAVENOUS; SUBCUTANEOUS at 13:05

## 2020-01-01 RX ADMIN — INSULIN LISPRO 1 UNITS: 100 INJECTION, SOLUTION INTRAVENOUS; SUBCUTANEOUS at 22:27

## 2020-01-01 RX ADMIN — INSULIN LISPRO 3 UNITS: 100 INJECTION, SOLUTION INTRAVENOUS; SUBCUTANEOUS at 21:36

## 2020-01-01 RX ADMIN — INSULIN LISPRO 4 UNITS: 100 INJECTION, SOLUTION INTRAVENOUS; SUBCUTANEOUS at 16:45

## 2020-01-01 RX ADMIN — SERTRALINE 100 MG: 50 TABLET, FILM COATED ORAL at 08:19

## 2020-01-01 RX ADMIN — B-COMPLEX W/ C & FOLIC ACID TAB 1 TABLET: TAB at 08:49

## 2020-01-01 RX ADMIN — INSULIN LISPRO 2 UNITS: 100 INJECTION, SOLUTION INTRAVENOUS; SUBCUTANEOUS at 09:38

## 2020-01-01 RX ADMIN — METRONIDAZOLE 500 MG: 500 INJECTION, SOLUTION INTRAVENOUS at 21:11

## 2020-01-01 RX ADMIN — ALBUMIN (HUMAN) 25 G: 0.25 INJECTION, SOLUTION INTRAVENOUS at 02:02

## 2020-01-01 RX ADMIN — DONEPEZIL HYDROCHLORIDE 5 MG: 5 TABLET, FILM COATED ORAL at 21:28

## 2020-01-01 RX ADMIN — METOPROLOL TARTRATE 50 MG: 50 TABLET, FILM COATED ORAL at 09:36

## 2020-01-01 RX ADMIN — FUROSEMIDE 20 MG/HR: 10 INJECTION, SOLUTION INTRAMUSCULAR; INTRAVENOUS at 12:59

## 2020-01-01 RX ADMIN — Medication 15 ML: at 09:59

## 2020-01-01 RX ADMIN — INSULIN LISPRO 2 UNITS: 100 INJECTION, SOLUTION INTRAVENOUS; SUBCUTANEOUS at 16:51

## 2020-01-01 RX ADMIN — DARBEPOETIN ALFA 60 MCG: 60 INJECTION, SOLUTION INTRAVENOUS; SUBCUTANEOUS at 20:45

## 2020-01-01 RX ADMIN — ALPRAZOLAM 0.5 MG: 0.25 TABLET ORAL at 21:11

## 2020-01-01 RX ADMIN — TRAZODONE HYDROCHLORIDE 50 MG: 50 TABLET ORAL at 21:16

## 2020-01-01 RX ADMIN — METRONIDAZOLE 500 MG: 250 TABLET ORAL at 14:00

## 2020-01-01 RX ADMIN — MICONAZOLE NITRATE: 20 POWDER TOPICAL at 09:57

## 2020-01-01 RX ADMIN — SERTRALINE HYDROCHLORIDE 100 MG: 50 TABLET ORAL at 09:13

## 2020-01-01 RX ADMIN — ROSUVASTATIN CALCIUM 5 MG: 10 TABLET, FILM COATED ORAL at 09:56

## 2020-01-01 RX ADMIN — FERROUS SULFATE TAB 325 MG (65 MG ELEMENTAL FE) 325 MG: 325 (65 FE) TAB at 21:36

## 2020-01-01 RX ADMIN — FUROSEMIDE 10 MG/HR: 10 INJECTION, SOLUTION INTRAMUSCULAR; INTRAVENOUS at 12:40

## 2020-01-01 RX ADMIN — Medication 10 ML: at 13:23

## 2020-01-01 RX ADMIN — Medication 1 CAPSULE: at 09:14

## 2020-01-01 RX ADMIN — B-COMPLEX W/ C & FOLIC ACID TAB 1 TABLET: TAB at 14:31

## 2020-01-01 RX ADMIN — PROPOFOL 20 MCG/KG/MIN: 10 INJECTION, EMULSION INTRAVENOUS at 06:20

## 2020-01-01 RX ADMIN — PANTOPRAZOLE SODIUM 40 MG: 40 TABLET, DELAYED RELEASE ORAL at 07:07

## 2020-01-01 RX ADMIN — Medication 3 MG: at 20:22

## 2020-01-01 RX ADMIN — VANCOMYCIN HYDROCHLORIDE 500 MG: 500 INJECTION, POWDER, LYOPHILIZED, FOR SOLUTION INTRAVENOUS at 14:31

## 2020-01-01 RX ADMIN — ATORVASTATIN CALCIUM 20 MG: 10 TABLET, FILM COATED ORAL at 21:34

## 2020-01-01 RX ADMIN — SODIUM BICARBONATE 50 MEQ: 84 INJECTION, SOLUTION INTRAVENOUS at 06:41

## 2020-01-01 RX ADMIN — IOPAMIDOL 75 ML: 755 INJECTION, SOLUTION INTRAVENOUS at 16:16

## 2020-01-01 RX ADMIN — ALPRAZOLAM 0.5 MG: 0.25 TABLET ORAL at 08:49

## 2020-01-01 RX ADMIN — B-COMPLEX W/ C & FOLIC ACID TAB 1 TABLET: TAB at 07:47

## 2020-01-01 RX ADMIN — INSULIN LISPRO 3 UNITS: 100 INJECTION, SOLUTION INTRAVENOUS; SUBCUTANEOUS at 20:59

## 2020-01-01 RX ADMIN — HYDRALAZINE HYDROCHLORIDE 50 MG: 50 TABLET, FILM COATED ORAL at 17:32

## 2020-01-01 RX ADMIN — INSULIN GLARGINE 15 UNITS: 100 INJECTION, SOLUTION SUBCUTANEOUS at 21:23

## 2020-01-01 RX ADMIN — BENZONATATE 100 MG: 100 CAPSULE ORAL at 21:39

## 2020-01-01 RX ADMIN — INSULIN LISPRO 2 UNITS: 100 INJECTION, SOLUTION INTRAVENOUS; SUBCUTANEOUS at 13:15

## 2020-01-01 RX ADMIN — FERROUS SULFATE TAB 325 MG (65 MG ELEMENTAL FE) 325 MG: 325 (65 FE) TAB at 10:31

## 2020-01-01 RX ADMIN — SERTRALINE 100 MG: 50 TABLET, FILM COATED ORAL at 10:48

## 2020-01-01 RX ADMIN — Medication 10 ML: at 08:45

## 2020-01-01 RX ADMIN — INSULIN GLARGINE 19 UNITS: 100 INJECTION, SOLUTION SUBCUTANEOUS at 21:12

## 2020-01-01 RX ADMIN — FERROUS SULFATE TAB 325 MG (65 MG ELEMENTAL FE) 325 MG: 325 (65 FE) TAB at 09:56

## 2020-01-01 RX ADMIN — SERTRALINE 100 MG: 50 TABLET, FILM COATED ORAL at 09:06

## 2020-01-01 RX ADMIN — Medication 125 MG: at 07:07

## 2020-01-01 RX ADMIN — TRAZODONE HYDROCHLORIDE 50 MG: 50 TABLET ORAL at 20:58

## 2020-01-01 RX ADMIN — TRAMADOL HYDROCHLORIDE 50 MG: 50 TABLET, FILM COATED ORAL at 23:35

## 2020-01-01 RX ADMIN — METOPROLOL TARTRATE 50 MG: 50 TABLET, FILM COATED ORAL at 08:46

## 2020-01-01 RX ADMIN — FERROUS SULFATE TAB 325 MG (65 MG ELEMENTAL FE) 325 MG: 325 (65 FE) TAB at 07:52

## 2020-01-01 RX ADMIN — Medication 125 MG: at 00:33

## 2020-01-01 RX ADMIN — Medication 10 ML: at 21:31

## 2020-01-01 RX ADMIN — PANTOPRAZOLE SODIUM 40 MG: 40 TABLET, DELAYED RELEASE ORAL at 05:49

## 2020-01-01 RX ADMIN — METOPROLOL TARTRATE 50 MG: 50 TABLET, FILM COATED ORAL at 07:53

## 2020-01-01 RX ADMIN — B-COMPLEX W/ C & FOLIC ACID TAB 1 TABLET: TAB at 09:35

## 2020-01-01 RX ADMIN — Medication 125 MG: at 13:44

## 2020-01-01 RX ADMIN — INSULIN LISPRO 3 UNITS: 100 INJECTION, SOLUTION INTRAVENOUS; SUBCUTANEOUS at 21:23

## 2020-01-01 RX ADMIN — Medication 1 CAPSULE: at 09:56

## 2020-01-01 RX ADMIN — Medication 125 MG: at 12:42

## 2020-01-01 RX ADMIN — HYDRALAZINE HYDROCHLORIDE 50 MG: 50 TABLET, FILM COATED ORAL at 06:12

## 2020-01-01 RX ADMIN — SERTRALINE 100 MG: 50 TABLET, FILM COATED ORAL at 08:38

## 2020-01-01 RX ADMIN — HYDRALAZINE HYDROCHLORIDE 50 MG: 25 TABLET, FILM COATED ORAL at 16:38

## 2020-01-01 RX ADMIN — Medication 10 ML: at 10:00

## 2020-01-01 RX ADMIN — PANTOPRAZOLE SODIUM 40 MG: 40 INJECTION, POWDER, FOR SOLUTION INTRAVENOUS at 09:05

## 2020-01-01 RX ADMIN — ALPRAZOLAM 0.5 MG: 0.25 TABLET ORAL at 22:14

## 2020-01-01 RX ADMIN — INSULIN LISPRO 4 UNITS: 100 INJECTION, SOLUTION INTRAVENOUS; SUBCUTANEOUS at 18:21

## 2020-01-01 RX ADMIN — B-COMPLEX W/ C & FOLIC ACID TAB 1 TABLET: TAB at 09:56

## 2020-01-01 RX ADMIN — Medication 3 MG: at 20:56

## 2020-01-01 RX ADMIN — INSULIN GLARGINE 15 UNITS: 100 INJECTION, SOLUTION SUBCUTANEOUS at 20:23

## 2020-01-01 RX ADMIN — FERROUS SULFATE TAB 325 MG (65 MG ELEMENTAL FE) 325 MG: 325 (65 FE) TAB at 20:51

## 2020-01-01 RX ADMIN — DONEPEZIL HYDROCHLORIDE 5 MG: 5 TABLET, FILM COATED ORAL at 20:29

## 2020-01-01 RX ADMIN — Medication: at 10:52

## 2020-01-01 RX ADMIN — INSULIN LISPRO 2 UNITS: 100 INJECTION, SOLUTION INTRAVENOUS; SUBCUTANEOUS at 21:20

## 2020-01-01 RX ADMIN — Medication 100 MG: at 08:44

## 2020-01-01 RX ADMIN — ALPRAZOLAM 0.5 MG: 0.25 TABLET ORAL at 07:49

## 2020-01-01 RX ADMIN — SENNOSIDES AND DOCUSATE SODIUM 1 TABLET: 8.6; 5 TABLET ORAL at 21:08

## 2020-01-01 RX ADMIN — INSULIN GLARGINE 19 UNITS: 100 INJECTION, SOLUTION SUBCUTANEOUS at 22:12

## 2020-01-01 RX ADMIN — METOPROLOL TARTRATE 50 MG: 50 TABLET, FILM COATED ORAL at 20:46

## 2020-01-01 RX ADMIN — Medication 125 MG: at 10:41

## 2020-01-01 RX ADMIN — ROSUVASTATIN CALCIUM 5 MG: 10 TABLET, FILM COATED ORAL at 07:48

## 2020-01-01 RX ADMIN — SERTRALINE 100 MG: 50 TABLET, FILM COATED ORAL at 08:52

## 2020-01-01 RX ADMIN — Medication 100 MG: at 08:12

## 2020-01-01 RX ADMIN — FERROUS SULFATE TAB 325 MG (65 MG ELEMENTAL FE) 325 MG: 325 (65 FE) TAB at 21:29

## 2020-01-01 RX ADMIN — Medication 1 CAPSULE: at 09:13

## 2020-01-01 RX ADMIN — METOPROLOL TARTRATE 25 MG: 25 TABLET ORAL at 08:49

## 2020-01-01 RX ADMIN — MICONAZOLE NITRATE: 20 POWDER TOPICAL at 20:50

## 2020-01-01 RX ADMIN — SERTRALINE HYDROCHLORIDE 100 MG: 50 TABLET ORAL at 14:26

## 2020-01-01 RX ADMIN — INSULIN LISPRO 1 UNITS: 100 INJECTION, SOLUTION INTRAVENOUS; SUBCUTANEOUS at 22:14

## 2020-01-01 RX ADMIN — LOSARTAN POTASSIUM 25 MG: 25 TABLET, FILM COATED ORAL at 07:55

## 2020-01-01 RX ADMIN — LOSARTAN POTASSIUM 50 MG: 25 TABLET, FILM COATED ORAL at 09:06

## 2020-01-01 RX ADMIN — Medication 10 ML: at 09:49

## 2020-01-01 RX ADMIN — LOSARTAN POTASSIUM 25 MG: 25 TABLET, FILM COATED ORAL at 07:56

## 2020-01-01 RX ADMIN — DONEPEZIL HYDROCHLORIDE 5 MG: 5 TABLET, FILM COATED ORAL at 21:19

## 2020-01-01 RX ADMIN — ASPIRIN 81 MG 81 MG: 81 TABLET ORAL at 14:26

## 2020-01-01 RX ADMIN — WARFARIN SODIUM 3 MG: 2 TABLET ORAL at 19:02

## 2020-01-01 RX ADMIN — CLOPIDOGREL BISULFATE 75 MG: 75 TABLET ORAL at 09:06

## 2020-01-01 RX ADMIN — INSULIN LISPRO 2 UNITS: 100 INJECTION, SOLUTION INTRAVENOUS; SUBCUTANEOUS at 12:01

## 2020-01-01 RX ADMIN — INSULIN LISPRO 2 UNITS: 100 INJECTION, SOLUTION INTRAVENOUS; SUBCUTANEOUS at 11:34

## 2020-01-01 RX ADMIN — SERTRALINE HYDROCHLORIDE 50 MG: 50 TABLET ORAL at 20:39

## 2020-01-01 RX ADMIN — ASPIRIN 81 MG 81 MG: 81 TABLET ORAL at 19:00

## 2020-01-01 RX ADMIN — MICONAZOLE NITRATE: 20 POWDER TOPICAL at 07:41

## 2020-01-01 RX ADMIN — TRAMADOL HYDROCHLORIDE 50 MG: 50 TABLET, FILM COATED ORAL at 15:06

## 2020-01-01 RX ADMIN — INSULIN LISPRO 1 UNITS: 100 INJECTION, SOLUTION INTRAVENOUS; SUBCUTANEOUS at 22:24

## 2020-01-01 RX ADMIN — Medication 3 MG: at 21:37

## 2020-01-01 RX ADMIN — SODIUM CHLORIDE: 9 INJECTION, SOLUTION INTRAVENOUS at 05:44

## 2020-01-01 RX ADMIN — ONDANSETRON 4 MG: 2 INJECTION INTRAMUSCULAR; INTRAVENOUS at 01:31

## 2020-01-01 RX ADMIN — ONDANSETRON 8 MG: 4 TABLET, ORALLY DISINTEGRATING ORAL at 14:18

## 2020-01-01 RX ADMIN — FIDAXOMICIN 200 MG: 200 TABLET, FILM COATED ORAL at 09:06

## 2020-01-01 RX ADMIN — FUROSEMIDE 40 MG: 10 INJECTION, SOLUTION INTRAMUSCULAR; INTRAVENOUS at 14:31

## 2020-01-01 RX ADMIN — INSULIN LISPRO 4 UNITS: 100 INJECTION, SOLUTION INTRAVENOUS; SUBCUTANEOUS at 17:36

## 2020-01-01 RX ADMIN — Medication 10 ML: at 21:07

## 2020-01-01 RX ADMIN — INSULIN LISPRO 1 UNITS: 100 INJECTION, SOLUTION INTRAVENOUS; SUBCUTANEOUS at 09:07

## 2020-01-01 RX ADMIN — DONEPEZIL HYDROCHLORIDE 5 MG: 5 TABLET, FILM COATED ORAL at 20:10

## 2020-01-01 RX ADMIN — METOPROLOL TARTRATE 50 MG: 50 TABLET, FILM COATED ORAL at 21:34

## 2020-01-01 RX ADMIN — HYDRALAZINE HYDROCHLORIDE 50 MG: 50 TABLET, FILM COATED ORAL at 14:26

## 2020-01-01 RX ADMIN — CEFAZOLIN SODIUM 2 G: 1 INJECTION, POWDER, FOR SOLUTION INTRAMUSCULAR; INTRAVENOUS at 13:13

## 2020-01-01 RX ADMIN — METOPROLOL TARTRATE 50 MG: 50 TABLET, FILM COATED ORAL at 10:03

## 2020-01-01 RX ADMIN — SERTRALINE 100 MG: 50 TABLET, FILM COATED ORAL at 12:08

## 2020-01-01 RX ADMIN — INSULIN LISPRO 1 UNITS: 100 INJECTION, SOLUTION INTRAVENOUS; SUBCUTANEOUS at 22:12

## 2020-01-01 RX ADMIN — INSULIN LISPRO 1 UNITS: 100 INJECTION, SOLUTION INTRAVENOUS; SUBCUTANEOUS at 21:00

## 2020-01-01 RX ADMIN — FERROUS SULFATE TAB 325 MG (65 MG ELEMENTAL FE) 325 MG: 325 (65 FE) TAB at 21:59

## 2020-01-01 RX ADMIN — ALPRAZOLAM 0.5 MG: 0.25 TABLET ORAL at 20:53

## 2020-01-01 RX ADMIN — MELATONIN TAB 5 MG 5 MG: 5 TAB at 21:40

## 2020-01-01 RX ADMIN — Medication 3 MG: at 21:29

## 2020-01-01 RX ADMIN — SODIUM CHLORIDE, POTASSIUM CHLORIDE, SODIUM LACTATE AND CALCIUM CHLORIDE: 600; 310; 30; 20 INJECTION, SOLUTION INTRAVENOUS at 16:55

## 2020-01-01 RX ADMIN — SERTRALINE HYDROCHLORIDE 50 MG: 50 TABLET ORAL at 21:54

## 2020-01-01 RX ADMIN — Medication: at 09:41

## 2020-01-01 RX ADMIN — LOSARTAN POTASSIUM 25 MG: 25 TABLET, FILM COATED ORAL at 12:15

## 2020-01-01 RX ADMIN — INSULIN LISPRO 2 UNITS: 100 INJECTION, SOLUTION INTRAVENOUS; SUBCUTANEOUS at 11:49

## 2020-01-01 RX ADMIN — INSULIN GLARGINE 10 UNITS: 100 INJECTION, SOLUTION SUBCUTANEOUS at 21:35

## 2020-01-01 RX ADMIN — ALBUMIN (HUMAN) 25 G: 12.5 SOLUTION INTRAVENOUS at 18:00

## 2020-01-01 RX ADMIN — METOLAZONE 5 MG: 2.5 TABLET ORAL at 09:05

## 2020-01-01 RX ADMIN — METOPROLOL TARTRATE 50 MG: 50 TABLET, FILM COATED ORAL at 21:05

## 2020-01-01 RX ADMIN — PANTOPRAZOLE SODIUM 40 MG: 40 TABLET, DELAYED RELEASE ORAL at 06:16

## 2020-01-01 RX ADMIN — FERROUS SULFATE TAB 325 MG (65 MG ELEMENTAL FE) 325 MG: 325 (65 FE) TAB at 07:40

## 2020-01-01 RX ADMIN — METOPROLOL TARTRATE 50 MG: 50 TABLET, FILM COATED ORAL at 20:43

## 2020-01-01 RX ADMIN — ROSUVASTATIN CALCIUM 5 MG: 10 TABLET, FILM COATED ORAL at 07:40

## 2020-01-01 RX ADMIN — Medication 100 MG: at 09:33

## 2020-01-01 RX ADMIN — METOPROLOL TARTRATE 50 MG: 50 TABLET, FILM COATED ORAL at 21:39

## 2020-01-01 RX ADMIN — Medication: at 20:07

## 2020-01-01 RX ADMIN — Medication 10 ML: at 08:20

## 2020-01-01 RX ADMIN — INSULIN LISPRO 2 UNITS: 100 INJECTION, SOLUTION INTRAVENOUS; SUBCUTANEOUS at 17:00

## 2020-01-01 RX ADMIN — PANTOPRAZOLE SODIUM 40 MG: 40 TABLET, DELAYED RELEASE ORAL at 08:24

## 2020-01-01 RX ADMIN — PANTOPRAZOLE SODIUM 40 MG: 40 TABLET, DELAYED RELEASE ORAL at 06:17

## 2020-01-01 RX ADMIN — FERROUS SULFATE TAB 325 MG (65 MG ELEMENTAL FE) 325 MG: 325 (65 FE) TAB at 08:15

## 2020-01-01 RX ADMIN — CIPROFLOXACIN 400 MG: 2 INJECTION, SOLUTION INTRAVENOUS at 14:16

## 2020-01-01 RX ADMIN — ALPRAZOLAM 0.5 MG: 0.25 TABLET ORAL at 13:10

## 2020-01-01 RX ADMIN — DONEPEZIL HYDROCHLORIDE 5 MG: 5 TABLET, FILM COATED ORAL at 21:23

## 2020-01-01 RX ADMIN — MICONAZOLE NITRATE: 20 POWDER TOPICAL at 21:36

## 2020-01-01 RX ADMIN — ANTICOAGULANT CITRATE DEXTROSE SOLUTION FORMULA A: 12.25; 11; 3.65 SOLUTION INTRAVENOUS at 12:08

## 2020-01-01 RX ADMIN — METOPROLOL TARTRATE 25 MG: 25 TABLET ORAL at 20:33

## 2020-01-01 RX ADMIN — Medication: at 10:17

## 2020-01-01 RX ADMIN — PANTOPRAZOLE SODIUM 40 MG: 40 TABLET, DELAYED RELEASE ORAL at 06:03

## 2020-01-01 RX ADMIN — Medication 125 MG: at 12:15

## 2020-01-01 RX ADMIN — Medication 125 MG: at 00:44

## 2020-01-01 RX ADMIN — INSULIN GLARGINE 19 UNITS: 100 INJECTION, SOLUTION SUBCUTANEOUS at 21:41

## 2020-01-01 RX ADMIN — HYDRALAZINE HYDROCHLORIDE 50 MG: 25 TABLET, FILM COATED ORAL at 21:16

## 2020-01-01 RX ADMIN — CEFEPIME HYDROCHLORIDE 1 G: 1 INJECTION, POWDER, FOR SOLUTION INTRAMUSCULAR; INTRAVENOUS at 14:24

## 2020-01-01 RX ADMIN — VANCOMYCIN HYDROCHLORIDE 125 MG: 5 INJECTION, POWDER, LYOPHILIZED, FOR SOLUTION INTRAVENOUS at 20:46

## 2020-01-01 RX ADMIN — ANTICOAGULANT CITRATE DEXTROSE SOLUTION FORMULA A: 12.25; 11; 3.65 SOLUTION INTRAVENOUS at 20:05

## 2020-01-01 RX ADMIN — Medication 125 MG: at 11:38

## 2020-01-01 RX ADMIN — PROMETHAZINE HYDROCHLORIDE 25 MG: 25 TABLET ORAL at 08:38

## 2020-01-01 RX ADMIN — WARFARIN SODIUM 2 MG: 2 TABLET ORAL at 18:17

## 2020-01-01 RX ADMIN — METOPROLOL TARTRATE 25 MG: 25 TABLET ORAL at 08:08

## 2020-01-01 RX ADMIN — INSULIN LISPRO 5 UNITS: 100 INJECTION, SOLUTION INTRAVENOUS; SUBCUTANEOUS at 11:40

## 2020-01-01 RX ADMIN — Medication: at 19:50

## 2020-01-01 RX ADMIN — Medication: at 08:19

## 2020-01-01 RX ADMIN — HYDRALAZINE HYDROCHLORIDE 50 MG: 50 TABLET, FILM COATED ORAL at 20:58

## 2020-01-01 RX ADMIN — Medication 125 MG: at 11:24

## 2020-01-01 RX ADMIN — ONDANSETRON 8 MG: 4 TABLET, ORALLY DISINTEGRATING ORAL at 06:30

## 2020-01-01 RX ADMIN — FIDAXOMICIN 200 MG: 200 TABLET, FILM COATED ORAL at 22:45

## 2020-01-01 RX ADMIN — SERTRALINE HYDROCHLORIDE 50 MG: 50 TABLET ORAL at 21:05

## 2020-01-01 RX ADMIN — FERROUS SULFATE TAB 325 MG (65 MG ELEMENTAL FE) 325 MG: 325 (65 FE) TAB at 16:49

## 2020-01-01 RX ADMIN — INSULIN LISPRO 2 UNITS: 100 INJECTION, SOLUTION INTRAVENOUS; SUBCUTANEOUS at 18:16

## 2020-01-01 RX ADMIN — FUROSEMIDE 20 MG/HR: 10 INJECTION, SOLUTION INTRAMUSCULAR; INTRAVENOUS at 06:45

## 2020-01-01 RX ADMIN — SERTRALINE 100 MG: 50 TABLET, FILM COATED ORAL at 07:46

## 2020-01-01 RX ADMIN — PANTOPRAZOLE SODIUM 40 MG: 40 TABLET, DELAYED RELEASE ORAL at 05:37

## 2020-01-01 RX ADMIN — FENTANYL CITRATE 25 MCG/HR: 50 INJECTION, SOLUTION INTRAMUSCULAR; INTRAVENOUS at 01:21

## 2020-01-01 RX ADMIN — INSULIN LISPRO 2 UNITS: 100 INJECTION, SOLUTION INTRAVENOUS; SUBCUTANEOUS at 12:10

## 2020-01-01 RX ADMIN — INSULIN LISPRO 2 UNITS: 100 INJECTION, SOLUTION INTRAVENOUS; SUBCUTANEOUS at 11:40

## 2020-01-01 RX ADMIN — Medication 10 ML: at 21:52

## 2020-01-01 RX ADMIN — METOPROLOL TARTRATE 25 MG: 25 TABLET ORAL at 08:15

## 2020-01-01 RX ADMIN — B-COMPLEX W/ C & FOLIC ACID TAB 1 TABLET: TAB at 08:20

## 2020-01-01 RX ADMIN — Medication 125 MG: at 11:59

## 2020-01-01 RX ADMIN — INSULIN LISPRO 2 UNITS: 100 INJECTION, SOLUTION INTRAVENOUS; SUBCUTANEOUS at 16:16

## 2020-01-01 RX ADMIN — METRONIDAZOLE 500 MG: 250 TABLET ORAL at 14:55

## 2020-01-01 RX ADMIN — WARFARIN SODIUM 3.5 MG: 2.5 TABLET ORAL at 17:33

## 2020-01-01 RX ADMIN — DONEPEZIL HYDROCHLORIDE 5 MG: 5 TABLET, FILM COATED ORAL at 21:29

## 2020-01-01 RX ADMIN — METOPROLOL TARTRATE 25 MG: 25 TABLET ORAL at 21:36

## 2020-01-01 RX ADMIN — ALPRAZOLAM 0.5 MG: 0.25 TABLET ORAL at 08:39

## 2020-01-01 RX ADMIN — INSULIN GLARGINE 15 UNITS: 100 INJECTION, SOLUTION SUBCUTANEOUS at 21:04

## 2020-01-01 RX ADMIN — INSULIN GLARGINE 15 UNITS: 100 INJECTION, SOLUTION SUBCUTANEOUS at 20:18

## 2020-01-01 RX ADMIN — INSULIN LISPRO 2 UNITS: 100 INJECTION, SOLUTION INTRAVENOUS; SUBCUTANEOUS at 08:45

## 2020-01-01 RX ADMIN — DARBEPOETIN ALFA 40 MCG: 40 INJECTION, SOLUTION INTRAVENOUS; SUBCUTANEOUS at 13:28

## 2020-01-01 RX ADMIN — FUROSEMIDE 80 MG: 10 INJECTION, SOLUTION INTRAMUSCULAR; INTRAVENOUS at 15:51

## 2020-01-01 RX ADMIN — LINEZOLID 600 MG: 600 INJECTION, SOLUTION INTRAVENOUS at 15:27

## 2020-01-01 RX ADMIN — Medication: at 08:50

## 2020-01-01 RX ADMIN — TRAZODONE HYDROCHLORIDE 50 MG: 50 TABLET ORAL at 20:17

## 2020-01-01 RX ADMIN — SENNOSIDES AND DOCUSATE SODIUM 1 TABLET: 8.6; 5 TABLET ORAL at 21:03

## 2020-01-01 RX ADMIN — MEROPENEM 1 G: 1 INJECTION, POWDER, FOR SOLUTION INTRAVENOUS at 16:20

## 2020-01-01 RX ADMIN — TETROFOSMIN 10.5 MILLICURIE: 1.38 INJECTION, POWDER, LYOPHILIZED, FOR SOLUTION INTRAVENOUS at 10:20

## 2020-01-01 RX ADMIN — ATORVASTATIN CALCIUM 20 MG: 10 TABLET, FILM COATED ORAL at 20:42

## 2020-01-01 RX ADMIN — CEFUROXIME AXETIL 250 MG: 250 TABLET ORAL at 09:36

## 2020-01-01 RX ADMIN — Medication 125 MG: at 23:44

## 2020-01-01 RX ADMIN — FERROUS SULFATE TAB 325 MG (65 MG ELEMENTAL FE) 325 MG: 325 (65 FE) TAB at 15:19

## 2020-01-01 RX ADMIN — VASOPRESSIN 0.04 UNITS/MIN: 20 INJECTION INTRAVENOUS at 02:52

## 2020-01-01 RX ADMIN — INSULIN LISPRO 4 UNITS: 100 INJECTION, SOLUTION INTRAVENOUS; SUBCUTANEOUS at 12:15

## 2020-01-01 RX ADMIN — Medication 3 MG: at 21:08

## 2020-01-01 RX ADMIN — INSULIN LISPRO 4 UNITS: 100 INJECTION, SOLUTION INTRAVENOUS; SUBCUTANEOUS at 18:30

## 2020-01-01 RX ADMIN — INSULIN LISPRO 2 UNITS: 100 INJECTION, SOLUTION INTRAVENOUS; SUBCUTANEOUS at 20:38

## 2020-01-01 RX ADMIN — INSULIN LISPRO 2 UNITS: 100 INJECTION, SOLUTION INTRAVENOUS; SUBCUTANEOUS at 20:12

## 2020-01-01 RX ADMIN — SENNOSIDES AND DOCUSATE SODIUM 1 TABLET: 8.6; 5 TABLET ORAL at 21:17

## 2020-01-01 RX ADMIN — B-COMPLEX W/ C & FOLIC ACID TAB 1 TABLET: TAB at 09:49

## 2020-01-01 RX ADMIN — TRAMADOL HYDROCHLORIDE 50 MG: 50 TABLET, FILM COATED ORAL at 21:11

## 2020-01-01 RX ADMIN — ACETAMINOPHEN 650 MG: 325 TABLET ORAL at 13:33

## 2020-01-01 RX ADMIN — VASOPRESSIN 0.04 UNITS/MIN: 20 INJECTION INTRAVENOUS at 17:48

## 2020-01-01 RX ADMIN — ASPIRIN 81 MG: 81 TABLET, CHEWABLE ORAL at 09:06

## 2020-01-01 RX ADMIN — PANTOPRAZOLE SODIUM 40 MG: 40 TABLET, DELAYED RELEASE ORAL at 06:02

## 2020-01-01 RX ADMIN — ONDANSETRON 8 MG: 4 TABLET, ORALLY DISINTEGRATING ORAL at 21:17

## 2020-01-01 RX ADMIN — ONDANSETRON 8 MG: 4 TABLET, ORALLY DISINTEGRATING ORAL at 16:58

## 2020-01-01 RX ADMIN — ONDANSETRON 4 MG: 2 INJECTION INTRAMUSCULAR; INTRAVENOUS at 18:26

## 2020-01-01 RX ADMIN — ROSUVASTATIN CALCIUM 5 MG: 10 TABLET, FILM COATED ORAL at 09:03

## 2020-01-01 RX ADMIN — POVIDONE-IODINE: 10 SOLUTION TOPICAL at 09:28

## 2020-01-01 RX ADMIN — Medication 100 MG: at 08:15

## 2020-01-01 RX ADMIN — METRONIDAZOLE 500 MG: 250 TABLET ORAL at 22:06

## 2020-01-01 RX ADMIN — DONEPEZIL HYDROCHLORIDE 5 MG: 5 TABLET, FILM COATED ORAL at 20:39

## 2020-01-01 RX ADMIN — LOSARTAN POTASSIUM 25 MG: 25 TABLET, FILM COATED ORAL at 07:47

## 2020-01-01 RX ADMIN — PANTOPRAZOLE SODIUM 40 MG: 40 TABLET, DELAYED RELEASE ORAL at 05:16

## 2020-01-01 RX ADMIN — FIDAXOMICIN 200 MG: 200 TABLET, FILM COATED ORAL at 20:47

## 2020-01-01 RX ADMIN — ROSUVASTATIN CALCIUM 5 MG: 10 TABLET, FILM COATED ORAL at 14:26

## 2020-01-01 RX ADMIN — METOPROLOL TARTRATE 50 MG: 50 TABLET, FILM COATED ORAL at 14:26

## 2020-01-01 RX ADMIN — Medication 3 MG: at 22:10

## 2020-01-01 RX ADMIN — SERTRALINE HYDROCHLORIDE 50 MG: 50 TABLET ORAL at 20:45

## 2020-01-01 RX ADMIN — LOSARTAN POTASSIUM 50 MG: 25 TABLET, FILM COATED ORAL at 08:46

## 2020-01-01 RX ADMIN — METOPROLOL TARTRATE 25 MG: 25 TABLET ORAL at 07:49

## 2020-01-01 RX ADMIN — WARFARIN SODIUM 3 MG: 2 TABLET ORAL at 18:39

## 2020-01-01 RX ADMIN — HYDRALAZINE HYDROCHLORIDE 50 MG: 50 TABLET, FILM COATED ORAL at 20:37

## 2020-01-01 RX ADMIN — METOPROLOL TARTRATE 50 MG: 50 TABLET, FILM COATED ORAL at 09:39

## 2020-01-01 RX ADMIN — HYDRALAZINE HYDROCHLORIDE 50 MG: 25 TABLET, FILM COATED ORAL at 21:03

## 2020-01-01 RX ADMIN — DONEPEZIL HYDROCHLORIDE 5 MG: 5 TABLET, FILM COATED ORAL at 21:08

## 2020-01-01 RX ADMIN — PROMETHAZINE HYDROCHLORIDE 12.5 MG: 25 TABLET ORAL at 21:53

## 2020-01-01 RX ADMIN — SODIUM ZIRCONIUM CYCLOSILICATE 10 G: 5 POWDER, FOR SUSPENSION ORAL at 01:46

## 2020-01-01 RX ADMIN — METHYLPREDNISOLONE SODIUM SUCCINATE 60 MG: 125 INJECTION, POWDER, FOR SOLUTION INTRAMUSCULAR; INTRAVENOUS at 16:50

## 2020-01-01 RX ADMIN — WARFARIN SODIUM 4 MG: 2 TABLET ORAL at 21:14

## 2020-01-01 RX ADMIN — INSULIN LISPRO 2 UNITS: 100 INJECTION, SOLUTION INTRAVENOUS; SUBCUTANEOUS at 20:59

## 2020-01-01 RX ADMIN — Medication 3 MG: at 20:06

## 2020-01-01 RX ADMIN — METRONIDAZOLE 500 MG: 250 TABLET ORAL at 05:47

## 2020-01-01 RX ADMIN — ROSUVASTATIN CALCIUM 5 MG: 10 TABLET, FILM COATED ORAL at 08:49

## 2020-01-01 RX ADMIN — PANTOPRAZOLE SODIUM 40 MG: 40 TABLET, DELAYED RELEASE ORAL at 05:36

## 2020-01-01 RX ADMIN — INSULIN LISPRO 4 UNITS: 100 INJECTION, SOLUTION INTRAVENOUS; SUBCUTANEOUS at 16:22

## 2020-01-01 RX ADMIN — METOPROLOL TARTRATE 25 MG: 25 TABLET ORAL at 21:40

## 2020-01-01 RX ADMIN — B-COMPLEX W/ C & FOLIC ACID TAB 1 TABLET: TAB at 08:28

## 2020-01-01 RX ADMIN — FERROUS SULFATE TAB 325 MG (65 MG ELEMENTAL FE) 325 MG: 325 (65 FE) TAB at 10:40

## 2020-01-01 RX ADMIN — FERROUS SULFATE TAB 325 MG (65 MG ELEMENTAL FE) 325 MG: 325 (65 FE) TAB at 12:56

## 2020-01-01 RX ADMIN — INSULIN LISPRO 4 UNITS: 100 INJECTION, SOLUTION INTRAVENOUS; SUBCUTANEOUS at 11:43

## 2020-01-01 RX ADMIN — ASPIRIN 81 MG 81 MG: 81 TABLET ORAL at 14:32

## 2020-01-01 RX ADMIN — INSULIN LISPRO 1 UNITS: 100 INJECTION, SOLUTION INTRAVENOUS; SUBCUTANEOUS at 21:05

## 2020-01-01 RX ADMIN — ACETAMINOPHEN 650 MG: 325 TABLET ORAL at 23:43

## 2020-01-01 RX ADMIN — INSULIN GLARGINE 19 UNITS: 100 INJECTION, SOLUTION SUBCUTANEOUS at 22:11

## 2020-01-01 RX ADMIN — DONEPEZIL HYDROCHLORIDE 5 MG: 5 TABLET, FILM COATED ORAL at 22:05

## 2020-01-01 RX ADMIN — WARFARIN SODIUM 1.5 MG: 1 TABLET ORAL at 18:58

## 2020-01-01 RX ADMIN — Medication 1 CAPSULE: at 10:03

## 2020-01-01 RX ADMIN — MICONAZOLE NITRATE: 20 POWDER TOPICAL at 21:03

## 2020-01-01 RX ADMIN — INSULIN LISPRO 2 UNITS: 100 INJECTION, SOLUTION INTRAVENOUS; SUBCUTANEOUS at 11:20

## 2020-01-01 RX ADMIN — CALCIUM GLUCONATE 1 G: 20 INJECTION, SOLUTION INTRAVENOUS at 00:35

## 2020-01-01 RX ADMIN — METOPROLOL TARTRATE 50 MG: 50 TABLET, FILM COATED ORAL at 07:48

## 2020-01-01 RX ADMIN — PANTOPRAZOLE SODIUM 40 MG: 40 TABLET, DELAYED RELEASE ORAL at 06:35

## 2020-01-01 RX ADMIN — METOPROLOL TARTRATE 50 MG: 50 TABLET, FILM COATED ORAL at 20:54

## 2020-01-01 RX ADMIN — DONEPEZIL HYDROCHLORIDE 5 MG: 5 TABLET, FILM COATED ORAL at 22:07

## 2020-01-01 RX ADMIN — CALCIUM GLUCONATE 1 G: 20 INJECTION, SOLUTION INTRAVENOUS at 17:19

## 2020-01-01 RX ADMIN — INSULIN GLARGINE 15 UNITS: 100 INJECTION, SOLUTION SUBCUTANEOUS at 22:22

## 2020-01-01 RX ADMIN — METRONIDAZOLE 500 MG: 500 INJECTION, SOLUTION INTRAVENOUS at 05:37

## 2020-01-01 RX ADMIN — HYDROCORTISONE SODIUM SUCCINATE 50 MG: 100 INJECTION, POWDER, FOR SOLUTION INTRAMUSCULAR; INTRAVENOUS at 14:47

## 2020-01-01 RX ADMIN — HYDRALAZINE HYDROCHLORIDE 50 MG: 50 TABLET, FILM COATED ORAL at 16:46

## 2020-01-01 RX ADMIN — METOPROLOL TARTRATE 50 MG: 50 TABLET, FILM COATED ORAL at 08:51

## 2020-01-01 RX ADMIN — INSULIN LISPRO 12 UNITS: 100 INJECTION, SOLUTION INTRAVENOUS; SUBCUTANEOUS at 16:26

## 2020-01-01 RX ADMIN — Medication 125 MG: at 12:45

## 2020-01-01 RX ADMIN — Medication 100 MG: at 08:16

## 2020-01-01 RX ADMIN — INSULIN LISPRO 4 UNITS: 100 INJECTION, SOLUTION INTRAVENOUS; SUBCUTANEOUS at 16:46

## 2020-01-01 RX ADMIN — Medication 1 CAPSULE: at 09:36

## 2020-01-01 RX ADMIN — Medication 125 MG: at 06:14

## 2020-01-01 RX ADMIN — LIDOCAINE HYDROCHLORIDE: 20 SOLUTION ORAL; TOPICAL at 22:17

## 2020-01-01 RX ADMIN — Medication 125 MG: at 12:36

## 2020-01-01 RX ADMIN — INSULIN LISPRO 4 UNITS: 100 INJECTION, SOLUTION INTRAVENOUS; SUBCUTANEOUS at 16:50

## 2020-01-01 RX ADMIN — ONDANSETRON 4 MG: 2 INJECTION INTRAMUSCULAR; INTRAVENOUS at 07:32

## 2020-01-01 RX ADMIN — DONEPEZIL HYDROCHLORIDE 5 MG: 5 TABLET, FILM COATED ORAL at 21:54

## 2020-01-01 RX ADMIN — Medication 10 ML: at 20:23

## 2020-01-01 RX ADMIN — HYDRALAZINE HYDROCHLORIDE 50 MG: 25 TABLET, FILM COATED ORAL at 06:11

## 2020-01-01 RX ADMIN — SERTRALINE 100 MG: 50 TABLET, FILM COATED ORAL at 08:25

## 2020-01-01 RX ADMIN — Medication 125 MG: at 01:13

## 2020-01-01 RX ADMIN — Medication 1 CAPSULE: at 09:35

## 2020-01-01 RX ADMIN — DONEPEZIL HYDROCHLORIDE 5 MG: 5 TABLET, FILM COATED ORAL at 20:17

## 2020-01-01 RX ADMIN — METOPROLOL TARTRATE 50 MG: 50 TABLET, FILM COATED ORAL at 08:20

## 2020-01-01 RX ADMIN — INSULIN LISPRO 3 UNITS: 100 INJECTION, SOLUTION INTRAVENOUS; SUBCUTANEOUS at 21:09

## 2020-01-01 RX ADMIN — MICONAZOLE NITRATE: 20 POWDER TOPICAL at 08:20

## 2020-01-01 RX ADMIN — DARBEPOETIN ALFA 25 MCG: 25 INJECTION, SOLUTION INTRAVENOUS; SUBCUTANEOUS at 17:58

## 2020-01-01 RX ADMIN — Medication 125 MG: at 06:53

## 2020-01-01 RX ADMIN — Medication 125 MG: at 06:20

## 2020-01-01 RX ADMIN — INSULIN LISPRO 1 UNITS: 100 INJECTION, SOLUTION INTRAVENOUS; SUBCUTANEOUS at 20:22

## 2020-01-01 RX ADMIN — Medication: at 09:26

## 2020-01-01 ASSESSMENT — PAIN SCALES - GENERAL
PAINLEVEL_OUTOF10: 4
PAINLEVEL_OUTOF10: 0
PAINLEVEL_OUTOF10: 0
PAINLEVEL_OUTOF10: 6
PAINLEVEL_OUTOF10: 7
PAINLEVEL_OUTOF10: 0
PAINLEVEL_OUTOF10: 4
PAINLEVEL_OUTOF10: 0
PAINLEVEL_OUTOF10: 5
PAINLEVEL_OUTOF10: 0
PAINLEVEL_OUTOF10: 4
PAINLEVEL_OUTOF10: 5
PAINLEVEL_OUTOF10: 2
PAINLEVEL_OUTOF10: 0
PAINLEVEL_OUTOF10: 0
PAINLEVEL_OUTOF10: 2
PAINLEVEL_OUTOF10: 7
PAINLEVEL_OUTOF10: 6
PAINLEVEL_OUTOF10: 7
PAINLEVEL_OUTOF10: 5
PAINLEVEL_OUTOF10: 0
PAINLEVEL_OUTOF10: 6
PAINLEVEL_OUTOF10: 7
PAINLEVEL_OUTOF10: 4
PAINLEVEL_OUTOF10: 0
PAINLEVEL_OUTOF10: 6
PAINLEVEL_OUTOF10: 6
PAINLEVEL_OUTOF10: 2
PAINLEVEL_OUTOF10: 0
PAINLEVEL_OUTOF10: 6
PAINLEVEL_OUTOF10: 4
PAINLEVEL_OUTOF10: 4
PAINLEVEL_OUTOF10: 0
PAINLEVEL_OUTOF10: 5
PAINLEVEL_OUTOF10: 5
PAINLEVEL_OUTOF10: 7
PAINLEVEL_OUTOF10: 6
PAINLEVEL_OUTOF10: 0
PAINLEVEL_OUTOF10: 6
PAINLEVEL_OUTOF10: 0
PAINLEVEL_OUTOF10: 0
PAINLEVEL_OUTOF10: 8
PAINLEVEL_OUTOF10: 0
PAINLEVEL_OUTOF10: 0
PAINLEVEL_OUTOF10: 2
PAINLEVEL_OUTOF10: 10
PAINLEVEL_OUTOF10: 0
PAINLEVEL_OUTOF10: 7
PAINLEVEL_OUTOF10: 6
PAINLEVEL_OUTOF10: 0
PAINLEVEL_OUTOF10: 9
PAINLEVEL_OUTOF10: 0
PAINLEVEL_OUTOF10: 3
PAINLEVEL_OUTOF10: 0
PAINLEVEL_OUTOF10: 6
PAINLEVEL_OUTOF10: 7
PAINLEVEL_OUTOF10: 6
PAINLEVEL_OUTOF10: 2
PAINLEVEL_OUTOF10: 10
PAINLEVEL_OUTOF10: 0
PAINLEVEL_OUTOF10: 3
PAINLEVEL_OUTOF10: 0
PAINLEVEL_OUTOF10: 8
PAINLEVEL_OUTOF10: 6
PAINLEVEL_OUTOF10: 4
PAINLEVEL_OUTOF10: 0
PAINLEVEL_OUTOF10: 6
PAINLEVEL_OUTOF10: 0
PAINLEVEL_OUTOF10: 0
PAINLEVEL_OUTOF10: 4
PAINLEVEL_OUTOF10: 0
PAINLEVEL_OUTOF10: 0
PAINLEVEL_OUTOF10: 5
PAINLEVEL_OUTOF10: 0
PAINLEVEL_OUTOF10: 5
PAINLEVEL_OUTOF10: 2
PAINLEVEL_OUTOF10: 7
PAINLEVEL_OUTOF10: 3
PAINLEVEL_OUTOF10: 7
PAINLEVEL_OUTOF10: 0
PAINLEVEL_OUTOF10: 3
PAINLEVEL_OUTOF10: 5
PAINLEVEL_OUTOF10: 0
PAINLEVEL_OUTOF10: 2
PAINLEVEL_OUTOF10: 4
PAINLEVEL_OUTOF10: 4
PAINLEVEL_OUTOF10: 0
PAINLEVEL_OUTOF10: 0
PAINLEVEL_OUTOF10: 7
PAINLEVEL_OUTOF10: 7
PAINLEVEL_OUTOF10: 6
PAINLEVEL_OUTOF10: 0
PAINLEVEL_OUTOF10: 7
PAINLEVEL_OUTOF10: 0
PAINLEVEL_OUTOF10: 0
PAINLEVEL_OUTOF10: 6
PAINLEVEL_OUTOF10: 0
PAINLEVEL_OUTOF10: 7
PAINLEVEL_OUTOF10: 0
PAINLEVEL_OUTOF10: 0
PAINLEVEL_OUTOF10: 7
PAINLEVEL_OUTOF10: 3
PAINLEVEL_OUTOF10: 6
PAINLEVEL_OUTOF10: 0
PAINLEVEL_OUTOF10: 4
PAINLEVEL_OUTOF10: 0
PAINLEVEL_OUTOF10: 4
PAINLEVEL_OUTOF10: 0
PAINLEVEL_OUTOF10: 2
PAINLEVEL_OUTOF10: 0
PAINLEVEL_OUTOF10: 0
PAINLEVEL_OUTOF10: 3
PAINLEVEL_OUTOF10: 0
PAINLEVEL_OUTOF10: 6
PAINLEVEL_OUTOF10: 0
PAINLEVEL_OUTOF10: 4
PAINLEVEL_OUTOF10: 5
PAINLEVEL_OUTOF10: 0
PAINLEVEL_OUTOF10: 5
PAINLEVEL_OUTOF10: 0
PAINLEVEL_OUTOF10: 7
PAINLEVEL_OUTOF10: 0
PAINLEVEL_OUTOF10: 0
PAINLEVEL_OUTOF10: 5
PAINLEVEL_OUTOF10: 4
PAINLEVEL_OUTOF10: 3
PAINLEVEL_OUTOF10: 0
PAINLEVEL_OUTOF10: 9
PAINLEVEL_OUTOF10: 0
PAINLEVEL_OUTOF10: 6
PAINLEVEL_OUTOF10: 0
PAINLEVEL_OUTOF10: 3
PAINLEVEL_OUTOF10: 3
PAINLEVEL_OUTOF10: 6
PAINLEVEL_OUTOF10: 6
PAINLEVEL_OUTOF10: 5
PAINLEVEL_OUTOF10: 0
PAINLEVEL_OUTOF10: 0
PAINLEVEL_OUTOF10: 2
PAINLEVEL_OUTOF10: 0
PAINLEVEL_OUTOF10: 0
PAINLEVEL_OUTOF10: 5
PAINLEVEL_OUTOF10: 0
PAINLEVEL_OUTOF10: 0
PAINLEVEL_OUTOF10: 2
PAINLEVEL_OUTOF10: 6
PAINLEVEL_OUTOF10: 0
PAINLEVEL_OUTOF10: 0
PAINLEVEL_OUTOF10: 6
PAINLEVEL_OUTOF10: 7
PAINLEVEL_OUTOF10: 4
PAINLEVEL_OUTOF10: 0
PAINLEVEL_OUTOF10: 7
PAINLEVEL_OUTOF10: 0
PAINLEVEL_OUTOF10: 5
PAINLEVEL_OUTOF10: 0
PAINLEVEL_OUTOF10: 2
PAINLEVEL_OUTOF10: 7
PAINLEVEL_OUTOF10: 0
PAINLEVEL_OUTOF10: 7
PAINLEVEL_OUTOF10: 6
PAINLEVEL_OUTOF10: 6
PAINLEVEL_OUTOF10: 0
PAINLEVEL_OUTOF10: 0
PAINLEVEL_OUTOF10: 2
PAINLEVEL_OUTOF10: 0
PAINLEVEL_OUTOF10: 2
PAINLEVEL_OUTOF10: 0
PAINLEVEL_OUTOF10: 2
PAINLEVEL_OUTOF10: 0
PAINLEVEL_OUTOF10: 0
PAINLEVEL_OUTOF10: 8
PAINLEVEL_OUTOF10: 6
PAINLEVEL_OUTOF10: 2
PAINLEVEL_OUTOF10: 7
PAINLEVEL_OUTOF10: 10
PAINLEVEL_OUTOF10: 0
PAINLEVEL_OUTOF10: 6
PAINLEVEL_OUTOF10: 0
PAINLEVEL_OUTOF10: 0
PAINLEVEL_OUTOF10: 2
PAINLEVEL_OUTOF10: 0
PAINLEVEL_OUTOF10: 0
PAINLEVEL_OUTOF10: 6
PAINLEVEL_OUTOF10: 0
PAINLEVEL_OUTOF10: 0
PAINLEVEL_OUTOF10: 7
PAINLEVEL_OUTOF10: 0
PAINLEVEL_OUTOF10: 8
PAINLEVEL_OUTOF10: 8
PAINLEVEL_OUTOF10: 10
PAINLEVEL_OUTOF10: 6
PAINLEVEL_OUTOF10: 7
PAINLEVEL_OUTOF10: 0
PAINLEVEL_OUTOF10: 6
PAINLEVEL_OUTOF10: 0
PAINLEVEL_OUTOF10: 0
PAINLEVEL_OUTOF10: 1
PAINLEVEL_OUTOF10: 0
PAINLEVEL_OUTOF10: 4
PAINLEVEL_OUTOF10: 7
PAINLEVEL_OUTOF10: 0
PAINLEVEL_OUTOF10: 8
PAINLEVEL_OUTOF10: 7
PAINLEVEL_OUTOF10: 0
PAINLEVEL_OUTOF10: 4
PAINLEVEL_OUTOF10: 7
PAINLEVEL_OUTOF10: 7
PAINLEVEL_OUTOF10: 0
PAINLEVEL_OUTOF10: 4
PAINLEVEL_OUTOF10: 0
PAINLEVEL_OUTOF10: 4
PAINLEVEL_OUTOF10: 0
PAINLEVEL_OUTOF10: 0
PAINLEVEL_OUTOF10: 5
PAINLEVEL_OUTOF10: 0
PAINLEVEL_OUTOF10: 7
PAINLEVEL_OUTOF10: 0

## 2020-01-01 ASSESSMENT — 9 HOLE PEG TEST
TESTTIME_SECONDS: 28.3
TEST_RESULT: IMPAIRED
TEST_RESULT: IMPAIRED
TESTTIME_SECONDS: 35.2
TESTTIME_SECONDS: 30.8
TESTTIME_SECONDS: 27.5

## 2020-01-01 ASSESSMENT — PAIN SCALES - WONG BAKER
WONGBAKER_NUMERICALRESPONSE: 0
WONGBAKER_NUMERICALRESPONSE: 4
WONGBAKER_NUMERICALRESPONSE: 0
WONGBAKER_NUMERICALRESPONSE: 6
WONGBAKER_NUMERICALRESPONSE: 0

## 2020-01-01 ASSESSMENT — PULMONARY FUNCTION TESTS
PIF_VALUE: 0
PIF_VALUE: 29
PIF_VALUE: 28
PIF_VALUE: 28
PIF_VALUE: 33
PIF_VALUE: 0
PIF_VALUE: 28
PIF_VALUE: 0
PIF_VALUE: 29
PIF_VALUE: 31
PIF_VALUE: 29
PIF_VALUE: 30
PIF_VALUE: 1
PIF_VALUE: 28
PIF_VALUE: 0
PIF_VALUE: 31
PIF_VALUE: 0
PIF_VALUE: 29
PIF_VALUE: 29
PIF_VALUE: 28
PIF_VALUE: 0
PIF_VALUE: 29
PIF_VALUE: 0
PIF_VALUE: 28
PIF_VALUE: 0
PIF_VALUE: 24
PIF_VALUE: 31
PIF_VALUE: 0
PIF_VALUE: 27
PIF_VALUE: 0
PIF_VALUE: 0
PIF_VALUE: 27
PIF_VALUE: 27
PIF_VALUE: 29
PIF_VALUE: 0
PIF_VALUE: 28
PIF_VALUE: 30
PIF_VALUE: 1
PIF_VALUE: 27
PIF_VALUE: 29
PIF_VALUE: 0
PIF_VALUE: 28
PIF_VALUE: 33
PIF_VALUE: 0
PIF_VALUE: 29
PIF_VALUE: 0
PIF_VALUE: 34
PIF_VALUE: 32
PIF_VALUE: 28
PIF_VALUE: 0
PIF_VALUE: 27
PIF_VALUE: 2
PIF_VALUE: 28
PIF_VALUE: 0
PIF_VALUE: 27
PIF_VALUE: 0
PIF_VALUE: 0
PIF_VALUE: 30
PIF_VALUE: 29
PIF_VALUE: 27
PIF_VALUE: 0
PIF_VALUE: 29
PIF_VALUE: 27
PIF_VALUE: 27
PIF_VALUE: 29
PIF_VALUE: 0
PIF_VALUE: 28
PIF_VALUE: 30
PIF_VALUE: 29
PIF_VALUE: 0
PIF_VALUE: 0
PIF_VALUE: 32
PIF_VALUE: 27
PIF_VALUE: 0
PIF_VALUE: 0
PIF_VALUE: 30
PIF_VALUE: 29
PIF_VALUE: 0
PIF_VALUE: 0
PIF_VALUE: 28
PIF_VALUE: 30
PIF_VALUE: 0
PIF_VALUE: 27
PIF_VALUE: 0
PIF_VALUE: 31
PIF_VALUE: 0
PIF_VALUE: 9
PIF_VALUE: 0
PIF_VALUE: 26
PIF_VALUE: 0
PIF_VALUE: 28
PIF_VALUE: 0
PIF_VALUE: 29
PIF_VALUE: 29
PIF_VALUE: 0
PIF_VALUE: 0
PIF_VALUE: 27
PIF_VALUE: 0
PIF_VALUE: 28
PIF_VALUE: 32
PIF_VALUE: 31
PIF_VALUE: 27
PIF_VALUE: 0
PIF_VALUE: 31
PIF_VALUE: 33
PIF_VALUE: 29
PIF_VALUE: 0
PIF_VALUE: 28
PIF_VALUE: 28
PIF_VALUE: 27
PIF_VALUE: 33
PIF_VALUE: 0

## 2020-01-01 ASSESSMENT — PAIN DESCRIPTION - PAIN TYPE
TYPE: ACUTE PAIN;CHRONIC PAIN
TYPE: CHRONIC PAIN
TYPE: ACUTE PAIN
TYPE: CHRONIC PAIN
TYPE: ACUTE PAIN
TYPE: ACUTE PAIN;CHRONIC PAIN
TYPE: CHRONIC PAIN
TYPE: CHRONIC PAIN
TYPE: ACUTE PAIN
TYPE: ACUTE PAIN;CHRONIC PAIN
TYPE: ACUTE PAIN;CHRONIC PAIN
TYPE: CHRONIC PAIN
TYPE: ACUTE PAIN
TYPE: ACUTE PAIN;CHRONIC PAIN
TYPE: CHRONIC PAIN
TYPE: ACUTE PAIN
TYPE: ACUTE PAIN;CHRONIC PAIN
TYPE: ACUTE PAIN
TYPE: CHRONIC PAIN
TYPE: ACUTE PAIN;CHRONIC PAIN
TYPE: CHRONIC PAIN
TYPE: ACUTE PAIN;CHRONIC PAIN
TYPE: ACUTE PAIN;CHRONIC PAIN
TYPE: CHRONIC PAIN
TYPE: ACUTE PAIN;CHRONIC PAIN
TYPE: CHRONIC PAIN
TYPE: ACUTE PAIN
TYPE: CHRONIC PAIN
TYPE: CHRONIC PAIN;ACUTE PAIN
TYPE: CHRONIC PAIN
TYPE: ACUTE PAIN
TYPE: CHRONIC PAIN
TYPE: ACUTE PAIN;CHRONIC PAIN
TYPE: ACUTE PAIN
TYPE: ACUTE PAIN;CHRONIC PAIN
TYPE: ACUTE PAIN
TYPE: CHRONIC PAIN
TYPE: ACUTE PAIN
TYPE: CHRONIC PAIN
TYPE: ACUTE PAIN
TYPE: ACUTE PAIN
TYPE: CHRONIC PAIN
TYPE: CHRONIC PAIN

## 2020-01-01 ASSESSMENT — PAIN DESCRIPTION - ORIENTATION
ORIENTATION: RIGHT
ORIENTATION: LEFT
ORIENTATION: RIGHT
ORIENTATION: POSTERIOR
ORIENTATION: RIGHT
ORIENTATION: RIGHT;LOWER
ORIENTATION: ANTERIOR
ORIENTATION: RIGHT
ORIENTATION: LEFT
ORIENTATION: RIGHT
ORIENTATION: RIGHT
ORIENTATION: LEFT;RIGHT
ORIENTATION: RIGHT
ORIENTATION: RIGHT;LEFT
ORIENTATION: RIGHT

## 2020-01-01 ASSESSMENT — PAIN - FUNCTIONAL ASSESSMENT
PAIN_FUNCTIONAL_ASSESSMENT: 0-10
PAIN_FUNCTIONAL_ASSESSMENT: PREVENTS OR INTERFERES SOME ACTIVE ACTIVITIES AND ADLS
PAIN_FUNCTIONAL_ASSESSMENT: PREVENTS OR INTERFERES WITH MANY ACTIVE NOT PASSIVE ACTIVITIES
PAIN_FUNCTIONAL_ASSESSMENT: PREVENTS OR INTERFERES SOME ACTIVE ACTIVITIES AND ADLS
PAIN_FUNCTIONAL_ASSESSMENT: PREVENTS OR INTERFERES SOME ACTIVE ACTIVITIES AND ADLS
PAIN_FUNCTIONAL_ASSESSMENT: ACTIVITIES ARE NOT PREVENTED
PAIN_FUNCTIONAL_ASSESSMENT: PREVENTS OR INTERFERES SOME ACTIVE ACTIVITIES AND ADLS
PAIN_FUNCTIONAL_ASSESSMENT: 0-10
PAIN_FUNCTIONAL_ASSESSMENT: ACTIVITIES ARE NOT PREVENTED
PAIN_FUNCTIONAL_ASSESSMENT: ACTIVITIES ARE NOT PREVENTED
PAIN_FUNCTIONAL_ASSESSMENT: PREVENTS OR INTERFERES SOME ACTIVE ACTIVITIES AND ADLS
PAIN_FUNCTIONAL_ASSESSMENT: PREVENTS OR INTERFERES SOME ACTIVE ACTIVITIES AND ADLS
PAIN_FUNCTIONAL_ASSESSMENT: 0-10
PAIN_FUNCTIONAL_ASSESSMENT: 0-10
PAIN_FUNCTIONAL_ASSESSMENT: PREVENTS OR INTERFERES SOME ACTIVE ACTIVITIES AND ADLS
PAIN_FUNCTIONAL_ASSESSMENT: ACTIVITIES ARE NOT PREVENTED
PAIN_FUNCTIONAL_ASSESSMENT: ACTIVITIES ARE NOT PREVENTED
PAIN_FUNCTIONAL_ASSESSMENT: PREVENTS OR INTERFERES SOME ACTIVE ACTIVITIES AND ADLS
PAIN_FUNCTIONAL_ASSESSMENT: ACTIVITIES ARE NOT PREVENTED

## 2020-01-01 ASSESSMENT — ENCOUNTER SYMPTOMS
EYES NEGATIVE: 1
GASTROINTESTINAL NEGATIVE: 1
VOMITING: 0
BACK PAIN: 0
SHORTNESS OF BREATH: 0
NAUSEA: 1
GASTROINTESTINAL NEGATIVE: 1
ALLERGIC/IMMUNOLOGIC NEGATIVE: 1
SHORTNESS OF BREATH: 1
GASTROINTESTINAL NEGATIVE: 1
GASTROINTESTINAL NEGATIVE: 1
COUGH: 0
SHORTNESS OF BREATH: 1
GASTROINTESTINAL NEGATIVE: 1
DIARRHEA: 1
ABDOMINAL PAIN: 1
SHORTNESS OF BREATH: 1
SORE THROAT: 0
RESPIRATORY NEGATIVE: 1
SHORTNESS OF BREATH: 1
SHORTNESS OF BREATH: 1

## 2020-01-01 ASSESSMENT — PAIN DESCRIPTION - PROGRESSION
CLINICAL_PROGRESSION: NOT CHANGED
CLINICAL_PROGRESSION: GRADUALLY WORSENING
CLINICAL_PROGRESSION: NOT CHANGED
CLINICAL_PROGRESSION: GRADUALLY WORSENING
CLINICAL_PROGRESSION: GRADUALLY WORSENING
CLINICAL_PROGRESSION: NOT CHANGED
CLINICAL_PROGRESSION: NOT CHANGED
CLINICAL_PROGRESSION: GRADUALLY WORSENING
CLINICAL_PROGRESSION: NOT CHANGED
CLINICAL_PROGRESSION: GRADUALLY WORSENING
CLINICAL_PROGRESSION: NOT CHANGED
CLINICAL_PROGRESSION: GRADUALLY WORSENING
CLINICAL_PROGRESSION: NOT CHANGED

## 2020-01-01 ASSESSMENT — PAIN DESCRIPTION - DESCRIPTORS
DESCRIPTORS: CRAMPING
DESCRIPTORS: ACHING
DESCRIPTORS: BURNING;TENDER
DESCRIPTORS: BURNING;CONSTANT;DISCOMFORT
DESCRIPTORS: ACHING
DESCRIPTORS: ACHING;CONSTANT
DESCRIPTORS: ACHING
DESCRIPTORS: ACHING;SORE
DESCRIPTORS: ACHING
DESCRIPTORS: ACHING;SORE
DESCRIPTORS: ACHING
DESCRIPTORS: ACHING;BURNING
DESCRIPTORS: CRAMPING
DESCRIPTORS: SORE
DESCRIPTORS: CONSTANT;ACHING
DESCRIPTORS: CONSTANT
DESCRIPTORS: TIGHTNESS
DESCRIPTORS: ACHING
DESCRIPTORS: SORE
DESCRIPTORS: OTHER (COMMENT);SORE
DESCRIPTORS: ACHING
DESCRIPTORS: TIGHTNESS
DESCRIPTORS: SORE
DESCRIPTORS: ACHING
DESCRIPTORS: ACHING
DESCRIPTORS: BURNING
DESCRIPTORS: ACHING
DESCRIPTORS: ACHING;SORE
DESCRIPTORS: BURNING
DESCRIPTORS: ACHING;CONSTANT

## 2020-01-01 ASSESSMENT — PAIN DESCRIPTION - ONSET
ONSET: ON-GOING
ONSET: GRADUAL
ONSET: ON-GOING

## 2020-01-01 ASSESSMENT — PAIN DESCRIPTION - FREQUENCY
FREQUENCY: INTERMITTENT
FREQUENCY: INTERMITTENT
FREQUENCY: CONTINUOUS
FREQUENCY: INTERMITTENT
FREQUENCY: CONTINUOUS
FREQUENCY: INTERMITTENT
FREQUENCY: CONTINUOUS
FREQUENCY: INTERMITTENT
FREQUENCY: CONTINUOUS
FREQUENCY: INTERMITTENT

## 2020-01-01 ASSESSMENT — PAIN DESCRIPTION - LOCATION
LOCATION: LEG;ARM
LOCATION: ABDOMEN
LOCATION: KNEE
LOCATION: ABDOMEN
LOCATION: BUTTOCKS
LOCATION: FOOT
LOCATION: GENERALIZED
LOCATION: BUTTOCKS
LOCATION: FOOT
LOCATION: ABDOMEN
LOCATION: FOOT
LOCATION: GENERALIZED
LOCATION: FOOT
LOCATION: FOOT
LOCATION: LEG
LOCATION: ABDOMEN
LOCATION: ABDOMEN
LOCATION: FOOT
LOCATION: FOOT
LOCATION: ABDOMEN
LOCATION: ABDOMEN
LOCATION: ARM
LOCATION: LEG
LOCATION: FOOT
LOCATION: FOOT
LOCATION: FOOT;LEG
LOCATION: FOOT
LOCATION: KNEE
LOCATION: FOOT
LOCATION: LEG;FOOT
LOCATION: FOOT
LOCATION: ABDOMEN
LOCATION: ABDOMEN
LOCATION: ARM
LOCATION: LEG
LOCATION: FOOT
LOCATION: FOOT
LOCATION: SHOULDER
LOCATION: NECK;SHOULDER
LOCATION: LEG
LOCATION: FOOT
LOCATION: FOOT
LOCATION: LEG
LOCATION: ABDOMEN
LOCATION: FOOT
LOCATION: ABDOMEN
LOCATION: FOOT
LOCATION: GENERALIZED
LOCATION: FOOT
LOCATION: LEG
LOCATION: ABDOMEN
LOCATION: GENERALIZED
LOCATION: TOE (COMMENT WHICH ONE)

## 2020-01-01 ASSESSMENT — LIFESTYLE VARIABLES: SMOKING_STATUS: 0

## 2020-01-03 PROBLEM — G93.41 METABOLIC ENCEPHALOPATHY: Status: ACTIVE | Noted: 2020-01-01

## 2020-01-03 NOTE — PROGRESS NOTES
thrombocytopenia (HIT) ([facility] [address]    Metabolic encephalopathy    Severe malnutrition (HCC)       Rehabilitation Diagnosis:     Metabolic encephalopathy [Q33.18]       ADMIT DATE:1/3/2020    Patient Goals: To return to PLOF and normalcy. Admitting Impairments: Decreased functional mobility ; Decreased ROM; Decreased safe awareness;Decreased endurance;Decreased balance; Increased pain;Decreased sensation;Decreased cognition;Decreased strength  Barriers: Cognition, pain, activity tolerance and medical complexity  Participation: fair, limited 2* cognition     CARE PLAN     NURSING:  Erin Laguna while on this unit will:     [] Be continent of bowel and bladder     [x] Have an adequate number of bowel movements  [] Urinate with no urinary retention >300ml in bladder  [] Complete bladder protocol with roa removal  [] Maintain O2 SATs at ___%  [] Have pain managed while on ARU       [] Be pain free by discharge   [] Have no skin breakdown while on ARU  [x] Have improved skin integrity via wound measurements  [x] Have no signs/symptoms of infection at the wound site  [x] Be free from injury during hospitalization   [x] Complete education with patient/family with understanding demonstrated for:  [] Adjustment   [] Other:   Nursing interventions may include bowel/bladder training, education for medical assistive devices, medication education, O2 saturation management, energy conservation, stress management techniques, fall prevention, alarms protocol, seating and positioning, skin/wound care, pressure relief instruction,dressing changes,  infection protection, DVT prophylaxis, and/or assistance with in room safety with transfers to bed, toilet, wheelchair, shower as well as bathroom activities and hygiene.      Patient/caregiver education for:   [x] Disease/sustained injury/management      [x] Medication Use   [] Surgical intervention   [x] Safety   [x] Body mechanics and or joint protection   [x] Health maintenance PHYSICAL THERAPY:  Goals:                  Short term goals  Time Frame for Short term goals: one week; 1/11/2020  Short term goal 1: Patient will perform bed mobility with CGA  Short term goal 2: Patient will perform sit <> stand transfers with Min A and AAD  Short term goal 3: Patient will perform stand pivot transfers with AAD and Min A  Short term goal 4: Patient will propel wheelchair 50 ft over even surfaces with SBA  Short term goal 5: Patient ambulate 15 ft with Mod A and AAD            Long term goals  Time Frame for Long term goals : 2 weeks; 1/18/2020  Long term goal 1: Patient will perform bed mobility with supervision  Long term goal 2: Patient will perform all functional transfers with AAD and supervision  Long term goal 3: Patient will ambulate 50 ft with AAD and CGA  Long term goal 4: Patient will propel w/c 100 ft on even and uneven surfaces with supervision  Long term goal 5: Patient will negotiate 3 stairs with AAD and Min A  These goals were reviewed with this patient at the time of assessment and Sunny Lawrence is in agreement. Plan of Care: Pt to be seen 5 out of 7 days per week, 90   mins (exact) per day for 14 days (exact)                   Current Treatment Recommendations: Strengthening, Transfer Training, Home Exercise Program, Balance Training, Endurance Training, Gait Training, Functional Mobility Training, Wheelchair Mobility Training, Stair training, Pain Management, Equipment Evaluation, Education, & procurement, Safety Education & Training, Patient/Caregiver Education & Training      OCCUPATIONAL THERAPY:  Goals:             Short term goals  Time Frame for Short term goals: 1 week (by 1/11/20)  Short term goal 1: Pt will tolerate static standing for 3 min with CGA for improved participation in BADL tasks. Short term goal 2: Pt will complete UB dressing with set-up and min verbal cues for task completion.    Short term goal 3: Pt will complete oral care in stance at sink with CGA and min verbal cues for sequencing task. Short term goal 4: Pt will participate in nine hole peg test for cont assessment of [facility].  :  Long term goals  Time Frame for Long term goals : 2 weeks (by 1/18/20)  Long term goal 1: Pt will complete toilet transfers with supervision with AD as needed and min cues for safety and sequencing. Long term goal 2: Pt will complete LB dressing tasks with min A with AE as needed. Long term goal 3: Pt will complete toileting tasks with supervision.  :    These goals were reviewed with this patient at the time of assessment and Leonard Kaur is in agreement    Plan of Care:  Pt to be seen 5 out of 7 days per week, 90   mins (exact) per day for 14 days (exact)  Current Treatment Recommendations: Strengthening, ROM, Safety Education & Training, Balance Training, Patient/Caregiver Education & Training, Self-Care / ADL, Cognitive/Perceptual Training, Functional Mobility Training, Neuromuscular Re-education, Equipment Evaluation, Education, & procurement, Home Management Training, Endurance Training, Cognitive Reorientation      SPEECH THERAPY: Goals will be left blank if speech is not following this patient. Goals:             Short-term Goals  Timeframe for Short-term Goals: 5 days (01/09)  Goal 1: The patient will tolerate repeat BSE when possible                                           Short-term Goals  Timeframe for Short-term Goals: 5 days (01/09)  Goal 1: The patient will complete basic problem solving tasks with 80% accuracy given min cues  Goal 2: The patient will use functional external aids to orient to all concepts, given min cues  Goal 3: The patient will complete graded attention tasks with 80% accuracy given min cues  Goal 4: The patient will complete graded naming tasks with 70% accuracy given min cues  Goal 5:  The patient will complete graded memory recall tasks with 80% accuracy given min cues              Timeframe for Short-term Goals: 5 days (01/09)

## 2020-01-04 PROBLEM — E43 SEVERE MALNUTRITION (HCC): Chronic | Status: ACTIVE | Noted: 2020-01-01

## 2020-01-04 PROBLEM — E43 SEVERE MALNUTRITION (HCC): Status: ACTIVE | Noted: 2020-01-01

## 2020-01-04 NOTE — PROGRESS NOTES
Occupational Therapy   Occupational Therapy Initial Assessment  Date: 2020   Patient Name: Aleksandr Vo  MRN: [id number]     : 1945    Date of Service: 2020    Discharge Recommendations:  24 hour supervision or assist  OT Equipment Recommendations  Other: will cont to assess    Assessment   Performance deficits / Impairments: Decreased functional mobility ; Decreased cognition;Decreased high-level IADLs;Decreased ADL status; Decreased endurance;Decreased fine motor control;Decreased strength;Decreased balance;Decreased safe awareness  Assessment: Pt is a 75year old female admitted to [address] [place] [address] with Dx of acute encephalopathy. Pt presents with impaired self care and functional transfer training d/t decreased strength/endurance, balance deficits and impaired functional cognition. Pt will benefit from OT to maximize safety and independence with daily living tasks in prep for d/c to home. Prognosis: Good  Decision Making: High Complexity  OT Education: OT Role;Plan of Care;ADL Adaptive Strategies;Transfer Training;Orientation; Energy Conservation  Barriers to Learning: STM deficits, decreased attention to task. REQUIRES OT FOLLOW UP: Yes  Activity Tolerance  Activity Tolerance: Patient Tolerated treatment well(despite fatigue ad impaired cognition)  Safety Devices  Safety Devices in place: Yes  Type of devices: Left in chair;Call light within reach; Chair alarm in place;Nurse notified           Patient Diagnosis(es): There were no encounter diagnoses. has a past medical history of Allergic, Anemia, Angina, Arthritis, Asthma, Cancer ([address] Coronary artery disease, Diabetes mellitus ([address] Heart attack ([address] Hyperlipidemia, Hypertension, and Obesity. has a past surgical history that includes Diagnostic Cardiac Cath Lab Procedure; Cataract removal with implant; Carpal tunnel release; Knee arthroscopy; Coronary angioplasty with stent (2012);  Hysterectomy; bronchoscopy (N/A, 2019); bronchoscopy Device: Wheelchair  Activity: To/from bathroom  Assist Level: Dependent/Total  Toilet Transfers  Toilet - Technique: Stand pivot  Equipment Used: Standard toilet(+ grab bars)  Toilet Transfer: Maximum assistance  Shower Transfers  Shower - Transfer From: Wheelchair  Shower - Transfer Type: To and From  Shower - Transfer To: Transfer tub bench  Shower - Technique: Stand pivot  Shower Transfers: Maximal assistance  ADL  Feeding: Setup;Verbal cueing; Increased time to complete  Grooming: Moderate assistance;Verbal cueing; Increased time to complete(w/c level at sink)  UE Bathing: Minimal assistance;Verbal cueing; Increased time to complete  LE Bathing: Maximum assistance;Verbal cueing; Increased time to complete  UE Dressing: Maximum assistance;Verbal cueing; Increased time to complete  LE Dressing: (Ax2 d/t fatigue)  Toileting: Dependent/Total  Coordination  Coordination and Movement description: Right UE;Left UE;Tremors; Fine motor impairments     Bed mobility  Scooting: Moderate assistance     Vision - Basic Assessment  Prior Vision: Wears glasses all the time(bifocals)  Vision Comments: [facility] for tasks completed. Not formally assessed. Cognition  Overall Cognitive Status: Exceptions  Arousal/Alertness: Appropriate responses to stimuli  Following Commands: Follows one step commands with increased time; Follows one step commands with repetition  Attention Span: Attends with cues to redirect  Memory: Decreased recall of biographical Information;Decreased recall of recent events;Decreased short term memory  Safety Judgement: Decreased awareness of need for safety  Problem Solving: Decreased awareness of errors  Insights: Decreased awareness of deficits  Initiation: Requires cues for some  Sequencing: Requires cues for all        Sensation  Overall Sensation Status: Impaired  Light Touch: Partial deficits in the LLE; Severe deficits in the RLE(unable to localize R foot or lateral leg, able to feel deep pressure laterally but not R foot; decreased sensation throughout left lower leg and foot but able to localize)        LUE AROM (degrees)  LUE General AROM: Shld flexion limited to 90o. Elbows WFL. RUE AROM (degrees)  RUE General AROM: Shld flexion limited to 90o. Elbows WFL. LUE Strength  LUE Strength Comment: grossly 3+/5  RUE Strength  RUE Strength Comment: grossly 3+/5                   Plan   Plan  Times per week: 5/7 days per week  Plan weeks: 2 weeks  Current Treatment Recommendations: Strengthening, ROM, Safety Education & Training, Balance Training, Patient/Caregiver Education & Training, Self-Care / ADL, Cognitive/Perceptual Training, Functional Mobility Training, Neuromuscular Re-education, Equipment Evaluation, Education, & procurement, Home Management Training, Endurance Training, Cognitive Reorientation    Goals  Short term goals  Time Frame for Short term goals: 1 week (by 1/11/20)  Short term goal 1: Pt will tolerate static standing for 3 min with CGA for improved participation in BADL tasks. Short term goal 2: Pt will complete UB dressing with set-up and min verbal cues for task completion. Short term goal 3: Pt will complete oral care in stance at sink with CGA and min verbal cues for sequencing task. Short term goal 4: Pt will participate in nine hole peg test for cont assessment of [facility]. Long term goals  Time Frame for Long term goals : 2 weeks (by 1/18/20)  Long term goal 1: Pt will complete toilet transfers with supervision with AD as needed and min cues for safety and sequencing. Long term goal 2: Pt will complete LB dressing tasks with min A with AE as needed. Long term goal 3: Pt will complete toileting tasks with supervision.    Patient Goals   Patient goals : \"to walk\" and \"to get back to normal\"       Therapy Time   Individual Concurrent Group Co-treatment   Time In 0730         Time Out 0900         Minutes 90         Timed Code Treatment Minutes: 90 Minutes       Dejuan Delgado OT

## 2020-01-04 NOTE — PROGRESS NOTES
Physical Therapy    Facility/Department: [facility]  Initial Assessment    NAME: Osmar Lange  : 1945  MRN: [id number]    Date of Service: 2020    Discharge Recommendations:  24 hour supervision or assist, Home with Home health PT   PT Equipment Recommendations  Equipment Needed: Yes  Mobility Devices: Wheelchair(walker pending further progress)  Wheelchair: Standard    Assessment   Body structures, Functions, Activity limitations: Decreased functional mobility ; Decreased ROM; Decreased safe awareness;Decreased endurance;Decreased balance; Increased pain;Decreased sensation;Decreased cognition;Decreased strength  Assessment: Patient is a 75 y.o female presenting to ARU with deficits in functional mobility compared to baseline level of function d/t impairments in strength, ROM, activity tolerance associated with elevated pain levels and impaired sensation. Patient will continue to benefit from skilled PT services to maximize return to PLOF, reduce risk of falls, and allow for safe d/c home. Treatment Diagnosis: Functional mobility deficit  Specific instructions for Next Treatment: progress transfer training and gait training as able  Prognosis: Fair  Decision Making: Medium Complexity  PT Education: Goals; Home Exercise Program;PT Role;Plan of Care;Transfer Training;Functional Mobility Training;General Safety;Weight-bearing Education  Patient Education: Pt requires reinforcement  Barriers to Learning: cognition  REQUIRES PT FOLLOW UP: Yes  Activity Tolerance  Activity Tolerance: Patient limited by pain; Patient limited by cognitive status;Treatment limited secondary to medical complications (free text); Patient limited by endurance  Activity Tolerance: Several polonged rest break srequired throughout; VS stable       Patient Diagnosis(es): There were no encounter diagnoses.      has a past medical history of Allergic, Anemia, Angina, Arthritis, Asthma, Cancer ([facility] [address] Coronary artery disease, Diabetes mellitus ([address] Heart attack ([address] Hyperlipidemia, Hypertension, and Obesity. has a past surgical history that includes Diagnostic Cardiac Cath Lab Procedure; Cataract removal with implant; Carpal tunnel release; Knee arthroscopy; Coronary angioplasty with stent (4/2012); Hysterectomy; bronchoscopy (N/A, 11/12/2019); bronchoscopy (N/A, 11/23/2019); bronchoscopy (11/23/2019); bronchoscopy (11/23/2019); and Upper gastrointestinal endoscopy (N/A, 12/2/2019). Restrictions  Restrictions/Precautions  Restrictions/Precautions: Fall Risk, Modified Diet  Position Activity Restriction  Other position/activity restrictions: right foot with necrotic toes,   Vision/Hearing  Vision: Impaired  Vision Exceptions: Wears glasses at all times  Hearing: Exceptions to [facility] Exceptions: Hard of hearing/hearing concerns; No hearing aid     Subjective  General  Chart Reviewed: Yes  Patient assessed for rehabilitation services?: Yes  Response To Previous Treatment: Not applicable  Family / Caregiver Present: No  Referring Practitioner: Angely Chahal MD  Referral Date : 01/03/20  Diagnosis: Acute encephalopathy  Follows Commands: Impaired  General Comment  Comments: Pt was sitting up in w/c upon arrival to room. RN cleared pateint for therapy eval.  Subjective  Subjective: Pt was agreeable to PT eval, reports feeling better after getting a shower and eating. Pain Screening  Patient Currently in Pain: Denies  Pain Assessment  Pain Assessment: 0-10  Pain Level: 5  Pain Location: Abdomen(at PEG tube)  Clinical Progression: Not changed  Response to Pain Intervention: Patient Satisfied  Vital Signs  Patient Currently in Pain: Yes  Pre Treatment Pain Screening  Intervention List: Nurse/physician notified    Orientation  Orientation  Overall Orientation Status: Impaired  Orientation Level: Oriented to person;Disoriented to time;Disoriented to place(Pt was oriented to month but not year, re-oriented;  Pt reporting location as \"extension of WFL  Sensation  Overall Sensation Status: Impaired  Light Touch: Partial deficits in the LLE; Severe deficits in the RLE(unable to localize R foot or lateral leg, able to feel deep pressure laterally but not R foot; decreased sensation throughout left lower leg and foot but able to localize)  Bed mobility  Supine to Sit: Moderate assistance(modified log roll from mat; cues for sequeqnce)  Sit to Supine: Minimal assistance(Min A to lift LEs onto mat)  Scooting: Minimal assistance; Moderate assistance(cues for sequence)  Transfers  Sit to Stand: Moderate Assistance;Maximum Assistance(Max A from W/C and mat; Mod A in parallel bars pulling up through bars)  Stand to sit: Moderate Assistance  Stand Pivot Transfers: Maximum Assistance(stand pivot w/c <> mat with HHA and therapist guiding hips to chair; decreased weightbearing through RLE)  Ambulation  Ambulation?: No(Pt unable to tolerate gait training d/t pain and decrease weightbearing R foot)  Stairs/Curb  Stairs?: No(d/t safety concerns)  Wheelchair Activities  Wheelchair Type: Standard  Wheelchair Cushion: Standard  Pressure Relief Type: Lateral lean  Level of Assistance for pressure relief activities: Contact guard assistance  Wheelchair Parts Management: Yes  Left Armrest Level of Assistance: Dependent/Total  Right Armrest Level of Assistance: Dependent/Total  Left Leg Rest Level of Assistance: Moderate assistance  Right Leg Rest Level of Assistance:  Moderate assistance  Left Brakes Level of Assistance: Contact guard assistance  Right Brakes Level of Assistance: Contact guard assistance  Propulsion: Yes  Propulsion 1  Propulsion: Manual  Level: Level Tile  Level of Assistance: Minimal assistance  Description/ Details: Minimal power with UEs leading to short strokes with UEs and decreased fluidity; max cues for sequence  Distance: 19 ft     Balance  Posture: Fair  Sitting - Static: Good;-  Sitting - Dynamic: Fair;+  Standing - Static: Poor;+  Standing - Dynamic: Poor  Exercises  Straight Leg Raise: x10 reps BLE; Min A RLE  Heelslides: x10 reps BLE; Min A RLE  Hip Abduction: x10 reps BLE, Min A RLE     Plan   Plan  Times per week: 5 of 7 days per week  Times per day: Daily  Specific instructions for Next Treatment: progress transfer training and gait training as able  Current Treatment Recommendations: Strengthening, Transfer Training, Home Exercise Program, Balance Training, Endurance Training, Gait Training, Functional Mobility Training, Wheelchair Mobility Training, Stair training, Pain Management, Equipment Evaluation, Education, & procurement, Safety Education & Training, Patient/Caregiver Education & Training  Safety Devices  Type of devices: All fall risk precautions in place, Left in chair, Call light within reach, Chair alarm in place, Patient at risk for falls, Gait belt, Nurse notified  Restraints  Initially in place: No    G-Code       OutComes Score                                                  AM-PAC Score             Goals  Short term goals  Time Frame for Short term goals: one week; 1/11/2020  Short term goal 1: Patient will perform bed mobility with CGA  Short term goal 2: Patient will perform sit <> stand transfers with Min A and AAD  Short term goal 3: Patient will perform stand pivot transfers with AAD and Min A  Short term goal 4: Patient will propel wheelchair 50 ft over even surfaces with SBA  Short term goal 5: Patient ambulate 15 ft with Mod A and AAD  Long term goals  Time Frame for Long term goals : 2 weeks; 1/18/2020  Long term goal 1: Patient will perform bed mobility with supervision  Long term goal 2: Patient will perform all functional transfers with AAD and supervision  Long term goal 3: Patient will ambulate 50 ft with AAD and CGA  Long term goal 4: Patient will propel w/c 100 ft on even and uneven surfaces with supervision  Long term goal 5: Patient will negotiate 3 stairs with AAD and Min A  Patient Goals   Patient goals :  \"To get back to normal\"       Therapy Time   Individual Concurrent Group Co-treatment   Time In 0900         Time Out 1000         Minutes 60         Timed Code Treatment Minutes: [place], [address], DPT

## 2020-01-04 NOTE — PROGRESS NOTES
(12/02/2019)  Impression   Supportive tubing projects in normal position.       No pneumothorax.       Vascular congestion.  Left basilar atelectasis, versus pulmonary edema or   pneumonia. 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 with acute on chronic hypoxic/hypercapneic respiratory failure related to volume overload complicated by cardiac arrest with ROSC. She underwent peg placement and was subsequently discharged to [facility] for further management. During her stay at [facility] she was evaluated by GI for cholelithiasis. She was also evaluated by podiatry for gangrene of her toes, with recommendations for betapaint daily. Vascular surgery also evaluated her with no recommendations for vascular intervention. Today she denies significant pain. She is somewhat confused. She is eager to start rehab. \"    Pain:  Pain Assessment  Pain Assessment: 0-10  Pain Level: 5  Pain Location: Abdomen(at PEG tube)  Pain Frequency: Intermittent  Clinical Progression: Not changed  Response to Pain Intervention: Asleep with RR greater than 10  RASS Score: Alert and calm    Reason for Referral  Leonard Kaur was referred for a bedside swallow evaluation to assess the efficiency of her swallow function, identify signs and symptoms of aspiration and make recommendations regarding safe dietary consistencies, effective compensatory strategies, and safe eating environment. Impression  Dysphagia Diagnosis: Mild oral stage dysphagia;Mild pharyngeal stage dysphagia  Dysphagia Impression : The patient is seen in room at bedside. The patient has hx of dysphagia with rec's for NPO and PEG. PEG placed upon last admission.  Per pt and nurse, the PEG is in place but not in use at this time. The patient arrives with diet orders for soft and bite sized solids and thin liquids. No MBSS or other documentation from [facility] seen in chart. Assessment this date is limited as the patient is nauseous  and later develops emesis and is unable to toelrate PO trials of solids (nurse aware and at bedside for treatment). SLP assessing the patient with thin liquids via straw sip and cup sip. No clinical s/s of aspiration are noted. No coughing, choking, throat clearing, or wet vocal quality. Limited assessment. Needs further assessment. Recommend to continue with thin liquids and soft and bite sized solids per MD order. Hold PO and contact SLP if s/s of aspiration develop. Strict aspiration precautions should be in place at all times. Dysphagia Outcome Severity Scale: Level 5: Mild dysphagia- Distant supervision. May need one diet consistency restricted     Treatment Plan  Requires SLP Intervention: Yes  Duration/Frequency of Treatment: 5x/wk LOS  D/C Recommendations: To be determined  Referral To: Dysphagia evaluation    Recommended Diet and Intervention  Diet Solids Recommendation: Dysphagia Soft and Bite-Sized (Dysphagia III)(Per MD order)  Liquid Consistency Recommendation: Thin  Recommended Form of Meds: Whole with water  Recommendations: Modified barium swallow study  Therapeutic Interventions: Patient/Family education;Diet tolerance monitoring; Therapeutic PO trials with SLP;Oral care    Compensatory Swallowing Strategies  Compensatory Swallowing Strategies:  Alternate solids and liquids;Small bites/sips;Assist feed;Eat/Feed slowly;Upright as possible for all oral intake;Remain upright for 30-45 minutes after meals    Treatment/Goals  Short-term Goals  Timeframe for Short-term Goals: 5 days (01/09)  Goal 1: The patient will tolerate repeat BSE when possible  Long-term Goals  Timeframe for Long-term Goals: 7 days (01/11/2020)  Goal 1: The patient will tolerate least restrictive diet with no clinical s/s of aspiration for optimal nutrition and hydration    General  Chart Reviewed: Yes  Comments: Chart reviewed for completion of BSE  Subjective  Subjective: The patient is seen in room at bedside. Confused and nauseous. On RA  Behavior/Cognition: Alert;Confused; Lethargic  Respiratory Status: Room air  O2 Device: None (Room air)  Communication Observation: Functional  Follows Directions: Simple  Dentition: Dentures top;Dentures bottom  Patient Positioning: Upright in chair  Baseline Vocal Quality: Normal  Volitional Cough: Strong  Prior Dysphagia History: The patient was seen upon prior admission with recommendations for NPO and PEG placed. Consistencies Administered: Thin - cup; Thin - straw       Vision/Hearing  Vision  Vision: Impaired  Vision Exceptions: Wears glasses at all times  Hearing  Hearing: Within functional limits    Oral Motor Deficits  Oral/Motor  Oral Motor: Within functional limits    Oral Phase Dysfunction  Oral Phase  Oral Phase: WFL  Oral Phase  Oral Phase - Comment: Suspect needs further assessment     Indicators of Pharyngeal Phase Dysfunction   Pharyngeal Phase  Pharyngeal Phase: WFL  Pharyngeal Phase   Pharyngeal: Suspect needs further assessment    Prognosis  Prognosis  Prognosis for safe diet advancement: fair  Barriers to reach goals: age;cognitive deficits;time post onset  Individuals consulted  Consulted and agree with results and recommendations: Patient;RN    Education  Patient Education: SLP re: Role of SLP, rationale for completion of BSE, results, and recommendations  Patient Education Response: Verbalizes understanding;Needs reinforcement  Safety Devices in place: Yes  Type of devices:  All fall risk precautions in place         Therapy Time  SLP Individual Minutes  Time In: 1100  Time Out: 1 Nahum Graham  Minutes: [id number], SLP  1/4/2020 4:03 PM  Micki Cook M.A., [id number] [facility] #[id number]  Speech-Language Pathologist

## 2020-01-04 NOTE — H&P
traZODone (DESYREL) tablet 50 mg  50 mg Oral Nightly PRN Whitney Tubbs MD   50 mg at 01/03/20 2202    pantoprazole (PROTONIX) tablet 40 mg  40 mg Oral QAM AC Whitney Tubbs MD   40 mg at 01/04/20 6871    vitamin B-1 (THIAMINE) tablet 100 mg  100 mg Oral Daily Whitney Tubbs MD   100 mg at 01/04/20 0843    traMADol (ULTRAM) tablet 50 mg  50 mg Oral Q6H PRN Whitney Tubbs MD   50 mg at 01/03/20 2203    acetaminophen (TYLENOL) tablet 650 mg  650 mg Oral Q4H PRN Whitney Tubbs MD        magnesium hydroxide (MILK OF MAGNESIA) 400 MG/5ML suspension 30 mL  30 mL Oral Daily PRN Whitney Tubbs MD        warfarin (COUMADIN) daily dosing (placeholder)   Other RX Ruth Chaudhry MD   Stopped at 01/03/20 2045       Allergies   Allergen Reactions    Dilaudid [Hydromorphone Hcl] Other (See Comments)     Pt states it made her crazy    Flexeril [Cyclobenzaprine Hcl]     Heparin      KEENAN     Hydromorphone     Penicillins     Soma [Carisoprodol]     Sulfa Antibiotics        Current Facility-Administered Medications   Medication Dose Route Frequency Provider Last Rate Last Dose    ALPRAZolam (XANAX) tablet 0.5 mg  0.5 mg Oral TID PRN Whitney Tubbs MD   0.5 mg at 01/03/20 2202    atorvastatin (LIPITOR) tablet 20 mg  20 mg Oral Nightly Whitney Tubbs MD   20 mg at 01/03/20 2203    donepezil (ARICEPT) tablet 5 mg  5 mg Oral Nightly Whitney Tubbs MD   5 mg at 01/03/20 2202    ferrous sulfate tablet 325 mg  325 mg Oral BID  Whitney Tubbs MD   325 mg at 01/04/20 0843    insulin lispro (HUMALOG) injection vial 0-12 Units  0-12 Units Subcutaneous TID JER Tubbs MD   2 Units at 01/04/20 0845    insulin lispro (HUMALOG) injection vial 0-6 Units  0-6 Units Subcutaneous Nightly Whitney Tubbs MD        glucose (GLUTOSE) 40 % oral gel 15 g  15 g Oral PRN Whitney Tubbs MD        dextrose 50 % IV solution  12.5 g Intravenous PRN MD Itz Suárez glucagon (rDNA) injection 1 mg  1 mg Intramuscular PRN Zahra Robertson MD        dextrose 5 % solution  100 mL/hr Intravenous PRN Zahra Robertson MD        hydrocortisone (ANUSOL-HC) suppository 25 mg  25 mg Rectal BID Zahra Robertsno MD        insulin glargine (LANTUS) injection vial 19 Units  19 Units Subcutaneous Nightly Zahra Robertson MD   19 Units at 01/03/20 2206    loperamide (IMODIUM) capsule 2 mg  2 mg Oral 4x Daily PRN Zahra Robertson MD        melatonin tablet 5 mg  5 mg Oral Nightly PRN Zahra Robertson MD   5 mg at 01/03/20 2203    metoprolol tartrate (LOPRESSOR) tablet 25 mg  25 mg Oral BID Zahra Robertson MD   25 mg at 01/04/20 5509    mirtazapine (REMERON) tablet 15 mg  15 mg Oral Nightly Zahra Robertson MD   15 mg at 01/03/20 2202    ondansetron (ZOFRAN-ODT) disintegrating tablet 8 mg  8 mg Oral Q8H PRN Zahra Robertson MD        hydrALAZINE (APRESOLINE) tablet 50 mg  50 mg Oral Q6H PRN Zahra Robertson MD        traZODone (DESYREL) tablet 50 mg  50 mg Oral Nightly PRDIONNA Robertson MD   50 mg at 01/03/20 2202    pantoprazole (PROTONIX) tablet 40 mg  40 mg Oral QAM AC Zahra Robertson MD   40 mg at 01/04/20 7089    vitamin B-1 (THIAMINE) tablet 100 mg  100 mg Oral Daily Zahra Robertson MD   100 mg at 01/04/20 0843    traMADol (ULTRAM) tablet 50 mg  50 mg Oral Q6H PRN Zahra Robertson MD   50 mg at 01/03/20 2203    acetaminophen (TYLENOL) tablet 650 mg  650 mg Oral Q4H PRN Zahra Robertson MD        magnesium hydroxide (MILK OF MAGNESIA) 400 MG/5ML suspension 30 mL  30 mL Oral Daily PRN Zahra Robertson MD        warfarin (COUMADIN) daily dosing (placeholder)   Other RX Placeholder Zahra Robertson MD   Stopped at 01/03/20 2045         REVIEW OF SYSTEMS:   CONSTITUTIONAL: negative for fevers, chills, diaphoresis, activity change, appetite change, fatigue, night sweats and unexpected weight change.    EYES: negative for blurred vision, eye discharge, visual bilaterally, no rales wheezes or ronchi, no retractions. Respirations unlabored. GI: Soft, nontender, nondistended. Normal bowel sounds. No palpable masses. G tube c/d/i  Neuro: Alert, oriented to the 4th, but reports that it is April 2003. No CN deficits or focal weakness. MSK: No joint abnormalities noted. Ext: Nonpitting edema distally, with ischemic changes noted in all digits of the right foot and mild changes to 2nd and 4th on the left foot. Lab Results   Component Value Date    WBC 7.6 12/05/2019    HGB 7.6 (L) 12/05/2019    HCT 23.3 (L) 12/05/2019    MCV 91.0 12/05/2019     12/05/2019     Lab Results   Component Value Date    INR 2.45 (H) 01/03/2020    INR 1.07 12/05/2019    INR 1.06 12/04/2019    PROTIME 28.7 (H) 01/03/2020    PROTIME 12.4 12/05/2019    PROTIME 12.3 12/04/2019     Lab Results   Component Value Date    CREATININE 3.1 (H) 12/05/2019    BUN 60 (H) 12/05/2019     (L) 12/05/2019    K 4.1 12/05/2019    CL 97 (L) 12/05/2019    CO2 27 12/05/2019     Lab Results   Component Value Date    ALT 6 (L) 12/05/2019    AST 23 12/05/2019    ALKPHOS 121 12/05/2019    BILITOT 0.4 12/05/2019      Conclusions      Summary   Limited only f/u for LVEF, RVF and pericardial effusion. Normal left ventricular systolic function with ejection fraction of 55-60%. Grade I diastolic dysfunction with normal filling pressure. Compared to previous study from 10- no changes noted in left   ventricular function. There is a small pericardial effusion   No tamponade physiology. Signature      ------------------------------------------------------------------   Electronically signed by Trish Aguero MD (Interpreting   physician) on 11/13/2019 at 03:00 PM  The above laboratory data have been reviewed. The above imaging data have been reviewed. The above medical testing have been reviewed. Body mass index is 27.6 kg/m².     Barriers to Discharge: Medical comorbidities,

## 2020-01-04 NOTE — PROGRESS NOTES
Matthew Leisure, SLP    RECENT RESULTS  CT OF HEAD/MRI: 11/26/2019  Impression   No acute intracranial abnormality.           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 She underwent peg placement and was subsequently discharged to [facility] for further management. During her stay at [facility] she was evaluated by GI for cholelithiasis. She was also evaluated by podiatry for gangrene of her toes, with recommendations for betapaint daily. Vascular surgery also evaluated her with no recommendations for vascular intervention. Today she denies significant pain. She is somewhat confused. She is eager to start rehab. \"    Pain:  Pain Assessment  Pain Assessment: 0-10  Pain Level: 5  Pain Location: Abdomen(at PEG tube)  Pain Frequency: Intermittent  Clinical Progression: Not changed  Response to Pain Intervention: Asleep with RR greater than 10  RASS Score: Alert and calm    Assessment:  Cognitive Diagnosis: Moderate Cognitive-Linguistic Deficit        Pedro Cognitive Assessment (MoCA)    Section Subtest Score Comments   Visuospatial/ Executive Moroni making N/A Pt could not complete    Copy Cube N/A Pt could not complete    Clock N/A Pt could not complete   Naming Picture naming 1/3 Incorrectly identifying pictures   Attention Number repetition 2/2 correct    Selective attention 0/1 Pt identifying random letters vs target letter    Serial 7s 1/3 1 correct response   Language Repetition 2/2 correct    Fluency 0/1 7 words identified.  11 words desired   Abstraction Comparisons 2/2 Correct    Delayed Recall Recall 5 novel words 0/5 Recalling random words not reviewed or associated with other areas of assessment   Orientation Spatial and Temporal 1/6 Oriented to location only    Total:  9/25 The patient presents with significant cognitive impairment. The patient is noted with frequent confusion and inconsistency in responses. At times, the patient knows this SLP. At other times, the patient is calling SLP by different name. Appears to be anxious and overall disoriented. The patient lives at home with son who suffered CVA and is now non-verbal and ambulates via w/c. Pt's 6 grandchildren also live in the home. Per patient, daughter now living there to provide some assistance as well as a paid aid that comes daily. During formal and informal assessment, it is evident that the patient presents with deficits in areas of attention, problem solving, orientation, and memory. Suspect deficits regarding executive functioning, however, these will need to be further assessed. SLP intervention is warranted at this time. Recommendations:  Requires SLP Intervention: Yes  Duration/Frequency of Treatment: 5x/wk LOS  D/C Recommendations: To be determined  Referral To: Dysphagia evaluation    Plan:   Goals:  Short-term Goals  Timeframe for Short-term Goals: 5 days (01/09)  Goal 1: The patient will complete basic problem solving tasks with 80% accuracy given min cues  Goal 2: The patient will use functional external aids to orient to all concepts, given min cues  Goal 3: The patient will complete graded attention tasks with 80% accuracy given min cues  Goal 4: The patient will complete graded naming tasks with 70% accuracy given min cues  Goal 5:  The patient will complete graded memory recall tasks with 80% accuracy given min cues  Long-term Goals  Timeframe for Long-term Goals: 7 days (01/11)  Goal 1: The patient will improve cognitive-linguistic skills to mild to faciliate safe return/discharge to least restrictive environment    Patient/family involved in developing goals and treatment plan: Yes    Subjective:   Previous level of function and limitations: independent  General  Chart Reviewed: Yes  Patient assessed for rehabilitation services?: Yes  Family / Caregiver Present: No  General Comment  Comments: Chart reviiewed for completion of BSE  Subjective  Subjective: The patient is seen in room seated upright in chair. Alert with confusion. States not feeling well  Social/Functional History  Lives With: Daughter(Son disabled, 6 grandchildren)  Type of Home: House  Home Layout: Multi-level; Able to Live on Main level with bedroom/bathroom  Home Access: Stairs to enter with rails  Home Equipment: Cane;Rolling walker  ADL Assistance: Independent  Homemaking Assistance: Independent  Homemaking Responsibilities: Yes  Ambulation Assistance: Independent  Transfer Assistance: Independent  Active : Yes  Mode of Transportation: Car  Occupation: Retired  Type of occupation: Retired book keeper  Leisure & Hobbies: Watching the [place] [address]: Impaired  Vision Exceptions: Wears glasses at all times  Hearing  Hearing: Within functional limits           Objective:     Oral/Motor  Oral Motor: Within functional limits    Auditory Comprehension  Comprehension: Exceptions  Basic Questions: Mild  Complex Questions: Moderate  Multistep Basic Commands: Mild    Reading Comprehension  Reading Status: Within functional limits    Expression  Primary Mode of Expression: Verbal    Verbal Expression  Verbal Expression: Within functional limits    Written Expression  Dominant Hand: Right  Written Expression: Within Functional Limits    Motor Speech  Motor Speech:  Within Functional Limits    Pragmatics/Social Functioning  Pragmatics: Within functional limits    Cognition:      Orientation  Overall Orientation Status: Impaired  Orientation Level: Oriented to person;Disoriented X4  Attention  Attention: Exceptions to [facility]  Alternating Attention: Moderate  Divided Attention: Severe  Selective Attention: Moderate  Memory  Memory: Exceptions to [facility]  Daily Routines: Mild  People Encountered: Mild  Short-term Memory: Mild  Problem

## 2020-01-04 NOTE — CONSULTS
Nutrition Assessment    Type and Reason for Visit: Initial, Positive Nutrition Screen, Consult(Consult for supplement recommendations. + screen for wounds, home TF/PN, and poor po intakes)    Nutrition Recommendations:   1. Continue dysphagia soft and bite-sized, renal diet  2. Diet texture/liquid level per SLP recommendations  3. Add Vanilla or Strawberry Glucerna with meals  4. Encourage po intakes  5. Monitor po intakes and need to initiate TF  6. Monitor nutrition adequacy, weights, pertinent labs, BMs, N/V    Nutrition Assessment: Pt severely malnourished AEB poor po intakes, weight loss, muscle loss, and fat loss. Pt confused and poor historian. Pt nauseous at time of visit, lunch at [facility] untouched. Noted Ensure at [facility] that was partially drank. Pt admitted from [facility]. According to chart review and discussion with RN, pt was switched to oral Nepro instead of TF. Will add Glucerna ONS as it is lower in carbs and fat. Pt at risk for further compromise d/t increased needs from presence of wounds. Encouraged po intakes. Will continue to monitor. Malnutrition Assessment:  · Malnutrition Status: Meets the criteria for severe malnutrition  · Context: Chronic illness  · Findings of the 6 clinical characteristics of malnutrition (Minimum of 2 out of 6 clinical characteristics is required to make the diagnosis of moderate or severe Protein Calorie Malnutrition based on AND/ASPEN Guidelines):  1. Energy Intake-Less than or equal to 75% of estimated energy requirement, Greater than or equal to 3 months    2. Weight Loss-10% loss or greater, in 3 months  3. Fat Loss-Moderate subcutaneous fat loss, Orbital  4. Muscle Loss-Moderate muscle mass loss, Temples (temporalis muscle), Clavicles (pectoralis and deltoids)  5. Fluid Accumulation-No significant fluid accumulation,    6.   Strength-Not measured    Nutrition Risk Level: High    Nutrient Needs:  · Estimated Daily Total Kcal: 9240-0664(94-11 kcal/kg)  · Estimated Daily Protein (g): 76-90(1.2-1.4 g/kg)  · Estimated Daily Total Fluid (ml/day): 1 ml/kcal    Nutrition Diagnosis:   · Problem: Predicted suboptimal energy intake  · Etiology: related to Insufficient energy/nutrient consumption     Signs and symptoms:  as evidenced by Weight loss    Objective Information:  · Nutrition-Focused Physical Findings: Confused at time of visit. Nausea reported.    · Wound Type: Multiple, Pressure Ulcer, Stage II, Deep Tissue Injury  · Current Nutrition Therapies:  · Oral Diet Orders: Dysphagia  Soft and Bite-Sized (Dysphagia 3), Renal   · Oral Diet intake: Unable to assess  · Oral Nutrition Supplement (ONS) Orders: None  · ONS intake: Unable to assess  · Anthropometric Measures:  · Ht: 5' (152.4 cm)   · Current Body Wt: 141 lb 5 oz (64.1 kg)  · % Weight Change:  ,  28 lb, 16.6% loss x 3 months per EMR  · Ideal Body Wt: 100 lb (45.4 kg),  · BMI Classification: BMI 25.0 - 29.9 Overweight    Nutrition Interventions:   Continue current diet, Start ONS  Continued Inpatient Monitoring    Nutrition Evaluation:   · Evaluation: Goals set   · Goals: Pt will tolerate diet and have meal and ONS intakes 50% or greater during ARU stay    · Monitoring: Meal Intake, Supplement Intake, Diet Tolerance, Weight, Pertinent Labs      Electronically signed by Po Holt RD, WYATT on 1/4/20 at 2:02 PM    Contact Number: Office: [phone]; [address]: [facility]

## 2020-01-04 NOTE — CONSULTS
Pharmacy to Dose Warfarin    Dx: DVT of unknown origin   Goal INR range 2-2.5 per MD consult  Home Warfarin dose: 4 mg daily      Date  INR  Warfarin  1/3                    2.45                4 mg  Recommend Warfarin 4 mg tonight x1. Daily INR ordered.   Rx will continue to manage therapy per Dr Jenny Ogden consult  Salbador Rivera [facility] 1/3/2020 9:44 PM

## 2020-01-04 NOTE — PROGRESS NOTES
Per Bettie Wiggins at [facility] last dialysis treatment was on 01/02/2020.

## 2020-01-04 NOTE — CONSULTS
Pharmacy requested to suggest substitution for Ferrlecit 62.5 mg IV in dialysis weekly. This provides 62.5 mg elemental iron   Iron Sucrose (Venofer) could be an alternative. It provides 20 mg elemental iron per ml.

## 2020-01-05 NOTE — PROGRESS NOTES
Gage Clark  1/5/2020  [id number]    Chief Complaint: Metabolic encephalopathy    Subjective:   Resting in bed; not having much pain. ROS: No n/v, cp, sob, f/c  Objective:  Patient Vitals for the past 24 hrs:   BP Temp Temp src Pulse Resp SpO2 Weight   01/05/20 0857 (!) 137/57 99 °F (37.2 °C) Oral 100 16 96 % --   01/05/20 0618 -- -- -- -- -- -- 143 lb 1.3 oz (64.9 kg)   01/04/20 1930 130/62 98.8 °F (37.1 °C) Oral 101 18 97 % --     Gen: No distress, pleasant. HEENT: Normocephalic, atraumatic. CV: Regular rate and rhythm. Resp: No respiratory distress. Abd: Soft, nontender   Ext: No edema. Neuro: Alert, oriented, appropriately interactive. Wt Readings from Last 3 Encounters:   01/05/20 143 lb 1.3 oz (64.9 kg)   12/05/19 156 lb 1.4 oz (70.8 kg)   10/17/19 169 lb 6.4 oz (76.8 kg)       Laboratory data:   Lab Results   Component Value Date    WBC 11.7 (H) 01/04/2020    HGB 9.1 (L) 01/04/2020    HCT 29.8 (L) 01/04/2020    MCV 84.4 01/04/2020     01/04/2020       Lab Results   Component Value Date     01/04/2020    K 5.0 01/04/2020    CL 88 01/04/2020    CO2 24 01/04/2020    BUN 55 01/04/2020    CREATININE 3.4 01/04/2020    GLUCOSE 280 01/04/2020    CALCIUM 10.0 01/04/2020        Therapy progress:  PT  Position Activity Restriction  Other position/activity restrictions: right foot with necrotic toes,   Objective     Sit to Stand: Moderate Assistance, Maximum Assistance(Max A from W/C and mat;  Mod A in parallel bars pulling up through bars)  Stand to sit: Moderate Assistance     OT  PT Equipment Recommendations  Equipment Needed: Yes  Mobility Devices: Wheelchair(walker pending further progress)  Wheelchair: Standard  Toilet - Technique: Stand pivot  Equipment Used: Standard toilet(+ grab bars)  Assessment        SLP  Current Diet : Regular  Current Liquid Diet : Thin  Diet Solids Recommendation: Dysphagia Soft and Bite-Sized (Dysphagia III)(Per MD order)  Liquid Consistency Recommendation:

## 2020-01-06 NOTE — CONSULTS
Pharmacy to Dose Vancomycin    Dx: [facility] - bcx  Goal trough = 15-20 mcg/mL  Pt wt =  64.9kg,  Estimated Creatinine Clearance: 13 mL/min (A) (based on SCr of 3.2 mg/dL (H)). Vancomycin 1000 mg IVPB x 1 - Will give as soon as picc line is placed - spoke with RN no iv access at this time, they will call asap. Will pulse dose given renal fx   Vancomycin level in AM     1/9/20  Random vanc level = 9.3 mcg/ml. Give 1000 mg IV now x 1 dose. HD changed to Friday. Pharmacy will check HD schedule daily to determine when to check next level. [facility]. 1/9/2020 1:06 PM    1/10/20    Vancomycin Day 5  Current Dosing: pulse dosing  No results for input(s): VANCOTROUGH in the last 72 hours. Recent Labs     01/09/20  1149 01/10/20  1123   VANCORANDOM 9.3 19.3     Recent Labs     01/09/20  1149 01/10/20  0414   CREATININE 2.3*  2.4* 2.6*     Estimated Creatinine Clearance: 16 mL/min (A) (based on SCr of 2.6 mg/dL (H)). Recent Labs     01/09/20  1149   WBC 8.3     Vanc rm = 19.3  No HD today, will hold dose today and recheck level tomorrow  Connor Ramos 1/10/2020 1:03 PM      Vancomycin Day 6  Current Dosing: pulse dosing  No results for input(s): VANCOTROUGH in the last 72 hours. Recent Labs     01/09/20  1149 01/10/20  1123 01/11/20  0900   VANCORANDOM 9.3 19.3 17.0     Recent Labs     01/09/20  1149 01/10/20  0414 01/11/20  0900   CREATININE 2.3*  2.4* 2.6* 2.7*     Estimated Creatinine Clearance: 15 mL/min (A) (based on SCr of 2.7 mg/dL (H)). Recent Labs     01/09/20  1149   WBC 8.3     No HD today (called floor to ask)     Hold dose today and recheck level tomorrow am.  [employer] 1/11/2020 10:31 AM    Vancomycin Day 7  Current Dosing: pulse dosing  No results for input(s): VANCOTROUGH in the last 72 hours.   Recent Labs     01/10/20  1123 01/11/20  0900 01/12/20  0736   VANCORANDOM 19.3 17.0 14.2     Recent Labs     01/10/20  0414 01/11/20  0900 01/12/20  0736   CREATININE 2. 6* 2.7* 2.7*     Estimated Creatinine Clearance: 15 mL/min (A) (based on SCr of 2.7 mg/dL (H)). Recent Labs     01/09/20  1149   WBC 8.3     Vanc level 14.2 mcg/ml. Give Vanc 500 mg IV x 1 dose now. Per Nephrology: no HD needs, will assess daily  Check level with am labs and pulse dose per protocol. Willis Perez 1/12/2020 8:42 AM    1/15  Vancomycin day 10  0703 Vanc random - 11.2 mcg/ml  Vancomycin 500 mg IVPB x 1 today    1/16  1239  Vancomycin day 11  Vanc level = 14.4 mcg/ml  Will not give dose today and check in AM as HD on hold. Vanc level in AM 1/17  Spoke with RN order for HD on T/Th/Sat is on hold for now. ID PN -   Plan:   Plan to continue IV vanc 2 weeks post-TDC removal --> 1/22    Vancomycin Day 12  Estimated Creatinine Clearance: 14 mL/min (A) (based on SCr of 2.9 mg/dL (H)). No longer on HD  Vancomycin random level = 13.3 mcg/mL (1/17 0644)  Give Vancomycin 500mg IVPB x1   Vancomycin random ordered for 1/18 w/ AM labs and pulse dose per protocol  Melody Morales, PharmD 1/17/2020  7:58 AM    Vancomycin Day 13   Estimated Creatinine clearance 15 ml/hr  Based on SCr of 2.8  Vancomycin random level 16 @ 0742  Will not give dose today and check Level in AM 1/19  Continue pulse dosing    Vancomycin Day 15  Estimated Creatinine Clearance: 14 mL/min (A) (based on SCr of 2.9 mg/dL (H)).   Vancomycin random level = 12.7 mcg/mL  Give Vancomycin 500mg IVPB x1 dose today  Vancomycin random level tomorrow AM  Continue to pulse dose per protocol  Melody Morales 1/20/2020 11:40 AM

## 2020-01-06 NOTE — CARE COORDINATION
disorder(s)? __No  _X_Yes, how does this affect current situation? Mental health diagnoses include anxiety disorder with panic attack. Is patient and family coping appropriately with stressful events and this hospitalization? __Yes  _X_No, please explain: Patient has increased anxiety and very tearful, ruminates on minor details. Support system at home and in the community: Patient is primary caregiver for disabled adult son. Son had a major stroke and is now non verbal with a aide for daily care and assistance. Patient's daughter has an LVAD in place and can provide minimal assistance. Patient also has custody of 6 minor grandchildren. Caregiver on discharge: Self with limited family supports. 24 hour care on discharge: Self with limited family supports. What patient needs to be at to return home alone or with family: to be determined. Discharge plan: Intent is to return to home setting where patient lives with minor grandchildren and disabled adult son. Summary:   **ORIENTATION/COGNITION** Ms. Danette Cho presents as alert and oriented with periods of confusion likely related to her medical diagnoses. She is able to verbally communicate her needs in an appropriate manner. Ms. Danette Cho presents as very anxious and has been noted by this writer to ruminate on a specific detail of a conversation and become increasingly anxious and tearful. **FAMILY/SOCIAL** Ms. Danette Cho is a . 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 Ms. Danette Cho lives in the community with her son who had a massive stroke and is now non verbal. She states he receives 86 hours of assistance throughout the week for his care and needs. Ms. Danette Cho states she has custody of her 6 grandchildren. She states her daughter has moved into their home and is assisting the best she is able. Ms. Danette Cho states that her daughter has an LVAD in place and can provide limited assistance.  Ms. Danette Cho states she is retired, she states she worked as a  and still does taxes on the side. Ms. Madeline Merrill denies any tobacco, alcohol or drug use at this time. She states her hobbies include her grandchildren. **CODE STATUS** Full code updated on 1/3/2020  **LOSSES/CONCERNS/ROOM** Concerns include a referral made to Child Protective Services, Ms. Madeline Merrill was very anxious and tearful over this report and states \"I will fight, those are my kids\". Ms. Madeline Merrill resides in a private room while on the acute rehab unit. **DENTAL/HEARING/VISION/MENTAL**  Ms. Madeline Merrill wears full dentures. She discloses no hearing issues and states she wears glasses for vision assistance. Mental health diagnoses include anxiety disorder with panic attack and she takes Sertraline, Donepezil and Alprazolam PRN for her mental health needs. **INTENT** Intent is to return to home setting where patient lives with family. **Goals for discharge:   DME: Has cane and rolling walker. PCP: Dr. Michelle Everett: [facility] [phone]  Home Health: to be determined. Electronic continuity of care form is on the chart. Case Management (CM) will continue to follow for recommendations from the treatment team. Please notify Case Management if needs or concerns arise.      CLAUS Taveras

## 2020-01-06 NOTE — PROGRESS NOTES
[facility]       Progress Note           Subjective/   75y.o. year old female who we are seeing in consultation for KIMBERLY/CKD requiring HD. Transferred back from [facility] for rehab. HPI:  Remains on HD since 11/13, not making much urine, only 20 ml charted for last 24h     She is confused     ROS:  Intake reduced, no fevers. Objective/   GEN:  Chronically ill, /62   Pulse 92   Temp 98.7 °F (37.1 °C) (Oral)   Resp 18   Ht 5' (1.524 m)   Wt 143 lb 1.3 oz (64.9 kg)   SpO2 96%   BMI 27.94 kg/m²   HEENT: non-icteric, no JVD  CV: S1, S2 without m/r/g; no LE edema  RESP: CTA B without w/r/r; breathing wnl  ABD: +bs, soft, nt, no hsm  SKIN: warm, no rashes  ACCESS: R IJ TDC (12/2)    Data/  Recent Labs     01/04/20  1036 01/06/20  1210   WBC 11.7* 10.3   HGB 9.1* 8.8*   HCT 29.8* 28.0*   MCV 84.4 84.0    404     Recent Labs     01/04/20  1036 01/06/20  1210   * 129*   K 5.0 4.5   CL 88* 89*   CO2 24 27   GLUCOSE 280* 214*   PHOS 2.5  --    BUN 55* 42*   CREATININE 3.4* 3.2*   LABGLOM 13* 14*   GFRAA 16* 17*       Assessment/Plan      Acute Kidney Injury.  - ATN from septic shock / cardiac arrest, initially requiring CRRT since 11/13/19  - We will continue hemodialysis per TRS schedule currently   - Access: R IJV TDC  - Plan to declare ESRD by mid January given little signs of renal recovery.     CKD stage 3.  - Suspect from HTN and DM.  - Baseline serum creatinine of 1.3-1.4 mg/dL.     Sepsis with MOSF  - ID following. Etiology not determined  - Repeat blood cultures 11/27 were no growth to date. Off antibiotics now.     Acute respiratory failure  - s/p intubation X2, now off     Malnutrition. - s/p PEG, on TF's     Anemia. -  on retracrit 5K qHD, change to aranesp 60 mcg IV qweekly with HD     Spindle Cell Sarcoma.   - Follows with Dr. Snehal Sarabia and Dr. Dylon Erwin at [facility].    Hyponatremia  - Secondary to reduced renal function, hyperglycemia    LE DVT  - On coumadin    Cholelithiasis  - Conservative measures per GI at OSH    Gangrenous toes  - Per Admitting

## 2020-01-06 NOTE — CONSULTS
[address] [address] Continence Nurse  Consult Note       NAME:  Evelio Butler RECORD NUMBER:  [id number]  AGE: 75 y.o. GENDER: female  : 1945  TODAY'S DATE:  2020    Subjective: Ora Garcia was a rough day; I am feeling tired today. Reason for WOCN Evaluation and Assessment: Multiple unstageable and necrotic foot wounds, see LDA list.      Sunny Lawrence is a 75 y.o. 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 She underwent peg placement and was subsequently discharged to [facility] for further management. During her stay at [facility] she was evaluated by GI for cholelithiasis. She was also evaluated by podiatry for gangrene of her toes, with recommendations for betapaint daily. Vascular surgery also evaluated her with no recommendations for vascular intervention. Today she denies significant pain. She is somewhat confused. She is eager to start rehab.   Current Wound Care Treatment: R Heel, R Toes and L Toes - betadine swabs  R Foot Dorsal - alginate ag, 4x4    Patient Goal of Care:  [x] Wound Healing  [] Odor Control  [] Palliative Care  [] Pain Control   [] Other:         PAST MEDICAL HISTORY        Diagnosis Date    Allergic     Anemia     Angina     with exertion    Arthritis     Asthma 2010    Cancer ([facility]     Coronary artery disease 2010    Diabetes mellitus ([facility]     Heart attack [facility]) age 43    no problems since then    Hyperlipidemia     Hypertension     Obesity        PAST SURGICAL HISTORY    Past Surgical History:   Procedure Laterality Date    BRONCHOSCOPY N/A 2019    BRONCHOSCOPY WASHING performed by Pete Basilio MD at [address] [place] [address] N/A 2019    BRONCHOSCOPY ALVEOLAR LAVAGE performed by Michelle Gilbert MD at [address] [place] [address]  2019    BRONCHOSCOPY THERAPUTIC ASPIRATION INITIAL performed by Michelle Gilbert MD at [address] [place] [address]  2019    BRONCHOSCOPY FOREIGN BODY REMOVAL performed by Michelle Gilbert MD at [address] [place] [address]      right    CATARACT REMOVAL WITH IMPLANT      CORONARY ANGIOPLASTY WITH STENT PLACEMENT  2012    DIAGNOSTIC CARDIAC CATH LAB PROCEDURE      HYSTERECTOMY      11/30/15    KNEE ARTHROSCOPY      left knee. not a TKR.  no metal.     UPPER GASTROINTESTINAL ENDOSCOPY N/A 2019    EGD PEG PLACEMENT W/ ANESTHESIA performed by Emeli Hodgson MD at [address] [place] [address]    Family History   Problem Relation Age of Onset    Diabetes Mother     Heart Failure Mother     Heart Disease Mother     Stroke Mother     Diabetes Sister     Diabetes Brother     Diabetes Maternal Grandmother     Asthma Son     Asthma Son     Diabetes Daughter     Heart Disease Daughter     Irritable Bowel Syndrome Daughter     Diabetes Brother     Cancer Neg Hx     Emphysema Neg Hx     Hypertension Neg Hx        SOCIAL HISTORY    Social History     Tobacco Use    Smoking status: Former Smoker     Years: 13.00     Types: Cigarettes     Last attempt to quit: 1985     Years since quittin.0    Smokeless tobacco: Never Used   Substance Use Topics    Alcohol use: No     Alcohol/week: 0.0 standard drinks    Drug use: No       ALLERGIES    Allergies   Allergen Reactions    Dilaudid [Hydromorphone Hcl] Other (See Comments)     Pt states it made her crazy    Flexeril [Cyclobenzaprine Hcl]     Heparin      KEENAN     Hydromorphone     Penicillins     Soma [Carisoprodol]     Sulfa Antibiotics MEDICATIONS    No current facility-administered medications on file prior to encounter. Current Outpatient Medications on File Prior to Encounter   Medication Sig Dispense Refill    ondansetron (ZOFRAN) 4 MG tablet Take 4 mg by mouth every 6 hours as needed for Nausea or Vomiting      sennosides-docusate sodium (SENOKOT-S) 8.6-50 MG tablet Take 1 tablet by mouth 2 times daily      traMADol (ULTRAM) 50 MG tablet Take 50 mg by mouth every 6 hours as needed for Pain.  ALPRAZolam (XANAX) 0.5 MG tablet Take 0.5 mg by mouth every 6 hours as needed for Anxiety.       donepezil (ARICEPT) 5 MG tablet Take 5 mg by mouth nightly      ferrous sulfate 325 (65 Fe) MG tablet Take 325 mg by mouth 2 times daily      melatonin 3 MG TABS tablet Take 3 mg by mouth nightly      mirtazapine (REMERON) 15 MG tablet Take 15 mg by mouth nightly      epoetin brando (EPOGEN;PROCRIT) 10321 UNIT/ML injection Inject 10,000 Units into the skin three times a week      acetaminophen (TYLENOL) 325 MG tablet Take 2 tablets by mouth every 4 hours as needed for Pain or Fever 120 tablet 3    insulin glargine (LANTUS) 100 UNIT/ML injection vial Inject 25 Units into the skin daily (Patient taking differently: Inject 19 Units into the skin nightly ) 1 vial 3    insulin lispro (HUMALOG) 100 UNIT/ML injection vial Inject 0-12 Units into the skin every 4 hours 1 vial 3    metoprolol tartrate (LOPRESSOR) 25 MG tablet Take 0.5 tablets by mouth 2 times daily 60 tablet 3    vitamin B-1 100 MG tablet 1 tablet by Per NG tube route daily 30 tablet 3    fluticasone (FLOVENT HFA) 220 MCG/ACT inhaler Inhale 1 puff into the lungs 2 times daily      guanFACINE (TENEX) 1 MG tablet Take 1 tablet by mouth nightly 14 tablet 0    atorvastatin (LIPITOR) 40 MG tablet Take 40 mg by mouth daily      albuterol sulfate  (90 BASE) MCG/ACT inhaler Inhale 2 puffs into the lungs every 4 hours as needed 1 Inhaler 3    INSULIN PEN NEEDLE For use with Perennial allergic rhinitis J30.89    Primary localized osteoarthrosis, lower leg M17.10    Tibialis tendinitis M76.829    Osteoarthritis of spine with radiculopathy, lumbar region M47.26    Pain of both hip joints M25.551, M25.552    Spinal stenosis, lumbar region, with neurogenic claudication M48.062    Right sided sciatica M54.31    Endometrial adenocarcinoma ([address] C54.1    Drainage from wound T14. 8XXA    S/P hysterectomy with oophorectomy Z90.710, Z90.721    S/P total hysterectomy and BSO (bilateral salpingo-oophorectomy) Z90.710, Z90.79, Z90.722    Chronic pain of left knee M25.562, G89.29    Chronic pain of right knee M25.561, G89.29    History of endometrial cancer Z85.42    Menopausal 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    CKD (chronic kidney disease) stage 3, GFR 30-59 ml/min (HCC) N18.3    Cardiac arrest (HCC) I46.9    Acute pulmonary edema (HCC) J81.0    Pleural effusion J90    Elevated LFTs R94.5    Moderate protein-calorie malnutrition (HCC) E44.0    Thrombocytopenia (HCC) D69.6    Leukocytosis D72.829    Normocytic normochromic anemia D64.9    Acute encephalopathy G93.40    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    Metabolic encephalopathy K55.23    Severe malnutrition ([facility]) E43       Measurements:  Wound 11/27/19 Heel Right DTI (Active)   Wound Image   1/6/2020  9:42 AM   Wound Pressure Unstageable 1/6/2020  9:42 AM   Offloading for Diabetic Foot Ulcers Offloading boot 1/6/2020  9:42 AM Dressing Status Clean;Dry; Intact 1/6/2020  9:42 AM   Dressing Changed Changed/New 1/6/2020  9:42 AM   Dressing/Treatment Betadine swabs 1/6/2020  9:42 AM   Wound Cleansed Rinsed/Irrigated with saline 1/6/2020  9:42 AM   Dressing Change Due 01/07/20 1/6/2020  9:42 AM   Wound Length (cm) 1.5 cm 1/6/2020  9:42 AM   Wound Width (cm) 2 cm 1/6/2020  9:42 AM   Wound Depth (cm) 0 cm 1/6/2020  9:42 AM   Wound Surface Area (cm^2) 3 cm^2 1/6/2020  9:42 AM   Change in Wound Size % (l*w) 0 1/6/2020  9:42 AM   Wound Volume (cm^3) 0 cm^3 1/6/2020  9:42 AM   Post-Procedure Length (cm) 0 cm 1/6/2020  9:42 AM   Post-Procedure Width (cm) 0 cm 1/6/2020  9:42 AM   Post-Procedure Depth (cm) 0 cm 1/6/2020  9:42 AM   Post-Procedure Surface Area (cm^2) 0 cm^2 1/6/2020  9:42 AM   Post-Procedure Volume (cm^3) 0 cm^3 1/6/2020  9:42 AM   Distance Tunneling (cm) 0 cm 1/6/2020  9:42 AM   Tunneling Position ___ O'Clock 0 1/6/2020  9:42 AM   Undermining Starts ___ O'Clock 0 1/6/2020  9:42 AM   Undermining Ends___ O'Clock 0 1/6/2020  9:42 AM   Undermining Maxium Distance (cm) 0 1/6/2020  9:42 AM   Wound Assessment Dry; Intact; Black 1/6/2020  9:42 AM   Drainage Amount None 1/6/2020  9:42 AM   Odor None 1/6/2020  9:42 AM   Margins Attached edges; Defined edges 1/6/2020  9:42 AM   Tess-wound Assessment Intact; Red;Swelling 1/6/2020  9:42 AM   Black%Wound Bed 100 1/6/2020  9:42 AM   Purple%Wound Bed 100 12/3/2019  5:30 AM   Culture Taken No 1/6/2020  9:42 AM   Number of days: 40       Wound 11/27/19 Buttocks Stage 2, R buttocks (Active)   Wound Image   1/3/2020  5:00 PM   Wound Pressure Stage  1 1/5/2020  8:57 AM   Wound Cleansed Rinsed/Irrigated with saline 1/3/2020  5:00 PM   Wound Length (cm) 1.5 cm 1/3/2020  5:00 PM   Wound Width (cm) 1.5 cm 1/3/2020  5:00 PM   Wound Depth (cm) 0 cm 1/3/2020  5:00 PM   Wound Surface Area (cm^2) 2.25 cm^2 1/3/2020  5:00 PM   Change in Wound Size % (l*w) 62.5 1/3/2020  5:00 PM   Wound Volume (cm^3) 0 cm^3 1/3/2020 5:00 PM   Wound Assessment Intact; Non-blanchable erythema 1/5/2020 11:36 PM   Drainage Amount None 1/5/2020 11:36 PM   Drainage Description Serous 12/4/2019  8:30 AM   Odor None 1/5/2020 11:36 PM   Tess-wound Assessment Intact; Induration 1/5/2020 11:36 PM   Number of days: 40       Wound 11/27/19 Coccyx Stage 2, coccyx (Active)   Wound Image   1/3/2020  5:00 PM   Wound Pressure Stage  3 1/3/2020  5:00 PM   Offloading for Diabetic Foot Ulcers Offloading boot 12/4/2019 10:15 PM   Dressing Changed Changed/New 12/4/2019  8:30 AM   Wound Cleansed Rinsed/Irrigated with saline 1/3/2020  5:00 PM   Wound Length (cm) 1.8 cm 1/3/2020  5:00 PM   Wound Width (cm) 1.3 cm 1/3/2020  5:00 PM   Wound Depth (cm) 0.3 cm 1/3/2020  5:00 PM   Wound Surface Area (cm^2) 2.34 cm^2 1/3/2020  5:00 PM   Change in Wound Size % (l*w) 41.5 1/3/2020  5:00 PM   Wound Volume (cm^3) 0.7 cm^3 1/3/2020  5:00 PM   Wound Assessment Non-blanchable erythema;Red 1/5/2020 11:36 PM   Drainage Amount None 1/5/2020 11:36 PM   Odor None 1/5/2020 11:36 PM   Tess-wound Assessment Blanchable erythema 1/5/2020 11:36 PM   Number of days: 40       Wound 11/28/19 Abdomen Other (Comment); Right apparent skin tear along abdominal fold line noted when lifted. (Active)   Wound Pressure Stage  2 12/4/2019  8:30 AM   Dressing Status Clean;Dry; Intact 12/4/2019  8:30 AM   Wound Cleansed Rinsed/Irrigated with saline 11/28/2019  6:00 AM   Wound Length (cm) 0.6 cm 11/30/2019  8:10 AM   Wound Width (cm) 6 cm 11/30/2019  8:10 AM   Wound Surface Area (cm^2) 3.6 cm^2 11/30/2019  8:10 AM   Change in Wound Size % (l*w) -10.77 11/30/2019  8:10 AM   Wound Assessment Clean;Pink;Red; White 12/4/2019  8:30 AM   Drainage Amount Scant 12/4/2019  8:30 AM   Drainage Description Clear 12/4/2019  8:30 AM   Number of days: 39       Wound 11/29/19 Toe (Comment  which one) L Foot Toes 2 & 3 (Active)   Wound Image   1/6/2020  9:42 AM   Wound Diabetic 1/6/2020  9:42 AM   Offloading for Diabetic Foot 1/6/2020  9:42 AM   Wound Length (cm) 0.5 cm 1/6/2020  9:42 AM   Wound Width (cm) 0.7 cm 1/6/2020  9:42 AM   Wound Depth (cm) 0 cm 1/6/2020  9:42 AM   Wound Surface Area (cm^2) 0.35 cm^2 1/6/2020  9:42 AM   Change in Wound Size % (l*w) 65 1/6/2020  9:42 AM   Wound Volume (cm^3) 0 cm^3 1/6/2020  9:42 AM   Post-Procedure Length (cm) 0 cm 1/6/2020  9:42 AM   Post-Procedure Width (cm) 0 cm 1/6/2020  9:42 AM   Post-Procedure Depth (cm) 0 cm 1/6/2020  9:42 AM   Post-Procedure Surface Area (cm^2) 0 cm^2 1/6/2020  9:42 AM   Post-Procedure Volume (cm^3) 0 cm^3 1/6/2020  9:42 AM   Distance Tunneling (cm) 0 cm 1/6/2020  9:42 AM   Tunneling Position ___ O'Clock 0 1/6/2020  9:42 AM   Undermining Starts ___ O'Clock 0 1/6/2020  9:42 AM   Undermining Ends___ O'Clock 0 1/6/2020  9:42 AM   Undermining Maxium Distance (cm) 0 1/6/2020  9:42 AM   Wound Assessment Brown;Dry; Intact 1/6/2020  9:42 AM   Drainage Amount None 1/6/2020  9:42 AM   Odor None 1/6/2020  9:42 AM   Margins Attached edges; Defined edges 1/6/2020  9:42 AM   Tess-wound Assessment Dry; Intact 1/6/2020  9:42 AM   Other%Wound Bed 100 1/6/2020  9:42 AM   Culture Taken No 1/6/2020  9:42 AM   Number of days: 38       Wound 11/30/19 Toe (Comment  which one) Anterior;Right R 2nd toe, 1/3/20 now includes the right foot toes #1 -4 (Active)   Wound Image   1/6/2020  9:42 AM   Wound Diabetic 1/6/2020  9:42 AM   Offloading for Diabetic Foot Ulcers Offloading boot 1/6/2020  9:42 AM   Dressing Status Clean;Dry; Intact 1/6/2020  9:42 AM   Dressing Changed Changed/New 1/6/2020  9:42 AM   Dressing/Treatment Betadine swabs 1/6/2020  9:42 AM   Wound Cleansed Rinsed/Irrigated with saline 1/6/2020  9:42 AM   Dressing Change Due 01/07/20 1/6/2020  9:42 AM   Wound Length (cm) 5 cm 1/6/2020  9:42 AM   Wound Width (cm) 8 cm 1/6/2020  9:42 AM   Wound Depth (cm) 0 cm 1/6/2020  9:42 AM   Wound Surface Area (cm^2) 40 cm^2 1/6/2020  9:42 AM   Change in Wound Size % (l*w) -4800.56 1/6/2020  9:42 AM AM   Undermining Maxium Distance (cm) 0 12/2/2019 11:53 AM   Wound Assessment Edema;Fibrin;Red 1/5/2020 11:36 PM   Drainage Amount None 1/5/2020 11:36 PM   Odor None 1/5/2020 11:36 PM   Margins Attached edges 1/5/2020 11:36 PM   Tess-wound Assessment Blanchable erythema 1/5/2020 11:36 PM   Other%Wound Bed 100 12/2/2019 11:53 AM   Number of days: 34       Wound 01/03/20 Foot Anterior;Dorsal;Medial;Proximal open area to top of right foot (Active)   Wound Image   1/6/2020  9:42 AM   Wound Pressure Stage  2 1/5/2020  8:57 AM   Offloading for Diabetic Foot Ulcers Offloading boot 1/6/2020  9:42 AM   Dressing Status Clean;Dry; Intact; Changed 1/6/2020  9:42 AM   Dressing Changed Changed/New 1/6/2020  9:42 AM   Dressing/Treatment Alginate with Ag;4x4;Roll gauze 1/6/2020  9:42 AM   Wound Cleansed Rinsed/Irrigated with saline 1/6/2020  9:42 AM   Dressing Change Due 01/07/20 1/6/2020  9:42 AM   Wound Length (cm) 1.2 cm 1/6/2020  9:42 AM   Wound Width (cm) 1.5 cm 1/6/2020  9:42 AM   Wound Depth (cm) 0.1 cm 1/6/2020  9:42 AM   Wound Surface Area (cm^2) 1.8 cm^2 1/6/2020  9:42 AM   Change in Wound Size % (l*w) -20 1/6/2020  9:42 AM   Wound Volume (cm^3) 0.18 cm^3 1/6/2020  9:42 AM   Wound Healing % -20 1/6/2020  9:42 AM   Post-Procedure Length (cm) 0 cm 1/6/2020  9:42 AM   Post-Procedure Width (cm) 0 cm 1/6/2020  9:42 AM   Post-Procedure Depth (cm) 0 cm 1/6/2020  9:42 AM   Post-Procedure Surface Area (cm^2) 0 cm^2 1/6/2020  9:42 AM   Post-Procedure Volume (cm^3) 0 cm^3 1/6/2020  9:42 AM   Distance Tunneling (cm) 0 cm 1/6/2020  9:42 AM   Tunneling Position ___ O'Clock 0 1/6/2020  9:42 AM   Undermining Starts ___ O'Clock 0 1/6/2020  9:42 AM   Undermining Ends___ O'Clock 0 1/6/2020  9:42 AM   Undermining Maxium Distance (cm) 0 1/6/2020  9:42 AM   Wound Assessment Intact; Red;Yellow 1/6/2020  9:42 AM   Drainage Amount None 1/6/2020  9:42 AM   Odor None 1/6/2020  9:42 AM   Margins Attached edges; Defined edges 1/6/2020  9:42 AM Tess-wound Assessment Dry; Intact; Red;Swelling 1/6/2020  9:42 AM   Red%Wound Bed 10 1/6/2020  9:42 AM   Yellow%Wound Bed 90 1/6/2020  9:42 AM   Culture Taken No 1/6/2020  9:42 AM   Number of days: 2     Response to treatment:  Well tolerated by patient. Pain Assessment:  Severity:  0 / 10  Quality of pain: N/A  Wound Pain Timing/Severity: none  Premedicated: No    Plan   Plan of Care: Wound 11/29/19 Toe (Comment  which one) L 4th Toe-Dressing/Treatment: Betadine swabs  Wound 11/27/19 Heel Right DTI-Dressing/Treatment: Betadine swabs  Wound 11/27/19 Coccyx Stage 2, coccyx-Dressing/Treatment: (zinc based ointment)  Wound 11/27/19 Buttocks Stage 2, R buttocks-Dressing/Treatment: (zinc based ointment)  Wound 11/29/19 Toe (Comment  which one) L Foot Toes 2 & 3-Dressing/Treatment: Betadine swabs  Wound 11/30/19 Toe (Comment  which one) Anterior;Right R 2nd toe, 1/3/20 now includes the right foot toes #1 -4-Dressing/Treatment: Betadine swabs  Wound 01/03/20 Foot Anterior;Dorsal;Medial;Proximal open area to top of right foot-Dressing/Treatment: Alginate with Ag, 4x4, Roll gauze   Recommend: Clean all wounds with NSS; pat dry  L Foot Toes 2-4; R Foot Toes 1-4 and R Heel - paint with betadine daily; JOAQUINA  R Foot Dorsal - alginate ag; 4x4; roll gauze; change daily  R Lower Leg Posterior - alginate ag; 4x4; roll gauze; change daily  While sitting in wheelchair or chair - pressure redistribution cushion  Call Wound Care for deterioration [phone]; pager [phone]    Specialty Bed Required : Yes   [x] Low Air Loss   [x] Pressure Redistribution  [] Fluid Immersion  [] Bariatric  [] Total Pressure Relief  [] Other:     Current Diet: Dietary Nutrition Supplements: Diabetic Oral Supplement  DIET DYSPHAGIA SOFT AND BITE-SIZED;  Renal  Dietician consult:  Yes    Discharge Plan:  Placement for patient upon discharge: unknown at this time    Patient appropriate for Outpatient [address] [place] [address] Road: Yes    Referrals:  [x] Social Worker  [] [facility]  [] Supplies  [] Other    Patient/Caregiver Teaching:  Level of patient/caregiver understanding able to:   [x] Indicates understanding       [] Needs reinforcement  [] Unsuccessful      [] Verbal Understanding  [] Demonstrated understanding       [] No evidence of learning  [] Refused teaching         [] N/A       Electronically signed by Radha Gerardo RN, CWOCN on 1/6/2020 at 9:57 AM

## 2020-01-06 NOTE — PROGRESS NOTES
[facility]: ACUTE REHAB UNIT  SPEECH-LANGUAGE PATHOLOGY      [x] Daily  [] Weekly Care Conference Note  [] Discharge    J Luis  RCN:[id number]  Rehab Dx/Hx: Metabolic encephalopathy [W02.17]    Precautions: Falls  Home situation: Lives at home, caregiver for son and grandchildren   ST Dx: [] Aphasia  [] Dysarthria  [] Apraxia   [x] Oropharyngeal dysphagia [x] Cognitive Impairment  [] Other:   Date of Admit: 1/3/2020  Room #: 0165/0165-01    Current functional status (updated daily):         Pt being seen for : [] Speech/Language Treatment  [x] Dysphagia Treatment [x] Cognitive Treatment  [] Other:  Communication: [x]WFL  [] Aphasia  [] Dysarthria  [] Apraxia  [] Pragmatic Impairment [] Non-verbal  [] Hearing Loss  [] Other:   Cognition: [] WFL  [] Mild  [x] Moderate  [x] Severe [] Unable to Assess  [] Other:  Memory: [] WFL  [] Mild  [x] Moderate  [x] Severe [] Unable to Assess  [] Other:  Behavior: [x] Alert  [x] Cooperative  []  Pleasant  [x] Confused  [] Agitated  [] Uncooperative  [x] Distractible [] Motivated  [] Self-Limiting [] Anxious  [] Other:  Endurance:  [x] Adequate for participation in SLP sessions  [] Reduced overall  [] Lethargic  [] Other:  Safety: [] No concerns at this time  [x] Reduced insight into deficits  []  Reduced safety awareness [] Not following call light procedures  [] Unable to Assess  [] Other:    Current Diet Order:Dietary Nutrition Supplements: Diabetic Oral Supplement  DIET DYSPHAGIA SOFT AND BITE-SIZED; Renal  Swallowing Precautions:    Alternate solids and liquids;Small bites/sips;Assist feed;Eat/Feed slowly;Upright as possible for all oral intake;Remain upright for 30-45 minutes after meals        Date: 1/6/2020      Tx session 1  [id number] Tx session 2  All tx needs met during session 1   Total Timed Code Min 45    Total Treatment Minutes 60    Individual Treatment Minutes 60    Group Treatment Minutes 0 0   Co-Treat Minutes 0 0   Variance/Reason: difficulty observed with basic sustained attention during task. Goal 4: The patient will complete graded naming tasks with 70% accuracy given min cues   Goal not addressed. Goal 5: The patient will complete graded memory recall tasks with 80% accuracy given min cues     Goal addressed. Pt unable to recall street address; once provided with street name, pt was able to state house number. Unable to state accurate zip code. Pt able to recall daughter's name. (Address and daughter's name verified in chart information). Unable to recall most recent holiday. Timeframe for Short-term Goals: 5 days (01/09)         Other areas targeted: Pt completed the [place] Mental Status Examination (SLUMS) for further assessment of cognition. Pt completed assessment with a total score of 13/30 with severe difficulty on immediate and delayed recall tasks, executive function skills, math calculations, attention, and clock drawing task. Education:   Pt educated on role of SLP, diet recommendations, safe swallow strategies, and reasons for tasks administered this date     Safety Devices: [x] Call light within reach  [x] Chair alarm activated  [] Bed alarm activated  [x] Other: Pt left in chair, belongings within reach, RN notified  [] Call light within reach  [] Chair alarm activated  [] Bed alarm activated  [] Other:    Assessment: Pt seen this date for SLP tx session. Pt seen upright in chair, appeared fatigued. Pt tearful at times about situation. Pt feeling as if she will get sick throughout session, basin in lap; pt gagging/bringing up mucous at times. Pt tolerated trials of thin liquids and puree without s/s of aspiration. Pt reports that her diet needs to be pureed and preferred this. Diet to be downgraded this date. Severe difficulty on cognitive tasks presented. Pt completed the SLUMS with a total score of 13/30.  Mod-severe difficulty in the areas of recall, orientation, executive function skills, attention, problem solving, and clock drawing task. Recommend ongoing SLP services to target the above goals. Plan: Continue as per plan of care. Additional Information: n/a    Barriers toward progress: Dysphagia, severe cognitive communication deficits   Discharge recommendations:  [] Home independently  [] Home with assistance []  24 hour supervision  [] ECF [x] Other: PT/OT recs  Continued Tx Upon Discharge: ? [x] Yes [] No [] TBD based on progress while on ARU [] Vital Stim indicated [] Other:   Estimated discharge date: TBD  Interventions used this date:  [] Speech/Language Treatment  [] Instruction in HEP [] Group [x] Dysphagia Treatment [x] Cognitive Treatment   [] Other:       Total Time Breakdown / Charges    Time in Time out Total Time / units   Cognitive Tx 1245 1330 45 mins / 3 units    Speech Tx      Dysphagia Tx 1230 1245 15 mins / 1 unit        Electronically Signed by     Amrik Flowers, [address] [facility]  Speech-Language Pathologist

## 2020-01-06 NOTE — PATIENT CARE CONFERENCE
of scheduled session) Transfer train with cynthia palacios and  Walker with Mod assist  cues to WB on UE  And LLE due to RLE pain, bed mobility with need for RLE leg lift due to weakness and pain. Patient given LE and trunk therex to improve endurance and functional strengthening. Pt  Notes her right Leg pain and weakness limit her mobility. Multipodous boots noted to be ordered but not present in room (notified nursing). Pt has necrotic tissue present across right toes with foot drop RLE. Pt's inability to WB effectively on RLE in the presence of weakness makes OOB mobility challenging for patient. Occupational therapy observed barriers to dc:    Baseline: pt lived with family, independent with ADls   Current level: max A for transfers, max/total A for ADLs    Barriers to DC: assist level, cognition   Needs in order to achieve dc home/next level of care: SPV to min A for ADLs and transfers    Occupational Therapy interventions:  Current Treatment Recommendations: Strengthening, ROM, Safety Education & Training, Balance Training, Patient/Caregiver Education & Training, Self-Care / ADL, Cognitive/Perceptual Training, Functional Mobility Training, Neuromuscular Re-education, Equipment Evaluation, Education, & procurement, Home Management Training, Endurance Training, Cognitive Reorientation      OCCUPATIONAL THERAPY  Pt tolerated OT session well. Pt requiring Max A for stand pivot transfers without grab bars and Mod A when provided with grab bar for UE support. Pt requiring Total A to SBA with ADLs. Pt with confusion throughout session requiring cues for problem solving and attention. Pt left with transport to take for doppler imaging of LE. Recommend continued OT services to increase safety and independence with ADLs and functional transfers.      Speech therapy observed barriers to dc:    Baseline: pt lives at home, son who had a CVA and is nonverbal along with 6 grandchildren live with her   Current level:

## 2020-01-06 NOTE — PROGRESS NOTES
Diet : Regular  Current Liquid Diet : Thin  Diet Solids Recommendation: Dysphagia Soft and Bite-Sized (Dysphagia III)(Per MD order)  Liquid Consistency Recommendation: Thin    Body mass index is 27.94 kg/m². Rehabilitation Diagnosis:  Brain, 2.1, Non-Traumatic     Assessment and Plan:  Bilat lower limb DVT  - Anticoagulation, pharmacy to dose  - Goal INR 2.0-2.5     Cholelithiasis  - No urgent intervention per GI at OSH    Enterococcal bacteremia  - Start vancomycin  - Consult ID      Spindle cell sarcoma  - OP oncology f/u       Cardiac arrest  - Continue beta blocker, statin     Encephalopathy  - Likely related to cardiac arrest  - SLP consulted     Gangrenous toes  - Continue wound care per orders  - Wound care consulted  - Suspect eventual autoamputation, but will follow for now  - Consult podiatry     DM  - lantus, SSI     Bowels: Schedule colace + senna. Follow bowel movements. Enema or suppository if needed.      Bladder: Check PVR x 3. [address] if PVR > 200ml or if any volume is > 500 ml.      Sleep: Trazodone provided prn. Rodríguez Rebolledo MD 1/6/2020, 12:14 PM

## 2020-01-07 NOTE — PROGRESS NOTES
Physical Therapy  Facility/Department: [facility]  Daily Treatment Note  NAME: Rach Casper  : 1945  MRN: [id number]    Date of Service: 2020    Discharge Recommendations:  24 hour supervision or assist, Home with Home health PT        Assessment : Patient seen for split session (due to pt at testing during first part of scheduled session) Transfer train with cynthia palacios and  Walker with Mod assist  cues to WB on UE  And LLE due to RLE pain, bed mobility with need for RLE leg lift due to weakness and pain. Patient given LE and trunk therex to improve endurance and functional strengthening. Pt  Notes her right Leg pain and weakness limit her mobility. Multipodous boots noted to be ordered but not present in room (notified nursing). Pt has necrotic tissue present across right toes with foot drop RLE. Pt's inability to WB effectively on RLE in the presence of weakness makes OOB mobility challenging for patient. Patient Diagnosis(es): There were no encounter diagnoses. has a past medical history of Allergic, Anemia, Angina, Arthritis, Asthma, Cancer ([address] Coronary artery disease, Diabetes mellitus ([address] Heart attack ([address] Hyperlipidemia, Hypertension, and Obesity. has a past surgical history that includes Diagnostic Cardiac Cath Lab Procedure; Cataract removal with implant; Carpal tunnel release; Knee arthroscopy; Coronary angioplasty with stent (2012); Hysterectomy; bronchoscopy (N/A, 2019); bronchoscopy (N/A, 2019); bronchoscopy (2019); bronchoscopy (2019); and Upper gastrointestinal endoscopy (N/A, 2019). Restrictions  Restrictions/Precautions  Restrictions/Precautions: Fall Risk, Modified Diet  Position Activity Restriction  Other position/activity restrictions: Noted multipodous boots ordered by podiatry for foot wounds, USE cushion while up in chair.   right foot with necrotic toes-requested RN seek clarification on weightbearing restrictions due to wounds to feet. Subjective   General  Chart Reviewed: Yes  Response To Previous Treatment: Patient with no complaints from previous session. Referring Practitioner: Loraine Lou MD  General Comment  Comments: Pt in bed upon approach and reports stomach pain is 3/10. Orientation  Orientation  Orientation Level: Oriented to place;Oriented to time;Oriented to person  Cognition      Objective   Bed mobility  Rolling to Right: Modified independent  Supine to Sit: Contact guard assistance(HOBE with cues to move LE off bed, uses rail )  Sit to Supine: Minimal assistance(for RLE into bed )  Transfers  Sit to Stand: Dependent/Total;Moderate Assistance with cynthia palacios needed mob assist and with walker mod assist as pt unable to tolerate RLE WB from bed   Stand to sit: Dependent/Total  Moderate Assistance with cynthia palacios needed mob assist and with walker mod assist as pt unable to tolerate RLE WB to bed     Ambulation  Ambulation?: No  Unsafe to attempt due to poor RLE WB tolreance and overall unsteadiness with standing        Exercises  Heelslides: seated x20 BLE   Hip Flexion: seated x5  Knee Long Arc Quad: x 20 BLE  Ankle Pumps: x20  LLE, noted foot drop on RLE, inversion and eversion x20 WFL LLE and inversion to neutral LLE x20. Comments: observed necrotic areas throghout dorsum of right toes. Pt had just returned from Doppler at the start of session  LLEn                      Second session:   bed mobility worked on sup<>sit with pt encouraged to use rails with pt supervision wwith encouragement only sup>sit and need for leg lifting of RLE with sit to sup.   Pt mod assist with draw sheet to scoot to hob.    sit<>stand EOB with ac palacios 45 seconds SBA for standing<>sitting with pt cued to WB primarily on LLE as pt reports pain in RLE prevents RLE WB.   EOB sitting unsupported with dynamic reaching from hob to foot of bed moving rolls of tape for 5/10 minutes    LE therex:   seated EOB   LLE dayo x20   Supine heel slides x5 BLE  Hip abd/Er BLE              Goals  Short term goals  Time Frame for Short term goals: one week; 1/11/2020  Short term goal 1: Patient will perform bed mobility with CGA  Short term goal 2: Patient will perform sit <> stand transfers with Min A and AAD  Short term goal 3: Patient will perform stand pivot transfers with AAD and Min A  Short term goal 4: Patient will propel wheelchair 50 ft over even surfaces with SBA  Short term goal 5: Patient ambulate 15 ft with Mod A and AAD  Long term goals  Time Frame for Long term goals : 2 weeks; 1/18/2020  Long term goal 1: Patient will perform bed mobility with supervision  Long term goal 2: Patient will perform all functional transfers with AAD and supervision  Long term goal 3: Patient will ambulate 50 ft with AAD and CGA  Long term goal 4: Patient will propel w/c 100 ft on even and uneven surfaces with supervision  Long term goal 5: Patient will negotiate 3 stairs with AAD and Min A  Patient Goals   Patient goals : \"To get back to normal\"    Plan    Plan  Times per week: 5 of 7 days per week  Times per day: Daily  Specific instructions for Next Treatment: progress transfer training and gait training as able  Current Treatment Recommendations: Strengthening, Transfer Training, Home Exercise Program, Balance Training, Endurance Training, Gait Training, Functional Mobility Training, Wheelchair Mobility Training, Stair training, Pain Management, Equipment Evaluation, Education, & procurement, Safety Education & Training, Patient/Caregiver Education & Training  Safety Devices  Type of devices:  All fall risk precautions in place, Left in chair, Call light within reach, Chair alarm in place, Patient at risk for falls, Gait belt, Nurse notified  Restraints  Initially in place: No     Therapy Time   Individual Concurrent Group Co-treatment   Time In 1000(pt missed first 30 minutes of therapy due to at testing)         Time Out 1030         Minutes 30              Second Session Therapy Time:   Individual Concurrent Group Co-treatment   Time In 1100         Time Out 1130         Minutes 30           Timed Code Treatment Minutes:  30    Total Treatment Minutes:  60      Catrina Stroud PT

## 2020-01-07 NOTE — PROGRESS NOTES
improves with elevation. +1 pitting edema to right foot. Left foot toes 2-4 small dorsal full thickness wounds. 2nd toe has fibrotic base. No surrounding sings of infection. Neurologic:  Protective sensation is grossly diminshed to light touch at the level of the toes, bilateral.  Vascular:  DP and PT pulses are non-palpable, bilateral.  Musculoskeletal:  Patient has 5/5 strength on inversion/everison/dorsiflexion/plantarflexion, bilateral.  No gross musculoskeletal deformities noted.     Labs:  CBC with Differential:    Lab Results   Component Value Date    WBC 10.3 01/06/2020    RBC 3.33 01/06/2020    RBC 4.31 12/08/2015    HGB 8.8 01/06/2020    HCT 28.0 01/06/2020     01/06/2020    MCV 84.0 01/06/2020    MCH 26.4 01/06/2020    MCHC 31.4 01/06/2020    RDW 18.9 01/06/2020    NRBC 1 11/28/2019    NRBC 1 11/28/2019    SEGSPCT 52.3 10/21/2012    BANDSPCT 9 11/28/2019    METASPCT 2 11/24/2019    LYMPHOPCT 9.7 01/06/2020    LYMPHOPCT 26.1 12/08/2015    MONOPCT 8.8 01/06/2020    MYELOPCT 2 11/23/2019    EOSPCT 0.0 04/05/2012    BASOPCT 0.4 01/06/2020    MONOSABS 0.9 01/06/2020    LYMPHSABS 1.0 01/06/2020    EOSABS 0.0 01/06/2020    BASOSABS 0.0 01/06/2020    DIFFTYPE Auto 10/21/2012     CMP:    Lab Results   Component Value Date     01/07/2020    K 4.5 01/07/2020    K 4.5 01/06/2020    CL 88 01/07/2020    CO2 26 01/07/2020    BUN 46 01/07/2020    CREATININE 3.4 01/07/2020    GFRAA 16 01/07/2020    GFRAA 57 10/21/2012    AGRATIO 0.7 01/04/2020    LABGLOM 13 01/07/2020    GLUCOSE 97 01/07/2020    PROT 7.1 01/04/2020    PROT 7.0 11/30/2012    LABALBU 2.6 01/07/2020    CALCIUM 8.5 01/07/2020    BILITOT 0.5 01/04/2020    ALKPHOS 114 01/04/2020    AST 22 01/04/2020    ALT 15 01/04/2020     HgBA1c:    Lab Results   Component Value Date    LABA1C 8.5 11/12/2019       Imaging:  Right:   Soft tissue swelling.       Detailed evaluation limited by bandaging.       Deformity involving the mid shaft of the proximal

## 2020-01-07 NOTE — PROGRESS NOTES
Pharmacy to Dose Warfarin     Dx: DVT of unknown origin   Goal INR range 2-2.5 per MD consult  Home Warfarin dose: 4 mg daily     Date                 INR                  Warfarin  1/3                    2.45                4 mg  1/4                    2.52                4 mg  1/5                    3.87                Hold  1/6                    3.46                Hold  1/7                    2.35                2.5 mg     Recommend warfarin 2.5 mg x 1 tonight. Daily INR ordered. Rx will continue to manage therapy per Dr Linnea Orellana consult order.   Loraine Bedolla PharmD 1/7/2020 10:17 AM

## 2020-01-07 NOTE — PROGRESS NOTES
[facility]       Progress Note           Subjective/   75y.o. year old female who we are seeing in consultation for KIMBERLY/CKD requiring HD. Transferred back from [facility] for rehab. HPI:  Remains on HD since 11/13, not making much urine, only 20 ml charted for last 24h     Seen and examined at HD on 1/7, admits to drinking lots of fluids, na 125 , cr 3.4; says she is urinating some    ROS:  Intake reduced, no fevers. Objective/   GEN:  Chronically ill, BP (!) 146/76   Pulse 82   Temp 98.2 °F (36.8 °C) (Oral)   Resp 18   Ht 5' (1.524 m)   Wt 143 lb 1.3 oz (64.9 kg)   SpO2 99%   BMI 27.94 kg/m²   HEENT: non-icteric, no JVD  CV: S1, S2 without m/r/g; no LE edema  RESP: CTA B without w/r/r; breathing wnl  ABD: +bs, soft, nt, no hsm  SKIN: warm, no rashes  ACCESS: R IJ TDC (12/2)    Data/  Recent Labs     01/06/20  1210   WBC 10.3   HGB 8.8*   HCT 28.0*   MCV 84.0        Recent Labs     01/06/20  1210 01/07/20  1134   * 125*   K 4.5 4.5   CL 89* 88*   CO2 27 26   GLUCOSE 214* 97   PHOS  --  4.0   BUN 42* 46*   CREATININE 3.2* 3.4*   LABGLOM 14* 13*   GFRAA 17* 16*       Assessment/Plan      Acute Kidney Injury.  - ATN from septic shock / cardiac arrest, initially requiring CRRT since 11/13/19  - We will continue hemodialysis per TRS schedule currently   - Access: R IJV TDC  - Plan to declare ESRD by mid January given little signs of renal recovery. FR 1l/day  HD on 1/7 w na bath of 135 d/t hyponatremia  Will give line holiday after [facility] removal and plan on next HD on Friday      CKD stage 3.  - Suspect from HTN and DM.  - Baseline serum creatinine of 1.3-1.4 mg/dL.     Sepsis with MOSF  - ID following. Etiology not determined  - Repeat blood cultures 11/27 were no growth to date. enterococci + 1/5 on vanc   Dc TDC d/t bacteremia     Acute respiratory failure  - s/p intubation X2, now off     Malnutrition. - s/p PEG, on TF's     Anemia.   -  on retracrit 5K qHD, change to

## 2020-01-07 NOTE — PROGRESS NOTES
Pain Assessment  Pain Assessment: 0-10  Pain Level: 6  Patient's Stated Pain Goal: No pain  Pain Type: Acute pain  Pain Location: Abdomen  Pain Descriptors: Aching; Sore  Pain Frequency: Intermittent  Pain Onset: On-going  Non-Pharmaceutical Pain Intervention(s): Repositioned;Distraction  Response to Pain Intervention: Patient Satisfied  Pre Treatment Pain Screening  Intervention List: Patient able to continue with treatment;Nurse/Physician notified  Vital Signs  Patient Currently in Pain: Yes     Orientation  Orientation  Overall Orientation Status: Impaired  Orientation Level: Oriented to person;Disoriented to situation;Disoriented to time;Disoriented to place     Objective    ADL  Feeding: Setup; Increased time to complete;Dentures(drank juice seated EOB)  Grooming: Stand by assistance;Setup; Increased time to complete;Verbal cueing(seated in w/c at sink to wash hands)  Toileting: Dependent/Total(Pt stands with BUE support on grab bar. OT perform 3/3 steps. 1 person total assist)        Balance  Sitting Balance: Supervision  Standing Balance: Maximum assistance  Standing Balance  Time: 12 seconds  Activity: standing with BUE support on grab bar during pericare for toileting. Comment: Mod A     Toilet Transfers  Toilet - Technique: Stand pivot  Equipment Used: Standard toilet  Toilet Transfer: Moderate assistance  Toilet Transfers Comments: BUE support on grab bars to pull self up. Bed mobility  Supine to Sit: Minimal assistance(bedrail, HOB 30 degrees, extra time, assist with scooting)  Sit to Supine: Moderate assistance(assist with BLE, use of bedrail)     Transfers  Stand Pivot Transfers: Maximum assistance(EOB <> w/c)  Sit to stand: Maximum assistance  Stand to sit: Maximum assistance                       Cognition  Overall Cognitive Status: Exceptions  Arousal/Alertness: Appropriate responses to stimuli  Following Commands: Follows one step commands with increased time; Follows one step commands with repetition  Attention Span: Attends with cues to redirect  Memory: Decreased recall of biographical Information;Decreased recall of recent events;Decreased short term memory  Safety Judgement: Decreased awareness of need for safety  Problem Solving: Decreased awareness of errors  Insights: Decreased awareness of deficits  Initiation: Requires cues for some  Sequencing: Requires cues for some  Cognition Comment: Pt anxious and verbalizes fear of falling. Pt requiring cues for problem solving and attention throughout session. Plan   Plan  Times per week: 5/7 days per week  Plan weeks: 2 weeks  Current Treatment Recommendations: Strengthening, ROM, Safety Education & Training, Balance Training, Patient/Caregiver Education & Training, Self-Care / ADL, Cognitive/Perceptual Training, Functional Mobility Training, Neuromuscular Re-education, Equipment Evaluation, Education, & procurement, Home Management Training, Endurance Training, Cognitive Reorientation       Goals  Short term goals  Time Frame for Short term goals: 1 week (by 1/11/20)  Short term goal 1: Pt will tolerate static standing for 3 min with CGA for improved participation in BADL tasks. Short term goal 2: Pt will complete UB dressing with set-up and min verbal cues for task completion. Short term goal 3: Pt will complete oral care in stance at sink with CGA and min verbal cues for sequencing task. Short term goal 4: Pt will participate in nine hole peg test for cont assessment of [facility]. Long term goals  Time Frame for Long term goals : 2 weeks (by 1/18/20)  Long term goal 1: Pt will complete toilet transfers with supervision with AD as needed and min cues for safety and sequencing. Long term goal 2: Pt will complete LB dressing tasks with min A with AE as needed. Long term goal 3: Pt will complete toileting tasks with supervision.    Patient Goals   Patient goals : \"to walk\" and \"to get back to normal\"       Therapy Time   Individual

## 2020-01-07 NOTE — PROGRESS NOTES
[facility]: ACUTE REHAB UNIT  SPEECH-LANGUAGE PATHOLOGY      [x] Daily  [] Weekly Care Conference Note  [] Discharge    J Luis  UPI:[id number]  Rehab Dx/Hx: Metabolic encephalopathy [B73.70]    Precautions: Falls  Home situation: Lives at home, caregiver for son and grandchildren    Dx: [] Aphasia  [] Dysarthria  [] Apraxia   [x] Oropharyngeal dysphagia [x] Cognitive Impairment  [] Other:   Date of Admit: 1/3/2020  Room #: 0165/0165-01    Current functional status (updated daily):         Pt being seen for : [] Speech/Language Treatment  [x] Dysphagia Treatment [x] Cognitive Treatment  [] Other:  Communication: [x]WFL  [] Aphasia  [] Dysarthria  [] Apraxia  [] Pragmatic Impairment [] Non-verbal  [] Hearing Loss  [] Other:   Cognition: [] WFL  [] Mild  [x] Moderate  [x] Severe [] Unable to Assess  [] Other:  Memory: [] WFL  [] Mild  [x] Moderate  [x] Severe [] Unable to Assess  [] Other:  Behavior: [x] Alert  [x] Cooperative  []  Pleasant  [x] Confused  [] Agitated  [] Uncooperative  [x] Distractible [] Motivated  [] Self-Limiting [] Anxious  [] Other:  Endurance:  [x] Adequate for participation in SLP sessions  [] Reduced overall  [] Lethargic  [] Other:  Safety: [] No concerns at this time  [x] Reduced insight into deficits  []  Reduced safety awareness [] Not following call light procedures  [] Unable to Assess  [] Other:    Current Diet Order:Dietary Nutrition Supplements: Diabetic Oral Supplement  DIET DYSPHAGIA SOFT AND BITE-SIZED; Dysphagia Pureed; Renal  Swallowing Precautions:    Alternate solids and liquids;Small bites/sips;Assist feed;Eat/Feed slowly;Upright as possible for all oral intake;Remain upright for 30-45 minutes after meals        Date: 1/7/2020      Tx session 1  [id number] Tx session 2  [id number]   Total Timed Code Min 30 30   Total Treatment Minutes 30 30   Individual Treatment Minutes 30 30   Group Treatment Minutes 0 0   Co-Treat Minutes 0 0   Variance/Reason: tasks with 80% accuracy given min cues     Did not target. Goal indirectly targeted. Pt required min-mod cues for double checking medication label to ensure accuracy and correct placement of pills in organizer. Other areas targeted: N/A   N/A   Education:   Education provided re: rationale for tx tasks provided   Education provided re: rationale for tasks provided. Safety Devices: [x] Call light within reach  [] Chair alarm activated  [x] Bed alarm activated  [] Other:  [x] Call light within reach  [x] Chair alarm activated  [] Bed alarm activated  [] Other:    Assessment: Session 1: Pt pleasant and cooperative, agreeable to participate in therapy. Pt with improved orientation concepts with use of visual calendar on wall. Overall improved attention and thought organization noted compared to yesterday's tx session. Session 2: Pt cooperative and agreeable to participate in therapy. Pt improved sorting medication into a complex pill organizer using look back method when given mod cues. Pt continues to demonstrate with overall reduced recall and thought organization skills. Continue goals as listed above. Plan: Continue as per plan of care. Additional Information: n/a    Barriers toward progress: Dysphagia, severe cognitive communication deficits   Discharge recommendations:  [] Home independently  [] Home with assistance []  24 hour supervision  [] ECF [x] Other: PT/OT recs  Continued Tx Upon Discharge: ? [x] Yes [] No [] TBD based on progress while on ARU [] Vital Stim indicated [] Other:   Estimated discharge date: TBD  Interventions used this date:  [] Speech/Language Treatment  [] Instruction in HEP [] Group [x] Dysphagia Treatment [x] Cognitive Treatment   [] Other:       Total Time Breakdown / Charges    Time in Time out Total Time / units   Cognitive Tx 1030  1330 1100  1400 30 min/ 2 units  30 min/2 units   Speech Tx      Dysphagia Tx          Electronically Signed by     Rogers Masterson

## 2020-01-07 NOTE — PROGRESS NOTES
Brenda Simpler  1/7/2020  [id number]    Chief Complaint: Metabolic encephalopathy    Subjective:   Appreciate ID and podiatry input. ROS: No n/v, cp, sob, f/c  Objective:  Patient Vitals for the past 24 hrs:   BP Temp Temp src Pulse Resp SpO2   01/07/20 0835 (!) 146/76 98.2 °F (36.8 °C) Oral 82 18 99 %   01/06/20 2015 121/74 98.4 °F (36.9 °C) Oral 94 18 94 %     Gen: No distress, pleasant. HEENT: Normocephalic, atraumatic. CV: Regular rate and rhythm. Resp: No respiratory distress. Abd: Soft, nontender   Ext: No edema. Neuro: Alert, oriented, appropriately interactive. Wt Readings from Last 3 Encounters:   01/05/20 143 lb 1.3 oz (64.9 kg)   12/05/19 156 lb 1.4 oz (70.8 kg)   10/17/19 169 lb 6.4 oz (76.8 kg)       Laboratory data:   Lab Results   Component Value Date    WBC 10.3 01/06/2020    HGB 8.8 (L) 01/06/2020    HCT 28.0 (L) 01/06/2020    MCV 84.0 01/06/2020     01/06/2020       Lab Results   Component Value Date     01/07/2020    K 4.5 01/07/2020    K 4.5 01/06/2020    CL 88 01/07/2020    CO2 26 01/07/2020    BUN 46 01/07/2020    CREATININE 3.4 01/07/2020    GLUCOSE 97 01/07/2020    CALCIUM 8.5 01/07/2020        Therapy progress:  PT  Position Activity Restriction  Other position/activity restrictions: Noted multipodous boots ordered by podiatry for foot wounds, USE cushion while up in chair. right foot with necrotic toes-requested RN seek clarification on weightbearing restrictions due to wounds to feet. Objective     Sit to Stand: Dependent/Total, Moderate Assistance  Stand to sit: Dependent/Total  Bed to Chair: Moderate assistance, Dependent/Total     OT  PT Equipment Recommendations  Equipment Needed: Yes  Mobility Devices: Wheelchair(walker pending progress)  Wheelchair: Standard  Toilet - Technique: Stand pivot  Equipment Used: Standard toilet  Toilet Transfers Comments: BUE support on grab bars to pull self up.    Assessment        SLP  Current Diet : Regular  Current Liquid Diet : Thin  Diet Solids Recommendation: Dysphagia Soft and Bite-Sized (Dysphagia III)(Per MD order)  Liquid Consistency Recommendation: Thin    Body mass index is 27.94 kg/m². Rehabilitation Diagnosis:  Brain, 2.1, Non-Traumatic     Assessment and Plan:  Bilat lower limb DVT  - Anticoagulation, pharmacy to dose  - Goal INR 2.0-2.5     Cholelithiasis  - No urgent intervention per GI at OSH    Enterococcal bacteremia  - Cont vancomycin  - Appreciate ID   - Await echo  - Will need removal of TDS with temp line     Spindle cell sarcoma  - OP oncology f/u       Cardiac arrest  - Continue beta blocker, statin     Encephalopathy  - Likely related to cardiac arrest  - SLP consulted     Gangrenous toes  - Continue wound care per orders  - Wound care consulted  - Consulted podiatry     DM  - lantus, SSI     Bowels: Schedule colace + senna. Follow bowel movements. Enema or suppository if needed.      Bladder: Check PVR x 3. [facility] if PVR > 200ml or if any volume is > 500 ml.      Sleep: Trazodone provided prn. Nicholas A. Homer Goodell, MD 1/7/2020, 2:31 PM

## 2020-01-07 NOTE — PROGRESS NOTES
ID consult note    Full note to follow tomorrow    Patient known to me from complicated admission 49/28-37/6/95 with severe sepsis without established cause, resolved prior to her discharge to LTAC    With KIMBERLY/ESRD, dialyzing TTS via R chest TDC placed during that admission     Transferred from [place] [address] to ARU on 1/4/20  High grade fever >102 mid-day 1/5/20    BC collected 1/5/20 with Enterococci. VRE screen neg  Also with low level bacteriuria with E coli    She has wounds dorsal toes R foot of unclear pathogenesis.   Podiatry evaluation noted       Recs:  Suspect primary CLABSI from the [facility], though it is also possible that the R foot is the source of bacteremia     -continue IV vanc by level   -TDC will need to be removed with delayed replacement, will likely need temp line placed   -echo   -no Rx low level bacteriuria   -local wound care to R foot   -get repeat BC in the next 24-48 hours after removal TDC    D/w pt and son-in-law at bedside  D/w RN    Full consult note to follow

## 2020-01-07 NOTE — PROGRESS NOTES
Physical Therapy  Facility/Department: [facility]  Daily Treatment Note  NAME: Gerald Bruce  : 1945  MRN: [id number]    Date of Service: 2020    Discharge Recommendations:  24 hour supervision or assist, Home with Home health PT   PT Equipment Recommendations  Equipment Needed: Yes  Wheelchair: Standard    Assessment   Body structures, Functions, Activity limitations: Decreased functional mobility ; Decreased ROM; Decreased safe awareness;Decreased endurance;Decreased balance; Increased pain;Decreased sensation;Decreased cognition;Decreased strength  Assessment: pt initially tearful about current situation, however able to be re-directed to complete therapy tasks. able to propel w/c in linear path with SBA however requires min A with max VC to complete L turn. pt requesting to use commode during session. utilizes STEDY for commode transfers, able to complete sit to stands with min A via pulling on bars. pt hesistant with all transfers/standing to bear weight through RLE. pt supine in bed with moon boots donned BLE. RN notified that pt is requesting anxiety medicine. Treatment Diagnosis: Functional mobility deficit  Specific instructions for Next Treatment: progress transfer training and gait training as able  Prognosis: Fair  PT Education: Goals; Home Exercise Program;PT Role;Plan of Care;Transfer Training;Functional Mobility Training;General Safety;Weight-bearing Education  Patient Education: Pt requires reinforcement  Barriers to Learning: cognition  REQUIRES PT FOLLOW UP: Yes  Activity Tolerance  Activity Tolerance: Patient limited by fatigue;Patient limited by pain; Patient limited by endurance     Patient Diagnosis(es): There were no encounter diagnoses. has a past medical history of Allergic, Anemia, Angina, Arthritis, Asthma, Cancer ([facility] [address] Coronary artery disease, Diabetes mellitus ([facility] [address] Heart attack ([facility] [address] Hyperlipidemia, Hypertension, and Obesity.    has a past surgical history that includes

## 2020-01-08 NOTE — PROGRESS NOTES
Physical Therapy  Facility/Department: [facility]  Daily Treatment Note  NAME: Cecilio Nogueira  : 1945  MRN: [id number]    Date of Service: 2020    Discharge Recommendations:  24 hour supervision or assist, Home with Home health PT        Assessment   Assessment: Pt sleeping upon entry this am and was very lethargic during early tx. Pt seen for split tx for mobility training Pt given assist for set up and cues for exercise angles with LE therex. Pt incontinent of stool during tx and required  Up to mod assist with all transfers with education for hand placement with cues needed and up to mod assist for lifting/lowering and balance with transfers. Patient noted to be dizzy with sup>sit with sx persisting with normative BP and non fasting glucose noted. Patients pain in RLE and functional weakness limit standing activity. Pt seen for second session after returning from tunneled dialysis catheter removal. PT's c/i dizziness with sit<>stand limits walking and transfer ability with need for frequent rest.  Pt progressed with gait training in parallel bars to min assist sit<>stand across 4 trials for walking 4 steps forward and backward with poor swing clearance on RLE and poor WB tolerance on RLE. Pt able to  Propel WC with cues only 150 feet with left/right turns. PT Education: Goals; Home Exercise Program;PT Role;Plan of Care;Transfer Training;Functional Mobility Training;General Safety;Weight-bearing Education  Patient Education: Pt educated in SPT  with cues for WB on UE and moving feet during transfer. Pt needed cues and encouragement for safe technique. Pt educated in LE therex and required cues, encouragment and AAROM for RLE . Activity Tolerance  Activity Tolerance: Patient limited by fatigue;Patient limited by pain; Patient limited by endurance  Activity Tolerance: Patient reports feeling sick to stomach and c/o right knee pain. Patient throughout session limiting participation.  Pt also reports fatigue throughout session and  asked to be toileted during session with pt incontinent of stool in disposable undergarment. Pt had BM and urinated in toilet with pt needing assist for      Patient Diagnosis(es): There were no encounter diagnoses. has a past medical history of Allergic, Anemia, Angina, Arthritis, Asthma, Cancer ([facility] [address] Coronary artery disease, Diabetes mellitus ([facility] [address] Heart attack ([facility] [address] Hyperlipidemia, Hypertension, and Obesity. has a past surgical history that includes Diagnostic Cardiac Cath Lab Procedure; Cataract removal with implant; Carpal tunnel release; Knee arthroscopy; Coronary angioplasty with stent (4/2012); Hysterectomy; bronchoscopy (N/A, 11/12/2019); bronchoscopy (N/A, 11/23/2019); bronchoscopy (11/23/2019); bronchoscopy (11/23/2019); and Upper gastrointestinal endoscopy (N/A, 12/2/2019). Restrictions  Restrictions/Precautions  Restrictions/Precautions: Fall Risk, Modified Diet  Position Activity Restriction  Other position/activity restrictions: Noted multipodous boots ordered by podiatry for foot wounds, USE cushion while up in chair. right foot with necrotic toes-requested RN seek clarification on weightbearing restrictions due to wounds to feet.    Subjective   Pain Screening  Patient Currently in Pain: Yes  Pain Assessment  Pain Assessment: 0-10  Pain Level: 7  Pain Location: Knee  Functional Pain Assessment: Prevents or interferes with many active not passive activities  Non-Pharmaceutical Pain Intervention(s): Distraction(decreased WB tolerance RLE )  Vital Signs  Temp: 98.4 °F (36.9 °C)  Temp Source: Oral  Pulse: 92  Resp: 16  BP: 128/63  BP Location: Right upper arm  BP Upper/Lower: Upper  Patient Position: Sitting  Patient Currently in Pain: Yes  Oxygen Therapy  SpO2: 94 %  Pulse Oximeter Device Mode: Intermittent  Pulse Oximeter Device Location: Finger  O2 Device: None (Room air)  Patient Observation  Observations: Pt c/o dizziness in sitting with pt requesting blood sugar be taken which was in normative range for non fasting. Orientation  Orientation  Orientation Level: Oriented to place;Oriented to time;Oriented to person;Disoriented to situation(knows is at hospital but is confused as to why not at [facility])  Cognition : Pt needed encouragement and reorientation to situation. Objective      Bed mobility   sup>sit with HOBE and rail  SBA as pt c/o dizziness. See vitals. Transfers  Sit to Stand: Moderate Assistance  Stand to sit: Moderate Assistance  Bed to Chair: Moderate assistance  Stand Pivot Transfers: Moderate Assistance  Comment: Pt transferred bed>WC with SPT, WC to toilet and toilet to WC SPT, sit<>stand at toilet with grab bars x4. Pt's REL weakness and pain limit stability with transfers with pt needing mod assist with all transfers (without use of parallel bars). Patientt  appeared anxious during transfers and offered frequent encouragement     WC propulsion 150 feet SBA with cues for hand placement/propulsion technique  navigating turns    Exercises  Straight Leg Raise: 2x10 BLE min assist to RLE   Heelslides: with heel supported RLE   Hip Abduction: 2x10 BLE min assist to RLE   Knee Short Arc Quad: x30BLE  Ankle Pumps: x30  LLE, noted foot drop on RLE, inversion and eversion x20 WFL LLE and inversion to neutral LLE x20. SECOND SESSION:     Pt seen for second session after dialysis catheter removed with nursing clearing patient to continue therapy after procedure. BED MOBILITY:  Pt performed sup>sit supervision with need to go slowly due to dizziness. WC propulsion   150 feet SBA with cues for hand placement/propulsion technique  navigating turns   GAIT TRAINING:  Pt in parallel bars forward and backward stepping 4 feet x4 with poor WB on RLE and poor swing clearance RLE and short step length LLE due to impaired RLE stance control. Pt noted to have FWD flexed posture and rely heavily on UE WB.  Patient required min assist only in parallel

## 2020-01-08 NOTE — PROGRESS NOTES
[facility]: ACUTE REHAB UNIT  SPEECH-LANGUAGE PATHOLOGY      [x] Daily  [] Weekly Care Conference Note  [] Discharge    J Luis LOZANO:[id number]  Rehab Dx/Hx: Metabolic encephalopathy [Q39.26]    Precautions: Falls  Home situation: Lives at home, caregiver for son and grandchildren   ST Dx: [] Aphasia  [] Dysarthria  [] Apraxia   [x] Oropharyngeal dysphagia [x] Cognitive Impairment  [] Other:   Date of Admit: 1/3/2020  Room #: 0165/0165-01    Current functional status (updated daily):         Pt being seen for : [] Speech/Language Treatment  [x] Dysphagia Treatment [x] Cognitive Treatment  [] Other:  Communication: [x]WFL  [] Aphasia  [] Dysarthria  [] Apraxia  [] Pragmatic Impairment [] Non-verbal  [] Hearing Loss  [] Other:   Cognition: [] WFL  [] Mild  [x] Moderate  [x] Severe [] Unable to Assess  [] Other:  Memory: [] WFL  [] Mild  [x] Moderate  [x] Severe [] Unable to Assess  [] Other:  Behavior: [x] Alert  [x] Cooperative  []  Pleasant  [x] Confused  [] Agitated  [] Uncooperative  [x] Distractible [] Motivated  [] Self-Limiting [] Anxious  [] Other:  Endurance:  [x] Adequate for participation in SLP sessions  [] Reduced overall  [] Lethargic  [] Other:  Safety: [] No concerns at this time  [x] Reduced insight into deficits  []  Reduced safety awareness [] Not following call light procedures  [] Unable to Assess  [] Other:    Current Diet Order:Dietary Nutrition Supplements: Diabetic Oral Supplement  DIET DYSPHAGIA SOFT AND BITE-SIZED; Dysphagia Pureed; Daily Fluid Restriction: 1000 ml; Renal  Swallowing Precautions:    Alternate solids and liquids;Small bites/sips;Assist feed;Eat/Feed slowly;Upright as possible for all oral intake;Remain upright for 30-45 minutes after meals        Date: 1/8/2020      Tx session 1  [id number] Tx session 2  All tx needs met during session 1   Total Timed Code Min 15 -   Total Treatment Minutes 30 -   Individual Treatment Minutes 30 -   Group Treatment

## 2020-01-08 NOTE — PROGRESS NOTES
Kalyani Boyer  1/8/2020  [id number]    Chief Complaint: Metabolic encephalopathy    Subjective:   Dialysis catheter pulled today; having some anxiety. ROS: No n/v, cp, sob, f/c  Objective:  Patient Vitals for the past 24 hrs:   BP Temp Temp src Pulse Resp SpO2 Weight   01/08/20 0825 128/63 98.4 °F (36.9 °C) Oral 92 16 94 % --   01/08/20 0730 130/75 98.5 °F (36.9 °C) Oral 84 16 94 % --   01/07/20 2030 (!) 145/68 97.6 °F (36.4 °C) Oral 90 16 96 % --   01/07/20 1908 138/60 97 °F (36.1 °C) -- 83 16 -- 144 lb 6.4 oz (65.5 kg)   01/07/20 1538 128/66 96.8 °F (36 °C) -- 79 16 -- 144 lb 6.4 oz (65.5 kg)     Gen: No distress, pleasant. HEENT: Normocephalic, atraumatic. CV: Regular rate and rhythm. Resp: No respiratory distress. Abd: Soft, nontender   Ext: No edema. Neuro: Alert, oriented, appropriately interactive. Wt Readings from Last 3 Encounters:   01/07/20 144 lb 6.4 oz (65.5 kg)   12/05/19 156 lb 1.4 oz (70.8 kg)   10/17/19 169 lb 6.4 oz (76.8 kg)       Laboratory data:   Lab Results   Component Value Date    WBC 10.3 01/06/2020    HGB 8.8 (L) 01/06/2020    HCT 28.0 (L) 01/06/2020    MCV 84.0 01/06/2020     01/06/2020       Lab Results   Component Value Date     01/07/2020    K 4.5 01/07/2020    K 4.5 01/06/2020    CL 88 01/07/2020    CO2 26 01/07/2020    BUN 46 01/07/2020    CREATININE 3.4 01/07/2020    GLUCOSE 97 01/07/2020    CALCIUM 8.5 01/07/2020        Therapy progress:  PT  Position Activity Restriction  Other position/activity restrictions: Noted multipodous boots ordered by podiatry for foot wounds, USE cushion while up in chair. right foot with necrotic toes-requested RN seek clarification on weightbearing restrictions due to wounds to feet. Objective     Sit to Stand:  Moderate Assistance  Stand to sit: Moderate Assistance  Bed to Chair: Moderate assistance     OT  PT Equipment Recommendations  Equipment Needed: Yes  Mobility Devices: Wheelchair(walker pending

## 2020-01-08 NOTE — PROGRESS NOTES
energy/nutrient consumption     Signs and symptoms:  as evidenced by Weight loss    Objective Information:  · Nutrition-Focused Physical Findings: Trace BLE edema. Anxious at time of visit.    · Wound Type: Multiple, Pressure Ulcer, Stage II, Deep Tissue Injury  · Current Nutrition Therapies:  · Oral Diet Orders: Dysphagia Pureed (Dysphagia 1), Renal, Fluid Restriction   · Oral Diet intake: 1-25%, 26-50%, 51-75%  · Oral Nutrition Supplement (ONS) Orders: Diabetic Oral Supplement  · ONS intake: 51-75%, %  · Anthropometric Measures:  · Ht: 5' (152.4 cm)   · Current Body Wt: 144 lb 6.4 oz (65.5 kg)  · % Weight Change:  ,  28 lb, 16.6% loss x 3 months per EMR  · Ideal Body Wt: 100 lb (45.4 kg)   · BMI Classification: BMI 25.0 - 29.9 Overweight    Nutrition Interventions:   Continue current diet, Modify current ONS  Continued Inpatient Monitoring    Nutrition Evaluation:   · Evaluation: Progressing toward goals   · Goals: Pt will tolerate diet and have meal and ONS intakes 50% or greater during ARU stay    · Monitoring: Meal Intake, Supplement Intake, Diet Tolerance, Nutrition Progression, Weight, Pertinent Labs      Electronically signed by Efren Reyes RD, LD on 1/8/20 at 2:09 PM    Contact Number: Office: [phone]; [address]: [id number]

## 2020-01-08 NOTE — PROGRESS NOTES
Podiatry Follow up    Subjective: The patient is a 75 y.o. female seen this evening. Denies pedal pain. Awaiting laser studies. Past Medical History:        Diagnosis Date    Allergic     Anemia     Angina     with exertion    Arthritis     Asthma 9/16/2010    [facility] [address]     Coronary artery disease 9/16/2010    Diabetes mellitus ([facility] [address]     Heart attack [facility]) age 43    no problems since then    Hyperlipidemia     Hypertension     Obesity        Past Surgical History:        Procedure Laterality Date    BRONCHOSCOPY N/A 11/12/2019    BRONCHOSCOPY WASHING performed by Francisco Strange MD at [address] [place] [address] N/A 11/23/2019    BRONCHOSCOPY ALVEOLAR LAVAGE performed by Doreen Monroy MD at [address] [place] [address]  11/23/2019    BRONCHOSCOPY THERAPUTIC ASPIRATION INITIAL performed by Doreen Monroy MD at [address] [place] [address]  11/23/2019    BRONCHOSCOPY FOREIGN BODY REMOVAL performed by Doreen Monroy MD at [address] [place] [address]      right    CATARACT REMOVAL WITH IMPLANT      CORONARY ANGIOPLASTY WITH STENT PLACEMENT  4/2012    DIAGNOSTIC CARDIAC CATH LAB PROCEDURE      HYSTERECTOMY      11/30/15    KNEE ARTHROSCOPY      left knee. not a TKR.  no metal.     UPPER GASTROINTESTINAL ENDOSCOPY N/A 12/2/2019    EGD PEG PLACEMENT W/ ANESTHESIA performed by Oleksandr Rayo MD at [address]       Current Medications:    Current Facility-Administered Medications: perflutren lipid microspheres (DEFINITY) injection 1.65 mg, 1.5 mL, Intravenous, ONCE PRN  sertraline (ZOLOFT) tablet 50 mg, 50 mg, Oral, Daily  warfarin (COUMADIN) tablet 4 mg, 4 mg, Oral, Once  darbepoetin brando-polysorbate (ARANESP) injection 60 mcg, 60 mcg, Intravenous, Weekly  ALPRAZolam (XANAX) tablet 0.5 mg, 0.5 mg, Oral, TID PRN  atorvastatin (LIPITOR) tablet 20 mg, 20 mg, Oral, Nightly  donepezil (ARICEPT) tablet 5 mg, 5 mg, Oral, Nightly  ferrous sulfate tablet 325 mg, 325 mg, Oral, BID WC  insulin lispro (HUMALOG) injection vial 0-12 Units, 0-12 Units, Subcutaneous, TID WC  insulin lispro (HUMALOG) injection vial 0-6 Units, 0-6 Units, Subcutaneous, Nightly  glucose (GLUTOSE) 40 % oral gel 15 g, 15 g, Oral, PRN  dextrose 50 % IV solution, 12.5 g, Intravenous, PRN  glucagon (rDNA) injection 1 mg, 1 mg, Intramuscular, PRN  dextrose 5 % solution, 100 mL/hr, Intravenous, PRN  hydrocortisone (ANUSOL-HC) suppository 25 mg, 25 mg, Rectal, BID  insulin glargine (LANTUS) injection vial 19 Units, 19 Units, Subcutaneous, Nightly  loperamide (IMODIUM) capsule 2 mg, 2 mg, Oral, 4x Daily PRN  melatonin tablet 5 mg, 5 mg, Oral, Nightly PRN  metoprolol tartrate (LOPRESSOR) tablet 25 mg, 25 mg, Oral, BID  ondansetron (ZOFRAN-ODT) disintegrating tablet 8 mg, 8 mg, Oral, Q8H PRN  hydrALAZINE (APRESOLINE) tablet 50 mg, 50 mg, Oral, Q6H PRN  traZODone (DESYREL) tablet 50 mg, 50 mg, Oral, Nightly PRN  pantoprazole (PROTONIX) tablet 40 mg, 40 mg, Oral, QAM AC  vitamin B-1 (THIAMINE) tablet 100 mg, 100 mg, Oral, Daily  traMADol (ULTRAM) tablet 50 mg, 50 mg, Oral, Q6H PRN  acetaminophen (TYLENOL) tablet 650 mg, 650 mg, Oral, Q4H PRN  magnesium hydroxide (MILK OF MAGNESIA) 400 MG/5ML suspension 30 mL, 30 mL, Oral, Daily PRN  warfarin (COUMADIN) daily dosing (placeholder), , Other, RX Placeholder    Allergies:  Dilaudid [hydromorphone hcl]; Flexeril [cyclobenzaprine hcl]; Heparin; Hydromorphone; Penicillins; Soma [carisoprodol]; and Sulfa antibiotics    Review of Systems:    As noted above    Physical Exam:    Vitals:    /63   Pulse 92   Temp 98.4 °F (36.9 °C) (Oral)   Resp 16   Ht 5' (1.524 m)   Wt 144 lb 6.4 oz (65.5 kg)   SpO2 94%   BMI 28.20 kg/m²   Integument:  Superficial eschars to toes 1-5 right foot encompassing the majority of the toe. Mild, improved erythema surrounding the wound. Webspaces are mildly macerated. Unstageable, stable eschar to the right heel.   Mild erythema surrounding toes that improves with elevation. +1 pitting edema to right foot. Left foot toes 2-4 small dorsal full thickness wounds. 2nd toe has fibrotic base. No surrounding sings of infection. Neurologic:  Protective sensation is grossly diminshed to light touch at the level of the toes, bilateral.  Vascular:  DP and PT pulses are non-palpable, bilateral.    Musculoskeletal:  Patient has 5/5 strength on inversion/everison/dorsiflexion/plantarflexion, bilateral.  No gross musculoskeletal deformities noted.     Labs:  CBC with Differential:    Lab Results   Component Value Date    WBC 10.3 01/06/2020    RBC 3.33 01/06/2020    RBC 4.31 12/08/2015    HGB 8.8 01/06/2020    HCT 28.0 01/06/2020     01/06/2020    MCV 84.0 01/06/2020    MCH 26.4 01/06/2020    MCHC 31.4 01/06/2020    RDW 18.9 01/06/2020    NRBC 1 11/28/2019    NRBC 1 11/28/2019    SEGSPCT 52.3 10/21/2012    BANDSPCT 9 11/28/2019    METASPCT 2 11/24/2019    LYMPHOPCT 9.7 01/06/2020    LYMPHOPCT 26.1 12/08/2015    MONOPCT 8.8 01/06/2020    MYELOPCT 2 11/23/2019    EOSPCT 0.0 04/05/2012    BASOPCT 0.4 01/06/2020    MONOSABS 0.9 01/06/2020    LYMPHSABS 1.0 01/06/2020    EOSABS 0.0 01/06/2020    BASOSABS 0.0 01/06/2020    DIFFTYPE Auto 10/21/2012     CMP:    Lab Results   Component Value Date     01/07/2020    K 4.5 01/07/2020    K 4.5 01/06/2020    CL 88 01/07/2020    CO2 26 01/07/2020    BUN 46 01/07/2020    CREATININE 3.4 01/07/2020    GFRAA 16 01/07/2020    GFRAA 57 10/21/2012    AGRATIO 0.7 01/04/2020    LABGLOM 13 01/07/2020    GLUCOSE 97 01/07/2020    PROT 7.1 01/04/2020    PROT 7.0 11/30/2012    LABALBU 2.6 01/07/2020    CALCIUM 8.5 01/07/2020    BILITOT 0.5 01/04/2020    ALKPHOS 114 01/04/2020    AST 22 01/04/2020    ALT 15 01/04/2020     HgBA1c:    Lab Results   Component Value Date    LABA1C 8.5 11/12/2019       Imaging:  Right:   Soft tissue swelling.       Detailed evaluation limited by bandaging.       Deformity involving the mid shaft of the proximal phalanx 5th digit which may   be related to nondisplaced fracture.  Recommend oblique view.         LeftLimited evaluation.  No obvious radiographic evidence of osteomyelitis. Laser studies: pending    Impression/Recommendations: The patient is a pleasant 75 y.o. female with:  1) Full thickness wounds right and left foot  -Eschar only to dorsal toe surface. Remaining toe appears viable.  -Aquacel Ag placed between toes, Betadine to dorsal eschars  -Mild erythema surrounding eschars, but does show some improvement with elevation. Possible cause to bacteremia  -xrays negative for OM  - Would continue with local wound care  -Continue abx per ID    2) Pressure ulceration heel  -stable  -Applied betadine  -Multipodus boots ordered. 3) PVD  -ordered laser studies to evaluate for healing potential and wound debridement. 4) DM  5) Encephalopathy     Thank you for the opportunity to take part in this patient's care. Please call me with any questions.     Nisreen Dominguez DPM  Office: [phone]  Cell: [phone]

## 2020-01-08 NOTE — PROGRESS NOTES
attack ([facility] [address] Hyperlipidemia, Hypertension, and Obesity. has a past surgical history that includes Diagnostic Cardiac Cath Lab Procedure; Cataract removal with implant; Carpal tunnel release; Knee arthroscopy; Coronary angioplasty with stent (4/2012); Hysterectomy; bronchoscopy (N/A, 11/12/2019); bronchoscopy (N/A, 11/23/2019); bronchoscopy (11/23/2019); bronchoscopy (11/23/2019); and Upper gastrointestinal endoscopy (N/A, 12/2/2019). Restrictions  Restrictions/Precautions  Restrictions/Precautions: Fall Risk, Modified Diet  Position Activity Restriction  Other position/activity restrictions: Noted multipodous boots ordered by podiatry for foot wounds, USE cushion while up in chair. right foot with necrotic toes-requested RN seek clarification on weightbearing restrictions due to wounds to feet. Subjective   General  Chart Reviewed: Yes  Patient assessed for rehabilitation services?: Yes  Response to previous treatment: Patient with no complaints from previous session  Family / Caregiver Present: No  Referring Practitioner: Lonnie  Diagnosis: acute encephalopathy due to bacteremia, suspect CLABSI from [address] [place] [address]. Pt with full thickness wounds R and L foot with gangrenous toes. Pt also with KIMBERLY and recent cardiac arrest. Pt with previous admission 11/11 to 12/5 for sepsis in which pt was discharged to LTAC  Subjective  Subjective: Pt in w/c, appears confused but cooperative  General Comment  Comments: RN agreeable to OT session. Transport arrived during OT session to take pt for doppler imaging of LE.    Pain Assessment  Pain Level: 7  Pain Type: Acute pain  Pain Location: Knee  Pain Descriptors: Aching  Pre Treatment Pain Screening  Intervention List: Patient able to continue with treatment  Vital Signs  Patient Currently in Pain: Yes   Orientation  Orientation  Overall Orientation Status: Impaired  Orientation Level: Oriented to person;Disoriented to situation;Disoriented to time;Disoriented to place  Objective Neuromuscular Re-education, Equipment Evaluation, Education, & procurement, Home Management Training, Endurance Training, Cognitive Reorientation    Goals  Short term goals  Time Frame for Short term goals: 1 week (by 1/11/20)  Short term goal 1: Pt will tolerate static standing for 3 min with CGA for improved participation in BADL tasks. Short term goal 2: Pt will complete UB dressing with set-up and min verbal cues for task completion. Short term goal 3: Pt will complete oral care in stance at sink with CGA and min verbal cues for sequencing task. Short term goal 4: Pt will participate in nine hole peg test for cont assessment of [facility]. Long term goals  Time Frame for Long term goals : 2 weeks (by 1/18/20)  Long term goal 1: Pt will complete toilet transfers with supervision with AD as needed and min cues for safety and sequencing. Long term goal 2: Pt will complete LB dressing tasks with min A with AE as needed. Long term goal 3: Pt will complete toileting tasks with supervision.    Patient Goals   Patient goals : \"to walk\" and \"to get back to normal\"         Therapy Time   Individual Concurrent Group Co-treatment   Time In 1230         Time Out 1300         Minutes 30         Timed Code Treatment Minutes: 30 Minutes    Therapy Time   Individual Concurrent Group Co-treatment   Time In 1400         Time Out 1500         Minutes 60         Timed Code Treatment Minutes: 646 Mickey Butler, OT

## 2020-01-08 NOTE — PROGRESS NOTES
01/07/2020    CL 88 (L) 01/07/2020    CO2 26 01/07/2020       Hepatic Function Panel:   Lab Results   Component Value Date    ALKPHOS 114 01/04/2020    ALT 15 01/04/2020    AST 22 01/04/2020    PROT 7.1 01/04/2020    PROT 7.0 11/30/2012    BILITOT 0.5 01/04/2020    BILIDIR 0.4 11/24/2015    IBILI 0.7 11/30/2012    LABALBU 2.6 01/07/2020         MICRO:  1/5 BC x2 sets with Enterococcus faecalis   Antibiotic Interpretation AUBREY Status    ampicillin Sensitive <=2 mcg/mL     vancomycin Sensitive 1 mcg/mL       1/5 UC 75,000cfu E coli pan-S      IMAGING:  Xray R foot 1/6:  Impression   Soft tissue swelling.       Detailed evaluation limited by bandaging.       Deformity involving the mid shaft of the proximal phalanx 5th digit which may   be related to nondisplaced fracture.  Recommend oblique view. Xray L foot 1/6 negative     CXR 1/5 CM, CHF     CT a/p 11/20     Impression   Cholelithiasis.       Enhancing soft tissue mass retrocaval region.  Findings indeterminate and   appear relatively stable from an MRI from 11/06/2018 accounting for   differences in technique.  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       Thrombosis, left gonadal vein.       Mild ascites increased from comparison.       Improved aeration of the lung bases.  Small noncalcified nodules at the right   lung base indeterminate.  Follow-up could be considered in 3-6 months given   history of prior malignancy.       Compression deformity of vertebral bodies of the lumbar spine which are age   indeterminate but are not seen on prior MRI.  Follow-up could always be   considered with MRI as clinically indicated to further evaluate.        Assessment:     Patient Active Problem List    Diagnosis Date Noted    Near syncope 03/13/2014     Priority: High    Chest pain 04/05/2012     Priority: High    Coronary artery disease 09/16/2010     Priority: High     Overview Note:     SKALE      Acute asthma exacerbation 04/06/2012 Primary localized osteoarthrosis, lower leg 03/23/2015    Perennial allergic rhinitis 04/15/2014    Osteoarthritis of both knees 09/23/2013    Chest pain 10/22/2012    HTN (hypertension) 10/22/2012    CAD (coronary artery disease) 10/22/2012     Overview Note:     Left dominant. LCx & OM stent. Mid RCA stent.  Lipids abnormal 10/22/2012    Type 2 diabetes mellitus with hyperglycemia, with long-term current use of insulin ([facility] [address] 04/06/2012    Vertigo 10/08/2011     History of spindle cell carcinoma with IVC invasion, CAD, HTN, HLD, anemia, DM     Prolonged admission 22/56-05/0/89 with asystolic arrest, hypoxemic respiratory failure attributed to CHF  Developed severe sepsis with lactic acidosis, acute hepatitis, rhabdomyolysis, without clear cause despite extensive evaluation, for which she completed a course of empiric abx. Developed KIMBERLY requiring HD with TDC placed   Dysphagia s/p PEG placement   DC to LTAC and then admitted to ARU 1/4/20    Nosocomial fever 1/5/20 and Enterococcal bacteremia, presumed CLABSI from [facility]   IV vanc started 1/6/20  Fever resolved    Landon foot wounds, R worse than L. ? Pressure related. Potential but less likely source of bacteremia. Possible fx R 5th toe. Continuing local wound care per DPM      Asymptomatic bacteriuria, no treatment indicated      Cholelithiasis with nl LFTs and benign abd exam.  GI/hepatobiliary source of Enterococcal bacteremia unlikely. ESRD on HD TTS     Deconditioning, encephalopathy    Allergy sulfa, pcn     Plan:   Plan for removal TDC today noted  Will need to continue with IV vanc because of pcn allergy. Pharmacy is dosing.   LOT TBD by repeat BC, echo, etc.  Minimum 2 weeks post-TDC removal   Repeat BC in AM   Echo ordered      Discussed with patient/family, all questions answered  D/w RN       Anisa Neri MD  Phone: [phone]   Fax : [phone]

## 2020-01-09 NOTE — PROGRESS NOTES
Physical Therapy  Facility/Department: [facility]  Daily Treatment Note  NAME: Dorothea Andrade  : 1945  MRN: [id number]    Date of Service: 2020    Discharge Recommendations:  24 hour supervision or assist, Home with Home health PT        Assessment   Assessment:   Pt sleeping upon entry and noted to deny pain at rest during first session (seen for split session this am) . Pt continues with salient weakness RLE. With mobility pt notes 4/10 right knee to lower leg pain. Eucated and cued patient with LE strengthening exercises with AAROM as needed RLE. Pt performed transfers bed<>chair and sit<>stand as well as practicing  sup<>sit with cues needed for set up and physical assist to get LE into bed and to keep balance and asist with lift from bed<>chair. Shaq Johnson Pt endorses numbness and decreased sensation in right leg below the knee. Pt able to demo sup>sit with use of rail and HOBE only supervision to left and min assit to Right due to lob; supervision for sit to sup . Pt noted to be tearful with walking, and had difficulty sequencing walker and LE advancement with upright  Vs posterior lean posture. Patient was pushing backwards and needed max cues and max assist to keep upright after turning when walking step to gait*(cues for walker, RLE, LLE) with SW for 15 feet. Pt with WC propulsion needed intermittent cues and assist to navigate turns with Wheelchair propulsion level and carpet. Pt will likely need assist with transfers and gait upon DC due to poor carryover from one session to the next as to safety/sequencing/coordination with use of walker. Patient Diagnosis(es): There were no encounter diagnoses. has a past medical history of Allergic, Anemia, Angina, Arthritis, Asthma, Cancer ([address] Coronary artery disease, Diabetes mellitus ([facility] [address] Heart attack ([facility] [address] Hyperlipidemia, Hypertension, and Obesity.    has a past surgical history that includes Diagnostic Cardiac Cath Lab Procedure; Cataract removal with implant; Carpal tunnel release; Knee arthroscopy; Coronary angioplasty with stent (4/2012); Hysterectomy; bronchoscopy (N/A, 11/12/2019); bronchoscopy (N/A, 11/23/2019); bronchoscopy (11/23/2019); bronchoscopy (11/23/2019); and Upper gastrointestinal endoscopy (N/A, 12/2/2019). Restrictions  Restrictions/Precautions  Restrictions/Precautions: Fall Risk, Modified Diet  Position Activity Restriction  Other position/activity restrictions: Noted multipodous boots ordered by podiatry for foot wounds, USE cushion while up in chair. right foot with necrotic toes-requested RN seek clarification on weightbearing restrictions due to wounds to feet. Subjective  Pt reports no pain during second session and 4/10 knee pain during first session. Orientation  Orientation  Overall Orientation Status: Impaired  Orientation Level: Oriented to place;Oriented to time;Oriented to person;Disoriented to situation  Cognition      Objective   Bed mobility  Rolling to Left: Modified independent  Rolling to Right: Modified independent  Supine to Sit: Modified independent(x3 did not need assist with LE , used rail with HOB minimally elevated)  Sit to Supine: Minimal assistance;Supervision(x3)  Scooting: Maximal assistance(bed rail, pt with LE in hooklying )  Transfers  Sit to Stand: Moderate Assistance(needs cues to push up from transfer surface and to put weight first on LLE due to unsteadiness. )  Stand to sit: Moderate Assistance(needs cues across trials to not fall back to chair but to align to chair and reach back with hands to lower self, needs lowering assist with use of FWW)  Bed to Chair: Moderate assistance  FWW  WC<>car simulator  Min assist with max cues and assist for WC set up angle. Ambulation  Ambulation?: Yes  More Ambulation?: Yes  Ambulation 1  3 feet bed>chair and WC>car simulator  And 15 feet (SW)   Surface: level tile  Device: Rolling Walker  Assistance:  Moderate assistance  Gait

## 2020-01-09 NOTE — PROGRESS NOTES
Pharmacy to Dose Warfarin     Dx: DVT of unknown origin   Goal INR range 2-2.5 per MD consult  Home Warfarin dose: 4 mg daily     Date                 INR                  Warfarin  1/3                    2.45                4 mg  1/4                    2.52                4 mg  1/5                    3.87                Hold  1/6                    3.46                Hold  1/7                    2.35                2.5 mg   1/8                    1.66                4 mg   1/9                    1.45                4 mg  Recommend warfarin 4 mg x 1 tonight. Daily INR ordered. Rx will continue to manage therapy per Dr Sophie De La Garza consult order. Buddy, R. Ph. 1/9/2020 12:55 PM

## 2020-01-09 NOTE — PROGRESS NOTES
Podiatry Follow up    Subjective: The patient is a 75 y.o. female seen this evening. Denies pedal pain. Past Medical History:        Diagnosis Date    Allergic     Anemia     Angina     with exertion    Arthritis     Asthma 9/16/2010    Cancer ([facility]     Coronary artery disease 9/16/2010    Diabetes mellitus ([facility]     Heart attack [facility]) age 43    no problems since then    Hyperlipidemia     Hypertension     Obesity        Past Surgical History:        Procedure Laterality Date    BRONCHOSCOPY N/A 11/12/2019    BRONCHOSCOPY WASHING performed by Giovanna Welch MD at [address] [place] [address] N/A 11/23/2019    BRONCHOSCOPY ALVEOLAR LAVAGE performed by Jack Diaz MD at [address] [place] [address]  11/23/2019    BRONCHOSCOPY THERAPUTIC ASPIRATION INITIAL performed by Jack Diaz MD at [address] [place] [address]  11/23/2019    BRONCHOSCOPY FOREIGN BODY REMOVAL performed by Jack Diaz MD at [address] [place] [address]      right    CATARACT REMOVAL WITH IMPLANT      CORONARY ANGIOPLASTY WITH STENT PLACEMENT  4/2012    DIAGNOSTIC CARDIAC CATH LAB PROCEDURE      HYSTERECTOMY      11/30/15    KNEE ARTHROSCOPY      left knee. not a TKR.  no metal.     UPPER GASTROINTESTINAL ENDOSCOPY N/A 12/2/2019    EGD PEG PLACEMENT W/ ANESTHESIA performed by Laine Lyn MD at [address] [place] [address]       Current Medications:    Current Facility-Administered Medications: perflutren lipid microspheres (DEFINITY) injection 1.65 mg, 1.5 mL, Intravenous, ONCE PRN  sertraline (ZOLOFT) tablet 50 mg, 50 mg, Oral, Daily  darbepoetin brando-polysorbate (ARANESP) injection 60 mcg, 60 mcg, Intravenous, Weekly  ALPRAZolam (XANAX) tablet 0.5 mg, 0.5 mg, Oral, TID PRN  atorvastatin (LIPITOR) tablet 20 mg, 20 mg, Oral, Nightly  donepezil (ARICEPT) tablet 5 mg, 5 mg, Oral, Nightly  ferrous sulfate tablet 325 mg, 325 mg, Oral, BID WC  insulin lispro (HUMALOG) injection vial foot. Left foot toes 2-4 small dorsal full thickness wounds. 2nd toe has fibrotic base. No surrounding sings of infection. Neurologic:  Protective sensation is grossly diminshed to light touch at the level of the toes, bilateral.  Vascular:  DP and PT pulses are non-palpable, bilateral.    Musculoskeletal:  Patient has 5/5 strength on inversion/everison/dorsiflexion/plantarflexion, bilateral.  No gross musculoskeletal deformities noted.     Labs:  CBC with Differential:    Lab Results   Component Value Date    WBC 8.3 01/09/2020    RBC 3.19 01/09/2020    RBC 4.31 12/08/2015    HGB 8.2 01/09/2020    HCT 26.4 01/09/2020     01/09/2020    MCV 82.8 01/09/2020    MCH 25.8 01/09/2020    MCHC 31.1 01/09/2020    RDW 18.2 01/09/2020    NRBC 1 11/28/2019    NRBC 1 11/28/2019    SEGSPCT 52.3 10/21/2012    BANDSPCT 9 11/28/2019    METASPCT 2 11/24/2019    LYMPHOPCT 21.6 01/09/2020    LYMPHOPCT 26.1 12/08/2015    MONOPCT 11.7 01/09/2020    MYELOPCT 2 11/23/2019    EOSPCT 0.0 04/05/2012    BASOPCT 0.6 01/09/2020    MONOSABS 1.0 01/09/2020    LYMPHSABS 1.8 01/09/2020    EOSABS 0.1 01/09/2020    BASOSABS 0.0 01/09/2020    DIFFTYPE Auto 10/21/2012     CMP:    Lab Results   Component Value Date     01/09/2020     01/09/2020    K 5.0 01/09/2020    K 5.0 01/09/2020    K 5.2 01/09/2020    CL 92 01/09/2020    CL 92 01/09/2020    CO2 27 01/09/2020    CO2 27 01/09/2020    BUN 23 01/09/2020    BUN 22 01/09/2020    CREATININE 2.4 01/09/2020    CREATININE 2.3 01/09/2020    GFRAA 24 01/09/2020    GFRAA 25 01/09/2020    GFRAA 57 10/21/2012    AGRATIO 0.7 01/09/2020    LABGLOM 20 01/09/2020    LABGLOM 21 01/09/2020    GLUCOSE 183 01/09/2020    GLUCOSE 195 01/09/2020    PROT 6.8 01/09/2020    PROT 7.0 11/30/2012    LABALBU 2.8 01/09/2020    LABALBU 2.7 01/09/2020    CALCIUM 8.6 01/09/2020    CALCIUM 8.5 01/09/2020    BILITOT 0.6 01/09/2020    ALKPHOS 100 01/09/2020    AST 23 01/09/2020    ALT 11 01/09/2020     HgBA1c:    Lab Results   Component Value Date    LABA1C 8.5 11/12/2019       Imaging:  Right:   Soft tissue swelling.       Detailed evaluation limited by bandaging.       Deformity involving the mid shaft of the proximal phalanx 5th digit which may   be related to nondisplaced fracture.  Recommend oblique view.         LeftLimited evaluation.  No obvious radiographic evidence of osteomyelitis. Laser studies:   Laser perfusion pressures bilateral feet are adequate for healing of a    spontaneous or surgical wound.             Impression/Recommendations: The patient is a pleasant 75 y.o. female with:  1) Full thickness wounds right and left foot  -Eschar only to dorsal toe surface. Remaining toe appears viable.  -Aquacel Ag placed between toes, Betadine to dorsal eschars  -Mild erythema surrounding eschars. Held in dependent position  -xrays negative for OM  - Would continue with local wound care. -Patient okay to follow up with podiatry within one week of discharge. Nursing orders placed for wound care  -abx per ID  - Podiatry will sign off. Please call for questions or concerns. 2) Pressure ulceration heel  -stable  -Applied betadine  -Multipodus boots ordered. 3) PVD  -Adequate healing per laser study    4) DM  5) Encephalopathy     Thank you for the opportunity to take part in this patient's care. Please call me with any questions.     Yanira Mckay DPM  Office: [phone]  Cell: [phone]

## 2020-01-09 NOTE — PROGRESS NOTES
[facility]       Progress Note           Subjective/   75y.o. year old female who we are seeing in consultation for KIMBERLY/CKD requiring HD. Transferred back from [facility] for rehab. HPI:   HD since 11/13/19   RIJ TDC removed on 1/8    She reports of voiding large amount of urine  Her cr is down to 2.4    HD on 1/7, wo any fluid taken off , post wt 65.5 kg admits to drinking lots of fluids, na 125 , cr 3.4; says she is urinating some    ROS:  Intake reduced, no fevers. Objective/   GEN:  Chronically ill, /80   Pulse 92   Temp 98.5 °F (36.9 °C) (Oral)   Resp 18   Ht 5' (1.524 m)   Wt 144 lb 6.4 oz (65.5 kg)   SpO2 95%   BMI 28.20 kg/m²   HEENT: non-icteric, no JVD  CV: S1, S2 without m/r/g; no LE edema  RESP: CTA B without w/r/r; breathing wnl  ABD: +bs, soft, nt, no hsm  SKIN: warm, no rashes  ACCESS: R IJ TDC (12/2)    Data/  Recent Labs     01/09/20  1149   WBC 8.3   HGB 8.2*   HCT 26.4*   MCV 82.8        Recent Labs     01/07/20  1134 01/09/20  1149   * 131*  132*   K 4.5 5.0  5.2*  5.0   CL 88* 92*  92*   CO2 26 27  27   GLUCOSE 97 195*  183*   PHOS 4.0 3.3   BUN 46* 22*  23*   CREATININE 3.4* 2.3*  2.4*   LABGLOM 13* 21*  20*   GFRAA 16* 25*  24*       Assessment/Plan      Acute Kidney Injury.  - ATN from septic shock / cardiac arrest, initially requiring CRRT since 11/13/19  - We will continue hemodialysis per TRS schedule currently   - Access: R IJV TDC--> removed 1/8  FR 1l/day  HD on 1/7 w na bath of 135 d/t hyponatremia  s/p TDC removal 1/8  -d/t improvement on her cr and increased uop, will wait to see if she has started to recover renal  Function  -renal panel in am   -NPO after midnight just in case if we need to do [facility] for dialysis     CKD stage 3.  - Suspect from HTN and DM.  - Baseline serum creatinine of 1.3-1.4 mg/dL.     Sepsis with MOSF  - ID following. Etiology not determined  - Repeat blood cultures 11/27 were no growth to date.

## 2020-01-09 NOTE — PROGRESS NOTES
[facility]: ACUTE REHAB UNIT  SPEECH-LANGUAGE PATHOLOGY      [x] Daily  [] Weekly Care Conference Note  [] Discharge    J Luis PYLEYV:[id number]  Rehab Dx/Hx: Metabolic encephalopathy [G69.54]    Precautions: Falls  Home situation: Lives at home, caregiver for son and grandchildren   ST Dx: [] Aphasia  [] Dysarthria  [] Apraxia   [x] Oropharyngeal dysphagia [x] Cognitive Impairment  [] Other:   Date of Admit: 1/3/2020  Room #: 0165/0165-01    Current functional status (updated daily):         Pt being seen for : [] Speech/Language Treatment  [x] Dysphagia Treatment [x] Cognitive Treatment  [] Other:  Communication: [x]WFL  [] Aphasia  [] Dysarthria  [] Apraxia  [] Pragmatic Impairment [] Non-verbal  [] Hearing Loss  [] Other:   Cognition: [] WFL  [] Mild  [x] Moderate  [x] Severe [] Unable to Assess  [] Other:  Memory: [] WFL  [] Mild  [x] Moderate  [x] Severe [] Unable to Assess  [] Other:  Behavior: [x] Alert  [x] Cooperative  []  Pleasant  [x] Confused  [] Agitated  [] Uncooperative  [x] Distractible [] Motivated  [] Self-Limiting [] Anxious  [] Other:  Endurance:  [x] Adequate for participation in SLP sessions  [] Reduced overall  [] Lethargic  [] Other:  Safety: [] No concerns at this time  [x] Reduced insight into deficits  []  Reduced safety awareness [] Not following call light procedures  [] Unable to Assess  [] Other:    Current Diet Order:DIET DYSPHAGIA SOFT AND BITE-SIZED; Dysphagia Pureed; Daily Fluid Restriction: 1000 ml  Dietary Nutrition Supplements: Renal Oral Supplement  Swallowing Precautions:    Alternate solids and liquids;Small bites/sips;Assist feed;Eat/Feed slowly;Upright as possible for all oral intake;Remain upright for 30-45 minutes after meals        Date: 1/9/2020      Tx session 1  [id number] Tx session 2  All tx needs met during session 1   Total Timed Code Min 45 -   Total Treatment Minutes 60 -   Individual Treatment Minutes 60 -   Group Treatment Minutes 0 Cognitive Tx 0915 1000 45 min / 3 units   Speech Tx      Dysphagia Tx 0900 0915 15 min / 1 unit       Electronically Signed by     Corina Powers M.S. [id number] [facility], [place], operated by [facility]  Speech-language pathologist  [id number]

## 2020-01-09 NOTE — PLAN OF CARE
Wound/ Incision kept clean and dry. Dressing changed as ordered. No signs or symptoms of infection noted. Pt educated on signs and symptoms of infection and prevention. Will continue to monitor.

## 2020-01-09 NOTE — PROGRESS NOTES
Recommendations  Equipment Needed: Yes  Mobility Devices: Wheelchair(walker pending progress)  Wheelchair: Standard  Toilet - Technique: Stand pivot  Equipment Used: Standard toilet  Toilet Transfers Comments: mod A in cynthia palacios  Assessment        SLP  Current Diet : Regular  Current Liquid Diet : Thin  Diet Solids Recommendation: Dysphagia Soft and Bite-Sized (Dysphagia III)(Per MD order)  Liquid Consistency Recommendation: Thin    Body mass index is 28.2 kg/m². Rehabilitation Diagnosis:  Brain, 2.1, Non-Traumatic     Assessment and Plan:  Bilat lower limb DVT  - Anticoagulation, pharmacy to dose  - Goal INR 2.0-2.5     Cholelithiasis  - No urgent intervention per GI at OSH    Enterococcal bacteremia  - Cont vancomycin  - Appreciate ID   - Await echo  - dialysis catheter removed yesterday     Spindle cell sarcoma  - OP oncology f/u       Cardiac arrest  - Continue beta blocker, statin     Encephalopathy  - Likely related to cardiac arrest  - SLP consulted     Gangrenous toes  - Continue wound care per orders  - Wound care consulted  - Consulted podiatry     DM  - lantus, SSI     Bowels: Schedule colace + senna. Follow bowel movements. Enema or suppository if needed.      Bladder: Check PVR x 3. [facility] if PVR > 200ml or if any volume is > 500 ml.      Sleep: Trazodone provided prn. HALIE: 1/23 home vs skilled  DME: RW, JER with swing away leg rests, shower chair vs ttb      Rodríguez Andrade MD 1/9/2020, 12:19 PM

## 2020-01-09 NOTE — PROGRESS NOTES
Infectious Disease Follow up Notes    CC :  Enterococcal bacteremia      Antibiotics:   vanc by level started 1/6    Admit Date:   1/3/2020  Hospital Day: 7    Subjective:   She remains afebrile  No new concerns     Objective:     Patient Vitals for the past 8 hrs:   BP Temp Temp src Pulse Resp SpO2   01/09/20 0900 126/80 98.5 °F (36.9 °C) Oral 92 18 95 %       EXAM:  General:  Alert, cooperative, no apparent distress    HEENT:  NCAT, PERRL, sclera anicteric. MMM   NECK:  supple, no LAD   LUNGS:  CTA kalpesh, no W/R/R  CV:  RRR without murmur  ABD:  Soft, +BS, NT  PEG in place, site ok   EXT:  Feet dressed.   No ascending cellulitis        LINE:  PIV in place       Scheduled Meds:   warfarin  4 mg Oral Daily    vancomycin  1,000 mg Intravenous Once    sertraline  50 mg Oral Daily    darbepoetin brando-polysorbate  60 mcg Intravenous Weekly    atorvastatin  20 mg Oral Nightly    donepezil  5 mg Oral Nightly    ferrous sulfate  325 mg Oral BID WC    insulin lispro  0-12 Units Subcutaneous TID WC    insulin lispro  0-6 Units Subcutaneous Nightly    hydrocortisone  25 mg Rectal BID    insulin glargine  19 Units Subcutaneous Nightly    metoprolol tartrate  25 mg Oral BID    pantoprazole  40 mg Oral QAM AC    thiamine  100 mg Oral Daily    warfarin (COUMADIN) daily dosing (placeholder)   Other RX Placeholder         Data Review:    Lab Results   Component Value Date    WBC 8.3 01/09/2020    HGB 8.2 (L) 01/09/2020    HCT 26.4 (L) 01/09/2020    MCV 82.8 01/09/2020     01/09/2020     Lab Results   Component Value Date    CREATININE 2.4 (H) 01/09/2020    CREATININE 2.3 (H) 01/09/2020    BUN 23 (H) 01/09/2020    BUN 22 (H) 01/09/2020     (L) 01/09/2020     (L) 01/09/2020    K 5.0 01/09/2020    K 5.0 01/09/2020    K 5.2 (H) 01/09/2020    CL 92 (L) 01/09/2020    CL 92 (L) 01/09/2020    CO2 27 01/09/2020    CO2 27 Uncontrolled hypertension 10/08/2011     Priority: Medium    Asthma 09/16/2010     Priority: Low    Severe malnutrition ([facility] 92/95/8778    Metabolic encephalopathy 71/02/1678    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    Acute encephalopathy     Elevated LFTs 11/22/2019    Moderate protein-calorie malnutrition (HCC) 11/22/2019    Thrombocytopenia (HCC) 11/22/2019    Leukocytosis 11/22/2019    Normocytic normochromic anemia 11/22/2019    Cardiac arrest (HCC)     Acute pulmonary edema (HCC)     Pleural effusion     CKD (chronic kidney disease) stage 3, GFR 30-59 ml/min ([address] 11/12/2019    Respiratory arrest before cardiac arrest ([address] 11/11/2019    Acute respiratory failure with hypoxia and hypercapnia ([address] 11/11/2019    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       Pulmonary nodule 10/15/2019    Acute respiratory failure (HCC) 10/15/2019    Overdose of insulin, accidental or unintentional, initial encounter 03/03/2018    Hypoglycemia due to insulin 03/03/2018    Hypoglycemia 03/03/2018    Vaginal atrophy 07/31/2017    Primary osteoarthritis of both knees 02/28/2017    Osteoporosis 01/30/2017    History of endometrial cancer 07/25/2016     Overview Note:     Grade 1 stage 1a dx 11-30-15      Menopausal state 07/25/2016    Chronic pain of left knee 07/19/2016    Chronic pain of right knee 07/19/2016    S/P total hysterectomy and BSO (bilateral salpingo-oophorectomy) 01/05/2016    Drainage from wound 12/08/2015    S/P hysterectomy with oophorectomy 12/08/2015    Endometrial adenocarcinoma ([address] 11/17/2015    Osteoarthritis of spine with radiculopathy, lumbar region 10/20/2015    Pain of both hip joints 10/20/2015    Spinal stenosis, lumbar region, with neurogenic claudication 10/20/2015    Right sided sciatica 10/20/2015    Tibialis tendinitis 09/14/2015    Primary localized

## 2020-01-09 NOTE — PROGRESS NOTES
Occupational Therapy  Facility/Department: [facility]  Daily Treatment Note  NAME: Chasity Foster  : 1945  MRN: [id number]    Date of Service: 2020    Discharge Recommendations:  Continue to assess pending progress  OT Equipment Recommendations  Other: will cont to assess    Assessment   Performance deficits / Impairments: Decreased functional mobility ; Decreased cognition;Decreased high-level IADLs;Decreased ADL status; Decreased endurance;Decreased fine motor control;Decreased strength;Decreased balance;Decreased safe awareness  Assessment: Pt pleasant and cooperative, appears confused at times during conversation. Pt very talkative, some speech appears nonsensical and unrelated, tangential. Pt min A for toilet transfers with heavy use of grab bar and cues for tech, requires assist for clothing mgmt up/down d/t fearful of falling and refuses to let go of grab bar. Pt tolerates grooming at sink, declines to perform in stance d/t foot pain with weight bearing. Second session:  pt able to dial cell phone appropraitely, able to write menu with pen with addaptive . Pt toelrates standing for bean bag toss with fair accuracy. Pt with fair accuracy for parquetry task with only min cues to complete replication. Pt progressing well, limited by cognition and lability. COnt POC. OT Education: OT Role;Plan of Care;ADL Adaptive Strategies;Transfer Training;Orientation; Energy Conservation  Patient Education: bed mobility, safety with transfers, toilet transfers, safety with toileting  Barriers to Learning: Pt verbalizes understanding. Pt with cognitive deficits and would benefit from continued education and training.    Activity Tolerance  Activity Tolerance: Patient Tolerated treatment well  Activity Tolerance: no c/o nausea this date  Safety Devices  Safety Devices in place: Yes  Type of devices: Call light within reach;Gait belt;Left in chair;Chair alarm in place         Patient Diagnosis(es): There were no encounter diagnoses. has a past medical history of Allergic, Anemia, Angina, Arthritis, Asthma, Cancer ([facility] Coronary artery disease, Diabetes mellitus ([address] Heart attack ([facility] Hyperlipidemia, Hypertension, and Obesity. has a past surgical history that includes Diagnostic Cardiac Cath Lab Procedure; Cataract removal with implant; Carpal tunnel release; Knee arthroscopy; Coronary angioplasty with stent (4/2012); Hysterectomy; bronchoscopy (N/A, 11/12/2019); bronchoscopy (N/A, 11/23/2019); bronchoscopy (11/23/2019); bronchoscopy (11/23/2019); and Upper gastrointestinal endoscopy (N/A, 12/2/2019). Restrictions  Restrictions/Precautions  Restrictions/Precautions: Fall Risk, Modified Diet  Position Activity Restriction  Other position/activity restrictions: Noted multipodous boots ordered by podiatry for foot wounds, USE cushion while up in chair. right foot with necrotic toes-requested RN seek clarification on weightbearing restrictions due to wounds to feet, PEG tube  Subjective   General  Chart Reviewed: Yes  Patient assessed for rehabilitation services?: Yes  Response to previous treatment: Patient with no complaints from previous session  Family / Caregiver Present: No  Referring Practitioner: Lonnie  Diagnosis: acute encephalopathy due to bacteremia, suspect CLABSI from [address] [place] [address]. Pt with full thickness wounds R and L foot with gangrenous toes. Pt also with KIMBERLY and recent cardiac arrest. Pt with previous admission 11/11 to 12/5 for sepsis in which pt was discharged to LTAC  Subjective  Subjective: Pt in w/c, appears confused but cooperative  General Comment  Comments: RN agreeable to OT session. Transport arrived during OT session to take pt for doppler imaging of LE.    Vital Signs  Patient Currently in Pain: Denies   Orientation  Orientation  Overall Orientation Status: Impaired  Orientation Level: Oriented to person;Disoriented to situation;Disoriented to time;Disoriented to place  Objective ADL  Feeding: Setup; Increased time to complete;Dentures(drinking)  Grooming: Stand by assistance;Setup; Increased time to complete;Verbal cueing  Toileting: Maximum assistance(clothing mgmt up/down, pt able to perform own pericare while seated on toilet)        Balance  Sitting Balance: Supervision  Standing Balance: Contact guard assistance  Standing Balance  Time: 1-2 min x 4  Activity: transfers, toileting, bean bag toss in stance  Toilet Transfers  Toilet - Technique: Stand pivot  Equipment Used: Standard toilet  Toilet Transfer: Minimal assistance  Toilet Transfers Comments: using grab bars     Transfers  Stand Pivot Transfers: Minimal assistance  Sit to stand: Minimal assistance  Stand to sit: Minimal assistance  Transfer Comments: min A to stand from w/c and toilet using grab bars        Coordination  Gross Motor: good for bean bag toss  Fine Motor: fair for parquetry and grooming tasks               Cognition  Overall Cognitive Status: Exceptions  Arousal/Alertness: Appropriate responses to stimuli  Following Commands: Follows one step commands with increased time; Follows one step commands with repetition  Attention Span: Attends with cues to redirect  Memory: Decreased recall of biographical Information;Decreased recall of recent events;Decreased short term memory  Safety Judgement: Decreased awareness of need for safety  Problem Solving: Decreased awareness of errors  Insights: Decreased awareness of deficits  Initiation: Requires cues for some  Sequencing: Requires cues for some  Cognition Comment: Pt anxious and verbalizes fear of falling (pt pushes posteriorly at times). Pt requiring cues for problem solving and attention throughout session.          Plan   Plan  Times per week: 5/7 days per week  Plan weeks: 2 weeks  Current Treatment Recommendations: Strengthening, ROM, Safety Education & Training, Balance Training, Patient/Caregiver Education & Training, Self-Care / ADL, Cognitive/Perceptual

## 2020-01-10 NOTE — PROGRESS NOTES
Sunny Lawrence  1/10/2020  [id number]    Chief Complaint: Metabolic encephalopathy    Subjective:   No issues overnight; no new c/o this AM.     ROS: No n/v, cp, sob, f/c  Objective:  Patient Vitals for the past 24 hrs:   BP Temp Temp src Pulse Resp SpO2   01/10/20 0738 (!) 124/56 98.8 °F (37.1 °C) -- 95 18 96 %   01/09/20 2000 (!) 159/78 98.5 °F (36.9 °C) Oral 98 18 98 %     Gen: No distress, pleasant. HEENT: Normocephalic, atraumatic. CV: Regular rate and rhythm. Resp: No respiratory distress. Abd: Soft, nontender   Ext: No edema. Neuro: Alert, oriented, appropriately interactive. Wt Readings from Last 3 Encounters:   01/07/20 144 lb 6.4 oz (65.5 kg)   12/05/19 156 lb 1.4 oz (70.8 kg)   10/17/19 169 lb 6.4 oz (76.8 kg)       Laboratory data:   Lab Results   Component Value Date    WBC 8.3 01/09/2020    HGB 8.2 (L) 01/09/2020    HCT 26.4 (L) 01/09/2020    MCV 82.8 01/09/2020     01/09/2020       Lab Results   Component Value Date     01/10/2020    K 4.7 01/10/2020    K 5.0 01/09/2020    CL 92 01/10/2020    CO2 26 01/10/2020    BUN 26 01/10/2020    CREATININE 2.6 01/10/2020    GLUCOSE 139 01/10/2020    CALCIUM 8.1 01/10/2020        Therapy progress:  PT  Position Activity Restriction  Other position/activity restrictions: Noted multipodous boots ordered by podiatry for foot wounds, USE cushion while up in chair.   right foot with necrotic toes-requested RN seek clarification on weightbearing restrictions due to wounds to feet, PEG tube, sponge bathe only per nsg 1/10  Objective     Sit to Stand: Minimal Assistance, Moderate Assistance(FWW)  Stand to sit: Moderate Assistance, Minimal Assistance(FWW)  Bed to Chair: Minimal assistance(FWW)  Device: Rolling Walker  Assistance: Minimal assistance, Moderate assistance  Distance: 10 feet, 20 feet, greater difficulty with turns requiring mod assist to negotitae/advance walker with turns and intermittent cues for sequence as pt tries to step past walker otherwise. OT  PT Equipment Recommendations  Equipment Needed: Yes  Mobility Devices: Wheelchair(walker pending progress)  Wheelchair: Standard  Toilet - Technique: Stand pivot  Equipment Used: Standard toilet  Toilet Transfers Comments: using grab bars, cues for hand placement and sequencing steps of task   Assessment        SLP  Current Diet : Regular  Current Liquid Diet : Thin  Diet Solids Recommendation: Dysphagia Soft and Bite-Sized (Dysphagia III)(Per MD order)  Liquid Consistency Recommendation: Thin    Body mass index is 28.2 kg/m². Rehabilitation Diagnosis:  Brain, 2.1, Non-Traumatic     Assessment and Plan:  Bilat lower limb DVT  - Anticoagulation, pharmacy to dose  - Goal INR 2.0-2.5     Cholelithiasis  - No urgent intervention per GI at OSH    Enterococcal bacteremia  - Cont vancomycin 2 weeks post-TDC removal  - Appreciate ID   - Await echo  - dialysis catheter removed   - tentative placement of new catheter today     Spindle cell sarcoma  - OP oncology f/u       Cardiac arrest  - Continue beta blocker, statin     Encephalopathy  - Likely related to cardiac arrest  - SLP consulted     Gangrenous toes  - Continue wound care per orders  - Wound care consulted  - Consulted podiatry     DM  - lantus, SSI     Bowels: Schedule colace + senna. Follow bowel movements. Enema or suppository if needed. KIMBERLY  - Appreciate nephrology input     Bladder: Check PVR x 3. [address] if PVR > 200ml or if any volume is > 500 ml.      Sleep: Trazodone provided prn. HALIE: 1/23 home vs skilled  DME: RW, WC with swing away leg rests, shower chair vs ttb      Rodríguez Reeder MD 1/10/2020, 11:44 AM

## 2020-01-10 NOTE — PROGRESS NOTES
Physical Therapy  Facility/Department: [facility]  Daily Treatment Note  NAME: Leonard Kaur  : 1945  MRN: [id number]    Date of Service: 1/10/2020    Discharge Recommendations:  24 hour supervision or assist, Home with Home health PT        Assessment Pt seen for am session with pt needing to go to bathroom with pt npo at start of session awaiting lab results and npo discontinued during tx. Pt with nausea and taking sips of 40z water during tx when allowed with nursing notified. Pt incontinent of urine and trace stool. Max assist today to manage undergarments, don clean shirt  As others are soiled. Pt during tx given gait training with FWW with cues for step to gait with facilitation to coordinate walker advancing in advance of LE stepping as pt tends to try and walk past walker cross bar with resultant posterior lob. Pt walked up to 20 feet  With WC follow and assist of 1 with poor RLE stance control and steppage gait RLE. Pt notes, \"I have difficulty controlling and moving RLE. \" Facilitated LLE WB and quad activation with deep pressure to quads and cues for  Transfer technique with FWW with pt with tendency to forget to lean forward and push up from transfer surface. At times has difficulty with anterior weight shift with sit>stand  Due to poor coordination, impaired RLE sensation and impaired RLE motor control with pt's feet slipping forward with sit>stand unless cues/faciltiated/assisted. Pt continues to need assist for all mobility but with less verbal and more tactile facilitation for walker coordination/advancement and Left quad activation patient with improved walking for transfers distances noted. Patient Diagnosis(es): There were no encounter diagnoses. has a past medical history of Allergic, Anemia, Angina, Arthritis, Asthma, Cancer ([facility] [address] Coronary artery disease, Diabetes mellitus ([facility] [address] Heart attack ([facility] [address] Hyperlipidemia, Hypertension, and Obesity.    has a past surgical history

## 2020-01-10 NOTE — PROGRESS NOTES
implant; Carpal tunnel release; Knee arthroscopy; Coronary angioplasty with stent (4/2012); Hysterectomy; bronchoscopy (N/A, 11/12/2019); bronchoscopy (N/A, 11/23/2019); bronchoscopy (11/23/2019); bronchoscopy (11/23/2019); and Upper gastrointestinal endoscopy (N/A, 12/2/2019). Restrictions  Restrictions/Precautions  Restrictions/Precautions: Fall Risk, Modified Diet  Position Activity Restriction  Other position/activity restrictions: Noted multipodous boots ordered by podiatry for foot wounds, USE cushion while up in chair. right foot with necrotic toes-requested RN seek clarification on weightbearing restrictions due to wounds to feet, PEG tube, sponge bathe only per nsg 1/10  Subjective   General  Chart Reviewed: Yes  Response To Previous Treatment: Patient with no complaints from previous session. Family / Caregiver Present: No  Referring Practitioner: Zahra Robertson MD  Subjective  Subjective: pt pleasant and agreeable  General Comment  Comments: denies pain, reports muscle tightness in R HS  Pain Screening  Patient Currently in Pain: Yes  Pain Assessment  Pain Assessment: 0-10  Pain Level: 3  Pain Type: Acute pain  Pain Location: Leg  Pain Orientation: Posterior  Pain Descriptors: Tightness  Non-Pharmaceutical Pain Intervention(s):  Other (Comment)(gentle stretch)  Response to Pain Intervention: Patient Satisfied  Vital Signs  Patient Currently in Pain: Yes       Orientation  Orientation  Overall Orientation Status: Impaired  Cognition      Objective      Transfers  Sit to Stand: Minimal Assistance  Stand to sit: Minimal Assistance  Comment: sit to stand from w/c to // bars with min A x 4 reps  Wheelchair Activities  Left Brakes Level of Assistance: Stand by assistance  Right Brakes Level of Assistance: Stand by Assist  Propulsion 1  Propulsion: Manual  Level: Level Tile  Level of Assistance: Stand by assistance  Description/ Details: pt requires cues to maintain linear path, often deviates to L, cues for safe navigtion through doorway. Distance: 110 ft   Neuromuscular Education  NDT Treatment: Standing  Neuromuscular Comments: 2x10 IL stepping to anterior target RLE and LLE each with BUE support and CGA. pt then completed 10 reps of alternating BLE stepping to target with consistent pina, appropriate BRANDON and BUE support. grossly CGA wiht no LOB. Other exercises  Other exercises 1: 3x30 second seated RLE HS stretch followed by 2x30 sec gentle seated gastroc stretch due to c/o \"tightness\" durign WB activities. pt reported ease in symtpoms following stretches to a 2/10. Goals  Short term goals  Time Frame for Short term goals: one week; 1/11/2020  Short term goal 1: Patient will perform bed mobility with CGA  Short term goal 2: Patient will perform sit <> stand transfers with Min A and AAD  Short term goal 3: Patient will perform stand pivot transfers with AAD and Min A  Short term goal 4: Patient will propel wheelchair 50 ft over even surfaces with SBA  Short term goal 5: Patient ambulate 15 ft with Mod A and AAD  Long term goals  Time Frame for Long term goals : 2 weeks; 1/18/2020  Long term goal 1: Patient will perform bed mobility with supervision  Long term goal 2: Patient will perform all functional transfers with AAD and supervision  Long term goal 3: Patient will ambulate 50 ft with AAD and CGA  Long term goal 4: Patient will propel w/c 100 ft on even and uneven surfaces with supervision  Long term goal 5: Patient will negotiate 3 stairs with AAD and Min A  Patient Goals   Patient goals :  \"To get back to normal\"    Plan    Plan  Times per week: 5 of 7 days per week  Times per day: Daily  Specific instructions for Next Treatment: progress transfer training and gait training as able  Current Treatment Recommendations: Strengthening, Transfer Training, Home Exercise Program, Balance Training, Endurance Training, Gait Training, Functional Mobility Training, Wheelchair Mobility Training,

## 2020-01-10 NOTE — PLAN OF CARE
Problem: Nutrition  Goal: Optimal nutrition therapy  Outcome: Ongoing  Note:   Nutrition Problem: Predicted suboptimal energy intake  Intervention: Food and/or Nutrient Delivery: Continue current diet, Continue current ONS  Nutritional Goals: Pt will tolerate diet and have meal and ONS intakes 50% or greater during ARU stay

## 2020-01-10 NOTE — PROGRESS NOTES
Occupational Therapy  Facility/Department: [facility]  Daily Treatment Note  NAME: Eugene De La Cruz  : 1945  MRN: [id number]    Date of Service: 1/10/2020    Discharge Recommendations:  Continue to assess pending progress        Assessment   Performance deficits / Impairments: Decreased functional mobility ; Decreased cognition;Decreased high-level IADLs;Decreased ADL status; Decreased endurance;Decreased fine motor control;Decreased strength;Decreased balance;Decreased safe awareness  Assessment: Py pleasant and cooperative, appears confused in conversation. Pt talkative with nonsensical speech at times, speech very tangential. Pt seen for ADL with increased time to complete and madina to sequence tasks. Pt with increased attempt to perform clothing mgmt up/down with toileting this date, performs cristi care after BM with increased time. Pt cont to require assist with LB ADLs, may benefit from AE, pending cognition. Pt progressing well, cont POC. Activity Tolerance  Activity Tolerance: Patient Tolerated treatment well  Safety Devices  Safety Devices in place: Yes  Type of devices: Call light within reach;Gait belt;Left in chair;Chair alarm in place       Second Session: Pt tolerated 9 hole peg with good attention to task. Pt demos 5 mins standing table top activity, continues to demo decreased standing tolerance, however pleasant and motivated. Continue OT per POC. Patient Diagnosis(es): There were no encounter diagnoses. has a past medical history of Allergic, Anemia, Angina, Arthritis, Asthma, Cancer ([address] Coronary artery disease, Diabetes mellitus ([address] Heart attack ([address] Hyperlipidemia, Hypertension, and Obesity. has a past surgical history that includes Diagnostic Cardiac Cath Lab Procedure; Cataract removal with implant; Carpal tunnel release; Knee arthroscopy; Coronary angioplasty with stent (2012);  Hysterectomy; bronchoscopy (N/A, 2019); bronchoscopy (N/A, 2019); bronchoscopy with increased time, pt attempting to manage clothing up/down but requires assist)        Balance  Sitting Balance: Supervision  Standing Balance: Contact guard assistance  Standing Balance  Time: 1-2 min x 4  Activity: transfers, toileting,ADL tasks  Toilet Transfers  Toilet - Technique: Stand pivot  Equipment Used: Standard toilet  Toilet Transfer: Minimal assistance  Toilet Transfers Comments: using grab bars, cues for hand placement and sequencing steps of task      Transfers  Sit to stand: Minimal assistance  Stand to sit: Minimal assistance  Transfer Comments: min A to stand from w/c and toilet using grab bars        Coordination  Gross Motor: fair for ADLs   Fine Motor: fair for ADLs               Cognition  Overall Cognitive Status: Exceptions  Arousal/Alertness: Appropriate responses to stimuli  Following Commands: Follows one step commands with increased time; Follows one step commands with repetition  Attention Span: Attends with cues to redirect  Memory: Decreased recall of biographical Information;Decreased recall of recent events;Decreased short term memory  Safety Judgement: Decreased awareness of need for safety  Problem Solving: Decreased awareness of errors  Insights: Decreased awareness of deficits  Initiation: Requires cues for some  Sequencing: Requires cues for some  Cognition Comment: Pt anxious and verbalizes fear of falling (pt pushes posteriorly at times). Pt requiring cues for problem solving and attention throughout session. Second Session: Pt seated in w/c at approach, pt completed 9 hole peg test, RUE 30 secs, LUE 31 secs, see assessment written by OT regarding 9 hole peg scores. Pt then completed standing activity at table top with focus on standing endurance, pt tolerated 5 mins requiring CGA initially, min A by end due to fatigue. Pt mod A for sit to stand and min A for stand to sit. Pt left in w/c with alarm in place at end of session.       Plan   Plan  Times per week: 5/7 days per week  Plan weeks: 2 weeks  Current Treatment Recommendations: Strengthening, ROM, Safety Education & Training, Balance Training, Patient/Caregiver Education & Training, Self-Care / ADL, Cognitive/Perceptual Training, Functional Mobility Training, Neuromuscular Re-education, Equipment Evaluation, Education, & procurement, Home Management Training, Endurance Training, Cognitive Reorientation    Goals  Short term goals  Time Frame for Short term goals: 1 week (by 1/11/20)  Short term goal 1: Pt will tolerate static standing for 3 min with CGA for improved participation in BADL tasks. Short term goal 2: Pt will complete UB dressing with set-up and min verbal cues for task completion. -MET 1/10  Short term goal 3: Pt will complete oral care in stance at sink with CGA and min verbal cues for sequencing task. -MET 1/10  Short term goal 4: Pt will participate in nine hole peg test for cont assessment of [facility]. Long term goals  Time Frame for Long term goals : 2 weeks (by 1/18/20)  Long term goal 1: Pt will complete toilet transfers with supervision with AD as needed and min cues for safety and sequencing. Long term goal 2: Pt will complete LB dressing tasks with min A with AE as needed. Long term goal 3: Pt will complete toileting tasks with supervision.    Patient Goals   Patient goals : \"to walk\" and \"to get back to normal\"       Therapy Time   Individual Concurrent Group Co-treatment   Time In 1000         Time Out 1100         Minutes 60         Timed Code Treatment Minutes: King Milner OT     Second Session Therapy Time:   Individual Concurrent Group Co-treatment   Time In 2264         Time Out 1455         Minutes 30           Timed Code Treatment Minutes:  30 Minutes    Total Treatment Minutes: 90    Second Session Completed by [employer], HIGGINS/L

## 2020-01-10 NOTE — PROGRESS NOTES
Nutrition Assessment    Type and Reason for Visit: Reassess    Nutrition Recommendations:   1. Continue renal, soft and bite sized diet  2. Diet texture/liquid level per SLP recommendations  3. Continue Nepro with meals  4. Encourage po intakes  5. Monitor po intakes, nutrition adequacy, weights, pertinent labs, BMs    Nutrition Assessment: Follow up: Pt improving some from a nutritional standpoint AEB pt report of appetite improving some. Po intakes % per EMR. Pt reports that she ahs been drinking ~2-3 Nepro per day as well (425 calories, 19 gm protein each). Pt endorses tolerating diet well. Encouraged po intakes. Will continue current diet and ONS. Malnutrition Assessment:  · Malnutrition Status: Meets the criteria for severe malnutrition  · Context: Chronic illness  · Findings of the 6 clinical characteristics of malnutrition (Minimum of 2 out of 6 clinical characteristics is required to make the diagnosis of moderate or severe Protein Calorie Malnutrition based on AND/ASPEN Guidelines):  1. Energy Intake-Less than or equal to 75% of estimated energy requirement, Greater than or equal to 3 months    2. Weight Loss-10% loss or greater, in 3 months  3. Fat Loss-Moderate subcutaneous fat loss, Orbital  4. Muscle Loss-Moderate muscle mass loss, Temples (temporalis muscle), Clavicles (pectoralis and deltoids)  5. Fluid Accumulation-No significant fluid accumulation,    6.   Strength-Not measured    Nutrition Risk Level: High    Nutrient Needs:  · Estimated Daily Total Kcal: 3771-3124(22-28 kcal/kg)  · Estimated Daily Protein (g): 76-90(1.2-1.4 g/kg)  · Estimated Daily Total Fluid (ml/day): 1 ml/kcal    Nutrition Diagnosis:   · Problem: Predicted suboptimal energy intake  · Etiology: related to Insufficient energy/nutrient consumption     Signs and symptoms:  as evidenced by Weight loss    Objective Information:  · Nutrition-Focused Physical Findings: Trace generalized edema  · Wound Type: Multiple, Pressure Ulcer, Stage II, Deep Tissue Injury  · Current Nutrition Therapies:  · Oral Diet Orders: Renal, Dysphagia  Soft and Bite-Sized (Dysphagia 3), Fluid Restriction   · Oral Diet intake: 26-50%, 51-75%, %  · Oral Nutrition Supplement (ONS) Orders: Renal Oral Supplement  · ONS intake: %(2-3/day)  · Anthropometric Measures:  · Ht: 5' (152.4 cm)   · Current Body Wt: 144 lb 6.4 oz (65.5 kg)  · % Weight Change:  ,  28 lb, 16.6% loss x 3 months per EMR  · Ideal Body Wt: 100 lb (45.4 kg),  · BMI Classification: BMI 25.0 - 29.9 Overweight    Nutrition Interventions:   Continue current diet, Continue current ONS  Continued Inpatient Monitoring    Nutrition Evaluation:   · Evaluation: Progressing toward goals   · Goals: Pt will tolerate diet and have meal and ONS intakes 50% or greater during ARU stay    · Monitoring: Meal Intake, Supplement Intake, Diet Tolerance, Nutrition Progression, Weight, Pertinent Labs      Electronically signed by Annie Waldron RD, LD on 1/10/20 at 3:10 PM    Contact Number: Office: [phone]; [address]: [id number]

## 2020-01-11 NOTE — PROGRESS NOTES
Pharmacy to Dose Warfarin     Dx: DVT of unknown origin   Goal INR range 2-2.5 per MD consult  Home Warfarin dose: 4 mg daily     Date                 INR                  Warfarin  1/3                    2.45                4 mg  1/4                    2.52                4 mg  1/5                    3.87                Hold  1/6                    3.46                Hold  1/7                    2.35                2.5 mg   1/8                    1.66                4 mg   1/9                    1.45                4 mg  1/10                  1.64                4 mg  1/11                  1.53                6 mg  Recommend warfarin 6 mg x 1 tonight. Daily INR ordered. Rx will continue to manage therapy per Dr Linnea Orellana consult order. [facility]. Ph. 1/11/2020 10:29 AM

## 2020-01-11 NOTE — PROGRESS NOTES
[facility]       Progress Note           Subjective/   75y.o. year old female who we are seeing in consultation for KIMBERLY/CKD requiring HD. Transferred back from [facility] for rehab. HPI:  Vanna Salcedo more, pt has no s/o uremia    cr  2.4-->2.6-->2.7    HD since 11/13/19 , last HD on 1/7  RIJ TDC removed on 1/8    ROS:  Intake reduced, no fevers. Objective/   GEN:  Chronically ill, /69   Pulse 87   Temp 98.8 °F (37.1 °C) (Oral)   Resp 20   Ht 5' (1.524 m)   Wt 144 lb 6.4 oz (65.5 kg)   SpO2 94%   BMI 28.20 kg/m²   HEENT: non-icteric, no JVD  CV: S1, S2 without m/r/g; no LE edema  RESP: CTA B without w/r/r; breathing wnl  ABD: +bs, soft, nt, no hsm  SKIN: warm, no rashes  ACCESS: R IJ TDC (12/2)    Data/  Recent Labs     01/09/20  1149   WBC 8.3   HGB 8.2*   HCT 26.4*   MCV 82.8        Recent Labs     01/09/20  1149 01/10/20  0414 01/11/20  0900   *  132* 128* 131*   K 5.0  5.2*  5.0 4.7 4.8   CL 92*  92* 92* 94*   CO2 27  27 26 27   GLUCOSE 195*  183* 139* 92   PHOS 3.3 3.4 4.2   BUN 22*  23* 26* 31*   CREATININE 2.3*  2.4* 2.6* 2.7*   LABGLOM 21*  20* 18* 17*   GFRAA 25*  24* 22* 21*       Assessment/Plan      Acute Kidney Injury.  - ATN from septic shock / cardiac arrest, initially requiring CRRT since 11/13/19  - We will continue hemodialysis per TRS schedule currently   - Access: R IJV TDC--> removed 1/8    -FR 1.5 l/day  -monitor for recovery of renal  Function  -renal panel daily  -no HD needs, will assess daily     CKD stage 3.  - Suspect from HTN and DM.  - Baseline serum creatinine of 1.3-1.4 mg/dL.     Sepsis with MOSF  - ID following. Etiology not determined  - Repeat blood cultures 11/27 were no growth to date. enterococci + 1/5 on vanc   Dc TDC on 1/8  Echo and repeat BC planned     Acute respiratory failure  - s/p intubation X2, now off     Malnutrition. - s/p PEG, on TF's     Anemia.   -  on retracrit 5K qHD, change to aranesp 60 mcg IV

## 2020-01-13 NOTE — PLAN OF CARE
Problem: Falls - Risk of:  Goal: Will remain free from falls  Description  Will remain free from falls  Outcome: Ongoing  Goal: Absence of physical injury  Description  Absence of physical injury  Outcome: Ongoing     Problem: Risk for Impaired Skin Integrity  Goal: Tissue integrity - skin and mucous membranes  Description  Structural intactness and normal physiological function of skin and  mucous membranes. Outcome: Ongoing     Problem: Pain:  Goal: Pain level will decrease  Description  Pain level will decrease  Outcome: Ongoing  Goal: Control of acute pain  Description  Control of acute pain  Outcome: Ongoing  Goal: Control of chronic pain  Description  Control of chronic pain  Outcome: Ongoing     Problem: Nutrition  Goal: Optimal nutrition therapy  Outcome: Ongoing     Problem: Safety:  Goal: Free from accidental physical injury  Description  Free from accidental physical injury  Outcome: Ongoing  Goal: Free from intentional harm  Description  Free from intentional harm  Outcome: Ongoing     Problem: Daily Care:  Goal: Daily care needs are met  Description  Daily care needs are met  Outcome: Ongoing   Plan of care gone over with patient, including goals and interventions. Able to participate in decision making about course of treatment. In agreement with care action.

## 2020-01-13 NOTE — PROGRESS NOTES
Occupational Therapy  Facility/Department: [facility]  Daily Treatment Note  NAME: Gerald Bruce  : 1945  MRN: [id number]    Date of Service: 2020    Discharge Recommendations:  Continue to assess pending progress       Assessment   Performance deficits / Impairments: Decreased functional mobility ; Decreased cognition;Decreased high-level IADLs;Decreased ADL status; Decreased endurance;Decreased fine motor control;Decreased strength;Decreased balance;Decreased safe awareness  Assessment: pt pleasant and cooperative, appears less confused this date. Speech remains tangential and she states worry about social issues, but redirectable. Pt cont to requires cues for sequencing with some tasks, but completes toileting and grooming with less assist. Pt tolerates increased standing this date. Pt able to perform large tabletop puzzle with min difficulty, increased toelrance to UE ex. pt returned to supine after each session per pt request. Pt progressing well, cont POC. OT Education: OT Role;Plan of Care;ADL Adaptive Strategies;Transfer Training;Orientation; Energy Conservation  Patient Education: bed mobility, safety with transfers, toilet transfers, safety with toileting  Activity Tolerance  Activity Tolerance: Patient Tolerated treatment well  Safety Devices  Safety Devices in place: Yes  Type of devices: Call light within reach;Gait belt;Left in bed;Bed alarm in place         Patient Diagnosis(es): There were no encounter diagnoses. has a past medical history of Allergic, Anemia, Angina, Arthritis, Asthma, Cancer ([address] Coronary artery disease, Diabetes mellitus ([address] Heart attack ([address] Hyperlipidemia, Hypertension, and Obesity. has a past surgical history that includes Diagnostic Cardiac Cath Lab Procedure; Cataract removal with implant; Carpal tunnel release; Knee arthroscopy; Coronary angioplasty with stent (2012);  Hysterectomy; bronchoscopy (N/A, 2019); bronchoscopy (N/A, 2019); 4, 2-3 min x 2  Activity: transfers, toileting,ADL tasks, tabletop activity, grooming0  Toilet Transfers  Toilet - Technique: Stand pivot  Equipment Used: Standard toilet  Toilet Transfer: Minimal assistance  Toilet Transfers Comments: using grab bars, cues for hand placement and sequencing steps of task      Transfers  Sit to stand: Contact guard assistance  Stand to sit: Contact guard assistance  Transfer Comments: CGA to stand from w/c and toilet using grab bars        Coordination  Gross Motor: fair for ADLs   Fine Motor: fair for ADLs, fair for tabletop puzzle with large pieces              Cognition  Overall Cognitive Status: Exceptions  Arousal/Alertness: Appropriate responses to stimuli  Following Commands: Follows one step commands with increased time; Follows one step commands with repetition  Attention Span: Attends with cues to redirect  Memory: Decreased recall of biographical Information;Decreased recall of recent events;Decreased short term memory  Safety Judgement: Decreased awareness of need for safety  Problem Solving: Decreased awareness of errors  Insights: Decreased awareness of deficits  Initiation: Requires cues for some  Sequencing: Requires cues for some  Cognition Comment: Pt anxious and verbalizes fear of falling (pt pushes posteriorly at times). Pt requiring cues for problem solving and attention throughout session.                      Type of ROM/Therapeutic Exercise  Type of ROM/Therapeutic Exercise: AROM  Comment: 2# weights  Exercises  Scapular Protraction: x15  Scapular Retraction: x15  Shoulder Flexion: x15  Shoulder ABduction: x15  Shoulder ADduction: x15  Elbow Flexion: x20  Elbow Extension: x20  Supination: x20  Pronation: x20  Wrist Flexion: x20  Wrist Extension: 00                    Plan   Plan  Times per week: 5/7 days per week  Plan weeks: 2 weeks  Current Treatment Recommendations: Strengthening, ROM, Safety Education & Training, Balance Training, Patient/Caregiver Education & Training, Self-Care / ADL, Cognitive/Perceptual Training, Functional Mobility Training, Neuromuscular Re-education, Equipment Evaluation, Education, & procurement, Home Management Training, Endurance Training, Cognitive Reorientation    Goals  Short term goals  Time Frame for Short term goals: 1 week (by 1/11/20)  Short term goal 1: Pt will tolerate static standing for 3 min with CGA for improved participation in BADL tasks. Short term goal 2: Pt will complete UB dressing with set-up and min verbal cues for task completion. -MET 1/10  Short term goal 3: Pt will complete oral care in stance at sink with CGA and min verbal cues for sequencing task. -MET 1/10  Short term goal 4: Pt will participate in nine hole peg test for cont assessment of [facility]. Long term goals  Time Frame for Long term goals : 2 weeks (by 1/18/20)  Long term goal 1: Pt will complete toilet transfers with supervision with AD as needed and min cues for safety and sequencing. Long term goal 2: Pt will complete LB dressing tasks with min A with AE as needed. Long term goal 3: Pt will complete toileting tasks with supervision.    Patient Goals   Patient goals : \"to walk\" and \"to get back to normal\"         Therapy Time   Individual Concurrent Group Co-treatment   Time In 1000         Time Out 1100         Minutes 60         Timed Code Treatment Minutes: 60 Minutes    Therapy Time   Individual Concurrent Group Co-treatment   Time In 1430         Time Out 1510         Minutes 40         Timed Code Treatment Minutes: 100 [facility] Hailey, OT

## 2020-01-13 NOTE — PROGRESS NOTES
mild turnk sway noted. OT  PT Equipment Recommendations  Equipment Needed: Yes  Mobility Devices: Wheelchair(walker pending progress)  Wheelchair: Standard  Toilet - Technique: Stand pivot  Equipment Used: Standard toilet  Toilet Transfers Comments: using grab bars, cues for hand placement and sequencing steps of task   Assessment        SLP  Current Diet : Regular  Current Liquid Diet : Thin  Diet Solids Recommendation: Dysphagia Soft and Bite-Sized (Dysphagia III)(Per MD order)  Liquid Consistency Recommendation: Thin    Body mass index is 28.16 kg/m². Rehabilitation Diagnosis:  Brain, 2.1, Non-Traumatic     Assessment and Plan:  Bilat lower limb DVT  - Anticoagulation, pharmacy to dose  - Goal INR 2.0-2.5     Cholelithiasis  - No urgent intervention per GI at OSH    Enterococcal bacteremia  - Cont vancomycin 2 weeks post-TDC removal  - Appreciate ID   - dialysis catheter removed      Spindle cell sarcoma  - OP oncology f/u       Cardiac arrest  - Continue beta blocker, statin     Encephalopathy  - Likely related to cardiac arrest  - SLP consulted     Gangrenous toes  - Continue wound care per orders  - Wound care consulted  - Consulted podiatry     DM  - lantus, SSI     Bowels: Schedule colace + senna. Follow bowel movements. Enema or suppository if needed. KIMBERLY  - Appreciate nephrology input     Bladder: Check PVR x 3. [address] if PVR > 200ml or if any volume is > 500 ml.      Sleep: Trazodone provided prn. HALIE: 1/23 home vs skilled  DME: RW, JER with swing away leg rests, shower chair vs ttb      Rodríguez Murphy MD 1/13/2020, 11:03 AM

## 2020-01-13 NOTE — PROGRESS NOTES
Linette at [facility].    Hyponatremia  - Secondary to reduced renal function, hyperglycemia    LE DVT  - On coumadin    Cholelithiasis  - Conservative measures per GI at OSH    Gangrenous toes  - Per Admitting

## 2020-01-13 NOTE — PROGRESS NOTES
(N/A, 11/23/2019); bronchoscopy (11/23/2019); bronchoscopy (11/23/2019); and Upper gastrointestinal endoscopy (N/A, 12/2/2019). Restrictions  Restrictions/Precautions  Restrictions/Precautions: Fall Risk, Modified Diet  Position Activity Restriction  Other position/activity restrictions: Noted multipodous boots ordered by podiatry for foot wounds, USE cushion while up in chair. right foot with necrotic toes-requested RN seek clarification on weightbearing restrictions due to wounds to feet, PEG tube, sponge bathe only per nsg 1/10  Subjective   General  Chart Reviewed: Yes  Response To Previous Treatment: Patient with no complaints from previous session. Family / Caregiver Present: No  Referring Practitioner: Loraine Lou MD  Subjective  Subjective: pt denies pain  General Comment  Comments:  found supine in bed. Pain Screening  Patient Currently in Pain: No  Vital Signs  Patient Currently in Pain: No       Orientation  Orientation  Overall Orientation Status: Impaired  Orientation Level: Oriented to place;Oriented to time;Oriented to person;Disoriented to situation  Cognition      Objective   Bed mobility  Supine to Sit: Stand by assistance(with HOB minimally elevated)  Transfers  Sit to Stand: Contact guard assistance  Stand to sit: Contact guard assistance  Comment: sit to stand from MEÑO to RW with CGA and w/c to Rw with CGA, safe sequencing observed   Ambulation  Ambulation?: Yes  Ambulation 1  Surface: level tile  Device: Rolling Walker  Other Apparatus: Wheelchair follow  Assistance: Contact guard assistance  Gait Deviations: Slow Caitlyn; Increased BRANDON; Decreased step length;Decreased step height  Distance: 42 ft   Wheelchair Activities  Left Brakes Level of Assistance: Stand by assistance  Right Brakes Level of Assistance: Stand by Assist  Propulsion 1  Propulsion: Manual  Level: Level Tile  Level of Assistance: Stand by assistance  Description/ Details: pt requires cues to maintain linear path, as able  Current Treatment Recommendations: Strengthening, Transfer Training, Home Exercise Program, Balance Training, Endurance Training, Gait Training, Functional Mobility Training, Wheelchair Mobility Training, Stair training, Pain Management, Equipment Evaluation, Education, & procurement, Safety Education & Training, Patient/Caregiver Education & Training  Safety Devices  Type of devices:  All fall risk precautions in place, Gait belt, Nurse notified, Left in chair(left in gym with OT)  Restraints  Initially in place: No     Therapy Time   Individual Concurrent Group Co-treatment   Time In 1400         Time Out 1430         Minutes 30         Timed Code Treatment Minutes: 30 Minutes       Adelina Blancas, PT

## 2020-01-13 NOTE — PROGRESS NOTES
Physical Therapy  Facility/Department: [facility]  Daily Treatment Note  NAME: Shayla Harvey  : 1945  MRN: [id number]    Date of Service: 2020    Discharge Recommendations:  24 hour supervision or assist, Home with Home health PT        Assessment   Assessment: Patient seen for gait training with WC follow with cues for step to gait only for first 2-3 steps with pt leading with RLE with noted increased WB tolerance RLE. Pt needed WC follow due to limited endruance. However, pt walked up to 50 feet this date with CGA to steady and WC follow. Pt given transfer training with FWW.  transfers sit<>stand and bed<>chair with FWW pt CGA to SBA with  cues for sequencing to push up and reach back from/to chair. Pt provided LE therex for strengthening with cues for exercise angles and for stretches  (gentle) to right gastroc to neutral DF. Pt demo increased swelling BLE (piting) with erythema noted in right lower leg without increased warmth. Notified physician. Pt making good progress with gait and transfers this date. See updated goals. Patient Diagnosis(es): There were no encounter diagnoses. has a past medical history of Allergic, Anemia, Angina, Arthritis, Asthma, Cancer ([address] Coronary artery disease, Diabetes mellitus ([address] Heart attack ([address] Hyperlipidemia, Hypertension, and Obesity. has a past surgical history that includes Diagnostic Cardiac Cath Lab Procedure; Cataract removal with implant; Carpal tunnel release; Knee arthroscopy; Coronary angioplasty with stent (2012); Hysterectomy; bronchoscopy (N/A, 2019); bronchoscopy (N/A, 2019); bronchoscopy (2019); bronchoscopy (2019); and Upper gastrointestinal endoscopy (N/A, 2019).     Restrictions  Restrictions/Precautions  Restrictions/Precautions: Fall Risk, Modified Diet  Position Activity Restriction  Other position/activity restrictions: Noted multipodous boots ordered by podiatry for foot wounds, USE & Training, Patient/Caregiver Education & Training  Safety Devices  Type of devices:  All fall risk precautions in place, Call light within reach, Gait belt, Nurse notified, Chair alarm in place, Left in chair  Restraints  Initially in place: No     Therapy Time   Individual Concurrent Group Co-treatment   Time In 0730         Time Out 0830         Minutes 60         Timed Code Treatment Minutes: 60 Minutes       Raisa Davis, PT

## 2020-01-13 NOTE — PROCEDURES
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 She underwent peg placement and was subsequently discharged to [facility] for further management. During her stay at [facility] she was evaluated by GI for cholelithiasis. She was also evaluated by podiatry for gangrene of her toes, with recommendations for betapaint daily. Vascular surgery also evaluated her with no recommendations for vascular intervention. She was admitted to ARU on 1/3/20. Pt has been working with an SLP for dysphagia since 1/4/20; and has been tolerating dysphagia III with thin liquids without overt difficulty or clinical s/s aspiration. [facility] [place]/o dysph[place]ia. Subjective  Subjective: Pt pleasant and agreeable    Behavior/Cognition/Vision/Hearing:  Behavior/Cognition: Alert; Cooperative;Pleasant mood  Hearing: Within functional limits    Impressions: Modified barium swallow study completed. Thin liquids by cup x 2, thin liquids by straw x 2, nectar-thick liquid by straw x 2, honey-thick liquid by spoon x 1, pudding x 2, and cracker bite coated in barium x 1 presented. Grossly normal oral phase characterized by effective mastication, bolus formation, and control. Adequate oral clearance. Mild pharyngeal dysphagia characterized by an inconsistent delayed swallow with thin and nectar-thick liquids and reduced lingual base retraction, hyolaryngeal elevation/excursion, and sensation to vallecular residue across all consistencies. This resulted in vallecular residue with all consistencies (trace with thin liquids, mild with nectar-thick liquids, and mild-mod with pudding/soft cracker).  Swallow triggered at the valleculae for all consistencies and occ triggered at the pyriforms with thin and nectar-thick liquids; this did not effect the safety of the understanding;Needs reinforcement    Prognosis  Prognosis for safe diet advancement: good  Barriers to reach goals: age;cognitive deficits;time post onset  Duration/Frequency of Treatment  Duration/Frequency of Treatment: 5x/wk LOS  Safety Devices  Safety Devices in place: Yes  Type of devices: All fall risk precautions in place      Goals:  Short-term Goals established 1/4/20:  Timeframe for Short-term Goals: 5 days (01/09)  Goal 1: The patient will tolerate repeat BSE when possible-Goal met 1/13/20  Long-term Goals  Timeframe for Long-term Goals: 7 days (01/11/2020)  Goal 1: The patient will tolerate least restrictive diet with no clinical s/s of aspiration for optimal nutrition and hydration-1/13/20 ongoing    New goals added 1/13/20:  Pt will complete lingual base strengthening and laryngeal elevation ex's for improved pharyngeal clearance with min cues. Oral Preparation / Oral Phase  Oral Phase: WNL    Pharyngeal Phase  Pharyngeal Phase: Impaired  Pharyngeal Phase - Major Contributing Deficits  Delayed Swallow Initiation: Nectar straw; Thin cup; Thin straw  Premature Spillage to Valleculae: All  Premature Spillage to Ues: Thin cup;Nectar straw  Reduced Laryngeal Elevation: All  Reduced Anterior Laryngeal Movement: All  Reduced Tongue Base: All  Not Self-clearing (shallow): All  Pharyngeal Residue - Valleculae:  All    Esophageal Phase  Esophageal Screen: WNL    Pain   Patient Currently in Pain: No  Pain Level: 0  Pain Type: Chronic pain  Pain Location: Generalized      Therapy Time:   Individual Concurrent Group Co-treatment   Time In 0900         Time Out 1000         Minutes 60 (1 MBS, 1 dysphagia tx)                   Jose Elias Hussein, 1/13/2020, 4:35 PM

## 2020-01-14 NOTE — PROGRESS NOTES
Infectious Disease Follow up Notes    CC :  Enterococcal bacteremia      Antibiotics:   vanc by level started 1/6    Admit Date:   1/3/2020  Hospital Day: 12    Subjective:   She remains afebrile  Off the floor for MRI R foot     Objective:     No data found.       Scheduled Meds:   sodium zirconium cyclosilicate  10 g Oral TID    darbepoetin brando-polysorbate  60 mcg Subcutaneous Weekly    vancomycin (VANCOCIN) intermittent dosing (placeholder)   Other RX Placeholder    sertraline  50 mg Oral Daily    atorvastatin  20 mg Oral Nightly    donepezil  5 mg Oral Nightly    ferrous sulfate  325 mg Oral BID WC    insulin lispro  0-12 Units Subcutaneous TID WC    insulin lispro  0-6 Units Subcutaneous Nightly    hydrocortisone  25 mg Rectal BID    insulin glargine  19 Units Subcutaneous Nightly    metoprolol tartrate  25 mg Oral BID    pantoprazole  40 mg Oral QAM AC    thiamine  100 mg Oral Daily    warfarin (COUMADIN) daily dosing (placeholder)   Other RX Placeholder         Data Review:    Lab Results   Component Value Date    WBC 9.8 01/13/2020    HGB 7.7 (L) 01/13/2020    HCT 24.4 (L) 01/13/2020    MCV 81.3 01/13/2020     01/13/2020     Lab Results   Component Value Date    CREATININE 2.7 (H) 01/14/2020    BUN 35 (H) 01/14/2020     (L) 01/14/2020    K 5.6 (H) 01/14/2020    CL 94 (L) 01/14/2020    CO2 26 01/14/2020       Hepatic Function Panel:   Lab Results   Component Value Date    ALKPHOS 100 01/09/2020    ALT 11 01/09/2020    AST 23 01/09/2020    PROT 6.8 01/09/2020    PROT 7.0 11/30/2012    BILITOT 0.6 01/09/2020    BILIDIR 0.4 11/24/2015    IBILI 0.7 11/30/2012    LABALBU 2.9 01/14/2020         MICRO:  1/5 BC x2 sets with Enterococcus faecalis   Antibiotic Interpretation AUBREY Status    ampicillin Sensitive <=2 mcg/mL     vancomycin Sensitive 1 mcg/mL       1/5 UC 75,000cfu E coli pan-S  1/9 BC x2 neg HLD, anemia, DM     Prolonged admission 45/30-55/6/10 with asystolic arrest, hypoxemic respiratory failure attributed to CHF  Developed severe sepsis with lactic acidosis, acute hepatitis, rhabdomyolysis, without clear cause despite extensive evaluation, for which she completed a course of empiric abx. Developed KIMBERLY requiring HD with TDC placed   Dysphagia s/p PEG placement   DC to LTAC and then admitted to ARU 1/4/20    Nosocomial fever 1/5/20 and Enterococcal bacteremia, presumed [facility]ABSI from [facility]   IV vanc started 1/6/20  Fever resolved  TDC removed 1/8    Landon foot wounds, R worse than L. ? Pressure related. Potential but less likely source of bacteremia. Possible fx R 5th toe. MRI R foot ordered     Asymptomatic bacteriuria, no treatment indicated      Cholelithiasis with nl LFTs and benign abd exam.  GI/hepatobiliary source of Enterococcal bacteremia unlikely. CKD.   Off HD with stable creatinine     Deconditioning, encephalopathy    Allergy sulfa, pcn     Plan:   Plan to continue IV vanc 2 weeks post-TDC removal --> 1/22  Await MRI R foot     D/w RN and Nephrology          Jorden Alejandra MD  Phone: [phone]   Fax : [phone]

## 2020-01-14 NOTE — PROGRESS NOTES
bandaging.       Deformity involving the mid shaft of the proximal phalanx 5th digit which may   be related to nondisplaced fracture.  Recommend oblique view.         LeftLimited evaluation.  No obvious radiographic evidence of osteomyelitis. Laser studies:   Laser perfusion pressures bilateral feet are adequate for healing of a    spontaneous or surgical wound.             Impression/Recommendations: The patient is a pleasant 75 y.o. female with:  1) Full thickness wounds right and left foot  -wounds debrided tonight in excisional manner down to the level of subcutaneous tissue to promote healing and reduce bacterial bioburden.  -Right 2nd toe at the PIPJ has worsened in appearance  Necrotic to PIPJ and will need amputation. 3rd toe appears to have capsule of the joint exposed. Remaining toe wounds are superficial in appearance. -MRI right ordered to evaluate extent of necrosis 2nd, and questionable 3rd toe.  -Will discuss with primary care team regarding surgical intervention     2) Pressure ulceration heel  -stable  -Applied betadine  -Multipodus boots ordered. 3) PVD  -Adequate healing per laser study    4) DM  5) Encephalopathy     Thank you for the opportunity to take part in this patient's care. Please call me with any questions.     Hunter Raphael DPM  Office: [phone]  Cell: [phone]

## 2020-01-14 NOTE — PROGRESS NOTES
Diagnosis(es): There were no encounter diagnoses. has a past medical history of Allergic, Anemia, Angina, Arthritis, Asthma, Cancer ([facility] [address] Coronary artery disease, Diabetes mellitus ([address] Heart attack ([facility] [address] Hyperlipidemia, Hypertension, and Obesity. has a past surgical history that includes Diagnostic Cardiac Cath Lab Procedure; Cataract removal with implant; Carpal tunnel release; Knee arthroscopy; Coronary angioplasty with stent (4/2012); Hysterectomy; bronchoscopy (N/A, 11/12/2019); bronchoscopy (N/A, 11/23/2019); bronchoscopy (11/23/2019); bronchoscopy (11/23/2019); and Upper gastrointestinal endoscopy (N/A, 12/2/2019). Restrictions  Restrictions/Precautions  Restrictions/Precautions: Fall Risk, Modified Diet  Position Activity Restriction  Other position/activity restrictions: Noted multipodous boots ordered by podiatry for foot wounds, USE cushion while up in chair. right foot with necrotic toes-requested RN seek clarification on weightbearing restrictions due to wounds to feet, PEG tube, sponge bathe only per nsg 1/10 d/t LE wounds  Subjective Pt reports stomach ache this session 3/10, notes overall, \"I just don't feel too good today my stomach hurts. \"             Orientation  Orientation  Overall Orientation Status: Within Functional Limits  Cognition      Objective   Bed mobility  Rolling to Left: Independent  Rolling to Right: Independent  Sit to Supine: Independent  Comment: indep sup<>sit no rail and rolling  no rail   Transfers  Sit to Stand: (from bed x1 from chair x6)  Stand to sit: (to bed x1, to chair x6 )  Comment: pt with SBA for cues to push up from  transfer surface of bed/chair and to align to and reach back to transfer surfaces bed/chair before leaning back as pt tends to lean back on approach to sit without aligning to chair or bed and without reaching back for transfer surfaces.    Ambulation  Ambulation?: Yes  More Ambulation?: Yes  Ambulation 1  Surface: level tile  Device: feet carpet. Short term goal 5: Patient ambulate 15 ft with Mod A and AAD 1/13/2020 Goal Met Pt demo 50 feet with FWW WC follow SBA to CGA. Long term goals  Time Frame for Long term goals : 2 weeks; 1/18/2020  Long term goal 1: Patient will perform bed mobility with supervision 1/13/2020Goal Met pt demo indep with rolling  and sup<.sit. Long term goal 2: Patient will perform all functional transfers with AAD and supervision  Long term goal 3: Patient will ambulate 50 ft with AAD and CGA  Long term goal 4: Patient will propel w/c 100 ft on even and uneven surfaces with supervision  Long term goal 5: Patient will negotiate 3 stairs with AAD and Min A  Patient Goals   Patient goals : \"To get back to normal\"    Plan    Plan  Times per week: 5 of 7 days per week  Times per day: Daily  Specific instructions for Next Treatment: progress transfer training and gait training as able  Current Treatment Recommendations: Strengthening, Transfer Training, Home Exercise Program, Balance Training, Endurance Training, Gait Training, Functional Mobility Training, Wheelchair Mobility Training, Stair training, Pain Management, Equipment Evaluation, Education, & procurement, Safety Education & Training, Patient/Caregiver Education & Training  Safety Devices  Type of devices:  All fall risk precautions in place, Gait belt, Nurse notified, Left in chair(at end of session on toilet with nursing assistant attending to pt. )  Restraints  Initially in place: No     Therapy Time   Individual Concurrent Group Co-treatment   Time In 1000         Time Out 1130         Minutes 90         Timed Code Treatment Minutes: 90 Minutes       Suzanne Johnston, PT

## 2020-01-14 NOTE — PROGRESS NOTES
[facility]: ACUTE REHAB UNIT  SPEECH-LANGUAGE PATHOLOGY      [x] Daily  [] Weekly Care Conference Note  [] Discharge    J Luis  [facility]:[id number]  Rehab Dx/Hx: Metabolic encephalopathy [D69.71]    Precautions: Falls  Home situation: Lives at home, caregiver for son and grandchildren   ST Dx: [] Aphasia  [] Dysarthria  [] Apraxia   [x] Oropharyngeal dysphagia [x] Cognitive Impairment  [] Other:   Date of Admit: 1/3/2020  Room #: 0165/0165-01    Current functional status (updated daily):         Pt being seen for : [] Speech/Language Treatment  [x] Dysphagia Treatment [x] Cognitive Treatment  [] Other:  Communication: [x]WFL  [] Aphasia  [] Dysarthria  [] Apraxia  [] Pragmatic Impairment [] Non-verbal  [] Hearing Loss  [] Other:   Cognition: [] WFL  [] Mild  [x] Moderate  [x] Severe [] Unable to Assess  [] Other:  Memory: [] WFL  [] Mild  [x] Moderate  [x] Severe [] Unable to Assess  [] Other:  Behavior: [x] Alert  [x] Cooperative  []  Pleasant  [x] Confused  [] Agitated  [] Uncooperative  [x] Distractible [] Motivated  [] Self-Limiting [] Anxious  [] Other:  Endurance:  [x] Adequate for participation in SLP sessions  [] Reduced overall  [] Lethargic  [] Other:  Safety: [] No concerns at this time  [x] Reduced insight into deficits  []  Reduced safety awareness [] Not following call light procedures  [] Unable to Assess  [] Other:    Current Diet Order:Dietary Nutrition Supplements: Renal Oral Supplement  DIET RENAL; Dysphagia Soft and Bite-Sized; Daily Fluid Restriction: 1500 ml  Swallowing Precautions:    Alternate solids and liquids;Small bites/sips;Assist feed;Eat/Feed slowly;Upright as possible for all oral intake;Remain upright for 30-45 minutes after meals        Date: 1/14/2020      Tx session 1  [id number]  Patrick Robbins,  Clinician Tx session 2  All tx needs met during session 1   Total Timed Code Min 60 -   Total Treatment Minutes 60 -   Individual Treatment Minutes 60 4: The patient will complete graded naming tasks with 70% accuracy given min cues   Naming task  - Pt was instructed to name 10 vegetables, independently with 100%      Goal 5: The patient will complete graded memory recall tasks with 80% accuracy given min cues     Functional recall task  - Pt was instructed to recall five items from a grocery list. Pt used repetition to assist with recall. STM: 100% accurcy  5 min Delay: 100% accuracy        Other areas targeted: N/A   N/A   Education:   Education provided re: rationale for today's structured tasks, remind pt to use look back/double check meds after placed in complex organizer. Safety Devices: [x] Call light within reach  [x] Chair alarm activated  [] Bed alarm activated  [] Other:  [] Call light within reach  [] Chair alarm activated  [] Bed alarm activated  [] Other:    Assessment: Pt was pleasant and cooperative throughout session. Pt required min-mod cues from SLP to complete higher level cog tasks. It was observed Pt was independent when organizing pills with a daily complex organizer. Pt required min-mod cues to provide a rationale as to why an object did not belong. Pt continues to make progress towards goals. Plan: Continue as per plan of care. Additional Information: n/a    Barriers toward progress: Dysphagia, severe cognitive communication deficits   Discharge recommendations:  [] Home independently  [] Home with assistance []  24 hour supervision  [] ECF [x] Other: PT/OT recs  Continued Tx Upon Discharge: ? [x] Yes [] No [] TBD based on progress while on ARU [] Vital Stim indicated [] Other:   Estimated discharge date: TBD  Interventions used this date:  [] Speech/Language Treatment  [] Instruction in HEP [] Group [] Dysphagia Treatment [x] Cognitive Treatment   [] Other:       Total Time Breakdown / Charges    Time in Time out Total Time / units   Cognitive Tx 1330 1430 60 min/ 4 units   Speech Tx -- -- --   Dysphagia Tx -- -- -- Electronically Signed by     Jessica Pham   Clinician      Arcelia UMAÑA CCC-SLP  Speech-Language Pathologist  [phone]

## 2020-01-14 NOTE — PROGRESS NOTES
with stent (4/2012); Hysterectomy; bronchoscopy (N/A, 11/12/2019); bronchoscopy (N/A, 11/23/2019); bronchoscopy (11/23/2019); bronchoscopy (11/23/2019); and Upper gastrointestinal endoscopy (N/A, 12/2/2019). Restrictions  Restrictions/Precautions  Restrictions/Precautions: Fall Risk, Modified Diet  Position Activity Restriction  Other position/activity restrictions: Noted multipodous boots ordered by podiatry for foot wounds, USE cushion while up in chair. right foot with necrotic toes-requested RN seek clarification on weightbearing restrictions due to wounds to feet, PEG tube, sponge bathe only per nsg 1/10 d/t LE wounds  Subjective   General  Chart Reviewed: Yes  Patient assessed for rehabilitation services?: Yes  Response to previous treatment: Patient with no complaints from previous session  Family / Caregiver Present: No  Referring Practitioner: Lonnie  Diagnosis: acute encephalopathy due to bacteremia, suspect CLABSI from [address] [place] [address]. Pt with full thickness wounds R and L foot with gangrenous toes. Pt also with KIMBERLY and recent cardiac arrest. Pt with previous admission 11/11 to 12/5 for sepsis in which pt was discharged to LTAC  Subjective  Subjective: Pt in w/c, appears confused but cooperative  General Comment  Comments: RN agreeable to OT session. Transport arrived during OT session to take pt for doppler imaging of LE. Pain Assessment  Pain Level: 4  Pain Type: Acute pain  Pain Location: Foot  Pain Orientation: Right  Pre Treatment Pain Screening  Intervention List: Patient able to continue with treatment  Vital Signs  Patient Currently in Pain: Yes   Orientation  Orientation  Overall Orientation Status: Impaired  Orientation Level: Oriented to person;Disoriented to situation;Disoriented to time;Oriented to place  Objective    ADL  Grooming: Stand by assistance;Setup; Increased time to complete;Verbal cueing  LE Dressing: Setup;Maximum assistance; Increased time to complete;Verbal cueing(max A for socks, pt completion. -MET 1/10  Short term goal 3: Pt will complete oral care in stance at sink with CGA and min verbal cues for sequencing task. -MET 1/10  Short term goal 4: Pt will participate in nine hole peg test for cont assessment of [facility]. -MET 1/14  Long term goals  Time Frame for Long term goals : 2 weeks (by 1/18/20)  Long term goal 1: Pt will complete toilet transfers with supervision with AD as needed and min cues for safety and sequencing. Long term goal 2: Pt will complete LB dressing tasks with min A with AE as needed. Long term goal 3: Pt will complete toileting tasks with supervision.    Patient Goals   Patient goals : \"to walk\" and \"to get back to normal\"         Therapy Time   Individual Concurrent Group Co-treatment   Time In 0900         Time Out 0930         Minutes 30         Timed Code Treatment Minutes: 30 Minutes    Therapy Time   Individual Concurrent Group Co-treatment   Time In 1230         Time Out 1330         Minutes 60         Timed Code Treatment Minutes: [address], OT

## 2020-01-14 NOTE — PROGRESS NOTES
Nutrition Assessment    Type and Reason for Visit: Reassess    Nutrition Recommendations:   · Continue renal diet  · Diet texture/liquid level per SLP recommendations  · Continue Nepro with meals  · Monitor po intakes, nutrition adequacy, weights, pertinent labs, BMs    Nutrition Assessment: Follow up: Pt declining some from a nutritional standpoint AEB variable po intakes per EMR. Po intakes 1-100% per EMR. Pt reports that she has been drinking most of her ONS, but is tired of the same flavor, will adjust order. Encoruged po intakes. Will continue renal diet. Malnutrition Assessment:  · Malnutrition Status: Meets the criteria for severe malnutrition  · Context: Chronic illness  · Findings of the 6 clinical characteristics of malnutrition (Minimum of 2 out of 6 clinical characteristics is required to make the diagnosis of moderate or severe Protein Calorie Malnutrition based on AND/ASPEN Guidelines):  1. Energy Intake-Less than or equal to 75% of estimated energy requirement, Greater than or equal to 3 months    2. Weight Loss-10% loss or greater, in 3 months  3. Fat Loss-Moderate subcutaneous fat loss, Orbital  4. Muscle Loss-Moderate muscle mass loss, Temples (temporalis muscle), Clavicles (pectoralis and deltoids)  5. Fluid Accumulation-No significant fluid accumulation,    6.   Strength-Not measured    Nutrition Risk Level: High    Nutrient Needs:  · Estimated Daily Total Kcal: 3317-1640(22-28 kcal/kg)  · Estimated Daily Protein (g): 76-90(1.2-1.4 g/kg)  · Estimated Daily Total Fluid (ml/day): 1 ml/kcal    Nutrition Diagnosis:   · Problem: Predicted suboptimal energy intake  · Etiology: related to Insufficient energy/nutrient consumption     Signs and symptoms:  as evidenced by Weight loss    Objective Information:  · Nutrition-Focused Physical Findings: +2 BLE edema  · Wound Type: Multiple, Pressure Ulcer, Stage II, Deep Tissue Injury  · Current Nutrition Therapies:  · Oral Diet Orders: Renal, Dysphagia  Soft and Bite-Sized (Dysphagia 3), Fluid Restriction   · Oral Diet intake: 1-25%, 26-50%, 51-75%, %  · Oral Nutrition Supplement (ONS) Orders: Renal Oral Supplement  · ONS intake: %(2-3/day)  · Anthropometric Measures:  · Ht: 5' (152.4 cm)   · Current Body Wt: 145 lb 1 oz (65.8 kg)  · % Weight Change:  ,  28 lb, 16.6% loss x 3 months per EMR  · Ideal Body Wt: 100 lb (45.4 kg),   · BMI Classification: BMI 25.0 - 29.9 Overweight    Nutrition Interventions:   Continue current diet, Continue current ONS  Continued Inpatient Monitoring    Nutrition Evaluation:   · Evaluation: Progressing toward goals   · Goals: Pt will tolerate diet and have meal and ONS intakes 50% or greater during ARU stay    · Monitoring: Meal Intake, Supplement Intake, Diet Tolerance, Nutrition Progression, Weight, Pertinent Labs      Electronically signed by Jayashree Sanchez RD, LD on 1/14/20 at 2:53 PM    Contact Number: Office: [phone]; [address]: [phone]

## 2020-01-14 NOTE — PATIENT CARE CONFERENCE
[facility]  Inpatient Rehabilitation  Weekly Team Conference Note    Date: 1/15/2020  Patient Name: Jame Duvall        MRN: [id number]    : 1945  (75 y.o.)  Gender: female   Referring Practitioner: Carmen Rutherford MD  Diagnosis: Acute encephalopathy      Interventions to be utilized toward barriers to discharge, per discipline:  [address] observed barriers to dc: Limited family support, Depression, Anxiety, Upper extremity weakness, Lower extremity weakness, Skin Care and Wound Care  Nursing interventions: Skin care, Wound Care  Family Education: Yes  Fall Risk:  Yes      Physical therapy observed barriers to dc:    Baseline: indep FWW   Current level:  Needs assist of 1 transfer distances, indep in WC propulsion,    Barriers to DC: poor carryover for safety education with transfers, wounds on both feet vascular following. Pt with limitation in walking due to LE fatigue. Has foot drop. Has ramp to enter home. Needs in order to achieve dc home/next level of care: indep with transfers to [facility] and  accessible home with 24 hours care      Physical therapy interventions:   Current Treatment Recommendations: Strengthening, Transfer Training, Home Exercise Program, Balance Training, Endurance Training, Gait Training, Functional Mobility Training, Wheelchair Mobility Training, Stair training, Pain Management, Equipment Evaluation, Education, & procurement, Safety Education & Training, Patient/Caregiver Education & Training      PHYSICAL THERAPY         PT Equipment Recommendations  Equipment Needed: Yes  Mobility Devices: Wheelchair(walker pending progress)  Wheelchair: Standard    Assessment: Pt seen for 90 minute session of PT with patient given gait training with FWW with  need for encouragement and assist of 2 due to dneed to sit suddenly due to fatigue and LE weakness.   Pt seen for transfer tarianing and bed mobility with pt progressed to indep with bed mobility but continuing to require cues for safety as noted with transfers. Pt noted to have upset stomach this am with nursing notified during session. At end of session pt with  nursing assitant with pt on toilet after walking into bathroom 6 feet with min  assist of 1 FWW and min assist with use of grab bars to stand to sit on toilet. Pt also assisted with min asssist of 1 onto recumbent bike for LE strengthenign and  given cues and assist for LE therex as noted. Cotninue to progress towards goals. See updated goals. Expect  pt will need 24 hour assist as pt with LE weakness such that she is able to consistnently  walk for transfer disances only with assist of 1, needs supervision for cues for safety with transfers as pt does not remember cues from one session to the next. Pt noted to have wounds on both feet for which vascular following, MRI of feet ordered.,        Occupational therapy observed barriers to dc:    Baseline: pt lived with family, independent with ADls              Current level: CGA/min A for transfers, min/mod A for ADLs               Barriers to DC: assist level, cognition              Needs in order to achieve dc home/next level of care: SPV to min A for ADLs and transfers    Occupational Therapy interventions:  Current Treatment Recommendations: Strengthening, ROM, Safety Education & Training, Balance Training, Patient/Caregiver Education & Training, Self-Care / ADL, Cognitive/Perceptual Training, Functional Mobility Training, Neuromuscular Re-education, Equipment Evaluation, Education, & procurement, Home Management Training, Endurance Training, Cognitive Reorientation      OCCUPATIONAL THERAPY  pt pleasant and cooperative, appears less confused this date. Speech remains tangential and she states worry about social issues, but redirectable. Pt cont to requires cues for sequencing with some tasks, but completes toileting and grooming with less assist. Pt tolerates increased standing this date.  Pt supervision for transfers due to knee buckling. Necrotic tissue on feet. Walking 50'. SLP: increase in cognition, some intermittent confusion. Interdisciplinary Goals:   1.)Pt will complete toileting tasks with supervision. 2.) Pt will carryover compensatory strategies to improve overall recall skills within environment   3.)Pt will demo set up safety with transfers with use of wheeled walker without need for cues (aligns to transer surface, pushes up and reaches back to transfer surface. 4.) Pt will remain free of falls  5.)    Discharge Plan   Estimated discharge date: 1/23/2020  Destination: discharge home with supervision vs SNF  Pass:No  Services at Discharge: [address], Occupational Therapy and [address] only  Equipment at Discharge: shower chair. Team Members Present at Conference:  : Marilynn DEVLIN [facility]    Occupational Therapist: Haylee Dudley OTR/L  Physical Therapist:Pita Freeman 0911  Speech Therapist: Kathie Mireles, [facility] SLP  Nurse: Larissa Horton RN  Dietician: Sherron Whitaker RDN, LD  : N/A  Psychiatry: N/A    Family members present at conference: N/A      I led this team conference and I approve the established interdisciplinary plan of care as documented within the medical record of Leonard Kaur. MD: Bela Pringle.  Prachi Reeder MD 1/15/2020, 1:49 PM

## 2020-01-14 NOTE — PROGRESS NOTES
Pharmacy to Dose Warfarin     Dx: DVT of unknown origin   Goal INR range 2-2.5 per MD consult  Home Warfarin dose: 4 mg daily     Date                 INR                  Warfarin  1/3                    2.45                4 mg  1/4                    2.52                4 mg  1/5                    3.87                Hold  1/6                    3.46                Hold  1/7                    2.35                2.5 mg   1/8                    1.66                4 mg   1/9                    1.45                4 mg  1/10                  1.64                4 mg  1/11                  1.53                6 mg  1/12                  1.72                6 mg  1/13                  2.19                4 mg   1/14                  2.64               Hold      Recommend to hold warfarin tonight for recent increase in INR. Daily INR ordered. Rx will continue to manage therapy per Dr Buzz Mauerr consult order.   Cheryle Lea, PharmD 1/14/2020 9:05 AM

## 2020-01-14 NOTE — PROGRESS NOTES
Patient returned from MRI, tolerated well. Sitting in bed, eating dinner. Instructed to be NPO after midnight for PEG tube removal tomorrow.

## 2020-01-14 NOTE — PROGRESS NOTES
[facility]       Progress Note           Subjective/   75y.o. year old female who we are seeing in consultation for KIMBERLY/CKD requiring HD. Transferred back from [facility] for rehab. HPI:  Pawel Garner more, pt has no s/o uremia  Feels fine  Scr is 2.7   K is elevated     HD since 11/13/19 , last HD on 1/7  RIJ TDC removed on 1/8    ROS:  Intake reduced, no fevers. Objective/   GEN:  Chronically ill, /70   Pulse 97   Temp 98.2 °F (36.8 °C) (Oral)   Resp 18   Ht 5' (1.524 m)   Wt 145 lb 1 oz (65.8 kg)   SpO2 97%   BMI 28.33 kg/m²   HEENT: non-icteric, no JVD  CV: S1, S2 without m/r/g; no LE edema  RESP: CTA B without w/r/r; breathing wnl  ABD: +bs, soft, nt, no hsm  SKIN: warm, no rashes  ACCESS: R IJ TDC (12/2)    Data/  Recent Labs     01/13/20  1124   WBC 9.8   HGB 7.7*   HCT 24.4*   MCV 81.3        Recent Labs     01/12/20  0736 01/13/20  1124 01/14/20  0752   * 130* 130*   K 4.8 5.2* 5.6*   CL 94* 94* 94*   CO2 25 26 26   GLUCOSE 87 157* 108*   PHOS 4.8 5.2* 5.8*   BUN 32* 35* 35*   CREATININE 2.7* 2.7* 2.7*   LABGLOM 17* 17* 17*   GFRAA 21* 21* 21*       Assessment/Plan      Acute Kidney Injury.  - ATN from septic shock / cardiac arrest, initially requiring CRRT since 11/13/19  - Resolved      CKD stage 3.  - Suspect from HTN and DM.  - Baseline serum creatinine of 1.3-1.4 mg/dL. - Likely to have worsening of baseline but hopefully we will see slow improvement      Sepsis with MOSF, resolved  - ID following  - Repeat blood cultures 11/27 were no growth to date. enterococci + 1/5 on vnc; 1/9 NGTD  Dc TDC on 1/8  Echo wo vegetations noted on 1/9/20    Hyperkalemia:    - Will add lokelma 10 grams TID x 48 hours, monitor with labs      Acute respiratory failure  - s/p intubation X2, now resolved     Malnutrition. - still has PEG but has not needed it. Planning to get it out      Anemia.   -  on retracrit 5K qHD, change to aranesp 60 mcg IV qweekly with HD     Spindle Cell Sarcoma.   - Follows with Dr. Lori Larsen and Dr. Santy Walker at [place].    Hyponatremia  - Secondary to reduced renal function, hyperglycemia    LE DVT  - On coumadin    Cholelithiasis  - Conservative measures per GI at OSH    Gangrenous toes  - Per Admitting

## 2020-01-15 NOTE — PROGRESS NOTES
[facility]: ACUTE REHAB UNIT  SPEECH-LANGUAGE PATHOLOGY      [x] Daily  [] Weekly Care Conference Note  [] Discharge    [facility]  LAV:[id number]  Rehab Dx/Hx: Metabolic encephalopathy [S20.62]    Precautions: Falls  Home situation: Lives at home, caregiver for son and grandchildren   ST Dx: [] Aphasia  [] Dysarthria  [] Apraxia   [x] Oropharyngeal dysphagia [x] Cognitive Impairment  [] Other:   Date of Admit: 1/3/2020  Room #: 0165/0165-01    Current functional status (updated daily):         Pt being seen for : [] Speech/Language Treatment  [x] Dysphagia Treatment [x] Cognitive Treatment  [] Other:  Communication: [x]WFL  [] Aphasia  [] Dysarthria  [] Apraxia  [] Pragmatic Impairment [] Non-verbal  [] Hearing Loss  [] Other:   Cognition: [] WFL  [] Mild  [x] Moderate  [x] Severe [] Unable to Assess  [] Other:  Memory: [] WFL  [] Mild  [x] Moderate  [x] Severe [] Unable to Assess  [] Other:  Behavior: [x] Alert  [x] Cooperative  []  Pleasant  [x] Confused  [] Agitated  [] Uncooperative  [x] Distractible [] Motivated  [] Self-Limiting [] Anxious  [] Other:  Endurance:  [x] Adequate for participation in SLP sessions  [] Reduced overall  [] Lethargic  [] Other:  Safety: [] No concerns at this time  [x] Reduced insight into deficits  []  Reduced safety awareness [] Not following call light procedures  [] Unable to Assess  [] Other:    Current Diet Order:DIET RENAL; Dysphagia Soft and Bite-Sized; Daily Fluid Restriction: 1500 ml  Dietary Nutrition Supplements: Renal Oral Supplement  Swallowing Precautions:    Alternate solids and liquids;Small bites/sips;Assist feed;Eat/Feed slowly;Upright as possible for all oral intake;Remain upright for 30-45 minutes after meals        Date: 1/15/2020      Tx session 1  [id number] Tx session 2  All tx needs met during session 1   Total Timed Code Min 45 -   Total Treatment Minutes 60 -   Individual Treatment Minutes 60 -   Group Treatment Minutes 0 - Co-Treat Minutes 0 -   Variance/Reason:  n/a N/a    Pain Pt denied pain. Pain Intervention [] RN notified  [] Repositioned  [] Intervention offered and patient declined  [x] N/A  [] Other: [] RN notified  [] Repositioned  [] Intervention offered and patient declined  [] N/A  [] Other:   Subjective     Pt seen upright in wheelchair in room for therapy session. Pt pleasant and cooperative for all tasks presented. Objective:  Goals       Short-term Goals  Timeframe for Short-term Goals: 5 days (01/09)  Goal 1: The patient will tolerate repeat BSE when possible     Goal met. Pt had a MBSS completed on 01/13/20 recommending a Dysphagia soft and bite sized diet with thin liquids. Goal met 01/13/20. Short-term Goals  Timeframe for Short-term Goals: 5 days (01/09)    Goal 1: The patient will complete basic problem solving tasks with 80% accuracy given min cues    Goal addressed. Pt completed math calculation task with 75% (9/12) accuracy. Given repetition of problem and min cues, pt able to improve accuracy to 100%. Goal 2: The patient will use functional external aids to orient to all concepts, given min cues      Goal addressed. Pt able to accurately state month, date, year, day of week, location, and city/state independently with 100% accuracy. Goal 3: The patient will complete graded attention tasks with 80% accuracy given min cues   Goal addressed. Pt completed alternating attention task (naming items in categories per card suite). Pt completed task with min cues needed for accurate category and word retrieval.          Goal 4: The patient will complete graded naming tasks with 70% accuracy given min cues   Goal not addressed this date. Goal 5: The patient will complete graded memory recall tasks with 80% accuracy given min cues Goal not addressed this date.     Long-term Goals  Timeframe for Long-term Goals: 7 days (01/11/2020)    The patient will tolerate least restrictive diet with no severe cognitive communication deficits   Discharge recommendations:  [] Home independently  [] Home with assistance []  24 hour supervision  [] ECF [x] Other: PT/OT recs  Continued Tx Upon Discharge: ? [x] Yes [] No [] TBD based on progress while on ARU [] Vital Stim indicated [] Other:   Estimated discharge date: TBD  Interventions used this date:  [] Speech/Language Treatment  [] Instruction in HEP [] Group [] Dysphagia Treatment [x] Cognitive Treatment   [] Other:       Total Time Breakdown / Charges    Time in Time out Total Time / units   Cognitive Tx 1245 1330 45 min/ 3 units   Speech Tx -- -- --   Dysphagia Tx 1230 1245 15 mins / 1 unit        Electronically Signed by     Todd Alvarado, [address], [address] [facility]  Speech-Language Pathologist

## 2020-01-15 NOTE — PROGRESS NOTES
Procedure; Cataract removal with implant; Carpal tunnel release; Knee arthroscopy; Coronary angioplasty with stent (4/2012); Hysterectomy; bronchoscopy (N/A, 11/12/2019); bronchoscopy (N/A, 11/23/2019); bronchoscopy (11/23/2019); bronchoscopy (11/23/2019); and Upper gastrointestinal endoscopy (N/A, 12/2/2019). Restrictions  Restrictions/Precautions  Restrictions/Precautions: Fall Risk, Modified Diet  Position Activity Restriction  Other position/activity restrictions: Noted multipodous boots ordered by podiatry for foot wounds, USE cushion while up in chair. right foot with necrotic toes-requested RN seek clarification on weightbearing restrictions due to wounds to feet, PEG tube, sponge bathe only per nsg 1/10 d/t LE wounds  Subjective   General: Reports pain atop foot up to 4/10 during session. Additional Pertinent Hx: MRI right foot  1/15/2020: \"3. Questionable age-indeterminate nondisplaced fracture of the 5th proximal phalangeal shaft. . Mild marrow edema in the 1st metatarsal may represent a stress response or noninfectious reactive osteitis. Orientation  Orientation  Overall Orientation Status: Within Normal Limits  Orientation Level: Oriented to place;Oriented to time;Oriented to person;Disoriented to situation  Cognition   Cognition  Following Commands: Follows one step commands with increased time; Follows one step commands with repetition  Objective   Bed mobility  Rolling to Right: Independent  Supine to Sit: Independent  Transfers  Sit to Stand: Stand by assistance  Stand to sit: Stand by assistance  Bed to Chair: Stand by assistance  Ambulation  Ambulation?: Yes  More Ambulation?: Yes  Ambulation 1  Surface: level tile  Device: Rolling Walker  Other Apparatus: Wheelchair follow  Assistance: 2 Person assistance;Contact guard assistance  Quality of Gait: step to gait leading with RLE   Gait Deviations: Slow Caitlyn; Increased BRANDON; Decreased step length;Decreased step height  Distance: 52', 34', 43'  Comments: Patient  needed seated rest break after each bout of walking due to LE fatigue and UE fatigue per pt report. Stairs/Curb  Stairs?: No                  Comment: pt take to bathroom and needed min assist to pull up and down pants, max assist to don new brief and min to remove soiled (urine incontinent) old brief, set up assist for pericare. G-Code     OutComes Score                                                     AM-PAC Score             Goals  Short term goals  Time Frame for Short term goals: one week; 1/11/2020  Short term goal 1: Patient will perform bed mobility with CGA.  1/13/2020Goal Met pt demo indep with rolling  and sup<.sit. Short term goal 2: Patient will perform sit <> stand transfers with Min A and AAD  1/13/2020Goal Met Pt demo sit<.stand with FWW SBA to CGA. Short term goal 3: Patient will perform stand pivot transfers with AAD and Min A 1/13/2020Goal Met Pt demo SPT SBA/CGA with FWW. Short term goal 4: Patient will propel wheelchair 50 ft over even surfaces with SBA. GOAL MET 1/13/2020 Pt demo SANG for WC propulsion 150 feet with BUE for left right turns and 10 feet carpet. Short term goal 5: Patient ambulate 15 ft with Mod A and AAD 1/13/2020 Goal Met Pt demo 50 feet with FWW WC follow SBA to CGA. Long term goals  Time Frame for Long term goals : 2 weeks; 1/18/2020  Long term goal 1: Patient will perform bed mobility with supervision 1/13/2020Goal Met pt demo indep with rolling  and sup<.sit. Long term goal 2: Patient will perform all functional transfers with AAD and supervision. GOAL MET 1/15/2020Pt demo SBA with toilet transfer,  with garb bars and FWW and SBA for bed/WC transfers with FWW.   Long term goal 3: Patient will ambulate 50 ft with AAD and CGA  Long term goal 4: Patient will propel w/c 100 ft on even and uneven surfaces with supervision   GOAL MET  1/15/2020 pt demo 180 feet of WC propulsion wiht BUE with L/R turns mod I for level.   Long term goal 5: Patient will negotiate 3 stairs with AAD and Min A  Patient Goals   Patient goals : \"To get back to normal\"    Plan    Plan  Times per week: 5 of 7 days per week  Times per day: Daily  Specific instructions for Next Treatment: progress transfer training and gait training as able  Current Treatment Recommendations: Strengthening, Transfer Training, Home Exercise Program, Balance Training, Endurance Training, Gait Training, Functional Mobility Training, Wheelchair Mobility Training, Stair training, Pain Management, Equipment Evaluation, Education, & procurement, Safety Education & Training, Patient/Caregiver Education & Training  Safety Devices  Type of devices:  All fall risk precautions in place, Gait belt, Left in chair, Chair alarm in place, Call light within reach  Restraints  Initially in place: No     Therapy Time   Individual Concurrent Group Co-treatment   Time In 810         Time Out 0910         Minutes 60         Timed Code Treatment Minutes: [address] [facility],

## 2020-01-15 NOTE — PROGRESS NOTES
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    Acute encephalopathy     Elevated LFTs 11/22/2019    Moderate protein-calorie malnutrition (HCC) 11/22/2019    Thrombocytopenia (HCC) 11/22/2019    Leukocytosis 11/22/2019    Normocytic normochromic anemia 11/22/2019    Cardiac arrest ([address]     Acute pulmonary edema (HCC)     Pleural effusion     CKD (chronic kidney disease) stage 3, GFR 30-59 ml/min ([address] 11/12/2019    Respiratory arrest before cardiac arrest ([address] 11/11/2019    Acute respiratory failure with hypoxia and hypercapnia ([address] 11/11/2019    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       Pulmonary nodule 10/15/2019    Acute respiratory failure (HCC) 10/15/2019    Overdose of insulin, accidental or unintentional, initial encounter 03/03/2018    Hypoglycemia due to insulin 03/03/2018    Hypoglycemia 03/03/2018    Vaginal atrophy 07/31/2017    Primary osteoarthritis of both knees 02/28/2017    Osteoporosis 01/30/2017    History of endometrial cancer 07/25/2016     Overview Note:     Grade 1 stage 1a dx 11-30-15      Menopausal state 07/25/2016    Chronic pain of left knee 07/19/2016    Chronic pain of right knee 07/19/2016    S/P total hysterectomy and BSO (bilateral salpingo-oophorectomy) 01/05/2016    Drainage from wound 12/08/2015    S/P hysterectomy with oophorectomy 12/08/2015    Endometrial adenocarcinoma ([address] 11/17/2015    Osteoarthritis of spine with MD  Phone: [phone]   Fax : [phone]

## 2020-01-15 NOTE — PROGRESS NOTES
Pharmacy to Dose Warfarin     Dx: DVT of unknown origin   Goal INR range 2-2.5 per MD consult  Home Warfarin dose: 4 mg daily     Date                 INR                  Warfarin  1/3                    2.45                4 mg  1/4                    2.52                4 mg  1/5                    3.87                Hold  1/6                    3.46                Hold  1/7                    2.35                2.5 mg   1/8                    1.66                4 mg   1/9                    1.45                4 mg  1/10                  1.64                4 mg  1/11                  1.53                6 mg  1/12                  1.72                6 mg  1/13                  2.19                4 mg   1/14                  2.64                Hold  1/15                  3.40                Hold     Recommend to hold warfarin tonight for recent increase in INR. Daily INR ordered. Rx will continue to manage therapy per Dr Maria Victoria Chapman consult order.   Trina Holman PharmD  1/15/2020 at 12:26 PM

## 2020-01-15 NOTE — PROGRESS NOTES
Supervision  Standing Balance: Contact guard assistance  Standing Balance  Time: 1-2 min x 2, 4-5 min x 2  Activity: transfers, toileting,ADL tasks, tabletop activity, grooming  Comment: CGA for balance in stance  Toilet Transfers  Toilet - Technique: Stand pivot  Equipment Used: Standard toilet  Toilet Transfer: Contact guard assistance  Toilet Transfers Comments: using grab bars, cues for hand placement and sequencing steps of task      Transfers  Stand Pivot Transfers: Contact guard assistance  Sit to stand: Contact guard assistance  Stand to sit: Contact guard assistance  Transfer Comments: CGA to stand from w/c and toilet using grab bars        Coordination  Gross Motor: fair for ADLs, graded clothespin and \"fishing\" game  Fine Motor: fair for ADLs and graded clothespin task              Cognition  Overall Cognitive Status: Exceptions  Arousal/Alertness: Appropriate responses to stimuli  Following Commands: Follows one step commands with increased time; Follows one step commands with repetition  Attention Span: Attends with cues to redirect  Memory: Decreased recall of biographical Information;Decreased recall of recent events;Decreased short term memory  Safety Judgement: Decreased awareness of need for safety  Problem Solving: Decreased awareness of errors  Insights: Decreased awareness of deficits  Initiation: Requires cues for some  Sequencing: Requires cues for some  Cognition Comment: pt cont to be anxious with tangential speech         Plan   Plan  Times per week: 5/7 days per week  Plan weeks: 2 weeks  Current Treatment Recommendations: Strengthening, ROM, Safety Education & Training, Balance Training, Patient/Caregiver Education & Training, Self-Care / ADL, Cognitive/Perceptual Training, Functional Mobility Training, Neuromuscular Re-education, Equipment Evaluation, Education, & procurement, Home Management Training, Endurance Training, Cognitive Reorientation    Goals  Short term goals  Time Frame for Short term goals: 1 week (by 1/11/20)  Short term goal 1: Pt will tolerate static standing for 3 min with CGA for improved participation in BADL tasks. -met 1/15  Short term goal 2: Pt will complete UB dressing with set-up and min verbal cues for task completion. -MET 1/10  Short term goal 3: Pt will complete oral care in stance at sink with CGA and min verbal cues for sequencing task. -MET 1/10  Short term goal 4: Pt will participate in nine hole peg test for cont assessment of [facility]. -MET 1/14  Long term goals  Time Frame for Long term goals : 2 weeks (by 1/18/20)  Long term goal 1: Pt will complete toilet transfers with supervision with AD as needed and min cues for safety and sequencing-progressing  Long term goal 2: Pt will complete LB dressing tasks with min A with AE as needed. -progressing  Long term goal 3: Pt will complete toileting tasks with supervision.  -progressing  Patient Goals   Patient goals : \"to walk\" and \"to get back to normal\"         Therapy Time   Individual Concurrent Group Co-treatment   Time In 1000         Time Out 1030         Minutes 30         Timed Code Treatment Minutes: 30 Minutes    Therapy Time   Individual Concurrent Group Co-treatment   Time In 1400         Time Out 1510         Minutes 70         Timed Code Treatment Minutes: [address] 48, OT

## 2020-01-15 NOTE — PROGRESS NOTES
12/2/2019). Restrictions  Restrictions/Precautions  Restrictions/Precautions: Fall Risk, Modified Diet  Position Activity Restriction  Other position/activity restrictions: Noted multipodous boots ordered by podiatry for foot wounds, USE cushion while up in chair. right foot with necrotic toes-requested RN seek clarification on weightbearing restrictions due to wounds to feet, PEG tube, sponge bathe only per nsg 1/10 d/t LE wounds  Subjective   General  Chart Reviewed: Yes  Additional Pertinent Hx: MRI right foot  1/15/2020: \"3. Questionable age-indeterminate nondisplaced fracture of the 5th proximal phalangeal shaft. . Mild marrow edema in the 1st metatarsal may represent a stress response or noninfectious reactive osteitis. Response To Previous Treatment: Patient with no complaints from previous session. Family / Caregiver Present: No  Referring Practitioner: Cheyanne Goldberg MD  Subjective  Subjective: pt agreeable to therapy, but very fatigued  Pain Screening  Patient Currently in Pain: Denies  Vital Signs  Patient Currently in Pain: Denies       Orientation  Orientation  Overall Orientation Status: Within Normal Limits  Cognition   Cognition  Overall Cognitive Status: Exceptions  Arousal/Alertness: Appropriate responses to stimuli  Following Commands: Follows one step commands with increased time; Follows one step commands with repetition  Attention Span: Attends with cues to redirect  Memory: Decreased recall of biographical Information;Decreased recall of recent events;Decreased short term memory  Safety Judgement: Decreased awareness of need for safety  Problem Solving: Decreased awareness of errors  Insights: Decreased awareness of deficits  Initiation: Requires cues for some  Sequencing: Requires cues for some  Cognition Comment: pt cont to be anxious with tangential speech   Objective   Bed mobility  Supine to Sit: Stand by assistance(with HOB minimally elevated)  Sit to Supine: Unable to assess(Pt 100 ft on even and uneven surfaces with supervision  Long term goal 5: Patient will negotiate 3 stairs with AAD and Min A  Patient Goals   Patient goals : \"To get back to normal\"    Plan    Plan  Times per week: 5 of 7 days per week  Times per day: Daily  Specific instructions for Next Treatment: progress transfer training and gait training as able  Current Treatment Recommendations: Strengthening, Transfer Training, Home Exercise Program, Balance Training, Endurance Training, Gait Training, Functional Mobility Training, Wheelchair Mobility Training, Stair training, Pain Management, Equipment Evaluation, Education, & procurement, Safety Education & Training, Patient/Caregiver Education & Training  Safety Devices  Type of devices:  All fall risk precautions in place, Gait belt, Left in chair, Chair alarm in place, Call light within reach  Restraints  Initially in place: No     Therapy Time   Individual Concurrent Group Co-treatment   Time In 0930         Time Out 1000         Minutes 30         Timed Code Treatment Minutes: 2015 Ruben Butler, PT

## 2020-01-15 NOTE — PROGRESS NOTES
ecchymotic. Unstageable, stable eschar to the right heel. Mild erythema surrounding toes that improves with elevation. +1 pitting edema to right foot. Left foot toes 2-4 small dorsal full thickness wounds. 2nd toe left has fibrotic base. No surrounding sings of infection. Neurologic:  Protective sensation is grossly diminshed to light touch at the level of the toes, bilateral.  Vascular:  DP and PT pulses are non-palpable, bilateral.    Musculoskeletal:  Patient has 5/5 strength on inversion/everison/dorsiflexion/plantarflexion, bilateral.  No gross musculoskeletal deformities noted.     Labs:  CBC with Differential:    Lab Results   Component Value Date    WBC 9.8 01/13/2020    RBC 3.01 01/13/2020    RBC 4.31 12/08/2015    HGB 7.7 01/13/2020    HCT 24.4 01/13/2020     01/13/2020    MCV 81.3 01/13/2020    MCH 25.8 01/13/2020    MCHC 31.7 01/13/2020    RDW 18.5 01/13/2020    NRBC 1 11/28/2019    NRBC 1 11/28/2019    SEGSPCT 52.3 10/21/2012    BANDSPCT 9 11/28/2019    METASPCT 2 11/24/2019    LYMPHOPCT 21.6 01/09/2020    LYMPHOPCT 26.1 12/08/2015    MONOPCT 11.7 01/09/2020    MYELOPCT 2 11/23/2019    EOSPCT 0.0 04/05/2012    BASOPCT 0.6 01/09/2020    MONOSABS 1.0 01/09/2020    LYMPHSABS 1.8 01/09/2020    EOSABS 0.1 01/09/2020    BASOSABS 0.0 01/09/2020    DIFFTYPE Auto 10/21/2012     CMP:    Lab Results   Component Value Date     01/14/2020    K 5.6 01/14/2020    K 5.0 01/09/2020    CL 94 01/14/2020    CO2 26 01/14/2020    BUN 35 01/14/2020    CREATININE 2.7 01/14/2020    GFRAA 21 01/14/2020    GFRAA 57 10/21/2012    AGRATIO 0.7 01/09/2020    LABGLOM 17 01/14/2020    GLUCOSE 108 01/14/2020    PROT 6.8 01/09/2020    PROT 7.0 11/30/2012    LABALBU 2.9 01/14/2020    CALCIUM 9.0 01/14/2020    BILITOT 0.6 01/09/2020    ALKPHOS 100 01/09/2020    AST 23 01/09/2020    ALT 11 01/09/2020     HgBA1c:    Lab Results   Component Value Date    LABA1C 8.5 11/12/2019       Imaging:  Right:   Soft tissue swelling.

## 2020-01-15 NOTE — PROGRESS NOTES
Oral, TID PRN  atorvastatin (LIPITOR) tablet 20 mg, 20 mg, Oral, Nightly  donepezil (ARICEPT) tablet 5 mg, 5 mg, Oral, Nightly  ferrous sulfate tablet 325 mg, 325 mg, Oral, BID WC  insulin lispro (HUMALOG) injection vial 0-12 Units, 0-12 Units, Subcutaneous, TID WC  insulin lispro (HUMALOG) injection vial 0-6 Units, 0-6 Units, Subcutaneous, Nightly  glucose (GLUTOSE) 40 % oral gel 15 g, 15 g, Oral, PRN  dextrose 50 % IV solution, 12.5 g, Intravenous, PRN  glucagon (rDNA) injection 1 mg, 1 mg, Intramuscular, PRN  dextrose 5 % solution, 100 mL/hr, Intravenous, PRN  hydrocortisone (ANUSOL-HC) suppository 25 mg, 25 mg, Rectal, BID  insulin glargine (LANTUS) injection vial 19 Units, 19 Units, Subcutaneous, Nightly  loperamide (IMODIUM) capsule 2 mg, 2 mg, Oral, 4x Daily PRN  melatonin tablet 5 mg, 5 mg, Oral, Nightly PRN  metoprolol tartrate (LOPRESSOR) tablet 25 mg, 25 mg, Oral, BID  ondansetron (ZOFRAN-ODT) disintegrating tablet 8 mg, 8 mg, Oral, Q8H PRN  hydrALAZINE (APRESOLINE) tablet 50 mg, 50 mg, Oral, Q6H PRN  traZODone (DESYREL) tablet 50 mg, 50 mg, Oral, Nightly PRN  pantoprazole (PROTONIX) tablet 40 mg, 40 mg, Oral, QAM AC  vitamin B-1 (THIAMINE) tablet 100 mg, 100 mg, Oral, Daily  traMADol (ULTRAM) tablet 50 mg, 50 mg, Oral, Q6H PRN  acetaminophen (TYLENOL) tablet 650 mg, 650 mg, Oral, Q4H PRN  magnesium hydroxide (MILK OF MAGNESIA) 400 MG/5ML suspension 30 mL, 30 mL, Oral, Daily PRN  warfarin (COUMADIN) daily dosing (placeholder), , Other, RX Placeholder    Allergies:  Dilaudid [hydromorphone hcl]; Flexeril [cyclobenzaprine hcl]; Heparin; Hydromorphone; Penicillins; Soma [carisoprodol]; and Sulfa antibiotics    Review of Systems:    As noted above    Physical Exam:    Vitals:    /66   Pulse 89   Temp 97.6 °F (36.4 °C) (Oral)   Resp 18   Ht 5' (1.524 m)   Wt 145 lb 1 oz (65.8 kg)   SpO2 94%   BMI 28.33 kg/m²   Integument:  Full thickness wounds to toes 2-4 right foot.  2nd toe wound now reaches to the PIPJ. 3rd PIPJ capsule appears exposed and 4th is ecchymotic. Unstageable, stable eschar to the right heel. Mild erythema surrounding toes that improves with elevation. +1 pitting edema to right foot. Left foot toes 2-4 small dorsal full thickness wounds. 2nd toe left has fibrotic base. No surrounding sings of infection. Neurologic:  Protective sensation is grossly diminshed to light touch at the level of the toes, bilateral.  Vascular:  DP and PT pulses are non-palpable, bilateral.    Musculoskeletal:  Patient has 5/5 strength on inversion/everison/dorsiflexion/plantarflexion, bilateral.  No gross musculoskeletal deformities noted.     Labs:  CBC with Differential:    Lab Results   Component Value Date    WBC 9.8 01/13/2020    RBC 3.01 01/13/2020    RBC 4.31 12/08/2015    HGB 7.7 01/13/2020    HCT 24.4 01/13/2020     01/13/2020    MCV 81.3 01/13/2020    MCH 25.8 01/13/2020    MCHC 31.7 01/13/2020    RDW 18.5 01/13/2020    NRBC 1 11/28/2019    NRBC 1 11/28/2019    SEGSPCT 52.3 10/21/2012    BANDSPCT 9 11/28/2019    METASPCT 2 11/24/2019    LYMPHOPCT 21.6 01/09/2020    LYMPHOPCT 26.1 12/08/2015    MONOPCT 11.7 01/09/2020    MYELOPCT 2 11/23/2019    EOSPCT 0.0 04/05/2012    BASOPCT 0.6 01/09/2020    MONOSABS 1.0 01/09/2020    LYMPHSABS 1.8 01/09/2020    EOSABS 0.1 01/09/2020    BASOSABS 0.0 01/09/2020    DIFFTYPE Auto 10/21/2012     CMP:    Lab Results   Component Value Date     01/15/2020    K 4.8 01/15/2020    K 5.0 01/09/2020    CL 94 01/15/2020    CO2 26 01/15/2020    BUN 39 01/15/2020    CREATININE 3.2 01/15/2020    GFRAA 17 01/15/2020    GFRAA 57 10/21/2012    AGRATIO 0.7 01/09/2020    LABGLOM 14 01/15/2020    GLUCOSE 78 01/15/2020    PROT 6.8 01/09/2020    PROT 7.0 11/30/2012    LABALBU 2.6 01/15/2020    CALCIUM 8.5 01/15/2020    BILITOT 0.6 01/09/2020    ALKPHOS 100 01/09/2020    AST 23 01/09/2020    ALT 11 01/09/2020     HgBA1c:    Lab Results   Component Value Date    LABA1C 8.5 11/12/2019 Imaging:  Right:   Soft tissue swelling.       Detailed evaluation limited by bandaging.       Deformity involving the mid shaft of the proximal phalanx 5th digit which may   be related to nondisplaced fracture.  Recommend oblique view.         LeftLimited evaluation.  No obvious radiographic evidence of osteomyelitis. Laser studies:   Laser perfusion pressures bilateral feet are adequate for healing of a    spontaneous or surgical wound.         MRI:  1. Diffuse subcutaneous edema of the forefoot compatible with lymphedema   versus cellulitis.  No deep soft tissue defect or drainable fluid collection. 2. Nonspecific marrow edema throughout the 1st through 5th phalanges with   patchy mildly decreased T1 signal.  No confluent decreased T1 signal or   osseous erosion.  This likely represents noninfectious reactive osteitis. Early osteomyelitis is unlikely. 3. Questionable age-indeterminate nondisplaced fracture of the 5th proximal   phalangeal shaft. 4. Mild marrow edema in the 1st metatarsal may represent a stress response or   noninfectious reactive osteitis. Impression/Recommendations: The patient is a pleasant 75 y.o. female with:  1) Full thickness wounds right and left foot  -Right 2nd toe at the PIPJ has worsened in appearance  Necrotic to PIPJ and will need amputation. 3rd toe appears to have capsule of the joint exposed. Remaining toe wounds are superficial in appearance. -MRI negative for OM. Will continue to monitor the third toe for changes.  -Will evaluate R third toe wound tomorrow. Likely will need amputation along with the R second. Will perform R heel debridement in conjunction. -OR tomorrow 1/16/20 5:00pm NPO after 9am  -Reversing warfarin    2) Pressure ulceration heel  -stable  -Applied betadine  -Multipodus boots ordered. 3) PVD  -Adequate healing per laser study    4) DM  5) Encephalopathy     Thank you for the opportunity to take part in this patient's care.

## 2020-01-15 NOTE — PROGRESS NOTES
rehab goals and discharge planning. Barriers: cognition, weakness, balance, endurance. Total treatment time >35 min with greater than 50% spent in care coordination. Rodríguez Ortiz MD 1/15/2020, 1:50 PM

## 2020-01-15 NOTE — CARE COORDINATION
Writer met with patient to discuss disposition of care conference with interdisciplinary team on 1/15/2020               Discussed:  intent is to return to home setting. 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 Son has had a major stroke and is disabled. Daughter has LVAD. PT: confusion, poor short term memory, poor endurance. Recommendations: 24hr care. OT: assist with lower body ADLs, supervision for transfers due to knee buckling. Necrotic tissue on feet. Walking 50'. SLP: increase in cognition, some intermittent confusion. Recommendations: Discharge to home with supervision vs short term skilled stay. Social work to follow up on 1/17/2020 for patient determination. Referral made to [facility] for assistance with potential for passport services. Home Health referral PT/OT/SN/Aide. Shower chair. Patient requests reacher and manual wheelchair. Anticipated discharge date: 1/23/2020    Patient verbalized understanding of recommendations and anticipated discharge date.        CLAUS Donaldson

## 2020-01-16 NOTE — ANESTHESIA POSTPROCEDURE EVALUATION
Department of Anesthesiology  Postprocedure Note    Patient: Karuna Horowitz  MRN: [id number]  YOB: 1945  Date of evaluation: 1/16/2020  Time:  5:54 PM     Procedure Summary     Date:  01/16/20 Room / Location:  [facility]    Anesthesia Start:  1657 Anesthesia Stop:  1101    Procedure:  RIGHT SECOND AND THIRD TOE AMPUTATION, RIGHT HEEL WOUND DEBRIDEMENT, RIGHT HALLUX TOE NAIL DEBRIDEMENT (Right Foot) Diagnosis:  (-)    Surgeon:  Néstor Martinez DPM Responsible Provider:  Jane Corona MD    Anesthesia Type:  MAC ASA Status:  4          Anesthesia Type: MAC    Shelly Phase I:      Shelly Phase II:      Last vitals: Reviewed and per EMR flowsheets.        Anesthesia Post Evaluation    Comments: Postoperative Anesthesia Note    Name:    Karuna Horowitz  MRN:      [id number]    Patient Vitals in the past 12 hrs:  01/16/20 0730, BP:130/75, Temp:98.2 °F (36.8 °C), Temp src:Oral, Pulse:74, Resp:18, SpO2:94 %     LABS:    CBC  Lab Results       Component                Value               Date/Time                  WBC                      7.4                 01/16/2020 12:39 PM        HGB                      7.4 (L)             01/16/2020 12:39 PM        HCT                      22.7 (L)            01/16/2020 12:39 PM        PLT                      380                 01/16/2020 12:39 PM   RENAL  Lab Results       Component                Value               Date/Time                  NA                       127 (L)             01/16/2020 12:39 PM        K                        4.5                 01/16/2020 12:39 PM        K                        5.0                 01/09/2020 11:49 AM        CL                       90 (L)              01/16/2020 12:39 PM        CO2                      25                  01/16/2020 12:39 PM        BUN                      39 (H)              01/16/2020 12:39 PM        CREATININE               2.9 (H)             01/16/2020 12:39 PM GLUCOSE                  105 (H)             01/16/2020 12:39 PM   COAGS  Lab Results       Component                Value               Date/Time                  PROTIME                  19.7 (H)            01/16/2020 03:42 PM        INR                      1.69 (H)            01/16/2020 03:42 PM        APTT                     56.0 (H)            11/28/2019 08:00 AM     Intake & Output:  In: 280 (P.O.:180; I.V.:100)  Out: -     Nausea & Vomiting:  No    Level of Consciousness:  Awake    Pain Assessment:  Adequate analgesia    Anesthesia Complications:  No apparent anesthetic complications    SUMMARY      Vital signs stable  OK to discharge from Stage I post anesthesia care.   Care transferred from Anesthesiology department on discharge from perioperative area

## 2020-01-16 NOTE — PROGRESS NOTES
Shayla Harvey  1/16/2020  [id number]    Chief Complaint: Metabolic encephalopathy    Subjective:   INR still 2.4 despite vit K last night. Second dose given today; will recheck INR shortly. ROS: No n/v, cp, sob, f/c  Objective:  Patient Vitals for the past 24 hrs:   BP Temp Temp src Pulse Resp SpO2   01/16/20 0730 130/75 98.2 °F (36.8 °C) Oral 74 18 94 %   01/15/20 2200 124/69 97.9 °F (36.6 °C) Oral 86 18 93 %     Gen: No distress, pleasant. HEENT: Normocephalic, atraumatic. CV: Regular rate and rhythm. Resp: No respiratory distress. Abd: Soft, nontender   Ext: No edema. Neuro: Alert, oriented, appropriately interactive. Wt Readings from Last 3 Encounters:   01/14/20 145 lb 1 oz (65.8 kg)   12/05/19 156 lb 1.4 oz (70.8 kg)   10/17/19 169 lb 6.4 oz (76.8 kg)       Laboratory data:   Lab Results   Component Value Date    WBC 9.8 01/13/2020    HGB 7.7 (L) 01/13/2020    HCT 24.4 (L) 01/13/2020    MCV 81.3 01/13/2020     01/13/2020       Lab Results   Component Value Date     01/15/2020    K 4.8 01/15/2020    K 5.0 01/09/2020    CL 94 01/15/2020    CO2 26 01/15/2020    BUN 39 01/15/2020    CREATININE 3.2 01/15/2020    GLUCOSE 78 01/15/2020    CALCIUM 8.5 01/15/2020        Therapy progress:  PT  Position Activity Restriction  Other position/activity restrictions: Noted multipodous boots ordered by podiatry for foot wounds, USE cushion while up in chair.   right foot with necrotic toes-requested RN seek clarification on weightbearing restrictions due to wounds to feet, PEG tube, sponge bathe only per nsg 1/10 d/t LE wounds  Objective     Sit to Stand: Stand by assistance  Stand to sit: Stand by assistance  Bed to Chair: Stand by assistance  Device: Rolling Walker  Other Apparatus: Wheelchair follow  Assistance: 2 Person assistance, Contact guard assistance  Distance: 52', 34', 42'  OT  PT Equipment Recommendations  Equipment Needed: Yes  Mobility Devices: Wheelchair  Wheelchair: Standard  Other: 22 inch wheel chair with cushion   Toilet - Technique: Stand pivot  Equipment Used: Standard toilet  Toilet Transfers Comments: using grab bars, cues for hand placement and sequencing steps of task   Assessment        SLP  Current Diet : Regular  Current Liquid Diet : Thin  Diet Solids Recommendation: Dysphagia Soft and Bite-Sized (Dysphagia III)(Per MD order)  Liquid Consistency Recommendation: Thin    Body mass index is 28.33 kg/m². Rehabilitation Diagnosis:  Brain, 2.1, Non-Traumatic     Assessment and Plan:  Bilat lower limb DVT  - Anticoagulation, pharmacy to dose  - Goal INR 2.0-2.5  - On hold for toe amputation     Cholelithiasis  - No urgent intervention per GI at OSH    Enterococcal bacteremia  - Cont vancomycin 2 weeks post-TDC removal  - Appreciate ID   - dialysis catheter removed      Spindle cell sarcoma  - OP oncology f/u       Cardiac arrest  - Continue beta blocker, statin     Encephalopathy  - Likely related to cardiac arrest  - SLP consulted     Gangrenous toes  - Continue wound care per orders  - Wound care consulted  - Consulted podiatry; possible amputation today pending INR     DM  - lantus, SSI     Bowels: Schedule colace + senna. Follow bowel movements. Enema or suppository if needed. KIMBERLY  - Appreciate nephrology input     Bladder: Check PVR x 3. [address] if PVR > 200ml or if any volume is > 500 ml.      Sleep: Trazodone provided prn. HALIE: 1/23 home vs skilled  DME: RW, WC with swing away leg rests, shower chair vs ttb    Rodríguez Maurer MD 1/16/2020, 12:44 PM

## 2020-01-16 NOTE — PROGRESS NOTES
cueing provided. Safety Devices: [x] Call light within reach  [] Chair alarm activated  [x] Bed alarm activated  [x] Other: Pt left in bed, RN at bedside. [] Call light within reach  [] Chair alarm activated  [] Bed alarm activated  [] Other:    Assessment: Pt was pleasant and cooperative throughout session, continues to be motivated to improve although reported feeling \"anxious\" about her surgery later this date. She has consistently been oriented x4 without cues required (goal met this date). She benefits from min cues to complete cognitive-linguistic tasks and occasional repetition of stimuli / instructions for improved comprehension and accuracy. Continue with goals per POC. Plan: Continue as per plan of care. Additional Information: n/a    Barriers toward progress: Dysphagia, severe cognitive communication deficits   Discharge recommendations:  [] Home independently  [] Home with assistance []  24 hour supervision  [] ECF [x] Other: PT/OT recs  Continued Tx Upon Discharge: ? [x] Yes [] No [] TBD based on progress while on ARU [] Vital Stim indicated [] Other:   Estimated discharge date: TBD  Interventions used this date:  [] Speech/Language Treatment  [] Instruction in HEP [] Group [] Dysphagia Treatment [x] Cognitive Treatment   [] Other:       Total Time Breakdown / Charges    Time in Time out Total Time / units   Cognitive Tx 1300 1400 60 min / 4 units   Speech Tx -- -- --   Dysphagia Tx -- -- --       Electronically Signed by     Connie Snider M.S. [id number] [facility]  Speech-language pathologist  [id number]

## 2020-01-16 NOTE — ANESTHESIA PRE PROCEDURE
times daily 12/4/19   Ledy Mack MD   vitamin B-1 100 MG tablet 1 tablet by Per NG tube route daily 12/5/19   Ledy Mack MD   fluticasone [facility] HFA) 220 MCG/ACT inhaler Inhale 1 puff into the lungs 2 times daily    Historical Provider, MD   guanFACINE (TENEX) 1 MG tablet Take 1 tablet by mouth nightly 5/26/16   Suzanne Valentino MD   atorvastatin (LIPITOR) 40 MG tablet Take 40 mg by mouth daily    Historical Provider, MD   albuterol sulfate  (90 BASE) MCG/ACT inhaler Inhale 2 puffs into the lungs every 4 hours as needed 3/23/16   Kerwin Burgos MD   INSULIN PEN NEEDLE For use with Lantus and Novolog 4/20/15   Historical Provider, MD   Potassium Chloride ER 8 MEQ CPCR Take 8 mEq by mouth daily     Historical Provider, MD   insulin aspart (NOVOLOG) 100 UNIT/ML injection Inject 16 Units into the skin 3 times daily (before meals) Inject 3 units per 15g of carb. Average 40 units/day    Historical Provider, MD   Cholecalciferol (VITAMIN D3) 2000 UNITS CAPS Take 2,000 Units by mouth daily     Historical Provider, MD   B Complex-C-Iron (SUPER B-COMPLEX/IRON/VITAMIN C PO) Take 1 tablet by mouth daily.  9/2/10   Historical Provider, MD       Current medications:    Current Facility-Administered Medications   Medication Dose Route Frequency Provider Last Rate Last Dose    HYDROmorphone (DILAUDID) injection 0.5 mg  0.5 mg Intravenous Q10 Min PRN Maday Fischer MD        HYDROmorphone (DILAUDID) injection 0.5 mg  0.5 mg Intravenous Q5 Min PRN Maday Fischer MD        oxyCODONE-acetaminophen (PERCOCET) 5-325 MG per tablet 1 tablet  1 tablet Oral PRN Maday Fischer MD        Or    oxyCODONE-acetaminophen (PERCOCET) 5-325 MG per tablet 2 tablet  2 tablet Oral PRN Maday Fischer MD        diphenhydrAMINE (BENADRYL) injection 6.25 mg  6.25 mg Intravenous Once PRN Maday Fischer MD        ondansetron [facility]) injection 4 mg  4 mg Intravenous Q30 Min PRN Maday Fischer MD 19 Units Subcutaneous Nightly Cheyanne Goldberg MD   19 Units at 01/15/20 2214    loperamide (IMODIUM) capsule 2 mg  2 mg Oral 4x Daily PRN Cheyanne Goldberg MD        melatonin tablet 5 mg  5 mg Oral Nightly PRN Cheyanne Golbderg MD   5 mg at 01/14/20 2140    metoprolol tartrate (LOPRESSOR) tablet 25 mg  25 mg Oral BID Cheyanne Goldberg MD   25 mg at 01/16/20 0816    ondansetron (ZOFRAN-ODT) disintegrating tablet 8 mg  8 mg Oral Q8H PRN Cheyanne Goldberg MD   8 mg at 01/14/20 0630    hydrALAZINE (APRESOLINE) tablet 50 mg  50 mg Oral Q6H PRN Cheyanne Goldberg MD        traZODone (DESYREL) tablet 50 mg  50 mg Oral Nightly PRN Cheyanne Goldberg MD   50 mg at 01/15/20 2214    pantoprazole (PROTONIX) tablet 40 mg  40 mg Oral QAM AC Cheyanne Goldberg MD   40 mg at 01/16/20 4321    vitamin B-1 (THIAMINE) tablet 100 mg  100 mg Oral Daily Cheyanne Goldberg MD   100 mg at 01/16/20 0572    traMADol (ULTRAM) tablet 50 mg  50 mg Oral Q6H PRN Cheyanne Goldberg MD   50 mg at 01/14/20 2140    acetaminophen (TYLENOL) tablet 650 mg  650 mg Oral Q4H PRN Cheyanne Goldberg MD   650 mg at 01/08/20 2105    magnesium hydroxide (MILK OF MAGNESIA) 400 MG/5ML suspension 30 mL  30 mL Oral Daily PRN Cheyanne Goldberg MD           Allergies: Allergies   Allergen Reactions    Dilaudid [Hydromorphone Hcl] Other (See Comments)     Pt states it made her crazy    Flexeril [Cyclobenzaprine Hcl]     Heparin      KEENAN     Hydromorphone     Penicillins     Soma [Carisoprodol]     Sulfa Antibiotics        Problem List:    Patient Active Problem List   Diagnosis Code    Asthma J45.909    Coronary artery disease I25.10    Uncontrolled hypertension I10    Vertigo R42    Chest pain R07.9    Type 2 diabetes mellitus with hyperglycemia, with long-term current use of insulin (HCC) E11.65, Z79.4    Acute asthma exacerbation J45. 0    Chest pain R07.9    HTN (hypertension) I10    CAD (coronary artery disease) I25.10    Lipids

## 2020-01-16 NOTE — PROGRESS NOTES
Physical Therapy  Facility/Department: [facility]  Daily Treatment Note  NAME: Aleksandr Vo  : 1945  MRN: [id number]    Date of Service: 2020    Discharge Recommendations:  24 hour supervision or assist, Home with Home health PT   PT Equipment Recommendations  Equipment Needed: Yes  Wheelchair: Standard  Other: 22 inch wheel chair with cushion     Assessment   Body structures, Functions, Activity limitations: Decreased functional mobility ; Decreased ROM; Decreased safe awareness;Decreased endurance;Decreased balance; Increased pain;Decreased sensation;Decreased cognition;Decreased strength  Assessment: pt pleasant and agreeable to therapy. reports minimal pain in R foot that was not aggravated with therapy activiites. completes trnasfers with SBA due to occasional cues for safe hand placement and eccentric control. tolerated ther ex with no complaints. Treatment Diagnosis: Functional mobility deficit  Specific instructions for Next Treatment: progress transfer training and gait training as able  Prognosis: Fair  Decision Making: Medium Complexity  PT Education: Functional Mobility Training;Transfer Training;Home Exercise Program;Goals;Plan of Care;PT Role;General Safety  Patient Education: pt needs  cues for gait and with transfers for safety with pt not retaining education on this from one session to the next without verbal reminder. Barriers to Learning: cognition  REQUIRES PT FOLLOW UP: Yes  Activity Tolerance  Activity Tolerance: Patient limited by fatigue;Patient limited by endurance     Patient Diagnosis(es): There were no encounter diagnoses. has a past medical history of Allergic, Anemia, Angina, Arthritis, Asthma, Cancer ([address] Coronary artery disease, Diabetes mellitus ([address] Heart attack ([address] Hyperlipidemia, Hypertension, and Obesity. has a past surgical history that includes Diagnostic Cardiac Cath Lab Procedure; Cataract removal with implant;  Carpal tunnel release; Knee arthroscopy; Coronary angioplasty with stent (4/2012); Hysterectomy; bronchoscopy (N/A, 11/12/2019); bronchoscopy (N/A, 11/23/2019); bronchoscopy (11/23/2019); bronchoscopy (11/23/2019); and Upper gastrointestinal endoscopy (N/A, 12/2/2019). Restrictions  Restrictions/Precautions  Restrictions/Precautions: Fall Risk, Modified Diet  Position Activity Restriction  Other position/activity restrictions: Noted multipodous boots ordered by podiatry for foot wounds, USE cushion while up in chair. right foot with necrotic toes-requested RN seek clarification on weightbearing restrictions due to wounds to feet, PEG tube, sponge bathe only per nsg 1/10 d/t LE wounds  Subjective   General  Chart Reviewed: Yes  Additional Pertinent Hx: MRI right foot  1/15/2020: \"3. Questionable age-indeterminate nondisplaced fracture of the 5th proximal phalangeal shaft. . Mild marrow edema in the 1st metatarsal may represent a stress response or noninfectious reactive osteitis. Response To Previous Treatment: Patient with no complaints from previous session. Family / Caregiver Present: No  Referring Practitioner: Kristie Sarmiento MD  Subjective  Subjective: continues to report anxiety about upcoming procedure. General Comment  Comments: found seated in w/c finishing therapy session.    Pain Screening  Patient Currently in Pain: Yes  Pain Assessment  Pain Assessment: 0-10  Pain Level: 4  Pain Type: Acute pain  Pain Location: Foot  Pain Orientation: Right  Pain Descriptors: Tightness  Non-Pharmaceutical Pain Intervention(s): Repositioned  Response to Pain Intervention: Patient Satisfied  Vital Signs  Patient Currently in Pain: Yes       Orientation  Orientation  Overall Orientation Status: Within Functional Limits  Cognition      Objective      Transfers  Sit to Stand: Stand by assistance  Stand to sit: Stand by assistance  Stand Pivot Transfers: Stand by assistance  Comment: stand pivot from w/c <> low mat x 3 reps with SBA and occasional cues for safe hand placement. Exercises  Hip Flexion: 2x10 BLE   Hip Abduction: 2x10 BLE with orange theraband   Knee Long Arc Quad: 2x10 BLE   Ankle Pumps: 2x10 BLE   Comments: completed seated             Goals  Short term goals  Time Frame for Short term goals: one week; 1/11/2020  Short term goal 1: Patient will perform bed mobility with CGA.  1/13/2020Goal Met pt demo indep with rolling  and sup<.sit. Short term goal 2: Patient will perform sit <> stand transfers with Min A and AAD  1/13/2020Goal Met Pt demo sit<.stand with FWW SBA to CGA. Short term goal 3: Patient will perform stand pivot transfers with AAD and Min A 1/13/2020Goal Met Pt demo SPT SBA/CGA with FWW. Short term goal 4: Patient will propel wheelchair 50 ft over even surfaces with SBA. GOAL MET 1/13/2020 Pt demo SANG for WC propulsion 150 feet with BUE for left right turns and 10 feet carpet. Short term goal 5: Patient ambulate 15 ft with Mod A and AAD 1/13/2020 Goal Met Pt demo 50 feet with FWW WC follow SBA to CGA. Long term goals  Time Frame for Long term goals : 2 weeks; 1/18/2020  Long term goal 1: Patient will perform bed mobility with supervision 1/13/2020Goal Met pt demo indep with rolling  and sup<.sit. Long term goal 2: Patient will perform all functional transfers with AAD and supervision. GOAL MET 1/15/2020Pt demo SBA with toilet transfer,  with garb bars and FWW and SBA for bed/WC transfers with FWW. Long term goal 3: Patient will ambulate 50 ft with AAD and CGA  Long term goal 4: Patient will propel w/c 100 ft on even and uneven surfaces with supervision   GOAL MET  1/15/2020 pt demo 180 feet of WC propulsion wiht BUE with L/R turns mod I for level. Long term goal 5: Patient will negotiate 3 stairs with AAD and Min A  Patient Goals   Patient goals :  \"To get back to normal\"    Plan    Plan  Times per week: 5 of 7 days per week  Times per day: Daily  Specific instructions for Next Treatment: progress transfer training and gait training as able  Current Treatment Recommendations: Strengthening, Transfer Training, Home Exercise Program, Balance Training, Endurance Training, Gait Training, Functional Mobility Training, Wheelchair Mobility Training, Stair training, Pain Management, Equipment Evaluation, Education, & procurement, Safety Education & Training, Patient/Caregiver Education & Training  Safety Devices  Type of devices:  All fall risk precautions in place, Gait belt, Left in chair(left with OT at end of session)  Restraints  Initially in place: No     Therapy Time   Individual Concurrent Group Co-treatment   Time In 1000         Time Out 1030         Minutes 30         Timed Code Treatment Minutes: 69 Bailey Thomas, PT

## 2020-01-16 NOTE — PROGRESS NOTES
wounds to feet, PEG tube, sponge bathe only per nsg 1/10 d/t LE wounds  Subjective   General  Chart Reviewed: Yes  Additional Pertinent Hx: MRI right foot  1/15/2020: \"3. Questionable age-indeterminate nondisplaced fracture of the 5th proximal phalangeal shaft. . Mild marrow edema in the 1st metatarsal may represent a stress response or noninfectious reactive osteitis. Referring Practitioner: Angella Caraballo MD  Subjective  Subjective: Pt c/o being anxous about upcoing surgery, violet[place] palomares pt is  npo now. Pt rates pain in right foot as 4/10 and interferes with walking          Orientation  Orientation  Overall Orientation Status: Within Normal Limits  Orientation Level: Oriented to place;Oriented to time;Oriented to person;Disoriented to situation  Cognition      Objective   Bed mobility  Rolling to Right: Independent  Supine to Sit: Independent  Transfers:   pt cued to align to transfer surface, push up from and reach back to as pt attempting to sit to early stand to sit as not close enough to chair, needed cues to push up from transfer surface and not pull up on walker. Transferred sit>stand  From bed x1, chair x4 with FWW  Sit to Stand: Stand by assistance FWW   Stand to sit: Stand by assistance FWW   Bed to Chair: Stand by assistance  FWW   Ambulation  Ambulation?: Yes  More Ambulation?: Yes  Ambulation 1  Surface: level tile  Device: Rolling Walker  Other Apparatus: Wheelchair follow  Quality of Gait: 1 person assist for 25 feet  CGA    Patient given cues with walking for step to gait with encouragement leading with RLE and encouraged for WBAT and to weight shift to LLE as needed. Ambulation 2  Surface - 2: level tile  Device 2: Rolling Walker  Assistance 2: 2 Person assistance;Contact guard assistance    56 feet, 70 feet with encouragement   Patient given cues with walking for step to gait with encouragement leading with RLE and encouraged for WBAT and to weight shift to LLE as needed.       Other

## 2020-01-16 NOTE — PROGRESS NOTES
Occupational Therapy  Facility/Department: [facility]  Daily Treatment Note  NAME: Yanet Lua  : 1945  MRN: [id number]    Date of Service: 2020    Discharge Recommendations:  24 hour supervision or assist, Subacute/Skilled Nursing Facility(SNF if no 24 hr)       Assessment   Performance deficits / Impairments: Decreased functional mobility ; Decreased cognition;Decreased high-level IADLs;Decreased ADL status; Decreased endurance;Decreased fine motor control;Decreased strength;Decreased balance;Decreased safe awareness  Assessment: Pt pleasant and cooperative, less confused but anxious re: toe amp this date. Pt completes transfers and standing activity with CGA, no knee buckling. Pt with improved problem solving noted this date with parquetry tasks and grooming. Pt requires less redirection this date. Pt cont to demo difficulty with using reacher to assist with LB ADLs, cont to require assist. Pt progressing toward goals. Cont POC. OT Education: OT Role;Plan of Care;ADL Adaptive Strategies;Transfer Training;Orientation; Energy Conservation  Patient Education: bed mobility, safety with transfers, toilet transfers, safety with toileting, reacher use  Barriers to Learning: Pt verbalizes understanding. Pt with cognitive deficits and would benefit from continued education and training. Activity Tolerance  Activity Tolerance: Patient limited by fatigue;Patient Tolerated treatment well  Safety Devices  Safety Devices in place: Yes  Type of devices: Call light within reach;Gait belt;Left in bed;Bed alarm in place         Patient Diagnosis(es): There were no encounter diagnoses. has a past medical history of Allergic, Anemia, Angina, Arthritis, Asthma, Cancer ([address] Coronary artery disease, Diabetes mellitus ([address] Heart attack ([address] Hyperlipidemia, Hypertension, and Obesity. has a past surgical history that includes Diagnostic Cardiac Cath Lab Procedure; Cataract removal with implant;  Carpal tunnel release; Knee arthroscopy; Coronary angioplasty with stent (4/2012); Hysterectomy; bronchoscopy (N/A, 11/12/2019); bronchoscopy (N/A, 11/23/2019); bronchoscopy (11/23/2019); bronchoscopy (11/23/2019); and Upper gastrointestinal endoscopy (N/A, 12/2/2019). Restrictions  Restrictions/Precautions  Restrictions/Precautions: Fall Risk, Modified Diet  Position Activity Restriction  Other position/activity restrictions: Noted multipodous boots ordered by podiatry for foot wounds, USE cushion while up in chair. right foot with necrotic toes-requested RN seek clarification on weightbearing restrictions due to wounds to feet, PEG tube, sponge bathe only per nsg 1/10 d/t LE wounds  Subjective   General  Chart Reviewed: Yes  Patient assessed for rehabilitation services?: Yes  Response to previous treatment: Patient with no complaints from previous session  Family / Caregiver Present: No  Referring Practitioner: Lonnie  Diagnosis: acute encephalopathy due to bacteremia, suspect CLABSI from [address] [place] [address]. Pt with full thickness wounds R and L foot with gangrenous toes. Pt also with KIMBERLY and recent cardiac arrest. Pt with previous admission 11/11 to 12/5 for sepsis in which pt was discharged to LTAC  Subjective  Subjective: Pt in w/c, reports ebing fatigued, but agreeable  General Comment  Comments: RN agreeable to OT session. Transport arrived during OT session to take pt for doppler imaging of LE. Orientation     Objective    ADL  Grooming: Stand by assistance;Setup; Increased time to complete;Verbal cueing  LE Dressing:  Moderate assistance(difficulty processing to use reacher for LB ADLs)  Toileting: Contact guard assistance        Balance  Sitting Balance: Supervision  Standing Balance: Contact guard assistance  Standing Balance  Time: 1-2 min x 2, 4-5 min x 2  Activity: transfers, toileting,ADL tasks, tabletop activity, grooming  Comment: CGA for balance in stance  Toilet Transfers  Toilet - Technique: Stand pivot  Equipment

## 2020-01-17 NOTE — PROGRESS NOTES
with cues for lift and lower SPT WB on LLE only )  Comment: pt with spt from bed>WC on pt's left needed to have WC set up with right arm rest removed, assist to keep RLE NWB (leg lifted by PT) , hand over hand to direct pt to reach with Left hand to left WC arm lfit and lower assist needed NWB RLE.   ssit<> parallel bars with RLE lifted by PT to keep NWB, standing 1 minute and 2 mintutes with sit<> parallel  bars with mod assist to lift and lower for 5 additional trials , sit<>Stand RLE lifted NWB RLE Mod to lift and lower and Right LE held by PT and pt cued for reaching back to chair. Noted poor eccentrtic control stand to sit. Wheelchair Activities  Wheelchair Type: Standard  Wheelchair Cushion: None  Pressure Relief Type: Push up  Level of Assistance for pressure relief activities: Independent  Propulsion 1  Propulsion: Manual  Level: Level Tile(left right turns and carpet propulsion 3x slower than normal with pt c/o fatigue. Emilia Sebastian Pt NPO this tx awaiting peg tube removal. )  Method: RUE;LUE  Level of Assistance: Modified independent  Distance: 150 feet with left/right turns and 2 doorways. Exercises  Heelslides: x30 BLE with heels protected by therapist hand on right. Hip Abduction: x30 BLE    Ankle DF/PF partial AROM RLE x30  SAQ x30 BLE  LAQ x30 BLE         Comment: pt given set up assist and threaded pants on and given min assit to pull off, pt indep  after set up to doff and don shirt with pt rolling with cues to bend left knee and roll left/right with rail x2 to pull up pants. Goals  Short term goals  Time Frame for Short term goals: one week; 1/11/2020  Short term goal 1: Patient will perform bed mobility with CGA.  1/13/2020Goal Met pt demo indep with rolling  and sup<.sit. Short term goal 2: Patient will perform sit <> stand transfers with Min A and AAD  1/13/2020Goal Met Pt demo sit<.stand with FWW SBA to CGA.    Short term goal 3: Patient will perform stand

## 2020-01-17 NOTE — PROGRESS NOTES
ml.      Sleep: Trazodone provided prn. Peg tube removed today with traction; well tolerated, no complications. Dry dressing applied. HALIE: 1/23 home vs skilled  DME: RW, JER with swing away leg rests, shower chair vs ttb    Nicholas A. Homer Goodell, MD 1/17/2020, 11:06 AM

## 2020-01-17 NOTE — PLAN OF CARE
Patient resting comfortably in bed with call light, belongings, and bedside table within reach. Alarm on. Patient acknowledges an understanding to call when needing assistance. Electronically signed by Robert Serrano RN on 1/17/2020 at 10:00 AM

## 2020-01-17 NOTE — PROGRESS NOTES
Imaging:  Right:   Soft tissue swelling.       Detailed evaluation limited by bandaging.       Deformity involving the mid shaft of the proximal phalanx 5th digit which may   be related to nondisplaced fracture.  Recommend oblique view.         LeftLimited evaluation.  No obvious radiographic evidence of osteomyelitis. Laser studies:   Laser perfusion pressures bilateral feet are adequate for healing of a    spontaneous or surgical wound.         MRI:  1. Diffuse subcutaneous edema of the forefoot compatible with lymphedema   versus cellulitis.  No deep soft tissue defect or drainable fluid collection. 2. Nonspecific marrow edema throughout the 1st through 5th phalanges with   patchy mildly decreased T1 signal.  No confluent decreased T1 signal or   osseous erosion.  This likely represents noninfectious reactive osteitis. Early osteomyelitis is unlikely. 3. Questionable age-indeterminate nondisplaced fracture of the 5th proximal   phalangeal shaft. 4. Mild marrow edema in the 1st metatarsal may represent a stress response or   noninfectious reactive osteitis. Culture: no growth to date  Pathology: pending    Impression/Recommendations: The patient is a pleasant 75 y.o. 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 There was very minimal bleeding noted during surgery, so will need close follow up  -Dressing to remain intact. I will change it next week prior to discharge.  -full weightbearing okay for short distances only in surgical shoe    2) Pressure ulceration heel  -stable  -Continued necrosis after debridement. Will need santyl and follow up arterial studies    3) wound left 2nd toe  -healing well  -Aquacel AG , DSD, compression ACE to knee should be changed daily. Nursing order placed    4) PVD  -Adequate healing per laser study  -Ordered arterial duplex.  Heel wound remains necrotic post debridement    5) DM  6) Encephalopathy     Thank you for the opportunity to take part in this patient's care. Please call me with any questions.     Mulu Mora DPM  Office: [phone]  Cell: [phone]

## 2020-01-17 NOTE — CONSULTS
Pharmacy to Dose Warfarin    Dx: Bilat lower limb DVT  Goal INR range 2.0-2.5  Home Warfarin dose: 4mg daily  **Warfarin held from 1/14 to 1/17 for toe amputation    Date  INR  Warfarin  1/17                1.27                  4mg    Recommend Warfarin 4mg tonight x1. Daily INR ordered. Rx will continue to manage therapy per Dr. Kimber Hammonds consult order. Jose Silva, PharmD 1/17/2020  11:26 AM      Pharmacy to Dose Warfarin    Dx: Derik Bhardwaj lower limb DVT  Goal INR range 2.0-2.5  Home Warfarin dose: 4mg daily  **Warfarin held from 1/14 to 1/17 for toe amputation    Date  INR  Warfarin  1/17                1.27                  4 mg  1/18                1.22                  5 mg    Recommend Warfarin 5mg tonight x1. Daily INR ordered. Rx will continue to manage therapy per Dr. Kimber Hammonds consult order.

## 2020-01-17 NOTE — PROGRESS NOTES
Linette at [facility].    Hyponatremia  - Secondary to reduced renal function, hyperglycemia    LE DVT  - On coumadin    Cholelithiasis  - Conservative measures per GI at OSH    Gangrenous toes  - Per Podiatry, s/p debridement & amputation of R 2nd & 3rd toes on 1/16

## 2020-01-17 NOTE — PROGRESS NOTES
mellitus ([facility] [address] Heart attack ([facility] [address] Hyperlipidemia, Hypertension, and Obesity. 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 Restrictions  Restrictions/Precautions  Restrictions/Precautions: Fall Risk, Modified Diet  Position Activity Restriction  Other position/activity restrictions: Noted multipodous boots ordered by podiatry for foot wounds, USE cushion while up in chair. right foot with necrotic toes-requested RN seek clarification on weightbearing restrictions due to wounds to feet, sponge bathe only per nsg 1/10 d/t LE wounds, 1500 ml fluid restriction  Subjective   General  Chart Reviewed: Yes  Patient assessed for rehabilitation services?: Yes  Response to previous treatment: Patient with no complaints from previous session  Family / Caregiver Present: No  Referring Practitioner: Lonnie  Diagnosis: acute encephalopathy due to bacteremia, suspect CLABSI from [address] [place] [address]. Pt with full thickness wounds R and L foot with gangrenous toes. Pt also with KIMBERLY and recent cardiac arrest. Pt with previous admission 11/11 to 12/5 for sepsis in which pt was discharged to LTAC  Subjective  Subjective: Pt supine, anxious but pleasant and agreeable  General Comment  Comments: RN agreeable to OT session. Transport arrived during OT session to take pt for doppler imaging of LE. Vital Signs  Patient Currently in Pain: Denies   Orientation  Orientation  Overall Orientation Status: Impaired  Orientation Level: Oriented to person;Disoriented to time;Oriented to place;Oriented to situation  Objective    ADL  Feeding: Setup; Increased time to complete;Dentures(ramiro to topen containers)  Grooming: Stand by assistance;Setup; Increased time to complete;Verbal cueing  LE Dressing: Dependent/Total(to ting R surgical shoe)        Balance  Sitting Balance: Supervision  Standing Balance: Dependent/Total(cynthia stedy)  Standing Balance  Time: <1 min x 6  Activity: transfers  Comment: CGA for balance in stance on stedy      Transfers  Sit to stand: Dependent/Total(CGA to  cynthia stedy, cues and assist to maintain assumed NWB R LE )  Stand to sit: Dependent/Total  Transfer Comments: sit to stands in cynthia stedy, assist and cues         Coordination  Gross Motor: fair for ADLs and transfers more difficult this date d/t toe amps yesterday              Cognition  Overall Cognitive Status: Exceptions  Arousal/Alertness: Appropriate responses to stimuli  Following Commands: Follows one step commands with increased time; Follows one step commands with repetition  Attention Span: Attends with cues to redirect  Memory: Decreased recall of biographical Information;Decreased recall of recent events;Decreased short term memory  Safety Judgement: Decreased awareness of need for safety  Problem Solving: Decreased awareness of errors  Insights: Decreased awareness of deficits  Initiation: Requires cues for some  Sequencing: Requires cues for some  Cognition Comment: pt cont to be anxious with tangential speech                     Type of ROM/Therapeutic Exercise  Type of ROM/Therapeutic Exercise: AROM  Comment: 2# weights  Exercises  Scapular Protraction: x15  Scapular Retraction: x15  Shoulder Flexion: x15  Elbow Flexion: x20  Elbow Extension: x20  Supination: x20  Pronation: x20  Wrist Flexion: x20  Wrist Extension: x20  Other: theraband HEP with orange band x 15 reps                     Plan   Plan  Times per week: 5/7 days per week  Plan weeks: 2 weeks  Current Treatment Recommendations: Strengthening, ROM, Safety Education & Training, Balance Training, Patient/Caregiver Education & Training,

## 2020-01-17 NOTE — PROGRESS NOTES
Agree with head to toe assessment completed by Rob Dixon RN. Patient resting comfortably in bed with call light, bedside table and belongings within reach. Will continue to monitor.  Electronically signed by Ponce Houston RN on 1/17/2020 at 4:13 AM

## 2020-01-17 NOTE — PROGRESS NOTES
Physical Therapy  Facility/Department: [facility]  Daily Treatment Note  NAME: Bernard Ruiz  : 1945  MRN: [id number]    Date of Service: 2020    Discharge Recommendations:  24 hour supervision or assist, Home with Home health PT   PT Equipment Recommendations  Equipment Needed: Yes  Mobility Devices: Wheelchair  Wheelchair: Standard  Other: 22 inch wheel chair with cushion     Assessment   Body structures, Functions, Activity limitations: Decreased functional mobility ; Decreased ROM; Decreased safe awareness;Decreased endurance;Decreased balance; Increased pain;Decreased sensation;Decreased cognition;Decreased strength  Assessment: Pt s/p Right heel debridement of necrotic tissue and amputation of right toes 2 and 3 . Pt with dressing with ace wrap noted RLE. Max A for stand pivot transfer to commode to right. Requires assist with all mobility to maintain RLE NWB until further WBing orders received. Frank Contreras stedy used for commode>bed transfer d/t difficulty maintaining RLE NWB. Pt indep with sit>supine and scooting in bed. Educated on maintaining RLE NWB while scooting in bed. Will continue to progress per POC. Treatment Diagnosis: Functional mobility deficit  Specific instructions for Next Treatment: progress transfer training and gait training as able  Prognosis: Fair  PT Education: Functional Mobility Training;Transfer Training;Home Exercise Program;Goals;Plan of Care;PT Role;General Safety  Patient Education: pt needs  cues for gait and with transfers for safety with pt not retaining education on this from one session to the next without verbal reminder. Barriers to Learning: cognition  REQUIRES PT FOLLOW UP: Yes  Activity Tolerance  Activity Tolerance: Patient limited by fatigue;Patient limited by endurance     Patient Diagnosis(es): There were no encounter diagnoses.      has a past medical history of Allergic, Anemia, Angina, Arthritis, Asthma, Cancer ([facility] [address] Coronary artery disease, Diabetes for stand pivot to commode. Pt then stood in stedy from commode x 2 reps with min A to maintain RLE NWB. Pt able to don/doff pants standing in stedy with min A to maintain RLE NWB. Exercises  Quad Sets: x 20 BLE  Gluteal Sets: x 20 BLE  Ankle Pumps: x 20 LLE            Goals  Short term goals  Time Frame for Short term goals: one week; 1/11/2020  Short term goal 1: Patient will perform bed mobility with CGA.  1/13/2020Goal Met pt demo indep with rolling  and sup<.sit. Short term goal 2: Patient will perform sit <> stand transfers with Min A and AAD  1/13/2020Goal Met Pt demo sit<.stand with FWW SBA to CGA. Short term goal 3: Patient will perform stand pivot transfers with AAD and Min A 1/13/2020Goal Met Pt demo SPT SBA/CGA with FWW. 1/17/2020 GOAL NOT MET pt now NWB RLE mod assist for transfer . Short term goal 4: Patient will propel wheelchair 50 ft over even surfaces with SBA. GOAL MET 1/13/2020 Pt demo SANG for WC propulsion 150 feet with BUE for left right turns and 10 feet carpet. Short term goal 5: Patient ambulate 15 ft with Mod A and AAD 1/13/2020 Goal Met Pt demo 50 feet with FWW WC follow SBA to CGA. 1/17/2020 GOAL NOT MET pt now NWB RLE and unable to maintain WB status for amb after recent toe amputations RLE. Long term goals  Time Frame for Long term goals : 2 weeks; 1/18/2020  Long term goal 1: Patient will perform bed mobility with supervision 1/13/2020Goal Met pt demo indep with rolling  and sup<.sit. Long term goal 2: Patient will perform all functional transfers with AAD and supervision. GOAL MET 1/15/2020Pt demo SBA with toilet transfer,  with garb bars and FWW and SBA for bed/WC transfers with FWW.  1/17/2020 GOAL NOT MET pt now NWB RLE mod assist for transfer .    Long term goal 3: Patient will ambulate 50 ft with AAD and CGA  Long term goal 4: Patient will propel w/c 100 ft on even and uneven surfaces with supervision   GOAL MET  1/15/2020 pt demo 180 feet of WC propulsion uche GUTIERREZ with L/R turns mod I for level. Long term goal 5: Patient will negotiate 3 stairs with AAD and Min A  Patient Goals   Patient goals : \"To get back to normal\"    Plan    Plan  Times per week: 5 of 7 days per week  Times per day: Daily  Specific instructions for Next Treatment: progress transfer training and gait training as able  Current Treatment Recommendations: Strengthening, Transfer Training, Home Exercise Program, Balance Training, Endurance Training, Gait Training, Functional Mobility Training, Wheelchair Mobility Training, Stair training, Pain Management, Equipment Evaluation, Education, & procurement, Safety Education & Training, Patient/Caregiver Education & Training  Safety Devices  Type of devices:  All fall risk precautions in place, Call light within reach, Gait belt, Patient at risk for falls, Left in bed, Nurse notified, Bed alarm in place  Restraints  Initially in place: No     Therapy Time   Individual Concurrent Group Co-treatment   Time In 0900         Time Out 0930         Minutes 30         Timed Code Treatment Minutes: 10 Sandrine Butler, PT, DPT

## 2020-01-17 NOTE — PROGRESS NOTES
Variance/Reason:  n/a N/a    Pain Pt denied pain No c/o pain   Pain Intervention [] RN notified  [] Repositioned  [] Intervention offered and patient declined  [x] N/A  [] Other: [] RN notified  [] Repositioned  [] Intervention offered and patient declined  [x] N/A  [] Other:   Subjective     Pt seen upright in wheelchair in community room. Pt was plleasant and cooperative throughout. Pt seen upright in bed, alert and agreeable to therapy. Objective:  Goals       Short-term Goals  Timeframe for Short-term Goals: 5 days (01/09)      Goal met; MBSS completed 1/13/20 Goal met; MBSS completed 1/13/20   Goal 2:      Goal met; MBSS completed 1/13/20-- continue current Dysphagia III (soft and bite sized) diet with thin liquids. Pt denies dysphagia with meals / meds. Goal met; MBSS completed 1/13/20-- continue current Dysphagia III (soft and bite sized) diet with thin liquids. Pt denies dysphagia with meals / meds. Cognitive/lingustic goals:  Goal 1: The patient will complete basic problem solving tasks with 80% accuracy given min cues    SLP, , and pt discussed possible barriers to discharge (I.e fall risk, currently non-weight bearing on L foot). Pt will require 24-hour supervision at this time. All therapies have encouraged pt to speak with her daughter regarding POC after discharge. SLP encouraged pt to ask her daughter to attend therapies while in ARU. Medication management task: 50% average accuracy, no cues; pt had increased difficulty with more complex medication instructions (I.e '1 pill 2x/daily'). Pt will require 24-hour supervision / assistance with medication and financial management upon discharge. SLP reviewed this with pt, she will benefit from continued reinforcement. Money management task:      - Form target amount: 100% accuracy, no cues. - Solve word problems: 80% average accuracy, min-mod cues for increased complexity of problems (2-3 steps).   She benefited from simplification as able. *Pt able to self-correct errors at times. Goal met, 1/16/20. Goal met 1/16/20. Goal 3: The patient will complete graded attention tasks with 80% accuracy given min cues   Did not directly target; pt attended to all tasks without difficulty. Did not directly target   Goal 4: The patient will complete graded naming tasks with 70% accuracy given min cues   Did not directly target Did not directly target   Goal 5: The patient will complete graded memory recall tasks with 80% accuracy given min cues     At end of therapy session, SLP asked pt to recall 3 items that she had stated she \"needed to take care of\". Pt independently recalled 2/3 at end of session. Did not directly target   Other areas targeted: n/a   N/A   Education:   SLP re: rationale for today's structured tasks, types / amounts of cueing provided. SLP re: cues provided during money management task. Safety Devices: [x] Call light within reach  [] Chair alarm activated  [x] Bed alarm activated  [x] Other: Pt's belongings within reach. [x] Call light within reach  [] Chair alarm activated  [x] Bed alarm activated  [x] Other: Pt's belongings within reach. Assessment: Pt was pleasant and cooperative during therapy sessions, motivated to improve and return home. Majority of first session spent on education with pt regarding possible barriers to discharge (I.e fall risk, currently non-weight bearing on L foot). All therapies have encouraged pt to speak with her daughter regarding POC after discharge. Pt had increased difficulty with medication management task this date (see above). She will require 24-hour supervision / assistance with medication and financial management upon discharge. Continue with goals per POC. Plan: Continue as per plan of care.       Additional Information: n/a    Barriers toward progress: Dysphagia, severe cognitive communication deficits   Discharge recommendations:  []

## 2020-01-17 NOTE — PROGRESS NOTES
Nutrition Assessment    Type and Reason for Visit: Reassess    Nutrition Recommendations:   1. Continue soft and bite sized diet  2. Diet texture/liquid level per SLP recommendations  3. Fluid restriction per MD  4. Add Glucerna TID  5. Encourage po intakes  6. Monitor po intakes, nutrition adequacy, weights, pertinent labs, BMs    Nutrition Assessment: Follow up: Pt remains stable from a nutritional standpoint AEB pt report of okay appetite. Po intakes remain variable per EMR, 1-100%. Pt requesting switch in ONS from Nepro to Glucerna d/t taste fatigue with Nepro. Will adjust order. PEG removed today. Pt s/p wound debridement and toe amputation on 1/16. Encouraged po intakes and protein foods for wound healing. Will continue current diet. Malnutrition Assessment:  · Malnutrition Status: Meets the criteria for severe malnutrition  · Context: Chronic illness  · Findings of the 6 clinical characteristics of malnutrition (Minimum of 2 out of 6 clinical characteristics is required to make the diagnosis of moderate or severe Protein Calorie Malnutrition based on AND/ASPEN Guidelines):  1. Energy Intake-Less than or equal to 75% of estimated energy requirement, Greater than or equal to 3 months    2. Weight Loss-10% loss or greater, in 3 months  3. Fat Loss-Moderate subcutaneous fat loss, Orbital  4. Muscle Loss-Moderate muscle mass loss, Temples (temporalis muscle), Clavicles (pectoralis and deltoids)  5. Fluid Accumulation-No significant fluid accumulation,    6.   Strength-Not measured    Nutrition Risk Level: High    Nutrient Needs:  · Estimated Daily Total Kcal: 1406-0532(22-28 kcal/kg)  · Estimated Daily Protein (g): 76-90(1.2-1.4 g/kg)  · Estimated Daily Total Fluid (ml/day): 1 ml/kcal    Nutrition Diagnosis:   · Problem: Predicted suboptimal energy intake  · Etiology: related to Insufficient energy/nutrient consumption     Signs and symptoms:  as evidenced by Weight loss    Objective

## 2020-01-17 NOTE — OP NOTE
made.    OPERATIVE PROCEDURE:  Under mild sedation, the patient was brought into  the operating room, placed supine on the operating room table. A  time-out was performed, indicating the correct procedure, patient, and  laterality of side. Further induction was then achieved by the  Anesthesia team and a preoperative analgesic injection utilizing 8 mL of  0.5% Marcaine plain was infiltrated as a standard second and third ray  block. The right lower extremity was then scrubbed, prepped, and draped  in the usual aseptic manner. Attention was directed to the second toe,  where a vertical fishmouth incision was made to encompass the entire  wound dorsally. The toe was disarticulated at the metatarsal phalangeal  joint. This was again then repeated on the third toe, it was  disarticulated. There was no deep space infection and the metatarsal  heads appeared healthy, white, and hard. The wounds were copiously  irrigated with sterile saline solution and closed primarily using 3-0  nylon. The toes were handed to the scrub tech to be sent to pathology. Attention was directed to the posterior heel, where there was a deep  tissue injury with stable eschar, this was debrided down to subcutaneous  tissue. There was minimal bleeding noted. Wound was approximately 2 cm  x 2 cm x 0.1 cm. The foot was dressed using Xeroform, 4 x 4s, ABD, and  Kerlix. Once the wounds were completely covered, the great toenail was  debrided in length to prevent injury. The patient was then awoken from  anesthesia and transferred to the recovery room with vital signs stable  and vascular status intact. She will be monitored for a period of time  postoperatively before being readmitted to the ARU for medical and  postoperative needs.         Celia Yadav    D: 01/16/2020 19:21:31       T: 01/17/2020 3:45:39     [id number]  Job#: [id number]     Doc#: [id number]    CC:

## 2020-01-18 NOTE — PROGRESS NOTES
Pharmacy to Dose Warfarin    Dx: Bilat lower limb DVT  Goal INR range 2.0-2.5  Home Warfarin dose: 4mg daily  **Warfarin held from 1/14 to 1/17 for toe amputation    Date  INR  Warfarin  1/17                1.27                  4mg  1/18                1.22                  5 mg    Recommend Warfarin 5 mg tonight x1. Daily INR ordered. Rx will continue to manage therapy per Dr. Kellee Kramer consult order.   Benjamin Schaefer [facility] 1/18/2020 1:44 PM

## 2020-01-18 NOTE — PLAN OF CARE
Patient able to use pain rating scale 0/10 adequately without problems. Pain medications explained along with frequency and when to notify the nurse with  possible side effects. Verbalizes understanding. Call light within reach. Resting quietly in bed. Denies needs at present.  Evy Gottlieb RN

## 2020-01-19 NOTE — PROGRESS NOTES
reduced renal function  - Nutrition improving, will follow with labs     LE DVT  - On coumadin    Cholelithiasis  - Conservative measures per GI at OSH    Gangrenous toes  - Per Podiatry, s/p debridement & amputation of R 2nd & 3rd toes on 1/16

## 2020-01-19 NOTE — PROGRESS NOTES
Podiatry Follow up    Subjective: The patient is a 75 y.o. female seen this morning. Resting comfortably. POD #3s/p amputation right 2nd and 3rd toes and R heel debridement. Past Medical History:        Diagnosis Date    Allergic     Anemia     Angina     with exertion    Arthritis     Asthma 9/16/2010    [facility] ([facility] [address]     Coronary artery disease 9/16/2010    Diabetes mellitus ([facility] [address]     Heart attack [facility]) age 43    no problems since then    Hyperlipidemia     Hypertension     Obesity        Past Surgical History:        Procedure Laterality Date    BRONCHOSCOPY N/A 11/12/2019    BRONCHOSCOPY WASHING performed by aMnny Johnston MD at [address] N/A 11/23/2019    BRONCHOSCOPY ALVEOLAR LAVAGE performed by Victor Manuel Barriga MD at [address]  11/23/2019    BRONCHOSCOPY THERAPUTIC ASPIRATION INITIAL performed by Victor Manuel Barriga MD at [address]  11/23/2019    BRONCHOSCOPY FOREIGN BODY REMOVAL performed by Victor Manuel Barriga MD at [address] [place] [address]      right    CATARACT REMOVAL WITH IMPLANT      CORONARY ANGIOPLASTY WITH STENT PLACEMENT  4/2012    DIAGNOSTIC CARDIAC CATH LAB PROCEDURE      HYSTERECTOMY      11/30/15    KNEE ARTHROSCOPY      left knee. not a TKR.  no metal.     TOE AMPUTATION Right 1/16/2020    RIGHT SECOND AND THIRD TOE AMPUTATION, RIGHT HEEL WOUND DEBRIDEMENT, RIGHT HALLUX TOE NAIL DEBRIDEMENT performed by Stacey Romo DPM at [address] [facility] N/A 12/2/2019    EGD PEG PLACEMENT W/ ANESTHESIA performed by Kleber Mc MD at [address] [place] [address]       Current Medications:    Current Facility-Administered Medications: warfarin (COUMADIN) tablet 5 mg, 5 mg, Oral, Once  warfarin (COUMADIN) daily dosing (placeholder), , Other, RX Placeholder  darbepoetin brando-polysorbate (ARANESP) injection 60 mcg, 60 mcg, Subcutaneous, Weekly  vancomycin (VANCOCIN) Resp 18   Ht 5' (1.524 m)   Wt 145 lb 1 oz (65.8 kg)   SpO2 99%   BMI 28.33 kg/m²   Integument: Incision to right foot approximated with sutures, No evidence of infection. Skin flaps are pink. Heel wound is un-stageable  after debridement  Left foot toes 2-4 small dorsal full thickness wounds. 2nd toe left has fibrotic base. No surrounding sings of infection. Neurologic:  Protective sensation is grossly diminshed to light touch at the level of the toes, bilateral.  Vascular:  DP and PT pulses are non-palpable, bilateral.    Musculoskeletal:  Patient has 5/5 strength on inversion/everison/dorsiflexion/plantarflexion, bilateral.  No gross musculoskeletal deformities noted.     Labs:  CBC with Differential:    Lab Results   Component Value Date    WBC 6.1 01/18/2020    RBC 2.75 01/18/2020    RBC 4.31 12/08/2015    HGB 7.0 01/18/2020    HCT 22.4 01/18/2020     01/18/2020    MCV 81.5 01/18/2020    MCH 25.4 01/18/2020    MCHC 31.2 01/18/2020    RDW 18.7 01/18/2020    NRBC 1 11/28/2019    NRBC 1 11/28/2019    SEGSPCT 52.3 10/21/2012    BANDSPCT 9 11/28/2019    METASPCT 2 11/24/2019    LYMPHOPCT 21.6 01/09/2020    LYMPHOPCT 26.1 12/08/2015    MONOPCT 11.7 01/09/2020    MYELOPCT 2 11/23/2019    EOSPCT 0.0 04/05/2012    BASOPCT 0.6 01/09/2020    MONOSABS 1.0 01/09/2020    LYMPHSABS 1.8 01/09/2020    EOSABS 0.1 01/09/2020    BASOSABS 0.0 01/09/2020    DIFFTYPE Auto 10/21/2012     CMP:    Lab Results   Component Value Date     01/18/2020    K 4.2 01/18/2020    K 5.0 01/09/2020    CL 93 01/18/2020    CO2 26 01/18/2020    BUN 36 01/18/2020    CREATININE 2.8 01/18/2020    GFRAA 20 01/18/2020    GFRAA 57 10/21/2012    AGRATIO 0.7 01/09/2020    LABGLOM 16 01/18/2020    GLUCOSE 83 01/18/2020    PROT 6.8 01/09/2020    PROT 7.0 11/30/2012    LABALBU 2.6 01/18/2020    CALCIUM 8.5 01/18/2020    BILITOT 0.6 01/09/2020    ALKPHOS 100 01/09/2020    AST 23 01/09/2020    ALT 11 01/09/2020     HgBA1c:    Lab Results

## 2020-01-19 NOTE — PROGRESS NOTES
Pharmacy to Dose Warfarin     Dx: Bilat lower limb DVT  Goal INR range 2.0-2.5  Home Warfarin dose: 4mg daily  **Warfarin held from 1/14 to 1/17 for toe amputation     Date                 INR                  Warfarin  1/17                1.27                  4mg  1/18                1.22                  5 mg  1/19                1.28                  5 mg      Recommend Warfarin 5 mg tonight x1. Daily INR ordered. Rx will continue to manage therapy per Dr. Westley Goldberg consult order.   Chris Waggoner, PharmD 1/19/2020 8:35 AM

## 2020-01-20 NOTE — PROGRESS NOTES
Physical Therapy  Facility/Department: [facility]  Daily Treatment Note  NAME: Gordon Hassan  : 1945  MRN: [id number]    Date of Service: 2020    Discharge Recommendations:  24 hour supervision or assist, Home with Home health PT   PT Equipment Recommendations  Equipment Needed: Yes  Vendor Name: Pt will need 22 inch wheel chair with cushion for return to home. Pt has walker and is only able to WB for short distances only (transfer distances) due to recent   Mobility Devices: Wheelchair  Wheelchair: Standard  Other: 22 inch wheel chair with cushion     Assessment   Body structures, Functions, Activity limitations: Decreased functional mobility ; Decreased ROM; Decreased safe awareness;Decreased endurance;Decreased balance; Increased pain;Decreased sensation;Decreased cognition;Decreased strength  Assessment: Pt limited d/t stomach cramping this afternoon. SBA to stand from EOB to RW and to ambulate to bathroom RLE WBAT with surgical shoe donned. Pt able to participate in supine ther ex but limited d/t stomach pains. Denies RLE pain through session and with gait training. Will continue to progress per POC. Treatment Diagnosis: Functional mobility deficit  Specific instructions for Next Treatment: progress transfer training and gait training as able  Prognosis: Fair  Decision Making: Medium Complexity  PT Education: Functional Mobility Training;Transfer Training;Home Exercise Program;Goals;Plan of Care;PT Role;General Safety;Weight-bearing Education;Gait Training  Barriers to Learning: cognition  REQUIRES PT FOLLOW UP: Yes  Activity Tolerance  Activity Tolerance: Patient limited by fatigue;Patient limited by endurance     Patient Diagnosis(es): There were no encounter diagnoses. has a past medical history of Allergic, Anemia, Angina, Arthritis, Asthma, Cancer ([facility] [address] Coronary artery disease, Diabetes mellitus ([facility] [address] Heart attack ([facility] [address] Hyperlipidemia, Hypertension, and Obesity.    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 Restrictions  Restrictions/Precautions  Restrictions/Precautions: Fall Risk, Modified Diet  Position Activity Restriction  Other position/activity restrictions:  Per 1/17/2020 podiatry note Lawrnce Lands, DPM\"-full weightbearing okay for short distances only in surgical shoe\"Noted multipodous boots ordered by podiatry for foot wounds, USE cushion while up in chair. sponge bathe only per nsg 1/10 d/t LE wounds, 1500 ml fluid restriction, full weightbearing okay for short distances only in surgical shoe per podiatry   Subjective   General  Chart Reviewed: Yes  Additional Pertinent Hx: 1/17/2020 podiatry op note Dr. Guzman Faust : Sangeetha Krypton and second third toe amputation, right heel wound debridement, right hallux toenail debridement. Murphy Lorelei \" MRI right foot  1/15/2020: \"3. Questionable age-indeterminate nondisplaced fracture of the 5th proximal phalangeal shaft. . Mild marrow edema in the 1st metatarsal may represent a stress response or noninfectious reactive osteitis. Response To Previous Treatment: Patient with no complaints from previous session. Family / Caregiver Present: No  Referring Practitioner: Aure Weber MD  Subjective  Subjective: Pt resting in bed on approach; agreeable to therapy but states not feeling well d/t stomach cramping from milk of mag she took earlier today.  Did not rate stomach pain and denies right leg/foot pain           Objective   Bed mobility  Supine to Sit: Independent  Sit to Supine: Independent     Transfers  Sit to Stand: Stand by assistance  Stand to sit: Stand by assistance Ambulation  Ambulation?: Yes  WB Status: WBAT with RLE surgical shoe donned  Ambulation 1  Surface: level tile  Device: Rolling Walker  Assistance: Stand by assistance  Quality of Gait: Cues to maintain BRANDON within RW. Educated on importance of slowing pace of activity for safety/balance despite urgency of needing to use the restroom  Gait Deviations: Slow Caitlyn; Increased BRANDON; Decreased step length;Decreased step height  Distance: 10 ft x 2 (to/from bathroom)        Exercises  Quad Sets: x 20 BLE  Gluteal Sets: x 20 BLE  Hip Abduction: Supine 2 x 10 BLE with pt able to maintain bilateral heels off of bed when completing  Ankle Pumps: x 20 LLE            Goals  Short term goals  Time Frame for Short term goals: one week; 1/11/2020  Short term goal 1: Patient will perform bed mobility with CGA.  1/13/2020Goal Met pt demo indep with rolling  and sup<.sit. Short term goal 2: Patient will perform sit <> stand transfers with Min A and AAD  1/13/2020Goal Met Pt demo sit<.stand with FWW SBA to CGA. Short term goal 3: Patient will perform stand pivot transfers with AAD and Min A 1/13/2020Goal Met Pt demo SPT SBA/CGA with FWW. 1/17/2020 GOAL NOT MET pt now NWB RLE mod assist for transfer . 1/20/2020 GOAL met pt demo SBA with FWW and cast shoe  bed<>chair and sit<>stand. Short term goal 4: Patient will propel wheelchair 50 ft over even surfaces with SBA. GOAL MET 1/13/2020 Pt demo SANG for WC propulsion 150 feet with BUE for left right turns and 10 feet carpet. Short term goal 5: Patient ambulate 15 ft with Mod A and AAD 1/13/2020 Goal Met Pt demo 50 feet with FWW WC follow SBA to CGA. 1/17/2020 GOAL NOT MET pt now NWB RLE and unable to maintain WB status for amb after recent toe amputations RLE. Long term goals  Time Frame for Long term goals : 2 weeks; 1/18/2020  Long term goal 1: Patient will perform bed mobility with supervision 1/13/2020Goal Met pt demo indep with rolling  and sup<.sit.    Long term goal

## 2020-01-20 NOTE — CONSULTS
Vascular Surgery Consultation    Date of Admission:  1/3/2020  5:10 PM  Date of Consultation:  1/20/2020    PCP:  Joshua Malloy MD       Reason for consult: PVD  History of Present Illness: We are asked to see this patient in consultation by Dr. Laura Mckee regarding wound healing. Chasity Foster is a 75 y.o. female who has been hospitalized for several months. Sometime during hospitalization she developed toe ulcerations R foot as well as a pressure sore on the R Heel. She ultimately underwent R 2nd and 3rd toe amps on 1/16. Laser skin perfusion testing was performed pre-operatively and showed good probability of healing. Patient underwent arterial duplex post operatively to further evaluate blood flow as little bleeding encountered in OR. Patient denies foot pain. Past Medical History:  Past Medical History:   Diagnosis Date    Allergic     Anemia     Angina     with exertion    Arthritis     Asthma 9/16/2010    Cancer ([facility] [address]     Coronary artery disease 9/16/2010    Diabetes mellitus ([facility] [address]     Heart attack [facility]) age 43    no problems since then    Hyperlipidemia     Hypertension     Obesity        Past Surgical History:  Past Surgical History:   Procedure Laterality Date    BRONCHOSCOPY N/A 11/12/2019    BRONCHOSCOPY WASHING performed by Maria L Price MD at [address] [place] [address] N/A 11/23/2019    BRONCHOSCOPY ALVEOLAR LAVAGE performed by Funmi Robles MD at [address] [place] [address]  11/23/2019    BRONCHOSCOPY THERAPUTIC ASPIRATION INITIAL performed by Funmi Robles MD at [address] [place] [address]  11/23/2019    BRONCHOSCOPY FOREIGN BODY REMOVAL performed by Funmi Robles MD at [address] [place] [address]      right    CATARACT REMOVAL WITH IMPLANT      CORONARY ANGIOPLASTY WITH STENT PLACEMENT  4/2012    DIAGNOSTIC CARDIAC CATH LAB PROCEDURE      HYSTERECTOMY      11/30/15    KNEE ARTHROSCOPY      left knee. (LOPRESSOR) 25 MG tablet Take 0.5 tablets by mouth 2 times daily 12/4/19   Hay Barreto MD   vitamin B-1 100 MG tablet 1 tablet by Per NG tube route daily 12/5/19   Hay Barreto MD   fluticasone (FLOVENT HFA) 220 MCG/ACT inhaler Inhale 1 puff into the lungs 2 times daily    Historical Provider, MD   guanFACINE (TENEX) 1 MG tablet Take 1 tablet by mouth nightly 5/26/16   Tika Aaron MD   atorvastatin (LIPITOR) 40 MG tablet Take 40 mg by mouth daily    Historical Provider, MD   albuterol sulfate  (90 BASE) MCG/ACT inhaler Inhale 2 puffs into the lungs every 4 hours as needed 3/23/16   Juanjo Genao MD   INSULIN PEN NEEDLE For use with Lantus and Novolog 4/20/15   Historical Provider, MD   Potassium Chloride ER 8 MEQ CPCR Take 8 mEq by mouth daily     Historical Provider, MD   insulin aspart (NOVOLOG) 100 UNIT/ML injection Inject 16 Units into the skin 3 times daily (before meals) Inject 3 units per 15g of carb. Average 40 units/day    Historical Provider, MD   Cholecalciferol (VITAMIN D3) 2000 UNITS CAPS Take 2,000 Units by mouth daily     Historical Provider, MD   B Complex-C-Iron (SUPER B-COMPLEX/IRON/VITAMIN C PO) Take 1 tablet by mouth daily.  9/2/10   Historical Provider, MD        Facility Administered Medications:    warfarin (COUMADIN) daily dosing (placeholder)   Other RX Placeholder    darbepoetin brando-polysorbate  60 mcg Subcutaneous Weekly    vancomycin (VANCOCIN) intermittent dosing (placeholder)   Other RX Placeholder    sertraline  50 mg Oral Daily    atorvastatin  20 mg Oral Nightly    donepezil  5 mg Oral Nightly    ferrous sulfate  325 mg Oral BID WC    insulin lispro  0-12 Units Subcutaneous TID WC    insulin lispro  0-6 Units Subcutaneous Nightly    hydrocortisone  25 mg Rectal BID    insulin glargine  19 Units Subcutaneous Nightly    metoprolol tartrate  25 mg Oral BID    pantoprazole  40 mg Oral QAM AC    thiamine  100 mg Oral Daily       Allergies:  Dilaudid [hydromorphone hcl]; Flexeril [cyclobenzaprine hcl]; Heparin; Hydromorphone; Penicillins; Soma [carisoprodol]; and Sulfa antibiotics     Social History:      Social History     Socioeconomic History    Marital status:       Spouse name: Not on file    Number of children: Not on file    Years of education: Not on file    Highest education level: Not on file   Occupational History    Occupation:      Comment: [employer] most recently   Social Needs    Financial resource strain: Not on file    Food insecurity:     Worry: Not on file     Inability: Not on file   [employer] needs:     Medical: Not on file     Non-medical: Not on file   Tobacco Use    Smoking status: Former Smoker     Years: 13.00     Types: Cigarettes     Last attempt to quit: 1985     Years since quittin.0    Smokeless tobacco: Never Used   Substance and Sexual Activity    Alcohol use: No     Alcohol/week: 0.0 standard drinks    Drug use: No    Sexual activity: Not Currently     Partners: Male   Lifestyle    Physical activity:     Days per week: Not on file     Minutes per session: Not on file    Stress: Not on file   Relationships    Social connections:     Talks on phone: Not on file     Gets together: Not on file     Attends [employer] service: Not on file     Active member of club or organization: Not on file     Attends meetings of clubs or organizations: Not on file     Relationship status: Not on file    Intimate partner violence:     Fear of current or ex partner: Not on file     Emotionally abused: Not on file     Physically abused: Not on file     Forced sexual activity: Not on file   Other Topics Concern    Not on file   Social History Narrative    Not on file       Family History:        Problem Relation Age of Onset    Diabetes Mother     Heart Failure Mother     Heart Disease Mother     Stroke Mother     Diabetes Sister     Diabetes Brother     Diabetes Maternal Grandmother     Asthma Son  Asthma Son     Diabetes Daughter     Heart Disease Daughter     Irritable Bowel Syndrome Daughter     Diabetes Brother     Cancer Neg Hx     Emphysema Neg Hx     Hypertension Neg Hx        Review of Systems:  A 14 point review of systems was completed. Pertinent positives identified in the HPI, all other review of systems negative. Physical Examination:    BP (!) 151/61   Pulse 78   Temp 98 °F (36.7 °C) (Oral)   Resp 18   Ht 5' (1.524 m)   Wt 145 lb 1 oz (65.8 kg)   SpO2 94%   BMI 28.33 kg/m²        Admission Weight: 141 lb 5 oz (64.1 kg)       General appearance: NAD  Eyes: PERRLA  Neck: no JVD, no lymphadenopathy. Respiratory: effort is unlabored, no crackles, wheezes or rubs. Cardiovascular: regular, no murmur. No carotid bruits. Pulses:    femoral DP PT   RIGHT 1 doppler doppler   LEFT 1 doppler doppler   GI: abdomen soft, nondistended, no organomegaly. Musculoskeletal: strength and tone normal.  Extremities: warm and pink. Skin: R 2nd and 3rd toe amp site- skin well approximated without necrosis. Small eschar R Heel- dry   Neuro/psychiatric: grossly intact. MEDICAL DECISION MAKING/TESTING      Laser Doppler:    Right Impression   Pulse Volume Recording at the right dorsum of the foot and medial ankle   reveals mildly abnormal waveforms. Laser skin perfusion pressure study at the right dorsum of the foot is 54 mmHg   (wound healing likely), right medial plantar   surface is 54 mmHg (wound healing likely), and lateral plantar surface is 50   mmHg(wound healing likely). Pulse Volume Recording at the right ankle reveals mildly abnormal waveforms. Laser skin perfusion pressure study at the right medial aspect of the ankle is   55 mmHg (wound healing likely).     Left Impression   Pulse Volume Recording at the left dorsum of the foot and medial ankle is   within normal limits.    Laser skin perfusion pressure study at the left dorsum of the foot is 76 mmHg   (wound healing SPECGRAV >=1.030 01/05/2020    LEUKOCYTESUR Negative 01/05/2020    UROBILINOGEN 0.2 01/05/2020    BILIRUBINUR Negative 01/05/2020    BILIRUBINUR NEGATIVE 10/07/2011    BLOODU Negative 01/05/2020    GLUCOSEU Negative 01/05/2020    GLUCOSEU NEGATIVE 10/07/2011    AMORPHOUS trace 07/23/2012           Diagnosis:  PVD   Toe gangrene   Heel eschar   Acute on Chronic kidney disease    Assessment/Plan:  Non invasive studies consistent with some degree of R Inflow disease, however laser doppler suggests adequate perfusion for healing. Toe amp sites appear to be healing, and eschar on heel is dry without signs of infection. I would favor conservative treatment and observation from a Vascular perspective. Her kidney function remains poor although apparently improved. Angiogram at this time would be detrimental to continued recovery. If wound healing becomes an issue can entertain CO2 angio.

## 2020-01-20 NOTE — PROGRESS NOTES
([facility] [address] Heart attack ([facility] [address] Hyperlipidemia, Hypertension, and Obesity. 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 Restrictions  Restrictions/Precautions  Restrictions/Precautions: Fall Risk, Modified Diet  Position Activity Restriction  Other position/activity restrictions: Noted multipodous boots ordered by podiatry for foot wounds, USE cushion while up in chair. sponge bathe only per nsg 1/10 d/t LE wounds, 1500 ml fluid restriction, full weightbearing okay for short distances only in surgical shoe per podiatry   Subjective   General  Chart Reviewed: Yes  Patient assessed for rehabilitation services?: Yes  Response to previous treatment: Patient with no complaints from previous session  Family / Caregiver Present: No  Referring Practitioner: Lonnie  Diagnosis: acute encephalopathy due to bacteremia, suspect CLABSI from [address] [place] [address]. Pt with full thickness wounds R and L foot with gangrenous toes. Pt also with KIMBERLY and recent cardiac arrest. Pt with previous admission 11/11 to 12/5 for sepsis in which pt was discharged to LTAC  Subjective  Subjective: Pt EOB, anxious but agreeable. Reports no family coming today to her knowledge and \"still looking for an aide\"  General Comment  Comments: RN agreeable to OT session. Transport arrived during OT session to take pt for doppler imaging of LE.    Pain Assessment  Pain Level: 6  Pain Location: Abdomen  Pain Descriptors: Cramping  Pre Treatment Pain Screening  Intervention List: Patient able to continue with treatment  Vital Signs  Patient Currently in Pain: Yes   Orientation  Orientation  Overall Orientation x20  Pronation: x20  Wrist Flexion: x20  Wrist Extension: x20  Other: theraband HEP with orange band x 20 reps                     Plan   Plan  Times per week: 5/7 days per week  Plan weeks: 2 weeks  Current Treatment Recommendations: Strengthening, ROM, Safety Education & Training, Balance Training, Patient/Caregiver Education & Training, Self-Care / ADL, Cognitive/Perceptual Training, Functional Mobility Training, Neuromuscular Re-education, Equipment Evaluation, Education, & procurement, Home Management Training, Endurance Training, Cognitive Reorientation    Goals  Short term goals  Time Frame for Short term goals: 1 week (by 1/11/20)  Short term goal 1: Pt will tolerate static standing for 3 min with CGA for improved participation in BADL tasks. -met 1/15  Short term goal 2: Pt will complete UB dressing with set-up and min verbal cues for task completion. -MET 1/10  Short term goal 3: Pt will complete oral care in stance at sink with CGA and min verbal cues for sequencing task.  -MET 1/10  Short term goal 4: Pt will participate in nine hole peg test for cont assessment of [facility]. -MET 1/14  Long term goals  Time Frame for Long term goals : 2 weeks (by 1/18/20)  Long term goal 1: Pt will complete toilet transfers with supervision with AD as needed and min cues for safety and sequencing -continue  Long term goal 2: Pt will complete LB dressing tasks with min A with AE as needed. -continue  Long term goal 3: Pt will complete toileting tasks with supervision.-continue  Patient Goals   Patient goals : \"to walk\" and \"to get back to normal\"         Therapy Time   Individual Concurrent Group Co-treatment   Time In 0900         Time Out 1000         Minutes 60         Timed Code Treatment Minutes: 60 Minutes    Therapy Time   Individual Concurrent Group Co-treatment   Time In 1100         Time Out 1130         Minutes 30         Timed Code Treatment Minutes: [address], OT

## 2020-01-20 NOTE — PROGRESS NOTES
Physical Therapy  Facility/Department: [facility]  Daily Treatment Note  NAME: Brenda Xiao  : 1945  MRN: [id number]    Date of Service: 2020    Discharge Recommendations:  24 hour supervision or assist, Home with Home health PT   PT Equipment Recommendations  Equipment Needed: Yes  Vendor Name: Pt will need 22 inch wheel chair with cushion for return to home. Pt has walker and is only able to WB for short distances only (transfer distances) due to recent   Wheelchair: Standard  Other: 22 inch wheel chair with cushion     Assessment   Assessment: Pt s/p right heel debridement and Right toe amputations  toes 2,3. Per 2020 podiatry note Mulu Mora DPM\"-full weightbearing okay for short distances only in surgical shoe\"  Pt c/o gas /abdominal  pain 5/10 but denies RLE pain. Pt toileted for BM 3 x with assist for stedy of toilet and transfer wtih FWW SBA for second trip to bathroom. Patient given transfer train with FWW with cues needed to position/angle WC for set up and cues for not pivoting on RLE with transfer WBAT RLE. Pt indep bed mobility, SBA transfer, SANG /WC propulsion. walking distance limited as pt to be WB short distance only RLE due to recent amputation. Pt called daughter for family training with family  daughter Ana Shell and her  coming in at 6:30 am to pt's room for PT and 7am OT. Pt noted to have gas pain and c/o urgency for BM with pt reporting has had Milk of Magnesia earlier in the day. Treatment Diagnosis: Functional mobility deficit  Specific instructions for Next Treatment: progress transfer training and gait training as able  Decision Making: Medium Complexity  Barriers to Learning: cognition  REQUIRES PT FOLLOW UP: Yes     Patient Diagnosis(es): There were no encounter diagnoses.      has a past medical history of Allergic, Anemia, Angina, Arthritis, Asthma, Cancer ([address] Coronary artery disease, Diabetes mellitus ([address] Heart attack ([facility] [address] [facility] Hyperlipidemia, Hypertension, and Obesity. 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 Restrictions  Restrictions/Precautions  Restrictions/Precautions: Fall Risk, Modified Diet  Position Activity Restriction  Other position/activity restrictions:  Per 1/17/2020 podiatry note Edd Horner DPPRAVEEN\"-full weightbearing okay for short distances only in surgical shoe\"Noted multipodous boots ordered by podiatry for foot wounds, USE cushion while up in chair. sponge bathe only per nsg 1/10 d/t LE wounds, 1500 ml fluid restriction, full weightbearing okay for short distances only in surgical shoe per podiatry   Subjective   General  Chart Reviewed: Yes  Subjective  Subjective: Pt reports not pain in foot today. Orientation  Orientation  Overall Orientation Status: Within Functional Limits  Cognition   Cognition  Attention Span: Attends with cues to redirect  Objective   Bed mobility  Rolling to Left: Independent  Sit to Supine: Independent  Scooting: Independent  Transfers  Bed to Chair: Stand by assistance(pt needed set up assist to angle WC with pt able to transfer with walking cast shoe on from WC<>mat SBA with FWW. Pt cautioned not to pivot or twist on RLE. Pt verbalized understanding of education. )  Comment: cynthia palacios transfer toilet to stand, stegerber seat to stand and stand to WC SBA with cast shoe on RLE transferring toilet to [facility]. Pt returned to bathroom second time with pt needing WC set up assist to angle/position WC. CGA for tranfer WC<>toilet with use of grab bars WBAT with cast shoe on RLE.   Patient  needed mod assist for clothing RLE and unable to maintain WB status for amb after recent toe amputations RLE. Long term goals  Time Frame for Long term goals : 2 weeks; 1/18/2020  Long term goal 1: Patient will perform bed mobility with supervision 1/13/2020Goal Met pt demo indep with rolling  and sup<.sit. Long term goal 2: Patient will perform all functional transfers with AAD and supervision. GOAL MET 1/15/2020Pt demo SBA with toilet transfer,  with garb bars and FWW and SBA for bed/WC transfers with FWW.  1/17/2020 GOAL NOT MET pt now NWB RLE mod assist for transfer . 1/20/2020 GOAL met SBA for bed<>WC with FWW and set up assist with WBAT cast shoe RLE   Long term goal 3: Patient will ambulate 50 ft with AAD and CGA  Long term goal 4: Patient will propel w/c 100 ft on even and uneven surfaces with supervision   GOAL MET  1/15/2020 pt demo 180 feet of WC propulsion wiht BUE with L/R turns mod I for level. Long term goal 5: Patient will negotiate 3 stairs with AAD and Min A.  NApplicable as pt hs ramped entrance and recent toe amp with WB for short distance walking only. Patient Goals   Patient goals : \"To get back to normal\"    Plan    Plan  Times per week: 5 of 7 days per week  Times per day: Daily  Specific instructions for Next Treatment: progress transfer training and gait training as able  Current Treatment Recommendations: Strengthening, Transfer Training, Home Exercise Program, Balance Training, Endurance Training, Gait Training, Functional Mobility Training, Wheelchair Mobility Training, Stair training, Pain Management, Equipment Evaluation, Education, & procurement, Safety Education & Training, Patient/Caregiver Education & Training  Safety Devices  Type of devices:  All fall risk precautions in place, Call light within reach, Gait belt, Patient at risk for falls, Left in bed, Nurse notified, Bed alarm in place  Restraints  Initially in place: No     Therapy Time   Individual Concurrent Group Co-treatment   Time In 1233

## 2020-01-20 NOTE — PROGRESS NOTES
[facility]: ACUTE REHAB UNIT  SPEECH-LANGUAGE PATHOLOGY      [x] Daily  [] Weekly Care Conference Note  [] Discharge    J Luis  FUN:[id number]  Rehab Dx/Hx: Metabolic encephalopathy [I17.11]    Precautions: Falls  Home situation: Lives at home, caregiver for son and grandchildren   ST Dx: [] Aphasia  [] Dysarthria  [] Apraxia   [x] Oropharyngeal dysphagia [x] Cognitive Impairment  [] Other:   Date of Admit: 1/3/2020  Room #: 0165/0165-01    Current functional status (updated daily):         Pt being seen for : [] Speech/Language Treatment  [x] Dysphagia Treatment [x] Cognitive Treatment  [] Other:  Communication: [x]WFL  [] Aphasia  [] Dysarthria  [] Apraxia  [] Pragmatic Impairment [] Non-verbal  [] Hearing Loss  [] Other:   Cognition: [] WFL  [] Mild  [x] Moderate  [x] Severe [] Unable to Assess  [] Other:  Memory: [] WFL  [] Mild  [x] Moderate  [x] Severe [] Unable to Assess  [] Other:  Behavior: [x] Alert  [x] Cooperative  []  Pleasant  [x] Confused  [] Agitated  [] Uncooperative  [x] Distractible [] Motivated  [] Self-Limiting [] Anxious  [] Other:  Endurance:  [x] Adequate for participation in SLP sessions  [] Reduced overall  [] Lethargic  [] Other:  Safety: [] No concerns at this time  [x] Reduced insight into deficits  []  Reduced safety awareness [] Not following call light procedures  [] Unable to Assess  [] Other:    Current Diet Order:DIET GENERAL; Dysphagia Soft and Bite-Sized; Daily Fluid Restriction: 1500 ml  Dietary Nutrition Supplements: Diabetic Oral Supplement  Swallowing Precautions:    Alternate solids and liquids;Small bites/sips;Assist feed;Eat/Feed slowly;Upright as possible for all oral intake;Remain upright for 30-45 minutes after meals        Date: 1/20/2020      Tx session 1  [id number]  Will,  Clinician  Tx session 2  [id number]   Total Timed Code Min 30 0   Total Treatment Minutes 30 0   Individual Treatment Minutes 30 0   Group Treatment Minutes 0 0   Co-Treat Minutes 0 0   Variance/Reason:  n/a N/a    Pain Pt denied pain    Pain Intervention [] RN notified  [] Repositioned  [] Intervention offered and patient declined  [x] N/A  [] Other: [] RN notified  [] Repositioned  [] Intervention offered and patient declined  [] N/A  [] Other:   Subjective     Pt seen upright on edge of bed, alert and oriented. Pt cooperative and agreeable to participate in therapy. Pt scheduled for second Speech therapy session at 10:30am this date. SLP entered pt's room, MD in room speaking with pt, then RN needed to wrap patients foot. SLP returned at a second time and lab had just entered pt's room to obtain blood work.  -30 minutes. Objective:  Goals       Short-term Goals  Timeframe for Short-term Goals: 5 days (01/09)      Goal met; MBSS completed 1/13/20 Goal met; MBSS completed 1/13/20   Goal 2:      Goal met; MBSS completed 1/13/20-- continue current Dysphagia III (soft and bite sized) diet with thin liquids. Pt denies dysphagia with meals / meds. Goal met; MBSS completed 1/13/20-- continue current Dysphagia III (soft and bite sized) diet with thin liquids. Pt denies dysphagia with meals / meds. Cognitive/lingustic goals:  Goal 1: The patient will complete basic problem solving tasks with 80% accuracy given min cues    Not targeted. Goal met, 1/16/20. Goal met 1/16/20. Goal 3: The patient will complete graded attention tasks with 80% accuracy given min cues   Sustained attention task:  -pt provided with letters of the alphabet and asked to name three items per letter given three specific categories  -ex: Letter A: person's name, food item, and animal that all start with the letter A  -Independently 80% accuracy, improving to 100% given min cues. Goal 4: The patient will complete graded naming tasks with 70% accuracy given min cues. Goal indirectly targeted during sustained attention task. See goal 3 above. Goal 5:  The -- -- --   Dysphagia Tx -- -- --       Electronically Signed by     Session 1:  Aurea Quiros   Clinician    Julia UMAÑA CCC-SLP  Speech-Language Pathologist  [id number]

## 2020-01-21 NOTE — PROGRESS NOTES
[facility]       Progress Note           Subjective/   75y.o. year old female who we are seeing in consultation for KIMBERLY/CKD requiring HD. Transferred back from [facility] for rehab. HPI:   needed HD from 11/13/19 to 1/7/20. Denies any sob. Creatinine 2.9 after 1 l NS  K is normal     ROS:  Intake better, no fevers. Objective/   GEN:  Chronically ill, BP (!) 149/69   Pulse 78   Temp 97.9 °F (36.6 °C) (Oral)   Resp 18   Ht 5' (1.524 m)   Wt 145 lb 1 oz (65.8 kg)   SpO2 99%   BMI 28.33 kg/m²   HEENT: non-icteric, no JVD  CV: S1, S2 without m/r/g; + LE edema, R foot in wrap  RESP: CTA B without w/r/r; breathing wnl  ABD: +bs, soft, nt, no hsm  SKIN: warm, no rashes  ACCESS: R IJ TDC (12/2, removed 1/8)    Data/  No results for input(s): WBC, HGB, HCT, MCV, PLT in the last 72 hours. Recent Labs     01/20/20  1050 01/21/20  0621   * 126*   K 4.6 4.4   CL 88* 90*   CO2 24 24   GLUCOSE 164* 110*   PHOS 5.0* 4.4   BUN 37* 35*   CREATININE 2.9* 2.9*   LABGLOM 16* 16*   GFRAA 19* 19*       Assessment/Plan      Acute Kidney Injury.  - ATN from septic shock / cardiac arrest, initially requiring CRRT since 11/13/19  - Now off dialysis but has not returned to baseline   -follow overall trend      CKD stage 3.  - Suspect from HTN and DM.  - Baseline serum creatinine of 1.3-1.4 mg/dL. - Likely to have worsening of baseline but hopefully we will see slow improvement      Sepsis with MSOF/Enterococcus bacteremia  - ID following  - Repeat blood cultures 11/27 were no growth to date. enterococci + 1/5 on vanc; 1/9 NGTD  - TDC removed 1/8  - Echo wo vegetations noted on 1/9/20  - 2 weeks of vancomycin per ID until 1/22    Hyperkalemia:    - Course of lokelma 10 grams TID x 48 hours, now resolved        Malnutrition.  - PEG removed 1/17, intake better     Anemia. -  on retracrit 5K qHD, change to aranesp 60 mcg IV qweekly      Spindle Cell Sarcoma.   - Follows with Dr. Rae Gramajo and Dr. Joesph Pratt at [place].    Hyponatremia  - Secondary to reduced renal function  - Nutrition improving, will follow with labs     LE DVT  - On coumadin    Cholelithiasis  - Conservative measures per GI at OSH    Gangrenous toes  - Per Podiatry, s/p debridement & amputation of R 2nd & 3rd toes on 1/16

## 2020-01-21 NOTE — PROGRESS NOTES
Physical Therapy  Facility/Department: [facility]  Daily Treatment Note  NAME: Gordon Hassan  : 1945  MRN: [id number]    Date of Service: 2020    Discharge Recommendations:  24 hour supervision or assist, Home with Home health PT        Assessment   Assessment: Patient reports no pain this am across sessions but c/o fatigue, nausea, urgency to have BM. Pt with 1 episode of emesis and 1 BM during session with nursing alerted. Pt noted she did not sleep well last evening. Pt's family present (chrissy and son-in-law) from 6:40-7am portion of tx. Patient seen for gait training with step to gait with FWW with cast shoe and cues not to pivot or twist on RLE. Pt's family observed  CGA to SBA for gait and transfers and educated family on mobility techniques with FWW and current cognitive functional impairement asfter pt gave informed consent to D/W her family current care concerns. Pt very fatigued and required max encouragement to participate with  pt needing continued cues for safety with transfer train, gait train and variable assist with WC mobility. Pt needed cues and intermittent assist with LE therex. Decreased indep in WC mobility this date noted due to fatigue with pt noting she had only slept 2 hours last evening. Continue to reccomend 24 hour care or placement  due to impaired cogintive function and  impaired indep with mobility. Patient Diagnosis(es): There were no encounter diagnoses. has a past medical history of Allergic, Anemia, Angina, Arthritis, Asthma, Cancer ([address] Coronary artery disease, Diabetes mellitus ([address] Heart attack ([address] Hyperlipidemia, Hypertension, and Obesity. has a past surgical history that includes Diagnostic Cardiac Cath Lab Procedure; Cataract removal with implant; Carpal tunnel release; Knee arthroscopy; Coronary angioplasty with stent (2012);  Hysterectomy; bronchoscopy (N/A, 2019); bronchoscopy (N/A, 2019); bronchoscopy (2019); bronchoscopy (11/23/2019); Upper gastrointestinal endoscopy (N/A, 12/2/2019); and Toe amputation (Right, 1/16/2020). Restrictions  Restrictions/Precautions  Restrictions/Precautions: Fall Risk, Modified Diet  Position Activity Restriction  Other position/activity restrictions:  Per 1/17/2020 podiatry note Handy [place], DPM\"-full weightbearing okay for short distances only in surgical shoe\"Noted multipodous boots ordered by podiatry for foot wounds, USE cushion while up in chair. sponge bathe only per nsg 1/10 d/t LE wounds, 1500 ml fluid restriction, full weightbearing okay for short distances only in surgical shoe per podiatry   Subjective             Orientation  Orientation  Overall Orientation Status: Within Normal Limits  Cognition Needs frequent reminders to push up from transfer surface otherwise pt has difficulty sit to stand and pulls AD towards her. Oriented to person, place, time, and family. Objective    Bed mobility:   Sup<>sit x2 indep (including getting BLE in and out of bed)  Pt with cast shoe donned RLE  With pt cued not to pivot or twist on RLE with pt verbalizing understanding  Transfers  Sit to Stand: Stand by assistance Needs frequent reminders to push up from transfer surface otherwise pt has difficulty sit to stand and pulls AD towards her. FWW  Stand to sit: Stand by assistance Needs cues ot reach back to transfer surface to control LE and trunk flexion. FWW  Bed to Chair: Stand by assistance FWW  WC propulsion  150 feet with left right turns mod encouragement as pt notes today she is very fatigued. Gait 10 feet WC>bed step to technique SBA with pt cued to  rLE as pt with difficulty not shuffling with RLE for step to gait with  Apraxia noted RLE with steppage gait due to rLE foot drop)     Comment: Pt with transfers first session bed<>WC and WC<>chair with FWW and sit<>stand with FWW SBA with cues needed to set up to angle WC.   Demo to pt, her chrissy and son in law  SBA to [address] and appropriate set up for transfer as well as how to cue pt to push up from and reach back to transfer surface with  family verbalizing understanding. Exercises  Knee Short Arc Quad: [id number]   Seated marches x30  LAQ x30  Reviewed transfer, short distance gait, WC mobility and bed mobility  and WB precautions for RLE with pt, chrissy and son-in-law  Along with recommendation per SLP Dayne Singh for pt to have assistance with taking mediation and managing finances giving variable cognitive function since admission . Due to this variable cognitive function advised that pt should not manage anyone else's medication either. Second session:  Car Transfer: Stand by assistance(BA cues to reach back to and push up from transfer surface and cues to angle WC appropriately to car for WC<>passenger side car simulator with FWW ( cast shoe donned [email or web address] )  Stand Pivot Transfers: Stand by assistance(toilet transfer WC<>toilet with SBA and cues to set up WC and to use grab bar for  SPT on LLE with cast shoe on RLE   SBA, pt able to manage clothing with set up for wipes and indep in cristi care. SBA for toilet ing due to mild unsteadiness.  )  WC propulsion  100 feet with left right turns with min assist for path navigation  and max encouragement as pt notes today she is very fatigued. Pt requests return to bed after emesis in bathroom (scant 1 tablespoon amount)  With pt reporting fatigue and closing eyes. Notified nursing. LE therex:   SLR RLE x10, LLE x10  Hip abd supine x15 BLE  Ankle DF/PF LLE x30, RLE x15 AAROM to prom   heel slides with soft boots on and figure  Eight ace wrap from foot to lower leg   (re wrap lower lag as  Wrap coming loose) x15 BLE               Goals  Short term goals  Time Frame for Short term goals: one week; 1/11/2020  Short term goal 1: Patient will perform bed mobility with CGA.  1/13/2020Goal Met pt demo indep with rolling  and sup<.sit.    Short term goal 2: Patient will Daily  Specific instructions for Next Treatment: progress transfer training and gait training as able  Current Treatment Recommendations: Strengthening, Transfer Training, Home Exercise Program, Balance Training, Endurance Training, Gait Training, Functional Mobility Training, Wheelchair Mobility Training, Stair training, Pain Management, Equipment Evaluation, Education, & procurement, Safety Education & Training, Patient/Caregiver Education & Training  Safety Devices  Type of devices:  All fall risk precautions in place, Call light within reach, Gait belt, Patient at risk for falls, Nurse notified, Bed alarm in place, Left in bed  Restraints  Initially in place: No     Therapy Time   Individual Concurrent Group Co-treatment   Time In 0630         Time Out 0700         Minutes 30         Timed Code Treatment Minutes: 30 [facility] Time:   [facility]   Time In 0830         Time Out 0930         Minutes 60           Timed Code Treatment Minutes:  60    Total Treatment Minutes:  Ellen Mann5, PT

## 2020-01-21 NOTE — PATIENT CARE CONFERENCE
[facility]  Inpatient Rehabilitation  Weekly Team Conference Note    Date: 2020  Patient Name: Enedelia Swift        MRN: [id number]    : 1945  (75 y.o.)  Gender: female   Referring Practitioner: Jadine Cockayne, MD  Diagnosis: Acute encephalopathy      Interventions to be utilized toward barriers to discharge, per discipline:  [address] observed barriers to dc: Pain, Impulsivity, Limited safety awareness, Anxiety, Unrealistic expectations, Upper extremity weakness, Lower extremity weakness and Long standing deficits, new medication and dosage changes  Nursing interventions: educate patient and family on new medications and any dosage changes, offer pain medication when it is available or hot and cold packs for break through pain  Family Education: no  Fall Risk:  Yes      Physical therapy observed barriers to dc:    Baseline: indep FWW   Current level: needs assist (SBA ) for cues to push up from and reach back to transfer surface as otherwise pulls walker on self or not close enough to transfer surface, walking 10 feet transfer distances only with FWW and RLE cast shoe CGA, indep bed mobility, variable SANG to indep depends on fatigue level for WC propulsion in home distances. Impaired cognition noted with pt with decreased ability to recall and employ safety techniques. Barriers to DC: Unable to WB on RLE greater than transfer distance due to recent toe amputation and heel debridement. 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 Her daughter is managing her own medical issues including  Use of an LVAD and son-in-law works full time. Needs in order to achieve dc home/next level of care: 24 hour supervision to intermittent physical assist as pt unsafe to transfer, walk by herself at present.        Physical therapy interventions:   Current Treatment transfers, min/mod A for LB ADLs               Barriers to DC: assist level, cognition              Needs in order to achieve dc home/next level of care: SPV to min A for ADLs and transfers    Occupational Therapy interventions:  Current Treatment Recommendations: Strengthening, ROM, Safety Education & Training, Balance Training, Patient/Caregiver Education & Training, Self-Care / ADL, Cognitive/Perceptual Training, Functional Mobility Training, Neuromuscular Re-education, Equipment Evaluation, Education, & procurement, Home Management Training, Endurance Training, Cognitive Reorientation      OCCUPATIONAL THERAPY  Pt cooperative, seen for family training in am session with pt's daughter and son-in-law present. Family educated on toilet transfers, ADLs, and family report pt has TTB that is used for son who had a CVA. Daughter reports ability to assist pt with LB dressing. Educated on sponge bathing at present per podiatry recs. Family verb understanding of education. Second session: pt reports extreme fatigue and reports she has not been to bed and is fatigued. Pt performed toilet transfer with CGA, requires min A for sit>supine with use of leg . Pt performs B UE ex with 2# weights in supine with cues for tech.  Pt very limited by fatigue in second session    Speech therapy observed barriers to dc:    Baseline: pt lives at home, pt is primary caretaker of son who had a CVA and is nonverbal, primary caretaker of 6 grandchildren   Current level: mild-mod cognitive linguistic   Barriers to DC: fluctuating cognitive status, intermittent confusion   Needs in order to achieve dc home/next level of care: assistance with higher level executive function tasks (meds, money), carryover of compensatory strategies    Speech Therapy interventions:  Dysphagia: Therapeutic Interventions: Laryngeal exercises, Patient/Family education, Diet tolerance monitoring, Tongue base strengthening, Effortful swallow, Oral conference and I approve the established interdisciplinary plan of care as documented within the medical record of Osmar Lange. MD: Francisco Pitt.  Micheal Ortiz MD 1/22/2020, 11:03 AM

## 2020-01-21 NOTE — PLAN OF CARE
Problem: Nutrition  Goal: Optimal nutrition therapy  Outcome: Ongoing  Note:   Nutrition Problem: Predicted suboptimal energy intake  Intervention: Food and/or Nutrient Delivery: Continue current diet, Start ONS  Nutritional Goals: Pt will tolerate diet and have meal and ONS intakes 50% or greater during ARU stay

## 2020-01-21 NOTE — PROGRESS NOTES
Pt verbalized understanding of education. )  Device: Rolling Walker  Other Apparatus: Wheelchair follow  Assistance: Stand by assistance  Distance: 10 ft x 2 (to/from bathroom)  OT  PT Equipment Recommendations  Equipment Needed: Yes  Vendor Name: Pt will need 22 inch wheel chair with cushion for return to home. Pt has walker and is only able to WB for short distances only (transfer distances) due to recent   Mobility Devices: Wheelchair  Wheelchair: Standard  Other: 22 inch wheel chair with cushion   Toilet - Technique: Ambulating  Equipment Used: Standard toilet  Toilet Transfers Comments: using grab bars, cues for hand placement and sequencing steps of task   Assessment        SLP  Current Diet : Regular  Current Liquid Diet : Thin  Diet Solids Recommendation: Dysphagia Soft and Bite-Sized (Dysphagia III)(Per MD order)  Liquid Consistency Recommendation: Thin    Body mass index is 28.33 kg/m². 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 - Defer to podiatry for further care.      DM  - lantus, SSI     Bowels: Schedule colace + senna. Follow bowel movements. Enema or suppository if needed. KIMBERLY  - Appreciate nephrology input     Bladder: Check PVR x 3. [facility] if PVR > 200ml or if any volume is > 500 ml.      Sleep: Trazodone provided prn. Peg tube removed today with traction; well tolerated, no complications. Dry dressing applied.      HALIE: 1/23 home vs skilled  DME: RW, WC with swing away leg rests, shower chair vs ttb    Rodríguez Zelaya MD 1/21/2020, 12:52 PM

## 2020-01-21 NOTE — PROGRESS NOTES
Electronically Signed by     Session 1:  [facility]   Clinician    Irvin UMAÑA CCC-SLP  Speech-Language Pathologist  [phone]

## 2020-01-21 NOTE — PROGRESS NOTES
Occupational Therapy  Facility/Department: [facility]  Daily Treatment Note  NAME: Allison Swift  : 1945  MRN: [id number]    Date of Service: 2020    Discharge Recommendations:  24 hour supervision or assist, Subacute/Skilled Nursing Facility(SNF if no 24 hr)       Assessment   Performance deficits / Impairments: Decreased functional mobility ; Decreased cognition;Decreased high-level IADLs;Decreased ADL status; Decreased endurance;Decreased fine motor control;Decreased strength;Decreased balance;Decreased safe awareness  Assessment: Pt cooperative, seen for family training in am session with pt's daughter and son-in-law present. Family educated on toilet transfers, ADLs, and family report pt has TTB that is used for son who had a CVA. Daughter reports ability to assist pt with LB dressing. Educated on sponge bathing at present per podiatry recs. Family verb understanding of education. Second session: pt reports extreme fatigue and reports she has not been to bed and is fatigued. Pt performed toilet transfer with CGA, requires min A for sit>supine with use of leg . Pt performs B UE ex with 2# weights in supine with cues for tech. Pt very limited by fatigue in second session. OT Education: OT Role;Plan of Care;ADL Adaptive Strategies;Transfer Training;Orientation; Energy Conservation;Home Exercise Program  Patient Education: bed mobility, safety with transfers, toilet transfers, safety with toileting, reacher use  Activity Tolerance  Activity Tolerance: Patient limited by pain; Patient limited by fatigue  Safety Devices  Safety Devices in place: Yes  Type of devices: Gait belt;Call light within reach; Chair alarm in place; Left in chair;Bed alarm in place; Left in bed(left in chair after 1st session, supine in bed after 2nd session)         Patient Diagnosis(es): There were no encounter diagnoses.       has a past medical history of Allergic, Anemia, Angina, Arthritis, Asthma, Cancer ([facility] [address] Coronary artery disease, Diabetes mellitus ([facility] [address] Heart attack ([facility] [address] Hyperlipidemia, Hypertension, and Obesity. 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 Restrictions  Restrictions/Precautions  Restrictions/Precautions: Fall Risk, Modified Diet  Position Activity Restriction  Other position/activity restrictions:  Per 1/17/2020 podiatry note Jenn Silverman, DPM\"-full weightbearing okay for short distances only in surgical shoe\"Noted multipodous boots ordered by podiatry for foot wounds, USE cushion while up in chair. sponge bathe only per nsg 1/10 d/t LE wounds, 1500 ml fluid restriction, full weightbearing okay for short distances only in surgical shoe per podiatry   Subjective   General  Chart Reviewed: Yes  Patient assessed for rehabilitation services?: Yes  Response to previous treatment: Patient with no complaints from previous session  Family / Caregiver Present: No  Referring Practitioner: Lonnie  Diagnosis: acute encephalopathy due to bacteremia, suspect CLABSI from [address] [place] [address]. Pt with full thickness wounds R and L foot with gangrenous toes. Pt also with KIMBERLY and recent cardiac arrest. Pt with previous admission 11/11 to 12/5 for sepsis in which pt was discharged to LTAC  Subjective  Subjective: pt seated in chair, agreeable  General Comment  Comments: RN agreeable to OT session. Transport arrived during OT session to take pt for doppler imaging of LE. Vital Signs  Patient Currently in Pain: No   Orientation  Orientation  Overall Orientation Status: Within Functional Limits  Objective    ADL  Grooming: Stand by assistance;Setup; Increased time to complete;Verbal cueing  LE Dressing: Maximum assistance(thread/doff over distal LEs, assist for socks)  Toileting: Contact guard assistance(clothing mgmt)        Balance  Sitting Balance: Supervision  Standing Balance: Contact guard assistance  Standing Balance  Time: ~1 min x 6  Activity: transfers, mobility, toileting, grooming  Comment: CGA for balance at times   Toilet Transfers  Toilet - Technique: Ambulating  Equipment Used: Standard toilet  Toilet Transfer: Contact guard assistance  Toilet Transfers Comments: using grab bars, cues for hand placement and sequencing steps of task      Transfers  Stand Pivot Transfers: Contact guard assistance  Sit to stand: Contact guard assistance  Stand to sit: Contact guard assistance  Transfer Comments: CGA sit to stands        Coordination  Gross Motor: fair for ADLs and transfers  Fine Motor: fair for ADLs              Cognition  Overall Cognitive Status: Exceptions  Arousal/Alertness: Appropriate responses to stimuli  Following Commands: Follows one step commands with increased time; Follows one step commands with repetition  Attention Span: Attends with cues to redirect  Memory: Decreased recall of biographical Information;Decreased recall of recent events;Decreased short term memory  Safety Judgement: Decreased awareness of need for safety  Problem Solving: Decreased awareness of errors  Insights: Decreased awareness of deficits  Initiation: Requires cues for some  Sequencing: Requires cues for some  Cognition Comment: pt cont to be anxious with tangential speech at times.  Pt reports very fatigued during afternoon session                    Type of ROM/Therapeutic Exercise  Type of ROM/Therapeutic Exercise: AROM  Comment: 2# weights  Exercises  Scapular Protraction: x15  Scapular Retraction: x15  Shoulder Flexion: x15  Shoulder ABduction: x15  Shoulder ADduction: x15  Elbow Flexion: x20  Elbow Extension: x20  Supination: x20  Pronation: x20  Wrist Flexion: x20  Wrist Extension: x20  Other: theraband HEP with orange band x 20 reps                     Plan   Plan  Times per week: 5/7 days per week  Plan weeks: 2 weeks  Current Treatment Recommendations: Strengthening, ROM, Safety Education & Training, Balance Training, Patient/Caregiver Education & Training, Self-Care / ADL, Cognitive/Perceptual Training, Functional Mobility Training, Neuromuscular Re-education, Equipment Evaluation, Education, & procurement, Home Management Training, Endurance Training, Cognitive Reorientation  G-Code    Goals  Short term goals  Time Frame for Short term goals: 1 week (by 1/11/20)  Short term goal 1: Pt will tolerate static standing for 3 min with CGA for improved participation in BADL tasks. -met 1/15  Short term goal 2: Pt will complete UB dressing with set-up and min verbal cues for task completion. -MET 1/10  Short term goal 3: Pt will complete oral care in stance at sink with CGA and min verbal cues for sequencing task.  -MET 1/10  Short term goal 4: Pt will participate in nine hole peg test for cont assessment of [address]. -MET 1/14  Long term goals  Time Frame for Long term goals : 2 weeks (by 1/18/20)  Long term goal 1: Pt will complete toilet transfers with supervision with AD as needed and min cues for safety and sequencing -continue  Long term goal 2: Pt will complete LB dressing tasks with min A with AE as needed. -continue  Long term goal 3: Pt will complete toileting tasks with supervision.-continue  Patient Goals   Patient goals : \"to walk\" and \"to get back to normal\"        Therapy Time   Individual Concurrent Group Co-treatment   Time In 0710         Time Out 0740         Minutes 30         Timed Code Treatment Minutes: 30 Minutes  Therapy Time   Individual Concurrent Group Co-treatment   Time In 1230         Time Out 1330         Minutes 60         Timed Code Treatment Minutes: [address], OT

## 2020-01-21 NOTE — PROGRESS NOTES
Nutrition Assessment    Type and Reason for Visit: Reassess    Nutrition Recommendations:   Continue general, soft and bite sized diet  Fluid restriction per MD  Continue Glucerna ONS  Monitor po intakes, nutrition adequacy, weights, pertinent labs, BMs    Nutrition Assessment: Follow up: Pt improving some from a nutritional standpoint AEB po intakes of 50-75% recently. Pt reports that her appetite remains diminished d/t nausea. Antiemetics available PRN. Pt states that she has been trying to drink her ONS. Encouraged po intakes to promote healing. Will continue current diet and ONS. Malnutrition Assessment:  · Malnutrition Status: Meets the criteria for severe malnutrition  · Context: Chronic illness  · Findings of the 6 clinical characteristics of malnutrition (Minimum of 2 out of 6 clinical characteristics is required to make the diagnosis of moderate or severe Protein Calorie Malnutrition based on AND/ASPEN Guidelines):  1. Energy Intake-Less than or equal to 75% of estimated energy requirement, Greater than or equal to 3 months    2. Weight Loss-10% loss or greater, in 3 months  3. Fat Loss-Moderate subcutaneous fat loss, Orbital  4. Muscle Loss-Moderate muscle mass loss, Temples (temporalis muscle), Clavicles (pectoralis and deltoids)  5. Fluid Accumulation-No significant fluid accumulation,    6.  Strength-Not measured    Nutrition Risk Level:  Moderate    Nutrient Needs:  · Estimated Daily Total Kcal: 3212-6350(22-28 kcal/kg)  · Estimated Daily Protein (g): 76-90(1.2-1.4 g/kg)  · Estimated Daily Total Fluid (ml/day): 1 ml/kcal    Nutrition Diagnosis:   · Problem: Predicted suboptimal energy intake  · Etiology: related to Insufficient energy/nutrient consumption     Signs and symptoms:  as evidenced by Weight loss    Objective Information:  · Nutrition-Focused Physical Findings: +2 pitting BLE edema  · Wound Type: Multiple, Surgical Wound  · Current Nutrition Therapies:  · Oral Diet Orders:

## 2020-01-21 NOTE — PROGRESS NOTES
IV catheter discontinued intact. Site without signs and symptoms of complications. Dressing and pressure applied. Tolerated well.  Kai Avendaño RN

## 2020-01-22 NOTE — DISCHARGE INSTR - COC
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    CKD (chronic kidney disease) stage 3, GFR 30-59 ml/min (HCC) N18.3    Cardiac arrest (HCC) I46.9    Acute pulmonary edema (HCC) J81.0    Pleural effusion J90    Elevated LFTs R94.5    Moderate protein-calorie malnutrition (HCC) E44.0    Thrombocytopenia (HCC) D69.6    Leukocytosis D72.829    Normocytic normochromic anemia D64.9    Acute encephalopathy G93.40    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    Metabolic encephalopathy J54.89    Severe malnutrition (HCC) E43       Isolation/Infection:   Isolation          No Isolation        Patient Infection Status     None to display          Nurse Assessment:  Last Vital Signs: BP (!) 156/75   Pulse 72   Temp 97.7 °F (36.5 °C) (Oral)   Resp 16   Ht 5' (1.524 m)   Wt 145 lb 1 oz (65.8 kg)   SpO2 95%   BMI 28.33 kg/m²     Last documented pain score (0-10 scale): Pain Level: 0  Last Weight:   Wt Readings from Last 1 Encounters:   01/14/20 145 lb 1 oz (65.8 kg)     Mental Status:  oriented and alert    IV Access:  - None    Nursing Mobility/ADLs:  Walking   Assisted  Transfer  Assisted  Bathing  Assisted  Dressing  Assisted  Toileting  Assisted  Feeding  Independent  Med Admin  Assisted  Med Delivery   whole    Wound Care Documentation and Therapy:  Wound 11/27/19 Heel Right DTI (Active)   Wound Image   1/6/2020  9:42 AM   Wound Deep tissue/Injury 1/20/2020  9:00 AM   Offloading for Diabetic Foot Ulcers Offloading boot 1/20/2020  9:00 AM -44 1/6/2020  9:42 AM   Wound Volume (cm^3) 0 cm^3 1/6/2020  9:42 AM   Post-Procedure Length (cm) 0 cm 1/6/2020  9:42 AM   Post-Procedure Width (cm) 0 cm 1/6/2020  9:42 AM   Post-Procedure Depth (cm) 0 cm 1/6/2020  9:42 AM   Post-Procedure Surface Area (cm^2) 0 cm^2 1/6/2020  9:42 AM   Post-Procedure Volume (cm^3) 0 cm^3 1/6/2020  9:42 AM   Distance Tunneling (cm) 0 cm 1/6/2020  9:42 AM   Tunneling Position ___ O'Clock 0 1/6/2020  9:42 AM   Undermining Starts ___ O'Clock 0 1/6/2020  9:42 AM   Undermining Ends___ O'Clock 0 1/6/2020  9:42 AM   Undermining Maxium Distance (cm) 0 1/6/2020  9:42 AM   Wound Assessment Pink 1/20/2020  5:59 PM   Drainage Amount None 1/20/2020  5:59 PM   Odor None 1/20/2020  5:59 PM   Margins Attached edges 1/9/2020  9:53 AM   Tess-wound Assessment Dry; Intact 1/20/2020  5:59 PM   Black%Wound Bed 100 1/6/2020  9:42 AM   Culture Taken No 1/6/2020  9:42 AM   Number of days: 54       Wound 11/29/19 Toe (Comment  which one) L 4th Toe (Active)   Wound Image   1/6/2020  9:42 AM   Wound Diabetic 1/6/2020  9:42 AM   Offloading for Diabetic Foot Ulcers Offloading boot 1/6/2020  9:42 AM   Dressing Status Clean;Dry; Intact 1/20/2020  9:00 AM   Dressing Changed Changed/New 1/19/2020  9:34 PM   Dressing/Treatment Betadine swabs;Dry dressing 1/17/2020  9:46 AM   Wound Cleansed Rinsed/Irrigated with saline 1/6/2020  9:42 AM   Dressing Change Due 01/12/20 1/13/2020  8:08 AM   Wound Length (cm) 0.5 cm 1/6/2020  9:42 AM   Wound Width (cm) 0.7 cm 1/6/2020  9:42 AM   Wound Depth (cm) 0 cm 1/6/2020  9:42 AM   Wound Surface Area (cm^2) 0.35 cm^2 1/6/2020  9:42 AM   Change in Wound Size % (l*w) 65 1/6/2020  9:42 AM   Wound Volume (cm^3) 0 cm^3 1/6/2020  9:42 AM   Post-Procedure Length (cm) 0 cm 1/6/2020  9:42 AM   Post-Procedure Width (cm) 0 cm 1/6/2020  9:42 AM   Post-Procedure Depth (cm) 0 cm 1/6/2020  9:42 AM   Post-Procedure Surface Area (cm^2) 0 cm^2 1/6/2020  9:42 AM   Post-Procedure Volume (cm^3) 0 cm^3 Distance (cm) 0 1/6/2020  9:42 AM   Wound Assessment Other (Comment) 1/20/2020  9:00 AM   Drainage Amount None 1/16/2020  9:58 AM   Odor None 1/16/2020  9:58 AM   Margins Unattached edges 1/9/2020  9:53 AM   Tess-wound Assessment Edema; Red 1/9/2020  9:53 AM   Yellow%Wound Bed 30 1/6/2020  9:42 AM   Other%Wound Bed 70 1/6/2020  9:42 AM   Culture Taken No 1/6/2020  9:42 AM   Number of days: 53       Wound 12/02/19 Leg Lower;Right;Posterior (Active)   Wound Image   1/3/2020  5:00 PM   Wound Traumatic 1/3/2020  5:00 PM   Dressing Status Clean;Dry; Intact 1/20/2020  9:00 AM   Dressing/Treatment Betadine swabs;Dry dressing 1/16/2020  9:58 AM   Wound Cleansed Rinsed/Irrigated with saline 1/3/2020  5:00 PM   Dressing Change Due 01/13/20 1/13/2020  8:08 AM   Wound Length (cm) 3 cm 1/3/2020  5:00 PM   Wound Width (cm) 0.8 cm 1/3/2020  5:00 PM   Wound Depth (cm) 0 cm 1/3/2020  5:00 PM   Wound Surface Area (cm^2) 2.4 cm^2 1/3/2020  5:00 PM   Change in Wound Size % (l*w) 40 1/3/2020  5:00 PM   Wound Volume (cm^3) 0 cm^3 1/3/2020  5:00 PM   Wound Assessment Other (Comment) 1/18/2020  8:36 PM   Drainage Amount None 1/17/2020  9:46 AM   Odor None 1/9/2020  9:53 AM   Margins Attached edges 1/9/2020  9:53 AM   Tess-wound Assessment Blanchable erythema 1/9/2020  9:53 AM   Number of days: 51       Wound 01/03/20 Foot Anterior;Dorsal;Medial;Proximal open area to top of right foot (Active)   Wound Image   1/14/2020 12:52 AM   Wound Pressure Stage  2 1/16/2020  9:58 AM   Offloading for Diabetic Foot Ulcers Offloading boot 1/20/2020  9:00 AM   Dressing Status Clean;Dry; Intact 1/22/2020 10:17 AM   Dressing Changed Changed/New 1/20/2020  9:00 AM   Dressing/Treatment Silver dressing;Dry dressing; Ace wrap 1/21/2020  9:00 AM   Wound Cleansed Rinsed/Irrigated with saline 1/20/2020  9:00 AM   Dressing Change Due 01/21/20 1/20/2020  9:00 AM   Wound Length (cm) 1.2 cm 1/6/2020  9:42 AM   Wound Width (cm) 1.5 cm 1/6/2020  9:42 AM   Wound Depth (cm) 0.1 cm 1/6/2020  9:42 AM   Wound Surface Area (cm^2) 1.8 cm^2 1/6/2020  9:42 AM   Change in Wound Size % (l*w) -20 1/6/2020  9:42 AM   Wound Volume (cm^3) 0.18 cm^3 1/6/2020  9:42 AM   Wound Healing % -20 1/6/2020  9:42 AM   Post-Procedure Length (cm) 0 cm 1/6/2020  9:42 AM   Post-Procedure Width (cm) 0 cm 1/6/2020  9:42 AM   Post-Procedure Depth (cm) 0 cm 1/6/2020  9:42 AM   Post-Procedure Surface Area (cm^2) 0 cm^2 1/6/2020  9:42 AM   Post-Procedure Volume (cm^3) 0 cm^3 1/6/2020  9:42 AM   Distance Tunneling (cm) 0 cm 1/6/2020  9:42 AM   Tunneling Position ___ O'Clock 0 1/6/2020  9:42 AM   Undermining Starts ___ O'Clock 0 1/6/2020  9:42 AM   Undermining Ends___ O'Clock 0 1/6/2020  9:42 AM   Undermining Maxium Distance (cm) 0 1/6/2020  9:42 AM   Wound Assessment Intact; Clean;Drainage;Edges well approximated 1/20/2020  9:00 AM   Drainage Amount Scant 1/20/2020  9:00 AM   Drainage Description Serosanguinous 1/20/2020  9:00 AM   Odor None 1/20/2020  9:00 AM   Margins Attached edges; Defined edges 1/6/2020  9:42 AM   Tess-wound Assessment Blanchable erythema 1/20/2020  9:00 AM   Red%Wound Bed 10 1/6/2020  9:42 AM   Yellow%Wound Bed 90 1/6/2020  9:42 AM   Culture Taken No 1/6/2020  9:42 AM   Number of days: 18        Elimination:  Continence:   · Bowel: Yes  · Bladder: Yes  Urinary Catheter: None   Colostomy/Ileostomy/Ileal Conduit: No       Date of Last BM: 1/23/20    Intake/Output Summary (Last 24 hours) at 1/22/2020 1542  Last data filed at 1/22/2020 0745  Gross per 24 hour   Intake 100 ml   Output --   Net 100 ml     I/O last 3 completed shifts: In: 100 [P.O.:100]  Out: -     Safety Concerns: At Risk for Falls    Impairments/Disabilities:      None    Nutrition Therapy:  Current Nutrition Therapy:   - Oral Diet:  soft and bite sized, Renal diet    Routes of Feeding: Oral  Liquids:  Thin Liquids  Daily Fluid Restriction: yes - amount 1500  Last Modified Barium Swallow with Video (Video Swallowing Test): done on 20/      Rehab Therapies: {THERAPEUTIC INTERVENTION:[id number]}  Weight Bearing Status/Restrictions: [address] Weight Bearin}  Other Medical Equipment (for information only, NOT a DME order):  wheelchair  Other Treatments: keep dressing to bilateral feet in place until follow up appointment with Dr. Ryder Carr              Patient's personal belongings (please select all that are sent with patient):  Fitz    RN SIGNATURE:  Electronically signed by Hernandez Morales RN on 20 at 10:31 AM    CASE MANAGEMENT/SOCIAL WORK SECTION    Inpatient Status Date: 1/3/2020    Readmission Risk Assessment Score: 44%    Discharging to Agency   · Name: [facility]  · Address: [address] [place] [address]  · Phone: [phone]  · Fax: [phone]    / signature: CLAUS Kim    PHYSICIAN SECTION    Prognosis: Good    Condition at Discharge: Stable    Rehab Potential (if transferring to Rehab): Good    Recommended Labs or Other Treatments After Discharge: PT/INR twice weekly  Follow up with nephrology, podiatry, PCP    Physician Certification: I certify the above information and transfer of Ishaan Solorzano  is necessary for the continuing treatment of the diagnosis listed and that she requires Home Care for less 30 days.      Update Admission H&P: No change in H&P    PHYSICIAN SIGNATURE:  Electronically signed by Sebas Arshad MD on 20 at 11:49 AM

## 2020-01-22 NOTE — PROGRESS NOTES
assistance  Stand to sit: Modified independent  Bed to Chair: Stand by assistance  Car Transfer: Stand by assistance(cues to push up from car seate and WC vs pull up on walker)  Comment: Pt with cast shoe donned to right foot needed cues to push up from chair as pt pulling up on walker  Ambulation  Ambulation?: Yes  WB Status: WBAT with RLE surgical shoe donned  More Ambulation?: No  Ambulation 1  Surface: level tile  Device: Rolling Walker  Other Apparatus: (cast shoe on right foot)  Assistance: Stand by assistance  Quality of Gait: NEEDED CUES  TO INCREASE RIGHT HIP FLEXION FOR STEPPAGE GAIT TO CLEAR rle ON SWING DUE TO  RLE FOOT DROP   Gait Deviations: Slow Caitlyn; Increased BRANDON; Decreased step length;Decreased step height  Distance: 10 FEET WITH LEFT RIGHT TURNS  ON CARPET   Stairs/Curb  Stairs?: Yes(pt has ramped entrance to home, avoiding WB RLE due to recent right heel debridement and right toe 2,3 amputation. not medically appropriate for steps at present as pt unable to effectively limit WB to RLe )  Wheelchair Activities  Wheelchair Type: Standard  Wheelchair Cushion: Standard  Pressure Relief Type: Push up  Level of Assistance for pressure relief activities: Independent  Left Leg Rest Level of Assistance: Independent  Right Leg Rest Level of Assistance: Independent  Left Brakes Level of Assistance: Independent  Right Brakes Level of Assistance: Independent  Propulsion: Yes  Propulsion 1  Propulsion: Manual  Level: Level Tile  Method: RUE;LUE  Level of Assistance: Modified independent  Distance: 167 feet with left/right turns and over 10 feet of carpet and thru doorways.       Balance  Sitting - Static: Good  Sitting - Dynamic: Good  Standing - Static: Fair  Standing - Dynamic: Poor(requires walker and SBA due to unsteadiness and need for cues to manage RLE foot drop )  Other exercises  Other exercises?: Yes  Other exercises 4: Pt educated in written hep and performed indep on return demo of: stretches with rolling  and sup<.sit. Long term goal 2: Patient will perform all functional transfers with AAD and supervision. GOAL MET 1/15/2020Pt demo SBA with toilet transfer,  with garb bars and FWW and SBA for bed/WC transfers with FWW.  1/17/2020 GOAL NOT MET pt now NWB RLE mod assist for transfer . 1/20/2020 GOAL met SBA for bed<>WC with FWW and set up assist with WBAT cast shoe RLE   Long term goal 3: Patient will ambulate 50 ft with AAD and CGA DC this goal as pt now 10 feet walking for transfers only as pt to limit walking on RLE due to recent vascular surgery. Pt SBA with cues to increase RLE hip flexion  with step to steppage gait noted for 10 feet with left right turns on carpet with FWW and cast shoe. Long term goal 4: Patient will propel w/c 100 ft on even and uneven surfaces with supervision   GOAL MET  1/15/2020 pt demo 180 feet of WC propulsion wiht BUE with L/R turns mod I for level. Long term goal 5: Patient will negotiate 3 stairs with AAD and Min A.  DC this goal Not Applicable as pt hs ramped entrance and recent toe amp with WB for short distance walking only. Patient Goals   Patient goals : \"To get back to normal\"    Plan    Plan  Times per week: 5 of 7 days per week  Times per day: Daily  Specific instructions for Next Treatment: progress transfer training and gait training as able  Current Treatment Recommendations: Strengthening, Transfer Training, Home Exercise Program, Balance Training, Endurance Training, Gait Training, Functional Mobility Training, Wheelchair Mobility Training, Stair training, Pain Management, Equipment Evaluation, Education, & procurement, Safety Education & Training, Patient/Caregiver Education & Training  Safety Devices  Type of devices:  All fall risk precautions in place, Call light within reach, Gait belt, Patient at risk for falls, Nurse notified(nursing with pt on toilet at end of session. )  Restraints  Initially in place: No     Therapy Time   Individual

## 2020-01-22 NOTE — PROGRESS NOTES
Pharmacy to Dose Warfarin     Dx: Bilat lower limb DVT  Goal INR range 2.0-2.5  Home Warfarin dose: 4mg daily  **Warfarin held from 1/14 to 1/17 for toe amputation     Date                 INR                  Warfarin  1/17                1.27                  4mg  1/18                1.22                  5 mg  1/19                1.28                  5 mg   1/20                1.77                  5 mg  1/21                2.41                  1.5mg  1/22                3.15                   Hold     Recommend hold Warfarin tonight due to supratherapeutic INR. Daily INR ordered. Rx will continue to manage therapy per Dr. Krishna Garcia consult order.   Schuyler Thornton, AbebaD 1/21/2020  7:58 AM

## 2020-01-22 NOTE — DISCHARGE SUMMARY
Supervision  LE Bathing: Moderate assistance(distal LEs)  UE Dressing: Supervision  LE Dressing: Maximum assistance(thread LEs into pants/briefs, ting socks )  Toileting: Contact guard assistance(clothing mgmt)    Bed mobility  Bridging: Independent  Rolling to Left: Independent  Rolling to Right: Independent  Supine to Sit: Independent  Sit to Supine: Independent  Scooting: Independent  Comment: indep sup<>sit no rail and rolling  no rail     Functional Transfers: Toilet Transfers  Toilet - Technique: Ambulating  Equipment Used: Standard toilet  Toilet Transfer: Contact guard assistance  Toilet Transfers Comments: using grab bars, cues for hand placement and sequencing steps of task          Shower Transfers  Shower - Transfer From: Wheelchair  Shower - Transfer Type: To and From  Shower - Transfer To: Transfer tub bench  Shower - Technique: Stand pivot  Shower Transfers: Contact Guard  Shower Transfers Comments: transfer with CGA, pt unable to shower d/t LE wounds           Functional Mobility  Functional - Mobility Device: Wheelchair  Activity: Other, To/From therapy gym  Assist Level: Dependent/Total(Max A -DEP)      UE Function:  Hand Dominance  Hand Dominance: Right        Fine Motor Skills  Left 9 Hole Peg Test Time (secs): 30.8  Right 9 Hole Peg Test Time (secs): 27.5      Assessment:   Assessment: Pt cooperative, cont to report fatigue. Pt remains inconsistent with transfers and ADLs, requires CGA/SPV for transfers and toileting. Pt mod I with grooming seated at sink, cont to require max A  for LB dressing, difficulty with AE for LB ADLs d/t cognition and inconsistent coordination. Pt with limited standing and activity tolerance this date, inconsistent cognition. Issued and reviewed written HEP for free weights, pt able to perform with cues. Pt scheduled for d/c next date, recommend 24 hr SPV/assist, family reports ability to assist with LB ADLs.    Prognosis: Good  Barriers to Learning: Pt verbalizes understanding. Pt with cognitive deficits and would benefit from continued education and training.    REQUIRES OT FOLLOW UP: Yes  Discharge Recommendations: 24 hour supervision or assist, Subacute/Skilled Nursing Facility(SNF if no 24 hr)    Equipment Recommendations:  Grab bars for toilet, son reports he will be having installed         Home Exercise Program  Provided Pt with written HEP for free weights    Electronically signed by Nai Toney OT on 1/22/2020 at 11:41 AM

## 2020-01-22 NOTE — PROGRESS NOTES
[facility]: ACUTE REHAB UNIT  SPEECH-LANGUAGE PATHOLOGY      [x] Daily  [] Weekly Care Conference Note  [x] Discharge    [address]      :1945  BEE:[id number]  Rehab Dx/Hx: Metabolic encephalopathy [Q42.24]    Precautions: Falls  Home situation: Lives at home, caregiver for son and grandchildren    Dx: [] Aphasia  [] Dysarthria  [] Apraxia   [x] Oropharyngeal dysphagia [x] Cognitive Impairment  [] Other:   Date of Admit: 1/3/2020  Room #: 0165/0165-01    Current functional status (updated daily):         Pt being seen for : [] Speech/Language Treatment  [x] Dysphagia Treatment [x] Cognitive Treatment  [] Other:  Communication: [x]WFL  [] Aphasia  [] Dysarthria  [] Apraxia  [] Pragmatic Impairment [] Non-verbal  [] Hearing Loss  [] Other:   Cognition: [] WFL  [] Mild  [x] Moderate  [x] Severe [] Unable to Assess  [] Other:  Memory: [] WFL  [] Mild  [x] Moderate  [x] Severe [] Unable to Assess  [] Other:  Behavior: [x] Alert  [x] Cooperative  []  Pleasant  [x] Confused  [] Agitated  [] Uncooperative  [x] Distractible [] Motivated  [] Self-Limiting [] Anxious  [] Other:  Endurance:  [x] Adequate for participation in SLP sessions  [] Reduced overall  [] Lethargic  [] Other:  Safety: [] No concerns at this time  [x] Reduced insight into deficits  []  Reduced safety awareness [] Not following call light procedures  [] Unable to Assess  [] Other:    Current Diet Order:DIET GENERAL; Dysphagia Soft and Bite-Sized; Daily Fluid Restriction: 1500 ml  Dietary Nutrition Supplements: Diabetic Oral Supplement  Swallowing Precautions:    Alternate solids and liquids;Small bites/sips;Assist feed;Eat/Feed slowly;Upright as possible for all oral intake;Remain upright for 30-45 minutes after meals        Date: 2020      Tx session 1  [id number]  Will,  Clinician  Discharge Summary    Total Timed Code Min 60 0   Total Treatment Minutes 60 0   Individual Treatment Minutes 60 0   Group Treatment Minutes 0 0   Co-Treat Minutes 0 0   Variance/Reason:  n/a N/a    Pain Pt denied pain    Pain Intervention [] RN notified  [] Repositioned  [] Intervention offered and patient declined  [x] N/A  [] Other: [] RN notified  [] Repositioned  [] Intervention offered and patient declined  [] N/A  [] Other:   Subjective     Pt seen upright in w/c in community room. Pt alert and agreeable for tx. Objective:  Goals       Short-term Goals  Timeframe for Short-term Goals: 5 days (01/09)      Goal met; MBSS completed 1/13/20 Goal met; MBSS completed 1/13/20   Goal 2:      Goal met; MBSS completed 1/13/20-- continue current Dysphagia III (soft and bite sized) diet with thin liquids. Pt denies dysphagia with meals / meds. Goal met; MBSS completed 1/13/20-- continue current Dysphagia III (soft and bite sized) diet with thin liquids. Pt denies dysphagia with meals / meds. Cognitive/lingustic goals:  Goal 1: The patient will complete basic problem solving tasks with 80% accuracy given min cues    Money Calculations  - Pt given an allotted total and instructed to balance a checkbook given credit and debit amounts. - Pt independently balanced the checkbook with 75% accuracy, improving to 100% with min cues. - Pt encouraged to use look back method to confirm calculations were correct. Goal met. Pt will benefit from continued ST services to achieve higher level accuracy and independence with problem solving tasks. Pt will also benefit from continued assistance/supervision with medication and finance management to ensure accuracy, as cognitive status fluctuates daily. Pt in agreement. Goal met, 1/16/20. Goal met 1/16/20. Goal 3: The patient will complete graded attention tasks with 80% accuracy given min cues   Card Sorting  - Pt instructed to sort a deck of cards according to their suit while naming an item associated with each suit (e.g., anika = drink).    - Pt independently sorted cards with 100% accuracy. Writing Checks  -Pt instructed to write checks out given amount, date, and pay to the order of, with 100% accuracy. Sustained attention indirectly targeted while balancing a checkbook. Goal met. Pt with significantly improved attention skills. Goal 4: The patient will complete graded naming tasks with 70% accuracy given min cues. Card Sorting  - Pt instructed to sort a deck of cards according to their suit while naming an item associated with each suit (e.g., anika = drink) using her recall strategies. - Independently pt named items with and without a visual (e.g., whiteboard with key) with 100% accuracy. Goal met. Goal 5: The patient will complete graded memory recall tasks with 80% accuracy given min cues     Card Sorting  - Pt recalled all suits and category associated with the suit with 100% accuracy. Goal met. Pt will benefit from continued ST services to achieve higher level accuracy and independence with recall tasks. Pt benefits from reinforcement for use of compensatory strategies. Discussed how to implement strategies at home (writing, association, repetition, picturing, etc)     Other areas targeted: MOCA  - Pt was administered the [facility] scoring a 20/30, an improvement compared to her score of 9/25 on 01/03/20. Pt demonstrates strengths on visuospatial, attention, and picture naming tasks. Pt continues to demonstrate deficits in STM, naming tasks given a time constraint, and mathematics. N/A   Education:   Education provided re: strategies for recalling and using the look back method while managing her finances and medication. Pt educated on recommendations following DC.        Safety Devices: [x] Call light within reach  [x] Chair alarm activated  [] Bed alarm activated  [] Other:  [] Call light within reach  [] Chair alarm activated  [] Bed alarm activated  [] Other:    Assessment: Session 1: Pt seen alert, upright in w/c, and agreeable to tx in community room. Pt was administered the [facility] a 20/30. Pt participated in an alternating attention task while sorting cards according to their suit and naming items associated to categories. Pt balanced a checkbook given credit and debit amounts, and was encouraged to use the look back method to double check her calculations. Discharge summary: Pt has met all goals. Pt will benefit from ongoing ST services to improve overall independence and completion of higher level executive function tasks. Pt will continue to benefit from assistance with medication and finance management, pt in agreement. Despite cognitive status fluctuating day by day, pt demonstrated overall progress towards all goals within POC. Plan: Continue as per plan of care. Additional Information: n/a    Barriers toward progress: Dysphagia, severe cognitive communication deficits   Discharge recommendations:  [] Home independently  [] Home with assistance [x]  24 hour supervision  [] ECF [x] Other:   Continued Tx Upon Discharge: ? [x] Yes [] No [] TBD based on progress while on ARU [] Vital Stim indicated [] Other:   Estimated discharge date: 01/23/20  Interventions used this date:  [] Speech/Language Treatment  [] Instruction in HEP [] Group [] Dysphagia Treatment [x] Cognitive Treatment   [] Other: Total Time Breakdown / Charges    Time in Time out Total Time / units   Cognitive Tx 1400 1500 60 min/ 4 units   Speech Tx -- -- --   Dysphagia Tx -- -- --       Electronically Signed by     Session 1:  Evelia Persaud   Clinician    Boone UMAÑA CCC-SLP  Speech-Language Pathologist  [phone]

## 2020-01-22 NOTE — PROGRESS NOTES
Occupational Therapy  Facility/Department: [facility]  Daily Treatment Note  NAME: Robe Zee  : 1945  MRN: [id number]    Date of Service: 2020    Discharge Recommendations:  24 hour supervision or assist, Subacute/Skilled Nursing Facility(SNF if no 24 hr)  OT Equipment Recommendations  Other: family reports having barthroom DME, educated son-in-law on toilet grab bar need, son-in-law reports plan to install grab bar by toilet    Assessment   Performance deficits / Impairments: Decreased functional mobility ; Decreased cognition;Decreased high-level IADLs;Decreased ADL status; Decreased endurance;Decreased fine motor control;Decreased strength;Decreased balance;Decreased safe awareness  Assessment: Pt cooperative, cont to report fatigue. Pt remains inconsistent with transfers and ADLs, requires CGA/SPV for transfers and toileting. Pt mod I with grooming seated at sink, cont to require max A  for LB dressing, difficulty with AE for LB ADLs d/t cognition and inconsistent coordination. Pt with limited standing and activity tolerance this date, inconsistent cognition. Issued and reviewed written HEP for free weights, pt able to perform with cues. Pt scheduled for d/c next date, recommend 24 hr SPV/assist, family reports ability to assist with LB ADLs. OT Education: OT Role;Plan of Care;ADL Adaptive Strategies;Transfer Training;Orientation; Energy Conservation;Home Exercise Program  Patient Education: bed mobility, safety with transfers, toilet transfers, safety with toileting, reacher use  Barriers to Learning: Pt verbalizes understanding. Pt with cognitive deficits and would benefit from continued education and training. Activity Tolerance  Activity Tolerance: Patient limited by fatigue  Safety Devices  Safety Devices in place: Yes  Type of devices: Bed alarm in place;Gait belt;Call light within reach; Left in bed         Patient Diagnosis(es): There were no encounter diagnoses.       has a past medical history of Allergic, Anemia, Angina, Arthritis, Asthma, Cancer ([facility] [address] Coronary artery disease, Diabetes mellitus ([address] Heart attack ([address] Hyperlipidemia, Hypertension, and Obesity. 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 Restrictions  Restrictions/Precautions  Restrictions/Precautions: Fall Risk, Modified Diet  Position Activity Restriction  Other position/activity restrictions:  Per 1/17/2020 podiatry note Leticia Cruz, DPM\"-full weightbearing okay for short distances only in surgical shoe\"Noted multipodous boots ordered by podiatry for foot wounds, USE cushion while up in chair. sponge bathe only per nsg 1/10 d/t LE wounds, 1500 ml fluid restriction, full weightbearing okay for short distances only in surgical shoe per podiatry   Subjective   General  Chart Reviewed: Yes  Patient assessed for rehabilitation services?: Yes  Response to previous treatment: Patient with no complaints from previous session  Family / Caregiver Present: No  Referring Practitioner: Lonnie  Diagnosis: acute encephalopathy due to bacteremia, suspect CLABSI from [address] [place] [address]. Pt with full thickness wounds R and L foot with gangrenous toes. Pt also with KIMBERLY and recent cardiac arrest. Pt with previous admission 11/11 to 12/5 for sepsis in which pt was discharged to LTAC  Subjective  Subjective: pt seated in chair, agreeable  General Comment  Comments: RN agreeable to OT session. Transport arrived during OT session to take pt for doppler imaging of LE.    Vital Signs  Patient Currently in Pain: No   Orientation  Orientation  Overall Orientation Status: Within Functional Limits  Objective    ADL  Feeding: Modified independent ;Dentures  Grooming: Modified independent (sitting at sink)  UE Bathing: Supervision  LE Bathing: Moderate assistance(distal LEs)  UE Dressing: Supervision  LE Dressing: Maximum assistance(thread LEs into pants/briefs, ting socks )  Toileting: Contact guard assistance(clothing mgmt)        Balance  Sitting Balance: Supervision  Standing Balance  Time: ~1 min x 6  Activity: transfers, mobility, toileting, grooming  Comment: CGA for balance at times   Toilet Transfers  Toilet - Technique: Ambulating  Equipment Used: Standard toilet  Toilet Transfer: Contact guard assistance  Toilet Transfers Comments: using grab bars, cues for hand placement and sequencing steps of task   Shower Transfers  Shower - Transfer From: Wheelchair  Shower - Transfer Type: To and From  Shower - Transfer To: Transfer tub bench  Shower - Technique: Stand pivot  Shower Transfers: Contact Guard  Shower Transfers Comments: transfer with CGA, pt unable to shower d/t LE wounds     Transfers  Stand Pivot Transfers: Contact guard assistance  Sit to stand: Contact guard assistance  Stand to sit: Contact guard assistance  Transfer Comments: CGA sit to stands        Coordination  Gross Motor: fair for ADLs and transfers  Fine Motor: fair for ADLs              Cognition  Overall Cognitive Status: Exceptions  Arousal/Alertness: Appropriate responses to stimuli  Following Commands: Follows one step commands with increased time; Follows one step commands with repetition  Attention Span: Attends with cues to redirect  Memory: Decreased recall of biographical Information;Decreased recall of recent events;Decreased short term memory  Safety Judgement: Decreased awareness of need for safety  Problem Solving: Decreased awareness of errors  Insights: Decreased awareness of deficits  Initiation: Requires cues for some  Sequencing: Requires cues for some  Cognition Comment: pt cont to be anxious with tangential times  Patient Goals   Patient goals : \"to walk\" and \"to get back to normal\"       Therapy Time   Individual Concurrent Group Co-treatment   Time In 0930         Time Out 1100         Minutes 90         Timed Code Treatment Minutes: King Milner OT

## 2020-01-22 NOTE — CARE COORDINATION
CASE MANAGEMENT DISCHARGE SUMMARY      Discharge to: Home with family. New Durable Medical Equipment ordered/agency: Manual wheelchair, rolling walker, shower chair. Patient is aware of private pay cost. [facility]  [phone]    Transportation:    Family/car: Family to provide transportation to home via private vehicle.  time: 1/23/2020 @ after dinner    Notified:    Family: Patient to notify family of discharge planning. Agency: referral faxed to [employer] [phone]   RN: Nursing staff notified of discharge planning for RN follow up. Note: Discharging nurse to complete TONE, reconcile AVS, and place final copy with patient's discharge packet. RN to ensure that written prescriptions for  Level II medications are sent with patient as per protocol.     CLUAS Taveras

## 2020-01-22 NOTE — PROGRESS NOTES
Reviewed chart. Appreciate vascular input. No vascular intervention at this time. Patient will follow up within one week. Discharge instructions in chart. Please call for questions or concerns.     Kayleigh Beasley DPM

## 2020-01-22 NOTE — PROGRESS NOTES
Osmar Lange  1/22/2020  [id number]    Chief Complaint: Metabolic encephalopathy    Subjective:   Having some nausea this AM, but no other complaints. ROS: No n/v, cp, sob, f/c  Objective:  Patient Vitals for the past 24 hrs:   BP Temp Temp src Pulse Resp SpO2   01/22/20 0745 (!) 156/75 97.7 °F (36.5 °C) Oral 72 16 95 %   01/21/20 2215 (!) 160/80 98 °F (36.7 °C) Oral 78 16 97 %     Gen: No distress, pleasant. HEENT: Normocephalic, atraumatic. CV: Regular rate and rhythm. Resp: No respiratory distress. Abd: Soft, nontender   Ext: No edema. Dressing to R foot intact. Neuro: Alert, oriented, appropriately interactive. Wt Readings from Last 3 Encounters:   01/14/20 145 lb 1 oz (65.8 kg)   12/05/19 156 lb 1.4 oz (70.8 kg)   10/17/19 169 lb 6.4 oz (76.8 kg)       Laboratory data:   Lab Results   Component Value Date    WBC 6.1 01/18/2020    HGB 7.0 (L) 01/18/2020    HCT 22.4 (L) 01/18/2020    MCV 81.5 01/18/2020     01/18/2020       Lab Results   Component Value Date     01/21/2020    K 4.4 01/21/2020    K 5.0 01/09/2020    CL 90 01/21/2020    CO2 24 01/21/2020    BUN 35 01/21/2020    CREATININE 2.9 01/21/2020    GLUCOSE 110 01/21/2020    CALCIUM 8.7 01/21/2020        Therapy progress:  PT  Position Activity Restriction  Other position/activity restrictions:  Per 1/17/2020 podiatry note Susanna Jon DPM\"-full weightbearing okay for short distances only in surgical shoe\"Noted multipodous boots ordered by podiatry for foot wounds, USE cushion while up in chair.  sponge bathe only per nsg 1/10 d/t LE wounds, 1500 ml fluid restriction, full weightbearing okay for short distances only in surgical shoe per podiatry   Objective     Sit to Stand: Stand by assistance  Stand to sit: Modified independent  Bed to Chair: Stand by assistance  Device: Rolling Walker  Other Apparatus: (cast shoe on right foot)  Assistance: Stand by assistance  Distance: 10 FEET WITH LEFT RIGHT TURNS  ON CARPET   OT  PT Equipment possible in an ordinary bed, complexity of body positioning needs. Patient requires frequent and immediate positioning changes for relief of pain and care needs related to medical diagnoses. Patient needs variability of bed height requirements to perform patient transfers and for eating, bathing, toileting, personal cares, ambulating, grooming, hygiene, dressing upper body, dressing lower body, meal preparation and taking own medications. Current body Weight: 145 lb 1 oz (65.8 kg). The need for this equipment was discussed with the patient and she understands and is in agreement. Pillows/wedges ruled out. Interdisciplinary team conference was held today with entire rehab treatment team including PT, OT, SLP, Dietician, RN, and SW. Discussion focused on progress toward rehab goals and discharge planning. Barriers: variable cognition, weakness. Total treatment time >35 min with greater than 50% spent in care coordination. Rodríguez Hoffman MD 1/22/2020, 11:08 AM

## 2020-01-23 NOTE — CARE COORDINATION
Writer met with patient to discuss disposition of care conference with interdisciplinary team on               Discussed:  See patient care conference note for further detail. Recommendations: Home with 24/7 assist vs. Short term skilled stay. Manual wheelchair, rolling walker, shower chair. Anticipated discharge date: 1/23/2020    Patient verbalized understanding of recommendations and anticipated discharge date. Patient states she intends to return home and that she feels she needs to try. Resources provided on short term facilities and patient educated about 30 day window should she have difficulty at home and cannot manage. Patient introduced to non skilled agency representative from [facility] for non medical assistance and patient understand this is private pay. Referral made to [facility] for patient assistance. Patient states agency of choice is [employer].      CLAUS Taveras

## 2020-01-23 NOTE — PROGRESS NOTES
Pharmacy to Dose Warfarin     Dx: Bilat lower limb DVT  Goal INR range 2.0-2.5  Home Warfarin dose: 4mg daily  **Warfarin held from 1/14 to 1/17 for toe amputation     Date                 INR                  Warfarin  1/17                1.27                  4mg  1/18                1.22                  5 mg  1/19                1.28                  5 mg   1/20                1.77                  5 mg  1/21                2.41                  1.5mg  1/22                3.15                   Hold  1/23                3.25                   Hold     Recommend hold Warfarin tonight due to supratherapeutic INR. Daily INR ordered. Rx will continue to manage therapy per Dr. Tatiana Banuelos consult order.   Michele Amos, PharmD 1/23/2020  9:18 AM

## 2020-01-23 NOTE — PROGRESS NOTES
Telephone call to Dr. Kim Gomez office to notify her patient is discharging today and patient inquiring about follow up care/dressing changes. Await return call.  Hetal Phan

## 2020-01-24 NOTE — PROGRESS NOTES
[facility]: ACUTE REHAB UNIT  SPEECH-LANGUAGE PATHOLOGY       [x]? Daily           []? Weekly Care Conference Note      []? Discharge     Patient:Yesi Solis                                    :1945  DOK:[id number]  Rehab Dx/Hx: Metabolic encephalopathy [U05.43]                        Precautions: Falls  Home situation: Lives at home, caregiver for son and grandchildren   ST Dx: []? Aphasia  []? Dysarthria  []? Apraxia   [x]? Oropharyngeal dysphagia [x]? Cognitive Impairment  []? Other:   Date of Admit: 1/3/2020  Room #: 1508/8424-74     Current functional status (updated daily):                                                              Pt being seen for : []? Speech/Language Treatment  [x]? Dysphagia Treatment [x]? Cognitive Treatment  []? Other:  Communication: [x]? WFL  []? Aphasia  []? Dysarthria  []? Apraxia  []? Pragmatic Impairment []? Non-verbal  []? Hearing Loss  []? Other:   Cognition: []? WFL  []? Mild  [x]? Moderate  [x]? Severe []? Unable to Assess  []? Other:  Memory: []? WFL  []? Mild  [x]? Moderate  [x]? Severe []? Unable to Assess  []? Other:  Behavior: [x]? Alert  [x]? Cooperative  []? Pleasant  [x]? Confused  []? Agitated  []? Uncooperative  [x]? Distractible []? Motivated  []? Self-Limiting []? Anxious  []? Other:  Endurance:  [x]? Adequate for participation in SLP sessions  []? Reduced overall  []? Lethargic  []? Other:  Safety: []? No concerns at this time  [x]? Reduced insight into deficits  []? Reduced safety awareness []? Not following call light procedures  []? Unable to Assess  []? Other:     Current Diet Order:DIET DYSPHAGIA SOFT AND BITE-SIZED; Dysphagia Pureed; Daily Fluid Restriction: 1000 ml  Dietary Nutrition Supplements: Renal Oral Supplement  Swallowing Precautions:    Alternate solids and liquids;Small bites/sips;Assist feed;Eat/Feed slowly;Upright as possible for all oral intake;Remain upright for 30-45 minutes after meals           Date: 2020         Tx Tx 1100 1135 35 min/2 units      Speech Tx         Dysphagia Tx 1030  1049 1044  1100 25 min/1 unit         Electronically Signed by     Jamie Nogueira. Jeri Caraballo M.A.  [id number] [facility]  Speech-Language Pathologist

## 2020-01-24 NOTE — PROGRESS NOTES
Patient's luan arrived after 2000 to  patient for discharge. Discharge notes reviewed with patient. Patient to call about prescription in a.m. Signature needed. Left hospital via wheel chair to car 2020. Patient called nurse's station later in evening stating she did not have any medications at home. Patient was to  medications at pharmacy of choice on discharge but even noted before left pharmacy closed. Nurse reviewed medications and advised patient to obtain medications in a.m.

## 2020-01-28 NOTE — DISCHARGE SUMMARY
Physical Medicine & Rehabilitation  Discharge Summary     Patient Identification:  Shayla Harvey  : 1945  Admit date: 1/3/2020  Discharge date: 2020  Attending provider: No att. providers found        Primary care provider: Winsome Turcios MD     Discharge Diagnoses:   Patient Active Problem List   Diagnosis    Asthma    Coronary artery disease    Uncontrolled hypertension    Vertigo    Chest pain    Type 2 diabetes mellitus with hyperglycemia, with long-term current use of insulin ([facility] [address]    Acute asthma exacerbation    Chest pain    HTN (hypertension)    CAD (coronary artery disease)    Lipids abnormal    Osteoarthritis of both knees    Near syncope    Perennial allergic rhinitis    Primary localized osteoarthrosis, lower leg    Tibialis tendinitis    Osteoarthritis of spine with radiculopathy, lumbar region    Pain of both hip joints    Spinal stenosis, lumbar region, with neurogenic claudication    Right sided sciatica    Endometrial adenocarcinoma ([facility] [address]    Drainage from wound    S/P hysterectomy with oophorectomy    S/P total hysterectomy and BSO (bilateral salpingo-oophorectomy)    Chronic pain of left knee    Chronic pain of right knee    History of endometrial cancer    Menopausal state    Osteoporosis    Primary osteoarthritis of both knees    Vaginal atrophy    Overdose of insulin, accidental or unintentional, initial encounter    Hypoglycemia due to insulin    Hypoglycemia    Pulmonary nodule    Acute respiratory failure (HCC)    Respiratory arrest before cardiac arrest (HCC)    Acute respiratory failure with hypoxia and hypercapnia (HCC)    Spindle cell sarcoma (HCC)    CKD (chronic kidney disease) stage 3, GFR 30-59 ml/min (HCC)    Cardiac arrest (HCC)    Acute pulmonary edema (HCC)    Pleural effusion    Elevated LFTs    Moderate protein-calorie malnutrition (HCC)    Thrombocytopenia (HCC)    Leukocytosis    Normocytic normochromic anemia    with functional gait as pt allowed more WB post RLE toe amputationas and heel debridement. Recommending 24 hours supervision for safety with gait and transfers with FWW and WC recommended as pt walking transfer distance only due to vascular impairment with recent RLE surgery( toe amputation with heel debridement). .    Occupational therapy:  ,  , Assessment: Pt cooperative, cont to report fatigue. Pt remains inconsistent with transfers and ADLs, requires CGA/SPV for transfers and toileting. Pt mod I with grooming seated at sink, cont to require max A  for LB dressing, difficulty with AE for LB ADLs d/t cognition and inconsistent coordination. Pt with limited standing and activity tolerance this date, inconsistent cognition. Issued and reviewed written HEP for free weights, pt able to perform with cues. Pt scheduled for d/c next date, recommend 24 hr SPV/assist, family reports ability to assist with LB ADLs. Speech therapy:       Inpatient Rehabilitation Course: Shayla Harvey is a 75 y.o. female admitted to inpatient rehabilitation on 7/3/4564 s/p Metabolic encephalopathy. The patient participated in an aggressive multidisciplinary inpatient rehabilitation program involving 3 hours of therapy per day, at least 5 days per week.  The below items were addressed during her time on the ARU:    Bilat lower limb DVT  - Anticoagulation  - Goal INR 2.0-2.5     Cholelithiasis  - No urgent intervention per GI at OSH     Enterococcal bacteremia  - Completed vancomycin 2 weeks post-TDC removal  - Appreciate ID consult     Spindle cell sarcoma  - OP oncology f/u       Cardiac arrest  - Continue beta blocker, statin     Encephalopathy  - Likely related to cardiac arrest  - SLP consulted, continue after d/c     Gangrenous toes  - Continue wound care per orders  - Wound care consulted  - Consulted podiatry; s/p amputation   - Defer to podiatry for further care, outpatient follow up.      DM  - lantus, SSI    Discharge Exam:  Constitutional: Pleasant, no distress  Head: Normocephalic  Eyes: Conjunctiva noninjected  Pulm: CTA bilat  CV: No murmurs noted  Abd: Soft, nontender  Ext: No edema  Neuro: Alert, fully oriented, appropriate     Significant Diagnostics:   Lab Results   Component Value Date    CREATININE 2.8 (H) 01/22/2020    BUN 38 (H) 01/22/2020     (L) 01/22/2020    K 4.8 01/22/2020    CL 91 (L) 01/22/2020    CO2 23 01/22/2020       Lab Results   Component Value Date    WBC 5.1 01/23/2020    HGB 7.3 (L) 01/23/2020    HCT 23.0 (L) 01/23/2020    MCV 81.5 01/23/2020     01/23/2020       Disposition:  Home in stable condition    Follow-up:  See after visit summary from hospitalization    Discharge Medications:     Medication List      START taking these medications    omeprazole 40 MG delayed release capsule  Commonly known as:  PRILOSEC  Take 1 capsule by mouth every morning (before breakfast)  Notes to patient:  Use: prevention and treatment of gastric ulcers and/or heartburn  Side effects: headache, fatigue, constipation, dry mouth      sertraline 50 MG tablet  Commonly known as:  ZOLOFT  Take 1 tablet by mouth daily  Notes to patient:  Use: Treatment of depression or anxiety disorders  Side Effects: Fatigue, stomach upset, constipation, insomnia, chest pain, weight gain     warfarin 2 MG tablet  Commonly known as:  Coumadin  Take 1 tablet by mouth daily  Notes to patient:  Blood thinner        CHANGE how you take these medications    ALPRAZolam 0.5 MG tablet  Commonly known as:  XANAX  Take 1 tablet by mouth 3 times daily as needed for Anxiety for up to 7 days.   What changed:  when to take this     atorvastatin 20 MG tablet  Commonly known as:  LIPITOR  Take 1 tablet by mouth nightly  What changed:    · medication strength  · how much to take  · when to take this     insulin lispro 100 UNIT/ML injection vial  Commonly known as:  HUMALOG  Inject 0-12 Units into the skin 3 times daily (with meals) **Medium Dose Corrective Algorithm**  Glucose: Dose:  If <139             No Insulin  140-199 2 Units  200-249 4 Units  250-299 6 Units  300-349 8 Units  350-400 10 Units  Above 400       12 Units  What changed:    · when to take this  · additional instructions     metoprolol tartrate 25 MG tablet  Commonly known as:  LOPRESSOR  Take 1 tablet by mouth 2 times daily  What changed:  how much to take        CONTINUE taking these medications    acetaminophen 325 MG tablet  Commonly known as:  TYLENOL  Take 2 tablets by mouth every 4 hours as needed for Pain or Fever     donepezil 5 MG tablet  Commonly known as:  ARICEPT  Take 1 tablet by mouth nightly  Notes to patient: For memory     ferrous sulfate 325 (65 Fe) MG tablet  Notes to patient:  supplement     insulin glargine 100 UNIT/ML injection vial  Commonly known as:  LANTUS  Inject 19 Units into the skin nightly  Notes to patient:  Use: treats diabetes or high blood sugar. Long acting insulin  Side effects: Low blood sugar, irritation at the injection site     INSULIN PEN NEEDLE     melatonin 3 MG Tabs tablet  Notes to patient:  Sleep aide      sennosides-docusate sodium 8.6-50 MG tablet  Commonly known as:  SENOKOT-S  Notes to patient:  Stool softener/laxative      SUPER B-COMPLEX/IRON/VITAMIN C PO  Notes to patient:  Supplement      traMADol 50 MG tablet  Commonly known as:  ULTRAM  Take 1 tablet by mouth every 6 hours as needed for Pain for up to 7 days.         STOP taking these medications    albuterol sulfate  (90 Base) MCG/ACT inhaler     epoetin brando 86862 UNIT/ML injection  Commonly known as:  EPOGEN;PROCRIT     fluticasone 220 MCG/ACT inhaler  Commonly known as:  FLOVENT HFA     guanFACINE 1 MG tablet  Commonly known as:  Tenex     mirtazapine 15 MG tablet  Commonly known as:  REMERON     NovoLOG 100 UNIT/ML injection vial  Generic drug:  insulin aspart     ondansetron 4 MG tablet  Commonly known as:  ZOFRAN     Potassium Chloride ER 8 MEQ Cpcr     thiamine 100 MG tablet     Vitamin D3 50 MCG (2000 UT) Caps           Where to Get Your Medications      These medications were sent to [id number] - [place], [address]  [address], [facility]    Phone:  [phone]   · ALPRAZolam 0.5 MG tablet  · atorvastatin 20 MG tablet  · donepezil 5 MG tablet  · insulin glargine 100 UNIT/ML injection vial  · metoprolol tartrate 25 MG tablet  · omeprazole 40 MG delayed release capsule  · sertraline 50 MG tablet  · traMADol 50 MG tablet  · warfarin 2 MG tablet     You can get these medications from any pharmacy    Bring a paper prescription for each of these medications  · insulin lispro 100 UNIT/ML injection vial         I spent over 35 minutes on this discharge encounter between counseling, coordination of care, and medication reconciliation.   To comply with [facility] zuleyka R.II.4.1: Discharge order placed in advance to facilitate discharge planning with rehab team.     Guillermo Germain MD

## 2020-02-21 PROBLEM — L03.90 CELLULITIS: Status: ACTIVE | Noted: 2020-01-01

## 2020-02-21 NOTE — ED PROVIDER NOTES
[facility]    CARPAL TUNNEL RELEASE      right    CATARACT REMOVAL WITH IMPLANT      CORONARY ANGIOPLASTY WITH STENT PLACEMENT  2012    DIAGNOSTIC CARDIAC CATH LAB PROCEDURE      HYSTERECTOMY      11/30/15    KNEE ARTHROSCOPY      left knee. not a TKR. no metal.     TOE AMPUTATION Right 2020    RIGHT SECOND AND THIRD TOE AMPUTATION, RIGHT HEEL WOUND DEBRIDEMENT, RIGHT HALLUX TOE NAIL DEBRIDEMENT performed by Percy Duran DPM at  [facility] N/A 2019    EGD PEG PLACEMENT W/ ANESTHESIA performed by Gasper Lama MD at [address] [place] [address]     Family History   Problem Relation Age of Onset    Diabetes Mother     Heart Failure Mother     Heart Disease Mother     Stroke Mother     Diabetes Sister     Diabetes Brother     Diabetes Maternal Grandmother     Asthma Son     Asthma Son     Diabetes Daughter     Heart Disease Daughter     Irritable Bowel Syndrome Daughter     Diabetes Brother     Cancer Neg Hx     Emphysema Neg Hx     Hypertension Neg Hx      Social History     Socioeconomic History    Marital status:       Spouse name: Not on file    Number of children: Not on file    Years of education: Not on file    Highest education level: Not on file   Occupational History    Occupation:      Comment: [employer] most recently   Social Needs    Financial resource strain: Not on file    Food insecurity:     Worry: Not on file     Inability: Not on file   [employer] needs:     Medical: Not on file     Non-medical: Not on file   Tobacco Use    Smoking status: Former Smoker     Years: 13.00     Types: Cigarettes     Last attempt to quit: 1985     Years since quittin.1    Smokeless tobacco: Never Used   Substance and Sexual Activity    Alcohol use: No     Alcohol/week: 0.0 standard drinks    Drug use: No    Sexual activity: Not Currently     Partners: Male   Lifestyle    Physical activity:     Days per week: Not on file     Minutes per session: Not on file    Stress: Not on file   Relationships    Social connections:     Talks on phone: Not on file     Gets together: Not on file     Attends Anglican service: Not on file     Active member of club or organization: Not on file     Attends meetings of clubs or organizations: Not on file     Relationship status: Not on file    Intimate partner violence:     Fear of current or ex partner: Not on file     Emotionally abused: Not on file     Physically abused: Not on file     Forced sexual activity: Not on file   Other Topics Concern    Not on file   Social History Narrative    Not on file     No current facility-administered medications for this encounter.       Current Outpatient Medications   Medication Sig Dispense Refill    warfarin (COUMADIN) 2 MG tablet Take 1 tablet by mouth daily 100 tablet 3    sertraline (ZOLOFT) 50 MG tablet Take 1 tablet by mouth daily 30 tablet 3    insulin glargine (LANTUS) 100 UNIT/ML injection vial Inject 19 Units into the skin nightly 1 vial 3    insulin lispro (HUMALOG) 100 UNIT/ML injection vial Inject 0-12 Units into the skin 3 times daily (with meals) **Medium Dose Corrective Algorithm**  Glucose: Dose:  If <139             No Insulin  140-199 2 Units  200-249 4 Units  250-299 6 Units  300-349 8 Units  350-400 10 Units  Above 400       12 Units 1 vial 3    atorvastatin (LIPITOR) 20 MG tablet Take 1 tablet by mouth nightly 30 tablet 3    metoprolol tartrate (LOPRESSOR) 25 MG tablet Take 1 tablet by mouth 2 times daily 60 tablet 3    donepezil (ARICEPT) 5 MG tablet Take 1 tablet by mouth nightly 30 tablet 3    omeprazole (PRILOSEC) 40 MG delayed release capsule Take 1 capsule by mouth every morning (before breakfast) 30 capsule 5    ferrous sulfate 325 (65 Fe) MG tablet Take 325 mg by mouth 2 times daily      melatonin 3 MG TABS tablet Take 3 mg by mouth nightly      sennosides-docusate sodium (SENOKOT-S) 8.6-50 MG tablet Take 1 tablet by mouth 2 times daily      acetaminophen (TYLENOL) 325 MG tablet Take 2 tablets by mouth every 4 hours as needed for Pain or Fever 120 tablet 3    INSULIN PEN NEEDLE For use with Lantus and Novolog      B Complex-C-Iron (SUPER B-COMPLEX/IRON/VITAMIN C PO) Take 1 tablet by mouth daily. Allergies   Allergen Reactions    Ativan [Lorazepam]     Dilaudid [Hydromorphone Hcl] Other (See Comments)     Pt states it made her crazy    Flexeril [Cyclobenzaprine Hcl]     Heparin      KEENAN     Hydromorphone     Penicillins     Soma [Carisoprodol]     Sulfa Antibiotics        REVIEW OF SYSTEMS  10 systems reviewed, pertinent positives per HPI otherwise noted to be negative    PHYSICAL EXAM  BP (!) 176/71   Pulse 64   Temp 97.9 °F (36.6 °C) (Oral)   Resp 16   SpO2 99%   GENERAL APPEARANCE: Awake and alert. Cooperative. No acute distress. HEAD: Normocephalic. Atraumatic. EYES: PERRL. EOM's grossly intact. ENT: Mucous membranes are moist.   NECK: Supple. HEART: RRR. No murmurs. LUNGS: Respirations unlabored. CTAB. Good air exchange. Speaking comfortably in full sentences. ABDOMEN: Soft. Non-distended. Non-tender. No guarding or rebound. EXTREMITIES: No peripheral edema. Patient with +2 pitting edema noted bilaterally. There is redness associated to the left leg. Right foot with erythema near surgical wounds which appear to be draining. No obvious areas of fluctuance or induration. No streaking. Compartment soft. Pedal pulses +2 and cap refill less than 5 seconds. Moves all extremities equally. All extremities neurovascularly intact. SKIN: Warm and dry. No acute rashes. NEUROLOGICAL: Alert and oriented. CN's 2-12 intact. No gross facial drooping. Strength 5/5, sensation intact. PSYCHIATRIC: Normal mood and affect.     RADIOLOGY  Xr Foot Right (min 3 Views)    Result Date: 2/21/2020  EXAMINATION: 3 XRAY VIEWS OF THE RIGHT FOOT 2/21/2020 10:23 am COMPARISON: 01/16/2020, 01/06/2020 HISTORY: ORDERING SYSTEM PROVIDED HISTORY: redness/swelling TECHNOLOGIST PROVIDED HISTORY: Reason for exam:->redness/swelling Reason for Exam: foot pain s/p amputatioin Acuity: Acute Type of Exam: Subsequent/Follow-up FINDINGS: Soft tissue swelling of the forefoot. There has been amputation of the 2nd and 3rd digits at the level of the MTP joints. There is no soft tissue gas at the amputation sites. The heads of the 2nd and 3rd metatarsals demonstrate intact cortex and normal mineralization. There is no bone destruction or aggressive periostitis of the foot. There is a nonacute fracture of the base of the 5th proximal phalanx. No acute fracture is demonstrated. Vascular calcifications are noted     Soft tissue swelling with no acute process demonstrated     ED COURSE   I have evaluated this patient in collaboration with Dr. Adama Walton. Pain control was not required while here in the emergency department. Triage vitals stable. CBC without leukocytosis or anemia. CMP consistent with chronic kidney disease which appears slightly better than baseline. Wound and blood cultures pending. Lactic acid negative. CRP elevated at approximately 21. ESR was 10. Therapeutic INR 2.43. Right foot plain films without evidence for ostial reactions with some soft tissue swelling noted. Patient was initiated on Zyvox for suspected cellulitis. Discussed case with hospital medicine regarding admission and orders placed. A discussion was had with Ms. Sotero Odonnell regarding her leg redness, foot redness, ED findings and recommendations for admission. All questions were answered. Patient is in agreement.     [facility]  Results for orders placed or performed during the hospital encounter of 02/21/20   Culture, Wound   Result Value Ref Range    Gram Stain Result No WBCs or organisms seen    CBC   Result Value Ref Range    WBC 4.4 4.0 - 11.0 K/uL    RBC 4.27 4.00 - 5.20 M/uL    Hemoglobin 11.6 (L) 12.0 - 16.0 g/dL    Hematocrit 36.7 36.0 - 48.0 %    MCV 86.0 80.0 - 100.0 fL    MCH 27.2 26.0 - 34.0 pg    MCHC 31.6 31.0 - 36.0 g/dL    RDW 22.8 (H) 12.4 - 15.4 %    Platelets 635 951 - 408 K/uL    MPV 7.4 5.0 - 10.5 fL   Comprehensive metabolic panel   Result Value Ref Range    Sodium 134 (L) 136 - 145 mmol/L    Potassium 3.8 3.5 - 5.1 mmol/L    Chloride 98 (L) 99 - 110 mmol/L    CO2 22 21 - 32 mmol/L    Anion Gap 14 3 - 16    Glucose 213 (H) 70 - 99 mg/dL    BUN 39 (H) 7 - 20 mg/dL    CREATININE 2.2 (H) 0.6 - 1.2 mg/dL    GFR Non-African American 22 (A) >60    GFR  26 (A) >60    Calcium 8.5 8.3 - 10.6 mg/dL    Total Protein 6.2 (L) 6.4 - 8.2 g/dL    Alb 3.3 (L) 3.4 - 5.0 g/dL    Albumin/Globulin Ratio 1.1 1.1 - 2.2    Total Bilirubin 0.6 0.0 - 1.0 mg/dL    Alkaline Phosphatase 77 40 - 129 U/L    ALT 15 10 - 40 U/L    AST 33 15 - 37 U/L    Globulin 2.9 g/dL   Sedimentation Rate   Result Value Ref Range    Sed Rate 10 0 - 30 mm/Hr   C-reactive protein   Result Value Ref Range    CRP 20.9 (H) 0.0 - 5.1 mg/L   Protime-INR   Result Value Ref Range    Protime 28.5 (H) 10.0 - 13.2 sec    INR 2.43 (H) 0.86 - 1.14   Lactic acid, plasma   Result Value Ref Range    Lactic Acid 1.3 0.4 - 2.0 mmol/L   POCT Glucose   Result Value Ref Range    POC Glucose 199 (H) 70 - 99 mg/dl    Performed on ACCU-CHEK        I spoke with Gary Bryant. We thoroughly discussed the history, physical exam, laboratory and imaging studies, as well as, emergency department course. Based upon that discussion, we've decided to admit Sunny Lawrence to the hospital for further observation, evaluation, and treatment. Final Impression  1. Cellulitis of foot    2. Left leg cellulitis      Blood pressure (!) 186/86, pulse 63, temperature 97.4 °F (36.3 °C), temperature source Oral, resp. rate 16, height 5' (1.524 m), weight 169 lb 1.5 oz (76.7 kg), SpO2 98 %, not currently breastfeeding. DISPOSITION  Patient was admitted to the hospital in stable ondition.          Carlyn Correia [place], [place]  02/21/20 1758

## 2020-02-21 NOTE — CONSULTS
Placeholder  0.9 % sodium chloride infusion, , Intravenous, Continuous  Allergies:  Ativan [lorazepam]; Dilaudid [hydromorphone hcl]; Flexeril [cyclobenzaprine hcl]; Heparin; Hydromorphone; Penicillins; Soma [carisoprodol]; and Sulfa antibiotics    Social History: In rehab at the [facility]:  Former smoker. Quit 35 years ago  ETOH:  Never drank alcohol  DRUGS:  Never used recreational drugs    Family History:       Problem Relation Age of Onset    Diabetes Mother     Heart Failure Mother     Heart Disease Mother     Stroke Mother     Diabetes Sister     Diabetes Brother     Diabetes Maternal Grandmother     Asthma Son     Asthma Son     Diabetes Daughter     Heart Disease Daughter     Irritable Bowel Syndrome Daughter     Diabetes Brother     Cancer Neg Hx     Emphysema Neg Hx     Hypertension Neg Hx      REVIEW OF SYSTEMS:    CONSTITUTIONAL:  Negative for for fever chills nausea or vomiting. Denies SOB, chest pain. + swelling lower legs     PHYSICAL EXAM:    VITALS:  BP (!) 186/86   Pulse 63   Temp 97.4 °F (36.3 °C) (Oral)   Resp 16   Ht 5' (1.524 m)   Wt 169 lb 1.5 oz (76.7 kg)   SpO2 98%   BMI 33.02 kg/m²   Gen: AAO x3, NAD pleasant female  Lower extremity exam reveals:   Vascular: dopplerable dorsalis pedis and posterior tibial pulses bilateral. CFT approx 3 seconds to each digit. Neuro: Decreased sensation is noted to bilateral feet. Derm: + 2 pitting edema noted to b/l lower legs and feet. Decubitus heel ulcer noted right posterior heel which measures 1.5 x 1cm with eschar noted overlying ulcer with mild erythema surrounding but no lymphangitis or adenitis noted. 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 Mild erythema is noted to forefoot. MS: Pt is able to move extremities but decreased strength anterior muscles b/l. Hx of 2.3rd toe amputations right.    DATA:    CBC with Differential:    Lab Results   Component Value Date    WBC change in vascular status at this time. 4. Acute on chronic kidney disease - nephrology is on board and improved since last admission. 5. Diabetes with Neuropathy -medicine following and last A1c was 8.5  Okay to DC back to SNF when other services have signed off. No long term antibiotics recommended.

## 2020-02-21 NOTE — H&P
Hospital Medicine History & Physical      PCP: Tamiko Hagan MD    Date of Admission: 2/21/2020    Date of Service: Pt seen/examined on 2/21/2020 and Admitted to Inpatient with expected LOS greater than two midnights due to medical therapy. Chief Complaint: Right foot cellulitis      History Of Present Illness:      75 y.o. female who presented to [facility] with acute onset of right foot cellulitis. Patient had a history of right foot surgery in the recent past, chronic kidney disease, diabetes, history of coronary artery disease, history of cardiac arrest.  Patient noted redness swelling and pain and possible pus discharge of the right foot area. Patient also noted swelling on both lower extremity. Patient denies any fever, cough, congestion, abdominal pain, nausea vomiting. Patient is admitted for further evaluation management.     Past Medical History:          Diagnosis Date    Allergic     Anemia     Angina     with exertion    Arthritis     Asthma 9/16/2010    Cancer ([facility]     Coronary artery disease 9/16/2010    Diabetes mellitus ([facility]     Heart attack [facility]) age 43    no problems since then    Hyperlipidemia     Hypertension     Obesity        Past Surgical History:          Procedure Laterality Date    BRONCHOSCOPY N/A 11/12/2019    BRONCHOSCOPY WASHING performed by Miquel Horner MD at [address] N/A 11/23/2019    BRONCHOSCOPY ALVEOLAR LAVAGE performed by Kalina Cuellar MD at [address]  11/23/2019    BRONCHOSCOPY THERAPUTIC ASPIRATION INITIAL performed by Kalina Cuellar MD at [address]  11/23/2019    BRONCHOSCOPY FOREIGN BODY REMOVAL performed by Kalina Cuellar MD at [address] [place] [address]      right    CATARACT REMOVAL WITH IMPLANT      CORONARY ANGIOPLASTY WITH STENT PLACEMENT  4/2012    DIAGNOSTIC CARDIAC CATH LAB PROCEDURE      HYSTERECTOMY      11/30/15    KNEE Date Noted    Cellulitis [L03.90] 02/21/2020     Priority: High    CKD (chronic kidney disease) stage 3, GFR 30-59 ml/min (HCC) [N18.3] 11/12/2019    HTN (hypertension) [I10] 10/22/2012    Type 2 diabetes mellitus with hyperglycemia, with long-term current use of insulin ([facility] [E11.65, Z79.4] 04/06/2012         PLAN:    Right foot cellulitis, [facility], patient is allergic to penicillin, and in presence of chronic kidney disease stage III patient was started on Zyvox empirically. Cultures obtained in the emergency room and results are pending. Podiatry consult. Chronic kidney disease stage III, with BUN at 39 and creatinine 2.2, patient has a swelling on both lower extremity, nephrology consult. Hypertension, controlled with current regimen, monitor. Diabetes mellitus type 2, reported controlled with current regimen, check A1c, continue home regimen including Lantus and medium dose Humalog sliding scale, monitor. DVT Prophylaxis: No Lovenox or heparin since patient is allergic to heparin, SCD  Diet: No diet orders on file  Code Status: Prior    PT/OT Eval Status: Not ordered    Dispo -2 to 4 days       Helen Burns MD    Thank you Jannie Luz MD for the opportunity to be involved in this patient's care. If you have any questions or concerns please feel free to contact me at [phone].

## 2020-02-21 NOTE — CONSULTS
Pharmacy to Dose Warfarin    Dx: Bilat lower limb DVT  Goal INR range 2.0-2.5  Home Warfarin dose: 4mg daily     Date  INR  Warfarin  2/21                2.43                    2 mg    Recommend Warfarin 2 mg tonight x1. Daily INR ordered. Rx will continue to manage therapy per Dr. Moraima Gunn consult order.   Sowmya Cortés, PharmD 3:43 PM 2/21/20

## 2020-02-21 NOTE — CONSULTS
Patient seen and examined, consult note dictated. Assessment and Plan:    1- A/CKD: The patient has chronic kidney disease with a baseline creatinine of 1.4 to 1.6. She was recently admitted and sustained an acute on chronic kidney injury requiring dialysis initiation. Her renal function recovered and she is currently off of dialysis, with a most recent serum creatinine of 2.2 mg/dl. - Consider gentle volume expansion.  - Avoid all nephrotoxic agents at this time. - Maintain systolic blood pressure > 100 mmHg. 2- No significant electrolytes disorders noted. 3- HTN: Blood pressure slightly elevated, resume home antihypertensive medications. 4- Anemia: Stable hemoglobin, monitor.   5- Right foot cellulitis: Currently on antibiotics per primary team.

## 2020-02-21 NOTE — CARE COORDINATION
CASE MANAGEMENT INITIAL ASSESSMENT      Reviewed chart and met with patient today, re: DCP needs  Explained Case Management role/services. Yes    Family present:   Daughter  Primary contact information: Daughter [facility], [phone]. Admit date/status: 2/21/20 IP  Diagnosis: Cellulitis  Is this a Readmission?:  Pt at the [facility] and had LE swelling, starting pt on IV ABX. Insurance: Medicare  Precert required for SNF -  N        3 night stay required - Y    Living arrangements, Adls, care needs, prior to admission: pt admitted here from The [address] [place] [address]. Transportation: [address] at home: facility provided. Services in the home and/or outpatient, prior to admission: Skilled at [facility]. PT/OT recs: None seen at this time. Hospital Exemption Notification (HEN): N/A pt will return back to faciltiy. Barriers to discharge: None    Plan/comments: CM met with pt and family at bedside. Updated that pt came from The [address] [place] [address] and would like to return. Spoke to [facility] with [facility] and pt can return no barriers, no cert no 3 [place].  CM following-Radha Woods RN      ECOC on chart for MD signature

## 2020-02-21 NOTE — CONSULTS
relatively  unremarkable. PHYSICAL EXAMINATION:  VITAL SIGNS:  Blood pressure 167/68, heart rate 64, respirations 16, and  temperature 97.9 Fahrenheit. The patient is saturating 98% on room air. GENERAL APPEARANCE :  The patient is alert and oriented x3, not in acute  distress. HEENT:  Eyes revealed normal conjunctivae, reactive pupils. NECK:  Revealed midline trachea, nonpalpable thyroid. LUNGS:  Clear to anterior auscultation bilaterally, nonlabored  breathing. CARDIOVASCULAR:  Exam revealed S1 and S2, regular rate and rhythm. No  murmurs or rubs. No peripheral edema. ABDOMEN:  Exam revealed a soft abdomen. No organomegaly. SKIN:  Revealed no lesions or rashes. Warm to touch. PSYCHIATRIC:  Revealed good judgment and insight. LYMPHATICS:  Revealed no cervical or axillary adenopathies. EXTREMITIES:  Right lower extremity erythema involving her foot and  toes. ASSESSMENT AND PLAN:  1. Acute-on-chronic kidney disease. The patient has chronic kidney  disease with the baseline creatinine of 1.4 to 1.6 mg/dL. She was  recently admitted and sustained an acute-on-chronic kidney injury  requiring dialysis initiation. However, her renal function recovered  and she is currently off of dialysis with the most recent serum  creatinine of 2.2 mg/dL. 2.  No significant electrolyte disorder is noted. 3.  Hypertension. Blood pressure is slightly elevated, resume home  antihypertensive medications. 4.  Anemia, stable hemoglobin. Monitor. 5.  Right foot cellulitis, currently on antibiotics per Primary Team.    RECOMMENDATIONS:  1. Consider gentle volume expansion. 2.  Avoid all nephrotoxic agents at this time. 3.  Maintain systolic blood pressure above 100 mmHg.         Tez José MD    D: 02/21/2020 16:30:38       T: 02/21/2020 17:53:09     [email or web address]  Job#: [id number]     Doc#: [id number]    CC:

## 2020-02-22 NOTE — PROGRESS NOTES
Pharmacy to Dose Warfarin     Dx: Bilat lower limb DVT  Goal INR range 2.0-2.5  Home Warfarin dose: 4mg daily      Date                 INR                  Warfarin  2/21                2.43                    2 mg  2/22                2.61                    2 mg     Recommend Warfarin 2 mg tonight x1. Daily INR ordered. Rx will continue to manage therapy per Dr. Manny Thorpe consult order.     Chris Argueta, [facility]

## 2020-02-22 NOTE — PROGRESS NOTES
Hospitalist Progress Note      PCP: Melina Noriega MD    Date of Admission: 2/21/2020    Chief Complaint: Right foot cellulitis    Hospital Course: See H&P    Subjective:   Patient is up in bed, comfortable, not in distress. Denies any chest pain, abdominal pain, nausea vomiting or diarrhea. No new event overnight noted. Medications:  Reviewed    Infusion Medications    dextrose      sodium chloride 75 mL/hr at 02/22/20 0712     Scheduled Medications    vancomycin  125 mg Oral 3 times per day    potassium chloride  20 mEq Oral Once    magnesium sulfate  2 g Intravenous Once    atorvastatin  20 mg Oral Nightly    donepezil  5 mg Oral Nightly    ferrous sulfate  325 mg Oral BID    insulin glargine  19 Units Subcutaneous Nightly    metoprolol tartrate  25 mg Oral BID    sennosides-docusate sodium  1 tablet Oral BID    pantoprazole  40 mg Oral QAM AC    sodium chloride flush  10 mL Intravenous 2 times per day    insulin lispro  0-12 Units Subcutaneous TID WC    insulin lispro  0-6 Units Subcutaneous Nightly    linezolid  600 mg Oral 2 times per day    warfarin (COUMADIN) daily dosing (placeholder)   Other RX Placeholder     PRN Meds: sodium chloride flush, acetaminophen, acetaminophen, polyethylene glycol, promethazine, ondansetron, glucose, dextrose, glucagon (rDNA), dextrose      Intake/Output Summary (Last 24 hours) at 2/22/2020 1014  Last data filed at 2/22/2020 0702  Gross per 24 hour   Intake 900 ml   Output 300 ml   Net 600 ml       Physical Exam Performed:    BP (!) 179/72   Pulse 69   Temp 97.5 °F (36.4 °C) (Oral)   Resp 18   Ht 5' (1.524 m)   Wt 169 lb 1.5 oz (76.7 kg)   SpO2 96%   BMI 33.02 kg/m²     General appearance: No apparent distress, appears stated age and cooperative. HEENT: Pupils equal, round, and reactive to light. Conjunctivae/corneas clear. Neck: Supple, with full range of motion. No jugular venous distention. Trachea midline.   Respiratory:  Normal current use of insulin (formerly [facility]) [E11.65, Z79.4]     Right foot cellulitis, admit to [facility] 5 telemetry, patient is allergic to penicillin, and in presence of chronic kidney disease stage III patient was started on Zyvox empirically. Cultures obtained in the emergency room and results are pending. Podiatry consult. Clinically improving gradually. History of C. difficile colitis, patient has been taking vancomycin 125 mg p.o. 3 times daily, will continue same for now.     Chronic kidney disease stage III, with BUN at 39 and creatinine 2.2, patient has a swelling on both lower extremity, nephrology consult. Input from neurology noted, plan to give gentle IV hydration, continue to monitor renal function.     Hypertension, controlled with current regimen, monitor.     Diabetes mellitus type 2, reported controlled with current regimen, check A1c, continue home regimen including Lantus and medium dose Humalog sliding scale, monitor. DVT Prophylaxis: SCD, allergy to heparin  Diet: DIET CARB CONTROL;   Dietary Nutrition Supplements: Renal Oral Supplement  Code Status: Full Code    PT/OT Eval Status: Not ordered    Dispo -2 to 3 days    Roderick Fofana MD

## 2020-02-22 NOTE — PLAN OF CARE
Problem: Nutrition  Goal: Optimal nutrition therapy  Outcome: Ongoing  Note:   Nutrition Problem: Inadequate oral intake  Intervention: Food and/or Nutrient Delivery: Continue current diet, Start ONS  Nutritional Goals: Pt will consume greater than 50% of meals and ONS this admission

## 2020-02-22 NOTE — PROGRESS NOTES
Department of Podiatric Surgery  Dr. Kofi Vee  Progress Note      SUBJECTIVE  Pt denies any complaints with exception of mild stomach ache since ate too fast.     OBJECTIVE    Physical    VITALS:  BP (!) 171/82   Pulse 65   Temp 98 °F (36.7 °C) (Oral)   Resp 18   Ht 5' (1.524 m)   Wt 169 lb 1.5 oz (76.7 kg)   SpO2 98%   BMI 33.02 kg/m²   Vascular: dopplerable dorsalis pedis and posterior tibial pulses bilateral.  Neuro: Decreased sensation is noted to bilateral feet. Derm: + 1 pitting edema noted to b/l lower legs and feet. Decubitus heel ulcer noted right posterior heel which measures 1.5 x 1cm with eschar noted overlying ulcer with mild erythema surrounding but no lymphangitis or adenitis noted. 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 Mild erythema is noted to forefoot. MS: Pt is able to move extremities but decreased strength anterior muscles b/l. Hx of 2.3rd toe amputations right.      Data    CBC with Differential:    Lab Results   Component Value Date    WBC 6.4 02/22/2020    RBC 4.04 02/22/2020    RBC 4.31 12/08/2015    HGB 10.9 02/22/2020    HCT 34.4 02/22/2020     02/22/2020    MCV 85.2 02/22/2020    MCH 27.0 02/22/2020    MCHC 31.6 02/22/2020    RDW 22.9 02/22/2020    NRBC 1 11/28/2019    NRBC 1 11/28/2019    SEGSPCT 52.3 10/21/2012    BANDSPCT 9 11/28/2019    METASPCT 2 11/24/2019    LYMPHOPCT 22.2 02/22/2020    LYMPHOPCT 26.1 12/08/2015    MONOPCT 9.5 02/22/2020    MYELOPCT 2 11/23/2019    EOSPCT 0.0 04/05/2012    BASOPCT 0.4 02/22/2020    MONOSABS 0.6 02/22/2020    LYMPHSABS 1.4 02/22/2020    EOSABS 0.0 02/22/2020    BASOSABS 0.0 02/22/2020    DIFFTYPE Auto 10/21/2012     CMP:    Lab Results   Component Value Date     02/22/2020    K 3.4 02/22/2020     02/22/2020    CO2 21 02/22/2020    BUN 37 02/22/2020    CREATININE 2.1 02/22/2020    GFRAA 28 02/22/2020    GFRAA 57 10/21/2012    AGRATIO 1.1 02/21/2020    LABGLOM 23 02/22/2020    GLUCOSE 180 02/22/2020    PROT 6.2 02/21/2020    PROT 7.0 11/30/2012    LABALBU 3.3 02/21/2020    CALCIUM 8.2 02/22/2020    BILITOT 0.6 02/21/2020    ALKPHOS 77 02/21/2020    AST 33 02/21/2020    ALT 15 02/21/2020     Current Inpatient Medications    Current Facility-Administered Medications: vancomycin (VANCOCIN) oral solution 125 mg, 125 mg, Oral, 3 times per day  metoprolol tartrate (LOPRESSOR) tablet 50 mg, 50 mg, Oral, BID  atorvastatin (LIPITOR) tablet 20 mg, 20 mg, Oral, Nightly  donepezil (ARICEPT) tablet 5 mg, 5 mg, Oral, Nightly  ferrous sulfate tablet 325 mg, 325 mg, Oral, BID  insulin glargine (LANTUS) injection vial 19 Units, 19 Units, Subcutaneous, Nightly  sennosides-docusate sodium (SENOKOT-S) 8.6-50 MG tablet 1 tablet, 1 tablet, Oral, BID  pantoprazole (PROTONIX) tablet 40 mg, 40 mg, Oral, QAM AC  sodium chloride flush 0.9 % injection 10 mL, 10 mL, Intravenous, 2 times per day  sodium chloride flush 0.9 % injection 10 mL, 10 mL, Intravenous, PRN  acetaminophen (TYLENOL) tablet 650 mg, 650 mg, Oral, Q6H PRN  acetaminophen (TYLENOL) suppository 650 mg, 650 mg, Rectal, Q6H PRN  polyethylene glycol (GLYCOLAX) packet 17 g, 17 g, Oral, Daily PRN  promethazine (PHENERGAN) tablet 12.5 mg, 12.5 mg, Oral, Q6H PRN  ondansetron (ZOFRAN) injection 4 mg, 4 mg, Intravenous, Q6H PRN  glucose (GLUTOSE) 40 % oral gel 15 g, 15 g, Oral, PRN  dextrose 50 % IV solution, 12.5 g, Intravenous, PRN  glucagon (rDNA) injection 1 mg, 1 mg, Intramuscular, PRN  dextrose 5 % solution, 100 mL/hr, Intravenous, PRN  insulin lispro (HUMALOG) injection vial 0-12 Units, 0-12 Units, Subcutaneous, TID WC  insulin lispro (HUMALOG) injection vial 0-6 Units, 0-6 Units, Subcutaneous, Nightly  linezolid (ZYVOX) tablet 600 mg, 600 mg, Oral, 2 times per day  warfarin (COUMADIN) daily dosing (placeholder), , Other, RX Placeholder  0.9 % sodium chloride infusion, , Intravenous, Continuous      ASSESSMENT AND PLAN    1.  Pressure ulcer right heel, unstageable - stable -Continue to offload and betadine daily  2. Cellulitis right foot - Currently on Zyvoxx and improving. Xrays negative for osteo. 3. PAD - has dopplerable pulses DP and PT and had recent arterial ultrasounds with monophasic flow to RLE. Vascular was consulted Dr. Jazlyn Ghosh on last admission and no intervention was needed. Do not see any significant change in vascular status at this time. HBO may help to heal small incisions and heel in future. 4. Acute on chronic kidney disease - nephrology is on board and improved since last admission. 5. Diabetes with Neuropathy -medicine following and last A1c was 8.5  Okay to DC back to SNF when other services have signed off. No long term antibiotics recommended.

## 2020-02-23 NOTE — DISCHARGE INSTR - COC
with saline 1/20/2020  9:00 AM   Wound Length (cm) 1.5 cm 1/6/2020  9:42 AM   Wound Width (cm) 2 cm 1/6/2020  9:42 AM   Wound Depth (cm) 0 cm 1/6/2020  9:42 AM   Wound Surface Area (cm^2) 3 cm^2 1/6/2020  9:42 AM   Change in Wound Size % (l*w) 0 1/6/2020  9:42 AM   Wound Volume (cm^3) 0 cm^3 1/6/2020  9:42 AM   Post-Procedure Length (cm) 0 cm 1/6/2020  9:42 AM   Post-Procedure Width (cm) 0 cm 1/6/2020  9:42 AM   Post-Procedure Depth (cm) 0 cm 1/6/2020  9:42 AM   Post-Procedure Surface Area (cm^2) 0 cm^2 1/6/2020  9:42 AM   Post-Procedure Volume (cm^3) 0 cm^3 1/6/2020  9:42 AM   Distance Tunneling (cm) 0 cm 1/6/2020  9:42 AM   Tunneling Position ___ O'Clock 0 1/6/2020  9:42 AM   Undermining Starts ___ O'Clock 0 1/6/2020  9:42 AM   Undermining Ends___ O'Clock 0 1/6/2020  9:42 AM   Undermining Maxium Distance (cm) 0 1/6/2020  9:42 AM   Drainage Amount None 2/23/2020  7:42 AM   Odor None 2/23/2020  7:42 AM   Margins Attached edges 2/23/2020  7:42 AM   Tess-wound Assessment Blanchable erythema 2/23/2020  7:42 AM   Black%Wound Bed 100 2/23/2020  7:42 AM   Culture Taken No 1/6/2020  9:42 AM   Number of days: 88       Wound 11/27/19 Buttocks Stage 2, R buttocks (Active)   Wound Image   1/3/2020  5:00 PM   Wound Pressure Stage  2 2/21/2020  8:15 PM   Dressing Status Other (Comment) 2/22/2020  9:31 AM   Dressing/Treatment Open to air 2/22/2020  9:31 AM   Wound Cleansed Rinsed/Irrigated with saline 1/3/2020  5:00 PM   Wound Length (cm) 1.5 cm 1/3/2020  5:00 PM   Wound Width (cm) 1.5 cm 1/3/2020  5:00 PM   Wound Depth (cm) 0 cm 1/3/2020  5:00 PM   Wound Surface Area (cm^2) 2.25 cm^2 1/3/2020  5:00 PM   Change in Wound Size % (l*w) 62.5 1/3/2020  5:00 PM   Wound Volume (cm^3) 0 cm^3 1/3/2020  5:00 PM   Wound Assessment Red;Dry 2/22/2020  9:31 AM   Drainage Amount None 2/22/2020  9:31 AM   Odor None 2/22/2020  9:31 AM   Margins Attached edges 2/22/2020  9:31 AM   Tess-wound Assessment Intact; Induration 2/22/2020  9:31 AM Cruger%Wound Bed 100 2/22/2020  9:31 AM   Number of days: 88       Wound 11/27/19 Coccyx Stage 2, coccyx (Active)   Wound Image   1/3/2020  5:00 PM   Wound Pressure Stage  2 1/16/2020  9:58 AM   Wound Cleansed Rinsed/Irrigated with saline 1/3/2020  5:00 PM   Wound Length (cm) 1.8 cm 1/3/2020  5:00 PM   Wound Width (cm) 1.3 cm 1/3/2020  5:00 PM   Wound Depth (cm) 0.3 cm 1/3/2020  5:00 PM   Wound Surface Area (cm^2) 2.34 cm^2 1/3/2020  5:00 PM   Change in Wound Size % (l*w) 41.5 1/3/2020  5:00 PM   Wound Volume (cm^3) 0.7 cm^3 1/3/2020  5:00 PM   Number of days: 88       Wound 11/29/19 Toe (Comment  which one) L Foot Toes 2 & 3 (Active)   Wound Image   1/6/2020  9:42 AM   Wound Other 1/7/2020  3:03 PM   Wound Cleansed Rinsed/Irrigated with saline 1/20/2020  5:59 PM   Wound Length (cm) 0.4 cm 1/6/2020  9:42 AM   Wound Width (cm) 0.9 cm 1/6/2020  9:42 AM   Wound Depth (cm) 0 cm 1/6/2020  9:42 AM   Wound Surface Area (cm^2) 0.36 cm^2 1/6/2020  9:42 AM   Change in Wound Size % (l*w) -44 1/6/2020  9:42 AM   Wound Volume (cm^3) 0 cm^3 1/6/2020  9:42 AM   Post-Procedure Length (cm) 0 cm 1/6/2020  9:42 AM   Post-Procedure Width (cm) 0 cm 1/6/2020  9:42 AM   Post-Procedure Depth (cm) 0 cm 1/6/2020  9:42 AM   Post-Procedure Surface Area (cm^2) 0 cm^2 1/6/2020  9:42 AM   Post-Procedure Volume (cm^3) 0 cm^3 1/6/2020  9:42 AM   Distance Tunneling (cm) 0 cm 1/6/2020  9:42 AM   Tunneling Position ___ O'Clock 0 1/6/2020  9:42 AM   Undermining Starts ___ O'Clock 0 1/6/2020  9:42 AM   Undermining Ends___ O'Clock 0 1/6/2020  9:42 AM   Undermining Maxium Distance (cm) 0 1/6/2020  9:42 AM   Black%Wound Bed 100 2/22/2020  9:31 AM   Culture Taken No 1/6/2020  9:42 AM   Number of days: 86       Wound 11/29/19 Toe (Comment  which one) L 4th Toe (Active)   Wound Image   1/6/2020  9:42 AM   Wound Diabetic 1/6/2020  9:42 AM   Offloading for Diabetic Foot Ulcers Offloading boot 1/6/2020  9:42 AM   Dressing Status Clean;Dry; Intact 2/22/2020  9:31 Wound Width (cm) 8 cm 1/6/2020  9:42 AM   Wound Depth (cm) 0 cm 1/6/2020  9:42 AM   Wound Surface Area (cm^2) 40 cm^2 1/6/2020  9:42 AM   Change in Wound Size % (l*w) -6566.67 1/6/2020  9:42 AM   Wound Volume (cm^3) 0 cm^3 1/6/2020  9:42 AM   Post-Procedure Length (cm) 0 cm 1/6/2020  9:42 AM   Post-Procedure Width (cm) 0 cm 1/6/2020  9:42 AM   Post-Procedure Depth (cm) 0 cm 1/6/2020  9:42 AM   Post-Procedure Surface Area (cm^2) 0 cm^2 1/6/2020  9:42 AM   Post-Procedure Volume (cm^3) 0 cm^3 1/6/2020  9:42 AM   Distance Tunneling (cm) 0 cm 1/6/2020  9:42 AM   Tunneling Position ___ O'Clock 0 1/6/2020  9:42 AM   Undermining Starts ___ O'Clock 0 1/6/2020  9:42 AM   Undermining Ends___ O'Clock 0 1/6/2020  9:42 AM   Undermining Maxium Distance (cm) 0 1/6/2020  9:42 AM   Wound Assessment Dry;Yellow;Red 2/23/2020  7:42 AM   Drainage Amount None 2/23/2020  7:42 AM   Odor None 2/23/2020  7:42 AM   Margins Unattached edges 2/23/2020  7:42 AM   Tess-wound Assessment Red 2/23/2020  7:42 AM   Yellow%Wound Bed 30 2/23/2020  7:42 AM   Other%Wound Bed 70 1/6/2020  9:42 AM   Culture Taken No 1/6/2020  9:42 AM   Number of days: 85       Wound 01/03/20 Foot Anterior;Dorsal;Medial;Proximal open area to top of right foot (Active)   Wound Image   1/14/2020 12:52 AM   Wound Pressure Stage  2 1/16/2020  9:58 AM   Offloading for Diabetic Foot Ulcers Offloading boot 2/23/2020  7:42 AM   Dressing Status Other (Comment) 2/23/2020  7:42 AM   Wound Cleansed Rinsed/Irrigated with saline 1/20/2020  9:00 AM   Dressing Change Due 01/21/20 2/23/2020  7:42 AM   Wound Length (cm) 1.2 cm 1/6/2020  9:42 AM   Wound Width (cm) 1.5 cm 1/6/2020  9:42 AM   Wound Depth (cm) 0.1 cm 1/6/2020  9:42 AM   Wound Surface Area (cm^2) 1.8 cm^2 1/6/2020  9:42 AM   Change in Wound Size % (l*w) -20 1/6/2020  9:42 AM   Wound Volume (cm^3) 0.18 cm^3 1/6/2020  9:42 AM   Wound Healing % -20 1/6/2020  9:42 AM   Post-Procedure Length (cm) 0 cm 1/6/2020  9:42 AM Swallow with Video (Video Swallowing Test): not done    Treatments at the Time of Hospital Discharge:   Respiratory Treatments: ***  Oxygen Therapy:  is not on home oxygen therapy. Ventilator:    - No ventilator support    Rehab Therapies: Physical Therapy and Occupational Therapy  Weight Bearing Status/Restrictions: No weight bearing restirctions  Other Medical Equipment (for information only, NOT a DME order):  wheelchair and walker  Other Treatments: ***    Patient's personal belongings (please select all that are sent with patient):  Glasses    RN SIGNATURE:  Electronically signed by Kaity Weber RN on 2/23/20 at 1:04 PM    CASE MANAGEMENT/SOCIAL WORK SECTION    Inpatient Status Date: ***    Readmission Risk Assessment Score:  Readmission Risk              Risk of Unplanned Readmission:        44           Discharging to Facility/ Agency   · Name: [facility]  · Address:  · Phone: [phone]  · Fax:    Dialysis Facility (if applicable)   · Name:  · Address:  · Dialysis Schedule:  · Phone:  · Fax:    / signature: Electronically signed by Mando Golden RN on 2/23/20 at 11:05 AM    PHYSICIAN SECTION    Prognosis: Fair    Condition at Discharge: Stable    Rehab Potential (if transferring to Rehab): Fair    Recommended Labs or Other Treatments After Discharge:     Physician Certification: I certify the above information and transfer of Shayla Harvey  is necessary for the continuing treatment of the diagnosis listed and that she requires [facility] for less 30 days.      Update Admission H&P: No change in H&P    PHYSICIAN SIGNATURE:  Electronically signed by Trayc Marroquin MD on 2/23/20 at 10:57 AM

## 2020-02-23 NOTE — PROGRESS NOTES
Department of Internal Medicine  Nephrology Progress Note        SUBJECTIVE:    We are following this patient for A/CKD. The patient was seen and examined; she was resting comfortably with no acute events noted overnight. ROS: No fever or chills. Social: No family at bedside. Physical Exam:    VITALS:  BP (!) 161/68   Pulse 66   Temp 98.1 °F (36.7 °C) (Oral)   Resp 16   Ht 5' (1.524 m)   Wt 169 lb 1.5 oz (76.7 kg)   SpO2 95%   BMI 33.02 kg/m²     General appearance: Seems comfortable, no acute distress. Neck: Trachea midline, thyroid normal.   Lungs:  Non labored breathing, CTA to anterior auscultation. Heart:  S1S2 normal, rub or gallop. No peripheral edema. Abdomen: Soft, non-tender, no organomegaly. Skin: No lesions or rashes, warm to touch. DATA:    CBC:   Lab Results   Component Value Date    WBC 6.4 02/22/2020    RBC 4.04 02/22/2020    RBC 4.31 12/08/2015    HGB 10.9 02/22/2020    HCT 34.4 02/22/2020    MCV 85.2 02/22/2020    MCH 27.0 02/22/2020    MCHC 31.6 02/22/2020    RDW 22.9 02/22/2020     02/22/2020    MPV 7.8 02/22/2020     BMP:    Lab Results   Component Value Date     02/23/2020    K 3.6 02/23/2020    K 3.4 02/22/2020     02/23/2020    CO2 21 02/23/2020    BUN 35 02/23/2020    LABALBU 3.3 02/21/2020    CREATININE 2.0 02/23/2020    CALCIUM 8.2 02/23/2020    GFRAA 29 02/23/2020    GFRAA 57 10/21/2012    LABGLOM 24 02/23/2020    GLUCOSE 106 02/23/2020       IMPRESSION/RECOMMENDATIONS:      1- A/CKD: The patient has chronic kidney disease with a baseline creatinine of 1.4 to 1.6. She was recently admitted and sustained an acute on chronic kidney injury requiring dialysis initiation. Her renal function recovered and she is currently off of dialysis, with a most recent serum creatinine of 2 mg/dl; continue gentle volume expansion and monitor. 2- Hypokalemia: PRN potassium replacement. 3- HTN: Blood pressure fluctuating with current regimen, monitor.   4- Anemia: Stable hemoglobin, monitor.   5- Right foot cellulitis: Currently on antibiotics per primary team.

## 2020-02-23 NOTE — DISCHARGE SUMMARY
Hospital Medicine Discharge Summary    Patient ID: Allison Swift      Patient's PCP: Maday Mckay MD    Admit Date: 2/21/2020     Discharge Date:   2/23/2020    Admitting Physician: Keith Swenson MD     Discharge Physician: Keith Swenson MD     Discharge Diagnoses: Active Hospital Problems    Diagnosis    Cellulitis [L03.90]     Priority: High    CKD (chronic kidney disease) stage 3, GFR 30-59 ml/min ([facility]) [N18.3]    HTN (hypertension) [I10]    Type 2 diabetes mellitus with hyperglycemia, with long-term current use of insulin ([facility]) [E11.65, Z79.4]       The patient was seen and examined on day of discharge and this discharge summary is in conjunction with any daily progress note from day of discharge. Hospital Course:     Right foot cellulitis, admit to [address], patient is allergic to penicillin, and in presence of chronic kidney disease stage III patient was started on Zyvox empirically.  Cultures obtained in the emergency room and results are pending. Clinically improving gradually. Input from podiatry noted, recommended no cellulitis and discontinue antibiotic. Cellulitis resolved with Po Zyvox. Further management as outpatient. No long term abx recommended.     History of C. difficile colitis, patient has been taking vancomycin 125 mg p.o. 3 times daily, will continue same for now.     Chronic kidney disease stage III, with BUN at 39 and creatinine 2.2, patient has a swelling on both lower extremity, nephrology consult. Input from neurology noted, plan to give gentle IV hydration, continue to monitor renal function.     Hypertension, controlled with current regimen, monitor.     Diabetes mellitus type 2, reported controlled with current regimen, check A1c, continue home regimen including Lantus and medium dose Humalog sliding scale, monitor.       Physical Exam Performed:     BP (!) 161/68   Pulse 66   Temp 98.1 °F (36.7 °C) (Oral)   Resp 16   Ht 5' (1.524 m)   Wt 169 lb 1.5 oz (76.7 kg)   SpO2 95%   BMI 33.02 kg/m²       General appearance:  No apparent distress, appears stated age and cooperative. HEENT:  Normal cephalic, atraumatic without obvious deformity. Pupils equal, round, and reactive to light. Extra ocular muscles intact. Conjunctivae/corneas clear. Neck: Supple, with full range of motion. No jugular venous distention. Trachea midline. Respiratory:  Normal respiratory effort. Clear to auscultation, bilaterally without Rales/Wheezes/Rhonchi. Cardiovascular:  Regular rate and rhythm with normal S1/S2 without murmurs, rubs or gallops. Abdomen: Soft, non-tender, non-distended with normal bowel sounds. Musculoskeletal:  No clubbing, cyanosis or edema bilaterally. Full range of motion without deformity. Skin: Skin color, texture, turgor normal.  No rashes or lesions. Neurologic:  Neurovascularly intact without any focal sensory/motor deficits. Cranial nerves: II-XII intact, grossly non-focal.  Psychiatric:  Alert and oriented, thought content appropriate, normal insight  Capillary Refill: Brisk,< 3 seconds   Peripheral Pulses: +2 palpable, equal bilaterally       Labs:  For convenience and continuity at follow-up the following most recent labs are provided:      CBC:    Lab Results   Component Value Date    WBC 6.4 02/22/2020    HGB 10.9 02/22/2020    HCT 34.4 02/22/2020     02/22/2020       Renal:    Lab Results   Component Value Date     02/23/2020    K 3.6 02/23/2020    K 3.4 02/22/2020     02/23/2020    CO2 21 02/23/2020    BUN 35 02/23/2020    CREATININE 2.0 02/23/2020    CALCIUM 8.2 02/23/2020    PHOS 4.5 01/22/2020         Significant Diagnostic Studies    Radiology:   VL Extremity Venous Bilateral         XR FOOT RIGHT (MIN 3 VIEWS)   Final Result   Soft tissue swelling with no acute process demonstrated                Consults:      IP CONSULT TO HOSPITALIST  IP CONSULT TO NEPHROLOGY  IP CONSULT TO PODIATRY  PHARMACY TO DOSE WARFARIN    Disposition: SNF    Condition at Discharge: Stable    Discharge Instructions/Follow-up:  Nephrology     Code Status:  Full Code     Activity: activity as tolerated    Diet: diabetic diet      Discharge Medications:     Current Discharge Medication List           Details   ALPRAZolam (XANAX) 0.5 MG tablet Take 0.5 mg by mouth 3 times daily as needed for Anxiety. rosuvastatin (CRESTOR) 5 MG tablet Take 5 mg by mouth daily      potassium chloride (KLOR-CON M) 10 MEQ extended release tablet Take 10 mEq by mouth daily      vancomycin (VANCOCIN) 50 mg/mL oral solution Take by mouth 4 times daily      insulin glargine (LANTUS) 100 UNIT/ML injection vial Inject 19 Units into the skin nightly  Qty: 1 vial, Refills: 3      insulin lispro (HUMALOG) 100 UNIT/ML injection vial Inject 0-12 Units into the skin 3 times daily (with meals) **Medium Dose Corrective Algorithm**  Glucose: Dose:  If <139             No Insulin  140-199 2 Units  200-249 4 Units  250-299 6 Units  300-349 8 Units  350-400 10 Units  Above 400       12 Units  Qty: 1 vial, Refills: 3      metoprolol tartrate (LOPRESSOR) 25 MG tablet Take 1 tablet by mouth 2 times daily  Qty: 60 tablet, Refills: 3      donepezil (ARICEPT) 5 MG tablet Take 1 tablet by mouth nightly  Qty: 30 tablet, Refills: 3      B Complex-C-Iron (SUPER B-COMPLEX/IRON/VITAMIN C PO) Take 1 tablet by mouth daily.       warfarin (COUMADIN) 2 MG tablet Take 1 tablet by mouth daily  Qty: 100 tablet, Refills: 3      sertraline (ZOLOFT) 50 MG tablet Take 1 tablet by mouth daily  Qty: 30 tablet, Refills: 3      omeprazole (PRILOSEC) 40 MG delayed release capsule Take 1 capsule by mouth every morning (before breakfast)  Qty: 30 capsule, Refills: 5      ferrous sulfate 325 (65 Fe) MG tablet Take 325 mg by mouth 2 times daily      melatonin 3 MG TABS tablet Take 3 mg by mouth nightly      sennosides-docusate sodium (SENOKOT-S) 8.6-50 MG tablet Take 1 tablet by mouth 2 times daily

## 2020-02-23 NOTE — PLAN OF CARE
patient turned every two hours. Heels elevated off bed. Skin assessed qshift. RN will continued to monitor. Problem: Risk for Impaired Skin Integrity  Goal: Tissue integrity - skin and mucous membranes  Description  Structural intactness and normal physiological function of skin and  mucous membranes. Outcome: Ongoing     Bed in lowest position, wheels locked, 2/4 side rails up, nonskid footwear on. Bed/ chair check alarm in place, call light within reach. Pt instructed to call out when needing assistance. Pt stated understanding. Nurse will continue to monitor.       Problem: Falls - Risk of:  Goal: Will remain free from falls  Description  Will remain free from falls  2/23/2020 0748 by Kaity Weber RN  Outcome: Ongoing  2/23/2020 0243 by Janell Davis RN  Outcome: Ongoing

## 2020-02-23 NOTE — CARE COORDINATION
CASE MANAGEMENT DISCHARGE SUMMARY      Discharge to: Skilled at [facility] 21 completed: [facility] Exemption Notification (HENS) completed: N/A    New Durable Medical Equipment ordered/agency: per facility    Transportation:    Family/car: no   Medical Transport explained to pt/family. Pt/family voice no agency preference. Agency used: [facility] up time: 1300   Ambulance form completed: Yes    Confirmed discharge plan with: RN     Patient: per RN     Family, name and contact number: dtr Roberta Chavarria [phone]     Facility/Agency, name: [facility] [address] [place] [address] faxed   Phone number for report to facility: [phone], name: Tai Chapa RN    Note: Discharging nurse to complete TONE, reconcile AVS, and place final copy with patient's discharge packet. RN to ensure that written prescriptions for  Level II medications are sent with patient to the facility as per protocol.

## 2020-02-23 NOTE — PROGRESS NOTES
Department of Podiatric  Surgery  Dr. Angela Strong  Progress Note      SUBJECTIVE  Pt resting comfortably and waiting for discharge. Pt denies any new complaints. OBJECTIVE    Physical    VITALS:  BP (!) 161/68   Pulse 66   Temp 98.1 °F (36.7 °C) (Oral)   Resp 16   Ht 5' (1.524 m)   Wt 169 lb 1.5 oz (76.7 kg)   SpO2 95%   BMI 33.02 kg/m²   Dopplerable DP and PT b/l. No blanchable erythema or signs of cellulitis Skin incisions are healing with no signs of drainage noted in area of previous amputation. Decubitus heel ulcer is stable.      Data    CBC with Differential:    Lab Results   Component Value Date    WBC 6.4 02/22/2020    RBC 4.04 02/22/2020    RBC 4.31 12/08/2015    HGB 10.9 02/22/2020    HCT 34.4 02/22/2020     02/22/2020    MCV 85.2 02/22/2020    MCH 27.0 02/22/2020    MCHC 31.6 02/22/2020    RDW 22.9 02/22/2020    NRBC 1 11/28/2019    NRBC 1 11/28/2019    SEGSPCT 52.3 10/21/2012    BANDSPCT 9 11/28/2019    METASPCT 2 11/24/2019    LYMPHOPCT 22.2 02/22/2020    LYMPHOPCT 26.1 12/08/2015    MONOPCT 9.5 02/22/2020    MYELOPCT 2 11/23/2019    EOSPCT 0.0 04/05/2012    BASOPCT 0.4 02/22/2020    MONOSABS 0.6 02/22/2020    LYMPHSABS 1.4 02/22/2020    EOSABS 0.0 02/22/2020    BASOSABS 0.0 02/22/2020    DIFFTYPE Auto 10/21/2012     CMP:    Lab Results   Component Value Date     02/23/2020    K 3.6 02/23/2020    K 3.4 02/22/2020     02/23/2020    CO2 21 02/23/2020    BUN 35 02/23/2020    CREATININE 2.0 02/23/2020    GFRAA 29 02/23/2020    GFRAA 57 10/21/2012    AGRATIO 1.1 02/21/2020    LABGLOM 24 02/23/2020    GLUCOSE 106 02/23/2020    PROT 6.2 02/21/2020    PROT 7.0 11/30/2012    LABALBU 3.3 02/21/2020    CALCIUM 8.2 02/23/2020    BILITOT 0.6 02/21/2020    ALKPHOS 77 02/21/2020    AST 33 02/21/2020    ALT 15 02/21/2020     Current Inpatient Medications    Current Facility-Administered Medications: warfarin (COUMADIN) tablet 1 mg, 1 mg, Oral, Once  vancomycin (VANCOCIN) oral solution 125 mg, 125 mg, Oral, 3 times per day  metoprolol tartrate (LOPRESSOR) tablet 50 mg, 50 mg, Oral, BID  atorvastatin (LIPITOR) tablet 20 mg, 20 mg, Oral, Nightly  donepezil (ARICEPT) tablet 5 mg, 5 mg, Oral, Nightly  ferrous sulfate tablet 325 mg, 325 mg, Oral, BID  insulin glargine (LANTUS) injection vial 19 Units, 19 Units, Subcutaneous, Nightly  sennosides-docusate sodium (SENOKOT-S) 8.6-50 MG tablet 1 tablet, 1 tablet, Oral, BID  pantoprazole (PROTONIX) tablet 40 mg, 40 mg, Oral, QAM AC  sodium chloride flush 0.9 % injection 10 mL, 10 mL, Intravenous, 2 times per day  sodium chloride flush 0.9 % injection 10 mL, 10 mL, Intravenous, PRN  acetaminophen (TYLENOL) tablet 650 mg, 650 mg, Oral, Q6H PRN  acetaminophen (TYLENOL) suppository 650 mg, 650 mg, Rectal, Q6H PRN  polyethylene glycol (GLYCOLAX) packet 17 g, 17 g, Oral, Daily PRN  promethazine (PHENERGAN) tablet 12.5 mg, 12.5 mg, Oral, Q6H PRN  ondansetron (ZOFRAN) injection 4 mg, 4 mg, Intravenous, Q6H PRN  glucose (GLUTOSE) 40 % oral gel 15 g, 15 g, Oral, PRN  dextrose 50 % IV solution, 12.5 g, Intravenous, PRN  glucagon (rDNA) injection 1 mg, 1 mg, Intramuscular, PRN  dextrose 5 % solution, 100 mL/hr, Intravenous, PRN  insulin lispro (HUMALOG) injection vial 0-12 Units, 0-12 Units, Subcutaneous, TID WC  insulin lispro (HUMALOG) injection vial 0-6 Units, 0-6 Units, Subcutaneous, Nightly  warfarin (COUMADIN) daily dosing (placeholder), , Other, RX Placeholder  0.9 % sodium chloride infusion, , Intravenous, Continuous    ASSESSMENT AND PLAN    1. Pressure ulcer right heel, unstageable - stable -Continue to offload and betadine daily- follow up with Dr. Wendy Ceja in 2 weeks after DC. 2. Cellulitis right foot - Currently on Zyvoxx and improving.  Xrays negative for osteo. 3. PAD - HBO may be needed if heel shoe lack of heeling in future. 4. Acute on chronic kidney disease - nephrology following   5.  Diabetes with Neuropathy -medicine following and last A1c was

## 2020-03-07 PROBLEM — R60.1 ANASARCA: Status: ACTIVE | Noted: 2020-01-01

## 2020-03-07 NOTE — CONSULTS
Pharmacy to Dose Vancomycin     Dx: Cellulitis   Goal trough = > 10 mcg/mL  Pt wt = 76.7 kg  SCr. 2.2   Vancomycin 1250mg IVPB given today at 1630  Vanc level in AM  Max Terrazas 5:45 PM 3/7/2020    3/9  NNL  Vanc level = 17.6  Vanc 500mg X1  Quynh Krishnan/Jie. 3/9/20 9:15 AM      3/10  SCr: 2.4  vanc level =17.7  NGTD  vanc 500mg X1 on 3/11am  Level on 3/12am.  Quynh Krishnan/Jie. 3/10/20 12:30 PM EDT    Day 6  Vanc random level= 17.7 mcg/ml  CrCl = 15ml/min  Give Vanc 500 mg iv x 1   Check level tomorrow am and pulse dose per protocol  ESMER Goodwin Ph. 3/12/2020 7:55 AM

## 2020-03-07 NOTE — ED NOTES
Bedside report given to [facility] from C-3. [facility] confirmed tele box 81.        Denis Boone RN  03/07/20 [id number]

## 2020-03-07 NOTE — ED NOTES
Bed: 14  Expected date:   Expected time:   Means of arrival:   Comments:  ut Rik Dancer, RN  03/07/20 [id number]

## 2020-03-07 NOTE — CONSULTS
Pharmacy to Dose Vancomycin    Dx: Cellulitis   Goal trough = > 10 mcg/mL  Pt wt = 76.7 kg  SCr. 2.2     Give Vancomycin 1250 mg IVPB x 1 today in the ER at 1600  Please re-consult pharmacy if patient is admitted  Pacheco Yoder PharmD 3/7/2020 3:52 PM

## 2020-03-07 NOTE — ED PROVIDER NOTES
I independently performed a history and physical on Rach Casper. All diagnostic, treatment, and disposition decisions were made by myself in conjunction with the advanced practice provider. I have participated in the medical decision making and directed the treatment plan and disposition of the patient. For further details of [address] emergency department encounter, please see the advanced practice provider's documentation. CHIEF COMPLAINT  Chief Complaint   Patient presents with    Shortness of Breath     pt reports swelling in abdomen that is causing shortness of breath laying flat. Pt reports that she was discharged from [facility] on Thursday and started taking a water pill yesterday        Briefly, Rach Casper is a 75 y.o. female  who presents to the ED complaining of shortness of breath left leg swelling and redness    FOCUSED PHYSICAL EXAMINATION  BP (!) 168/78   Pulse 68   Temp 98.1 °F (36.7 °C) (Oral)   Resp 19   Wt 169 lb (76.7 kg)   SpO2 97%   BMI 33.01 kg/m²      Focused physical examination:  General appearance:  Cooperative. No acute distress. Skin:  Warm. Dry. In addition to the edema noted in the left leg there is significant erythema and warmth in the left mid lower extremity   eye:  Extraocular movements intact. Ears, nose, mouth and throat:  Oral mucosa moist,  Neck:  Trachea midline. Heart:  Regular rate and rhythm  Perfusion:  intact  Respiratory: Speaking in full sentences but the diminished breath sounds bilaterally  Abdominal:   Non distended. Nontender  Neurological:  Alert and oriented x 3. Moves all extremities spontaneously  Musculoskeletal:   Normal ROM, no deformities. Significant 2-3+ bilateral lower extremity pitting edema          Psychiatric:  Normal mood      EKG: Sinus rhythm rate of 64 bpm with left axis deviation left bundle branch block.   When compared to prior EKG from 10/15/2019 no change    MDM: Patient presents emergency department today complaining of shortness of breath. She has not been on her diuretics for quite some time. Patient appears markedly fluid overloaded with significantly elevated BNP and chest x-ray with large pleural effusions accounting for her symptoms. She was given Lasix here and Nitropaste. She will be admitted for fluid overload but was also found to have a cellulitis of the left lower extremity. Due to prior history of C. difficile and allergies she was given vancomycin and will be admitted to the hospital    During the patient's ED course, the patient was given:  Medications   vancomycin (VANCOCIN) 1,250 mg in dextrose 5 % 250 mL IVPB (1,250 mg Intravenous New Bag 3/7/20 1630)   furosemide (LASIX) injection 80 mg (80 mg Intravenous Given 3/7/20 1551)   nitroglycerin (NITRO-BID) 2 % ointment 1 inch (1 inch Topical Given 3/7/20 1551)        CLINICAL IMPRESSION  1. Pleural effusion    2. Shortness of breath    3. Elevated troponin    4. Cellulitis of left lower extremity    5. Acute on chronic congestive heart failure, unspecified heart failure type [facility])        DISPOSITION  Admission      This chart was created using Dragon dictation software. Efforts were made by me to ensure accuracy, however some errors may be present due to limitations of this technology.             Sharri Engle MD  03/07/20 9600

## 2020-03-07 NOTE — ED PROVIDER NOTES
CHIEF COMPLAINT  Shortness of Breath (pt reports swelling in abdomen that is causing shortness of breath laying flat. Pt reports that she was discharged from [facility] on Thursday and started taking a water pill yesterday )      HISTORY OF PRESENT ILLNESS  Corina Almazan is a 75 y.o. female who presents to the ED complaining of shortness of breath and edema. Patient is nontoxic in appearance and in no acute distress. Patient presents to the emergency department via EMS. Patient reports that she recently spent 3 months in the hospital following respiratory failure and cardiac arrest.  Patient states that she was discharged to the [facility] where she was there for rehabilitation. Patient reports that she was discharged home on Thursday and states that she was recently placed on torsemide 20 mg tablet, once daily and states that she did take 1 tablet yesterday and reports that she actually felt worse. Patient reports that since the respiratory failure and cardiac arrest, she has had kidney failure and they were hesitant to place her on diuretic until recently. Patient denies headache or visual disturbances. No lightheadedness or dizziness. No associated chest pain. Reports that she felt as though she was unable to lay flat because this made her feel more short of breath. Patient denies any abdominal discomfort although she did state that she felt as though she was swollen from her bilateral lower extremities up into her abdomen. She denies any bowel or bladder complaints. No reports of recent fever or chills. States that she has noticed over the last several days that her left lower extremity has also appeared reddened. Patient also reports that while in facility for rehabilitation, she did develop C. difficile and was treated with antibiotics. No other complaints, modifying factors or associated symptoms. Nursing notes reviewed.    Past Medical History:   Diagnosis Date    Allergic     Anemia of education: Not on file    Highest education level: Not on file   Occupational History    Occupation:      Comment: [employer] most recently   Social Needs    Financial resource strain: Not on file    Food insecurity     Worry: Not on file     Inability: Not on file   [employer] needs     Medical: Not on file     Non-medical: Not on file   Tobacco Use    Smoking status: Former Smoker     Years: 13.00     Types: Cigarettes     Last attempt to quit: 1985     Years since quittin.2    Smokeless tobacco: Never Used   Substance and Sexual Activity    Alcohol use: No     Alcohol/week: 0.0 standard drinks    Drug use: No    Sexual activity: Not Currently     Partners: Male   Lifestyle    Physical activity     Days per week: Not on file     Minutes per session: Not on file    Stress: Not on file   Relationships    Social connections     Talks on phone: Not on file     Gets together: Not on file     Attends [employer] service: Not on file     Active member of club or organization: Not on file     Attends meetings of clubs or organizations: Not on file     Relationship status: Not on file    Intimate partner violence     Fear of current or ex partner: Not on file     Emotionally abused: Not on file     Physically abused: Not on file     Forced sexual activity: Not on file   Other Topics Concern    Not on file   Social History Narrative    Not on file     No current facility-administered medications for this encounter. Current Outpatient Medications   Medication Sig Dispense Refill    ALPRAZolam (XANAX) 0.5 MG tablet Take 0.5 mg by mouth 3 times daily as needed for Anxiety.       rosuvastatin (CRESTOR) 5 MG tablet Take 5 mg by mouth daily      potassium chloride (KLOR-CON M) 10 MEQ extended release tablet Take 10 mEq by mouth daily      vancomycin (VANCOCIN) 50 mg/mL oral solution Take by mouth 4 times daily      warfarin (COUMADIN) 2 MG tablet Take 1 tablet by mouth daily 100 tablet 3    sertraline (ZOLOFT) 50 MG tablet Take 1 tablet by mouth daily 30 tablet 3    insulin glargine (LANTUS) 100 UNIT/ML injection vial Inject 19 Units into the skin nightly 1 vial 3    insulin lispro (HUMALOG) 100 UNIT/ML injection vial Inject 0-12 Units into the skin 3 times daily (with meals) **Medium Dose Corrective Algorithm**  Glucose: Dose:  If <139             No Insulin  140-199 2 Units  200-249 4 Units  250-299 6 Units  300-349 8 Units  350-400 10 Units  Above 400       12 Units 1 vial 3    metoprolol tartrate (LOPRESSOR) 25 MG tablet Take 1 tablet by mouth 2 times daily (Patient taking differently: Take 50 mg by mouth 2 times daily ) 60 tablet 3    donepezil (ARICEPT) 5 MG tablet Take 1 tablet by mouth nightly 30 tablet 3    omeprazole (PRILOSEC) 40 MG delayed release capsule Take 1 capsule by mouth every morning (before breakfast) 30 capsule 5    ferrous sulfate 325 (65 Fe) MG tablet Take 325 mg by mouth 2 times daily      melatonin 3 MG TABS tablet Take 3 mg by mouth nightly      sennosides-docusate sodium (SENOKOT-S) 8.6-50 MG tablet Take 1 tablet by mouth 2 times daily      acetaminophen (TYLENOL) 325 MG tablet Take 2 tablets by mouth every 4 hours as needed for Pain or Fever 120 tablet 3    INSULIN PEN NEEDLE For use with Lantus and Novolog      B Complex-C-Iron (SUPER B-COMPLEX/IRON/VITAMIN C PO) Take 1 tablet by mouth daily.        Allergies   Allergen Reactions    Ativan [Lorazepam]     Dilaudid [Hydromorphone Hcl] Other (See Comments)     Pt states it made her crazy    Flexeril [Cyclobenzaprine Hcl]     Heparin      KEENAN     Hydromorphone     Penicillins     Soma [Carisoprodol]     Sulfa Antibiotics        REVIEW OF SYSTEMS  10 systems reviewed, pertinent positives per HPI otherwise noted to be negative    PHYSICAL EXAM  BP (!) 165/71   Pulse 67   Temp 98.1 °F (36.7 °C) (Oral)   Resp 18   Wt 169 lb (76.7 kg)   SpO2 99%   BMI 33.01 kg/m²   GENERAL APPEARANCE: Awake and alert. Cooperative. No acute distress. HEAD: Normocephalic. Atraumatic. EYES: EOM's grossly intact. Bilateral conjunctiva clear and without exudate. No conjunctival injection bilaterally. No scleral icterus. Noted with right subconjunctival conjunctival hemorrhage. ENT: Mucous membranes are moist. Bilateral tympanic membranes are clear. Posterior oropharynx is without erythema or exudate. Uvula is midline. NECK: Supple. Normal ROM. Trachea is midline. CHEST: Equal and symmetric chest rise and fall. HEART: Regular rate and rhythm without murmurs. LUNGS: Respirations unlabored. Speaking comfortably in full sentences. Bilateral lung sounds diminished in the bases. ABDOMEN: Soft, non-distended and non-tender. No hernias or masses appreciated. No organomegaly. No rebound or guarding. Bowel sounds audible and active in all four quadrants. EXTREMITIES: Moves all extremities equally and neurovascularly intact. Noted with 2+ pitting edema to the bilateral lower extremities, notable for pitting edema into the thighs as well as up into the lower abdomen. SKIN: Pink ,warm and dry. Erythema appreciated to the left lower extremity along the anterior lateral shin. Positive warmth to touch. No drainage. NEUROLOGICAL: Alert and oriented x 4. Speech clear. No gross facial drooping. PSYCHIATRIC: Normal mood and affect. RADIOLOGY  Xr Chest Standard (2 Vw)    Result Date: 3/7/2020  EXAMINATION: TWO XRAY VIEWS OF THE CHEST 3/7/2020 1:55 pm COMPARISON: I am going to January 5, 2020 HISTORY: ORDERING SYSTEM PROVIDED HISTORY: Dyspnea TECHNOLOGIST PROVIDED HISTORY: Reason for exam:-> dyspnea Reason for Exam: swelling on bell, feeling dyspneic Acuity: Acute Type of Exam: Initial Dyspnea FINDINGS: Cardiomegaly. Right effusion. Mild pulmonary vascular congestion magnified by technique. No pneumothorax or focal airspace consolidation. Whole right-sided pleural effusion and trace left-sided effusion. Cardiomegaly. ED COURSE  I have evaluated this patient in collaboration with Dr. Tai Hess. Patient seen and evaluated. Old records reviewed. Patient presenting for evaluation of shortness of breath and left lower extremity redness. Afebrile. Vital stable. Not tachycardic or tachypneic. Not hypoxic. CBC is without leukocytosis or anemia. CMP stable, history of renal failure. BN P is elevated at 44,750. Troponin elevated at 0.13. PT 25.2 and INR 2. 16.  X-ray of the chest with reports of whole right-sided pleural effusion and trace left-sided effusion with cardiomegaly. EKG interpretation per attending, please see his documentation for additional details. Patient received Nitropaste as well as Lasix while in the ED. considering left lower extremity cellulitis, patient was also given vancomycin with pharmacy to dose. A discussion was had with the patient regarding lab and imaging results as well as plan of care. Plan for admission to the hospital for further evaluation/treatment including continued diuresis, monitoring and serial troponins. . Patient verbalizes understanding, all questions were answered and she is agreeable with the plan of care. Dr. Tai Hess spoke with Dr. silvestre. They thoroughly discussed the history, physical exam, laboratory and imaging studies, as well as, emergency department course. Based upon that discussion, it was decided to admit Brenda Xiao to the hospital for further evaulation/treatment.         MDM  Results for orders placed or performed during the hospital encounter of 03/07/20   CBC Auto Differential   Result Value Ref Range    WBC 4.6 4.0 - 11.0 K/uL    RBC 4.27 4.00 - 5.20 M/uL    Hemoglobin 11.8 (L) 12.0 - 16.0 g/dL    Hematocrit 36.2 36.0 - 48.0 %    MCV 84.7 80.0 - 100.0 fL    MCH 27.6 26.0 - 34.0 pg    MCHC 32.6 31.0 - 36.0 g/dL    RDW 22.5 (H) 12.4 - 15.4 %    Platelets 417 815 - 283 K/uL    MPV 7.7 5.0 - 10.5 fL    Neutrophils % 59.8 % Lymphocytes % 23.5 %    Monocytes % 13.6 %    Eosinophils % 1.8 %    Basophils % 1.3 %    Neutrophils Absolute 2.7 1.7 - 7.7 K/uL    Lymphocytes Absolute 1.1 1.0 - 5.1 K/uL    Monocytes Absolute 0.6 0.0 - 1.3 K/uL    Eosinophils Absolute 0.1 0.0 - 0.6 K/uL    Basophils Absolute 0.1 0.0 - 0.2 K/uL   Protime-INR   Result Value Ref Range    Protime 25.2 (H) 10.0 - 13.2 sec    INR 2.16 (H) 0.86 - 1.14   Comprehensive Metabolic Panel w/ Reflex to MG   Result Value Ref Range    Sodium 137 136 - 145 mmol/L    Potassium reflex Magnesium 4.1 3.5 - 5.1 mmol/L    Chloride 100 99 - 110 mmol/L    CO2 25 21 - 32 mmol/L    Anion Gap 12 3 - 16    Glucose 113 (H) 70 - 99 mg/dL    BUN 38 (H) 7 - 20 mg/dL    CREATININE 2.2 (H) 0.6 - 1.2 mg/dL    GFR Non-African American 22 (A) >60    GFR  26 (A) >60    Calcium 9.4 8.3 - 10.6 mg/dL    Total Protein 6.9 6.4 - 8.2 g/dL    Alb 3.7 3.4 - 5.0 g/dL    Albumin/Globulin Ratio 1.2 1.1 - 2.2    Total Bilirubin 0.8 0.0 - 1.0 mg/dL    Alkaline Phosphatase 95 40 - 129 U/L    ALT 19 10 - 40 U/L    AST 25 15 - 37 U/L    Globulin 3.2 g/dL   Troponin   Result Value Ref Range    Troponin 0.13 (H) <0.01 ng/mL   Brain Natriuretic Peptide   Result Value Ref Range    Pro-BNP 44,750 (H) 0 - 449 pg/mL   EKG 12 Lead   Result Value Ref Range    Ventricular Rate 64 BPM    Atrial Rate 64 BPM    P-R Interval 156 ms    QRS Duration 126 ms    Q-T Interval 464 ms    QTc Calculation (Bazett) 478 ms    P Axis 35 degrees    R Axis -49 degrees    T Axis 118 degrees    Diagnosis       Normal sinus rhythmLeft axis deviationLeft bundle branch blockAbnormal ECGWhen compared with ECG of 11-NOV-2019 19:45,QRS axis Shifted leftT wave amplitude has decreased in Inferior leadsT wave amplitude has decreased in Lateral leadsConfirmed by Lyudmila Arellano ([id number]) on 3/7/2020 2:52:03 PM     Final Impression    1. Pleural effusion    2. Shortness of breath    3. Elevated troponin    4.  Cellulitis of left lower extremity Blood pressure (!) 173/70, pulse 68, temperature 98.1 °F (36.7 °C), temperature source Oral, resp. rate 20, weight 169 lb (76.7 kg), SpO2 99 %, not currently breastfeeding. DISPOSITION  Patient was admitted to the hospital.       DISCLAIMER:  Please note this report has been produced using speech recognition Dragon software and may contain errors related to that system including errors in grammar, punctuation, and spelling, as well as words and phrases that may be inappropriate. If there are any questions or concerns please feel free to contact the dictating provider for clarification.                 LUCIAN Trammell - [facility]  03/07/20 [id number]

## 2020-03-08 NOTE — PROGRESS NOTES
Hospitalist Progress Note      PCP: Natalie Pizano MD    Date of Admission: 3/7/2020    Chief Complaint: Shortness of breath, edema    Hospital Course: Admitted with edema and shortness of breath, diagnosed with acute systolic congestive heart failure. Being evaluated by nephrology for diuresis and edema    Subjective: No shortness of breath at rest.  No chest pain, no nausea, no vomiting. Medications:  Reviewed    Infusion Medications   Scheduled Medications    albumin human  25 g Intravenous BID    furosemide  80 mg Intravenous BID    vitamin B complex w/C  1 tablet Oral Daily    donepezil  5 mg Oral Nightly    ferrous sulfate  325 mg Oral BID    insulin glargine  20 Units Subcutaneous Nightly    melatonin ER  3 mg Oral Nightly    metoprolol tartrate  50 mg Oral BID    pantoprazole  40 mg Oral QAM AC    rosuvastatin  5 mg Oral Daily    sennosides-docusate sodium  1 tablet Oral BID    sodium chloride flush  10 mL Intravenous 2 times per day    insulin lispro  0-12 Units Subcutaneous TID WC    insulin lispro  0-6 Units Subcutaneous Nightly    vancomycin (VANCOCIN) intermittent dosing (placeholder)   Other RX Placeholder    sertraline  100 mg Oral Daily     PRN Meds: ALPRAZolam, sodium chloride flush, acetaminophen **OR** acetaminophen, polyethylene glycol, promethazine **OR** ondansetron      Intake/Output Summary (Last 24 hours) at 3/8/2020 1231  Last data filed at 3/8/2020 1210  Gross per 24 hour   Intake 840 ml   Output 325 ml   Net 515 ml       Physical Exam Performed:    BP (!) 165/74   Pulse 65   Temp 97.5 °F (36.4 °C) (Oral)   Resp 16   Ht 5' (1.524 m)   Wt 175 lb 7.8 oz (79.6 kg)   SpO2 96%   BMI 34.27 kg/m²     General appearance: No apparent distress, appears stated age and cooperative. HEENT: Pupils equal, round, and reactive to light. Conjunctivae/corneas clear. Neck: Supple, with full range of motion. No jugular venous distention. Trachea midline.   Respiratory: effusion and trace left-sided effusion. Cardiomegaly. Assessment/Plan:    Active Hospital Problems    Diagnosis    Anasarca [R60.1]       PLAN    Acute systolic congestive heart failure  Started the patient on IV Lasix. Nephrology following  Daily basic metabolic panel  Does not require oxygen    Acute on chronic kidney disease stage III  Seen by nephrology. Baseline creatinine was around 1.5 a couple months ago. Past month, it has been above 2. Current levels could be the new baseline. Elevated troponin  Consistent with demand ischemia. Ischemia evaluation is not possible either way, because of poor renal function. Diffuse edema  Secondary to CHF  Albumin is borderline low  IV albumin ordered by nephrologist.    Hypertension  Blood pressure slightly above baseline. Continue same medications at this time. Diuresis could help. Dementia  Continue donepezil    Diabetes mellitus type 2  Continue Lantus, sliding-scale insulin and carb controlled diet. Discussed with the patient      DVT Prophylaxis: Heparin subcutaneous  Diet: DIET CARB CONTROL;  Code Status: Full Code    PT/OT Eval Status: Requested    Dispo -inpatient, unclear length of stay.     Tomas Smith MD

## 2020-03-08 NOTE — H&P
breakfast) 1/23/20  Yes Shabbir Calalhan MD   ferrous sulfate 325 (65 Fe) MG tablet Take 325 mg by mouth 2 times daily   Yes Historical Provider, MD   melatonin 3 MG TABS tablet Take 3 mg by mouth nightly   Yes Historical Provider, MD   sennosides-docusate sodium (SENOKOT-S) 8.6-50 MG tablet Take 1 tablet by mouth 2 times daily   Yes Historical Provider, MD   INSULIN PEN NEEDLE For use with Lantus and Novolog 4/20/15  Yes Historical Provider, MD   B Complex-C-Iron (SUPER B-COMPLEX/IRON/VITAMIN C PO) Take 1 tablet by mouth daily. 9/2/10  Yes Historical Provider, MD   acetaminophen (TYLENOL) 325 MG tablet Take 2 tablets by mouth every 4 hours as needed for Pain or Fever 12/4/19   Carlos A Marvin MD       Allergies:  Ativan [lorazepam]; Dilaudid [hydromorphone hcl]; Flexeril [cyclobenzaprine hcl]; Heparin; Hydromorphone; Penicillins; Soma [carisoprodol]; and Sulfa antibiotics    Social History:      The patient currently lives at home     TOBACCO:   reports that she quit smoking about 35 years ago. Her smoking use included cigarettes. She quit after 13.00 years of use. She has never used smokeless tobacco.  ETOH:   reports no history of alcohol use. E-Cigarettes Vaping or Juuling     Questions Responses    Vaping Use Never User    Start Date     Does device contain nicotine? Quit Date     Vaping Type             Family History:       Reviewed in detail and negative for DM, CAD, Cancer, CVA. Positive as follows:        Problem Relation Age of Onset    Diabetes Mother     Heart Failure Mother     Heart Disease Mother     Stroke Mother     Diabetes Sister     Diabetes Brother     Diabetes Maternal Grandmother     Asthma Son     Asthma Son     Diabetes Daughter     Heart Disease Daughter     Irritable Bowel Syndrome Daughter     Diabetes Brother     Cancer Neg Hx     Emphysema Neg Hx     Hypertension Neg Hx        REVIEW OF SYSTEMS:   Pertinent positives as noted in the HPI.  All other systems reviewed and negative. PHYSICAL EXAM PERFORMED:    BP (!) 174/80   Pulse 66   Temp 97.4 °F (36.3 °C) (Oral)   Resp 18   Ht 5' (1.524 m)   Wt 169 lb (76.7 kg)   SpO2 98%   BMI 33.01 kg/m²     General appearance:  No apparent distress, appears stated age and cooperative. HEENT:  Normal cephalic, atraumatic without obvious deformity. Pupils equal, round, and reactive to light. Extra ocular muscles intact. Conjunctivae/corneas clear. Neck: Supple, with full range of motion. No jugular venous distention. Trachea midline. Respiratory:  Dec air entry both sides   Cardiovascular:  Regular rate and rhythm with normal S1/S2 without murmurs, rubs or gallops. Abdomen: Soft, non-tender, non-distended with normal bowel sounds. Musculoskeletal:  B/l leg edema, rt foot with dressing on   Left leg with erythema     Neurologic:  Neurovascularly intact without any focal sensory/motor deficits.  Cranial nerves: II-XII intact, grossly non-focal.  Psychiatric:  Alert and oriented, thought content appropriate, normal insight  Capillary Refill: Brisk,< 3 seconds   Peripheral Pulses: +2 palpable, equal bilaterally       Labs:     Recent Labs     03/07/20  1402   WBC 4.6   HGB 11.8*   HCT 36.2        Recent Labs     03/07/20  1415      K 4.1      CO2 25   BUN 38*   CREATININE 2.2*   CALCIUM 9.4     Recent Labs     03/07/20  1415   AST 25   ALT 19   BILITOT 0.8   ALKPHOS 95     Recent Labs     03/07/20  1402   INR 2.16*     Recent Labs     03/07/20  1415 03/07/20  1834   TROPONINI 0.13* 0.12*       Urinalysis:      Lab Results   Component Value Date    NITRU Negative 01/05/2020    WBCUA 3-5 01/05/2020    BACTERIA 3+ 01/05/2020    RBCUA 0-2 01/05/2020    BLOODU Negative 01/05/2020    SPECGRAV >=1.030 01/05/2020    GLUCOSEU Negative 01/05/2020    GLUCOSEU NEGATIVE 10/07/2011       Radiology:     CXR: I have reviewed the CXR with the following interpretation: rt pleural effusion   EKG:  I have reviewed the EKG

## 2020-03-08 NOTE — CONSULTS
sennosides-docusate sodium (SENOKOT-S) 8.6-50 MG tablet Take 1 tablet by mouth 2 times daily      INSULIN PEN NEEDLE For use with Lantus and Novolog      B Complex-C-Iron (SUPER B-COMPLEX/IRON/VITAMIN C PO) Take 1 tablet by mouth daily.  acetaminophen (TYLENOL) 325 MG tablet Take 2 tablets by mouth every 4 hours as needed for Pain or Fever 120 tablet 3       Allergies:  Ativan [lorazepam]; Dilaudid [hydromorphone hcl]; Flexeril [cyclobenzaprine hcl]; Heparin; Hydromorphone; Penicillins; Soma [carisoprodol]; and Sulfa antibiotics    Social History:    Social History     Socioeconomic History    Marital status:       Spouse name: Not on file    Number of children: Not on file    Years of education: Not on file    Highest education level: Not on file   Occupational History    Occupation:      Comment: [employer] most recently   Social Needs    Financial resource strain: Not on file    Food insecurity     Worry: Not on file     Inability: Not on file   [employer] needs     Medical: Not on file     Non-medical: Not on file   Tobacco Use    Smoking status: Former Smoker     Years: 13.00     Types: Cigarettes     Last attempt to quit: 1985     Years since quittin.2    Smokeless tobacco: Never Used   Substance and Sexual Activity    Alcohol use: No     Alcohol/week: 0.0 standard drinks    Drug use: No    Sexual activity: Not Currently     Partners: Male   Lifestyle    Physical activity     Days per week: Not on file     Minutes per session: Not on file    Stress: Not on file   Relationships    Social connections     Talks on phone: Not on file     Gets together: Not on file     Attends Congregation service: Not on file     Active member of club or organization: Not on file     Attends meetings of clubs or organizations: Not on file     Relationship status: Not on file    Intimate partner violence     Fear of current or ex partner: Not on file     Emotionally abused: Not on file Physically abused: Not on file     Forced sexual activity: Not on file   Other Topics Concern    Not on file   Social History Narrative    Not on file       Family History:   Family History   Problem Relation Age of Onset    Diabetes Mother     Heart Failure Mother     Heart Disease Mother    Amos Parody Stroke Mother     Diabetes Sister     Diabetes Brother     Diabetes Maternal Grandmother     Asthma Son     Asthma Son     Diabetes Daughter     Heart Disease Daughter     Irritable Bowel Syndrome Daughter     Diabetes Brother     Cancer Neg Hx     Emphysema Neg Hx     Hypertension Neg Hx        Review of Systems:   Pertinent positives stated above in HPI. All other 10 systems were reviewed and were negative. Physical exam:   Constitutional:  VITALS:  BP (!) 165/74   Pulse 65   Temp 97.5 °F (36.4 °C) (Oral)   Resp 16   Ht 5' (1.524 m)   Wt 175 lb 7.8 oz (79.6 kg)   SpO2 96%   BMI 34.27 kg/m²   Gen: alert, awake, nad  Skin: no rash, turgor wnl  Heent:  eomi, mmm  Neck: no bruits or jvd noted, thyroid normal  Cardiovascular:  S1, S2 without m/r/g  Respiratory: Decreased breath sounds at the bases  Abdomen:  +bs, soft, nt, nd, no hepatosplenomegaly  Ext: 3+ lower extremity edema, left leg with redness and warmth  Neuro/Psy: AAoriented times 3 ; moves all 4 ext  Musculoskeletal:  Rom, muscular strength intact; digits, nails normal    Data/  Recent Labs     03/07/20  1402 03/08/20  0634   WBC 4.6 3.9*   HGB 11.8* 10.8*   HCT 36.2 34.4*   MCV 84.7 85.2    179     Recent Labs     03/07/20  1415 03/08/20  0634    138   K 4.1 4.0    103   CO2 25 22   GLUCOSE 113* 126*   BUN 38* 37*   CREATININE 2.2* 2.3*   LABGLOM 22* 21*   GFRAA 26* 25*     Impression:     1. Moderate right pleural effusion with adjacent atelectasis. 2. Multiple 2-4 mm solid pulmonary nodules bilaterally dating back to  10/15/2019.  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   These were not well seen

## 2020-03-09 NOTE — CONSULTS
performed by Michelle Gilbert MD at [address] [place] [address]      right    CATARACT REMOVAL WITH IMPLANT      CORONARY ANGIOPLASTY WITH STENT PLACEMENT  2012    DIAGNOSTIC CARDIAC CATH LAB PROCEDURE      HYSTERECTOMY      11/30/15    KNEE ARTHROSCOPY      left knee. not a TKR. no metal.     TOE AMPUTATION Right 2020    RIGHT SECOND AND THIRD TOE AMPUTATION, RIGHT HEEL WOUND DEBRIDEMENT, RIGHT HALLUX TOE NAIL DEBRIDEMENT performed by Gi Chow DPM at [address] N/A 2019    EGD PEG PLACEMENT W/ ANESTHESIA performed by Emeli Hodgson MD at [address] [place] [address]    Family History   Problem Relation Age of Onset    Diabetes Mother     Heart Failure Mother     Heart Disease Mother     Stroke Mother     Diabetes Sister     Diabetes Brother     Diabetes Maternal Grandmother     Asthma Son     Asthma Son     Diabetes Daughter     Heart Disease Daughter     Irritable Bowel Syndrome Daughter     Diabetes Brother     Cancer Neg Hx     Emphysema Neg Hx     Hypertension Neg Hx        SOCIAL HISTORY    Social History     Tobacco Use    Smoking status: Former Smoker     Years: 13.00     Types: Cigarettes     Last attempt to quit: 1985     Years since quittin.2    Smokeless tobacco: Never Used   Substance Use Topics    Alcohol use: No     Alcohol/week: 0.0 standard drinks    Drug use: No       ALLERGIES    Allergies   Allergen Reactions    Ativan [Lorazepam]     Dilaudid [Hydromorphone Hcl] Other (See Comments)     Pt states it made her crazy    Flexeril [Cyclobenzaprine Hcl]     Heparin      KEENAN     Hydromorphone     Penicillins     Soma [Carisoprodol]     Sulfa Antibiotics        MEDICATIONS    No current facility-administered medications on file prior to encounter.       Current Outpatient Medications on File Prior to Encounter   Medication Sig Dispense Refill    ALPRAZolam (XANAX) 0.5 MG tablet Take 0.5 mg by mouth 3 times daily as needed for Anxiety.  rosuvastatin (CRESTOR) 5 MG tablet Take 5 mg by mouth daily      potassium chloride (KLOR-CON M) 10 MEQ extended release tablet Take 10 mEq by mouth daily      vancomycin (VANCOCIN) 50 mg/mL oral solution Take by mouth 4 times daily      warfarin (COUMADIN) 2 MG tablet Take 1 tablet by mouth daily 100 tablet 3    sertraline (ZOLOFT) 50 MG tablet Take 1 tablet by mouth daily 30 tablet 3    insulin glargine (LANTUS) 100 UNIT/ML injection vial Inject 19 Units into the skin nightly 1 vial 3    insulin lispro (HUMALOG) 100 UNIT/ML injection vial Inject 0-12 Units into the skin 3 times daily (with meals) **Medium Dose Corrective Algorithm**  Glucose: Dose:  If <139             No Insulin  140-199 2 Units  200-249 4 Units  250-299 6 Units  300-349 8 Units  350-400 10 Units  Above 400       12 Units 1 vial 3    metoprolol tartrate (LOPRESSOR) 25 MG tablet Take 1 tablet by mouth 2 times daily (Patient taking differently: Take 50 mg by mouth 2 times daily ) 60 tablet 3    donepezil (ARICEPT) 5 MG tablet Take 1 tablet by mouth nightly 30 tablet 3    omeprazole (PRILOSEC) 40 MG delayed release capsule Take 1 capsule by mouth every morning (before breakfast) 30 capsule 5    ferrous sulfate 325 (65 Fe) MG tablet Take 325 mg by mouth 2 times daily      melatonin 3 MG TABS tablet Take 3 mg by mouth nightly      sennosides-docusate sodium (SENOKOT-S) 8.6-50 MG tablet Take 1 tablet by mouth 2 times daily      INSULIN PEN NEEDLE For use with Lantus and Novolog      B Complex-C-Iron (SUPER B-COMPLEX/IRON/VITAMIN C PO) Take 1 tablet by mouth daily.  acetaminophen (TYLENOL) 325 MG tablet Take 2 tablets by mouth every 4 hours as needed for Pain or Fever 120 tablet 3       Objective  Sitting up in bed.   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)    Metabolic encephalopathy    Severe malnutrition (HCC)    Cellulitis    Anasarca       Measurements:  Wound 11/27/19 Buttocks Stage 2, R buttocks (Active)   Wound Pressure Stage  2 2/21/2020  8:15 PM   Dressing Status Other (Comment) 2/22/2020  9:31 AM   Dressing/Treatment Open to air 2/22/2020  9:31 AM   Wound Assessment Red;Dry 2/22/2020  9:31 AM   Drainage Amount None 2/22/2020  9:31 AM   Odor None 2/22/2020  9:31 AM   Margins Attached edges 2/22/2020  9:31 AM   Tess-wound Assessment Intact; Induration 2/22/2020  9:31 AM   Braxton%Wound Bed 100 2/22/2020  9:31 AM   Number of days: 103       Wound 11/27/19 Coccyx Stage 2, coccyx (Active)   Number of days: 103       Wound 11/29/19 Toe (Comment  which one) L Foot Toes 2 & 3 (Active)   Black%Wound Bed 100 2/22/2020  9:31 AM   Number of days: 101       Wound 11/29/19 Toe (Comment  which one) L 4th Toe (Active)   Dressing Status Clean;Dry; Intact 2/22/2020  9:31 AM   Dressing Changed Changed/New 2/22/2020  9:31 AM   Dressing/Treatment Betadine swabs;Dry dressing 2/22/2020  9:31 AM   Dressing Change Due 01/12/20 2/22/2020  9:31 AM   Wound Assessment Other (Comment) 2/22/2020  9:31 AM   Drainage Amount None 2/22/2020  9:31 AM   Odor None 2/22/2020  9:31 AM   Margins Attached edges; Defined edges 2/22/2020  9:31 AM   Tess-wound Assessment Dry; Intact 2/22/2020  9:31 AM   Number of days: 101       Wound 01/03/20 Foot Dorsal;Right open area to top of right foot (Active)   Wound Image   3/9/2020 10:22 AM   Wound Other 3/9/2020 10:22 AM   Offloading for Diabetic Foot Ulcers Offloading boot 2/23/2020  7:42 AM   Dressing Status Changed 3/9/2020 10:22 AM   Dressing Changed Changed/New 3/9/2020 10:22 AM   Dressing/Treatment Non adherent;4x4;Roll gauze 3/9/2020 10:22 AM   Wound Cleansed Rinsed/Irrigated with saline 3/9/2020 10:22 AM   Dressing Change Due 03/10/20 3/9/2020 10:22 AM   Wound Length (cm) 6.5 cm 3/9/2020 10:22 AM   Wound Width (cm) 10 cm 3/9/2020 10:22 AM   Wound Depth (cm) 0.1 cm 3/9/2020 10:22 AM   Wound Surface Area (cm^2) 65 cm^2 3/9/2020 10:22 AM   Change in Wound Size % (l*w) -4233.33 3/9/2020 10:22 AM   Wound Volume (cm^3) 6.5 cm^3 3/9/2020 10:22 AM   Wound Healing % -4233 3/9/2020 10:22 AM   Distance Tunneling (cm) 0 cm 3/9/2020 10:22 AM   Tunneling Position ___ O'Clock 0 3/9/2020 10:22 AM   Undermining Starts ___ O'Clock 0 3/9/2020 10:22 AM   Undermining Ends___ O'Clock 0 3/9/2020 10:22 AM   Undermining Maxium Distance (cm) 0 3/9/2020 10:22 AM   Wound Assessment Red;Brown 3/9/2020 10:22 AM   Drainage Amount Small 3/9/2020 10:22 AM   Drainage Description Serosanguinous 3/9/2020 10:22 AM   Odor None 3/9/2020 10:22 AM   Margins Attached edges; Defined edges 3/9/2020 10:22 AM   Tess-wound Assessment Edema; Red 3/9/2020 10:22 AM   Non-staged Wound Description Partial thickness 3/9/2020 10:22 AM   Red%Wound Bed 60 3/9/2020 10:22 AM   Yellow%Wound Bed 90 2/23/2020  7:42 AM   Other%Wound Bed 4-% brown 3/9/2020 10:22 AM   Culture Taken No 3/9/2020 10:22 AM   Number of days: 65      Right dorsal foot:            Wound 02/23/20 Heel Right unstageable (Active)   Wound Image   3/9/2020 10:22 AM   Wound Pressure Unstageable 3/9/2020 10:22 AM   Offloading for Diabetic Foot Ulcers Offloading boot 3/9/2020 10:22 AM   Dressing Status Changed 3/9/2020 10:22 AM   Dressing Changed Changed/New 3/9/2020 10:22 AM   Dressing/Treatment Betadine swabs;Foam;Roll gauze 3/9/2020 10:22 AM   Wound Cleansed Rinsed/Irrigated with saline 3/9/2020 10:22 AM   Dressing Change Due 03/10/20 3/9/2020 [phone], Pager [phone]. Specialty Bed Required : Yes isoflex mattress in place  [] Low Air Loss   [x] Pressure Redistribution  [] Fluid Immersion  [] Bariatric  [] Total Pressure Relief  [] Other:     Current Diet: DIET CARB CONTROL; Low Sodium (2 GM); Daily Fluid Restriction: 1500 ml  Dietician consult:  N/A    Discharge Plan:  Placement for patient upon discharge: intermediate care facility - [facility]   Patient appropriate for Outpatient [address]: Yes    Referrals:  []  / discharge planner demetrius  [] [address] [place] [facility]  [] Supplies  [] Other    Patient/Caregiver Teaching: Instructed on treatment and to keep right heel elevated off the bed.    Level of patient/caregiver understanding able to:   [x] Indicates understanding       [x] Needs reinforcement  [] Unsuccessful      [] Verbal Understanding  [] Demonstrated understanding       [] No evidence of learning  [] Refused teaching         [] N/A       Electronically signed by Roderick Graham RN, MSN, Ted Bernard on 3/9/2020 at 10:47 AM

## 2020-03-09 NOTE — PROGRESS NOTES
Nephrology Progress Note   [email or web address]      This patient is a 75year old female whom we are following for anasarca and CKD. Subjective: The patient was seen and examined. Does not have much appetite. Still with significant edema. WF=877mV the past 24H. Family History: No family at bedside  ROS: No nausea or vomiting      Vitals:  BP (!) 183/91   Pulse 69   Temp 98.3 °F (36.8 °C) (Oral)   Resp 16   Ht 5' (1.524 m)   Wt 179 lb 3.7 oz (81.3 kg)   SpO2 95%   BMI 35.00 kg/m²   I/O last 3 completed shifts: In: 1080 [P.O.:1080]  Out: 650 [Urine:650]  No intake/output data recorded. Physical Exam:  Physical Exam  Vitals signs reviewed. Constitutional:       General: She is not in acute distress. Appearance: Normal appearance. HENT:      Head: Normocephalic and atraumatic. Mouth/Throat:      Mouth: Mucous membranes are moist.   Eyes:      General: No scleral icterus. Conjunctiva/sclera: Conjunctivae normal.   Cardiovascular:      Rate and Rhythm: Normal rate. Heart sounds: No friction rub. Pulmonary:      Effort: Pulmonary effort is normal. No respiratory distress. Breath sounds: No wheezing. Comments: Diminished breath sounds bibasal  Abdominal:      General: Bowel sounds are normal. There is no distension. Tenderness: There is no abdominal tenderness. Musculoskeletal:      Right lower leg: Edema present. Left lower leg: Edema present. Neurological:      Mental Status: She is alert.            Medications:   vancomycin  500 mg Intravenous Once    albumin human  25 g Intravenous BID    furosemide  80 mg Intravenous BID    hydrALAZINE  25 mg Oral 2 times per day    vitamin B complex w/C  1 tablet Oral Daily    donepezil  5 mg Oral Nightly    ferrous sulfate  325 mg Oral BID    insulin glargine  20 Units Subcutaneous Nightly    melatonin ER  3 mg Oral Nightly    metoprolol tartrate  50 mg Oral BID    pantoprazole  40 mg Oral QAM AC   

## 2020-03-09 NOTE — CONSULTS
Department of Podiatric Surgery  Consult Note      Reason for Consult:  B/L foot ulcers  Requesting Physician:  Jaquelin Nunes MD    CHIEF COMPLAINT:  B/L foot ulcers    HISTORY OF PRESENT ILLNESS:                The patient is a 75 y.o. female with significant past medical history of CHF, CKD Stage 3, HTN, Dementia, DM2, history of multiple toe amputations right foot. Podiatry is consulted for b/l foot wounds with left lower leg cellulitis. Patient is known to our office and follows routinely with my partner, Dr. Jose Del Rio, whom she last saw 1 week ago with no issues at that time. Patient has had previous vascular workup 2 months ago during another admission, which indicated that her blood flow to the feet is adequate enough for healing at that time. No leukocytosis currently. She has been residing in a nursing home/rehab prior to this admission. Denies n/v/f/c/d. States that she has no pain in the feet at this time.     Past Medical History:        Diagnosis Date    Allergic     Anemia     Angina     with exertion    Arthritis     Asthma 9/16/2010    [facility] [address]     Coronary artery disease 9/16/2010    Diabetes mellitus ([facility] [address] [facility]     Heart attack [facility]) age 43    no problems since then    Hyperlipidemia     Hypertension     Obesity      Past Surgical History:        Procedure Laterality Date    BRONCHOSCOPY N/A 11/12/2019    BRONCHOSCOPY WASHING performed by Toribio Leija MD at [address] [place] [address] N/A 11/23/2019    BRONCHOSCOPY ALVEOLAR LAVAGE performed by Zuly Grullon MD at [address] [place] [address]  11/23/2019    BRONCHOSCOPY THERAPUTIC ASPIRATION INITIAL performed by Zuly Grullon MD at [address] [place] [address]  11/23/2019    BRONCHOSCOPY FOREIGN BODY REMOVAL performed by Zuly Grullon MD at [address] [place] [address]      right    CATARACT REMOVAL WITH IMPLANT      CORONARY ANGIOPLASTY WITH STENT PLACEMENT  4/2012    DIAGNOSTIC CARDIAC CATH LAB PROCEDURE      HYSTERECTOMY      11/30/15    KNEE ARTHROSCOPY      left knee. not a TKR.  no metal.     TOE AMPUTATION Right 1/16/2020    RIGHT SECOND AND THIRD TOE AMPUTATION, RIGHT HEEL WOUND DEBRIDEMENT, RIGHT HALLUX TOE NAIL DEBRIDEMENT performed by Rakesh Moeller DPM at [address] N/A 12/2/2019    EGD PEG PLACEMENT W/ ANESTHESIA performed by Jennifer Fernandes MD at [address] [place] [address]     Current Medications:    Current Facility-Administered Medications: hydrALAZINE (APRESOLINE) tablet 50 mg, 50 mg, Oral, 3 times per day  furosemide (LASIX) 100 mg in dextrose 5 % 100 mL infusion, 10 mg/hr, Intravenous, Continuous  insulin glargine (LANTUS) injection vial 15 Units, 15 Units, Subcutaneous, Nightly  glucose (GLUTOSE) 40 % oral gel 15 g, 15 g, Oral, PRN  dextrose 50 % IV solution, 12.5 g, Intravenous, PRN  glucagon (rDNA) injection 1 mg, 1 mg, Intramuscular, PRN  dextrose 5 % solution, 100 mL/hr, Intravenous, PRN  [START ON 3/10/2020] povidone-iodine 10 % swab, , Topical, PRN  perflutren lipid microspheres (DEFINITY) injection 1.65 mg, 1.5 mL, Intravenous, ONCE PRN  hydrOXYzine (VISTARIL) injection 25 mg, 25 mg, Intramuscular, Q6H PRN  ALPRAZolam (XANAX) tablet 0.5 mg, 0.5 mg, Oral, TID PRN  vitamin B complex w/C 1 tablet, 1 tablet, Oral, Daily  donepezil (ARICEPT) tablet 5 mg, 5 mg, Oral, Nightly  ferrous sulfate (IRON 325) tablet 325 mg, 325 mg, Oral, BID  melatonin ER tablet 3 mg, 3 mg, Oral, Nightly  metoprolol tartrate (LOPRESSOR) tablet 50 mg, 50 mg, Oral, BID  pantoprazole (PROTONIX) tablet 40 mg, 40 mg, Oral, QAM AC  rosuvastatin (CRESTOR) tablet 5 mg, 5 mg, Oral, Daily  sennosides-docusate sodium (SENOKOT-S) 8.6-50 MG tablet 1 tablet, 1 tablet, Oral, BID  sodium chloride flush 0.9 % injection 10 mL, 10 mL, Intravenous, 2 times per day  sodium chloride flush 0.9 % injection 10 mL, 10 mL, Intravenous, PRN  acetaminophen (TYLENOL) tablet 650 mg, 650 mg, Oral, Q6H PRN **OR**

## 2020-03-09 NOTE — CONSULTS
[address] [facility]  [phone]      Attending Physician: Kirt Gibson MD  Reason for Consultation/Chief Complaint: Swelling    Subjective   History of Present Illness:  Brenda Xiao is a 75 y.o. patient who presented to the hospital with complaints of increased swelling over the last week. Patient presented on March 7, 2022 the ER with increased swelling as well as shortness of breath and weight gain. Patient denied chest pain. She was felt to have anasarca and was admitted to the hospital and is being treated with IV Lasix and she says she feels much better. Nephrology is been consulted due to chronic kidney disease and this is been felt to be related to diabetes and hypertension, she follows with Dr. Loco Mcginnis, in their office. In the course of admission, patient's troponin level was found to be elevated between 0.12 and 0.13.  proBNP level was elevated at 44,750. Patient had previously been seen at [facility] with Dr. Ryan Cadet and our service was asked to see the patient for hypoxemia and respiratory arrest in November 2019 and the following was noted at that time:    Brenda Xiao is a 75 y.o. patient who presented to the hospital with complaints of difficulty breathing and low oxygen levels and presented to the emergency room with in a respiratory arrest.  History is not obtainable from the patient as she was intubated in the emergency room and underwent brief CPR with fairly spontaneous return of circulation. History is per chart review and reports indicate there were no cardiac dysrhythmias other than transient asystole after a brief respiratory arrest.  Patient initially was assisted with bag mask ventilation followed by intubation. Work-up thus far was noteworthy for negative troponin, x-ray suggested possible heart failure. Work-up is also included further evaluation of a retroperitoneal mass.   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 This is not yet occurred.  Dalton Henriquez has had a cardiovascular history which dates back to prior catheterizations in 1997 and possibly 2012. Her most recent heart catheterization was in 2015 when she was noted to have a patent stent in her circumflex artery and was managed medically. She receives her usual cardiac care through [facility] with Dr. Nathan Lowry. She last saw Dr. Nathan Lowry in October 2018 and at that time she was felt to be stable from a cardiac perspective.  [facility] course is otherwise noteworthy for hypotension requiring vasopressor support. She also appears to have decreased urine output and acute kidney injury. Nephrology has been consulted.   Recently, she is status post admission here at the hospital from October 15 to October 17 with concerns for cough and possible pneumonia and she was treated with antibiotics. She had echocardiogram at that time due to concerns for pulmonary edema and echocardiogram showed normal left ventricular function which was similar to prior echo from 2014. It has been noted that she has diabetes which is not well controlled. It has been noted that she has had chronic lower extremity swelling, more prominent on the left side.         Past Medical History:   has a past medical history of Allergic, Anemia, Angina, Arthritis, Asthma, Cancer ([address] Coronary artery disease, Diabetes mellitus ([address] Heart attack ([address] Hyperlipidemia, Hypertension, and Obesity. Surgical History:   has a past surgical history that includes Diagnostic Cardiac Cath Lab Procedure; Cataract removal with implant; Carpal tunnel release; Knee arthroscopy; Coronary angioplasty with stent (4/2012); Hysterectomy; bronchoscopy (N/A, 11/12/2019); bronchoscopy (N/A, 11/23/2019); bronchoscopy (11/23/2019); bronchoscopy (11/23/2019);  Upper gastrointestinal endoscopy (N/A, 12/2/2019); and Toe amputation (Right,

## 2020-03-09 NOTE — PROGRESS NOTES
Hospitalist Progress Note      PCP: Nabil Sandoval MD    Date of Admission: 3/7/2020    Chief Complaint: Shortness of breath, edema    Hospital Course: Admitted with edema and shortness of breath, diagnosed with acute systolic congestive heart failure. Being evaluated by nephrology for diuresis and edema  Cardiology consulted for congestive heart failure  On IV vancomycin for empiric treatment of lower extremity cellulitis. Subjective: No shortness of breath at rest.  No chest pain, no nausea, no vomiting.  Similar to yesterday      Medications:  Reviewed    Infusion Medications    furosemide (LASIX) 1mg/ml infusion 10 mg/hr (03/09/20 1240)    dextrose       Scheduled Medications    hydrALAZINE  50 mg Oral 3 times per day    insulin glargine  15 Units Subcutaneous Nightly    vitamin B complex w/C  1 tablet Oral Daily    donepezil  5 mg Oral Nightly    ferrous sulfate  325 mg Oral BID    melatonin ER  3 mg Oral Nightly    metoprolol tartrate  50 mg Oral BID    pantoprazole  40 mg Oral QAM AC    rosuvastatin  5 mg Oral Daily    sennosides-docusate sodium  1 tablet Oral BID    sodium chloride flush  10 mL Intravenous 2 times per day    insulin lispro  0-12 Units Subcutaneous TID WC    insulin lispro  0-6 Units Subcutaneous Nightly    vancomycin (VANCOCIN) intermittent dosing (placeholder)   Other RX Placeholder    sertraline  100 mg Oral Daily     PRN Meds: glucose, dextrose, glucagon (rDNA), dextrose, hydrOXYzine, ALPRAZolam, sodium chloride flush, acetaminophen **OR** acetaminophen, polyethylene glycol, promethazine **OR** ondansetron      Intake/Output Summary (Last 24 hours) at 3/9/2020 1401  Last data filed at 3/9/2020 1302  Gross per 24 hour   Intake 462.88 ml   Output 860 ml   Net -397.12 ml       Physical Exam Performed:    BP (!) 183/91   Pulse 69   Temp 98.3 °F (36.8 °C) (Oral)   Resp 16   Ht 5' (1.524 m)   Wt 179 lb 3.7 oz (81.3 kg)   SpO2 95%   BMI 35.00 kg/m²     General appearance: No apparent distress, appears stated age and cooperative. HEENT: Pupils equal, round, and reactive to light. Conjunctivae/corneas clear. Neck: Supple, with full range of motion. No jugular venous distention. Trachea midline. Respiratory:  Normal respiratory effort. Few bibasilar inspiratory crackles  Cardiovascular: Regular rate and rhythm with normal S1/S2 without murmurs, rubs or gallops. Abdomen: Soft, non-tender, non-distended with normal bowel sounds. Musculoskeletal: No clubbing or cyanosis. 3+ bilateral pitting edema bilaterally. Full range of motion without deformity. Skin: Skin color, texture, turgor normal.  Right foot erythema  Neurologic:  Neurovascularly intact without any focal sensory/motor deficits. Cranial nerves: II-XII intact, grossly non-focal.  Psychiatric: Alert and oriented, thought content appropriate, normal insight       Labs:   Recent Labs     03/07/20  1402 03/08/20  0634   WBC 4.6 3.9*   HGB 11.8* 10.8*   HCT 36.2 34.4*    179     Recent Labs     03/07/20  1415 03/08/20  0634 03/09/20  0625    138 137   K 4.1 4.0 4.0    103 102   CO2 25 22 22   BUN 38* 37* 38*   CREATININE 2.2* 2.3* 2.1*   CALCIUM 9.4 8.8 9.0     Recent Labs     03/07/20  1415 03/08/20  0634   AST 25 21   ALT 19 16   BILIDIR  --  <0.2   BILITOT 0.8 0.5   ALKPHOS 95 82     Recent Labs     03/07/20  1402 03/09/20  0625   INR 2.16* 2.28*     Recent Labs     03/07/20  1834 03/07/20  2302 03/08/20  0634   TROPONINI 0.12* 0.12* 0.12*       Urinalysis:      Lab Results   Component Value Date    NITRU POSITIVE 03/08/2020    WBCUA 6-10 03/08/2020    BACTERIA 4+ 03/08/2020    RBCUA 3-4 03/08/2020    BLOODU TRACE-INTACT 03/08/2020    SPECGRAV 1.020 03/08/2020    GLUCOSEU Negative 03/08/2020    GLUCOSEU NEGATIVE 10/07/2011       Radiology:  CT CHEST WO CONTRAST   Preliminary Result   1. Moderate right pleural effusion with adjacent atelectasis.    2. Multiple 2-4 mm solid pulmonary nodules bilaterally dating back to   10/15/2019. 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 These were not well seen in 2014. 3. Mild anasarca. XR CHEST STANDARD (2 VW)   Final Result   Whole right-sided pleural effusion and trace left-sided effusion. Cardiomegaly. Assessment/Plan:    Active Hospital Problems    Diagnosis    Anasarca [R60.1]       PLAN    Acute systolic congestive heart failure  Switched on Lasix drip  Nephrology following  Daily basic metabolic panel  Does not require oxygen  Cardiology consult also requested    Acute on chronic kidney disease stage III  Seen by nephrology. Baseline creatinine was around 1.5 a couple months ago. Past month, it has been above 2. Current levels could be the new baseline. Today's creatinine is 2.1. Elevated troponin  Consistent with demand ischemia. Ischemia evaluation is not possible either way, because of poor renal function. Diffuse edema  Secondary to CHF  Albumin is borderline low  IV albumin ordered by nephrologist.  Monitor I/O    Hypertension  Awaiting effect of Lasix drip before changing antihypertensives    Dementia  Continue donepezil    Diabetes mellitus type 2  Continue Lantus, sliding-scale insulin and carb controlled diet. Lower extremity cellulitis  Consulted wound care  Will ask podiatry to see. On empiric IV vancomycin     Discussed with the patient  Discussed with nursing    DVT Prophylaxis: Heparin subcutaneous  Diet: DIET CARB CONTROL; Low Sodium (2 GM); Daily Fluid Restriction: 1500 ml  Code Status: Full Code    PT/OT Eval Status: Requested    Dispo -inpatient, unclear length of stay.     Jese Thompson MD

## 2020-03-10 PROBLEM — E11.42 DIABETIC POLYNEUROPATHY ASSOCIATED WITH TYPE 2 DIABETES MELLITUS (HCC): Status: ACTIVE | Noted: 2020-01-01

## 2020-03-10 PROBLEM — E11.621 DIABETIC ULCER OF RIGHT HEEL ASSOCIATED WITH TYPE 2 DIABETES MELLITUS, WITH NECROSIS OF MUSCLE (HCC): Status: ACTIVE | Noted: 2020-01-01

## 2020-03-10 PROBLEM — L97.513 SKIN ULCER OF RIGHT FOOT WITH NECROSIS OF MUSCLE (HCC): Status: ACTIVE | Noted: 2020-01-01

## 2020-03-10 PROBLEM — L97.413 DIABETIC ULCER OF RIGHT HEEL ASSOCIATED WITH TYPE 2 DIABETES MELLITUS, WITH NECROSIS OF MUSCLE (HCC): Status: ACTIVE | Noted: 2020-01-01

## 2020-03-10 NOTE — PROGRESS NOTES
insulin and carb controlled diet. Lower extremity cellulitis  Consulted wound care  Podiatry has seen  Continue empiric antibiotic    Discussed with the patient  Discussed with nursing    DVT Prophylaxis: Allergic to heparin. Try prophylactic dose Eliquis. Diet: Diet NPO, After Midnight  Code Status: Full Code    PT/OT Eval Status: Requested    Dispo -inpatient, unclear length of stay.     Mirian Claros MD

## 2020-03-10 NOTE — PROGRESS NOTES
17 g Oral Daily PRN Geremias King MD        promethazine (PHENERGAN) tablet 12.5 mg  12.5 mg Oral Q6H PRN Geremias King MD   12.5 mg at 03/10/20 1512    Or    ondansetron (ZOFRAN) injection 4 mg  4 mg Intravenous Q6H PRN Geremias King MD        insulin lispro (HUMALOG) injection vial 0-12 Units  0-12 Units Subcutaneous TID WC Geremias King MD   2 Units at 03/08/20 1616    insulin lispro (HUMALOG) injection vial 0-6 Units  0-6 Units Subcutaneous Nightly Geremias Knig MD   2 Units at 03/08/20 2022    vancomycin (VANCOCIN) intermittent dosing (placeholder)   Other RX Placeholder Geremias King MD   Stopped at 03/07/20 2047    sertraline (ZOLOFT) tablet 100 mg  100 mg Oral Daily Geremias King MD   100 mg at 03/10/20 1432     Review of Systems   Constitutional: Negative. Respiratory: Positive for shortness of breath. Cardiovascular: Positive for leg swelling. Negative for chest pain and palpitations. Gastrointestinal: Negative. Neurological: Negative. Objective:     Telemetry monitor: SR BBB    Physical Exam:  Constitutional:  Comfortable and alert, NAD, appears stated age  Eyes: PERRL, sclera nonicteric  Neck:  Supple, no masses, no thyroidmegaly, no JVD  Skin:  Warm and dry; no rash or lesions  Heart: Regular, normal apex, S1 and S2 normal, no M/G/R  Lungs:  Normal respiratory effort; diminished  Abdomen: soft, non tender, + bowel sounds  Extremities:  2+ BLE edema to knee  Neuro: alert and oriented, moves legs and arms equally, normal mood and affect    Data Reviewed:    Stress test 3/10/2020:  Normal LV size and systolic function. Left ventricular ejection fraction of    56%. Normal wall motion. There is normal isotope uptake at stress and rest.    There is no evidence of myocardial ischemia or scar. Echo 3/10/2020:  Limited exam for RV and LV function. Left ventricular systolic function is normal with ejection fraction   estimated at 50-55%.    There is mild diastolic septal flattening consistent with right ventricular   volume overload. Right ventricular systolic function is mildly reduced . Moderate to severe tricuspid regurgitation. Systolic pulmonary artery pressure (SPAP) is estimated at 61 mmHg consistent   with severe pulmonary hypertension (Right atrial pressure of 8 mmHg). There is a trivial to small pericardial effusion noted. There is a small right pleural effusion. Echo 1/9/2020:  Left Ventricle   Reduced LVEF 25%  Limited study to assess for vegetations. No vegetations are seen    Coronary angiogram 2015:  Cardiac catheterization performed in 2015 showed some luminal irregularity with patent stent in the left circumflex coronary artery with the 30-40% LAD narrowing. The right coronary artery was a small nondominant vessel with a distal 70-80% stenosis which was in a vessel too small be approached by intervention.     Lab Reviewed:     Renal Profile:  Lab Results   Component Value Date    CREATININE 2.4 03/10/2020    BUN 38 03/10/2020     03/10/2020    K 3.8 03/10/2020    K 4.0 03/08/2020     03/10/2020    CO2 23 03/10/2020     CBC:    Lab Results   Component Value Date    WBC 5.2 03/10/2020    RBC 3.83 03/10/2020    RBC 4.31 12/08/2015    HGB 10.4 03/10/2020    HCT 32.5 03/10/2020    MCV 84.9 03/10/2020    RDW 22.1 03/10/2020     03/10/2020     BNP:    Lab Results   Component Value Date    PROBNP 44,750 03/07/2020    PROBNP 4,308 11/14/2019    PROBNP 7,138 11/12/2019    PROBNP 3,989 10/15/2019     Fasting Lipid Panel:    Lab Results   Component Value Date    CHOL 163 03/14/2014    HDL 55 03/14/2014    HDL 57 04/06/2012    TRIG 106 11/14/2019     Cardiac Enzymes:  CK/MbTroponin  Lab Results   Component Value Date    CKTOTAL 194 11/27/2019    TROPONINI 0.12 03/08/2020     PT/ INR   Lab Results   Component Value Date    INR 1.64 03/10/2020    INR 2.28 03/09/2020    INR 2.16 03/07/2020    PROTIME 19.1 03/10/2020    PROTIME 26.7 03/09/2020    PROTIME 25.2 03/07/2020     PTT No results found for: PTT   Lab Results   Component Value Date    MG 1.70 02/22/2020      Lab Results   Component Value Date    TSH 4.79 09/01/2011     All labs and imaging reviewed today    Assessment:  Elevated troponin: flat, likely due to supply demand imbalance  Acute on chronic diastolic CHF: remains fluid overloaded, inadequate output  CAD: stable, no angina; stress test negative 3/10/2020; patent stent on angiogram 2015; s/p remote PCI  History of LV dysfunction/cardiomyopathy: improved, EF 50-55% on echo 3/10/2020 from EF 25% 1/2020 during acute illness  HTN: stable  Pulmonary HTN: severe  DM  A/CKD  Lower extremity cellulitis  History of respiratory/cardiac arrest 11/2019    Plan:   1. Continue lasix gtt and metolazone per nephrology  2. Continue aspirin, statin, lopressor  3.  Daily weights, strict I/Os, BMP    Carmela Carrion, APRN-CNP  [facility]  [phone]

## 2020-03-10 NOTE — PROGRESS NOTES
Pt is a/o x4. VSS. Assessment as charted. - Pt complains of chronic numbness in her RLE and has multiple wounds- wound care saw today and dressing is in place- C/D/I.   - Pt has unlabored breathing w/ clear and diminished lung sounds except for slight crackles in her R mid and lower lobes. Pts SpO2 was 87% on RA. 2L O2 NC was applied and SpO2 increased. No changes in lung sounds and no dyspnea was noted. Will monitor closely. - Pt is on tele monitoring and is currently SR.   - Pt has pitting edema- but pt states she thinks it is improving.   - Pt has DTI on sacrum, skin tears on BUE, and redness on LLE r/t cellulitis. - Pt had an episode of N/V. PRN Phenergan and Zofran was given per MD order.   - Pt has roa in place and has adequate output- On fluid restriction and Lasix drip. Pt is currently resting in her bed that is locked and in its lowest position with her call light within reach, non-skid socks, and bed alarm on. Pt denies any other needs at this time. Will continue to monitor.

## 2020-03-10 NOTE — CARE COORDINATION
CASE MANAGEMENT INITIAL ASSESSMENT      Reviewed chart and met with patient today, re: Triggered by age and diagnosis and readmissions from SNF several times recently  Explained Case Management role/services. To patient     Family present:   Primary contact information: Hi Sebastian [phone] or friend Lisa Montague [phone]    Admit date/status: Inpatient 3/7/20  Diagnosis: Anasarca, CHF, KIMBERLY, cellulitis lower extremities    Is this a Readmission?:  Patient recently re-admitted [facility] with cellulitis needing IVABX's and then was discharged back to [facility] last month. She has since discharged home per [facility] [place] [facility] admissions at [facility]. She was re-admitted this hospitalization from home    Insurance: Medicare and [facility] [employer]  required for SNF - N        3 night stay required - Y     Living arrangements, Adls, care needs, prior to admission: from home    Transportation: TBD    [address] at home: Walker_x_Cane__RTS__ BSC__Shower Chair_x_  02__ HHN__ CPAP__  BiPap__  Hospital Bed__ W/C_x__ Other__________    Services in the home and/or outpatient, prior to admission:     PT/OT recs: Not seen by therapy yet this admission/ Will need PT/OT evaluation when appropriate    Hospital Exemption Notification (HEN): N/A    Barriers to discharge: Clinical Improvement    Plan/comments: SNF vs [facility] , with [facility] or [facility] when medically stable for discharge. Will continue to follow and assist with discharge plan.     ECOC on chart for MD signature

## 2020-03-10 NOTE — PROGRESS NOTES
Department of Podiatric Surgery  Progress Note      Sunny Lawrence  Allergies: Ativan [lorazepam]; Dilaudid [hydromorphone hcl]; Flexeril [cyclobenzaprine hcl]; Heparin; Hydromorphone; Penicillins; Soma [carisoprodol]; and Sulfa antibiotics    SUBJECTIVE  The patient is a 75 y.o. female following up for b/l foot wounds, and left leg cellulitis. Patient admitted for anasarca which she states is improving. Just returned from a stress test. No current complaints with b/l feet. Past Medical History:        Diagnosis Date    Allergic     Anemia     Angina     with exertion    Arthritis     Asthma 9/16/2010    [facility]     Coronary artery disease 9/16/2010    [facility]     Heart attack [facility]) age 43    no problems since then    Hyperlipidemia     Hypertension     Obesity        REVIEW OF SYSTEMS:  CONSTITUTIONAL:  negative for  fevers, chills and sweats  RESPIRATORY:  negative for  dry cough and wheezing    OBJECTIVE    SKIN:    Decubitus ulcer posterior right heel, with no clinical signs of infection. Well healed amputation sites on the right 2nd and 3rd toes. Dry, peeled skin on the dorsal aspect of the right foot.     Dry eschar to the dorsal PIPJ left 2nd toe. Erythema and warmth around the distal left lower leg, with no ulcers or openings in the skin noted; mildly improved today. No other clinical signs of infection.     NEUROLOGIC:    Protective sensation is diminished to light touch at the level of toes b/l.     VASCULAR:    DP and PT pulses are faintly palpable b/l. CFT < 5 seconds to the toes b/l. +1 pitting edema b/l ankles.     MUSCULOSKELETAL:  Amputated 2nd and 3rd toes right foot, well healed. Decreased ankle joint dorsiflexion with the knee extended but not flexed b/l. ANCILLARY STUDIES     1. No acute osseous abnormality of the right foot. 2. Diffuse soft tissue swelling of the midfoot and forefoot.  No subcutaneous   gas.    3. No obvious osteomyelitis at the site of the

## 2020-03-11 NOTE — CONSULTS
[address] FAILURE PROGRAM      Everton Patel 1945    History:  Past Medical History:   Diagnosis Date    Allergic     Anemia     Angina     with exertion    Arthritis     Asthma 9/16/2010    [facility]     Coronary artery disease 9/16/2010    Diabetes mellitus ([facility]     Heart attack [facility]) age 43    no problems since then    Hyperlipidemia     Hypertension     Obesity     Skin ulcer of right foot with necrosis of muscle ([facility] 3/10/2020       ECHO: 50%    Discharge plans: SNF vs home    Family Present: none    Advanced Directives: patient has advance directives scanned in the chart    Patient's stated goal of care: reduce edema      Patient's current functional capacity:  Marked limitation of physical activity. Comfortable at rest. Less than ordinary activity causes fatigue, palpitation, or dyspnea. Pt sitting in bed at this time on 1.5 L O2. Pt with complaints of shortness of breath. Pt with pitting lower extremity edema. Patient's weights and intake/output reviewed:         Last three weights hospital weights reviewed:    Patient Vitals for the past 96 hrs (Last 3 readings):   Weight   03/10/20 0436 185 lb 12.8 oz (84.3 kg)   03/09/20 0427 179 lb 3.7 oz (81.3 kg)   03/08/20 0427 175 lb 7.8 oz (79.6 kg)       Patient provided with both written and verbal education on CHF signs/symptoms, causes, discharge medications, daily weights, low sodium diet, activity, and follow-up. HF zone self management written instructions provided/reviewed and advised to call MD when in \"yellow\" zone. Instructed them to call the doctor post discharge if they experiences increasing worsening shortness of breath, worsening chest pains, increased swelling from their baseline, worsening cough, or weight gain of >2- 3 lbs in a day/ 5 lb gain in a week. Also advised to call the doctor if they feels dizzy, increased fatigue, decreased or difficulty urinating.      Instructed on and emphasized importance of scheduled hospital follow-up appointment with Winsome Turcios MD on 03- at [facility] education needs reinforcement. No additional questions at this time. Stated she will alert nurse with any questions. PATIENT/CAREGIVER TEACHING:    Level of patient/caregiver understanding able to:   [ X ] Verbalize understanding [ ] Demonstrate understanding [ ] Teach back   [ X ] Needs reinforcement [ ] Other:     Education Time: 15 minutes    Recommendations:   1. Encourage follow-up appointment compliance. Next appointment: 3-17  2. Educate further on fluid restriction of <64oz during inpatient stay so they can understand how to measure intake at home. 3. Review sodium restrictions. Encouraged to not add table salt to their foods and avoid foods that are high in sodium. 4. Continue to educate on S/S. Stress the importance of calling the MD with the earliest signs of an acute exacerbation. 5. Emphasize daily weights - instructed to call the MD if the patient gains 3 lb in a day or 5 lb in a week. 6. Provided patient with CHF Resource Line for questions and concerns.      Yajaira Blanton HF BSN-RN  Heart Failure Navigator  [address] [place] [address]  [phone]

## 2020-03-11 NOTE — CARE COORDINATION
RN CM met with the Pt to discuss poss. Need for SNF. Pt was d/c'd to home from [facility] on weds. And was readmitted to hospital on Friday. She is open to SNF and HHC. Pt prefers to return to [facility] if needed. She is also active with [facility] for SN/PT/OT/SLP and [facility] 2x/week for bathing. Referral to [facility]. Will need PT/OT eval when appropriate. Follow.

## 2020-03-11 NOTE — PROGRESS NOTES
Hospitalist Progress Note      PCP: Leelee Reeves MD    Date of Admission: 3/7/2020    Chief Complaint: Shortness of breath, edema    Hospital Course: Admitted with edema and shortness of breath, diagnosed with acute systolic congestive heart failure. Being evaluated by nephrology for diuresis and edema  Cardiology consulted for congestive heart failure  On IV vancomycin for empiric treatment of lower extremity cellulitis. Improving. Cardiac stress test is unremarkable. Lasix drip dose increased by nephrology    Subjective: No shortness of breath at rest.  No chest pain, no nausea, no vomiting.   Stable past couple day      Medications:  Reviewed    Infusion Medications    furosemide (LASIX) 1mg/ml infusion 20 mg/hr (03/11/20 1259)    dextrose       Scheduled Medications    metOLazone  5 mg Oral BID    warfarin  4 mg Oral Once    albumin human  25 g Intravenous Q8H    hydrALAZINE  75 mg Oral 3 times per day    warfarin (COUMADIN) daily dosing (placeholder)   Other RX Placeholder    insulin glargine  15 Units Subcutaneous Nightly    aspirin  81 mg Oral Daily    vitamin B complex w/C  1 tablet Oral Daily    donepezil  5 mg Oral Nightly    ferrous sulfate  325 mg Oral BID    melatonin ER  3 mg Oral Nightly    metoprolol tartrate  50 mg Oral BID    pantoprazole  40 mg Oral QAM AC    rosuvastatin  5 mg Oral Daily    sennosides-docusate sodium  1 tablet Oral BID    sodium chloride flush  10 mL Intravenous 2 times per day    insulin lispro  0-12 Units Subcutaneous TID WC    insulin lispro  0-6 Units Subcutaneous Nightly    vancomycin (VANCOCIN) intermittent dosing (placeholder)   Other RX Placeholder    sertraline  100 mg Oral Daily     PRN Meds: glucose, dextrose, glucagon (rDNA), dextrose, povidone-iodine, perflutren lipid microspheres, regadenoson, hydrOXYzine, ALPRAZolam, sodium chloride flush, acetaminophen **OR** acetaminophen, polyethylene glycol, promethazine **OR** changes needed. Dementia  Continue donepezil    Diabetes mellitus type 2  Continue Lantus, sliding-scale insulin and carb controlled diet. Lower extremity cellulitis  Consulted wound care  Podiatry has seen  Continue empiric antibiotics. Appears to be improving. Discussed with the patient  Discussed with nursing    DVT Prophylaxis: Resumed warfarin  Diet: DIET CARB CONTROL; Low Sodium (2 GM); Daily Fluid Restriction: 1500 ml  Code Status: Full Code    PT/OT Eval Status: Requested    Dispo -inpatient, unclear length of stay.     Mirian Claros MD

## 2020-03-11 NOTE — CARE COORDINATION
Per [facility], the Pt has a one time charge of $60 from previous stay. Pt and family are aware and ok with that. She has 68 medicare days remaining per facility. PT/OT eval pending at this time. Plan is to send the Pt to [facility] for SNF stay at d/c, if needed. Pt may also return home and resume [place] [address] services, if appropriate. [facility] is aware of this.

## 2020-03-11 NOTE — PROGRESS NOTES
Diagnosis: Multiple Myeloma    Regimen: Velcade/Revlimid  Cycle/Day:     Dr Mckeon is supervising clinician today.    ECO - Fully active, able to carry on all predisease activities without restrictions.    Nursing Assessment:   A focused nursing assessment addressing the toxicity of chemotherapy was performed and the patient reports the following:  Nausea: NO  Vomiting: NO  Fever: NO  Chills: NO  Other signs of infection: NO  Bleeding: NO  Mucositis: NO  Diarrhea: NO  Constipation: NO  Anorexia: NO  Dysuria: NO  Urinary Bleeding: NO  Cough: NO  Shortness of Breath: NO  Fatigue/Weakness: NO  Numbness/Tingling: NO  Other Neuropathies: NO  Edema: NO  Rash: NO  Hand/Foot Syndrome: NO  Anxiety/Depression/Insomnia: NO  Pain: NO    Patient confirms that he has received a copy of Chemotherapy Consent and verbalizes understanding of treatment plan: Yes.     Pre-Treatment: - Treatment consent signed  - Patient has valid pre-authorization  - VS completed  - BSA double checked  - Premed orders, including hydration, are verified prior to administration  - Treatment parameters verified in patient protocol  - Lab results checked   - Chemotherapy doses independently doubled checked & verified by two practitioners  - Chemotherapy infusion pump settings are double checked & verified by two practitioners  - Patient is identified by first & last name, Date of birth that has been verified by two practitioners with the patient chairside.    Treatment: Refer to LDA and MAR for line assessment and medication administration  Refer to Toxicity Assessment doc flowsheet   Chemotherapy has not   Apperance and physical integrity of drugs meets standard of drug monograph  SubQ/IM Injection: See injection record for documentation    Post Treatment: Treatment tolerated well; no adverse reaction    Transfusion: Not needed    Integrative Medicine: Yes,  Massage    Oral Chemotherapy: Yes, Patient is taking medication as  [facility]  Cardiology  Progress Note    Admission date:  3/7/2020    Reason for follow up visit: CHF    HPI/CC: Rach Casper is a 75 y.o. female who presented to the ED 3/7/2020 for edema. Troponin and BNP elevated. Stress test 3/10/2020 negative for ischemia. Echo showed EF 50-55%, RV overload, severe pulmonary HTN. She has been treated for CHF/anasarca, CKD. Subjective: No chest pain, shortness of breath improving, still feels like she is holding onto fluid. On 2L NC. Vitals:  Blood pressure 138/73, pulse 61, temperature 98.1 °F (36.7 °C), temperature source Oral, resp. rate 14, height 5' (1.524 m), weight 185 lb 12.8 oz (84.3 kg), SpO2 98 %, not currently breastfeeding.   Temp  Av °F (36.7 °C)  Min: 97.8 °F (36.6 °C)  Max: 98.2 °F (36.8 °C)  Pulse  Av.5  Min: 61  Max: 65  BP  Min: 138/73  Max: 169/51  SpO2  Av.8 %  Min: 98 %  Max: 100 %    24 hour I/O    Intake/Output Summary (Last 24 hours) at 3/11/2020 1050  Last data filed at 3/11/2020 0935  Gross per 24 hour   Intake 1080 ml   Output 300 ml   Net 780 ml     Current Facility-Administered Medications   Medication Dose Route Frequency Provider Last Rate Last Dose    metOLazone (ZAROXOLYN) tablet 5 mg  5 mg Oral BID Alin Rodriguez MD   5 mg at 20 0947    warfarin (COUMADIN) tablet 4 mg  4 mg Oral Once Jose Murphy MD        albumin human 25 % IV solution 25 g  25 g Intravenous Q8H Alin Rodriguez  mL/hr at 20 0948 25 g at 20 0948    hydrALAZINE (APRESOLINE) tablet 75 mg  75 mg Oral 3 times per day Alin Rodriguez MD   75 mg at 20 0946    warfarin (COUMADIN) daily dosing (placeholder)   Other RX Placeholder Jose Murphy MD        furosemide (LASIX) 100 mg in dextrose 5 % 100 mL infusion  20 mg/hr Intravenous Continuous Alin Rodriguez MD 20 mL/hr at 20 0645 20 mg/hr at 20 0645    insulin glargine (LANTUS) injection vial 15 Units  15 Units Subcutaneous Nightly Rob Hansen prescribed.    Education: No new instructions needed    Next appointment scheduled:   Future Appointments   Date Time Provider Department Center   7/15/2019  9:30 AM [facility] S   7/15/2019 10:00 AM [facility] TREATMENT CHAIR [facility] S   7/22/2019 11:15 AM [facility] S   7/22/2019 11:30 AM [facility] TREATMENT CHAIR [facility]   7/29/2019  9:15 AM [facility]   7/29/2019  9:30 AM [facility] TREATMENT CHAIR [facility]   7/30/2019 11:00 AM LSS CT 1 [facility] S   8/1/2019 10:15 AM [facility]   8/1/2019 10:30 AM Murphy Lopes MD [address]   8/9/2019  9:30 AM Glenn Lara MD [facility]       Patient instructed to call the office with any questions or concerns.    Patient Discharged: patient discharged to home per self, ambulatory, with family member

## 2020-03-12 PROBLEM — E44.0 MODERATE MALNUTRITION (HCC): Status: ACTIVE | Noted: 2020-01-01

## 2020-03-12 NOTE — PROGRESS NOTES
at 3/12/2020 1236  Last data filed at 3/12/2020 0915  Gross per 24 hour   Intake 620 ml   Output 225 ml   Net 395 ml       Physical Exam Performed:    BP (!) 149/64   Pulse 61   Temp 97.6 °F (36.4 °C) (Oral)   Resp 16   Ht 5' (1.524 m)   Wt 184 lb 6.4 oz (83.6 kg)   SpO2 98%   BMI 36.01 kg/m²     General appearance: No apparent distress, appears stated age and cooperative. HEENT: Pupils equal, round, and reactive to light. Conjunctivae/corneas clear. Neck: Supple, with full range of motion. No jugular venous distention. Trachea midline. Respiratory:  Normal respiratory effort. Few bibasilar inspiratory crackles  Cardiovascular: Regular rate and rhythm with normal S1/S2 without murmurs, rubs or gallops. Abdomen: Soft, non-tender, non-distended with normal bowel sounds. Musculoskeletal: No clubbing or cyanosis. 3+ bilateral pitting soft edema bilaterally. Full range of motion without deformity. Skin: Skin color, texture, turgor normal.  Right foot erythema  Neurologic:  Neurovascularly intact without any focal sensory/motor deficits. Cranial nerves: II-XII intact, grossly non-focal.  Psychiatric: Alert and oriented, thought content appropriate, normal insight    I examined the patient today (03/12/20). Physical exam is similar to yesterday (3/11). Labs:   Recent Labs     03/10/20  0547   WBC 5.2   HGB 10.4*   HCT 32.5*        Recent Labs     03/10/20  0547 03/11/20  0605 03/12/20  0553    137 135*   K 3.8 4.0 4.0    104 98*   CO2 23 21 23   BUN 38* 40* 43*   CREATININE 2.4* 2.5* 3.0*   CALCIUM 8.6 8.7 9.4   PHOS 4.3 4.8 5.1*     No results for input(s): AST, ALT, BILIDIR, BILITOT, ALKPHOS in the last 72 hours. Recent Labs     03/10/20  0547 03/11/20  0605 03/12/20  0553   INR 1.64* 1.62* 2.55*     No results for input(s): CKTOTAL, TROPONINI in the last 72 hours.     Urinalysis:      Lab Results   Component Value Date    NITRU POSITIVE 03/08/2020    WBCUA 6-10 03/08/2020 BACTERIA 4+ 03/08/2020    RBCUA 3-4 03/08/2020    BLOODU TRACE-INTACT 03/08/2020    SPECGRAV 1.020 03/08/2020    GLUCOSEU Negative 03/08/2020    GLUCOSEU NEGATIVE 10/07/2011       Radiology:  NM Cardiac Stress Test Nuclear Imaging   Final Result      XR FOOT RIGHT (2 VIEWS)   Final Result   1. No acute osseous abnormality of the right foot. 2. Diffuse soft tissue swelling of the midfoot and forefoot. No subcutaneous   gas. 3. No obvious osteomyelitis at the site of the patient's reported heel   ulceration. 4. 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 CT CHEST WO CONTRAST   Final Result   1. Moderate right pleural effusion with adjacent atelectasis. 2. Multiple 2-4 mm solid pulmonary nodules bilaterally dating back to   10/15/2019. 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 These were not well seen in 2014. 3. Mild anasarca. XR CHEST STANDARD (2 VW)   Final Result   Whole right-sided pleural effusion and trace left-sided effusion. Cardiomegaly. Assessment/Plan:    Active Hospital Problems    Diagnosis    Diabetic polyneuropathy associated with type 2 diabetes mellitus ([facility] [address] [E11.42]    Diabetic ulcer of right heel associated with type 2 diabetes mellitus, with necrosis of muscle (HCC) [D85.332, L97.413]    Anasarca [R60.1]       PLAN    Acute systolic congestive heart failure  Lasix drip on hold pending nephrology determination. Noted creatinine increased from 2.5 to 3.0 overnight. Urine output is small. Acute on chronic kidney disease stage III  Seen by nephrology. Creatinine increasing with Lasix drip. Currently Lasix drip is on hold. Elevated troponin  No reversible ischemia noted on cardiac stress test    Diffuse edema  Appears multifactorial, not only CHF  No improvement noted. Probably a component of venous insufficiency also present. Hypertension  Blood pressure controlled today.   No changes

## 2020-03-12 NOTE — PROGRESS NOTES
325 mg Oral BID    melatonin ER  3 mg Oral Nightly    metoprolol tartrate  50 mg Oral BID    pantoprazole  40 mg Oral QAM AC    rosuvastatin  5 mg Oral Daily    sennosides-docusate sodium  1 tablet Oral BID    sodium chloride flush  10 mL Intravenous 2 times per day    insulin lispro  0-12 Units Subcutaneous TID WC    insulin lispro  0-6 Units Subcutaneous Nightly    sertraline  100 mg Oral Daily         Labs:  Recent Labs     03/10/20  0547   WBC 5.2   HGB 10.4*   HCT 32.5*   MCV 84.9        Recent Labs     03/10/20  0547 03/11/20  0605 03/12/20  0553    137 135*   K 3.8 4.0 4.0    104 98*   CO2 23 21 23   GLUCOSE 126* 175* 162*   PHOS 4.3 4.8 5.1*   BUN 38* 40* 43*   CREATININE 2.4* 2.5* 3.0*   LABGLOM 20* 19* 15*   GFRAA 24* 23* 18*           Assessment/Plan:    Acute Kidney Injury on CKD stage 4.  - With worsening renal function associated with fluid overload not responding to high dose diuretics, discussed about the need to initiate RRT with patient. All questions were answered and she agreed to proceed. - Given history of CKD stage 4 presumed secondary to DM and HTN, high likelihood that she is going to need HD long-term. - Will ask IR to place [facility] and start dialysis tomorrow.  - Recent KIMBERLY requiring HD from November 13, 2019 ~1/7/2020; patient has recovered and her baseline creatinine has been around low to mid 2s. Follows with Dr. Soila Bobby in the office.  - Renal function panel daily. Acute on chronic CHF, combined with anasarca. - Random urine PCR of 700mg/g.  - Severe pulmonary HTN.  - D/C diuretics and will start HD tomorrow. - Fluid and sodium restriction. Hypertension.  - Continue Metoprolol and Hydralazine. Anemia.  - Hgb at goal.  - Will monitor. Please do not hesitate to contact me at [phone] if with questions. Thank you!     Skye Navas MD  3/12/2020  [facility]

## 2020-03-12 NOTE — PROGRESS NOTES
Margaret Franco MD        dextrose 50 % IV solution  12.5 g Intravenous PRN Sher Garcia MD        glucagon (rDNA) injection 1 mg  1 mg Intramuscular PRN Sher Garcia MD        dextrose 5 % solution  100 mL/hr Intravenous PRN Sher Garcia MD        povidone-iodine 10 % swab   Topical PRN Sher Garcia MD        perflutren lipid microspheres (DEFINITY) injection 1.65 mg  1.5 mL Intravenous ONCE PRN Kellen Dumont MD        aspirin chewable tablet 81 mg  81 mg Oral Daily Kellen Dumont MD   81 mg at 03/12/20 2770    regadenoson (LEXISCAN) injection 0.4 mg  0.4 mg Intravenous ONCE PRN Kellen Dumont MD        hydrOXYzine (VISTARIL) injection 25 mg  25 mg Intramuscular Q6H PRN Annie Low MD        ALPRAZolam Misty Simpler) tablet 0.5 mg  0.5 mg Oral TID PRN Margot Vo MD   0.5 mg at 03/08/20 2024    vitamin B complex w/C 1 tablet  1 tablet Oral Daily Margot Vo MD   1 tablet at 03/12/20 0905    donepezil (ARICEPT) tablet 5 mg  5 mg Oral Nightly Margot Vo MD   5 mg at 03/11/20 2153    ferrous sulfate (IRON 325) tablet 325 mg  325 mg Oral BID Margot Vo MD   325 mg at 03/12/20 0905    melatonin ER tablet 3 mg  3 mg Oral Nightly Margot Vo MD   3 mg at 03/11/20 2153    metoprolol tartrate (LOPRESSOR) tablet 50 mg  50 mg Oral BID Margot Vo MD   50 mg at 03/12/20 0905    pantoprazole (PROTONIX) tablet 40 mg  40 mg Oral QAM AC Margot Vo MD   40 mg at 03/12/20 0145    rosuvastatin (CRESTOR) tablet 5 mg  5 mg Oral Daily Margot Vo MD   5 mg at 03/12/20 0905    sennosides-docusate sodium (SENOKOT-S) 8.6-50 MG tablet 1 tablet  1 tablet Oral BID Margot Vo MD        sodium chloride flush 0.9 % injection 10 mL  10 mL Intravenous 2 times per day Margot Vo MD   10 mL at 03/11/20 2152    sodium chloride flush 0.9 % injection 10 mL  10 mL Intravenous PRN Margot oV MD        acetaminophen (TYLENOL) tablet 650 mg  650 mg Oral Q6H PRN Deanna Nam MD   650 mg at 03/10/20 2353    Or    acetaminophen (TYLENOL) suppository 650 mg  650 mg Rectal Q6H PRN Deanna Nam MD        polyethylene glycol (GLYCOLAX) packet 17 g  17 g Oral Daily PRN Deanna Nma MD        promethazine (PHENERGAN) tablet 12.5 mg  12.5 mg Oral Q6H PRN Deanna Nam MD   12.5 mg at 03/10/20 1512    Or    ondansetron (ZOFRAN) injection 4 mg  4 mg Intravenous Q6H PRN Deanna Nam MD        insulin lispro (HUMALOG) injection vial 0-12 Units  0-12 Units Subcutaneous TID WC Deanna Nam MD   4 Units at 03/11/20 1821    insulin lispro (HUMALOG) injection vial 0-6 Units  0-6 Units Subcutaneous Nightly Deanna Nam MD   1 Units at 03/11/20 2152    vancomycin (VANCOCIN) intermittent dosing (placeholder)   Other RX Placeholder Deanna Nam MD   Stopped at 03/07/20 2047    sertraline (ZOLOFT) tablet 100 mg  100 mg Oral Daily Deanna Nam MD   100 mg at 03/12/20 4869     Review of Systems   Constitutional: Negative. Respiratory: Positive for shortness of breath. Cardiovascular: Positive for leg swelling. Negative for chest pain and palpitations. Gastrointestinal: Negative. Neurological: Negative. Objective:     Telemetry monitor: SR BBB    Physical Exam:  Constitutional:  Comfortable and alert, NAD, appears stated age  Eyes: PERRL, sclera nonicteric  Neck:  Supple, no masses, no thyroidmegaly, JVD difficult to assess  Skin:  Warm and dry; no rash or lesions  Heart: Regular, normal apex, S1 and S2 normal, no M/G/R  Lungs:  Normal respiratory effort; diminished  Abdomen: soft, non tender, + bowel sounds  Extremities:  2+ BLE edema to flank  Neuro: alert and oriented, moves legs and arms equally, normal mood and affect    Data Reviewed:    Stress test 3/10/2020:  Normal LV size and systolic function. Left ventricular ejection fraction of    56%. Normal wall motion.  There is normal isotope uptake at stress and rest.   Juan Daniel Coil CKTOTAL 194 11/27/2019    TROPONINI 0.12 03/08/2020     PT/ INR   Lab Results   Component Value Date    INR 2.55 03/12/2020    INR 1.62 03/11/2020    INR 1.64 03/10/2020    PROTIME 29.9 03/12/2020    PROTIME 18.9 03/11/2020    PROTIME 19.1 03/10/2020     PTT No results found for: PTT   Lab Results   Component Value Date    MG 1.70 02/22/2020      Lab Results   Component Value Date    TSH 4.79 09/01/2011     All labs and imaging reviewed today    Assessment:  Elevated troponin: flat, likely due to supply demand imbalance, stress test negative  Acute on chronic diastolic CHF: little urine output, remains fluid overloaded   CAD: stable, no angina; stress test negative 3/10/2020; patent stent on angiogram 2015; s/p remote PCI  History of LV dysfunction/cardiomyopathy: improved, EF 50-55% on echo 3/10/2020 from EF 25% 1/2020 during acute illness  HTN: uncontrolled  LBBB  Tricuspid regurgitation: moderate-severe  Pulmonary HTN: severe  DM  A/CKD  Lower extremity cellulitis  History of DVT  History of respiratory/cardiac arrest 11/2019    Plan:   1. Continues to have inadequate output despite metolazone and lasix gtt, now with worsening renal function. May need HD. 2. Continue aspirin, statin, lopressor  3. On warfarin for history of DVT  4. Elevate legs  5.  Daily weights, strict I/Os, BMP    Anahi Mclain, APRN-CNP  [facility], [place]  [phone]

## 2020-03-12 NOTE — PROGRESS NOTES
Pt is a/o x4. VSS. Assessment as charted.      - Pt complains of chronic numbness in her RLE and has multiple wounds-  dressings are in place- C/D/I.   - Pt has unlabored breathing w/ clear and diminished lung sounds except for slight crackles in her R mid and lower lobes- ongoing improvement. - Pt is on tele monitoring and is currently SR.   - Pt has pitting edema- but pt states she thinks it is improving.   - Pt has DTI on sacrum, skin tears on BUE, and redness on LLE r/t cellulitis. - Pt has roa in place- low output- MDs are aware- On fluid restriction, albumin, and Lasix drip.      Pt is currently resting in her bed that is locked and in its lowest position with her call light within reach, non-skid socks, and bed alarm on. Pt denies any other needs at this time.  Will continue to monitor.

## 2020-03-12 NOTE — PROGRESS NOTES
Nutrition Assessment    Type and Reason for Visit: Initial, Patient Education(Seeing for LOS day 5)    Nutrition Recommendations:   1. Continue carb control, low sodium, fluid restricted diet  2. Encourage PO intakes  3. Add Ensure at dinner  4. Encourage diet compliance and healthy lifestyle  5. Monitor nutrition adequacy, pertinent labs, bowel habits, wt changes, and clinical progress    Nutrition Assessment: Moderate malnutrition AEB poor PO intakes and edema. Pt reports poor appetite for 3 days, intakes 1-100% per EMR. Noted weight gain of 14 lb since October 2019 with +2 non-pitting edema to extremeties, edema may be masking weight loss. Pt favorable to adding Ensure once per day. Encouraged PO intakes. Provided pt with verbal and written instruction on low sodium, fluid restritction and weight monitoring. Pt reports consuming foods with higher sodium content, provided substitutions for pt. Pt agreeable to fluid restriction and taking daily weights. Pt had no further nutrition related questions at this time, will continue to monitor. Malnutrition Assessment:  · Malnutrition Status: Meets the criteria for moderate malnutrition  · Context: Acute illness or injury  · Findings of the 6 clinical characteristics of malnutrition (Minimum of 2 out of 6 clinical characteristics is required to make the diagnosis of moderate or severe Protein Calorie Malnutrition based on AND/ASPEN Guidelines):  1. Energy Intake-Less than or equal to 75% of estimated energy requirement, Greater than or equal to 5 days    2. Weight Loss-Unable to assess,    3. Fat Loss-No significant subcutaneous fat loss,    4. Muscle Loss-No significant muscle mass loss,    5. Fluid Accumulation-Mild fluid accumulation,    6.   Strength-Not measured    Nutrition Risk Level: High    Nutrient Needs:  · Estimated Daily Total Kcal: 3521-7614 kcal  · Estimated Daily Protein (g): 54-68 g  · Estimated Daily Total Fluid (ml/day): 1 ml/kcal    Nutrition Diagnosis:   · Problem:  Moderate malnutrition  · Etiology: related to Insufficient energy/nutrient consumption, Increased demand for energy/nutrients    Signs and symptoms:  as evidenced by Diet history of poor intake, Localized or generalized fluid accumulation, Presence of wounds    Objective Information:  · Nutrition-Focused Physical Findings: +2 non pitting edema, Last BM 3/11  · Wound Type: Unstageable, Pressure Ulcer  · Current Nutrition Therapies:  · Oral Diet Orders: Carb Control 4 Carbs/Meal, 2gm Sodium, Fluid Restriction   · Oral Diet intake: 1-25%, 26-50%, 51-75%, %  · Oral Nutrition Supplement (ONS) Orders: None  · ONS intake: Unable to assess  · Anthropometric Measures:  · Ht: 5' (152.4 cm)   · Current Body Wt: 184 lb (83.5 kg)(standing scale)  · Admission Body Wt: 175 lb (79.4 kg)(bedscale)  · % Weight Change:  ,  Weight gain - may be related to edema  · Ideal Body Wt: 100 lb (45.4 kg),   · BMI Classification: BMI 35.0 - 39.9 Obese Class II    Nutrition Interventions:   Continue current diet, Start ONS  Continued Inpatient Monitoring    Nutrition Evaluation:   · Evaluation: Goals set   · Goals: Pt will consume greater than 50% of meals and ONS this admission    · Monitoring: Meal Intake, Supplement Intake, Weight, Pertinent Labs, Patient/Family Education      Electronically signed by Shelia Soares MS, RD, LD on 3/12/20 at 2:02 PM EDT    Contact Number: Office: [phone]; [address]: [id number]

## 2020-03-13 NOTE — PROGRESS NOTES
melatonin ER  3 mg Oral Nightly    metoprolol tartrate  50 mg Oral BID    pantoprazole  40 mg Oral QAM AC    rosuvastatin  5 mg Oral Daily    sennosides-docusate sodium  1 tablet Oral BID    sodium chloride flush  10 mL Intravenous 2 times per day    insulin lispro  0-12 Units Subcutaneous TID WC    insulin lispro  0-6 Units Subcutaneous Nightly    sertraline  100 mg Oral Daily         Labs:  Recent Labs     03/13/20  0534   WBC 5.7   HGB 11.3*   HCT 36.1   MCV 85.7        Recent Labs     03/11/20  0605 03/12/20  0553 03/13/20  0534    135* 135*   K 4.0 4.0 4.2    98* 98*   CO2 21 23 20*   GLUCOSE 175* 162* 163*   PHOS 4.8 5.1* 5.4*   BUN 40* 43* 47*   CREATININE 2.5* 3.0* 3.5*   LABGLOM 19* 15* 13*   GFRAA 23* 18* 15*           Assessment/Plan:    Acute Kidney Injury on CKD stage 4.  - Started on HD today for worsening renal function associated with fluid overload not responding to high dose diuretics. LIJ temporary dialysis catheter placed on 3/13/20.  - Given history of CKD stage 4 presumed secondary to DM and HTN, I suspect she is going to need HD long-term. - Recent KIMBERLY requiring HD from November 13, 2019 ~1/7/2020; patient has recovered and her baseline creatinine has been around low to mid 2s. Follows with Dr. Britton Butcher in the office.  - Renal function panel daily.  - Plan for next Sloan tomorrow. Acute on chronic CHF, combined with anasarca. - Random urine PCR of 700mg/g.  - Severe pulmonary HTN.  - HD/UF.  - Fluid and sodium restriction. Hypertension.  - Continue Metoprolol and Hydralazine.  - Meds likely will need to be adjusted as BP gets better with fluid removal with HD. Metabolic Acidosis. - Will improve with HD. Anemia.  - Hgb at goal.  - Will monitor. Please do not hesitate to contact me at [phone] if with questions. Thank you!     Demario Boyer MD  3/13/2020  [facility]

## 2020-03-13 NOTE — PROGRESS NOTES
Pt returned from dialysis. A&Ox4. VSS. Denies dyspnea. Denies pain. Denies N/V, BS active x4; passing flatus. L triple IJ intact. Tele in place. Call light within reach. Bed side table within reach. Wheels locked. Bed in lowest position. Bed check in place. Pt instructed to call out for assistance. Pt expressed understanding & calls out appropriately. Shift assessment complete. All care cont per orders. Will cont to monitor.    Electronically signed by Yessica Liu RN on 3/13/2020 at 2:41 PM

## 2020-03-13 NOTE — PROGRESS NOTES
Hospitalist Progress Note      PCP: Maday Mckay MD    Date of Admission: 3/7/2020    Chief Complaint: Shortness of breath, edema    Hospital Course: Admitted with edema and shortness of breath, diagnosed with acute systolic congestive heart failure. Being evaluated by nephrology for diuresis and edema  Cardiology consulted for congestive heart failure  On IV vancomycin for empiric treatment of lower extremity cellulitis. Switched to oral doxycycline, but cultures showed resistance. Changing back on IV vancomycin. Cardiac stress test is unremarkable. Hemodialysis started because of progressive renal failure. Subjective: No shortness of breath at rest.  No chest pain, no nausea, no vomiting.   No new complaints past couple days      Medications:  Reviewed    Infusion Medications    dextrose       Scheduled Medications    vancomycin  500 mg Intravenous Once    vancomycin (VANCOCIN) intermittent dosing (placeholder)   Other RX Placeholder    hydrALAZINE  50 mg Oral 3 times per day    warfarin (COUMADIN) daily dosing (placeholder)   Other RX Placeholder    insulin glargine  15 Units Subcutaneous Nightly    aspirin  81 mg Oral Daily    vitamin B complex w/C  1 tablet Oral Daily    donepezil  5 mg Oral Nightly    ferrous sulfate  325 mg Oral BID    melatonin ER  3 mg Oral Nightly    metoprolol tartrate  50 mg Oral BID    pantoprazole  40 mg Oral QAM AC    rosuvastatin  5 mg Oral Daily    sennosides-docusate sodium  1 tablet Oral BID    sodium chloride flush  10 mL Intravenous 2 times per day    insulin lispro  0-12 Units Subcutaneous TID WC    insulin lispro  0-6 Units Subcutaneous Nightly    sertraline  100 mg Oral Daily     PRN Meds: anticoagulant sodium citrate, glucose, dextrose, glucagon (rDNA), dextrose, povidone-iodine, perflutren lipid microspheres, regadenoson, hydrOXYzine, ALPRAZolam, sodium chloride flush, acetaminophen **OR** acetaminophen, polyethylene glycol, promethazine **OR** ondansetron      Intake/Output Summary (Last 24 hours) at 3/13/2020 1422  Last data filed at 3/13/2020 1418  Gross per 24 hour   Intake 368 ml   Output 130 ml   Net 238 ml       Physical Exam Performed:    BP (!) 170/71   Pulse 64   Temp 97.7 °F (36.5 °C) (Oral)   Resp 18   Ht 5' (1.524 m)   Wt 181 lb 11.2 oz (82.4 kg)   SpO2 99%   BMI 35.49 kg/m²     General appearance: No apparent distress, appears stated age and cooperative. HEENT: Pupils equal, round, and reactive to light. Conjunctivae/corneas clear. Neck: Supple, with full range of motion. No jugular venous distention. Trachea midline. Respiratory:  Normal respiratory effort. Few bibasilar inspiratory crackles  Cardiovascular: Regular rate and rhythm with normal S1/S2 without murmurs, rubs or gallops. Abdomen: Soft, non-tender, non-distended with normal bowel sounds. Musculoskeletal: No clubbing or cyanosis. 3+ bilateral pitting soft edema bilaterally. Full range of motion without deformity. Skin: Skin color, texture, turgor normal.  Right foot erythema  Neurologic:  Neurovascularly intact without any focal sensory/motor deficits. Cranial nerves: II-XII intact, grossly non-focal.  Psychiatric: Alert and oriented, thought content appropriate, normal insight    I examined the patient today (03/13/20). Physical exam is similar to yesterday (3/12). Labs:   Recent Labs     03/13/20  0534   WBC 5.7   HGB 11.3*   HCT 36.1        Recent Labs     03/11/20  0605 03/12/20  0553 03/13/20  0534    135* 135*   K 4.0 4.0 4.2    98* 98*   CO2 21 23 20*   BUN 40* 43* 47*   CREATININE 2.5* 3.0* 3.5*   CALCIUM 8.7 9.4 9.4   PHOS 4.8 5.1* 5.4*     No results for input(s): AST, ALT, BILIDIR, BILITOT, ALKPHOS in the last 72 hours. Recent Labs     03/11/20  0605 03/12/20  0553 03/13/20  0533   INR 1.62* 2.55* 3.99*     No results for input(s): CKTOTAL, TROPONINI in the last 72 hours.     Urinalysis:      Lab Results   Component Value failure. Elevated troponin  No reversible ischemia noted on cardiac stress test  Continue to monitor. No chest pain. Diffuse edema  Appears multifactorial, not only CHF  No improvement noted. Probably a component of venous insufficiency also present. Monitor with fluid removal on hemodialysis. Hypertension  Blood pressure controlled    Dementia  Continue donepezil    Diabetes mellitus type 2  Continue Lantus, sliding-scale insulin and carb controlled diet. Blood sugar remains acceptable    Lower extremity cellulitis  Consulted wound care  Podiatry has seen  Oral antibiotics not effective according to wound cultures. Back on vancomycin at least over the weekend. DVT Prophylaxis: Resumed warfarin  Diet: Diet NPO Time Specified  Code Status: Full Code    PT/OT Eval Status: Requested    Dispo -inpatient, unclear length of stay.     Tj Jefferson MD

## 2020-03-13 NOTE — FLOWSHEET NOTE
03/13/20 0925 03/13/20 1335   Vital Signs   /65 (!) 155/69   Temp 97.5 °F (36.4 °C) 97.5 °F (36.4 °C)   Pulse 61 64   Weight 190 lb 11.2 oz (86.5 kg) 188 lb 4.4 oz (85.4 kg)  (bed wt with 1 heavy blanket on)     Treatment time: 210min    Net UF: 2100ml    Pre weight: 87.5k  Post weight: 85.4  EDW: TBD    Access used: LIJ temp dialysis catheter  Access function: Good- reversed    Medications or blood products given: none    Regular outpatient schedule: TBD    Summary of response to treatment: Tolerated well. Copy of dialysis treatment record placed in chart, to be scanned into EMR.

## 2020-03-13 NOTE — PROGRESS NOTES
[facility]  Cardiology  Progress Note    Admission date:  3/7/2020    Reason for follow up visit: CHF    HPI/CC: Ishaan Solorzano is a 75 y.o. female who presented to the ED 3/7/2020 for edema. Troponin and BNP elevated. Stress test 3/10/2020 negative for ischemia. Echo showed EF 50-55%, RV overload, severe pulmonary HTN. She has been treated for A/CKD, CHF/anasarca now requiring iHD. Subjective: No chest pain, feels less short of breath and swollen than yesterday. Vitals:  Blood pressure (!) 137/51, pulse 67, temperature 97.5 °F (36.4 °C), temperature source Oral, resp. rate 12, height 5' (1.524 m), weight 181 lb 11.2 oz (82.4 kg), SpO2 99 %, not currently breastfeeding.   Temp  Av.5 °F (36.4 °C)  Min: 97.3 °F (36.3 °C)  Max: 97.6 °F (36.4 °C)  Pulse  Av.3  Min: 56  Max: 67  BP  Min: 133/52  Max: 153/67  SpO2  Av.9 %  Min: 97 %  Max: 100 %    24 hour I/O    Intake/Output Summary (Last 24 hours) at 3/13/2020 1000  Last data filed at 3/13/2020 0425  Gross per 24 hour   Intake 448 ml   Output 125 ml   Net 323 ml     Current Facility-Administered Medications   Medication Dose Route Frequency Provider Last Rate Last Dose    anticoagulant sodium citrate 4 % injection 5 mL  5 mL Intracatheter PRN Дмитрий Horton MD        vancomycin (VANCOCIN) 500 mg in dextrose 5 % 100 mL IVPB  500 mg Intravenous Once Shoaib Osborn MD        vancomycin (VANCOCIN) intermittent dosing (placeholder)   Other RX Placeholder Shoaib Osborn MD        hydrALAZINE (APRESOLINE) tablet 75 mg  75 mg Oral 3 times per day Дмитрий Horton MD   75 mg at 20 0540    warfarin (COUMADIN) daily dosing (placeholder)   Other RX Placeholder Shoaib Osborn MD        insulin glargine (LANTUS) injection vial 15 Units  15 Units Subcutaneous Nightly Rob Hansen MD        glucose (GLUTOSE) 40 % oral gel 15 g  15 g Oral PRN Shoaib Osborn MD        dextrose 50 % IV solution  12.5 g Intravenous PRN Rob Hansen, estimated at 50-55%. There is mild diastolic septal flattening consistent with right ventricular   volume overload. Right ventricular systolic function is mildly reduced . Moderate to severe tricuspid regurgitation. Systolic pulmonary artery pressure (SPAP) is estimated at 61 mmHg consistent   with severe pulmonary hypertension (Right atrial pressure of 8 mmHg). There is a trivial to small pericardial effusion noted. There is a small right pleural effusion. Echo 1/9/2020:  Left Ventricle   Reduced LVEF 25%  Limited study to assess for vegetations. No vegetations are seen    Coronary angiogram 2015:  Cardiac catheterization performed in 2015 showed some luminal irregularity with patent stent in the left circumflex coronary artery with the 30-40% LAD narrowing. The right coronary artery was a small nondominant vessel with a distal 70-80% stenosis which was in a vessel too small be approached by intervention.     Lab Reviewed:     Renal Profile:  Lab Results   Component Value Date    CREATININE 3.5 03/13/2020    BUN 47 03/13/2020     03/13/2020    K 4.2 03/13/2020    K 4.0 03/08/2020    CL 98 03/13/2020    CO2 20 03/13/2020     CBC:    Lab Results   Component Value Date    WBC 5.7 03/13/2020    RBC 4.22 03/13/2020    RBC 4.31 12/08/2015    HGB 11.3 03/13/2020    HCT 36.1 03/13/2020    MCV 85.7 03/13/2020    RDW 22.3 03/13/2020     03/13/2020     BNP:    Lab Results   Component Value Date    PROBNP 44,750 03/07/2020    PROBNP 4,308 11/14/2019    PROBNP 7,138 11/12/2019    PROBNP 3,989 10/15/2019     Fasting Lipid Panel:    Lab Results   Component Value Date    CHOL 163 03/14/2014    HDL 55 03/14/2014    HDL 57 04/06/2012    TRIG 106 11/14/2019     Cardiac Enzymes:  CK/MbTroponin  Lab Results   Component Value Date    CKTOTAL 194 11/27/2019    TROPONINI 0.12 03/08/2020     PT/ INR   Lab Results   Component Value Date    INR 3.99 03/13/2020    INR 2.55 03/12/2020    INR 1.62 03/11/2020 PROTIME 46.9 03/13/2020    PROTIME 29.9 03/12/2020    PROTIME 18.9 03/11/2020     PTT No results found for: PTT   Lab Results   Component Value Date    MG 1.70 02/22/2020      Lab Results   Component Value Date    TSH 4.79 09/01/2011     All labs and imaging reviewed today    Assessment:  Elevated troponin: flat, likely due to supply demand imbalance, stress test negative  Acute on chronic diastolic CHF: did not respond to diuretics, now on iHD  CAD: stable, no angina; stress test negative 3/10/2020; patent stent on angiogram 2015; s/p remote PCI  History of LV dysfunction/cardiomyopathy: improved, EF 50-55% on echo 3/10/2020 from EF 25% 1/2020 during acute illness  HTN: uncontrolled  LBBB: chronic  Tricuspid regurgitation: moderate-severe  Pulmonary HTN: severe  DM  A/CKD: started HD today  Lower extremity cellulitis  History of DVT  History of respiratory/cardiac arrest 11/2019    Plan:   1. Continue HD for fluid removal per nephrology  2. Continue aspirin, statin, lopressor  3. On warfarin for history of DVT  4. Elevate legs  5. Daily weights, strict I/Os, BMP  6.  Cardiology will sign off, please call with questions    She follows with [facility], [address] [place] [address] [address]  [phone]

## 2020-03-13 NOTE — CARE COORDINATION
Chart review for LOS day # 6 - Patient getting IV Vancomycin for cellulitis of leg. Nephrology following and indicate patient will most likely need HD long term. TDC being placed and starting HD today. Patient currently off floor for Dialysis. Will need to discuss SNF vs HHC and ensure patient will have transportation to and from outpatient HD in the event this does become long term ESRD. Call placed to She Callahan in intake with Ela Russell and initiated referral. She Callahan has epic access and will review all clinical and start placement process for outpatient HD.

## 2020-03-13 NOTE — PROGRESS NOTES
12.5 FR 20 CM L IJ temporary dialysis catheter placed by Dr. Megan Ellison. VSS throughout. Report called to Dialysis and Christina Ardon.

## 2020-03-14 NOTE — PROGRESS NOTES
Pt back from HD. VSS. Lunch tray ordered. Pt turned and repositioned for comfort. Bed alarm active. Will monitor.

## 2020-03-14 NOTE — PROGRESS NOTES
Pharmacy to Dose Warfarin     Dx: Hx of DVT  Goal INR range 2-3   Home Warfarin dose: 3mg daily        Date                 INR                  Warfarin  3/10                 1.64                 4 mg  3/11                 1.62                 4 mg   3/12                 2.55                 hold  3/13                 3.99                 Hold  3/14                 4.61                 Hold       Recommend hold Warfarin tonight x1 due to elevated INR. Daily INR ordered. Rx will continue to manage therapy per  Dr. Jacque Wheeler consult order.   Melany Helms PharmD 8:09 AM EDT 3/13/20     Note:  acute systolic congestive heart failure may increase INR

## 2020-03-14 NOTE — PROGRESS NOTES
4 Eyes Skin Assessment     The patient is being assess for  Shift Handoff    I agree that 2 RN's have performed a thorough Head to Toe Skin Assessment on the patient. ALL assessment sites listed below have been assessed. Areas assessed by both nurses: Tadeo Padilla RN    [x]   Head, Face, and Ears   [x]   Shoulders, Back, and Chest  [x]   Arms, Elbows, and Hands   [x]   Coccyx, Sacrum, and IschIum  [x]   Legs, Feet, and Heels      Redness on sacrum and folds, zinc cream applied. Scattered bruising and skin tears. See LDA for wounds    Does the Patient have Skin Breakdown?   Yes LDA WOUND CARE was Initiated documentation include the Tess-wound, Wound Assessment, Measurements, Dressing Treatment, Drainage, and Color\",         Choco Prevention initiated:  Yes   Wound Care Orders initiated:  Yes      [facility] nurse consulted for Pressure Injury (Stage 3,4, Unstageable, DTI, NWPT, and Complex wounds), New and Established Ostomies:  Yes      Nurse 1 eSignature: Electronically signed by Yony Lawrence RN on 3/13/20 at 10:38 PM EDT    **SHARE this note so that the co-signing nurse is able to place an eSignature**    Nurse 2 eSignature: Electronically signed by Hien Jung RN on 3/13/20 at 10:39 PM EDT

## 2020-03-14 NOTE — PROGRESS NOTES
Hospitalist Progress Note      PCP: Gloria Posada MD    Date of Admission: 3/7/2020    Chief Complaint: Shortness of breath, edema    Hospital Course: Admitted with edema and shortness of breath, diagnosed with acute systolic congestive heart failure. Being evaluated by nephrology for diuresis and edema  Cardiology consulted for congestive heart failure  On IV vancomycin for empiric treatment of lower extremity cellulitis. Switched to oral doxycycline, but cultures showed resistance. Changed back on IV vancomycin because of resistance and allergy patterns. Cardiac stress test is unremarkable. Hemodialysis started because of progressive renal failure. First session yesterday, second session today. Subjective: No shortness of breath at rest.  No chest pain, no nausea, no vomiting. No new complaints. Feels fine.       Medications:  Reviewed    Infusion Medications    dextrose       Scheduled Medications    vancomycin (VANCOCIN) intermittent dosing (placeholder)   Other RX Placeholder    hydrALAZINE  50 mg Oral 3 times per day    warfarin (COUMADIN) daily dosing (placeholder)   Other RX Placeholder    insulin glargine  15 Units Subcutaneous Nightly    aspirin  81 mg Oral Daily    vitamin B complex w/C  1 tablet Oral Daily    donepezil  5 mg Oral Nightly    ferrous sulfate  325 mg Oral BID    melatonin ER  3 mg Oral Nightly    metoprolol tartrate  50 mg Oral BID    pantoprazole  40 mg Oral QAM AC    rosuvastatin  5 mg Oral Daily    sennosides-docusate sodium  1 tablet Oral BID    sodium chloride flush  10 mL Intravenous 2 times per day    insulin lispro  0-12 Units Subcutaneous TID WC    insulin lispro  0-6 Units Subcutaneous Nightly    sertraline  100 mg Oral Daily     PRN Meds: citrate dextrose, glucose, dextrose, glucagon (rDNA), dextrose, povidone-iodine, perflutren lipid microspheres, regadenoson, hydrOXYzine, ALPRAZolam, sodium chloride flush, acetaminophen **OR** acetaminophen, Roberto Carlos Ran in the last 72 hours. Urinalysis:      Lab Results   Component Value Date    NITRU POSITIVE 03/08/2020    WBCUA 6-10 03/08/2020    BACTERIA 4+ 03/08/2020    RBCUA 3-4 03/08/2020    BLOODU TRACE-INTACT 03/08/2020    SPECGRAV 1.020 03/08/2020    GLUCOSEU Negative 03/08/2020    GLUCOSEU NEGATIVE 10/07/2011       Radiology:  IR NONTUNNELED VASCULAR CATHETER > 5 YEARS   Final Result   Successful ultrasound and fluoroscopy guided non-tunneled left internal   jugular temporary hemodialysis catheter placement. 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 NM Cardiac Stress Test Nuclear Imaging   Final Result      XR FOOT RIGHT (2 VIEWS)   Final Result   1. No acute osseous abnormality of the right foot. 2. Diffuse soft tissue swelling of the midfoot and forefoot. No subcutaneous   gas. 3. No obvious osteomyelitis at the site of the patient's reported heel   ulceration. 4. 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 CT CHEST WO CONTRAST   Final Result   1. Moderate right pleural effusion with adjacent atelectasis. 2. Multiple 2-4 mm solid pulmonary nodules bilaterally dating back to   10/15/2019. 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 These were not well seen in 2014. 3. Mild anasarca. XR CHEST STANDARD (2 VW)   Final Result   Whole right-sided pleural effusion and trace left-sided effusion. Cardiomegaly. Assessment/Plan:    Active Hospital Problems    Diagnosis    Diabetic polyneuropathy associated with type 2 diabetes mellitus ([facility] [address] [E11.42]    Diabetic ulcer of right heel associated with type 2 diabetes mellitus, with necrosis of muscle (HCC) [P42.628, L97.413]    Anasarca [R60.1]       PLAN    Acute systolic congestive heart failure  Lasix drip has not helped. Started on hemodialysis with fluid removal.  Tolerating well.     Acute on chronic kidney disease stage III  Seen by nephrology. Had vascular access for hemodialysis performed yesterday  Unclear at this time whether progression to end-stage or acute renal failure. Tolerating hemodialysis well. Continue for now. Second session today. Elevated troponin  No reversible ischemia noted on cardiac stress test  Continue to monitor. No chest pain. Diffuse edema  Appears multifactorial, not only CHF  Continue to monitor with hemodialysis. I anticipate some edema will remain because of venous insufficiency. Hypertension  Blood pressure reasonably well controlled with hemodialysis. No hypotension noted yet. Dementia  Continue donepezil    Diabetes mellitus type 2  Continue Lantus, sliding-scale insulin and carb controlled diet. Blood sugar remains acceptable    Lower extremity cellulitis  Consulted wound care  Podiatry has seen  Oral antibiotics not effective according to wound cultures. Back on vancomycin, at least over the weekend. DVT Prophylaxis: Resumed warfarin  Diet: DIET CARB CONTROL; Low Sodium (2 GM); Daily Fluid Restriction: 1500 ml  Code Status: Full Code    PT/OT Eval Status: Requested    Dispo -inpatient, unclear length of stay.     Margarett Mcardle, MD

## 2020-03-14 NOTE — PROGRESS NOTES
Perfect serve message sent to Dr. Rendon Comp, \"Pt has critical lab value, INR 4.61.  \", awaiting response.

## 2020-03-14 NOTE — PROGRESS NOTES
Attempted to see patient this morning during rounds for follow up of b/l foot wounds and LLE cellulitis. Patient was off the floor for dialysis. I will attempt to see her again tomorrow morning. Please call with any questions or concerns.     Christian Zelaya, [facility]

## 2020-03-14 NOTE — PROGRESS NOTES
Nephrology Progress Note   [email or web address]      This patient is a 75year old female whom we are following for anasarca and CKD. Subjective: The patient was seen and examined on HD. LIJ temporary dialysis catheter placed this morning. Remains oliguric. Family History: No family at bedside  ROS: No nausea or vomiting      Vitals:  BP (!) 163/65   Pulse 61   Temp 97 °F (36.1 °C)   Resp 18   Ht 5' (1.524 m)   Wt 184 lb 1.4 oz (83.5 kg)   SpO2 97%   BMI 35.95 kg/m²   I/O last 3 completed shifts: In: 700 [P.O.:300]  Out: 2510 [Urine:10]  No intake/output data recorded. Physical Exam:  Physical Exam  Vitals signs reviewed. Constitutional:       General: She is not in acute distress. Appearance: Normal appearance. HENT:      Head: Normocephalic and atraumatic. Mouth/Throat:      Mouth: Mucous membranes are moist.   Eyes:      General: No scleral icterus. Conjunctiva/sclera: Conjunctivae normal.   Cardiovascular:      Rate and Rhythm: Normal rate. Heart sounds: No friction rub. Pulmonary:      Effort: Pulmonary effort is normal. No respiratory distress. Breath sounds: No wheezing. Comments: Diminished breath sounds bibasal  Abdominal:      General: Bowel sounds are normal. There is no distension. Tenderness: There is no abdominal tenderness. Musculoskeletal:      Right lower leg: Edema present. Left lower leg: Edema present. Neurological:      Mental Status: She is alert.            Medications:   vancomycin (VANCOCIN) intermittent dosing (placeholder)   Other RX Placeholder    hydrALAZINE  50 mg Oral 3 times per day    warfarin (COUMADIN) daily dosing (placeholder)   Other RX Placeholder    insulin glargine  15 Units Subcutaneous Nightly    aspirin  81 mg Oral Daily    vitamin B complex w/C  1 tablet Oral Daily    donepezil  5 mg Oral Nightly    ferrous sulfate  325 mg Oral BID    melatonin ER  3 mg Oral Nightly    metoprolol tartrate  50 mg

## 2020-03-15 NOTE — PROGRESS NOTES
pain.    Diffuse edema  Appears multifactorial, not only CHF  Continue to monitor with hemodialysis. I anticipate some edema will remain because of venous insufficiency. Edema noted to be slightly improved. Hypertension  Blood pressure reasonably well controlled with hemodialysis. No hypotension noted yet. Dementia  Continue donepezil    Diabetes mellitus type 2  Continue Lantus, sliding-scale insulin and carb controlled diet. Blood sugar remains acceptable with a few high readings    Lower extremity cellulitis  Consulted wound care  Podiatry has seen  Oral antibiotics not effective according to wound cultures. Back on vancomycin, at least over the weekend. We will reassess tomorrow. DVT Prophylaxis: Resumed warfarin  Diet: DIET CARB CONTROL; Low Sodium (2 GM); Daily Fluid Restriction: 1500 ml  Code Status: Full Code    PT/OT Eval Status: Requested    Dispo -inpatient, unclear length of stay.     Lilia Luke MD

## 2020-03-15 NOTE — PROGRESS NOTES
VSS. SpO2 99% 2L NC, O2 titrated down to 1L at this time. Pt resting in bed, denies SOB at rest, reports feeling slightly dyspneic with turns/repositioning while in bed however feels this has improved. +appetite, + loose BM this morning. Pt repositioned for comfort as continued Q2. R offloading boot in place as ordered, L heel floated. Call light is within reach. Bed alarm remains active for safety. Will monitor closely.

## 2020-03-15 NOTE — PROGRESS NOTES
Patient assessment complete and documented. VSS. A&O x4. Patient on 2L O2 via nasal canula. Lungs diminished throughout. Gr in place and draining appropriately. Patient has multiple skin tears, dressings in place. Patient refused her lantus this evening stating that it has dropped her blood sugar to very low levels in the past. Patient is resting in bed with no complaints at this time. Bed is in lowest locked position with call light within reach and bed alarm on. Will continue to monitor situation.

## 2020-03-15 NOTE — PROGRESS NOTES
HD.    Anemia.  - Hgb at goal.  - Will monitor.       Warner Hashimoto, MD  3/15/2020  [facility]

## 2020-03-15 NOTE — PROGRESS NOTES
Pharmacy to Dose Warfarin     Dx: Hx of DVT  Goal INR range 2-3   Home Warfarin dose: 3mg daily        Date                 INR                  Warfarin  3/10                 1.64                 4 mg  3/11                 1.62                 4 mg   3/12                 2.55                 hold  3/13                 3.99                 Hold  3/14                 4.61                 Hold  3/15                 3.15                Hold                       Recommend hold Warfarin tonight x1 due to elevated INR. Daily INR ordered.   Rx will continue to manage therapy per  Dr. Tuan Lainez consult order  Sofía Brooks [facility] 3/15/2020 7:29 AM    .       Note:  acute systolic congestive heart failure may increase INR

## 2020-03-16 NOTE — PROGRESS NOTES
[place] [place] [id number] Continence Nurse  Follow-up Progress Note       NAME:  Evelio Butler RECORD NUMBER:  [id number]  AGE:  75 y.o. GENDER:  female  :  1945  TODAY'S DATE:  3/16/2020    Subjective:  I am doing better but do not know when I am leaving here. Wound Identification:  Wound Type: Unstageable dry black eschar right heel. Right dorsal foot with deflated and deroofed bulla. 2nd and 3rd metatarsals have been amputated and incision site white and macerated from xeroform guaze in place, incision line is closed. Contributing Factors: edema, diabetes, decreased mobility and obesity, neuropathy,PAD. Patient Goal of Care:  [x] Wound Healing  [] Odor Control   [] Palliative Care  [] Pain Control   [] Other:     Objective:  Lying in bed. Feet elevated on pillow. BP (!) 165/73   Pulse 65   Temp 97.8 °F (36.6 °C) (Oral)   Resp 16   Ht 5' (1.524 m)   Wt 167 lb 8.8 oz (76 kg)   SpO2 100%   BMI 32.72 kg/m²   Choco Risk Score: Choco Scale Score: 14  Assessment:  No deep tissue on coccyx or buttocks. Right dorsal foot wounds resolving. Surgical site from amputation of 2 & 3rd toes, healed. Dorsal foot dry, tan and flaking. Right heel dry eschar. Dorsal toes slightly bleeding after cleaning with soap and water. Measurements:  Wound 19 Buttocks Stage 2, R buttocks (Active)   Wound Pressure Stage  2 2020  8:15 PM   Dressing Status Other (Comment) 2020  9:31 AM   Dressing/Treatment Open to air 2020  9:31 AM   Wound Assessment Red;Dry 2020  9:31 AM   Drainage Amount None 2020  9:31 AM   Odor None 2020  9:31 AM   Margins Attached edges 2020  9:31 AM   Tess-wound Assessment Intact; Induration 2020  9:31 AM   [place]%Wound Bed 100 2020  9:31 AM   Number of days: 110       Wound 19 Coccyx Stage 2, coccyx (Active)   Number of days: 110       Wound 19 Toe (Comment  which one) L Foot Toes 2 & 3 (Active) Assessment Dry; Intact 3/16/2020  2:42 PM   Non-staged Wound Description Partial thickness 3/11/2020 10:06 PM   Red%Wound Bed 100 3/16/2020  2:42 PM   Yellow%Wound Bed 90 2/23/2020  7:42 AM   Other%Wound Bed 4-% brown 3/9/2020 10:22 AM   Culture Taken No 3/16/2020  2:42 PM   Number of days: 72      Right dorsal foot:           Wound 02/23/20 Heel Right unstageable (Active)   Wound Image   3/16/2020  2:42 PM   Wound Pressure Unstageable 3/16/2020  2:42 PM   Offloading for Diabetic Foot Ulcers Offloading boot 3/16/2020  2:42 PM   Dressing Status Changed 3/16/2020  2:42 PM   Dressing Changed Changed/New 3/16/2020  2:42 PM   Dressing/Treatment Betadine swabs; Foam 3/16/2020  2:42 PM   Wound Cleansed Rinsed/Irrigated with saline 3/16/2020  2:42 PM   Dressing Change Due 03/17/20 3/16/2020  2:42 PM   Wound Length (cm) 0.5 cm 3/16/2020  2:42 PM   Wound Width (cm) 1.5 cm 3/16/2020  2:42 PM   Wound Depth (cm) 0 cm 3/16/2020  2:42 PM   Wound Surface Area (cm^2) 0.75 cm^2 3/16/2020  2:42 PM   Change in Wound Size % (l*w) 62.5 3/16/2020  2:42 PM   Wound Volume (cm^3) 0 cm^3 3/16/2020  2:42 PM   Distance Tunneling (cm) 0 cm 3/16/2020  2:42 PM   Tunneling Position ___ O'Clock 0 3/16/2020  2:42 PM   Undermining Starts ___ O'Clock 0 3/16/2020  2:42 PM   Undermining Ends___ O'Clock 0 3/16/2020  2:42 PM   Undermining Maxium Distance (cm) 0 3/16/2020  2:42 PM   Wound Assessment Black 3/16/2020  2:42 PM   Drainage Amount None 3/16/2020  2:42 PM   Odor None 3/16/2020  2:42 PM   Margins Attached edges; Defined edges 3/16/2020  2:42 PM   Tess-wound Assessment Calloused 3/16/2020  2:42 PM   Black%Wound Bed 100 3/16/2020  2:42 PM   Culture Taken No 3/9/2020 10:22 AM   Number of days: 22     Right heel:          Incision 01/16/20 Foot Left (Active)   Number of days: 59       Response to treatment:  Well tolerated by patient.      Pain Assessment:  Severity:  0 / 10  Quality of pain: N/A  Wound Pain Timing/Severity: none  Premedicated: No  Plan: Plan of Care: Wound 02/23/20 Heel Right unstageable-Dressing/Treatment: Betadine swabs, Foam  Wound 01/03/20 Foot Dorsal;Right open area to top of right foot-Dressing/Treatment: Non adherent, 4x4, Roll gauze  [REMOVED] Wound 11/27/19 Heel Right DTI-Dressing/Treatment: Dry dressing  [REMOVED] Wound 11/30/19 Toe (Comment  which one) Anterior;Right right foot toes #1 -4-Dressing/Treatment: Dry dressing  [REMOVED] Wound 03/15/20 Leg Right; Lower;Dorsal-Dressing/Treatment: Foam     Recommend:  Off load right heel with off loading boot. Clean right foot with soap and water daily. Apply moisturizing lotion to dry areas. Paint right heel daily with betadine. Place 2 layers of white foam around right heel wound (like a donut for off loading). Right dorsal foot, apply non adherent dressing, cover with 4x4 and secure with roll guaze and tape. Change daily. Wound care to follow. Call wound care for deterioration [phone], Pager [phone]. Specialty Bed Required : Yes   [] Low Air Loss   [x] Pressure Redistribution isoflex mattress. [] Fluid Immersion  [] Bariatric  [] Total Pressure Relief  [] Other:     Current Diet: DIET CARB CONTROL; Low Sodium (2 GM);  Daily Fluid Restriction: 1500 ml  Dietician consult:  Yes    Discharge Plan:  Placement for patient upon discharge: intermediate care facility   Patient appropriate for Outpatient [address]: Yes    Referrals:  []  looking for placement  [] [facility]  [] Supplies  [] Other    Patient/Caregiver Teaching: Updated on wound improvement  Level of patient/caregiver understanding able to:   [x] Indicates understanding       [] Needs reinforcement  [] Unsuccessful      [] Verbal Understanding  [] Demonstrated understanding       [] No evidence of learning  [] Refused teaching         [] N/A       Electronically signed by Kt Singer, RN, MSN, Timur Vazquez on 3/16/2020 at 3:26 PM

## 2020-03-16 NOTE — PROGRESS NOTES
subcutaneous   gas. 3. No obvious osteomyelitis at the site of the patient's reported heel   ulceration. 4. 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 Assessment and Plan:     1. Right foot decubitus ulcer              - No clinical signs of infection.              - Continue off-loading with pillows or foam boots. - Betadine daily.              - Repeat x-ray with no osseous changes from previous study on 2/21/20. No soft tissue gas. - Will follow while in house. No acute foot/ankle intervention needed at this time.     2. Left lower leg cellulitis              - Appears stable with no signs or portals of entry noted. - Improved. - Primary giving one more day of Vancomycin.   - Do not feel like she needs ABX at discharge for LLE.     3. DM2              - Management per primary. 4. Edema    - Improved after a couple days of HD.     Thank you for the opportunity to take part in the patient's care.  Please feel free to page or call with any questions or concerns.  [address]

## 2020-03-16 NOTE — PROGRESS NOTES
Pharmacy to Dose Warfarin     Dx: Hx of DVT  Goal INR range 2-3   Home Warfarin dose: 3mg daily        Date                 INR                  Warfarin  3/10                 1.64                 4 mg  3/11                 1.62                 4 mg   3/12                 2.55                 hold  3/13                 3.99                 Hold  3/14                 4.61                 Hold  3/15                 3.15                 Hold   3/16                 3.05                 Hold                        Recommend hold Warfarin tonight x1 due to elevated INR. Daily INR ordered.   Rx will continue to manage therapy per  Dr. Oj Lockett consult order  Tata Meadows PharmD 03/16/20 10:57 AM          .       Note:  acute systolic congestive heart failure may increase INR

## 2020-03-16 NOTE — PROGRESS NOTES
Dialysis Procedure Note   [email or web address]        PROCEDURE:  Patient seen on hemodialysis at 3/16/20 AM    PHYSICIAN:  En Davis    DIAGNOSIS: KIMBERLY    RX:  See dialysis flowsheet for specifics on access, blood flow rate, dialysate baths, duration of dialysis, anticoagulation and other technical information. Physical: Blood pressure 131/70, pulse 58, temperature 97.8 °F (36.6 °C), temperature source Oral, resp. rate 16, height 5' (1.524 m), weight 174 lb 9.6 oz (79.2 kg), SpO2 98 %, not currently breastfeeding. Constitutional: Awake, alert, not in distress  Chest: Normal respiratory effort, diminished breath sounds bibasal  CVS: normal rate, no rub or gallop  Access: LIJ temporary dialysis catheter  Edema: 2-3+ LE edema    COMMENTS:    1. UF goal of 3L as tolerated. 2. Remove roa catheter. 3. UF tomorrow for [facility]. Zach Hameed MD  [facility]

## 2020-03-16 NOTE — PROGRESS NOTES
Pt A&Ox4; VSS; denies. Pleasant. Denies dyspnea. Denies N/V; BS active x4; passing flatus. Denies pain/discomfort. Sacrum red/blanchable. Q2 turn. R IJ vascath in place. Call light within reach. Bed side table within reach. Wheels locked. Bed in lowest position. Bed check in place. Pt instructed to call out for assistance. Pt expressed understanding & calls out appropriately. Shift assessment complete. All care cont per orders. Will cont to monitor.    Electronically signed by Barb Leon RN on 3/16/2020 at 3:54 PM

## 2020-03-16 NOTE — PROGRESS NOTES
Hospitalist Progress Note      PCP: Winsome Turcios MD    Date of Admission: 3/7/2020    Chief Complaint: Shortness of breath, edema    Hospital Course: Admitted with edema and shortness of breath, diagnosed with acute systolic congestive heart failure. Being evaluated by nephrology for diuresis and edema  Cardiology consulted for congestive heart failure  On IV vancomycin for empiric treatment of lower extremity cellulitis. Switched to oral doxycycline, but cultures showed resistance. Changed back on IV vancomycin because of resistance and allergy patterns. Cardiac stress test is unremarkable. Hemodialysis started because of progressive renal failure. Visited during hemodialysis, this one is the third session. Subjective: No shortness of breath at rest.  No chest pain, no nausea, no vomiting.   No new complaints or issues      Medications:  Reviewed    Infusion Medications    dextrose       Scheduled Medications    vancomycin (VANCOCIN) intermittent dosing (placeholder)   Other RX Placeholder    hydrALAZINE  50 mg Oral 3 times per day    warfarin (COUMADIN) daily dosing (placeholder)   Other RX Placeholder    insulin glargine  15 Units Subcutaneous Nightly    aspirin  81 mg Oral Daily    vitamin B complex w/C  1 tablet Oral Daily    donepezil  5 mg Oral Nightly    ferrous sulfate  325 mg Oral BID    melatonin ER  3 mg Oral Nightly    metoprolol tartrate  50 mg Oral BID    pantoprazole  40 mg Oral QAM AC    rosuvastatin  5 mg Oral Daily    sennosides-docusate sodium  1 tablet Oral BID    sodium chloride flush  10 mL Intravenous 2 times per day    insulin lispro  0-12 Units Subcutaneous TID WC    insulin lispro  0-6 Units Subcutaneous Nightly    sertraline  100 mg Oral Daily     PRN Meds: citrate dextrose, glucose, dextrose, glucagon (rDNA), dextrose, povidone-iodine, perflutren lipid microspheres, regadenoson, hydrOXYzine, ALPRAZolam, sodium chloride flush, acetaminophen **OR** acetaminophen, polyethylene glycol, promethazine **OR** ondansetron      Intake/Output Summary (Last 24 hours) at 3/16/2020 1322  Last data filed at 3/16/2020 1141  Gross per 24 hour   Intake 1590 ml   Output 3455 ml   Net -1865 ml       Physical Exam Performed:    BP (!) 170/72   Pulse 62   Temp 97.6 °F (36.4 °C) (Oral)   Resp 16   Ht 5' (1.524 m)   Wt 167 lb 8.8 oz (76 kg)   SpO2 100%   BMI 32.72 kg/m²     General appearance: No apparent distress, appears stated age and cooperative. HEENT: Pupils equal, round, and reactive to light. Conjunctivae/corneas clear. Neck: Supple, with full range of motion. No jugular venous distention. Trachea midline. Respiratory:  Normal respiratory effort. Few bibasilar inspiratory crackles  Cardiovascular: Regular rate and rhythm with normal S1/S2 without murmurs, rubs or gallops. Abdomen: Soft, non-tender, non-distended with normal bowel sounds. Musculoskeletal: No clubbing or cyanosis. 2+ bilateral pitting soft edema bilaterally. Full range of motion without deformity. Skin: Skin color, texture, turgor normal.  Right foot erythema  Neurologic:  Neurovascularly intact without any focal sensory/motor deficits. Cranial nerves: II-XII intact, grossly non-focal.  Psychiatric: Alert and oriented, thought content appropriate, normal insight    I examined the patient today (03/16/20). Physical exam similar to yesterday (3/15). No change in edema. Labs:   Recent Labs     03/15/20  0539   WBC 5.3   HGB 11.1*   HCT 34.8*        Recent Labs     03/14/20  0556 03/15/20  0539 03/16/20  0642   * 134* 136   K 4.0 3.7 4.0   CL 97* 98* 99   CO2 22 25 25   BUN 27* 18 25*   CREATININE 2.7* 2.2* 3.3*   CALCIUM 9.0 8.8 9.1   PHOS 4.1 3.0 3.2     No results for input(s): AST, ALT, BILIDIR, BILITOT, ALKPHOS in the last 72 hours.   Recent Labs     03/14/20  0557 03/15/20  0539 03/16/20  0642   INR 4.61* 3.16* 3.05*     No results for input(s): Ruslan Salazar in the

## 2020-03-16 NOTE — PROGRESS NOTES
Nutrition Assessment    Type and Reason for Visit: Reassess    Nutrition Recommendations:   Continue Low Na, 1500 mL FR, Carb controlled diet  Encourage good sources of protein and calories   Monitor nutrition adequacy, pertinent labs, bowel habits, wt changes, and clinical progress      Nutrition Assessment: Nutritionally stable AEB tolerating diet. Endorses variable appetite. Consuming about 50% or less of meals. Encouraged meal intake and discussed good sources of protein to include at meal times. ONS declined. Malnutrition Assessment:  · Malnutrition Status: Meets the criteria for moderate malnutrition  · Context: Acute illness or injury  · Findings of the 6 clinical characteristics of malnutrition (Minimum of 2 out of 6 clinical characteristics is required to make the diagnosis of moderate or severe Protein Calorie Malnutrition based on AND/ASPEN Guidelines):  1. Energy Intake-Less than or equal to 75% of estimated energy requirement, Greater than or equal to 5 days    2. Weight Loss-Unable to assess,    3. Fat Loss-No significant subcutaneous fat loss,    4. Muscle Loss-No significant muscle mass loss,    5. Fluid Accumulation-Mild fluid accumulation,    6.  Strength-Not measured    Nutrition Risk Level: Moderate    Nutrient Needs:  · Estimated Daily Total Kcal: 8243-4054 kcal  · Estimated Daily Protein (g): 54-68 g  · Estimated Daily Total Fluid (ml/day): 1 ml/kcal    Nutrition Diagnosis:   · Problem:  Moderate malnutrition  · Etiology: related to Insufficient energy/nutrient consumption, Increased demand for energy/nutrients     Signs and symptoms:  as evidenced by Diet history of poor intake, Localized or generalized fluid accumulation, Presence of wounds    Objective Information:  · Nutrition-Focused Physical Findings: BM x2 3/15  · Wound Type: Unstageable, Pressure Ulcer  · Current Nutrition Therapies:  · Oral Diet Orders: Carb Control 4 Carbs/Meal, 2gm Sodium, Fluid Restriction   · Oral Diet

## 2020-03-16 NOTE — PROGRESS NOTES
Patient assessment complete and documented. VSS. A&O x4. Patient with complaints of feeling SOB, Patients O2 was at 91-94% on room air. Placed 1L of O2 on patient to help calm nerves. Patients right foot is in offloading boot, and left heel floated on pillow at this time. Patient denies any other needs. Patient is in bed with call light within reach and bed in lowest position.  Will continue to monitor situation

## 2020-03-17 NOTE — CARE COORDINATION
Spoke with daughter Kenji Fox regarding transportation to and from HD. Family is unable to help and [facility] cannot accept unless patient has transportation. Daughter states patient will have to go to a facility where they can accommodate in house HD which would be [facility] or [facility]. Patient prefers [facility]. She states her son is currently at [facility] and she does not feel facility is taking good care of him. Referral initiated with [facility] . Awaiting facility response if they will accept. Will notify team once bed secured. Patient is currently set up with [facility] with 11:15 AM chair time which will temporarily need to be changed while she is in skilled rehab.

## 2020-03-17 NOTE — FLOWSHEET NOTE
03/17/20 0815 03/17/20 1130   Vital Signs   Temp 97.4 °F (36.3 °C) 97.8 °F (36.6 °C)   Pulse 61 52   BP (!) 140/60 (!) 154/71   Height and Weight   Weight 174 lb 9.7 oz (79.2 kg) 168 lb 6.9 oz (76.4 kg)  (1 heavy blanket included)     Treatment time: 180min    Net UF: 2800ml    Pre weight: 79.2k  Post weight: 76.4k  EDW: TBD    Access used: LIJ  Access function: worked well    Medications or blood products given: None    Regular outpatient schedule: TBD    Summary of response to treatment: Pt toelerated well. Copy of dialysis treatment record placed in chart, to be scanned into EMR.

## 2020-03-17 NOTE — PROGRESS NOTES
Pharmacy to Dose Warfarin     Dx: Hx of DVT  Goal INR range 2-3   Home Warfarin dose: 3mg daily        Date                 INR                  Warfarin  3/10                 1.64                 4 mg  3/11                 1.62                 4 mg   3/12                 2.55                 hold  3/13                 3.99                 Hold  3/14                 4.61                 Hold  3/15                 3.15                 Hold   3/16                 3.05                 Hold                      3/17                 2.21                 3 mg    Recommend Warfarin 3 mg tonight x 1. Daily INR ordered.   Rx will continue to manage therapy per  Dr. Lance Raines consult order  Wale Burt PharmD 03/17/20 8:55 AM                .

## 2020-03-17 NOTE — PROGRESS NOTES
Physical Therapy/Occupational Therapy    Orders received, chart reviewed. Attempted PT/OT evaluations. Pt off floor for dialysis. Will continue to follow. Thank you.     Otis Álvarez, PT, DPT #[id number]  Chloé Suh OTR/L #[id number]

## 2020-03-17 NOTE — PROGRESS NOTES
Nephrology Progress Note   [email or web address]      This patient is a 75year old female whom we are following for anasarca and CKD. Subjective: The patient was seen and examined on HD. Isolated UF session today. BP stable. Family History: No family at bedside  ROS: No nausea or vomiting      Vitals:  BP (!) 175/76   Pulse 55   Temp 97.5 °F (36.4 °C) (Oral)   Resp 20   Ht 5' (1.524 m)   Wt 168 lb 6.9 oz (76.4 kg) Comment: 1 heavy blanket included  SpO2 100%   BMI 32.89 kg/m²   I/O last 3 completed shifts: In: 1138 [P.O.:480; I.V.:258]  Out: 3425 [Urine:25]  I/O this shift:  In: 1000   Out: 3800     Physical Exam:  Physical Exam  Vitals signs reviewed. Constitutional:       General: She is not in acute distress. Appearance: Normal appearance. HENT:      Head: Normocephalic and atraumatic. Mouth/Throat:      Mouth: Mucous membranes are moist.   Eyes:      General: No scleral icterus. Conjunctiva/sclera: Conjunctivae normal.   Cardiovascular:      Rate and Rhythm: Normal rate. Heart sounds: No friction rub. Pulmonary:      Effort: Pulmonary effort is normal. No respiratory distress. Breath sounds: No wheezing. Comments: Diminished breath sounds bibasal  Abdominal:      General: Bowel sounds are normal. There is no distension. Tenderness: There is no abdominal tenderness. Musculoskeletal:      Right lower leg: Edema present. Left lower leg: Edema present. Neurological:      Mental Status: She is alert.            Medications:   warfarin  3 mg Oral Once    hydrALAZINE  50 mg Oral 3 times per day    warfarin (COUMADIN) daily dosing (placeholder)   Other RX Placeholder    insulin glargine  15 Units Subcutaneous Nightly    aspirin  81 mg Oral Daily    vitamin B complex w/C  1 tablet Oral Daily    donepezil  5 mg Oral Nightly    ferrous sulfate  325 mg Oral BID    melatonin ER  3 mg Oral Nightly    metoprolol tartrate  50 mg Oral BID    pantoprazole  40

## 2020-03-17 NOTE — PROGRESS NOTES
4 Eyes Skin Assessment     The patient is being assess for  Shift Handoff    I agree that 2 RN's have performed a thorough Head to Toe Skin Assessment on the patient. ALL assessment sites listed below have been assessed. Areas assessed by both nurses: Yoandy Brown RN   [x]   Head, Face, and Ears   [x]   Shoulders, Back, and Chest  [x]   Arms, Elbows, and Hands   [x]   Coccyx, Sacrum, and IschIum  [x]   Legs, Feet, and Heels        Does the Patient have Skin Breakdown?   No         Choco Prevention initiated:  Yes   Wound Care Orders initiated:  No      C nurse consulted for Pressure Injury (Stage 3,4, Unstageable, DTI, NWPT, and Complex wounds), New and Established Ostomies:  No      Nurse 1 eSignature: Electronically signed by Carmencita Julian RN on 3/17/20 at 6:05 AM EDT    **SHARE this note so that the co-signing nurse is able to place an eSignature**    Nurse 2 eSignature: Electronically signed by Bulmaro Jade RN on 3/17/20 at 11:50 PM EDT

## 2020-03-17 NOTE — CARE COORDINATION
Tony is contracted with a dialysis company that will not arrange HD for patient's with dx of KIMBERLY. Has to be ESRD. If this will be ongoing documentation must reflect that patient is now ESRD. [facility] can take patient with KIMBERLY in certain situations however patient has no transportation to and from and has to go to a facility that has in house HD. Will discuss [facility] with patient again and see if she is agreeable. The only way [facility] can accept is if patient truly needs ongoing HD for ESRD. Patient was agreeable to [facility] since they sometimes can help with transportation HD. Referral initiated with [facility] admissions liaison. She states she will have to check with director regarding the transportation to and from HD? She will let writer know if they can accept. She has an outpt spot at [facility] already on MWF at 11:15 AM chair time and if [facility] accepts she will keep this time. Will follow for facility determination and assist with discharge when cleared medically. Need PT/OT , request orders and per Dr. Marianne katz to place PT/OT orders in [employer].

## 2020-03-17 NOTE — PROGRESS NOTES
Hospitalist Progress Note      PCP: Kwame Mccloud MD    Date of Admission: 3/7/2020    Chief Complaint: Shortness of breath, edema    Hospital Course: Admitted with edema and shortness of breath, diagnosed with acute systolic congestive heart failure. Being evaluated by nephrology for diuresis and edema  Cardiology consulted for congestive heart failure  On IV vancomycin for empiric treatment of lower extremity cellulitis. Switched to oral doxycycline, but cultures showed resistance. Changed back on IV vancomycin because of resistance and allergy patterns. Cardiac stress test is unremarkable. Hemodialysis started because of progressive renal failure. Visited during hemodialysis  Noted edema less than before. Subjective: No shortness of breath at rest.  No chest pain, no nausea, no vomiting.   No new complaints or issues      Medications:  Reviewed    Infusion Medications    dextrose       Scheduled Medications    warfarin  3 mg Oral Once    hydrALAZINE  50 mg Oral 3 times per day    warfarin (COUMADIN) daily dosing (placeholder)   Other RX Placeholder    insulin glargine  15 Units Subcutaneous Nightly    aspirin  81 mg Oral Daily    vitamin B complex w/C  1 tablet Oral Daily    donepezil  5 mg Oral Nightly    ferrous sulfate  325 mg Oral BID    melatonin ER  3 mg Oral Nightly    metoprolol tartrate  50 mg Oral BID    pantoprazole  40 mg Oral QAM AC    rosuvastatin  5 mg Oral Daily    sennosides-docusate sodium  1 tablet Oral BID    sodium chloride flush  10 mL Intravenous 2 times per day    insulin lispro  0-12 Units Subcutaneous TID WC    insulin lispro  0-6 Units Subcutaneous Nightly    sertraline  100 mg Oral Daily     PRN Meds: citrate dextrose, glucose, dextrose, glucagon (rDNA), dextrose, povidone-iodine, perflutren lipid microspheres, regadenoson, hydrOXYzine, ALPRAZolam, sodium chloride flush, acetaminophen **OR** acetaminophen, polyethylene glycol, promethazine **OR** this time. Continue to monitor. Elevated troponin  No reversible ischemia noted on cardiac stress test  No chest pain. Diffuse edema  Appears multifactorial, not only CHF  I anticipate some edema will remain because of venous insufficiency. Edema stable overnight. Continue fluid removal with hemodialysis. Nephrology monitoring    Hypertension  Blood pressure reasonably well controlled with hemodialysis. No hypotension noted yet. Dementia  Continue donepezil    Diabetes mellitus type 2  Continue Lantus, sliding-scale insulin and carb controlled diet. Blood sugar is acceptable now    Lower extremity cellulitis  Consulted wound care  Podiatry has seen  Stop vancomycin. Treatment course complete    Discussed with nursing    DVT Prophylaxis: Resumed warfarin  Diet: DIET CARB CONTROL; Low Sodium (2 GM); Daily Fluid Restriction: 1500 ml  Code Status: Full Code    PT/OT Eval Status: Requested    Dispo -inpatient, unclear length of stay.     Federica Lawrence MD

## 2020-03-18 NOTE — PROGRESS NOTES
Pt resting in bed. Call light within reach. Getting ready to go to dialysis.  No complaints of pain or discomfort

## 2020-03-18 NOTE — PROGRESS NOTES
Pt is alert and oriented. VSS. RA. Pt denies pain and discomfort at this time. Shift assessment completed and documented. Call light within reach. Bed side table within reach. Wheels locked. Bed in lowest position. Bed check in place. Pt instructed to call out for assistance. Pt expressesed understanding & calls out appropritately. All care per orders. Will continue to monitor.  Electronically signed by Sherley Myers RN on 3/17/2020 at 11:25 PM

## 2020-03-18 NOTE — PROGRESS NOTES
.4 Eyes Skin Assessment     The patient is being assess for  Shift Handoff    I agree that 2 RN's have performed a thorough Head to Toe Skin Assessment on the patient. ALL assessment sites listed below have been assessed. Pt has bruising on arms, wounds on bilateral lower extremities, skin tears of bilateral arms   Areas assessed by both nurses: Mary Coley RN and Emigdio RN[x]   Head, Face, and Ears   [x]   Shoulders, Back, and Chest  [x]   Arms, Elbows, and Hands   [x]   Coccyx, Sacrum, and IschIum  [x]   Legs, Feet, and Heels        Does the Patient have Skin Breakdown? Yes LDA WOUND CARE was Initiated documentation include the Tess-wound, Wound Assessment, Measurements, Dressing Treatment, Drainage, and Color\",       Wound LDA in computer  Choco Prevention initiated:  Yes   Wound Care Orders initiated:  Yes      [id number]  nurse consulted for Pressure Injury (Stage 3,4, Unstageable, DTI, NWPT, and Complex wounds), New and Established Ostomies: Yes.  Wound care seeing pt    Nurse 1 eSignature: Electronically signed by Duong Padgett RN on 3/18/20 at 3:15 PM EDT    **SHARE this note so that the co-signing nurse is able to place an eSignature**    Nurse 2 eSignature: Electronically signed by Jennie Luna RN on 3/18/20 at 6:20 PM EDT

## 2020-03-18 NOTE — PROGRESS NOTES
Occupational Therapy   Occupational Therapy Initial Assessment and Treatment Note   Date: 3/18/2020   Patient Name: Ishaan Solorzano  MRN: [id number]     : 1945    Date of Service: 3/18/2020    Discharge Recommendations:  Subacute/Skilled Nursing Facility     Assessment   Performance deficits / Impairments: Decreased functional mobility ; Decreased ADL status  Assessment: OT eval completed. Pt admitted for anasarca. She demo's impaired self care skills and mobility due to balance deficits and c/o general weakness. Pt lives alone and needs to be at higher functional level. Recommend SNF for skilled OT services. Cont OT. Prognosis: Good  Decision Making: Medium Complexity  OT Education: OT Role;ADL Adaptive Strategies;Transfer Training;Plan of Care  REQUIRES OT FOLLOW UP: Yes  Activity Tolerance  Activity Tolerance: Patient Tolerated treatment well  Safety Devices  Safety Devices in place: Yes  Type of devices: Nurse notified;Gait belt;Call light within reach; Chair alarm in place; Left in chair         Patient Diagnosis(es): The primary encounter diagnosis was Pleural effusion. Diagnoses of Shortness of breath, Elevated troponin, Cellulitis of left lower extremity, and Acute on chronic congestive heart failure, unspecified heart failure type [facility]) were also pertinent to this visit. has a past medical history of Allergic, Anemia, Angina, Arthritis, Asthma, Cancer ([address] Coronary artery disease, Diabetes mellitus ([address] Heart attack ([address] Hyperlipidemia, Hypertension, Obesity, and Skin ulcer of right foot with necrosis of muscle ([address] has a past surgical history that includes Diagnostic Cardiac Cath Lab Procedure; Cataract removal with implant; Carpal tunnel release; Knee arthroscopy; Coronary angioplasty with stent (2012); Hysterectomy; bronchoscopy (N/A, 2019); bronchoscopy (N/A, 2019); bronchoscopy (2019); bronchoscopy (2019);  Upper gastrointestinal endoscopy (N/A, 2019); and

## 2020-03-18 NOTE — PROGRESS NOTES
Hospitalist Progress Note      PCP: Joi Price MD    Date of Admission: 3/7/2020    Chief Complaint: Shortness of breath, edema    Hospital Course: Admitted with edema and shortness of breath, diagnosed with acute systolic congestive heart failure. Being evaluated by nephrology for diuresis and edema  Cardiology consulted for congestive heart failure  Hemodialysis started because of progressive renal failure. Visited during hemodialysis  Patient's edema is less than before. Subjective: No shortness of breath at rest.  No chest pain, no nausea, no vomiting.   No new complaints or issues      Medications:  Reviewed    Infusion Medications    dextrose       Scheduled Medications    hydrALAZINE  50 mg Oral 3 times per day    warfarin (COUMADIN) daily dosing (placeholder)   Other RX Placeholder    insulin glargine  15 Units Subcutaneous Nightly    aspirin  81 mg Oral Daily    vitamin B complex w/C  1 tablet Oral Daily    donepezil  5 mg Oral Nightly    melatonin ER  3 mg Oral Nightly    metoprolol tartrate  50 mg Oral BID    pantoprazole  40 mg Oral QAM AC    rosuvastatin  5 mg Oral Daily    sennosides-docusate sodium  1 tablet Oral BID    sodium chloride flush  10 mL Intravenous 2 times per day    insulin lispro  0-12 Units Subcutaneous TID WC    insulin lispro  0-6 Units Subcutaneous Nightly    sertraline  100 mg Oral Daily     PRN Meds: citrate dextrose, glucose, dextrose, glucagon (rDNA), dextrose, povidone-iodine, perflutren lipid microspheres, regadenoson, hydrOXYzine, ALPRAZolam, sodium chloride flush, acetaminophen **OR** acetaminophen, polyethylene glycol, promethazine **OR** ondansetron      Intake/Output Summary (Last 24 hours) at 3/18/2020 1245  Last data filed at 3/18/2020 1230  Gross per 24 hour   Intake 1370 ml   Output 3400 ml   Net -2030 ml       Physical Exam Performed:    BP (!) 116/53   Pulse 60   Temp 97.9 °F (36.6 °C)   Resp 16   Ht 5' (1.524 m)   Wt 156 lb 1.4 oz (70.8 kg) Comment: standing scale  SpO2 92%   BMI 30.48 kg/m²     General appearance: No apparent distress, appears stated age and cooperative. HEENT: Pupils equal, round, and reactive to light. Conjunctivae/corneas clear. Neck: Supple, with full range of motion. No jugular venous distention. Trachea midline. Respiratory:  Normal respiratory effort. Few bibasilar inspiratory crackles  Cardiovascular: Regular rate and rhythm with normal S1/S2 without murmurs, rubs or gallops. Abdomen: Soft, non-tender, non-distended with normal bowel sounds. Musculoskeletal: No clubbing or cyanosis. 1+ bilateral pitting soft edema bilaterally. Full range of motion without deformity. Skin: Skin color, texture, turgor normal.  Right foot erythema  Neurologic:  Neurovascularly intact without any focal sensory/motor deficits. Cranial nerves: II-XII intact, grossly non-focal.  Psychiatric: Alert and oriented, thought content appropriate, normal insight    I examined the patient today (03/18/20). Physical exam similar to yesterday (3/16), noted improving edema     Labs:   Recent Labs     03/17/20  0820 03/18/20  1010   WBC 6.5  --    HGB 11.3* 11.3*   HCT 35.6* 35.7*     --      Recent Labs     03/16/20  0642 03/17/20  0600 03/18/20  0618    134* 135*   K 4.0 3.9 4.3   CL 99 100 99   CO2 25 24 26   BUN 25* 17 23*   CREATININE 3.3* 2.7* 3.2*   CALCIUM 9.1 8.6 9.1   PHOS 3.2 2.8 3.0     No results for input(s): AST, ALT, BILIDIR, BILITOT, ALKPHOS in the last 72 hours. Recent Labs     03/16/20  0642 03/17/20  0555 03/18/20  0618   INR 3.05* 2.21* 1.71*     No results for input(s): CKTOTAL, TROPONINI in the last 72 hours.     Urinalysis:      Lab Results   Component Value Date    NITRU POSITIVE 03/08/2020    WBCUA 6-10 03/08/2020    BACTERIA 4+ 03/08/2020    RBCUA 3-4 03/08/2020    BLOODU TRACE-INTACT 03/08/2020    SPECGRAV 1.020 03/08/2020    GLUCOSEU Negative 03/08/2020    GLUCOSEU NEGATIVE 10/07/2011 completely. Hypertension  Blood pressure reasonably well controlled with hemodialysis. No hypotension noted yet. Dementia  Continue donepezil    Diabetes mellitus type 2  Continue Lantus, sliding-scale insulin and carb controlled diet. Blood sugar is acceptable now    Lower extremity cellulitis  Resolving. Antibiotic treatment complete    Discussed with nursing  Discussed with patient    DVT Prophylaxis: Holding warfarin for permanent vascular access placement  Diet: DIET CARB CONTROL; Low Sodium (2 GM); Daily Fluid Restriction: 1000 ml  Code Status: Full Code    PT/OT Eval Status: Requested    Dispo -inpatient, unclear length of stay.     Kerry Carolina MD

## 2020-03-18 NOTE — CARE COORDINATION
Call placed to Nephrologist at [phone]. Left message for Dr. Elvira Borja or attending nephrologist to return writer's call to clarify diagnosis of KIMBERLY/CKD verses ESRD. Barrier to discharge is SNF placement. Family cannot provide transportation to and from HD. Only two facilities in this area have the ability to provide HD while at SNF and that is [facility] and [facility]. Patient ia adamant about not wanting to go to [facility] . [facility] However can not approve her unless she is getting HD for ESRD. [facility] can provide HD with her current KIMBERLY diagnosis and she has a spout outpatient at [facility] at 11:15 chair time. At present plan is [facility] verses [facility] who may be able to assist with transportation to and from the [facility] location pending PT eval and input. Awaiting return call from nephrologist.      Addendum: Nephrology wrote in notes today KIMBERLY/CKD progressing toward ESRD. TDC placement pending. Arlyn from [facility] will continue to follow in light of Nephrology's documentation about ESRD this facility still may be an option. Both [facility] and [facility] are waiting for PT/OT evaluate and see what they recommend regarding therapy needs .

## 2020-03-18 NOTE — FLOWSHEET NOTE
03/18/20 0840 03/18/20 1230   Vital Signs   Temp 98.2 °F (36.8 °C) 97.9 °F (36.6 °C)   Pulse 61 60   BP (!) 138/59 (!) 116/53   Height and Weight   Weight 163 lb 2.3 oz (74 kg)  (standing scale) 156 lb 1.4 oz (70.8 kg)  (standing scale)     Treatment time: 210min    Net UF: 3000ml    Pre weight: 74k   Post weight: 70.8k  EDW: TBD    Access used: LIJ vas cath  Access function: Good    Medications or blood products given: None    Regular outpatient schedule: MWF    Summary of response to treatment: Tolerated well. Copy of dialysis treatment record placed in chart, to be scanned into EMR.

## 2020-03-18 NOTE — PROGRESS NOTES
Physical/Occupational Therapy    PT/OT attempted to evaluate this patient. However patient was off the floor and at dialysis. PT/OT will re-attempt to evaluate this patient.      Luzmaria Forte PT  Graciela Handley OT

## 2020-03-18 NOTE — PROGRESS NOTES
CREATININE 3.3* 2.7* 3.2*   LABGLOM 14* 17* 14*   GFRAA 16* 21* 17*       Assessment/Plan:      A/CKD progressing to ESRD.  - HD started on 3/13/20 for worsening renal function associated with fluid overload not responding to high dose diuretics. LIJ temporary dialysis catheter placed on 3/13/20.  - Follows with Dr. Felipa Kay in the office.  - Plan for next HD today per [facility] schedule. - Pending TDC placement this week. - / for outpatient HD arrangements. Acute on chronic CHF, combined with anasarca. - Random urine PCR of 700mg/g.  - Severe pulmonary HTN.  - Fluid and sodium restriction. Hypertension.  - Continue Metoprolol and Hydralazine. -  Anemia.  - Hgb above goal.  - Hold ANKUR and monitor.

## 2020-03-18 NOTE — PROGRESS NOTES
Fair  Sitting - Dynamic: Fair  Standing - Static: Fair  Standing - Dynamic: Fair  Comments: with RW  Exercises  Hip Flexion: 1 x 10  Hip Abduction: 1 x 10  Knee Long Arc Quad: 1 x 10  Ankle Pumps: 1 x 10  Comments: cueing for sequencing and technique. Other exercises  Other exercises?: No     Plan   Plan  Times per week: 3-5/week  Plan weeks: 1 week 3/25/20  Specific instructions for Next Treatment: progress mobility as tolerated. Current Treatment Recommendations: Strengthening, ADL/Self-care Training, Gait Training, Patient/Caregiver Education & Training, Stair training, Equipment Evaluation, Education, & procurement, Balance Training, Pain Management, Endurance Training, Functional Mobility Training, Home Exercise Program, Transfer Training, Safety Education & Training  Safety Devices  Type of devices: All fall risk precautions in place, Left in chair, Call light within reach, Chair alarm in place, Nurse notified, Gait belt, Patient at risk for falls    AM-PAC Score     AM-PAC Inpatient Mobility without Stair Climbing Raw Score : 13 (03/18/20 1441)  AM-PAC Inpatient without Stair Climbing T-Scale Score : 38.96 (03/18/20 1441)  Mobility Inpatient CMS 0-100% Score: 58.44 (03/18/20 1441)  Mobility Inpatient without Stair CMS G-Code Modifier : CK (03/18/20 1441)       Goals  Short term goals  Time Frame for Short term goals: 1 week 3/25/20  Short term goal 1: Supine <> sit with modified independence. Short term goal 2: Sit <> stand with mod I. Short term goal 3: Bed <> chair with LRAD and mod I. Short term goal 4: Ambulate 10 feet with LRAD and mod I. Short term goal 5: Patient will tolerate 10-15 reps of her exercise program by 3/19. Patient Goals   Patient goals : To feel better. To decrease her swelling and improve her strength.         Therapy Time   Individual Concurrent Group Co-treatment   Time In 1330         Time Out 1404         Minutes 34         Timed Code Treatment Minutes: 24 Minutes(10 minute evaluation)       Marcia Deluca, PT

## 2020-03-19 NOTE — PROGRESS NOTES
Shift assessment completed. Pt A&O, VSS. NPO since midnight for procedure today. Non skid socks on. Bed alarm active for safety. Denies any needs or pain at this time. Bed locked and in lowest position. Call light and bedside table within reach. Will continue to monitor.

## 2020-03-19 NOTE — PROGRESS NOTES
Patient assessment complete and documented. VSS. A&O x4. Patient denies any pain at this time. Patient is now in bed and resting. Bed is in lowest locked position with call light within reach. Will continue to monitor situation.

## 2020-03-19 NOTE — CARE COORDINATION
[facility]  Cannot accept due to transportation issues. Family cannot transport to and from HD while at SNF. [facility] decision not to accept was based on PT notes of patient requiring a steady lift for transfers. She would therefore require a stretcher transport to and from HD while at [facility] which Medicare often will not cover and SNF is liable and she will not be able to go in a wheelchair Yoselyn mathew transport. Her best option is to be somewhere where there is in house HD such as [facility] or [facility] . Patient has been very verbal stating she does not want to go to [facility]. [facility] has pending referral, however they partner with [facility] at their facility. This dialysis company does not accept any patient's until they have they official diagnosis of ESRD. Thus far nephrology and internal medicine indicate that is the direction she is heading and that she will need ongoing HD for ESRD. The documentation will need to reflect this in order for the facility to accept and start pre-cert. She already had outpatient HD arranged at [facility] MWF chair time 11:15 AM for the distant future when she goes home from rehab. She will not be able to manage at home at this time due to weakness and requiring max assist. Her family is not able to provide the care she needs at home until she gets stronger and back to baseline. Monitoring nephrology recommendations, prognosis and diagnosis regarding KIMBERLY/CKD verses ESRD.  requesting clarification of diagnosis for outpatient HD from nephrology. Addendum: Arlyn  in admissions at [facility] stated the nephrologist at [facility] wants to know if the nephrologist following here is willing to complete or planning on completing a 27/28 form? Paged nephrology to inquire about this and relayed to primary nurse [facility]. Will follow and assist with D/C to SNF. Planning for [facility] today.  Spoke with nephrologist and he states patient's primary nephrologist she follow with in community will fill out the form and that patient is ESRD.

## 2020-03-19 NOTE — PROGRESS NOTES
1/16/2020). Restrictions  Restrictions/Precautions  Restrictions/Precautions: General Precautions, Fall Risk, Weight Bearing  Required Braces or Orthoses?: Yes(bilateral surgical shoes)  Lower Extremity Weight Bearing Restrictions  Right Lower Extremity Weight Bearing: Weight Bearing As Tolerated  Position Activity Restriction  Other position/activity restrictions: Up with assistance.  Per 1/17/2020 podiatry note JARAD VillalobosM\"-full weightbearing okay for short distances only in surgical shoe\"     Subjective   General  Chart Reviewed: Yes  Patient assessed for rehabilitation services?: Yes  Response to previous treatment: Patient with no complaints from previous session  Family / Caregiver Present: No  Referring Practitioner: Alina Espitia MD 3/17/2020  Diagnosis: Acute systolic congestive heart failure    Subjective  Subjective: Pt up in chair, agreeable to chair level exercises. Pt to be taken for dialysis catheter placement soon.      Vital Signs  Patient Currently in Pain: Denies     Orientation  Orientation  Overall Orientation Status: Within Functional Limits     Objective    Cognition  Overall Cognitive Status: WFL     Type of ROM/Therapeutic Exercise  Type of ROM/Therapeutic Exercise: AROM  Comment: seated  Exercises  Shoulder Flexion: 20x  Shoulder ABduction: 20x  Shoulder ADduction: 20x  Horizontal ABduction: 20x  Horizontal ADduction: 20x  Elbow Flexion: 20x  Elbow Extension: 20x  Supination: 20x  Pronation: 20x  Wrist Flexion: 20x  Wrist Extension: 20x  Finger Flexion: 20x  Finger Extension: 20x  Other: 10x forward/10x backward shoulder rolls     Plan   Plan  Times per week: 3-5x    AM-PAC Score  AM-[facility] Inpatient Daily Activity Raw Score: 15 (03/19/20 1322)  AM-PAC Inpatient ADL T-Scale Score : 34.69 (03/19/20 1322)  ADL Inpatient CMS 0-100% Score: 56.46 (03/19/20 1322)  ADL Inpatient CMS G-Code Modifier : CK (03/19/20 1322)    Goals  Short term goals  Time Frame for Short term goals: for 1 week (3/25) unless noted  Short term goal 1: Perform functional transfers to commode/chair/bed with SBA with RW  Short term goal 2: Perform toileting with min A by 3/22  Short term goal 3: Perform UE exer 15x each to improve UE endurance (GOAL MET 3/19/20)  Patient Goals   Patient goals :  \"Be able to walk again\"       Therapy Time   Individual Concurrent Group Co-treatment   Time In 2240         Time Out 1315         Minutes [address] [place] GISELA Solano/L

## 2020-03-19 NOTE — PROGRESS NOTES
vitals:  145/59  94% room air 64 BPM     Patient Diagnosis(es): The primary encounter diagnosis was Pleural effusion. Diagnoses of Shortness of breath, Elevated troponin, Cellulitis of left lower extremity, and Acute on chronic congestive heart failure, unspecified heart failure type [facility]) were also pertinent to this visit. has a past medical history of Allergic, Anemia, Angina, Arthritis, Asthma, Cancer ([facility] Coronary artery disease, Diabetes mellitus ([facility] [address] Heart attack ([facility] [address] Hyperlipidemia, Hypertension, Obesity, and Skin ulcer of right foot with necrosis of muscle ([facility] [address] 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 Restrictions  Restrictions/Precautions  Restrictions/Precautions: General Precautions, Fall Risk, Weight Bearing  Required Braces or Orthoses?: Yes(bilateral surgical shoes)  Lower Extremity Weight Bearing Restrictions  Right Lower Extremity Weight Bearing: Weight Bearing As Tolerated  Position Activity Restriction  Other position/activity restrictions: Up with assistance.  Per 1/17/2020 podiatry note Jenn Silverman DPM\"-full weightbearing okay for short distances only in surgical shoe\"  Subjective   General  Chart Reviewed: Yes  Response To Previous Treatment: Not applicable  Family / Caregiver Present: No  Subjective  Subjective: Patient agreed to participate.    General Comment  Comments: Patient in supine position in the bed at the beginning of therapy session  Pain Screening  Patient Currently in Pain: Denies  Vital Signs  Patient Currently in Pain: Denies       Orientation  Orientation  Overall Orientation Status: Within Normal Limits  Cognition   Cognition  Overall Cognitive Status: WFL  Objective   Bed mobility  Supine to Sit: Contact guard assistance  Sit to Supine: Unable to assess(patient in chair at end of session)  Scooting: Contact guard assistance(to edge of bed)  Transfers  Sit to Stand: Minimal Assistance  Stand to sit: Minimal Assistance  Bed to Chair: Minimal assistance  Comment: with RW. Ambulation  Ambulation?: Yes  WB Status: WBAT RLE for very short distances per podiatry. More Ambulation?: No  Ambulation 1  Surface: level tile  Device: Rolling Walker  Assistance: Minimal assistance  Quality of Gait: decreased weight bearing through RLE. decreased gait velocity, decreased step length/height. min assist to help turn her rolling walker. cueing to maintain base of support within rolling walker. Gait Deviations: Slow Caitlyn;Decreased step length;Decreased arm swing;Decreased step height;Shuffles  Distance: bed to chair. ~3 feet. (ambulation distance limited by patient's fatigue and podiatry weight bearing recommendations). Comments: No complaints of shortness of breath, chest pain or dizziness. 1 posterior loss of balance but patient was able to regain her balance independently. Stairs/Curb  Stairs?: No        Exercises  Straight Leg Raise: 1 x 10  Quad Sets: 1 x 10  Heelslides: 1 x 10  Gluteal Sets: 1 x 10  Hip Flexion: 2 x 10  Hip Abduction: 1 x 15  Knee Long Arc Quad: 2 x 10  Ankle Pumps: 1 x 10  Comments: cueing for sequencing and technique.    Other exercises  Other exercises?: No                     AM-PAC Score     AM-PAC Inpatient Mobility without Stair Climbing Raw Score : 13 (03/19/20 1300)  AM-PAC Inpatient without Stair Climbing T-Scale Score : 38.96 (03/19/20 1300)  Mobility Inpatient CMS 0-100% Score: 58.44 (03/19/20 1300)  Mobility Inpatient without Stair CMS G-Code Modifier : CK (03/19/20 1300)       Goals  Short term goals  Time Frame for Short term goals: 1 week 3/25/20  Short term goal 1: Supine <> sit with modified independence. Progressing CGA on 3/19/20  Short term goal 2: Sit <> stand with mod I.  Progressing min A on 3/19/20  Short term goal 3: Bed <> chair with LRAD and mod I. Progressing min A on 3/19/20  Short term goal 4: Ambulate 10 feet with LRAD and mod I. Short term goal 5: Patient will tolerate 10-15 reps of her exercise program by 3/19. Goal met 3/19  Patient Goals   Patient goals : To feel better. To decrease her swelling and improve her strength. Plan    Plan  Times per week: 3-5/week  Plan weeks: 1 week 3/25/20  Specific instructions for Next Treatment: progress mobility as tolerated. Current Treatment Recommendations: Strengthening, ADL/Self-care Training, Gait Training, Patient/Caregiver Education & Training, Stair training, Equipment Evaluation, Education, & procurement, Balance Training, Pain Management, Endurance Training, Functional Mobility Training, Home Exercise Program, Transfer Training, Safety Education & Training  Safety Devices  Type of devices:  All fall risk precautions in place, Left in chair, Call light within reach, Chair alarm in place, Nurse notified, Gait belt, Patient at risk for falls     Therapy Time   Individual Concurrent Group Co-treatment   Time In 1155         Time Out 1218         Minutes 23         Timed Code Treatment Minutes: [facility] [facility], PT

## 2020-03-19 NOTE — PROGRESS NOTES
Patient's left tunneled dialysis catheter placed without complication. Patient tolerated procedure well et returned to [facility] score prior to transfer to Floor. Report called to Alisa Seaman, Floor RN. Condition stable at time of transfer to Floor.

## 2020-03-19 NOTE — PROGRESS NOTES
Hospitalist Progress Note      PCP: Дмитрий Renteria MD    Date of Admission: 3/7/2020    Chief Complaint: Shortness of breath, edema    Hospital Course: Admitted with edema and shortness of breath, diagnosed with acute systolic congestive heart failure. Being evaluated by nephrology for diuresis and edema  Cardiology consulted for congestive heart failure  Hemodialysis started because of progressive renal failure. Had tunneled dialysis catheter insertion, this was converted from temporary. Subjective: No shortness of breath at rest.  No chest pain, no nausea, no vomiting. No new complaints or issues. Tolerated dialysis catheter insertion well.       Medications:  Reviewed    Infusion Medications    dextrose       Scheduled Medications    clindamycin (CLEOCIN) IV  600 mg Intravenous Once    hydrALAZINE  50 mg Oral 3 times per day    warfarin (COUMADIN) daily dosing (placeholder)   Other RX Placeholder    insulin glargine  15 Units Subcutaneous Nightly    aspirin  81 mg Oral Daily    donepezil  5 mg Oral Nightly    melatonin ER  3 mg Oral Nightly    metoprolol tartrate  50 mg Oral BID    pantoprazole  40 mg Oral QAM AC    rosuvastatin  5 mg Oral Daily    sennosides-docusate sodium  1 tablet Oral BID    sodium chloride flush  10 mL Intravenous 2 times per day    insulin lispro  0-12 Units Subcutaneous TID WC    insulin lispro  0-6 Units Subcutaneous Nightly    sertraline  100 mg Oral Daily     PRN Meds: citrate dextrose, glucose, dextrose, glucagon (rDNA), dextrose, povidone-iodine, perflutren lipid microspheres, regadenoson, hydrOXYzine, ALPRAZolam, sodium chloride flush, acetaminophen **OR** acetaminophen, polyethylene glycol, promethazine **OR** ondansetron      Intake/Output Summary (Last 24 hours) at 3/19/2020 1555  Last data filed at 3/19/2020 0631  Gross per 24 hour   Intake 200 ml   Output --   Net 200 ml       Physical Exam Performed:    BP (!) 157/60   Pulse 66   Temp 97.9 °F (36.6 °C) (Oral)   Resp 16   Ht 5' (1.524 m)   Wt 162 lb 9.6 oz (73.8 kg)   SpO2 100%   BMI 31.76 kg/m²     General appearance: No apparent distress, appears stated age and cooperative. HEENT: Pupils equal, round, and reactive to light. Conjunctivae/corneas clear. Neck: Supple, with full range of motion. No jugular venous distention. Trachea midline. Respiratory:  Normal respiratory effort. Few bibasilar inspiratory crackles  Cardiovascular: Regular rate and rhythm with normal S1/S2 without murmurs, rubs or gallops. Abdomen: Soft, non-tender, non-distended with normal bowel sounds. Musculoskeletal: No clubbing or cyanosis. 1+ bilateral pitting soft edema bilaterally. Full range of motion without deformity. Skin: Skin color, texture, turgor normal.  Right foot erythema  Neurologic:  Neurovascularly intact without any focal sensory/motor deficits. Cranial nerves: II-XII intact, grossly non-focal.  Psychiatric: Alert and oriented, thought content appropriate, normal insight    I examined the patient today (03/19/20). Physical exam similar to yesterday (3/18)    Labs:   Recent Labs     03/17/20  0820 03/18/20  1010   WBC 6.5  --    HGB 11.3* 11.3*   HCT 35.6* 35.7*     --      Recent Labs     03/17/20  0600 03/18/20  0618 03/19/20  0528   * 135* 134*   K 3.9 4.3 4.2    99 96*   CO2 24 26 27   BUN 17 23* 16   CREATININE 2.7* 3.2* 2.6*   CALCIUM 8.6 9.1 8.8   PHOS 2.8 3.0 2.3*     No results for input(s): AST, ALT, BILIDIR, BILITOT, ALKPHOS in the last 72 hours. Recent Labs     03/17/20  0555 03/18/20  0618 03/19/20  0528   INR 2.21* 1.71* 1.46*     No results for input(s): CKTOTAL, TROPONINI in the last 72 hours.     Urinalysis:      Lab Results   Component Value Date    NITRU POSITIVE 03/08/2020    WBCUA 6-10 03/08/2020    BACTERIA 4+ 03/08/2020    RBCUA 3-4 03/08/2020    BLOODU TRACE-INTACT 03/08/2020    SPECGRAV 1.020 03/08/2020    GLUCOSEU Negative 03/08/2020    GLUCOSEU NEGATIVE 10/07/2011       Radiology:  IR TUNNELED CVC PLACE WO SQ PORT/PUMP > 5 YEARS   Final Result   Successful fluoroscopic guided conversion of the left internal jugular   temporary hemodialysis catheter for a 23 cm tip to cuff tunneled hemodialysis   catheter. IR NONTUNNELED VASCULAR CATHETER > 5 YEARS   Final Result   Successful ultrasound and fluoroscopy guided non-tunneled left internal   jugular temporary hemodialysis catheter placement. 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 NM Cardiac Stress Test Nuclear Imaging   Final Result      XR FOOT RIGHT (2 VIEWS)   Final Result   1. No acute osseous abnormality of the right foot. 2. Diffuse soft tissue swelling of the midfoot and forefoot. No subcutaneous   gas. 3. No obvious osteomyelitis at the site of the patient's reported heel   ulceration. 4. 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 CT CHEST WO CONTRAST   Final Result   1. Moderate right pleural effusion with adjacent atelectasis. 2. Multiple 2-4 mm solid pulmonary nodules bilaterally dating back to   10/15/2019. 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 These were not well seen in 2014. 3. Mild anasarca. XR CHEST STANDARD (2 VW)   Final Result   Whole right-sided pleural effusion and trace left-sided effusion. Cardiomegaly. Assessment/Plan:    Active Hospital Problems    Diagnosis    Diabetic polyneuropathy associated with type 2 diabetes mellitus ([facility] [address] [E11.42]    Diabetic ulcer of right heel associated with type 2 diabetes mellitus, with necrosis of muscle (HCC) [Q32.928, L97.413]    Anasarca [R60.1]       PLAN    Acute systolic congestive heart failure  Stable after being started on hemodialysis. Edema stabilized. Successful fluid removal with dialysis. Asymptomatic now.     Acute on chronic kidney disease stage III  Has progressed to end-stage  Patient to require hemodialysis after discharge. Arrangements are being made. Diffuse edema  Appears multifactorial, not only CHF  I anticipate some edema will remain because of venous insufficiency. Slowly improving with fluid removal.  Full resolution of expected    Hypertension  Blood pressure reasonably well controlled with hemodialysis. No hypotension noted     Dementia  Continue donepezil    Diabetes mellitus type 2  Continue Lantus, sliding-scale insulin and carb controlled diet. Blood sugar is acceptable now    Lower extremity cellulitis  Resolved. Back to baseline. Completed antibiotic treatment. Discussed with nursing  Discussed with patient    DVT Prophylaxis: Holding warfarin for permanent vascular access placement; resume when appropriate. Diet: DIET CARB CONTROL; Low Sodium (2 GM); Daily Fluid Restriction: 1000 ml  Code Status: Full Code    PT/OT Eval Status: Requested    Dispo -inpatient, unclear length of stay.     Rena Panda MD

## 2020-03-19 NOTE — PRE SEDATION
Sedation Pre-Procedure Note    Patient Name: Cuauhtemoc Arreaga   YOB: 1945  Room/Bed: 0100/6055-83  Medical Record Number: [id number]  Date: 3/19/2020   Time: 3:19 PM       Indication:  Renal failure    Consent: I have discussed with the patient and/or the patient representative the indication, alternatives, and the possible risks and/or complications of the planned procedure and the anesthesia methods. The patient and/or patient representative appear to understand and agree to proceed. Vital Signs:   Vitals:    03/19/20 1430   BP: (!) 156/56   Pulse: 65   Resp: 15   Temp:    SpO2: 100%       Past Medical History:   has a past medical history of Allergic, Anemia, Angina, Arthritis, Asthma, Cancer ([address] Coronary artery disease, Diabetes mellitus ([address] Heart attack ([address] Hyperlipidemia, Hypertension, Obesity, and Skin ulcer of right foot with necrosis of muscle ([address] Past Surgical History:   has a past surgical history that includes Diagnostic Cardiac Cath Lab Procedure; Cataract removal with implant; Carpal tunnel release; Knee arthroscopy; Coronary angioplasty with stent (4/2012); Hysterectomy; bronchoscopy (N/A, 11/12/2019); bronchoscopy (N/A, 11/23/2019); bronchoscopy (11/23/2019); bronchoscopy (11/23/2019); Upper gastrointestinal endoscopy (N/A, 12/2/2019); and Toe amputation (Right, 1/16/2020).     Medications:   Scheduled Meds:    clindamycin (CLEOCIN) IV  600 mg Intravenous Once    hydrALAZINE  50 mg Oral 3 times per day    warfarin (COUMADIN) daily dosing (placeholder)   Other RX Placeholder    insulin glargine  15 Units Subcutaneous Nightly    aspirin  81 mg Oral Daily    donepezil  5 mg Oral Nightly    melatonin ER  3 mg Oral Nightly    metoprolol tartrate  50 mg Oral BID    pantoprazole  40 mg Oral QAM AC    rosuvastatin  5 mg Oral Daily    sennosides-docusate sodium  1 tablet Oral BID    sodium chloride flush  10 mL Intravenous 2 times per day    insulin lispro  0-12 Units Subcutaneous TID WC    insulin lispro  0-6 Units Subcutaneous Nightly    sertraline  100 mg Oral Daily     Continuous Infusions:    dextrose       PRN Meds: citrate dextrose, glucose, dextrose, glucagon (rDNA), dextrose, povidone-iodine, perflutren lipid microspheres, regadenoson, hydrOXYzine, ALPRAZolam, sodium chloride flush, acetaminophen **OR** acetaminophen, polyethylene glycol, promethazine **OR** ondansetron  Home Meds:   Prior to Admission medications    Medication Sig Start Date End Date Taking? Authorizing Provider   metoprolol tartrate (LOPRESSOR) 50 MG tablet Take 50 mg by mouth 2 times daily   Yes Historical Provider, MD   warfarin (COUMADIN) 3 MG tablet Take 3 mg by mouth daily   Yes Historical Provider, MD   ALPRAZolam (XANAX) 0.5 MG tablet Take 0.5 mg by mouth 3 times daily as needed for Anxiety.    Yes Historical Provider, MD   rosuvastatin (CRESTOR) 5 MG tablet Take 5 mg by mouth daily   Yes Historical Provider, MD   potassium chloride (KLOR-CON M) 10 MEQ extended release tablet Take 10 mEq by mouth daily   Yes Historical Provider, MD   vancomycin (VANCOCIN) 50 mg/mL oral solution Take by mouth 4 times daily   Yes Historical Provider, MD   sertraline (ZOLOFT) 50 MG tablet Take 1 tablet by mouth daily 1/23/20  Yes Whitney Tubbs MD   insulin glargine (LANTUS) 100 UNIT/ML injection vial Inject 19 Units into the skin nightly 1/23/20  Yes Whitney Tubbs MD   insulin lispro (HUMALOG) 100 UNIT/ML injection vial Inject 0-12 Units into the skin 3 times daily (with meals) **Medium Dose Corrective Algorithm**  Glucose: Dose:  If <139             No Insulin  140-199 2 Units  200-249 4 Units  250-299 6 Units  300-349 8 Units  350-400 10 Units  Above 400       12 Units 1/23/20  Yes Whitney Tubbs MD   donepezil (ARICEPT) 5 MG tablet Take 1 tablet by mouth nightly 1/23/20  Yes Whitney Tubbs MD   ferrous sulfate 325 (65 Fe) MG tablet Take 325 mg by mouth 2 times daily   Yes Historical Provider, MD

## 2020-03-20 NOTE — PROGRESS NOTES
SPECGRAV 1.020 03/08/2020    GLUCOSEU Negative 03/08/2020    GLUCOSEU NEGATIVE 10/07/2011       Radiology:  IR TUNNELED CVC PLACE WO SQ PORT/PUMP > 5 YEARS   Final Result   Successful fluoroscopic guided conversion of the left internal jugular   temporary hemodialysis catheter for a 23 cm tip to cuff tunneled hemodialysis   catheter. IR NONTUNNELED VASCULAR CATHETER > 5 YEARS   Final Result   Successful ultrasound and fluoroscopy guided non-tunneled left internal   jugular temporary hemodialysis catheter placement. 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 NM Cardiac Stress Test Nuclear Imaging   Final Result      XR FOOT RIGHT (2 VIEWS)   Final Result   1. No acute osseous abnormality of the right foot. 2. Diffuse soft tissue swelling of the midfoot and forefoot. No subcutaneous   gas. 3. No obvious osteomyelitis at the site of the patient's reported heel   ulceration. 4. 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 CT CHEST WO CONTRAST   Final Result   1. Moderate right pleural effusion with adjacent atelectasis. 2. Multiple 2-4 mm solid pulmonary nodules bilaterally dating back to   10/15/2019. 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 These were not well seen in 2014. 3. Mild anasarca. XR CHEST STANDARD (2 VW)   Final Result   Whole right-sided pleural effusion and trace left-sided effusion. Cardiomegaly. Assessment/Plan:    Active Hospital Problems    Diagnosis    Diabetic polyneuropathy associated with type 2 diabetes mellitus ([facility] [address] [E11.42]    Diabetic ulcer of right heel associated with type 2 diabetes mellitus, with necrosis of muscle (HCC) [O52.160, L97.413]    Anasarca [R60.1]       PLAN    Acute systolic congestive heart failure  Stable after being started on hemodialysis. Edema stable with fluid removal with dialysis. Asymptomatic now.     Acute on chronic kidney disease stage III  Has progressed to end-stage  Patient to require hemodialysis after discharge. Arrangements are being made. Diffuse edema  Appears multifactorial, not only CHF  I anticipate some edema will remain because of venous insufficiency. Slowly improving with fluid removal.    Hypertension  Blood pressure reasonably well controlled with hemodialysis. No hypotension noted     Dementia  Continue donepezil    Diabetes mellitus type 2  Continue Lantus, sliding-scale insulin and carb controlled diet. Blood sugar is acceptable now    Lower extremity cellulitis  Resolved. Back to baseline. Completed antibiotic treatment. Discussed with nursing  Discussed with patient    DVT Prophylaxis: Resumed warfarin  Diet: DIET CARB CONTROL; Low Sodium (2 GM); Daily Fluid Restriction: 1000 ml  Code Status: Full Code    PT/OT Eval Status: Requested     Dispo -discharge will depend on outpatient dialysis arrangements.     Lang Villalba MD

## 2020-03-20 NOTE — PROGRESS NOTES
loading of R heel 95% of time, mildly unsteady but no LOB  Gait Deviations: Slow Caitlyn;Decreased step length;Decreased arm swing;Decreased step height;Shuffles  Distance: 40 ft in room  Comments: No complaints of shortness of breath, chest pain or dizziness, no LOB     Balance  Posture: Good  Sitting - Static: Good;-  Sitting - Dynamic: Good;-  Standing - Static: Fair;+  Standing - Dynamic: Fair;+  Exercises  Straight Leg Raise: 1 x 10  Quad Sets: 1 x 10  Heelslides: 1 x 10  Gluteal Sets: 1 x 10  Hip Flexion: 2 x 10  Hip Abduction: 1 x 15  Knee Long Arc Quad: 2 x 10  Ankle Pumps: 1 x 10                        AM-PAC Score     AM-PAC Inpatient Mobility without Stair Climbing Raw Score : 15 (03/20/20 1526)  AM-PAC Inpatient without Stair Climbing T-Scale Score : 43.03 (03/20/20 1526)  Mobility Inpatient CMS 0-100% Score: 47.43 (03/20/20 1526)  Mobility Inpatient without Stair CMS G-Code Modifier : CK (03/20/20 1526)       Goals  Short term goals  Time Frame for Short term goals: 1 week 3/25/20  Short term goal 1: Supine <> sit with modified independence. Progressing CGA on 3/19/20  Short term goal 2: Sit <> stand with mod I.  Progressing min A on 3/19/20  Short term goal 3: Bed <> chair with LRAD and mod I. Progressing min A on 3/19/20  Short term goal 4: Ambulate 10 feet with LRAD and mod I. Short term goal 5: Patient will tolerate 10-15 reps of her exercise program by 3/19. Goal met 3/19  Patient Goals   Patient goals : To feel better. To decrease her swelling and improve her strength. Plan    Plan  Times per week: 3-5/week  Plan weeks: 1 week 3/25/20  Specific instructions for Next Treatment: progress mobility as tolerated.    Current Treatment Recommendations: Strengthening, ADL/Self-care Training, Gait Training, Patient/Caregiver Education & Training, Stair training, Equipment Evaluation, Education, & procurement, Balance Training, Pain Management, Endurance Training, Functional Mobility Training, Home Exercise Program, Transfer Training, Safety Education & Training  Safety Devices  Type of devices: All fall risk precautions in place, Call light within reach, Nurse notified, Gait belt, Patient at risk for falls, Bed alarm in place, Left in bed(pt going to dialysis)     Therapy Time   Individual Concurrent Group Co-treatment   Time In 0818         Time Out 0845         Minutes 27              If pt is discharged prior to next therapy session, this note will serve as discharge summary.     Thais Guo, PT

## 2020-03-20 NOTE — PROGRESS NOTES
Carbs/Meal, 2gm Sodium, Fluid Restriction   · Oral Diet intake: 1-25%, 26-50%, 51-75%, %  · Oral Nutrition Supplement (ONS) Orders: None  · ONS intake: Unable to assess  · Anthropometric Measures:  · Ht: 5' (152.4 cm)   · Current Body Wt: 162 lb (73.5 kg)  · Admission Body Wt: 175 lb (79.4 kg)(bedscale)  · % Weight Change:  ,  Wt changes, Diuresing as pt was fluid overloaded on admission   · Ideal Body Wt: 100 lb (45.4 kg),   · BMI Classification: BMI 35.0 - 39.9 Obese Class II    Nutrition Interventions:   Continue current diet  Continued Inpatient Monitoring    Nutrition Evaluation:   · Evaluation: Progressing toward goals   · Goals: Pt will consume greater than 50% of meals and ONS this admission    · Monitoring: Meal Intake, Supplement Intake, Weight, Pertinent Labs, Patient/Family Education      Electronically signed by Shannan Roach RD, LD on 3/20/20 at 11:00 AM EDT    Contact Number: [phone]

## 2020-03-20 NOTE — PROGRESS NOTES
Nephrology Progress Note   [email or web address]      This patient is a 75year old female whom we are following for anasarca and A/CKD. Subjective: The patient was seen and examined during dialysis; she feels well today with no CP, SOB, nausea or vomiting. ROS: No fever or chills. Social: No family at bedside. Vitals:  /68   Pulse 60   Temp 98.3 °F (36.8 °C) (Oral)   Resp 20   Ht 5' (1.524 m)   Wt 162 lb 0.6 oz (73.5 kg)   SpO2 93%   BMI 31.65 kg/m²   I/O last 3 completed shifts: In: 480 [P.O.:480]  Out: -   No intake/output data recorded. General appearance: Seems comfortable, no acute distress. Neck: Trachea midline, thyroid normal.   Lungs:  Non labored breathing, CTA to anterior auscultation. Heart:  S1S2 normal, rub or gallop. + peripheral edema. Abdomen: Soft, non-tender, no organomegaly. Skin: No lesions or rashes, warm to touch.        Medications:   hydrALAZINE  50 mg Oral 3 times per day    warfarin (COUMADIN) daily dosing (placeholder)   Other RX Placeholder    insulin glargine  15 Units Subcutaneous Nightly    aspirin  81 mg Oral Daily    donepezil  5 mg Oral Nightly    melatonin ER  3 mg Oral Nightly    metoprolol tartrate  50 mg Oral BID    pantoprazole  40 mg Oral QAM AC    rosuvastatin  5 mg Oral Daily    sennosides-docusate sodium  1 tablet Oral BID    sodium chloride flush  10 mL Intravenous 2 times per day    insulin lispro  0-12 Units Subcutaneous TID     insulin lispro  0-6 Units Subcutaneous Nightly    sertraline  100 mg Oral Daily         Labs:  Recent Labs     03/18/20  1010   HGB 11.3*   HCT 35.7*     Recent Labs     03/18/20  0618 03/19/20  0528 03/20/20  0505   * 134* 134*   K 4.3 4.2 4.6   CL 99 96* 97*   CO2 26 27 26   GLUCOSE 143* 139* 84   PHOS 3.0 2.3* 3.0   BUN 23* 16 24*   CREATININE 3.2* 2.6* 3.5*   LABGLOM 14* 18* 13*   GFRAA 17* 22* 15*       Assessment/Plan:      A/CKD progressing to ESRD.  - HD started on 3/13/20 for

## 2020-03-20 NOTE — FLOWSHEET NOTE
03/20/20 0853 03/20/20 1245   Vital Signs   Temp 97.7 °F (36.5 °C) 98.2 °F (36.8 °C)   Pulse 62 62   BP (!) 137/56 (!) 169/65   Height and Weight   Weight 158 lb 4.6 oz (71.8 kg)  (standing scale) 150 lb 9.2 oz (68.3 kg)  (standing scale)     Treatment time: 210min    Net UF: 3000ml    Pre weight: 71.8k  Post weight: 68.3K  EDW: TBD    Access used: LIJ TDC  Access function: Good    Medications or blood products given: None    Regular outpatient schedule: MWF    Summary of response to treatment: Tolerated well. Copy of dialysis treatment record placed in chart, to be scanned into EMR.

## 2020-03-20 NOTE — PROGRESS NOTES
Occupational Therapy  Facility/Department: [facility] C3 TELE/MED SURG/ONC  Daily Treatment Note  NAME: Yony Lawrence  : 1945  MRN: [id number]    Date of Service: 3/20/2020    Discharge Recommendations:  Subacute/Skilled Nursing Facility       Assessment   Performance deficits / Impairments: Decreased functional mobility ; Decreased ADL status; Decreased endurance;Decreased high-level IADLs;Decreased balance  Assessment: pt progressing with functional mobility TTWB Right LE, pt with dyspnea with minimal exertion; pt continues to benefit from skilled OT services while in acute care setting   OT Education: OT Role;Plan of Care  REQUIRES OT FOLLOW UP: Yes  Activity Tolerance  Activity Tolerance: Patient Tolerated treatment well  Activity Tolerance: 90 % O 2 sats with mobility on RA, rebounds to 94 % within 2 minutes sitting rest on RA  Safety Devices  Safety Devices in place: Yes  Type of devices: Call light within reach; Left in bed;Nurse notified         Patient Diagnosis(es): The primary encounter diagnosis was Pleural effusion. Diagnoses of Shortness of breath, Elevated troponin, Cellulitis of left lower extremity, and Acute on chronic congestive heart failure, unspecified heart failure type [facility]) were also pertinent to this visit. has a past medical history of Allergic, Anemia, Angina, Arthritis, Asthma, Cancer ([address] Coronary artery disease, Diabetes mellitus ([address] Heart attack ([address] Hyperlipidemia, Hypertension, Obesity, and Skin ulcer of right foot with necrosis of muscle ([address] has a past surgical history that includes Diagnostic Cardiac Cath Lab Procedure; Cataract removal with implant; Carpal tunnel release; Knee arthroscopy; Coronary angioplasty with stent (2012); Hysterectomy; bronchoscopy (N/A, 2019); bronchoscopy (N/A, 2019); bronchoscopy (2019); bronchoscopy (2019);  Upper gastrointestinal endoscopy (N/A, 2019); and Toe amputation (Right, 1/16/2020). Restrictions  Restrictions/Precautions  Restrictions/Precautions: General Precautions, Fall Risk, Weight Bearing  Required Braces or Orthoses?: Yes(B surgical shoes)  Lower Extremity Weight Bearing Restrictions  Right Lower Extremity Weight Bearing: (Off load right heel with off loading boot.)  Left Lower Extremity Weight Bearing: Weight Bearing As Tolerated  Position Activity Restriction  Other position/activity restrictions: Up with assistance, telemetry  Subjective   General  Chart Reviewed: Yes  Patient assessed for rehabilitation services?: Yes  Response to previous treatment: Patient with no complaints from previous session  Family / Caregiver Present: No  Referring Practitioner: Swetha Hernandez MD 3/17/2020  Diagnosis: Acute systolic congestive heart failure  Subjective  Subjective: pt resting in bed, agreeable to therapy  General Comment  Comments: RN cleared pt for OT    Patient Currently in Pain: Denies   Orientation  Orientation  Overall Orientation Status: Within Functional Limits  Objective    ADL  Grooming: Setup(seated EOB to wash & comb hair)  LE Dressing: Maximum assistance(to ting/doff kalpesh.  post op surgical shoes & manage brief)        Balance  Sitting Balance: Supervision(EOB 15 minutes)  Standing Balance: Contact guard assistance(with RW )  Standing Balance  Time: 1-2 minutes x 2  Activity: sit-->stand for pericare by RN, & walking in room  Comment: able to maintain TTWB Right LE  Functional Mobility  Functional - Mobility Device: Rolling Walker(CGA)  Activity: Other(walking)  Bed mobility  Supine to Sit: Minimal assistance  Sit to Supine: Minimal assistance(with LE's)  Transfers  Sit to stand: Contact guard assistance  Stand to sit: Contact guard assistance        Cognition  Overall Cognitive Status: WFL(tearful at times, but cooperative)          Type of ROM/Therapeutic Exercise: BUE AROM  Comment: seated  Hand flex/ext: x  15  Reps  Elbow flex/ext:  x   15 Reps  Forearm sup/pron:  x   15 Reps      LUE AROM : WFL  RUE AROM : WFL        Plan   Plan  Times per week: 3-5x  Current Treatment Recommendations: Strengthening, Balance Training, Safety Education & Training, Self-Care / ADL, Functional Mobility Training, Endurance Training    AM-PAC Score        AM-PAC Inpatient Daily Activity Raw Score: 16 (03/20/20 0856)  AM-PAC Inpatient ADL T-Scale Score : 35.96 (03/20/20 0856)  ADL Inpatient CMS 0-100% Score: 53.32 (03/20/20 0856)  ADL Inpatient CMS G-Code Modifier : CK (03/20/20 0856)    Goals  Short term goals  Time Frame for Short term goals: for 1 week (3/25) unless noted  Short term goal 1: Perform functional transfers to commode/chair/bed with SBA with RW; prgoressing,requires CGA  Short term goal 2: Perform toileting with min A by 3/22  Short term goal 3: Perform UE exer 15x each to improve UE endurance (GOAL MET 3/19/20)  Patient Goals   Patient goals :  \"Be able to walk again\"       Therapy Time   Individual Concurrent Group Co-treatment   Time In 0820         Time Out 0845         Minutes Sae. Rakel Mercado [address] [place] [address]

## 2020-03-21 NOTE — PROGRESS NOTES
Hospitalist Progress Note      PCP: Janie Arredondo MD    Date of Admission: 3/7/2020    Chief Complaint: Shortness of breath, edema    Hospital Course: Admitted with edema and shortness of breath, diagnosed with acute systolic congestive heart failure. Being evaluated by nephrology for diuresis and edema  Cardiology consulted for congestive heart failure  Hemodialysis started because of progressive renal failure. Had tunneled dialysis catheter insertion, this was converted from temporary. Doing fine. No new issues past couple days. Subjective: No shortness of breath at rest.  No chest pain, no nausea, no vomiting. No new complaints or issues. Tolerated dialysis catheter insertion well. Nothing new overnight.       Medications:  Reviewed    Infusion Medications    dextrose       Scheduled Medications    warfarin  2.5 mg Oral Once    hydrALAZINE  50 mg Oral 3 times per day    warfarin (COUMADIN) daily dosing (placeholder)   Other RX Placeholder    insulin glargine  15 Units Subcutaneous Nightly    aspirin  81 mg Oral Daily    donepezil  5 mg Oral Nightly    melatonin ER  3 mg Oral Nightly    metoprolol tartrate  50 mg Oral BID    pantoprazole  40 mg Oral QAM AC    rosuvastatin  5 mg Oral Daily    sennosides-docusate sodium  1 tablet Oral BID    sodium chloride flush  10 mL Intravenous 2 times per day    insulin lispro  0-12 Units Subcutaneous TID WC    insulin lispro  0-6 Units Subcutaneous Nightly    sertraline  100 mg Oral Daily     PRN Meds: traMADol, citrate dextrose, glucose, dextrose, glucagon (rDNA), dextrose, povidone-iodine, perflutren lipid microspheres, regadenoson, hydrOXYzine, ALPRAZolam, sodium chloride flush, acetaminophen **OR** acetaminophen, polyethylene glycol, promethazine **OR** ondansetron      Intake/Output Summary (Last 24 hours) at 3/21/2020 1235  Last data filed at 3/21/2020 1103  Gross per 24 hour   Intake 1486 ml   Output 3450 ml   Net -1964 ml 03/08/2020    GLUCOSEU NEGATIVE 10/07/2011       Radiology:  IR TUNNELED CVC PLACE WO SQ PORT/PUMP > 5 YEARS   Final Result   Successful fluoroscopic guided conversion of the left internal jugular   temporary hemodialysis catheter for a 23 cm tip to cuff tunneled hemodialysis   catheter. IR NONTUNNELED VASCULAR CATHETER > 5 YEARS   Final Result   Successful ultrasound and fluoroscopy guided non-tunneled left internal   jugular temporary hemodialysis catheter placement. 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 NM Cardiac Stress Test Nuclear Imaging   Final Result      XR FOOT RIGHT (2 VIEWS)   Final Result   1. No acute osseous abnormality of the right foot. 2. Diffuse soft tissue swelling of the midfoot and forefoot. No subcutaneous   gas. 3. No obvious osteomyelitis at the site of the patient's reported heel   ulceration. 4. 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 CT CHEST WO CONTRAST   Final Result   1. Moderate right pleural effusion with adjacent atelectasis. 2. Multiple 2-4 mm solid pulmonary nodules bilaterally dating back to   10/15/2019. 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 These were not well seen in 2014. 3. Mild anasarca. XR CHEST STANDARD (2 VW)   Final Result   Whole right-sided pleural effusion and trace left-sided effusion. Cardiomegaly. Assessment/Plan:    Active Hospital Problems    Diagnosis    Diabetic polyneuropathy associated with type 2 diabetes mellitus ([facility] [E11.42]    Diabetic ulcer of right heel associated with type 2 diabetes mellitus, with necrosis of muscle (HCC) [G38.655, L97.413]    Anasarca [R60.1]       PLAN    Acute on chronic kidney disease stage III  Has progressed to end-stage  Patient to require hemodialysis after discharge. Arrangements are being made.     Acute systolic congestive heart failure  Stable after being started on hemodialysis. Edema stable with fluid removal with dialysis. Asymptomatic now. Diffuse edema  Appears multifactorial, not only CHF  I anticipate some edema will remain because of venous insufficiency. Has stabilized with hemodialysis. Hypertension  Blood pressure reasonably well controlled with hemodialysis. No hypotension noted. Nephrology monitoring    Dementia  Continue donepezil    Diabetes mellitus type 2  Continue Lantus, sliding-scale insulin and carb controlled diet. Blood sugar is fluctuating, but acceptable. Lower extremity cellulitis  Resolved. Back to baseline. Completed antibiotic treatment. Discussed with nursing  Discussed with patient    DVT Prophylaxis: Resumed warfarin  Diet: DIET CARB CONTROL; Low Sodium (2 GM); Daily Fluid Restriction: 1000 ml  Code Status: Full Code    PT/OT Eval Status: Requested     Dispo -discharge will depend on outpatient dialysis arrangements.     Margarett Mcardle, MD

## 2020-03-21 NOTE — PROGRESS NOTES
Assessment completed and documented. VSS. A/ox4. Denies pain. x1-x2 with stedy. Patient up to chair at this time with legs elevated. RA sating at 93%. 1000 fluid restriction. Denies further needs at this time. Bed locked and in lowest position. Bedside table and call light within reach. Will continue to monitor.

## 2020-03-21 NOTE — PROGRESS NOTES
Assessment/Plan:      A/CKD progressing to ESRD.  - HD started on 3/13/20 for worsening renal function associated with fluid overload not responding to high dose diuretics. LIJ temporary dialysis catheter placed on 3/13/20.  - Follows with Dr. Willam Olguin in the office.  - Continue HD per [facility] schedule. - S/p [facility] placement and pending outpatient HD arrangements. Acute on chronic CHF, combined with anasarca. - Random urine PCR of 700mg/g.  - Severe pulmonary HTN.  - Fluid and sodium restriction. Hypertension.  - Continue Metoprolol and Hydralazine. -  Anemia.  - Hgb above goal.  - Hold ANKUR and monitor.

## 2020-03-22 NOTE — PROGRESS NOTES
treatment well     Patient Diagnosis(es): The primary encounter diagnosis was Pleural effusion. Diagnoses of Shortness of breath, Elevated troponin, Cellulitis of left lower extremity, and Acute on chronic congestive heart failure, unspecified heart failure type [facility]) were also pertinent to this visit. has a past medical history of Allergic, Anemia, Angina, Arthritis, Asthma, Cancer ([facility] Coronary artery disease, Diabetes mellitus ([facility] Heart attack ([facility] Hyperlipidemia, Hypertension, Obesity, and Skin ulcer of right foot with necrosis of muscle ([facility] [address] 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 Restrictions  Restrictions/Precautions  Restrictions/Precautions: General Precautions, Fall Risk, Weight Bearing  Required Braces or Orthoses?: Yes(B surgical shoes)  Lower Extremity Weight Bearing Restrictions  Right Lower Extremity Weight Bearing: (Offload R heel with offloading boot)  Left Lower Extremity Weight Bearing: Weight Bearing As Tolerated  Position Activity Restriction  Other position/activity restrictions: Up with assistance, telemetry  Subjective   General  Chart Reviewed: Yes  Response To Previous Treatment: Patient with no complaints from previous session.   Family / Caregiver Present: No  Referring Practitioner: She Nickerson MD  Subjective  Subjective: Pt sitting in chair at start of session, pleasant and agreeable to PT tx, motivated to ambulate  General Comment  Comments: RN cleared pt for session  Pain Screening  Patient Currently in Pain: Denies  Vital Signs  Patient Currently in Pain: Denies Orientation  Orientation  Overall Orientation Status: Within Normal Limits    Objective   Bed mobility  Comment: Pt seated in chair at start and end of session     Transfers  Sit to Stand: Contact guard assistance  Stand to sit: Contact guard assistance  Comment: Standard chair to RW, completed x 3 trials, commode to RW using grab bar. Cues for hand placement from commode     Ambulation  Ambulation?: Yes  WB Status: off load R heel with off loading boot per wound care RN, WBAT LLE  More Ambulation?: No  Ambulation 1  Surface: level tile  Device: Rolling Walker  Assistance: Contact guard assistance  Quality of Gait: partial step through pattern with RW, able to maintain off loading of R heel 95% of time, mildly unsteady but no LOB  Gait Deviations: Slow Caitlyn;Decreased step length;Decreased arm swing;Decreased step height;Shuffles  Distance: 20' x 2 + 40'  Comments: Seated rest break between each bout, SOB noted following longer bout of gait  Stairs/Curb  Stairs?: No        Balance  Posture: Good  Sitting - Static: Good;-  Sitting - Dynamic: Good;-  Standing - Static: Fair;+  Standing - Dynamic: Fair;+  Comments: with RW     Exercises  Hip Flexion: Seated marches BLE x 15  Hip Abduction: Seated clamshells BLE x 15; Seated hip adduction set BLE x 15  Knee Long Arc Quad: Seated BLE x 15  Ankle Pumps: Seated BLE x 15  Comments: cueing for sequencing and technique. AM-PAC Score     AM-PAC Inpatient Mobility without Stair Climbing Raw Score : 15 (03/22/20 1646)  AM-PAC Inpatient without Stair Climbing T-Scale Score : 43.03 (03/22/20 1646)  Mobility Inpatient CMS 0-100% Score: 47.43 (03/22/20 1646)  Mobility Inpatient without Stair CMS G-Code Modifier : CK (03/22/20 1646)       Goals  Short term goals  Time Frame for Short term goals: 1 week 3/25/20  Short term goal 1: Supine <> sit with modified independence.  Progressing CGA on 3/19/20  Short term goal 2: Sit <> stand with mod I.  Progressing

## 2020-03-22 NOTE — PROGRESS NOTES
Performed:    /65   Pulse 57   Temp 97.8 °F (36.6 °C) (Oral)   Resp 16   Ht 5' (1.524 m)   Wt 161 lb 6 oz (73.2 kg)   SpO2 92%   BMI 31.52 kg/m²     General appearance: No apparent distress, appears stated age and cooperative. HEENT: Pupils equal, round, and reactive to light. Conjunctivae/corneas clear. Neck: Supple, with full range of motion. No jugular venous distention. Trachea midline. Respiratory:  Normal respiratory effort. Few bibasilar inspiratory crackles  Cardiovascular: Regular rate and rhythm with normal S1/S2 without murmurs, rubs or gallops. Abdomen: Soft, non-tender, non-distended with normal bowel sounds. Musculoskeletal: No clubbing or cyanosis. 1+ bilateral pitting soft edema bilaterally. Full range of motion without deformity. Skin: Skin color, texture, turgor normal.  Right foot erythema  Neurologic:  Neurovascularly intact without any focal sensory/motor deficits. Cranial nerves: II-XII intact, grossly non-focal.  Psychiatric: Alert and oriented, thought content appropriate, normal insight    I examined the patient today (03/22/20). Physical exam similar to yesterday (3/21)    Labs:   Recent Labs     03/20/20  1259   WBC 7.5   HGB 11.4*   HCT 34.9*        Recent Labs     03/20/20  0505 03/21/20  0510 03/22/20  0534   * 133* 134*   K 4.6 4.9 4.9   CL 97* 98* 98*   CO2 26 25 26   BUN 24* 16 25*   CREATININE 3.5* 2.6* 3.7*   CALCIUM 9.1 8.9 9.0   PHOS 3.0 2.7 3.5     No results for input(s): AST, ALT, BILIDIR, BILITOT, ALKPHOS in the last 72 hours. Recent Labs     03/20/20  0505 03/21/20  0510 03/22/20  0534   INR 1.52* 1.84* 2.21*     No results for input(s): CKTOTAL, TROPONINI in the last 72 hours.     Urinalysis:      Lab Results   Component Value Date    NITRU POSITIVE 03/08/2020    WBCUA 6-10 03/08/2020    BACTERIA 4+ 03/08/2020    RBCUA 3-4 03/08/2020    BLOODU TRACE-INTACT 03/08/2020    SPECGRAV 1.020 03/08/2020    GLUCOSEU Negative 03/08/2020    GLUCOSEU NEGATIVE 10/07/2011       Radiology:  IR TUNNELED CVC PLACE WO SQ PORT/PUMP > 5 YEARS   Final Result   Successful fluoroscopic guided conversion of the left internal jugular   temporary hemodialysis catheter for a 23 cm tip to cuff tunneled hemodialysis   catheter. IR NONTUNNELED VASCULAR CATHETER > 5 YEARS   Final Result   Successful ultrasound and fluoroscopy guided non-tunneled left internal   jugular temporary hemodialysis catheter placement. 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 NM Cardiac Stress Test Nuclear Imaging   Final Result      XR FOOT RIGHT (2 VIEWS)   Final Result   1. No acute osseous abnormality of the right foot. 2. Diffuse soft tissue swelling of the midfoot and forefoot. No subcutaneous   gas. 3. No obvious osteomyelitis at the site of the patient's reported heel   ulceration. 4. 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 CT CHEST WO CONTRAST   Final Result   1. Moderate right pleural effusion with adjacent atelectasis. 2. Multiple 2-4 mm solid pulmonary nodules bilaterally dating back to   10/15/2019. 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 These were not well seen in 2014. 3. Mild anasarca. XR CHEST STANDARD (2 VW)   Final Result   Whole right-sided pleural effusion and trace left-sided effusion. Cardiomegaly. Assessment/Plan:    Active Hospital Problems    Diagnosis    Diabetic polyneuropathy associated with type 2 diabetes mellitus ([facility] [address] [E11.42]    Diabetic ulcer of right heel associated with type 2 diabetes mellitus, with necrosis of muscle (HCC) [Q58.614, L97.413]    Anasarca [R60.1]       PLAN    Chronic kidney disease progressed to end-stage renal disease during current stay, dialysis dependent  Patient to require hemodialysis after discharge. Arrangements are being made. Remained stable.   Tolerated hemodialysis    Acute systolic congestive heart failure  Stable after being started on hemodialysis. Edema stable with fluid removal with dialysis. Jian asymptomatic with improving edema    Diffuse edema  Appears multifactorial, not only CHF  I anticipate some edema will remain because of venous insufficiency. Has stabilized with hemodialysis. Hypertension  Blood pressure reasonably well controlled with hemodialysis. No hypotension noted. Nephrology monitoring    Dementia  Continue donepezil    Diabetes mellitus type 2  Continue Lantus, sliding-scale insulin and carb controlled diet. Blood sugar is acceptable. Lower extremity cellulitis  Resolved. Back to baseline. Completed antibiotic treatment. Discussed with nursing      DVT Prophylaxis: Resumed warfarin  Diet: DIET CARB CONTROL; Low Sodium (2 GM); Daily Fluid Restriction: 1000 ml  Code Status: Full Code    PT/OT Eval Status: Requested     Dispo -discharge will depend on outpatient dialysis arrangements.     Kirt Hashimoto, MD

## 2020-03-23 PROBLEM — R53.81 DEBILITY: Status: ACTIVE | Noted: 2020-01-01

## 2020-03-23 NOTE — PROGRESS NOTES
Performed:    BP (!) 126/46   Pulse 60   Temp 98.1 °F (36.7 °C)   Resp 16   Ht 5' (1.524 m)   Wt 149 lb 7.6 oz (67.8 kg)   SpO2 95%   BMI 29.19 kg/m²     General appearance: No apparent distress, appears stated age and cooperative. HEENT: Pupils equal, round, and reactive to light. Conjunctivae/corneas clear. Neck: Supple, with full range of motion. No jugular venous distention. Trachea midline. Respiratory:  Normal respiratory effort. Few bibasilar inspiratory crackles  Cardiovascular: Regular rate and rhythm with normal S1/S2 without murmurs, rubs or gallops. Abdomen: Soft, non-tender, non-distended with normal bowel sounds. Musculoskeletal: No clubbing or cyanosis. 1+ bilateral pitting soft edema bilaterally. Full range of motion without deformity. Skin: Skin color, texture, turgor normal.  Foot erythema stable. Foot ulcers are stable. Neurologic:  Neurovascularly intact without any focal sensory/motor deficits. Cranial nerves: II-XII intact, grossly non-focal.  Psychiatric: Alert and oriented, thought content appropriate, normal insight    I examined the patient today (03/23/20). Physical exam similar to yesterday (3/22)    Labs:   No results for input(s): WBC, HGB, HCT, PLT in the last 72 hours. Recent Labs     03/21/20  0510 03/22/20  0534 03/23/20  0542   * 134* 132*   K 4.9 4.9 5.3*   CL 98* 98* 95*   CO2 25 26 26   BUN 16 25* 33*   CREATININE 2.6* 3.7* 4.3*   CALCIUM 8.9 9.0 9.0   PHOS 2.7 3.5 4.2     No results for input(s): AST, ALT, BILIDIR, BILITOT, ALKPHOS in the last 72 hours. Recent Labs     03/21/20  0510 03/22/20  0534 03/23/20  0542   INR 1.84* 2.21* 2.60*     No results for input(s): Alferd Rupert in the last 72 hours.     Urinalysis:      Lab Results   Component Value Date    NITRU POSITIVE 03/08/2020    WBCUA 6-10 03/08/2020    BACTERIA 4+ 03/08/2020    RBCUA 3-4 03/08/2020    BLOODU TRACE-INTACT 03/08/2020    SPECGRAV 1.020 03/08/2020    GLUCOSEU Negative

## 2020-03-23 NOTE — PROGRESS NOTES
Attempted to see the patient to check on bilateral foot ulcers, however she was off the floor for dialysis. I will check back tomorrow. Please call with any questions or concerns.     Romy Bernal  [phone]

## 2020-03-23 NOTE — CARE COORDINATION
Spoke with [facility]. Can accept patient tonight. Messaged Dr Key Ewing for d/c order. Maeve Chavarria RN          CASE MANAGEMENT DISCHARGE SUMMARY      Discharge to: ARU    Transportation: In house       Confirmed discharge plan with:     Patient: yes     Family, name and contact number: message left for kt [phone]     Facility/Agency, name:  [facility] faxed aru   Phone number for report to facility: [phone]     RN, name: Mikayla    Note: Discharging nurse to complete TONE, reconcile [facility]S, and place final copy with patient's discharge packet. RN to ensure that written prescriptions for  Level II medications are sent with patient to the facility as per protocol.       Maeve Chavarria RN

## 2020-03-23 NOTE — PROGRESS NOTES
[facility]       Progress Note           Subjective/   75y.o. year old female who we are seeing in consultation for new ESRD. HPI:  Last HD on 3/20 with 3 liters removed, post-weight of 68.3 kg. Seen on dialysis. Orders confirmed. Pre-weight at HD today 71.5 kg. ROS:  States still with sob with exertion and lying flat. Intake adequate. Objective/   GEN:  Chronically ill, BP (!) 132/51   Pulse 58   Temp 97.9 °F (36.6 °C)   Resp 16   Ht 5' (1.524 m)   Wt 157 lb 10.1 oz (71.5 kg)   SpO2 95%   BMI 30.78 kg/m²   HEENT: non-icteric, no JVD  CV: S1, S2 without m/r/g; ++ LE edema  RESP: CTA B without w/r/r; breathing wnl  ABD: +bs, soft, nt, no hsm  SKIN: warm, no rashes  ACCESS: [facility]    Data/  Recent Labs     03/20/20  1259   WBC 7.5   HGB 11.4*   HCT 34.9*   MCV 84.0        Recent Labs     03/21/20  0510 03/22/20  0534 03/23/20  0542   * 134* 132*   K 4.9 4.9 5.3*   CL 98* 98* 95*   CO2 25 26 26   GLUCOSE 126* 87 65*   PHOS 2.7 3.5 4.2   BUN 16 25* 33*   CREATININE 2.6* 3.7* 4.3*   LABGLOM 18* 12* 10*   GFRAA 22* 14* 12*       Assessment/Plan      A/CKD now ESRD.  - HD started on 3/13/20 for worsening renal function associated with fluid overload not responding to high dose diuretics. [facility] 3/19/20.  - Follows with Dr. Loco Mcginnis in the office.  - HD today with 3-4 L UF target. - S/p TDC placement and pending outpatient HD arrangements.     Acute on chronic CHF, combined with anasarca. - Urine PCR of 700mg/g.  - Severe pulmonary HTN.  - Fluid and sodium restriction.     Hypertension.  - Continue Metoprolol and Hydralazine. Anemia.  - Hgb above goal.  - Hold ANKUR and monitor.       Okay for discharge once outpatient HD plans arranged

## 2020-03-23 NOTE — CARE COORDINATION
CM called [facility] to see if paperwork has been completed. Patients d/c is dependent on this. Told that MD is on vacation a couple more days. Requested another MD fill paper out. Waiting return call on status. Spoke with [facility], that is tihe only Barrier to admission. Kayla Guerrero RN      Spoke with ANNIE WORLEY. Will fax paperwork over for nephrology signature. Kayla Guerrero RN CM reviewed paperwork with Dr. Jaxson Guzman. He is uncomfortable with patient discharging to [place] with [facility]. Would like to keep patient within the umbrella of their care instead. Both Dr Jaxson Guzman and NEY spoke with patient. Would be ok with referral to ARU. Is not patient would like to explore perhaps going home with family or [place] seniors transporting. Verses other SNF. Would like to avoid [facility] if possible. Kayla Guerrero RN      Referral to ARU Consult PMR entered.  Kayla Guerrero RN

## 2020-03-23 NOTE — DISCHARGE INSTR - COC
Continuity of Care Form    Patient Name: Dorothea Andrade   :  1945  MRN:  [id number]    Admit date:  3/7/2020  Discharge date:  2020    Code Status Order: Full Code   Advance Directives:   Advance Care Flowsheet Documentation     Date/Time Healthcare Directive Type of Healthcare Directive Copy in [facility] [address] Agent's Name Healthcare Agent's Phone Number    [phone]  Yes, patient has an advance directive for healthcare treatment  Living will  No, copy requested from family  Healthcare power of   Darion Sawant   [phone]          Admitting Physician:  Almas Gonzales MD  PCP: Harvey Weinstein MD    Discharging Nurse: [address] Unit/Room#: 3946/4253-76  Discharging Unit Phone Number: [phone]    Emergency Contact:   Extended Emergency Contact Information  Primary Emergency Contact: Darlyn ledesma, [address] Phone: [phone]  Relation: Child  Secondary Emergency Contact: Gatito Pitt  Mobile Phone: [phone]  Relation: Other   needed? No    Past Surgical History:  Past Surgical History:   Procedure Laterality Date    BRONCHOSCOPY N/A 2019    BRONCHOSCOPY WASHING performed by Kim Leigh MD at [address] [place] [address] N/A 2019    BRONCHOSCOPY ALVEOLAR LAVAGE performed by Kirill Ramires MD at [address] [place] [address]  2019    BRONCHOSCOPY THERAPUTIC ASPIRATION INITIAL performed by Kirill Ramires MD at [address] [place] [address]  2019    BRONCHOSCOPY FOREIGN BODY REMOVAL performed by Kirill Ramires MD at [address] [place] [address]      right    CATARACT REMOVAL WITH IMPLANT      CORONARY ANGIOPLASTY WITH STENT PLACEMENT  2012    DIAGNOSTIC CARDIAC CATH LAB PROCEDURE      HYSTERECTOMY      11/30/15    KNEE ARTHROSCOPY      left knee. not a TKR.  no metal.     TOE AMPUTATION Right Documentation and Therapy:  Wound 11/27/19 Buttocks Stage 2, R buttocks (Active)   Wound Pressure Stage  2 2/21/2020  8:15 PM   Dressing Status Other (Comment) 2/22/2020  9:31 AM   Dressing/Treatment Open to air 2/22/2020  9:31 AM   Wound Assessment Red;Dry 2/22/2020  9:31 AM   Drainage Amount None 2/22/2020  9:31 AM   Odor None 2/22/2020  9:31 AM   Margins Attached edges 2/22/2020  9:31 AM   Tess-wound Assessment Intact; Induration 2/22/2020  9:31 AM   [place]%Wound Bed 100 2/22/2020  9:31 AM   Number of days: 117       Wound 11/27/19 Coccyx Stage 2, coccyx (Active)   Number of days: 117       Wound 11/29/19 Toe (Comment  which one) L Foot Toes 2 & 3 (Active)   Black%Wound Bed 100 2/22/2020  9:31 AM   Number of days: 115       Wound 11/29/19 Toe (Comment  which one) L 4th Toe (Active)   Dressing Status Clean;Dry; Intact 2/22/2020  9:31 AM   Dressing Changed Changed/New 2/22/2020  9:31 AM   Dressing/Treatment Betadine swabs;Dry dressing 2/22/2020  9:31 AM   Dressing Change Due 01/12/20 2/22/2020  9:31 AM   Wound Assessment Other (Comment) 2/22/2020  9:31 AM   Drainage Amount None 2/22/2020  9:31 AM   Odor None 2/22/2020  9:31 AM   Margins Attached edges; Defined edges 2/22/2020  9:31 AM   Tess-wound Assessment Dry; Intact 2/22/2020  9:31 AM   Number of days: 115       Wound 01/03/20 Foot Dorsal;Right open area to top of right foot (Active)   Wound Image   3/16/2020  2:42 PM   Wound Other 3/22/2020  8:16 AM   Offloading for Diabetic Foot Ulcers Offloading boot 3/21/2020  8:45 PM   Dressing Status Clean;Dry; Intact 3/22/2020  8:54 PM   Dressing Changed Dressing reinforced 3/22/2020  8:54 PM   Dressing/Treatment Ace wrap 3/22/2020  8:54 PM   Wound Cleansed Soap and water 3/21/2020  9:07 AM   Dressing Change Due 03/23/20 3/22/2020  8:54 PM   Wound Length (cm) 10 cm 3/18/2020 12:53 PM   Wound Width (cm) 6.3 cm 3/18/2020 12:53 PM   Wound Depth (cm) 0 cm 3/18/2020 12:53 PM   Wound Surface Area (cm^2) 63 cm^2 3/18/2020 12:53 PM

## 2020-03-23 NOTE — PLAN OF CARE
Increase level of function to baseline.
Increase patients ADLs/functional status to baseline.
Problem: Falls - Risk of:  Goal: Will remain free from falls  Description: Will remain free from falls  3/10/2020 4005 by Brandon Do RN  Outcome: Ongoing  Note: Pt remains free from falls. Non skid socks on. Bed alarm active for safety. Bed locked and in lowest position. Call light and bedside table within reach. Will continue to monitor.
Problem: Falls - Risk of:  Goal: Will remain free from falls  Description: Will remain free from falls  3/17/2020 0111 by Eben Davis RN  Outcome: Ongoing  Note: Pt will remain free from falls. Pt is a high fall risk. Call light within reach. Bed side table within reach. Wheels locked. Bed in lowest position. Bed check in place. Pt instructed to call out for assistance. Pt expressesed understanding & calls out appropritately. All care per orders. Will continue to monitor.
Problem: Falls - Risk of:  Goal: Will remain free from falls  Description: Will remain free from falls  3/17/2020 2241 by Seda Isabel RN  Outcome: Ongoing  Note: Pt will remain free from falls. Pt is a high fall risk. Call light within reach. Bed side table within reach. Wheels locked. Bed in lowest position. Bed check in place. Pt instructed to call out for assistance. Pt expressesed understanding & calls out appropritately. All care per orders. Will continue to monitor.
Problem: Falls - Risk of:  Goal: Will remain free from falls  Description: Will remain free from falls  3/20/2020 2009 by Richar Munoz RN  Outcome: Ongoing  3/20/2020 2007 by Richar Munoz RN  Outcome: Ongoing  Goal: Absence of physical injury  Description: Absence of physical injury  3/20/2020 2009 by Richar Munoz RN  Outcome: Ongoing  3/20/2020 2007 by Richar Munoz RN  Outcome: Ongoing
Problem: Falls - Risk of:  Goal: Will remain free from falls  Description: Will remain free from falls  3/8/2020 2020 by Lobo Reynolds RN  Outcome: Ongoing  Pt remains free from falls during this shift. Pt a/o and calls out appropriately. Bed in lowest position and wheels locked. Call light within reach. Bedside table within reach. Bed check in place. Will continue to monitor.
Problem: Falls - Risk of:  Goal: Will remain free from falls  Description: Will remain free from falls  Outcome: Ongoing  Goal: Absence of physical injury  Description: Absence of physical injury  Outcome: Ongoing
Problem: Falls - Risk of:  Goal: Will remain free from falls  Description: Will remain free from falls  Outcome: Ongoing  Note: Pt remains free from falls. Non skid socks on. Bed alarm active for safety. Bed locked and in lowest position. Call light and bedside table within reach. Will continue to monitor.
Problem: Falls - Risk of:  Goal: Will remain free from falls  Description: Will remain free from falls  Outcome: Ongoing  Pt remains free from falls during this shift. Pt a/o and calls out appropriately. Bed in lowest position and wheels locked. Call light within reach. Bedside table within reach. Bed check in place. Will continue to monitor. Problem: Fluid Volume:  Goal: Will show no signs or symptoms of fluid imbalance  Description: Will show no signs or symptoms of fluid imbalance  Outcome: Ongoing  Pt educated on fluid restriction. Fluid intake monitored, as well as output. Pt is a dialysis pt. Lungs sound diminished throughout. Will continue to monitor.
Problem: Nutrition  Goal: Optimal nutrition therapy  Outcome: Ongoing  Note: Eating %of her daily meals, blood glucose maintained below 250.
Problem: Nutrition  Goal: Optimal nutrition therapy  Outcome: Ongoing  Note: Nutrition Problem:  Moderate malnutrition  Intervention: Food and/or Nutrient Delivery: Continue current diet  Nutritional Goals: Pt will consume greater than 50% of meals and ONS this admission
Problem: Nutrition  Goal: Optimal nutrition therapy  Outcome: Ongoing  Note: Nutrition Problem:  Moderate malnutrition  Intervention: Food and/or Nutrient Delivery: Continue current diet, Start ONS  Nutritional Goals: Pt will consume greater than 50% of meals and ONS this admission
Pt remained free of falls. Call light within reach. Bed alarm on. Non skid footwear in place. Bed locked and in lowest position. Will continue to monitor.
urine output will be supported  Description: Ability to achieve and maintain adequate urine output will be supported  3/11/2020 0211 by Jamaica Garrido RN  Outcome: Ongoing

## 2020-03-23 NOTE — DISCHARGE SUMMARY
Hospital Medicine Discharge Summary    Patient ID: Yanet Lua      Patient's PCP: Дмитрий Renteria MD    Admit Date: 3/7/2020     Discharge Date:   03/23/20     Admitting Physician: Deanna Nam MD     Discharge Physician: Nura Mcclellan MD     Discharge Diagnoses: Active Hospital Problems    Diagnosis    [facility] with [facility] [E11.42]    Diabetic ulcer of right heel associated with type 2 diabetes mellitus, with necrosis of muscle ([facility] [T82.025, L97.413]    Anasarca [R60.1]       The patient was seen and examined on day of discharge and this discharge summary is in conjunction with any daily progress note from day of discharge. Hospital Course: Admitted with edema and shortness of breath, diagnosed with acute systolic congestive heart failure. Evaluated by nephrology for diuresis and edema  Cardiology consulted for congestive heart failure  Hemodialysis started because of progressive renal failure. Determined patient progressed to ESRD requiring permanent HD  Had tunneled dialysis catheter insertion, this was converted from temporary. Feet ulcers are stable and chronic. No signs of active infection after initial treatment completed. Was evaluated by ARU and accepted. Will be transferred today  I recommend podiatry consult at the ARU      Physical Exam Performed:     BP (!) 154/56   Pulse 62   Temp 97.9 °F (36.6 °C) (Oral)   Resp 16   Ht 5' (1.524 m)   Wt 149 lb 7.6 oz (67.8 kg)   SpO2 99%   BMI 29.19 kg/m²          General appearance: No apparent distress, appears stated age and cooperative. HEENT: Pupils equal, round, and reactive to light. Conjunctivae/corneas clear. Neck: Supple, with full range of motion. No jugular venous distention. Trachea midline. Respiratory:  Normal respiratory effort.   Few bibasilar inspiratory crackles  Cardiovascular: Regular rate and rhythm with normal S1/S2 without murmurs, rubs or WO CONTRAST   Final Result   1. Moderate right pleural effusion with adjacent atelectasis. 2. Multiple 2-4 mm solid pulmonary nodules bilaterally dating back to   10/15/2019. 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 These were not well seen in 2014. 3. Mild anasarca. XR CHEST STANDARD (2 VW)   Final Result   Whole right-sided pleural effusion and trace left-sided effusion. Cardiomegaly. Consults:     IP CONSULT TO HOSPITALIST  PHARMACY TO DOSE VANCOMYCIN  IP CONSULT TO PHARMACY  IP CONSULT TO NEPHROLOGY  IP CONSULT TO CARDIOLOGY  IP CONSULT TO PODIATRY  PHARMACY TO DOSE VANCOMYCIN  IP CONSULT TO PHARMACY  PHARMACY TO DOSE WARFARIN  IP CONSULT TO PHYSICAL MEDICINE REHAB    Disposition:  Inpatient rehab     Condition at Discharge: Stable    Discharge Instructions/Follow-up:  ARU transfer    Code Status:  Full Code     Activity: activity as tolerated    Diet: diabetic diet      Discharge Medications:     Current Discharge Medication List           Details   aspirin 81 MG chewable tablet Take 1 tablet by mouth daily  Qty: 30 tablet, Refills: 3      hydrALAZINE (APRESOLINE) 50 MG tablet Take 1 tablet by mouth every 8 hours  Qty: 90 tablet, Refills: 3      povidone-iodine 10 % swab Apply topically. Off load right heel with off loading boot. Paint right heel daily with betadine. Place white foam around right heel wound (like a donut). Wrap with roll guaze. pantoprazole (PROTONIX) 40 MG tablet Take 1 tablet by mouth every morning (before breakfast)  Qty: 30 tablet, Refills: 3              Details   ALPRAZolam (XANAX) 0.5 MG tablet Take 1 tablet by mouth 3 times daily as needed for Anxiety for up to 3 days.   Qty: 9 tablet, Refills: 0    Associated Diagnoses: Anxiety      insulin glargine (LANTUS) 100 UNIT/ML injection vial Inject 15 Units into the skin nightly  Qty: 1 vial, Refills: 3              Details   metoprolol tartrate (LOPRESSOR) 50 MG tablet Take 50 mg by

## 2020-03-24 PROBLEM — E44.0 MODERATE MALNUTRITION (HCC): Status: ACTIVE | Noted: 2020-01-01

## 2020-03-24 NOTE — PLAN OF CARE
Pt using call light for needs. Bed/chair alarm in place. Education and cueing for safety during transfers.

## 2020-03-24 NOTE — H&P
gel 15 g  15 g Oral PRN Nish Angeles MD        ALPRAZolam Marika Betina) tablet 0.5 mg  0.5 mg Oral TID PRN Nish Angeles MD        aspirin chewable tablet 81 mg  81 mg Oral Daily Nish Angeles MD   81 mg at 03/24/20 0754    citrate dextrose (ACD Formula A) 0.73-2.45-2.2 GM/100ML solution   Intracatheter PRN Nish Angeles MD        donepezil (ARICEPT) tablet 5 mg  5 mg Oral Nightly Nish Angeles MD   5 mg at 03/23/20 2207    hydrALAZINE (APRESOLINE) tablet 50 mg  50 mg Oral 3 times per day Nish Angeles MD   50 mg at 03/24/20 0611    insulin glargine (LANTUS) injection vial 15 Units  15 Units Subcutaneous Nightly Nish Angeles MD   15 Units at 03/23/20 2214    insulin lispro (HUMALOG) injection vial 0-12 Units  0-12 Units Subcutaneous TID WC Nish Angeles MD        insulin lispro (HUMALOG) injection vial 0-6 Units  0-6 Units Subcutaneous Nightly Nish Angeles MD   1 Units at 03/23/20 2213    melatonin ER tablet 3 mg  3 mg Oral Nightly Nish Angeles MD   3 mg at 03/23/20 2206    metoprolol tartrate (LOPRESSOR) tablet 50 mg  50 mg Oral BID Nish Angeles MD   50 mg at 03/24/20 0754    pantoprazole (PROTONIX) tablet 40 mg  40 mg Oral QAM AC Rob Hansen MD   40 mg at 03/24/20 0618    povidone-iodine 10 % swab   Topical PRN Nish Angeles MD        rosuvastatin (CRESTOR) tablet 5 mg  5 mg Oral Daily Nish Angeles MD   5 mg at 03/24/20 0754    sennosides-docusate sodium (SENOKOT-S) 8.6-50 MG tablet 1 tablet  1 tablet Oral BID Nish Angeles MD   1 tablet at 03/24/20 0754    sertraline (ZOLOFT) tablet 100 mg  100 mg Oral Daily Nish Angeles MD   100 mg at 03/24/20 0754    traMADol (ULTRAM) tablet 50 mg  50 mg Oral Q6H PRN Nish Angeles MD        [START ON 3/10/2021] warfarin (COUMADIN) daily dosing (placeholder)   Other RX Placeholder Nish Angeles MD             REVIEW OF SYSTEMS:   CONSTITUTIONAL: negative for fevers, chills, diaphoresis, activity change, date, and there are no significant changes. By signing this document, I acknowledge that I have personally performed a  full physical examination on this patient within 24 hours of admission to  this inpatient rehabilitation facility and have determined the patient to be  able to tolerate the above course of treatment at an intensive level for a  reasonable period of time. I will be completing a detailed individualized  Plan of Care for this patient by day four of the patients stay based upon the  Preadmission Screen, this Post-Admission Evaluation, and the therapy  evaluations. Rehabilitation Diagnosis:  Cardiac, 9.0, Cardiac    Assessment and Plan:  Acute systolic congestive heart failure. Stable after institution of HD as below. ESRD. Nephrology following. HTN. Currently controlled; follow. Mild cognitive deficit. SLP consulted. Continue donepezil. DM. Lantus, SSI. Lower extremity cellulitis. Resoled. Chronic foot ulcers. Consult podiatry. Bowels: Schedule colace + senna. Follow bowel movements. Enema or suppository if needed. Bladder: Check PVR x 3. [address] if PVR > 200ml or if any volume is > 500 ml. Sleep: Trazodone provided prn. Rodríguez Ross MD 3/24/2020, 8:05 AM

## 2020-03-24 NOTE — CONSULTS
Department of Podiatric Surgery  Dr. Kiki Brice  Consult Note        Reason for Consult: bilateral ulcers   Requesting Physician: Dr. Mary Jo Cruz MD    CHIEF COMPLAINT:  Bilateral ulcers     History Obtained From:  patient    HISTORY OF PRESENT ILLNESS:                The patient is a 75 y.o. female who presented to hospital for SOB and edema and was diagnosed with acute systolic congestive heart failure. Patient has has wounds on both feet since her fall in November but states they were doing great until this last CHF exacerbation and has been seeing Dr. Marsha Redman on out patient. Past Medical History:        Diagnosis Date    Allergic     Anemia     Angina     with exertion    Arthritis     Asthma 9/16/2010    [facility]     Coronary artery disease 9/16/2010    Diabetes mellitus ([facility]     Heart attack [facility]) age 43    no problems since then    Hyperlipidemia     Hypertension     Obesity     Skin ulcer of right foot with necrosis of muscle ([facility] 3/10/2020     Past Surgical History:        Procedure Laterality Date    BRONCHOSCOPY N/A 11/12/2019    BRONCHOSCOPY WASHING performed by Sofia Frederick MD at [address] [place] [address] N/A 11/23/2019    BRONCHOSCOPY ALVEOLAR LAVAGE performed by Robin Nath MD at [address] [place] [address]  11/23/2019    BRONCHOSCOPY THERAPUTIC ASPIRATION INITIAL performed by Robin Nath MD at [address] [place] [address]  11/23/2019    BRONCHOSCOPY FOREIGN BODY REMOVAL performed by Robin Nath MD at [address] [place] [address]      right    CATARACT REMOVAL WITH IMPLANT      CORONARY ANGIOPLASTY WITH STENT PLACEMENT  4/2012    DIAGNOSTIC CARDIAC CATH LAB PROCEDURE      HYSTERECTOMY      11/30/15    KNEE ARTHROSCOPY      left knee. not a TKR.  no metal.     TOE AMPUTATION Right 1/16/2020    RIGHT SECOND AND THIRD TOE AMPUTATION, RIGHT HEEL WOUND DEBRIDEMENT, RIGHT HALLUX TOE NAIL DEBRIDEMENT performed by Nallely Seay 03/24/2020     03/24/2020    MCV 85.6 03/24/2020    MCH 27.2 03/24/2020    MCHC 31.8 03/24/2020    RDW 22.4 03/24/2020    NRBC 1 11/28/2019    NRBC 1 11/28/2019    SEGSPCT 52.3 10/21/2012    BANDSPCT 9 11/28/2019    METASPCT 2 11/24/2019    LYMPHOPCT 20.3 03/17/2020    LYMPHOPCT 26.1 12/08/2015    MONOPCT 15.1 03/17/2020    MYELOPCT 2 11/23/2019    EOSPCT 0.0 04/05/2012    BASOPCT 0.8 03/17/2020    MONOSABS 1.0 03/17/2020    LYMPHSABS 1.3 03/17/2020    EOSABS 0.1 03/17/2020    BASOSABS 0.1 03/17/2020    DIFFTYPE Auto 10/21/2012     CMP:    Lab Results   Component Value Date     03/24/2020    K 4.8 03/24/2020    CL 98 03/24/2020    CO2 23 03/24/2020    BUN 23 03/24/2020    CREATININE 3.2 03/24/2020    GFRAA 17 03/24/2020    GFRAA 57 10/21/2012    AGRATIO 1.4 03/24/2020    LABGLOM 14 03/24/2020    GLUCOSE 115 03/24/2020    PROT 6.6 03/24/2020    PROT 7.0 11/30/2012    LABALBU 3.8 03/24/2020    CALCIUM 9.0 03/24/2020    BILITOT 0.7 03/24/2020    ALKPHOS 97 03/24/2020    AST 32 03/24/2020    ALT 17 03/24/2020         XR FOOT RIGHT (2 VIEWS)   Final Result   1. No acute osseous abnormality of the right foot. 2. Diffuse soft tissue swelling of the midfoot and forefoot. No subcutaneous   gas. 3. No obvious osteomyelitis at the site of the patient's reported heel   ulceration. 4. 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 IMPRESSION/RECOMMENDATIONS:  1. Diabetes type 2, with PAD   2. Decubitus ulcer right heel stage 3    Sharp excisional debridement of fullthickness to SQ and deep tissue was completed to remove any non viable  and some viable tissue to promote healing. Betadine and dsd to be applied daily. Offload heels from bed. Patient will follow-up with Dr. Eugene Patel on outpatient basis once DC from rehab. If still in rehab next week will  see her weekly to debride wounds.    3. Ulcer fullthickness to SQ tissue right hallux    Sharp excisional debridement fullthickness of

## 2020-03-24 NOTE — CARE COORDINATION
__Other     Has patient experienced a recent loss or significant life event that would impact their care or ability to participate? __No  _X_Yes, please explain: Recent medical decline resulting in need for hospitalization and need for rehab coupled with mental health diagnoses may impact mood and/or psychosocial wellbeing.      Has patient ever been treated for emotional disorder(s)? __No  _X_Yes, how does this affect current situation? Mental health diagnoses include anxiety disorder with panic attack.      Is patient and family coping appropriately with stressful events and this hospitalization? _X_Yes  __No, please explain:      Support system at home and in the community: Patient is primary caregiver for disabled adult son. Son had a major stroke and is now non verbal with a aide for daily care and assistance. Patient's daughter has an LVAD and also received dialysis and can provide minimal assistance. Patient also has custody of 4 minor grandchildren.      Caregiver on discharge: Self with limited family supports.      24 hour care on discharge: Self with limited family supports.      What patient needs to be at to return home alone or with family: to be determined.      Discharge plan: Intent is to return to home setting where patient lives with minor grandchildren and disabled adult son.      Summary:   **ORIENTATION/COGNITION** Ms. Thais Hawk presents as alert and oriented with periods of confusion likely related to her medical diagnoses. She is able to verbally communicate her needs in an appropriate manner. **FAMILY/SOCIAL** Ms. Thais Hawk is a . 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 Ms. Thais Hawk lives in the community with her son who had a massive stroke and is now non verbal. She states he receives 86 hours of assistance throughout the week for his care and needs. Ms. Thais Hawk states she has custody of her 4 of 6 grandchildren.  She states her daughter has moved into their home and is assisting the best she is able. Ms. Ramakrishna Sibley states that her daughter has an LVAD in place and can provide limited assistance, her daughter also receives dialysis. Ms. Ramakrishna Sibley states she is retired, she states she worked as a  and still does taxes on the side. Ms. Ramakrishna Sibley denies any tobacco, alcohol or drug use at this time. She states her hobbies include her grandchildren and going to [facility]. **CODE STATUS** Full code updated on 3/23/2020  **LOSSES/CONCERNS/ROOM** Concerns include a referral made to Child Protective Services and has a current court case in process. Ms. Ramakrishna Sibley is now receiving dialysis and has been set up at [facility] M/W/F with an 1115am chair time. Transportation assistance requested from [facility] who states they can accommodate this at this time. Ms. Ramakrishna Sibley resides in a private room while on the acute rehab unit. **DENTAL/HEARING/VISION/MENTAL**  Ms. Ramakrishna Sibley wears full dentures. She discloses no hearing issues and states she wears glasses for vision assistance. Mental health diagnoses include anxiety disorder with panic attack and she takes Sertraline, Donepezil and Alprazolam PRN for her mental health needs. **INTENT** Intent is to return to home setting where patient lives with family. **Goals for discharge:   DME: Has cane, rolling walker and manual wheelchair. PCP: Dr. Jn Dewitt: Luther Norwood [phone]  Home Health: to be determined.      Electronic continuity of care form is on the chart. Case Management (CM) will continue to follow for recommendations from the treatment team. Please notify Case Management if needs or concerns arise.      CLAUS Taveras

## 2020-03-24 NOTE — CARE COORDINATION
Writer spoke with [employer] [phone] to inquire about transportation assistance to/from dialysis at discharge. Transportation division [phone]. Chair time M/W/F at 1115am at [facility] at discharge. Able to provide transportation assistance upon discharge. Patient is in system and ready to go. Called [employer] [employer] to inquire about Passport Services. Patient income exceeds limit and patient would need a QIT trust. Agency staff states that patient liability for passport services would exceed $1000/mo. Ms. Gottlieb Spore declined services through [employer] [place] [employer] at this time. Request faxed to \[facility]\"" [facility] ([phone]): Case [id number] per Carie Wilder to notify court that patient has been admitted to hospital/acute rehab and requesting court date scheduled for 3/24/2020 @ 1pm be continued or dismissed.      CLAUS Taveras

## 2020-03-24 NOTE — PROGRESS NOTES
systolic congestive heart failure  Subjective  Subjective: Pt on toilet, agreeable to OT. Very pleasant, talkative. General Comment  Comments: RN cleared pt for OT    Social/Functional History  Social/Functional History  Lives With: Alone  Type of Home: House  Home Layout: Multi-level, Performs ADL's on one level, Able to Live on Main level with bedroom/bathroom  Home Access: Ramped entrance  Bathroom Shower/Tub: Tub/Shower unit  Bathroom Toilet: Handicap height  Bathroom Equipment: Grab bars in shower, Grab bars around toilet, Shower chair  Home Equipment: Rolling walker, Hospital bed, [address] Help From: Home health(OT/PT and aide)  ADL Assistance: Needs assistance(pt states needing assistance with B feet donning shoes and socks and bathing bottom (pt reports needing PRN assist for toilet transfer)-- pt has had multiple admissions to hospital vs SNF the last few months has not been home for more than 2-3 days)  Homemaking Assistance: Needs assistance(cleaning/laundry service, completing most meal prep (does more microwave meals now))  Ambulation Assistance: Needs assistance(uses WC in the home, RW in community short distances)  Transfer Assistance: Independent(can transfer to/from [facility] and most of the time to/from toilet without assistance)  Active : No  Occupation: Retired  Leisure & Hobbies: adult coloring, crosswords, puzzles, watching TV, being with family  Additional Comments: pt has had multiple hospital admissions and has gone from SNF to home to hospital to SNF last few weeks after DC from ARU. Pt is mostly home alone during the day, son is disabled and in the nursing home and dtr has an LVAD       Objective   Vision: Impaired  Vision Exceptions: Wears glasses at all times  Hearing: Exceptions to [employer]  Hearing Exceptions: Hard of hearing/hearing concerns; No hearing aid    Orientation  Overall Orientation Status: Within Functional Limits  Observation/Palpation  Posture: Co-treatment   Time In 0800         Time Out 0930         Minutes 90         Timed Code Treatment Minutes: 75 Minutes(15 minute eval)       Larry Serrato OTR/L

## 2020-03-24 NOTE — PATIENT CARE CONFERENCE
[facility]  Inpatient Rehabilitation  Weekly Team Conference Note    Date: 3/25/2020  Patient Name: Mumtaz Ferreira        MRN: [id number]    : 1945  (75 y.o.)  Gender: female   Referring Practitioner: Jennifer Palmer MD  Diagnosis: acute CHF       Interventions to be utilized toward barriers to discharge, per discipline:  [address] observed barriers to dc: Lower extremity weakness and Wound Care  Nursing interventions:medication, wound care education   Family Education: wound education   Fall Risk:  Yes      Physical therapy observed barriers to dc:    Baseline: supv-mod ind with AD for household distances   Current level: min A-CGA transfers, CGA gait with AD, CGA curb stpe navigation, SBa w/c mobility   Barriers to DC: endurance, multiple readmissions   Needs in order to achieve dc home/next level of care: pt lives alone, mod ind with household mobility      Physical therapy interventions:   Current Treatment Recommendations: Strengthening, ADL/Self-care Training, Gait Training, Patient/Caregiver Education & Training, Stair training, Equipment Evaluation, Education, & procurement, Balance Training, Pain Management, Endurance Training, Functional Mobility Training, Home Exercise Program, Transfer Training, Safety Education & Training      PHYSICAL THERAPY         PT Equipment Recommendations  Equipment Needed: No  Other: defer to patient's facility. Assessment: pt pleasant adn agreeable. easily distracted with excessive talking and cues to attend to therapy tasks, though pleasant. grossly SBA for transfers (min A from commode) and CGA for gait with RW. consistent cues for increase R hip flexion/swign phase during gait to decrease fall risk as pt demo's lack of R DF and often catches toes while walking. utilizes seated therapeutic restbeaks throughout session. continue to progress safeyt and awareness as tolerated.      Occupational therapy observed barriers to dc:    Baseline: Supplements: Diabetic Oral Supplement  PO Meals Eaten (%): 76 - 100%  Education: Declined    CASE MANAGEMENT  Assessment: Home with family; Marcos Dale with [employer]. Interdisciplinary Goals:   1.) pt will complete functional transfers with supv and AD.   2.) Pt will complete toilet transfer SBA with LRAD       Discharge Plan   Estimated discharge date: 4/3/2020  Destination: home health  Pass:No  Services at Discharge: [address], Occupational Therapy and [address] at Discharge: None - pt has DME. Team Members Present at Conference:  : Alcon DEVLIN [facility]    Occupational Therapist: Anant Sidhu, OTR/L  Physical Therapist: Prashant Escobedo PT, DPT   Speech Therapist: Alvino Toro  Nurse: Elizabeth Tovar RN  Dietician: Marlen Mckeon RDN, LD  Psychiatry: N/A    Family members present at conference: N/A      I led this team conference and I approve the established interdisciplinary plan of care as documented within the medical record of Tahmina Tinoco. MD: Rosalind Eldridge.  Nunu Schneider MD 3/25/2020, 11:10 AM

## 2020-03-24 NOTE — PLAN OF CARE
encephalopathy    Cellulitis    Anasarca    Skin ulcer of right foot with necrosis of muscle (HCC)    Diabetic polyneuropathy associated with type 2 diabetes mellitus [facility]    Diabetic ulcer of right heel associated with type 2 diabetes mellitus, with necrosis of muscle (HCC)    Debility    Moderate malnutrition ([facility]       Rehabilitation Diagnosis:     Debility [R53.81]       ADMIT DATE:3/23/2020    Patient Goals: To return to PLOF. Admitting Impairments: Pt is 75year old female admitted to ARU with dx of CHF resulting in Decreased functional mobility ; Decreased high-level IADLs;Decreased posture;Decreased ADL status; Decreased endurance;Decreased ROM; Decreased strength;Decreased sensation;Decreased balance;Decreased safe awareness  Barriers: None  Participation: Good, limited 2* to fatigue     CARE PLAN     NURSING:  Malgorzata Schneider while on this unit will:     [] Be continent of bowel and bladder     [x] Have an adequate number of bowel movements  [] Urinate with no urinary retention >300ml in bladder  [] Complete bladder protocol with roa removal  [] Maintain O2 SATs at ___%  [x] Have pain managed while on ARU       [] Be pain free by discharge   [] Have no skin breakdown while on ARU  [x] Have improved skin integrity via wound measurements  [x] Have no signs/symptoms of infection at the wound site  [x] Be free from injury during hospitalization   [] Complete education with patient/family with understanding demonstrated for:  [x] Adjustment   [] Other:   Nursing interventions may include bowel/bladder training, education for medical assistive devices, medication education, O2 saturation management, energy conservation, stress management techniques, fall prevention, alarms protocol, seating and positioning, skin/wound care, pressure relief instruction,dressing changes,  infection protection, DVT prophylaxis, and/or assistance with in room safety with transfers to bed, toilet, wheelchair, shower as well as for Long term goals : 4/2/20  Long term goal 1: Pt will complete functional mobility/transfers [place] with LRAD for ADLs  Long term goal 2: Pt will complete toileting/toilet transfer with LRAD Sreekanth using DME PRN  Long term goal 3: Pt will complete tub transfer with LRAD and DME SBA  Long term goal 4: Pt will complete LB dressing with AE and LRAD setup   Long term goal 5: Pt will complete 1 simple meal prep and/or IADL task setup sitting/standing with LRAD :    These goals were reviewed with this patient at the time of assessment and Alicia Larios is in agreement    Plan of Care:  Pt to be seen 5 out of 7 days per week, 60   mins (exact) per day for 10 days (exact)  Current Treatment Recommendations: Strengthening, Balance Training, Safety Education & Training, Self-Care / ADL, Functional Mobility Training, Endurance Training, Gait Training, Patient/Caregiver Education & Training, Equipment Evaluation, Education, & procurement, Home Management Training, Cognitive/Perceptual Training, Pain Management      SPEECH THERAPY: Goals will be left blank if speech is not following this patient. Goals: Evaluation completed as ordered and no further ST f/u warranted at this time. See evaluation for results and details. CASE MANAGEMENT:  Goals:   Assist patient/family with discharge planning, patient/family counseling,   and coordination with insurance during ARU stay.       Admission Period/Goal FIM SCORES  FIM Admit/Goal Score   Eating/Swallowing    Grooming    Bathing    Upper Body Dressing    Lower Body Dressing    Toileting    Bladder [place], Accidents = FIM    Bowel  [place], Accidents = FIM    Bed/Chair Transfer    Toilet Transfer    Tub/Shower Transfer     Locomotion:  Ambulation (Walk) / Wheelchair:  W = walk , w/c = wheelchair  Distance:   1= 0-49 ft , 2=  ft, 3= 150+ ft Distance:    Level of Assist:    Mode:    FIM:       Stairs    Comprehension    Expression    Social the therapy team.      I have reviewed this initial plan of care and agree with its contents:    Title   Name    Date    Time    Physician: Agueda Aranda.  Álvaro Kelly MD 3/25/2020, 3:49 PM     Case Mgmt: Charline Goldberg, LCSW    OT: Philip Ross OTR/L    PT: Bobbi Brooks PT, DPT     RN: José Manuel Bernardo RN    ST: Jasmin Ovalle M.A., [address] [facility]    : Channing Friedman OTR/L    Other:

## 2020-03-24 NOTE — PROGRESS NOTES
Pharmacy to Dose Warfarin     Dx: Hx of DVT  Goal INR range 2-3   Home Warfarin dose: 3mg daily      Date                 INR                  Warfarin  3/10                 1.64                 4 mg  3/11                 1.62                 4 mg   3/12                 2.55                 hold  3/13                 3.99                 Hold  3/14                 4.61                 Hold  3/15                 3.15                 Hold   3/16                 3.05                 Hold                      3/17                 2.21                 Hold  3/19                  1.46                4mg   3/20                 1.52                 4 mg           3/21                 1.84                 2.5 mg   3/22                 2.21                 2.5 mg   3/23                 2.60                 2 mg  3/24                 1.95                 3.5 mg     Recommend Warfarin 3.5 mg tonight x 1 due to 0.65 decrease in INR.  Daily INR ordered  Freddie Schaeffer, PharmD 3/24/2020  9:22 AM

## 2020-03-24 NOTE — PROGRESS NOTES
Speech Language Pathology  [facility]  Initial Speech/Language/Cognitive Assessment    NAME: Christie Price  : 1945   MRN: [id number]  ADMISSION DATE: 3/23/2020  ADMITTING DIAGNOSIS: has Asthma; Coronary artery disease; Uncontrolled hypertension; Vertigo; Chest pain; Type 2 diabetes mellitus with hyperglycemia, with long-term current use of insulin ([address] Acute asthma exacerbation; Chest pain; HTN (hypertension); CAD (coronary artery disease); Lipids abnormal; Osteoarthritis of both knees; Near syncope; Perennial allergic rhinitis; Primary localized osteoarthrosis, lower leg; Tibialis tendinitis; Osteoarthritis of spine with radiculopathy, lumbar region; Pain of both hip joints; Spinal stenosis, lumbar region, with neurogenic claudication; Right sided sciatica; Endometrial adenocarcinoma ([address] Drainage from wound; S/P hysterectomy with oophorectomy; S/P total hysterectomy and BSO (bilateral salpingo-oophorectomy); Chronic pain of left knee; Chronic pain of right knee; History of endometrial cancer; Menopausal state; Osteoporosis; Primary osteoarthritis of both knees; Vaginal atrophy; Overdose of insulin, accidental or unintentional, initial encounter; Hypoglycemia due to insulin; Hypoglycemia; Pulmonary nodule; Acute respiratory failure ([address] Respiratory arrest before cardiac arrest ([address] Acute respiratory failure with hypoxia and hypercapnia ([address] Spindle cell sarcoma (HCC); CKD (chronic kidney disease) stage 3, GFR 30-59 ml/min ([address] Cardiac arrest ([address] Acute pulmonary edema ([address] Pleural effusion; Elevated LFTs; Thrombocytopenia ([address] Leukocytosis; Normocytic normochromic anemia; Acute encephalopathy; 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 While she will not be able to care for grandchildren or son at this time, she appears to be greatly limited by physical deficits vs cognitive deficits. She presents with strong math skills (during unstructured assessment) as well as good knowledge of deadlines. 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 The patient reports that she feels memory and thinking skills have returned to baseline at this time. SLP tx not warranted at this time given performance and improvement since prior hospitalization. Please alert SLP if further s/s of cognitive deficits develop. Recommendations:  Requires SLP Intervention: No      Patient/family involved in developing goals and treatment plan: YES    Subjective:   Previous level of function and limitations: Mod I  General  Chart Reviewed: Yes  Patient assessed for rehabilitation services?: Yes  Family / Caregiver Present: No  General Comment  Comments: Chart reviewed for completion of Eval  Subjective  Subjective: The patient is seen in room seated upright in chair. Alert, pleasant, and cooperative. Social/Functional History  Lives With: Alone  Type of Home: House  Occupation: Retired  Leisure & Hobbies: adult coloring, crosswords, puzzles, watching TV, being with family  Additional Comments: pt has had multiple hospital admissions and has gone from SNF to home to hospital to SNF last few weeks after DC from ARU. Pt is mostly home alone during the day, son is disabled and in the nursing home and dtr has an LVAD. The patient is cargiver for 4 grandchidren whom she states are being cared for by neighbor at this time  Vision  Vision: Impaired  Vision Exceptions: Wears glasses at all times  Hearing  Hearing: Exceptions to [facility]  Hearing Exceptions: Hard of hearing/hearing concerns; No hearing aid        Objective:      Auditory Comprehension  Comprehension: Within Functional Limits    Expression  Primary Mode of Expression: Verbal    Verbal Expression  Verbal Expression: Within functional limits    Written Expression  Dominant Hand: Right  Written Expression: Within Functional Limits     Pragmatics/Social Functioning  Pragmatics: Within functional limits    Cognition:      Orientation  Overall Orientation Status: Within Normal Limits  Attention  Attention: Within Functional Limits  Memory  Memory: Within Funtional Limits  Problem Solving  Problem Solving: Within Functional Limits  Numeric Reasoning  Numeric Reasoning: Within Functional Limits  Abstract Reasoning  Abstract Reasoning: Within Functional Limits  Safety/Judgement  Safety/Judgement: Within Functional Limits    Prognosis:  Speech Therapy Prognosis  Prognosis: Fair  Prognosis Considerations: Co-Morbidities; Family/Community Support;Financial Resources  Individuals consulted  Consulted and agree with results and recommendations: Patient    Education: SLP re: Role of SLP, rationale for assessment, results, recommendations, POC     Safety Devices in place: Yes  Type of devices: All fall risk precautions in place; Left in chair;Chair alarm in place    Therapy Time:   Individual   Time In 1000   Time Out 1100   Minutes 1323 Reed Mcintosh M.A., [facility]-SLP #[id number]  Speech-Language Pathologist    3/24/2020 1:43 PM

## 2020-03-25 NOTE — PROGRESS NOTES
Physical Therapy  Facility/Department: [facility]  Daily Treatment Note  NAME: Gisselle De La Garza  : 1945  MRN: [id number]    Date of Service: 3/25/2020    Discharge Recommendations:  24 hour supervision or assist, Home with Home health PT   PT Equipment Recommendations  Equipment Needed: No  Other: defer to patient's facility. Assessment   Body structures, Functions, Activity limitations: Decreased functional mobility ; Decreased high-level IADLs;Decreased posture;Decreased ADL status; Decreased endurance;Decreased ROM; Decreased strength;Decreased sensation;Decreased balance;Decreased safe awareness  Assessment: pt pleasant adn agreeable. easily distracted with excessive talking and cues to attend to therapy tasks, though pleasant. grossly SBA for transfers (min A from commode) and CGA for gait with RW. consistent cues for increase R hip flexion/swign phase during gait to decrease fall risk as pt demo's lack of R DF and often catches toes while walking. utilizes seated therapeutic restbeaks throughout session. continue to progress safeyt and awareness as tolerated. Treatment Diagnosis: decreased independence with functional mobility. Specific instructions for Next Treatment: progress mobility as tolerated. Prognosis: Good  Decision Making: Medium Complexity  PT Education: Goals; Energy Conservation; Injury Prevention;PT Role;General Safety;Plan of Care;Gait Training;Home Exercise Program;Precautions; Equipment;Transfer Training;Pressure Relief; Functional Mobility Training  Patient Education: Patient verbalizes understanding. Barriers to Learning: none  Activity Tolerance  Activity Tolerance: Patient limited by fatigue;Patient limited by endurance  Activity Tolerance: pt c/o SOB with activity, Spo2 remains > 94% with activity and HR WNL. utilizes seated rest breaks as needed. Patient Diagnosis(es): There were no encounter diagnoses.      has a past medical history of Allergic, Anemia, Angina, Arthritis, Long term goal 3: pt will ascned/descend 4\" curb step with AD and SBa. Long term goal 4: pt will demonstrate ind with seated HEP to maintain functional strength. Patient Goals   Patient goals : \"get completely done\"    Plan    Plan  Times per week: 5-7x/week  Times per day: Daily  Specific instructions for Next Treatment: progress mobility as tolerated. Current Treatment Recommendations: Strengthening, ADL/Self-care Training, Gait Training, Patient/Caregiver Education & Training, Stair training, Equipment Evaluation, Education, & procurement, Balance Training, Pain Management, Endurance Training, Functional Mobility Training, Home Exercise Program, Transfer Training, Safety Education & Training  Safety Devices  Type of devices:  All fall risk precautions in place, Nurse notified, Gait belt, Patient at risk for falls(left with OT at end of session)     Therapy Time   Individual Concurrent Group Co-treatment   Time In 0800         Time Out 0930         Minutes 90         Timed Code Treatment Minutes: 90 Minutes       Jeremiah Negrete, PT

## 2020-03-25 NOTE — PROGRESS NOTES
José Miguel Guerrier  3/25/2020  [id number]    Chief Complaint: CHF, ESRD  Subjective:   Appreciate podiatry input; no new issues overnight. ROS: No n/v, cp, sob, f/c  Objective:  Patient Vitals for the past 24 hrs:   BP Temp Temp src Pulse Resp SpO2   03/25/20 0745 (!) 146/74 97.9 °F (36.6 °C) Oral 64 18 95 %   03/25/20 0612 (!) 156/65 -- -- -- -- --   03/24/20 2116 -- -- -- -- -- 94 %   03/24/20 2112 (!) 172/68 97.4 °F (36.3 °C) Oral 60 16 --   03/24/20 1638 138/74 -- -- -- -- --     Gen: No distress, pleasant. HEENT: Normocephalic, atraumatic. CV: Regular rate and rhythm. Resp: No respiratory distress. Abd: Soft, nontender   Ext: No edema. Neuro: Alert, oriented, appropriately interactive. Wt Readings from Last 3 Encounters:   03/24/20 151 lb 12.8 oz (68.9 kg)   03/23/20 149 lb 7.6 oz (67.8 kg)   02/21/20 169 lb 1.5 oz (76.7 kg)       Laboratory data:   Lab Results   Component Value Date    WBC 6.8 03/25/2020    HGB 10.7 (L) 03/25/2020    HCT 33.2 (L) 03/25/2020    MCV 84.0 03/25/2020     03/25/2020       Lab Results   Component Value Date     03/25/2020    K 5.1 03/25/2020    K 4.8 03/24/2020    CL 98 03/25/2020    CO2 27 03/25/2020    BUN 35 03/25/2020    CREATININE 3.8 03/25/2020    GLUCOSE 81 03/25/2020    CALCIUM 9.4 03/25/2020        Therapy progress:  PT  Position Activity Restriction  Other position/activity restrictions: R IV, Vas-cath on HD, B boots, weight bearing as tolerated with offloading as much as possible to R heel. HD MWF   Objective     Sit to Stand: Stand by assistance  Stand to sit: Stand by assistance  Bed to Chair: Contact guard assistance  Device: Rolling Walker  Assistance: Contact guard assistance  Distance: 70 ft (10 ft carpet)  OT  PT Equipment Recommendations  Equipment Needed: No  Other: defer to patient's facility.    Toilet - Technique: Ambulating  Equipment Used: Standard toilet  Toilet Transfers Comments: RW and grab bar  Assessment        SLP

## 2020-03-25 NOTE — PROGRESS NOTES
Occupational Therapy  Facility/Department: [facility]  Daily Treatment Note  NAME: Tahmina Tinoco  : 1945  MRN: [id number]    Date of Service: 3/25/2020    Discharge Recommendations:  Home with Home health OT, 24 hour supervision or assist, Home with assist PRN, Home with nursing aide  OT Equipment Recommendations  Other: CTA    Assessment   Performance deficits / Impairments: Decreased functional mobility ; Decreased ADL status; Decreased endurance;Decreased high-level IADLs;Decreased balance  Assessment: Despite fatigue, patient remains motivated and pleasant to complete OT POC and progress towards goals. First session was 30 minutes and involved BUE ex with multiple prolonged rest breaks. Pt was able to tolerate 2# free weight 2x15 reps and educated on body mechanics and importance of rest breaks for recovery. Pts O2 remained stable throughout exercises and good carryover noted with OT demo. Second session: Pt remains motivated and pleasant. Ambulated towards gym ~20 ft, requesting to sit. Self propel 25 ft SBA with rest breaks to increase endurance and strength. Pt stood 2x5 minutes to complete parquetry puzzle, reaching various locations out of BRANDON R vs L hand. Pt then completed LB dressing practice/education using reacher and sock aid. Pt unable to doff darco boot due to unable to reach forwards without pain-- attempted to place on stool and still unable to complete. Unable to grasp velcro with reacher, will continue to assess. Pt able to doff socks with reacher and demo, donned socks with sock aid SBA after demo. Pt completed theraputty (green) IDing beads and completed hook/palmar grasp 2x5 with 3 second holds and tip to tip pinch L vs R hand. Pt left in bed all needs within reach. Pt demo'd better balance and endurance this date but does fatigue quickly after multiple reps of standing. Cont OT POC.   Prognosis: Good  OT Education: OT Role;Plan of Care;Energy Flexion: 2x20 2#  Wrist Extension: 2x20 2#  Other: 2x20 2# chest press, circles forwards and backwards                    Plan   Plan  Times per week: 5 out of 7 days   Times per day: Daily  Plan weeks: 10 days  Current Treatment Recommendations: Strengthening, Balance Training, Safety Education & Training, Self-Care / ADL, Functional Mobility Training, Endurance Training, Gait Training, Patient/Caregiver Education & Training, Equipment Evaluation, Education, & procurement, Home Management Training, Cognitive/Perceptual Training, Pain Management    Goals  Short term goals  Time Frame for Short term goals: 3/28/20  Short term goal 1: Pt will complete toilet transfer SBA with LRAD   Short term goal 2: Pt will tolerate >4 minutes of dynamic standing activity and/or ADL with LRAD CGA  Short term goal 3: Pt will tolerate and/or demo BUE ex 10-15 reps using HEP to increase strength/endurance for transfers/ADLs  Long term goals  Time Frame for Long term goals : 4/2/20  Long term goal 1: Pt will complete functional mobility/transfers Sreekanth with LRAD for ADLs  Long term goal 2: Pt will complete toileting/toilet transfer with LRAD Sreekanth using DME PRN  Long term goal 3: Pt will complete tub transfer with LRAD and DME SBA  Long term goal 4: Pt will complete LB dressing with AE and LRAD setup   Long term goal 5: Pt will complete 1 simple meal prep and/or IADL task setup sitting/standing with LRAD  Patient Goals   Patient goals :  \"Be able to walk again and farther\"       Therapy Time   Individual Concurrent Group Co-treatment   Time In 1030         Time Out 1100         Minutes 30         Timed Code Treatment Minutes: 30 Minutes     Second Session Therapy Time:   Individual Concurrent Group Co-treatment   Time In 1230         Time Out 1330         Minutes 60           Timed Code Treatment Minutes:  60 Minutes    Total Treatment Minutes:  [address], OTR/L

## 2020-03-26 NOTE — PROGRESS NOTES
Radames Tapia  3/26/2020  [id number]    Chief Complaint: CHF, ESRD  Subjective:   Requesting different stool softener; no other issues. ROS: No n/v, cp, sob, f/c  Objective:  Patient Vitals for the past 24 hrs:   BP Temp Temp src Pulse Resp SpO2 Weight   03/26/20 0900 123/68 98 °F (36.7 °C) Oral 81 16 91 % --   03/26/20 0619 (!) 149/70 -- -- -- -- -- --   03/25/20 1816 (!) 143/64 97 °F (36.1 °C) -- 57 18 -- 147 lb 4.3 oz (66.8 kg)   03/25/20 1444 (!) 143/62 97.8 °F (36.6 °C) -- 59 18 -- 153 lb 14.1 oz (69.8 kg)   03/25/20 1335 130/72 -- -- -- -- -- --     Gen: No distress, pleasant. HEENT: Normocephalic, atraumatic. CV: Regular rate and rhythm. Resp: No respiratory distress. Abd: Soft, nontender   Ext: No edema. Neuro: Alert, oriented, appropriately interactive. Wt Readings from Last 3 Encounters:   03/25/20 147 lb 4.3 oz (66.8 kg)   03/23/20 149 lb 7.6 oz (67.8 kg)   02/21/20 169 lb 1.5 oz (76.7 kg)       Laboratory data:   Lab Results   Component Value Date    WBC 6.8 03/25/2020    HGB 10.7 (L) 03/25/2020    HCT 33.2 (L) 03/25/2020    MCV 84.0 03/25/2020     03/25/2020       Lab Results   Component Value Date     03/25/2020    K 5.1 03/25/2020    K 4.8 03/24/2020    CL 98 03/25/2020    CO2 27 03/25/2020    BUN 35 03/25/2020    CREATININE 3.8 03/25/2020    GLUCOSE 81 03/25/2020    CALCIUM 9.4 03/25/2020        Therapy progress:  PT  Position Activity Restriction  Other position/activity restrictions: R IV, Vas-cath on HD, B boots, weight bearing as tolerated with offloading as much as possible to R heel.  HD MWF   Objective     Sit to Stand: Contact guard assistance(mustiple attempts, heavy reliance of UE's to assist with stand)  Stand to sit: Contact guard assistance(cues for UE placement for safety)  Bed to Chair: Contact guard assistance  Device: Rolling Walker  Assistance: Contact guard assistance  Distance: 70 ft x1, 60 ft x1  OT  PT Equipment Recommendations  Equipment Needed: No  Other: defer to patient's facility. Toilet - Technique: Ambulating  Equipment Used: Standard toilet  Toilet Transfers Comments: RW and grab bar  Assessment        SLP                Body mass index is 28.76 kg/m². Rehabilitation Diagnosis:  Cardiac, 9.0, Cardiac     Assessment and Plan:  Acute systolic congestive heart failure. Stable after institution of HD as below.      ESRD. Nephrology following.      HTN. Currently controlled; follow.        Mild cognitive deficit. SLP consulted. Continue donepezil.      DM. Lantus, SSI.     Lower extremity cellulitis. Resoled.      Chronic foot ulcers. Appreciate podiatry input.      Bowels: Schedule colace + senna. Follow bowel movements. Enema or suppository if needed.      Bladder: Check PVR x 3. [facility] if PVR > 200ml or if any volume is > 500 ml.      Sleep: Trazodone provided prn. HALIE: 4/3 home with home health  DME: owns    Omayra Arriola.  Shaquille Bland MD 3/26/2020, 11:42 AM

## 2020-03-26 NOTE — PROGRESS NOTES
Occupational Therapy  Facility/Department: [facility]  Daily Treatment Note  NAME: Daniella Duval  : 1945  MRN: [id number]    Date of Service: 3/26/2020    Discharge Recommendations:  Home with Home health OT, 24 hour supervision or assist, Home with assist PRN, Home with nursing aide  OT Equipment Recommendations  Equipment Needed: No  Other: CTA    Assessment   Performance deficits / Impairments: Decreased functional mobility ; Decreased ADL status; Decreased endurance;Decreased high-level IADLs;Decreased balance  Assessment: Patient remains pleasant and motivated but forgetful with poor attn needing cues for safety, mechanics, and energy conservation. Pt was able to tolerate standing x5 minutes this date sinkside with better balance to complete grooming tasks but needing to sit to rest 2/2 fatigue. Pt educated on energy conservation, PLB and balance techniques during ADLs. Lunch arriving, patient able to open all containers without difficulty. Cont OT POC. Second session: Pt in [facility], sleeping yet easily arousable and agreeable to therapy. Pt was able to ambulate ~20 ft towards gym with better balance this date. Pt completed meal prep task activity but needing prolonged and frequent rest breaks due to fatigue. Pt was able to gather items out of fridge and cabinets with SPV and mod cues for strategies of sliding items across counter vs. Carrying with RW. Pt then stood 2x3-4 minutes each to place contents into bowl and mix. Pt sat x10 minutes to finish mixing and place cookies on tray due to fatigue. Educated patient on energy conservation especially during ADLs and IADLs. Pt then stood 2x2-3 minutes with 3-4 min rest break between to complete rubber band stretching across pegs activity with B hands and then placing beads on peg board R vs L hand. Pt with better FM/GM coordination and balance in stance.   Pt completed 2# medicine ball exercises (chest press, circles forwards/backwards, side to side and shoulder flexion) 2x10 reps. Pt left in bed all needs within reach. Pt is progressing well towards goals. Cont OT POC. Prognosis: Good  OT Education: OT Role;Plan of Care;Energy Conservation;Precautions; Equipment;Transfer Training  Patient Education: energy cosnervation, balance techniques, body mechanics during transfers and mobility  REQUIRES OT FOLLOW UP: Yes  Activity Tolerance  Activity Tolerance: Patient Tolerated treatment well;Patient limited by fatigue  Activity Tolerance: 94% O2 and 65 HR after ADLs  Safety Devices  Safety Devices in place: Yes  Type of devices: Left in chair;Chair alarm in place; Patient at risk for falls;Call light within reach;Nurse notified;Gait belt  Restraints  Initially in place: No         Patient Diagnosis(es): There were no encounter diagnoses. has a past medical history of Allergic, Anemia, Angina, Arthritis, Asthma, Cancer ([address] Coronary artery disease, Diabetes mellitus ([address] Heart attack ([address] Hyperlipidemia, Hypertension, Obesity, and Skin ulcer of right foot with necrosis of muscle ([address] 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     Restrictions  Restrictions/Precautions  Restrictions/Precautions: General Precautions, Fall Risk, Weight Bearing  Required Braces or Orthoses?: Yes  Lower Extremity Weight Bearing Restrictions  Right Lower Extremity Weight Bearing: Weight Bearing As Tolerated  Left Lower Extremity Weight Bearing: Weight Bearing As Tolerated  Position Activity Restriction  Other position/activity restrictions: R IV, Vas-cath on HD, B boots, weight bearing as tolerated with offloading as much as possible to R heel. HD MWF   Subjective   General  Chart Reviewed: Yes, Labs, Orders, Previous Admission, History and Physical, Progress Notes  Patient assessed for rehabilitation services?: Yes  Response to previous treatment: Patient with no complaints from previous session  Family / Caregiver Present: No  Referring Practitioner: Dr. Álvaro Kelly  Diagnosis: Acute systolic congestive heart failure  Subjective  Subjective: Pt in [facility], finishing with PT. Requesting to brush teeth at sinkside. Remains pleasant. General Comment  Comments: RN cleared pt for OT  Vital Signs  Patient Currently in Pain: Denies   Orientation  Orientation  Overall Orientation Status: Within Functional Limits  Objective    ADL  Feeding: Setup; Beverage management(opened all containers for lunch)  Grooming: Stand by assistance; Increased time to complete;Verbal cueing(increased time to stand, x5 minutes then sat x8 to finish due to fatigue)  Additional Comments: Pt declined further ADL  Instrumental ADL's  Instrumental ADLs: Yes  Meal Prep  Meal Prep Level: Walker  Meal Prep Level of Assistance: Stand by assistance     Balance  Sitting Balance: Supervision  Standing Balance: Contact guard assistance(CGA-Grant to bathroom)  Standing Balance  Time: first session: x2 minutes, x5 minutes, x2 minutes  Activity: mobility in room/bathroom, standing grooming  Comment: better balance this date, continues to needcues for safety and tolerated ~5 minutes of standing sinkside before requesting to sit to rest 2/2 fatigue  Functional Mobility  Functional - Mobility Device: Rolling Walker  Activity: To/from bathroom  Assist Level: Contact guard assistance  Functional Mobility Comments: CGA with  education and balance strategies with turning  Bed mobility  Supine to Sit: Unable to assess  Sit to Supine: Unable to assess  Scooting: Unable to assess  Comment: Pt in WC upon arrival and departure from room  Transfers  Stand Step Transfers: transfers/ADLs  Long term goals  Time Frame for Long term goals : 4/2/20  Long term goal 1: Pt will complete functional mobility/transfers Sreekanth with LRAD for ADLs  Long term goal 2: Pt will complete toileting/toilet transfer with LRAD Sreekanth using DME PRN  Long term goal 3: Pt will complete tub transfer with LRAD and DME SBA  Long term goal 4: Pt will complete LB dressing with AE and LRAD setup   Long term goal 5: Pt will complete 1 simple meal prep and/or IADL task setup sitting/standing with LRAD-Progressing, SBA 3/26  Patient Goals   Patient goals :  \"Be able to walk again and farther\"       Therapy Time   Individual Concurrent Group Co-treatment   Time In 1100         Time Out 1130         Minutes 30         Timed Code Treatment Minutes: 30 Minutes     Second Session Therapy Time:   Individual Concurrent Group Co-treatment   Time In 1300         Time Out 1400         Minutes 60           Timed Code Treatment Minutes:  60 Minutes    Total Treatment Minutes:  [address], OTR/L

## 2020-03-26 NOTE — PROGRESS NOTES
[facility]       Progress Note           Subjective/   75y.o. year old female who we are seeing in consultation for new ESRD. HPI:  Last HD on 3/25 with 3 liters removed, post-weight of 66.8 kg. States still with sob with exertion and lying flat. ROS:  Intake adequate. Objective/   GEN:  Chronically ill, BP (!) 149/70   Pulse 57   Temp 97 °F (36.1 °C)   Resp 18   Ht 5' (1.524 m)   Wt 147 lb 4.3 oz (66.8 kg)   SpO2 95%   BMI 28.76 kg/m²   HEENT: non-icteric, no JVD  CV: S1, S2 without m/r/g; ++ LE edema  RESP: CTA B without w/r/r; breathing wnl  ABD: +bs, soft, nt, no hsm  SKIN: warm, no rashes  ACCESS: [facility]    Data/  Recent Labs     03/24/20  0752 03/25/20  0644   WBC 7.3 6.8   HGB 11.0* 10.7*   HCT 34.7* 33.2*   MCV 85.6 84.0    171     Recent Labs     03/24/20  0752 03/25/20  0644   * 136   K 4.8 5.1   CL 98* 98*   CO2 23 27   GLUCOSE 115* 81   PHOS  --  4.3   BUN 23* 35*   CREATININE 3.2* 3.8*   LABGLOM 14* 12*   GFRAA 17* 14*       Assessment/Plan      A/CKD now ESRD.  - HD started on 3/13/20 for worsening renal function associated with fluid overload not responding to high dose diuretics. [facility] [place] 3/19/20.  - Follows with Dr. Virgen Kinney in the office.  - HD MWF with UF as tolerated - target EDW 67 kg or lower. - S/p TDC placement and pending outpatient HD arrangements.     Acute on chronic CHF, combined with anasarca. - Urine PCR of 700mg/g.  - Severe pulmonary HTN.  - Fluid (1 L/day) and sodium restriction (2 grams/day).     Hypertension.  - Continue Metoprolol and Hydralazine. Anemia.  - Hgb at goal.  - Holding ANKUR and monitor.       Set up at [facility] M2 shift after discharge  Okay for discharge once transportation to HD unit arranged - d/w NEY

## 2020-03-26 NOTE — PROGRESS NOTES
Pharmacy to Dose Warfarin     Dx: Hx of DVT  Goal INR range 2-3   Home Warfarin dose: 3mg daily      Date                 INR                  Warfarin  3/10                 1.64                 4 mg  3/11                 1.62                 4 mg   3/12                 2.55                 hold  3/13                 3.99                 Hold  3/14                 4.61                 Hold  3/15                 3.15                 Hold   3/16                 3.05                 Hold                      3/17                 2.21                 Hold  3/19                  1.46                4mg   3/20                 1.52                 4 mg           3/21                 1.84                 2.5 mg   3/22                 2.21                 2.5 mg   3/23                 2.60                 2 mg  3/24                 1.95                 3.5 mg  3/25                 1.95                 3 mg  3/26                 2.15                 3 mg      Recommend Warfarin 3 mg tonight X1.  Daily INR ordered  Nathan Dickson, PharmD 3/26/2020 8:43 AM

## 2020-03-26 NOTE — FLOWSHEET NOTE
Patient was pleased by a phone call from [facility]. She said her dad used to be a . She believes the pandemic is sign of the end of times. We prayed together.     03/26/20 3097   Encounter Summary   Services provided to: Patient   Referral/Consult From: Other ;Rounding   Support System Family members   Continue Visiting   (3/26/Support call.  Patient pleased by phone call.,)   Length of Encounter 15 minutes

## 2020-03-27 NOTE — PROGRESS NOTES
this trial.  Pt tolerated BUE ex 2x15 reps with 2# free weight, rest breaks and demo needed. Cont OT POC. Prognosis: Good  OT Education: OT Role;Plan of Care;Energy Conservation;Precautions; Equipment;Transfer Training  Patient Education: energy cosnervation, balance techniques, body mechanics during transfers and mobility  REQUIRES OT FOLLOW UP: Yes  Activity Tolerance  Activity Tolerance: Patient Tolerated treatment well;Patient limited by fatigue  Safety Devices  Safety Devices in place: Yes  Type of devices: Left in chair;Chair alarm in place; Patient at risk for falls;Call light within reach;Nurse notified;Gait belt  Restraints  Initially in place: No         Patient Diagnosis(es): There were no encounter diagnoses. has a past medical history of Allergic, Anemia, Angina, Arthritis, Asthma, Cancer ([address] Coronary artery disease, Diabetes mellitus ([address] Heart attack ([address] Hyperlipidemia, Hypertension, Obesity, and Skin ulcer of right foot with necrosis of muscle ([address] 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 Restrictions  Restrictions/Precautions  Restrictions/Precautions: General Precautions, Fall Risk, Weight Bearing  Required Braces or Orthoses?: Yes  Lower Extremity Weight Bearing Restrictions  Right Lower Extremity Weight Bearing: Weight Bearing As Tolerated  Left Lower Extremity Weight Bearing: Weight Bearing As Tolerated  Position Activity Restriction  Other position/activity restrictions: R IV, Vas-cath on HD, B boots, weight bearing as tolerated with offloading as much as possible to R heel.  HD MWF   Subjective General  Chart Reviewed: Yes, Labs, Orders, Previous Admission, History and Physical, Progress Notes  Patient assessed for rehabilitation services?: Yes  Response to previous treatment: Patient with no complaints from previous session  Family / Caregiver Present: No  Referring Practitioner: Dr. Shawn Campos  Diagnosis: Acute systolic congestive heart failure  Subjective  Subjective: Pt sitting EOB, just finishing breakfast.  Pleasant and agreeable. General Comment  Comments: RN cleared pt for OT  Vital Signs  Patient Currently in Pain: Denies   Orientation  Orientation  Overall Orientation Status: Within Functional Limits  Objective    ADL  Feeding: Setup; Beverage management(sitting EOB)  Grooming: Setup; Increased time to complete(sitting EOB to brush teeth and hair and wash face)  UE Bathing: Setup;Verbal cueing; Increased time to complete(sitting EOB to wash under breasts, chest and abdomen)  UE Dressing: Setup;Verbal cueing; Increased time to complete(sitting EOB donning/doffing shirt)  LE Dressing: Moderate assistance;Verbal cueing; Increased time to complete(able to doff/ting socks with reacher/sock aid but continues to need assistance with B darco boots)           Bed mobility  Supine to Sit: Unable to assess  Sit to Supine: Unable to assess  Scooting: Unable to assess  Comment: Pt sitting EOB upon arrival and in [facility] upon departure  Transfers  Stand Step Transfers: Stand by assistance  Sit to stand: Stand by assistance  Stand to sit: Stand by assistance  Transfer Comments: better balance but still needs cues for hand placement prior to sitting <> standing        Coordination  Fine Motor: fair coordination with opening containers for grooming tasks and bathing UE              Cognition  Overall Cognitive Status: Exceptions  Arousal/Alertness: Appropriate responses to stimuli  Following Commands: Follows multistep commands with repitition; Follows one step commands with increased time  Attention Span: Difficulty dividing

## 2020-03-27 NOTE — PROGRESS NOTES
toilet  Toilet Transfers Comments: RW and grab bar  Assessment        SLP                Body mass index is 29.08 kg/m². Rehabilitation Diagnosis:  Cardiac, 9.0, Cardiac     Assessment and Plan:  Acute systolic congestive heart failure. Stable after institution of HD as below.      ESRD. Nephrology following.      HTN. Currently controlled; follow.        Mild cognitive deficit. SLP consulted. Continue donepezil.      DM. Lantus, SSI.     Lower extremity cellulitis. Resoled.      Chronic foot ulcers. Appreciate podiatry input.      Bowels: Schedule colace + senna. Follow bowel movements. Enema or suppository if needed.      Bladder: Check PVR x 3. [facility] if PVR > 200ml or if any volume is > 500 ml.      Sleep: Trazodone provided prn. HALIE: 4/3 home with home health  DME: owns    Yobani .  Rea So MD 3/27/2020, 9:36 AM

## 2020-03-28 PROBLEM — J18.9 PNEUMONIA: Status: ACTIVE | Noted: 2020-01-01

## 2020-03-28 PROBLEM — N18.6 ESRD (END STAGE RENAL DISEASE) (HCC): Status: ACTIVE | Noted: 2020-01-01

## 2020-03-28 NOTE — PROGRESS NOTES
Alicia Larios  3/28/2020  [id number]    Chief Complaint: CHF, ESRD  Subjective:   C/o diarrhea multiple times this AM  C/o mild SOB while laying in bed  C/o abdominal discomfort  Low grade temperature noted this AM  O2 85% on RA this AM - started on 1L supplemental O2    ROS: No n/v, cp, sob, f/c  Objective:  Patient Vitals for the past 24 hrs:   BP Temp Temp src Pulse Resp SpO2 Weight   03/28/20 0951 -- 99.5 °F (37.5 °C) Oral -- -- 96 % --   03/28/20 0845 -- 100.4 °F (38 °C) Oral -- -- 91 % --   03/28/20 0730 128/63 99.3 °F (37.4 °C) Oral 62 18 (!) 85 % --   03/28/20 0445 131/67 98.5 °F (36.9 °C) Oral 86 18 92 % --   03/27/20 2112 117/63 98.3 °F (36.8 °C) Oral 68 14 -- --   03/27/20 1720 (!) 176/67 98.4 °F (36.9 °C) -- 70 16 -- 143 lb 4.8 oz (65 kg)   03/27/20 1342 136/60 98.2 °F (36.8 °C) -- 58 16 -- 150 lb 2.1 oz (68.1 kg)     Gen: No distress, pleasant. HEENT: Normocephalic, atraumatic. CV: Regular rate and rhythm. Resp: No respiratory distress. Lungs with crackles mostly on right   Abd: Soft, nontender   Ext: No edema. Neuro: Alert, oriented, appropriately interactive. Wt Readings from Last 3 Encounters:   03/27/20 143 lb 4.8 oz (65 kg)   03/23/20 149 lb 7.6 oz (67.8 kg)   02/21/20 169 lb 1.5 oz (76.7 kg)       Laboratory data:   Lab Results   Component Value Date    WBC 7.2 03/27/2020    HGB 10.5 (L) 03/27/2020    HCT 32.3 (L) 03/27/2020    MCV 84.1 03/27/2020     03/27/2020       Lab Results   Component Value Date     03/27/2020    K 5.1 03/27/2020    K 4.8 03/24/2020    CL 91 03/27/2020    CO2 26 03/27/2020    BUN 32 03/27/2020    CREATININE 3.6 03/27/2020    GLUCOSE 209 03/27/2020    CALCIUM 9.2 03/27/2020        Therapy progress:  PT  Position Activity Restriction  Other position/activity restrictions: R IV, Vas-cath on HD, B boots, weight bearing as tolerated with offloading as much as possible to R heel.  HD MWF   Objective     Sit to Stand: Contact guard assistance(from w/c multiple Refer to d/c summary; patient now discharging to acute care awaiting results of COVID test.     Yuli Conn MD 3/28/2020, 2:41 PM

## 2020-03-28 NOTE — H&P
wound (like a donut). Wrap with roll guaze. 3/23/20   Kellie Rock MD   donepezil (ARICEPT) 5 MG tablet Take 1 tablet by mouth nightly 1/23/20   Abdifatah Neal MD   melatonin 3 MG TABS tablet Take 3 mg by mouth nightly    Historical Provider, MD SANCHEZ Complex-C-Iron (SUPER B-COMPLEX/IRON/VITAMIN C PO) Take 1 tablet by mouth daily. 9/2/10   Historical Provider, MD       Allergies:  Ativan [lorazepam]; Dilaudid [hydromorphone hcl]; Flexeril [cyclobenzaprine hcl]; Heparin; Hydromorphone; Penicillins; Soma [carisoprodol]; and Sulfa antibiotics    Social History:      The patient currently lives ***    TOBACCO:   reports that she quit smoking about 35 years ago. Her smoking use included cigarettes. She quit after 13.00 years of use. She has never used smokeless tobacco.  ETOH:   reports no history of alcohol use. E-Cigarettes Vaping or Juuling     Questions Responses    Vaping Use Never User    Start Date     Does device contain nicotine? Quit Date     Vaping Type         Family History:      Reviewed in detail. Positive as follows:        Problem Relation Age of Onset    Diabetes Mother     Heart Failure Mother     Heart Disease Mother     Stroke Mother     Diabetes Sister     Diabetes Brother     Diabetes Maternal Grandmother     Asthma Son     Asthma Son     Diabetes Daughter     Heart Disease Daughter     Irritable Bowel Syndrome Daughter     Diabetes Brother     Cancer Neg Hx     Emphysema Neg Hx     Hypertension Neg Hx      REVIEW OF SYSTEMS:   Pertinent positives as noted in the HPI. All other systems reviewed and negative. PHYSICAL EXAM PERFORMED:    BP (!) 172/62   Pulse 66   Temp 101.9 °F (38.8 °C) (Oral)   Resp 20   Ht 5' (1.524 m)   Wt 143 lb 4.8 oz (65 kg)   SpO2 (!) 89%   BMI 27.99 kg/m²     General appearance:  No apparent distress, appears stated age and cooperative. HEENT:  Normal cephalic, atraumatic without obvious deformity.  Pupils equal, round, and reactive to light.  Extra ocular muscles intact. Conjunctivae/corneas clear. Neck: Supple, with full range of motion. No jugular venous distention. Trachea midline. Respiratory:  Normal respiratory effort. Clear to auscultation, bilaterally without Rales/Wheezes/Rhonchi. Cardiovascular:  Regular rate and rhythm with normal S1/S2 without murmurs, rubs or gallops. Abdomen: Soft, non-tender, non-distended with normal bowel sounds. Musculoskeletal:  No clubbing, cyanosis or edema bilaterally. Full range of motion without deformity. Skin: Skin color, texture, turgor normal.  No rashes or lesions. Neurologic:  Neurovascularly intact without any focal sensory/motor deficits.  Cranial nerves: II-XII intact, grossly non-focal.  Psychiatric:  Alert and oriented, thought content appropriate, normal insight  Capillary Refill: Brisk,< 3 seconds   Peripheral Pulses: +2 palpable, equal bilaterally     Labs:     Recent Labs     03/27/20  0827 03/28/20  1219   WBC 7.2 8.6   HGB 10.5* 11.0*   HCT 32.3* 34.2*    170     Recent Labs     03/27/20  0827 03/28/20  1219   * 131*   K 5.1 4.7   CL 91* 90*   CO2 26 23   BUN 32* 21*   CREATININE 3.6* 3.0*   CALCIUM 9.2 9.4   PHOS 4.6  --      Recent Labs     03/26/20  0734 03/27/20  0827 03/28/20  0843   INR 2.15* 2.06* 2.27*     Urinalysis:      Lab Results   Component Value Date    NITRU POSITIVE 03/08/2020    WBCUA 6-10 03/08/2020    BACTERIA 4+ 03/08/2020    RBCUA 3-4 03/08/2020    BLOODU TRACE-INTACT 03/08/2020    SPECGRAV 1.020 03/08/2020    GLUCOSEU Negative 03/08/2020    GLUCOSEU NEGATIVE 10/07/2011     Radiology:     CXR: I have reviewed the CXR with the following interpretation: ***    EKG:  I have reviewed the EKG with the following interpretation: ***    XR CHEST PORTABLE   Final Result   Right lower lobe pneumonia           ASSESSMENT:    Active Hospital Problems    Diagnosis Date Noted    Pneumonia [J18.9] 03/28/2020    ESRD (end stage renal disease) ([facility] [N18.6]

## 2020-03-28 NOTE — PROGRESS NOTES
place; Left in chair         Patient Diagnosis(es): There were no encounter diagnoses. has a past medical history of Allergic, Anemia, Angina, Arthritis, Asthma, Cancer ([address] Coronary artery disease, Diabetes mellitus ([address] Heart attack ([address] Hyperlipidemia, Hypertension, Obesity, and Skin ulcer of right foot with necrosis of muscle ([address] 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 Restrictions  Restrictions/Precautions  Restrictions/Precautions: General Precautions, Fall Risk, Weight Bearing  Required Braces or Orthoses?: Yes  Lower Extremity Weight Bearing Restrictions  Right Lower Extremity Weight Bearing: Weight Bearing As Tolerated  Left Lower Extremity Weight Bearing: Weight Bearing As Tolerated  Position Activity Restriction  Other position/activity restrictions: R IV, Vas-cath on HD, B boots, weight bearing as tolerated with offloading as much as possible to R heel. HD MWF   Subjective   General  Chart Reviewed: Yes, Labs, Orders, Previous Admission, History and Physical, Progress Notes  Patient assessed for rehabilitation services?: Yes  Response to previous treatment: Patient with no complaints from previous session  Family / Caregiver Present: No  Referring Practitioner: Dr. Álvaro Kelly  Diagnosis: Acute systolic congestive heart failure  Subjective  Subjective: Patient with minimal engagement in session. Patient reporting increase in nausea, febrile, and need for increase O2. Patient required max encouragment to participate in session.    General Comment  Comments: RN cleared pt for OT  Pain Assessment  Pain Assessment: 0-10  Pain Co-treatment   Time In 1200         Time Out 1230         Minutes 30           Total treatment minutes:  [address], OTR/L

## 2020-03-28 NOTE — PROGRESS NOTES
Pt noted with O2 sats 82-85% on RA this morning. SOA noted on exertion. Does have an occasional dry cough. Temp 100.5 this AM. O2 applied at 1LPM with sats improved to 92%. NP on unit at time and updated on pt with new orders noted.  Cristian Serrato RN

## 2020-03-28 NOTE — PROGRESS NOTES
Patient admitted to room 213.2 from ARU. Patient oriented to room, call light, bed rails, phone, lights and bathroom. Patient instructed about the schedule of the day including: vital sign frequency, lab draws, possible tests, frequency of MD and staff rounds, including RN/MD rounding together at bedside, daily weights, and I &O's. Patient instructed about prescribed diet, how to use 8MENU, and television. Bed locked, in lowest position, side rails up 2/4, call light within reach. Will continue to monitor.

## 2020-03-28 NOTE — CONSULTS
Pharmacy to Dose Warfarin     Dx: Hx of DVT  Goal INR range 2-3   Home Warfarin dose: 3mg daily      Date                 INR                  Warfarin  3/10                 1.64                 4 mg  3/11                 1.62                 4 mg   3/12                 2.55                 hold  3/13                 3.99                 Hold  3/14                 4.61                 Hold  3/15                 3.15                 Hold   3/16                 3.05                 Hold                      3/17                 2.21                 Hold  3/19                  1.46                4mg   3/20                 1.52                 4 mg           3/21                 1.84                 2.5 mg   3/22                 2.21                 2.5 mg   3/23                 2.60                 2 mg  3/24                 1.95                 3.5 mg  3/25                 1.95                 3 mg  3/26                 2.15                 3 mg    3/27                 2.06                 3 mg  3/28                 2.27                 3 mg     Recommend Warfarin 3 mg tonight X1.  Daily INR ordered     German Cogan PharmD  3/28/2020 at 6:34 PM

## 2020-03-28 NOTE — CONSULTS
Pharmacy to Dose Vancomycin    Dx: PNE  Goal trough = 15-20 mcg/mL  Pt wt = 65 kg, will use adjusted dosing wt = 53.3 kg  Estimated Creatinine Clearance: 11 mL/min (A) (based on SCr of 3.6 mg/dL (H)).     Vancomycin 1000mg IVPB x 1   Pulse Doses  Vancomycin trough 0600 labs on 3/29/20

## 2020-03-29 NOTE — CONSULTS
03/29/2020       Hepatic Function Panel:   Lab Results   Component Value Date    ALKPHOS 84 03/28/2020    ALT 19 03/28/2020    AST 30 03/28/2020    PROT 6.3 03/28/2020    PROT 7.0 11/30/2012    BILITOT 0.9 03/28/2020    BILIDIR <0.2 03/08/2020    IBILI see below 03/08/2020    LABALBU 3.5 03/28/2020     UA:  Lab Results   Component Value Date    COLORU Straw 03/08/2020    CLARITYU SL CLOUDY 03/08/2020    GLUCOSEU Negative 03/08/2020    GLUCOSEU NEGATIVE 10/07/2011    BILIRUBINUR Negative 03/08/2020    BILIRUBINUR NEGATIVE 10/07/2011    KETUA Negative 03/08/2020    SPECGRAV 1.020 03/08/2020    BLOODU TRACE-INTACT 03/08/2020    PHUR 5.0 03/08/2020    PROTEINU TRACE 03/08/2020    UROBILINOGEN 0.2 03/08/2020    NITRU POSITIVE 03/08/2020    LEUKOCYTESUR TRACE 03/08/2020    LABMICR YES 03/08/2020    URINETYPE NotGiven 03/08/2020      Urine Microscopic:   Lab Results   Component Value Date    LABCAST 5-10 Hyaline 10/15/2019    BACTERIA 4+ 03/08/2020    WBCUA 6-10 03/08/2020    RBCUA 3-4 03/08/2020    EPIU 0-2 01/05/2020         Scheduled Meds:   vancomycin  125 mg Oral 4 times per day    vancomycin  500 mg Intravenous Once    cefepime  1 g Intravenous Q12H    donepezil  5 mg Oral Nightly    aspirin  81 mg Oral Daily    ferrous sulfate  325 mg Oral BID    hydrALAZINE  50 mg Oral 3 times per day    insulin glargine  15 Units Subcutaneous Nightly    insulin lispro  0-12 Units Subcutaneous TID WC    insulin lispro  0-6 Units Subcutaneous Nightly    lactobacillus  1 capsule Oral Daily with breakfast    melatonin ER  3 mg Oral Nightly    metoprolol tartrate  50 mg Oral BID    pantoprazole  40 mg Oral QAM AC    povidone-iodine   Topical Daily    rosuvastatin  5 mg Oral Daily    sennosides-docusate sodium  1 tablet Oral BID    sertraline  100 mg Oral Daily    sodium chloride flush  10 mL Intravenous 2 times per day    [START ON 3/28/2021] vancomycin (VANCOCIN) intermittent dosing (placeholder)   Other RX Canceled Specimen: Blood    Culture, Blood 2 [[id number]]    Order Status: Canceled Specimen: Blood    Culture, Blood 1 [[id number]]    Order Status: Canceled Specimen: Blood    Culture, Blood 2 [[id number]]    Order Status: Canceled Specimen: Blood    Culture, Respiratory, Sputum [[id number]]    Order Status: Sent Specimen: Sputum Expectorated    Culture, Urine [[id number]]    Order Status: Sent Specimen: Urine voided    Legionella antigen, urine [[id number]]    Order Status: Sent Specimen: Urine voided    Strep Pneumoniae Antigen [[id number]]    Order Status: Sent Specimen: Urine voided    Culture, Blood 1 [[id number]] Collected: 03/28/20   Order Status: No result    Culture, Blood 2 [[id number]] Collected: 03/28/20   Order Status: No result          Urine Culture  No results for input(s): Veronica Goldstein in the last 72 hours. Imaging:   No orders to display     HISTORY:   ORDERING SYSTEM PROVIDED HISTORY: New O2 requirement   TECHNOLOGIST PROVIDED HISTORY:   Reason for exam:->New O2 requirement   Reason for Exam: new o2 requirement   Acuity: Unknown   Type of Exam: Unknown       FINDINGS:   Left-sided central venous catheter in place terminating in the right atrium. The heart size is normal.  Right lower lobe airspace opacification.           Impression   Right lower lobe pneumonia     CXR images reviewed from 3/28       /28/2020 12:29 PM - Gia Junior Incoming Lab Results From Soft (Epic Adt)     Specimen Information: Stool        Component Collected Lab   C.diff Toxin/Antigen Abnormal  03/28/2020 11:27 AM [facility]- [address] for Clostridium difficile antigen and toxin   CONTACT PRECAUTIONS INDICATED   Normal Range: Negative ? Internal Positive Control:  OK    Testing Performed By     Lab - Abbreviation Name Director Address Valid Date Range   [facility] [place] [address] 08/30/17 0807-Present   Narrative   Performed by: [address]   ORDER#: [id number]                          ORDERED BY: Bronwyn Byrd  SOURCE: Stool                              COLLECTED:  03/28/20 11:27  ANTIBIOTICS AT [facility]. :                      RECEIVED :  03/28/20 11:32  Collect White vial (sterile container)  Performed at: All pertinent images and reports for the current Hospitalization were reviewed by me.     IMPRESSION:    Patient Active Problem List   Diagnosis    Asthma    Coronary artery disease    Uncontrolled hypertension    Vertigo    Chest pain    Type 2 diabetes mellitus with hyperglycemia, with long-term current use of insulin (HCC)    Acute asthma exacerbation    Chest pain    HTN (hypertension)    CAD (coronary artery disease)    Lipids abnormal    Osteoarthritis of both knees    Near syncope    Perennial allergic rhinitis    Primary localized osteoarthrosis, lower leg    Tibialis tendinitis    Osteoarthritis of spine with radiculopathy, lumbar region    Pain of both hip joints    Spinal stenosis, lumbar region, with neurogenic claudication    Right sided sciatica    Endometrial adenocarcinoma ([address]    Drainage from wound    S/P hysterectomy with oophorectomy    S/P total hysterectomy and BSO (bilateral salpingo-oophorectomy)    Chronic pain of left knee    Chronic pain of right knee    History of endometrial cancer    Menopausal state    Osteoporosis    Primary osteoarthritis of both knees    Vaginal atrophy    Overdose of insulin, accidental or unintentional, initial encounter    Hypoglycemia due to insulin    Hypoglycemia    Pulmonary nodule    Acute respiratory failure ([address]    Respiratory arrest before cardiac arrest (HCC)    Acute respiratory failure with hypoxia and hypercapnia (HCC)    Spindle cell sarcoma (HCC)    CKD (chronic kidney disease) stage 3, GFR 30-59 ml/min (HCC)    Cardiac arrest (HCC)    Acute pulmonary edema (HCC)    Pleural effusion    Elevated LFTs   

## 2020-03-29 NOTE — PROGRESS NOTES
[facility]       Progress Note           Subjective/   75y.o. year old female who we are seeing in consultation for new ESRD. HPI:  Last HD on 3/27 with 3 liters removed, post-weight of 65 kg. Sob better, still with sob with exertion and lying flat. ROS:  No further fevers. Intake reduced. BP's on lower side. Objective/   GEN:  Chronically ill, BP (!) 107/52   Pulse 58   Temp 97.9 °F (36.6 °C) (Oral)   Resp 16   Ht 5' (1.524 m)   Wt 144 lb 6.4 oz (65.5 kg)   SpO2 91%   BMI 28.20 kg/m²   EXT: ++ LE edema  ACCESS: [address]    Data/  Recent Labs     03/28/20  1219 03/28/20  1557 03/29/20  0922   WBC 8.6 6.8 5.6   HGB 11.0* 9.3* 9.6*   HCT 34.2* 28.4* 29.2*   MCV 84.8 83.9 83.6    143 152     Recent Labs     03/27/20  0827 03/28/20  1219 03/28/20  1557 03/29/20  0922   * 131* 127* 128*   K 5.1 4.7 4.7 4.0   CL 91* 90* 90* 90*   CO2 26 23 23 24   GLUCOSE 209* 136* 225* 111*   PHOS 4.6  --   --   --    BUN 32* 21* 24* 33*   CREATININE 3.6* 3.0* 3.2* 4.0*   LABGLOM 12* 15* 14* 11*   GFRAA 15* 18* 17* 13*       Assessment/Plan      A/CKD now ESRD.  - HD started on 3/13/20 for worsening renal function associated with fluid overload not responding to high dose diuretics. ANA [address] 3/19/20.  - Follows with Dr. Percy Irvin in the office.  - HD MWF with UF as tolerated with prn albumin assistance & crit line monitoring  - Lower EDW to 65 kg or lower. - S/p TDC placement.     Acute on chronic CHF, combined with anasarca. - Urine PCR of 700mg/g.  - Severe pulmonary HTN.  - Fluid (1 L/day) and sodium restriction (2 grams/day).     Hypertension.  - Continue Metoprolol with parameters to hold for SBP less than 140, stop Hydralazine.  - Trend BP's    Anemia.  - Hgb at goal.  - Holding ANKUR and monitor.     RLL PNA/C diff   - Plans per Admitting  - Follow-up on cultures    Case d/w Nursing    Set up at [facility] M2 shift after discharge  Okay for discharge once transportation to HD

## 2020-03-29 NOTE — PROGRESS NOTES
Paged cross cover. \"Pt positive for C.diff. Placed in contact plus isolation. Already in droplet plus isolation d/t Covid r/o. Do you want to order PO vanc? \"

## 2020-03-29 NOTE — H&P
breath, numbness, tingling, N/V/C/D, fever and/or chills. Past Medical History:          Diagnosis Date    Allergic     Anemia     Angina     with exertion    Arthritis     Asthma 9/16/2010    [facility]     Coronary artery disease 9/16/2010    Diabetes mellitus ([facility]     Heart attack [facility]) age 43    no problems since then    Hyperlipidemia     Hypertension     Obesity     Skin ulcer of right foot with necrosis of muscle ([facility] 3/10/2020       Past Surgical History:          Procedure Laterality Date    BRONCHOSCOPY N/A 11/12/2019    BRONCHOSCOPY WASHING performed by Dacia Patino MD at [address] [place] [address] N/A 11/23/2019    BRONCHOSCOPY ALVEOLAR LAVAGE performed by Catalina Acosta MD at [address] [place] [address]  11/23/2019    BRONCHOSCOPY THERAPUTIC ASPIRATION INITIAL performed by Catalina Acosta MD at [address] [place] [address]  11/23/2019    BRONCHOSCOPY FOREIGN BODY REMOVAL performed by Catalina Acosta MD at [address] [place] [address]      right    CATARACT REMOVAL WITH IMPLANT      CORONARY ANGIOPLASTY WITH STENT PLACEMENT  4/2012    DIAGNOSTIC CARDIAC CATH LAB PROCEDURE      HYSTERECTOMY      11/30/15    KNEE ARTHROSCOPY      left knee. not a TKR. no metal.     TOE AMPUTATION Right 1/16/2020    RIGHT SECOND AND THIRD TOE AMPUTATION, RIGHT HEEL WOUND DEBRIDEMENT, RIGHT HALLUX TOE NAIL DEBRIDEMENT performed by Shelley Matute DPM at [facility] N/A 12/2/2019    EGD PEG PLACEMENT W/ ANESTHESIA performed by Any Sy MD at [address]       Medications Prior to Admission:      Prior to Admission medications    Medication Sig Start Date End Date Taking?  Authorizing Provider   aspirin 81 MG chewable tablet Take 1 tablet by mouth daily 3/23/20   Toya Meckel, MD   insulin glargine (LANTUS) 100 UNIT/ML injection vial Inject 15 Units into the skin nightly 3/23/20   Toya Meckel, MD   hydrALAZINE tylenol  -cefepime and Comycin initiated on 3/28/2020  -encourage coughing and deep breathing  -incentive spirometer  -tessalon  -oxygen therapy protocol     ESRD, HD M-W-F  -bmp in am  -nephrology consult in place - thank you     Essential HTN in setting of known CAD  -continue hydralazine and metoprolol  -continue asa     HLD  -continue rosuvastatin     DM2, controlled  -  -hemglobin a1c 6.1 on 2/21/2020  -continue lantus  -mdssi  -poct ac/hs  -hypoglycemia protocol  -carb control diet     Normocytic anemia, 11.0/34.2 on admission  -no s/s of bleeding at this time  -cbc in am  -continue ferrous sulfate    C. Difficile  -Contact precaution  -Continue Flagyl and vanc     DVT Prophylaxis: coumadin     Diet: DIET CARB CONTROL; Low Sodium (2 GM); Daily Fluid Restriction: 1000 ml  Dietary Nutrition Supplements: Diabetic Oral Supplement      Code Status: Full Code     PT/OT Eval Status: ordered     Dispo - 3-4 days pending clinical improvement      LUCIAN Ramos - CNP     Thank you Heidy Carr MD for the opportunity to be involved in this patient's care.  If you have any questions or concerns please feel free to contact me at [phone].  -----------------------------------Anticipated Dr. Poli ro-----------------------------------------

## 2020-03-29 NOTE — PROGRESS NOTES
dextrose, dextrose, dextrose, glucagon (rDNA), glucose, ondansetron, polyethylene glycol, povidone-iodine, promethazine **OR** ondansetron, sodium chloride flush, traMADol, traZODone      Intake/Output Summary (Last 24 hours) at 3/29/2020 0909  Last data filed at 3/29/2020 0047  Gross per 24 hour   Intake 130 ml   Output 0 ml   Net 130 ml             Labs:   Recent Labs     03/27/20  0827 03/28/20  1219 03/28/20  1557   WBC 7.2 8.6 6.8   HGB 10.5* 11.0* 9.3*   HCT 32.3* 34.2* 28.4*    170 143     Recent Labs     03/27/20  0827 03/28/20  1219 03/28/20  1557   * 131* 127*   K 5.1 4.7 4.7   CL 91* 90* 90*   CO2 26 23 23   BUN 32* 21* 24*   CREATININE 3.6* 3.0* 3.2*   CALCIUM 9.2 9.4 8.8   PHOS 4.6  --   --      Recent Labs     03/28/20  1557   AST 30   ALT 19   BILITOT 0.9   ALKPHOS 84     Recent Labs     03/27/20  0827 03/28/20  0843   INR 2.06* 2.27*     No results for input(s): CKTOTAL, TROPONINI in the last 72 hours.     Urinalysis:      Lab Results   Component Value Date    NITRU POSITIVE 03/08/2020    WBCUA 6-10 03/08/2020    BACTERIA 4+ 03/08/2020    RBCUA 3-4 03/08/2020    BLOODU TRACE-INTACT 03/08/2020    SPECGRAV 1.020 03/08/2020    GLUCOSEU Negative 03/08/2020    GLUCOSEU NEGATIVE 10/07/2011       Radiology:  No orders to display               Electronically signed by Leticia Galvez MD on 3/29/2020 at 9:09 AM

## 2020-03-29 NOTE — PROGRESS NOTES
CMU notified RN that pt had am 18 beat run of PAT. Returned to baseline heart rhythm w/o intervention. Pt was resting, in bed, asleep. Notified cross cover. Placed copy of tele strip in hard chart.

## 2020-03-29 NOTE — PROGRESS NOTES
BC not collected yet. Called lab. Per lab, will have phlebotomy come to room to collect [facility] ASAP.

## 2020-03-29 NOTE — PLAN OF CARE
Assess BP q 4 hours and prn. Educate pt on disease process, management, s/s to notify provider and risks associated with HTN.

## 2020-03-30 NOTE — PROGRESS NOTES
Infectious Disease Follow up Notes    CC :  CDI, pneumonia, r/o COVID-19 infection      Antibiotics:   Cefepime 1g q24  IV Vanc by level per pharmacy  Po vanc 125 QID   Culturelle. PPI     Admit Date:   3/28/2020  Hospital Day: 3    Subjective:   She has been afebrile for 48 hours. Currently on room air. Anuric. Stools very loose, less frequent. She denies cough, chest pain. +Orthopnea, not new or worse. +Nausea. Objective:     Patient Vitals for the past 8 hrs:   BP Temp Temp src Pulse Resp SpO2   03/30/20 1229 (!) 128/55 98.3 °F (36.8 °C) Oral 67 18 95 %   03/30/20 0738 (!) 118/58 98 °F (36.7 °C) Oral 59 15 93 %       EXAM:  General:  Alert, oriented, NAD    HEENT:  NCAT, PERRL, sclera anicteric  NECK:  supple  LUNGS:  +Rales R base. Breathing non-labored.   Otherwise clear    CV:  RRR  ABD: Soft, flat, NT    EXT:  Stasis changes kalpesh LE, L more erythematous than RLE, no calor or tenderness  Toes dressed   SKIN: No focal rash       LINE: L IJ TDC in place, site ok   PIV        Scheduled Meds:   vancomycin  500 mg Intravenous Once    darbepoetin brando-polysorbate  25 mcg Intravenous Weekly - Monday    vancomycin  125 mg Oral 4 times per day    cefepime  1 g Intravenous Q24H    donepezil  5 mg Oral Nightly    aspirin  81 mg Oral Daily    ferrous sulfate  325 mg Oral BID    insulin glargine  15 Units Subcutaneous Nightly    insulin lispro  0-12 Units Subcutaneous TID WC    insulin lispro  0-6 Units Subcutaneous Nightly    lactobacillus  1 capsule Oral Daily with breakfast    melatonin ER  3 mg Oral Nightly    metoprolol tartrate  50 mg Oral BID    pantoprazole  40 mg Oral QAM AC    povidone-iodine   Topical Daily    rosuvastatin  5 mg Oral Daily    sennosides-docusate sodium  1 tablet Oral BID    sertraline  100 mg Oral Daily    sodium chloride flush  10 mL Intravenous 2 times per day    [START ON Anasarca 03/07/2020    Cellulitis 54/20/4314    Metabolic encephalopathy 43/37/3226    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    Acute encephalopathy     Elevated LFTs 11/22/2019    Thrombocytopenia (HCC) 11/22/2019    Leukocytosis 11/22/2019    Normocytic normochromic anemia 11/22/2019    Cardiac arrest (HCC)     Acute pulmonary edema (HCC)     Pleural effusion     CKD (chronic kidney disease) stage 3, GFR 30-59 ml/min ([facility] 11/12/2019    Respiratory arrest before cardiac arrest ([address] 11/11/2019    Acute respiratory failure with hypoxia and hypercapnia ([address] 11/11/2019    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       Pulmonary nodule 10/15/2019    Acute respiratory failure (HCC) 10/15/2019    Overdose of insulin, accidental or unintentional, initial encounter 03/03/2018    Hypoglycemia due to insulin 03/03/2018    Hypoglycemia 03/03/2018    Vaginal atrophy 07/31/2017    Primary osteoarthritis of both knees 02/28/2017    Osteoporosis 01/30/2017    History of endometrial cancer 07/25/2016     Overview Note:     Grade 1 stage 1a dx 11-30-15      Menopausal state 07/25/2016    Chronic pain of left knee 07/19/2016    Chronic pain of right knee 07/19/2016    S/P total hysterectomy and BSO (bilateral salpingo-oophorectomy) 01/05/2016    Drainage from wound 12/08/2015    S/P hysterectomy with oophorectomy 12/08/2015    Endometrial adenocarcinoma ([address] 11/17/2015    Osteoarthritis of spine with radiculopathy, lumbar region 10/20/2015    Pain of both hip joints 10/20/2015    Spinal stenosis, lumbar region, with neurogenic claudication 10/20/2015    Right sided sciatica 10/20/2015    Tibialis tendinitis 09/14/2015    Primary localized osteoarthrosis, lower leg 03/23/2015    Perennial allergic rhinitis 04/15/2014    Osteoarthritis of both knees 09/23/2013    Chest pain 10/22/2012    HTN (hypertension) 10/22/2012    CAD (coronary artery disease) 10/22/2012     Overview Note:     Left dominant. LCx & OM stent. Mid RCA stent.  Lipids abnormal 10/22/2012    Type 2 diabetes mellitus with hyperglycemia, with long-term current use of insulin ([facility] 04/06/2012    Vertigo 10/08/2011     Admission 3/7-3/23/20 with CHF/CKD, started on HD    Moved to ARU 3/24/20    On 3/27/20 - developed worsening low grade fever, SOB, hypoxemia, diarrhea, CXR with new RLL infiltrate - prompting concern for COVID-19 infection and transfer. Getting broad abx for possible HCAP   On po vanc for confirmed CDI    ESRD   MWF HD via TDC     Allergy pcn, sulfa    Plan:    For pneumonia - po ceftin/flagyl  For C diff infection - po vanc   Strict isolation pending COVID result, suspicion low at this time       Discussed with patient/family, all questions answered        Natalio Camacho MD  Phone: [phone]   Fax : [phone]

## 2020-03-30 NOTE — PROGRESS NOTES
Report given to [employer]. Call light and bedside table within reach. No needs voiced at this time. Bed in lowest position, brakes locked.

## 2020-03-30 NOTE — PROGRESS NOTES
parameters to hold for SBP less than 140, stop Hydralazine.  - Trend BP's    Anemia.  - Hgb trending down, start weekly ANKUR with HD today, monitor.  - Ferritin low though unable to give iron with current infection.     Hyponatremia.  - Monitor with UF    RLL PNA/C diff   - Plans per Admitting  - Follow-up on cultures    Case d/w Admitting & Nursing    Set up at [facility] M2 shift after discharge  Okay for discharge once transportation to HD unit arranged - d/w NEY

## 2020-03-30 NOTE — PROGRESS NOTES
Monitor Summary   A Flutter 70's - 90's  0.18/0.12/0.40    12 hour chart check performed at bedside.   Received a call from Elizabeth Jones at [facility]. Covid-19 is negative. Informed bedside nurse, Veverly Crooked.   Lacey Kramer RN IP

## 2020-03-31 NOTE — PROGRESS NOTES
General appearance: Pleasant female in no apparent distress, appears stated age and cooperative. HEENT: Pupils equal, round, and reactive to light.  Glasses extra ocular muscles intact. Conjunctivae/corneas clear. Neck: Supple, with full range of motion. No jugular venous distention. Trachea midline. Respiratory:  Normal respiratory effort. Clear to auscultation, diminished, bilaterally without Rales/Wheezes/Rhonchi.  1 L nasal cannula  Cardiovascular:  Regular rate and rhythm with normal S1/S2 without murmurs, rubs or gallops. Abdomen: Soft, non-tender, slightly distended with normal bowel sounds. Musculoskeletal:  No clubbing, cyanosis , trace LE edema bilaterally.  Full range of motion without deformity. Skin: Skin color, texture, turgor normal.  No significant rashes or lesions. Neurologic:  Neurovascularly intact. Cranial nerves: II-XII intact, grossly non-focal.  Psychiatric:  Alert and oriented, thought content appropriate, normal insight  Capillary Refill: Brisk,< 3 seconds   Peripheral Pulses: +2 palpable, equal bilaterally        Labs:   Recent Labs     03/28/20  1557 03/29/20  0922 03/30/20  0620   WBC 6.8 5.6 5.5   HGB 9.3* 9.6* 9.3*   HCT 28.4* 29.2* 28.2*    152 165     Recent Labs     03/28/20  1557 03/29/20  0922 03/30/20  0620   * 128* 128*   K 4.7 4.0 4.0   CL 90* 90* 91*   CO2 23 24 24   BUN 24* 33* 41*   CREATININE 3.2* 4.0* 4.9*   CALCIUM 8.8 8.7 8.7   PHOS  --   --  4.5     Recent Labs     03/28/20  1557   AST 30   ALT 19   BILITOT 0.9   ALKPHOS 84     Recent Labs     03/29/20  0922 03/30/20  0620 03/31/20  0456   INR 3.24* 3.46* 2.43*     No results for input(s): CKTOTAL, TROPONINI in the last 72 hours.     Urinalysis:      Lab Results   Component Value Date    NITRU POSITIVE 03/08/2020    WBCUA 6-10 03/08/2020    BACTERIA 4+ 03/08/2020    RBCUA 3-4 03/08/2020    BLOODU TRACE-INTACT 03/08/2020    SPECGRAV 1.020 03/08/2020    GLUCOSEU Negative 03/08/2020    GLUCOSEU

## 2020-03-31 NOTE — PLAN OF CARE
Problem: Falls - Risk of:  Goal: Will remain free from falls  Description: Will remain free from falls  Outcome: Ongoing  Note: Fall risk assessment complete, fall precautions in place. Fall visuals posted, bed alarm on, bed in lowest position with wheels locked. Patient has been free of falls this shift, will continue to monitor. Goal: Absence of physical injury  Description: Absence of physical injury  Outcome: Ongoing     Problem: Activity:  Goal: Risk for activity intolerance will decrease  Description: Risk for activity intolerance will decrease  Outcome: Ongoing  Goal: Ability to tolerate increased activity will improve  Description: Ability to tolerate increased activity will improve  Outcome: Ongoing  Note: Pt up to chair with PT/OT assist. See PT note for details.       Problem: Coping:  Goal: Ability to adjust to condition or change in health will improve  Description: Ability to adjust to condition or change in health will improve  Outcome: Ongoing  Goal: Ability to identify and develop effective coping behavior will improve  Description: Ability to identify and develop effective coping behavior will improve  Outcome: Ongoing     Problem: Fluid Volume:  Goal: Ability to maintain a balanced intake and output will improve  Description: Ability to maintain a balanced intake and output will improve  Outcome: Ongoing     Problem: Health Behavior:  Goal: Ability to identify and utilize available resources and services will improve  Description: Ability to identify and utilize available resources and services will improve  Outcome: Ongoing  Goal: Ability to manage health-related needs will improve  Description: Ability to manage health-related needs will improve  Outcome: Ongoing  Goal: Identification of resources available to assist in meeting health care needs will improve  Description: Identification of resources available to assist in meeting health care needs will improve  Outcome: Ongoing     Problem: Metabolic:  Goal: Ability to maintain appropriate glucose levels will improve  Description: Ability to maintain appropriate glucose levels will improve  Outcome: Ongoing     Problem: Nutritional:  Goal: Maintenance of adequate nutrition will improve  Description: Maintenance of adequate nutrition will improve  Outcome: Ongoing  Goal: Progress toward achieving an optimal weight will improve  Description: Progress toward achieving an optimal weight will improve  Outcome: Ongoing  Goal: Ability to identify appropriate dietary choices will improve  Description: Ability to identify appropriate dietary choices will improve  Outcome: Ongoing     Problem: Physical Regulation:  Goal: Complications related to the disease process, condition or treatment will be avoided or minimized  Description: Complications related to the disease process, condition or treatment will be avoided or minimized  Outcome: Ongoing  Goal: Diagnostic test results will improve  Description: Diagnostic test results will improve  Outcome: Ongoing     Problem: Skin Integrity:  Goal: Risk for impaired skin integrity will decrease  Description: Risk for impaired skin integrity will decrease  Outcome: Ongoing  Note: Skin assessment complete. Pt at risk for skin breakdown. See Choco score. Heels elevated off bed. Will continue to turn and reposition patient every two hours and as needed. Will continue to keep patient clean and dry, applying skin care cream as needed. Pillows used for positioning. Will continue to monitor and assess for skin breakdown.        Problem: Tissue Perfusion:  Goal: Adequacy of tissue perfusion will improve  Description: Adequacy of tissue perfusion will improve  Outcome: Ongoing     Problem: Cardiac:  Goal: Complications related to the disease process, condition or treatment will be avoided or minimized  Description: Complications related to the disease process, condition or treatment will be avoided or minimized  Outcome: Ongoing  Goal: Hemodynamic stability will improve  Description: Hemodynamic stability will improve  Outcome: Ongoing  Goal: Cerebral tissue perfusion will improve  Description: Cerebral tissue perfusion will improve  Outcome: Ongoing     Problem: Pain:  Goal: Pain level will decrease  Description: Pain level will decrease  Outcome: Ongoing  Goal: Control of acute pain  Description: Control of acute pain  Outcome: Ongoing  Goal: Control of chronic pain  Description: Control of chronic pain  Outcome: Ongoing

## 2020-03-31 NOTE — CARE COORDINATION
Care Coordination, Acute Rehab     After review, this patient is felt to be:      [x]  Appropriate for Acute Inpatient Rehab    []  Appropriate for Acute Inpatient Rehab Pending Insurance Authorization    []  Not appropriate for Acute Inpatient Rehab    []  Not appropriate at this time, however evaluation ongoing     feels pt meets criteria for admission to ARU. Able to accept today based on MD decision to discharge from acute.  Electronically signed by Venu Flaherty RN on 3/31/2020 at 4:10 PM

## 2020-03-31 NOTE — PROGRESS NOTES
Patient transferred from A.D. to [address] [place] [address]. Patient is alert and oriented. She remain in contact plus isolation. Oriented to room,, call bell is at beside, Assessment as charted. Will continue to monitor.

## 2020-03-31 NOTE — CONSULTS
[address] [address] Continence Nurse  Consult Note       NAME:  Evelio Butler RECORD NUMBER:  [id number]  AGE: 75 y.o. GENDER: female  : 1945  TODAY'S DATE:  3/31/2020    Subjective  I ended back in the hospital after fever and cough. Reason for WOCN Evaluation and Assessment: DTI on keshia Ferreira is a 75 y.o. female referred by:   [x] Physician  [] Nursing  [] Other:     Wound Identification:  Wound Type: Moisture associated skin damage and shearing right buttocks (No DTI). Unstageable pressure injury right heel. Traumatic wound right posterior leg and right 4th dorsal toe. Dry scabs left 2-4 dorsal toes. Healed right dorsal foot and 2-3 MTP joints amputation site. Cellulitis in right leg and foot resolved. Small blanchable redness noted on left heel. Contributing Factors: edema, diabetes, decreased mobility and obesity, neuropathy,PAD. Wound History: Patient has been in hospital and acute rehab at [facility] since 3/8/20. She was recovering in rehab when she developed a fever and cough. She was re-admitted and is Covid-19 and flu negative. Dr Jud Moon evaluated this patient on 3/24/20 and debrided the right heel at that time. She debrided the right heel and dorsal foot (which is now healed). She no longer has cellulitis in her legs or feet. Current Wound Care Treatment:  Betadine and foam dressings.     Patient Goal of Care:  [x] Wound Healing  [] Odor Control  [] Palliative Care  [] Pain Control   [] Other:         PAST MEDICAL HISTORY        Diagnosis Date    Allergic     Anemia     Angina     with exertion    Arthritis     Asthma 2010    Cancer ([facility]     CKD (chronic kidney disease)     Clostridium difficile infection 2020    Coronary artery disease 2010    Diabetes mellitus ([facility]     Heart attack [facility]) age 43    no problems since then    Hyperlipidemia     Hypertension     Obesity     [facility] right [facility] of muscle [facility] 3/10/2020 Allergen Reactions    Ativan [Lorazepam]     Dilaudid [Hydromorphone Hcl] Other (See Comments)     Pt states it made her crazy    Flexeril [Cyclobenzaprine Hcl]     Heparin      KEENAN     Hydromorphone     Penicillins     Soma [Carisoprodol]     Sulfa Antibiotics     Vicodin [Hydrocodone-Acetaminophen] Other (See Comments)     Per pt, makes her \"go crazy\"       MEDICATIONS    No current facility-administered medications on file prior to encounter. Current Outpatient Medications on File Prior to Encounter   Medication Sig Dispense Refill    aspirin 81 MG chewable tablet Take 1 tablet by mouth daily 30 tablet 3    insulin glargine (LANTUS) 100 UNIT/ML injection vial Inject 15 Units into the skin nightly 1 vial 3    hydrALAZINE (APRESOLINE) 50 MG tablet Take 1 tablet by mouth every 8 hours 90 tablet 3    povidone-iodine 10 % swab Apply topically. Off load right heel with off loading boot. Paint right heel daily with betadine. Place white foam around right heel wound (like a donut). Wrap with roll guaze.       pantoprazole (PROTONIX) 40 MG tablet Take 1 tablet by mouth every morning (before breakfast) 30 tablet 3    metoprolol tartrate (LOPRESSOR) 50 MG tablet Take 50 mg by mouth 2 times daily      warfarin (COUMADIN) 3 MG tablet Take 3 mg by mouth daily      rosuvastatin (CRESTOR) 5 MG tablet Take 5 mg by mouth daily      sertraline (ZOLOFT) 50 MG tablet Take 1 tablet by mouth daily 30 tablet 3    insulin lispro (HUMALOG) 100 UNIT/ML injection vial Inject 0-12 Units into the skin 3 times daily (with meals) **Medium Dose Corrective Algorithm**  Glucose: Dose:  If <139             No Insulin  140-199 2 Units  200-249 4 Units  250-299 6 Units  300-349 8 Units  350-400 10 Units  Above 400       12 Units 1 vial 3    donepezil (ARICEPT) 5 MG tablet Take 1 tablet by mouth nightly 30 tablet 3    ferrous sulfate 325 (65 Fe) MG tablet Take 325 mg by mouth 2 times daily      melatonin 3 MG TABS tablet

## 2020-04-01 NOTE — DISCHARGE INSTR - COC
THIRD TOE AMPUTATION, RIGHT HEEL WOUND DEBRIDEMENT, RIGHT HALLUX TOE NAIL DEBRIDEMENT performed by Juma Gatica DPM at [facility] ([place], [place]) N/A 12/2/2019    EGD PEG PLACEMENT W/ ANESTHESIA performed by Desire Olson MD at [facility]       Immunization History:   Immunization History   Administered Date(s) Administered    Influenza Virus Vaccine 10/23/2012, 10/14/2014, 09/01/2015    Influenza Whole 11/01/2011    Influenza, High Dose (Fluzone 65 yrs and older) 10/13/2016    Pneumococcal Conjugate 13-valent ([id number]) 09/01/2015    Pneumococcal Conjugate 7-valent (Misty Barban) 10/01/2009, 11/01/2011    Tdap (Boostrix, Adacel) 05/13/2012       Active Problems:  Patient Active Problem List   Diagnosis Code    Asthma J45.909    Coronary artery disease I25.10    Uncontrolled hypertension I10    Vertigo R42    Chest pain R07.9    Type 2 diabetes mellitus with hyperglycemia, with long-term current use of insulin (HCC) E11.65, Z79.4    Acute asthma exacerbation J45. 901    Chest pain R07.9    HTN (hypertension) I10    CAD (coronary artery disease) I25.10    Lipids abnormal E78.89    Osteoarthritis of both knees M17.0    Near syncope R55    Perennial allergic rhinitis J30.89    Primary localized osteoarthrosis, lower leg M17.10    Tibialis tendinitis M76.829    Osteoarthritis of spine with radiculopathy, lumbar region M47.26    Pain of both hip joints M25.551, M25.552    Spinal stenosis, lumbar region, with neurogenic claudication M48.062    Right sided sciatica M54.31    Endometrial adenocarcinoma ([address] C54.1    Drainage from wound T14. 8XXA    S/P hysterectomy with oophorectomy Z90.710, Z90.721    S/P total hysterectomy and BSO (bilateral salpingo-oophorectomy) Z90.710, Z90.79, Z90.722    Chronic pain of left knee M25.562, G89.29    Chronic pain of right knee M25.561, G89.29    History of endometrial cancer Z85.42    Menopausal state N95.1    Osteoporosis M81.0  Primary osteoarthritis of both knees M17.0    Vaginal atrophy N95.2    Overdose of insulin, accidental or unintentional, initial encounter T38.3X1A    Hypoglycemia due to insulin E16.0, T38.3X5A    Hypoglycemia E16.2    Pulmonary nodule R91.1    Acute respiratory failure ([facility]) J96.00    Respiratory arrest before cardiac arrest ([facility]) I46.9, R09.2    Acute respiratory failure with hypoxia and hypercapnia (HCC) J96.01, J96.02    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    CKD (chronic kidney disease) stage 3, GFR 30-59 ml/min ([facility]) N18.3    Cardiac arrest ([facility]) I46.9    Acute pulmonary edema ([facility]) J81.0    Pleural effusion J90    Elevated LFTs R94.5    Thrombocytopenia ([facility]) D69.6    Leukocytosis D72.829    Normocytic normochromic anemia D64.9    Acute encephalopathy G93.40    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.47    Metabolic encephalopathy V64.98    Cellulitis L03.90    Anasarca R60.1    Skin ulcer of right foot with necrosis of muscle ([facility]) L97.513    Diabetic polyneuropathy associated with type 2 diabetes mellitus ([facility]) E11.42    Diabetic ulcer of right heel associated with type 2 diabetes mellitus, with necrosis of muscle ([facility]) E11.621, L97.413    Debility R53.81    Moderate malnutrition ([facility]) E44.0    Pneumonia J18.9    ESRD (end stage renal disease) ([facility]) N18.6       Isolation/Infection:   Isolation          C Diff Contact        Patient Infection Status     Infection Onset Added Last Indicated Last Indicated By Review Planned Expiration Resolved Resolved By    C-diff (Clostridium difficile)  04/01/20 04/01/20 Mikayla Crowe, RN 04/08/20       Resolved    COVID-19 Rule Out 03/28/20 03/28/20 03/29/20 Emergent Disease Panel (Ordered)   03/31/20 Rule-Out Test Resulted    C-diff Rule Out 03/28/20 03/28/20 03/28/20 Clostridium difficile toxin/antigen (Ordered)   03/28/20 Rule-Out Test Resulted          Nurse Assessment:  Last Vital Signs: BP (!) 153/67   Pulse 68   Temp 98 °F (36.7 °C) (Oral)   Resp 16   Ht 5' (1.524 m)   Wt 148 lb 5.9 oz (67.3 kg)   SpO2 93%   BMI 28.98 kg/m²     Last documented pain score (0-10 scale): Pain Level: 0  Last Weight:   Wt Readings from Last 1 Encounters:   04/01/20 148 lb 5.9 oz (67.3 kg)     Mental Status:  oriented, alert, coherent, logical, thought processes intact and able to concentrate and follow conversation    IV Access:  - Dialysis Catheter  - site  left and subclavian, insertion date: 3/19/20    Nursing Mobility/ADLs:  Walking   Assisted  Transfer  Assisted  Bathing  Assisted  Dressing  Assisted  Toileting  Assisted  Feeding  Independent  Med Admin  Assisted  Med Delivery   whole    Wound Care Documentation and Therapy:  Wound 11/29/19 Toe (Comment  which one) L Foot Toes 2 & 3 (Active)   Number of days: 124       Wound 11/29/19 Toe (Comment  which one) L 4th Toe (Active)   Number of days: 124       Wound 01/03/20 Foot Dorsal;Right open area to top of right foot (Active)   Wound Image   3/31/2020  4:02 PM   Wound Other 3/31/2020  4:02 PM   Offloading for Diabetic Foot Ulcers Offloading boot 3/21/2020  8:45 PM   Dressing Changed Changed/New 3/31/2020 12:00 AM   Wound Cleansed Rinsed/Irrigated with saline; Wound cleanser 3/30/2020  9:08 PM   Dressing Change Due 03/30/20 3/29/2020  5:28 AM   Wound Length (cm) 10 cm 3/18/2020 12:53 PM   Wound Width (cm) 6.3 cm 3/18/2020 12:53 PM   Wound Depth (cm) 0 cm 3/18/2020 12:53 PM   Wound Surface Area (cm^2) 63 cm^2 3/18/2020 12:53 PM   Change in Wound Size % (l*w) -4100 3/18/2020 12:53 PM   Wound Volume (cm^3) 0 cm^3 3/18/2020 12:53 PM   Wound Healing % 100 3/18/2020 12:53 PM   Distance Tunneling (cm) 0 cm 3/31/2020  4:02 PM   Tunneling Position ___ O'Clock 0 3/31/2020  4:02 PM   Undermining Starts ___ O'Clock 0 3/31/2020  4:02 PM   Undermining Ends___ O'Clock 0 3/31/2020  4:02 PM   Undermining Maxium Distance (cm) 0 3/31/2020  4:02 PM   Drainage Amount None 4/1/2020 12:20 AM   Margins Attached edges; Defined edges 3/29/2020  8:49 PM   Tess-wound Assessment Red; Intact;Dry 3/29/2020  8:49 PM   Non-staged Wound Description Partial thickness 3/11/2020 10:06 PM   Red%Wound Bed 10 3/17/2020  4:44 PM   Yellow%Wound Bed 90 3/17/2020  4:44 PM   Other%Wound Bed 4-% brown 3/9/2020 10:22 AM   Culture Taken No 3/31/2020  4:02 PM   Number of days: 88       Wound 02/23/20 Heel Right unstageable (Active)   Wound Image   3/31/2020  4:02 PM   Wound Pressure Unstageable 3/31/2020  4:02 PM   Offloading for Diabetic Foot Ulcers Offloading boot 3/31/2020  4:02 PM   Dressing Status Clean;Dry; Intact 4/1/2020 12:20 AM   Dressing Changed Changed/New 3/31/2020  4:02 PM   Dressing/Treatment Dry dressing 4/1/2020 12:20 AM   Wound Cleansed Rinsed/Irrigated with saline 3/31/2020  4:02 PM   Dressing Change Due 04/01/20 3/31/2020  4:02 PM   Wound Length (cm) 1.5 cm 3/31/2020  4:02 PM   Wound Width (cm) 2.5 cm 3/31/2020  4:02 PM   Wound Depth (cm) 0.1 cm 3/31/2020  4:02 PM   Wound Surface Area (cm^2) 3.75 cm^2 3/31/2020  4:02 PM   Change in Wound Size % (l*w) -87.5 3/31/2020  4:02 PM   Wound Volume (cm^3) 0.38 cm^3 3/31/2020  4:02 PM   Distance Tunneling (cm) 0 cm 3/31/2020  4:02 PM   Tunneling Position ___ O'Clock 0 3/31/2020  4:02 PM   Undermining Starts ___ O'Clock 0 3/31/2020  4:02 PM   Undermining Ends___ O'Clock 0 3/31/2020  4:02 PM   Undermining Maxium Distance (cm) 0 3/31/2020  4:02 PM   Wound Assessment Red 4/1/2020 12:20 AM   Drainage Amount None 4/1/2020 12:20 AM   Drainage Description Serosanguinous 3/31/2020  4:02 PM   Odor None 4/1/2020 12:20 AM   Margins Unattached edges; Defined edges 3/31/2020  4:02 PM   Tess-wound Assessment Red;Fragile 3/31/2020  4:02 PM   Non-staged Wound Description Full thickness 3/29/2020  8:49 PM   Yellow%Wound Bed 10 3/31/2020  4:02 PM   Black%Wound Bed 90 3/31/2020  4:02 PM   Other%Wound Bed 100 3/20/2020  4:11 PM   Culture Taken No 3/31/2020  4:02 PM   Number of days: 38       Wound 03/31/20 Leg

## 2020-04-01 NOTE — DISCHARGE SUMMARY
Hospital Medicine Discharge Summary    Patient ID: José Miguel Guerrier      Patient's PCP: Jacobo Pa MD    Admit Date: 3/28/2020     Discharge Date: 4/1/2020      Admitting Physician: Derek Damon MD     Discharge Physician: Ld Carvajal MD     Discharge Diagnoses: Active Hospital Problems    Diagnosis    Coronary artery disease [I25.10]     Priority: High    Pneumonia [J18.9]    ESRD (end stage renal disease) ([facility] [N18.6]    HTN (hypertension) [I10]    Type 2 diabetes mellitus with hyperglycemia, with long-term current use of insulin (HCC) [E11.65, Z79.4]       The patient was seen and examined on day of discharge and this discharge summary is in conjunction with any daily progress note from day of discharge. Hospital Course:   History Of Present Illness:       75 y.o. female, with past medical history of hypertension, CAD, hyperlipidemia and diabetes, who was a direct admit from the ARU to inpatient at [facility] with acute onset of fever and cough. History obtained from the patient and review of EMR. The patient stated she is unsure as to why she is \"being moved from place to place\". She stated this afternoon she did get a little short of breath and had a low-grade fever. Per EMR the patient was admitted on 3/7/2024 shortness of breath and edema. At that time she was diagnosed with acute systolic congestive heart failure. The patient was being evaluated by nephrology for diuresis and edema as well as cardiology for CHF. The patient was ultimately started on hemodialysis due to progressive renal failure. The patient was then discharged to the ARU at [facility] on 3/23/2020. Per EMR the patient had been doing well until today at roughly 1 PM when she spiked a fever of 101.7 and began to have a dry cough. Chest x-ray was done that revealed right lower lobe pneumonia. The patient was started on cefepime and vancomycin.   Per EMR infectious disease was consulted and 2.5 mLs by mouth every 6 hours for 10 days, Disp-1 Bottle, R-0NO PRINT      metroNIDAZOLE (FLAGYL) 500 MG tablet Take 1 tablet by mouth every 8 hours for 3 days, Disp-9 tablet, R-0NO PRINT      miconazole (MICOTIN) 2 % powder Apply to bilateral breast folds. Apply topically 2 times daily. , Disp-45 g, R-1, NO PRINT      zinc oxide (TRIAD HYDROPHILIC) PSTE paste Apply 1 ampule topically daily Clean left heel and left posterior lower mid leg with normal saline. Apply triad paste to black soft areas, cover with dry dressing and wrap with kerlix daily. ., Disp-1 Tube, R-0NO PRINT      benzonatate (TESSALON) 100 MG capsule Take 1 capsule by mouth 3 times daily as needed for Cough, Disp-5 capsule, R-0NO PRINT      cefUROXime (CEFTIN) 250 MG tablet Take 1 tablet by mouth daily for 3 days, Disp-3 tablet, R-0NO PRINT              Details   sennosides-docusate sodium (SENOKOT-S) 8.6-50 MG tablet Take 1 tablet by mouth 2 times daily Hold while pt having diarrhea, only give if pt constipated, Disp-1 tablet, R-0NO PRINT      sertraline (ZOLOFT) 100 MG tablet Take 1 tablet by mouth daily, Disp-30 tablet, R-0NO PRINT              Details   aspirin 81 MG chewable tablet Take 1 tablet by mouth daily, Disp-30 tablet, R-3DC to SNF      insulin glargine (LANTUS) 100 UNIT/ML injection vial Inject 15 Units into the skin nightly, Disp-1 vial, R-3DC to SNF      hydrALAZINE (APRESOLINE) 50 MG tablet Take 1 tablet by mouth every 8 hours, Disp-90 tablet, R-3DC to SNF      povidone-iodine 10 % swab Apply topically. Off load right heel with off loading boot. Paint right heel daily with betadine. Place white foam around right heel wound (like a donut).   Wrap with roll guaze., DC to SNF      pantoprazole (PROTONIX) 40 MG tablet Take 1 tablet by mouth every morning (before breakfast), Disp-30 tablet, R-3DC to SNF      metoprolol tartrate (LOPRESSOR) 50 MG tablet Take 50 mg by mouth 2 times dailyHistorical Med      warfarin (COUMADIN) 3 MG tablet Take 3 mg by mouth dailyHistorical Med      rosuvastatin (CRESTOR) 5 MG tablet Take 5 mg by mouth dailyHistorical Med      insulin lispro (HUMALOG) 100 UNIT/ML injection vial Inject 0-12 Units into the skin 3 times daily (with meals) **Medium Dose Corrective Algorithm**  Glucose: Dose:  If <139             No Insulin  140-199 2 Units  200-249 4 Units  250-299 6 Units  300-349 8 Units  350-400 10 Units  Above 400       12 Unit s, Disp-1 vial, R-3Print      donepezil (ARICEPT) 5 MG tablet Take 1 tablet by mouth nightly, Disp-30 tablet, R-3Normal      ferrous sulfate 325 (65 Fe) MG tablet Take 325 mg by mouth 2 times dailyHistorical Med      melatonin 3 MG TABS tablet Take 3 mg by mouth nightlyHistorical Med      acetaminophen (TYLENOL) 325 MG tablet Take 2 tablets by mouth every 4 hours as needed for Pain or Fever, Disp-120 tablet, R-3Normal      B Complex-C-Iron (SUPER B-COMPLEX/IRON/VITAMIN C PO) Take 1 tablet by mouth daily. Time Spent on discharge is more than 30 minutes in the examination, evaluation, counseling and review of medications and discharge plan. Signed:    Anuja Garsia MD   4/2/2020      Thank you Anthony Juan MD for the opportunity to be involved in this patient's care. If you have any questions or concerns please feel free to contact me at [phone].

## 2020-04-01 NOTE — PROGRESS NOTES
pulmonary HTN.  - Fluid (1 L/day) and sodium restriction (2 grams/day).     Hypertension.  - Continue Metoprolol with parameters to hold for SBP less than 140, stopped Hydralazine.  - Trend BP's    Anemia.  - Hgb trending down, started weekly ANKUR qMonday with HD, monitor.  - Ferritin low though unable to give iron with current infection.     Hyponatremia.  - Monitor with UF    RLL PNA/C diff   - Plans per Admitting  - Follow-up on cultures      Set up at [facility] M2 shift after discharge  Okay for discharge once transportation to HD unit arranged - d/w NEY

## 2020-04-01 NOTE — PLAN OF CARE
Patient continues to have good apettite. Patient ate more than 3/4 of her breakfast and took her lunch with her to dialysis.  Renzo Iqbal

## 2020-04-01 NOTE — PROGRESS NOTES
The patient will demonstrate an understanding of a low carb diet, by selecting low carb items with each meal,  with the goal of completion at discharge.     Nancy Melgoza

## 2020-04-01 NOTE — PROGRESS NOTES
4 Eyes Skin Assessment     The patient is being assess for   Shift Handoff    I agree that 2 RN's have performed a thorough Head to Toe Skin Assessment on the patient. ALL assessment sites listed below have been assessed. Areas assessed by both nurses:   [x]   Head, Face, and Ears   [x]   Shoulders, Back, and Chest, Abdomen  [x]   Arms, Elbows, and Hands   [x]   Coccyx, Sacrum, and Ischium  [x]   Legs, Feet, and Heels          **SHARE this note so that the co-signing nurse is able to place an eSignature**    Co-signer eSignature: Electronically signed by Evan Beard RN on 4/1/20 at 12:43 AM EDT    Does the Patient have Skin Breakdown?   Yes LDA WOUND CARE was Initiated documentation include the Tess-wound, Wound Assessment, Measurements, Dressing Treatment, Drainage, and Color\",          Choco Prevention initiated:  Yes   Wound Care Orders initiated:  Yes      [id number]  nurse consulted for Pressure Injury (Stage 3,4, Unstageable, DTI, NWPT, Complex wounds)and New or Established Ostomies:  Yes      Primary Nurse eSignature: Electronically signed by Chiki Cornejo RN on 3/31/20 at 8:03 PM EDT

## 2020-04-01 NOTE — PROGRESS NOTES
Placeholder    miconazole   Topical BID       Continuous Infusions:   dextrose            Data Review:    Lab Results   Component Value Date    WBC 6.0 04/01/2020    HGB 10.0 (L) 04/01/2020    HCT 30.8 (L) 04/01/2020    MCV 84.3 04/01/2020     04/01/2020     Lab Results   Component Value Date    CREATININE 3.7 (H) 04/01/2020    BUN 32 (H) 04/01/2020     (L) 04/01/2020    K 4.3 04/01/2020    CL 91 (L) 04/01/2020    CO2 25 04/01/2020       Hepatic Function Panel:   Lab Results   Component Value Date    ALKPHOS 84 03/28/2020    ALT 19 03/28/2020    AST 30 03/28/2020    PROT 6.3 03/28/2020    PROT 7.0 11/30/2012    BILITOT 0.9 03/28/2020    BILIDIR <0.2 03/08/2020    IBILI see below 03/08/2020    LABALBU 3.7 04/01/2020       MICRO:  3/28 C diff +  3/29 Resp PCR panel neg   Rapid flu neg    BC x2 NGTD    MRSA screen neg       IMAGING:  CXR 3/28/20 - RLL pneumonia       Assessment:     Patient Active Problem List    Diagnosis Date Noted    Near syncope 03/13/2014     Priority: High    Chest pain 04/05/2012     Priority: High    Coronary artery disease 09/16/2010     Priority: High     Overview Note:     SKALE      Acute asthma exacerbation 04/06/2012     Priority: Medium    Uncontrolled hypertension 10/08/2011     Priority: Medium    Asthma 09/16/2010     Priority: Low    Pneumonia 03/28/2020    ESRD (end stage renal disease) ([facility] [address] 03/28/2020    Moderate malnutrition ([address] 03/24/2020    Debility 03/23/2020    Skin ulcer of right foot with necrosis of muscle ([address] 03/10/2020    Diabetic polyneuropathy associated with type 2 diabetes mellitus ([address] 03/10/2020    Diabetic ulcer of right heel associated with type 2 diabetes mellitus, with necrosis of muscle ([address] 03/10/2020    Anasarca 03/07/2020    Cellulitis 74/59/4097    Metabolic encephalopathy 32/36/9850    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 ([facility] [address] 11/24/2019    Acute encephalopathy     Elevated LFTs 11/22/2019    Thrombocytopenia ([facility] [address] 11/22/2019    Leukocytosis 11/22/2019    Normocytic normochromic anemia 11/22/2019    Cardiac arrest (HCC)     Acute pulmonary edema (HCC)     Pleural effusion     CKD (chronic kidney disease) stage 3, GFR 30-59 ml/min ([facility] [address] 11/12/2019    Respiratory arrest before cardiac arrest ([address] 11/11/2019    Acute respiratory failure with hypoxia and hypercapnia ([facility] [address] 11/11/2019    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  Pulmonary nodule 10/15/2019    Acute respiratory failure (HCC) 10/15/2019    Overdose of insulin, accidental or unintentional, initial encounter 03/03/2018    Hypoglycemia due to insulin 03/03/2018    Hypoglycemia 03/03/2018    Vaginal atrophy 07/31/2017    Primary osteoarthritis of both knees 02/28/2017    Osteoporosis 01/30/2017    History of endometrial cancer 07/25/2016     Overview Note:     Grade 1 stage 1a dx 11-30-15      Menopausal state 07/25/2016    Chronic pain of left knee 07/19/2016    Chronic pain of right knee 07/19/2016    S/P total hysterectomy and BSO (bilateral salpingo-oophorectomy) 01/05/2016    Drainage from wound 12/08/2015    S/P hysterectomy with oophorectomy 12/08/2015    Endometrial adenocarcinoma ([facility] [address] 11/17/2015    Osteoarthritis of spine with radiculopathy, lumbar region 10/20/2015    Pain of both hip joints 10/20/2015    Spinal stenosis, lumbar region, with neurogenic claudication 10/20/2015    Right sided sciatica 10/20/2015    Tibialis tendinitis 09/14/2015    Primary localized osteoarthrosis, lower leg 03/23/2015    Perennial allergic rhinitis 04/15/2014    Osteoarthritis of both knees 09/23/2013    Chest pain 10/22/2012    HTN (hypertension) 10/22/2012    CAD (coronary artery disease) 10/22/2012     Overview Note:     Left dominant. LCx & OM stent. Mid RCA stent.       Lipids abnormal 10/22/2012    Type 2 diabetes mellitus with

## 2020-04-02 PROBLEM — E44.0 MODERATE MALNUTRITION (HCC): Status: ACTIVE | Noted: 2020-01-01

## 2020-04-02 NOTE — H&P
Patient: Juliet Fish  [id number]  Date: 4/2/2020      Chief Complaint: weakness    History of Present Illness/Hospital Course:  Ms Tanya Moon is a 75year old female well known to me from a prior stay on the ARU. She was admitted 3/7 to [facility] [place] with complaints of swelling and shortness of breath after apparent noncompliance with her home torsemide. Cardiology was consulted for management of CHF, with nephrology consulted for progression of her CKD to ESRD requiring HD. She continues to have stable ulcers on both feet. She was doing well in rehab when she unfortunately developed a fever and was discharged to acute care due to suspicion for COVID. Her test was negative and she was treated for right lower lobe pneumonia. She has been transitioned from cefepime/vancomycin to ceftin/flagyl. She remains on oral vancomycin for cdifficile colitis, with improvement but not resolution of those symptoms. Prior Level of Function:  Mod I     Current Level of Function:  Min-ModA     has a past medical history of Allergic, Anemia, Angina, Arthritis, Asthma, Cancer ([address] CKD (chronic kidney disease), Clostridium difficile infection, Coronary artery disease, Diabetes mellitus ([address] Heart attack ([address] Hyperlipidemia, Hypertension, Obesity, and Skin ulcer of right foot with necrosis of muscle ([address] 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 reports that she quit smoking about 35 years ago. Her smoking use included cigarettes. She quit after 13.00 years of use.  She has never used smokeless tobacco. She reports that she does not drink alcohol or use bisacodyl (DULCOLAX) EC tablet 5 mg  5 mg Oral Daily PRN Sugey Han MD        cefUROXime (CEFTIN) tablet 250 mg  250 mg Oral Daily Sugey Han MD   250 mg at 04/02/20 0936    citrate dextrose (ACD Formula A) 0.73-2.45-2.2 GM/100ML solution 5 mL  5 mL Intracatheter PRN Sugey Han MD       [facility] [START ON 4/6/2020] darbepoetin brando-polysorbate (ARANESP) injection 25 mcg  25 mcg Intravenous Weekly - Monday Sugey Han MD        dextrose 50 % IV solution  12.5 g Intravenous PRN Sugey Han MD        donepezil (ARICEPT) tablet 5 mg  5 mg Oral Nightly Sugey Han MD   5 mg at 04/01/20 2210    ferrous sulfate (IRON 325) tablet 325 mg  325 mg Oral BID Sugey Han MD   325 mg at 04/02/20 0936    glucose (GLUTOSE) 40 % oral gel 15 g  15 g Oral PRN Sugey Han MD        hydrALAZINE (APRESOLINE) tablet 50 mg  50 mg Oral Q6H PRN Sugey Han MD        insulin glargine (LANTUS) injection vial 15 Units  15 Units Subcutaneous Nightly Sugey Han MD        insulin lispro (HUMALOG) injection vial 0-12 Units  0-12 Units Subcutaneous TID WC Sugey Han MD   2 Units at 04/02/20 4833    insulin lispro (HUMALOG) injection vial 0-6 Units  0-6 Units Subcutaneous Nightly Sugey Han MD   1 Units at 04/01/20 2224    lactobacillus (CULTURELLE) capsule 1 capsule  1 capsule Oral Daily with breakfast Sugey Han MD   1 capsule at 04/02/20 0936    melatonin ER tablet 3 mg  3 mg Oral Nightly Sugey Han MD   3 mg at 04/01/20 2210    metoprolol tartrate (LOPRESSOR) tablet 50 mg  50 mg Oral BID Sugey Han MD   50 mg at 04/02/20 2317    metroNIDAZOLE (FLAGYL) tablet 500 mg  500 mg Oral 3 times per day Sugey Han MD   500 mg at 04/02/20 0606    miconazole (MICOTIN) 2 % powder   Topical BID Sugey Han MD        ondansetron (ZOFRAN-ODT) disintegrating tablet 8 mg  8 mg Oral Q8H PRN Sugey Han MD        pantoprazole ([address]) anaphylaxis and drug reactions. ENDOCRINE: negative for weight changes and diabetic symptoms including polyuria, polydipsia and polyphagia. MUSCULOSKELETAL: negative for pain, joint swelling, decreased range of motion and muscle weakness. NEUROLOGICAL: negative for headaches, slurred speech, unilateral weakness. PSYCHIATRIC/BEHAVIORAL: negative for hallucinations, behavioral problems, confusion and agitation. All pertinent positives are noted in the HPI. Physical Examination:  Vitals:   Patient Vitals for the past 24 hrs:   BP Temp Temp src Pulse Resp SpO2 Height Weight   04/02/20 0930 (!) 162/72 98.1 °F (36.7 °C) Oral 73 16 99 % -- --   04/02/20 0617 -- -- -- -- -- -- -- 138 lb 3.2 oz (62.7 kg)   04/01/20 2015 (!) 149/68 98.7 °F (37.1 °C) Oral 77 16 95 % 5' (1.524 m) 137 lb 5.6 oz (62.3 kg)     Psych: Stable mood, normal judgement, normal affect   Const: No distress  Eyes: Conjunctiva noninjected, no icterus noted; pupils equal, round, and reactive to light. HENT: Atraumatic, normocephalic; Oral mucosa moist  Neck: Trachea midline, neck supple. No thyromegaly noted. CV: Regular rate and rhythm, no murmur rub or gallop noted  Resp: Lungs clear to auscultation bilaterally, no rales wheezes or ronchi, no retractions. Respirations unlabored. GI: Soft, nontender, nondistended. Normal bowel sounds. No palpable masses. Neuro: Alert, oriented, appropriate. No cranial nerve deficits appreciated. Sensation intact to light touch. Motor examination reveals normal strength in all four limbs diffusely. Skin: Normal temperature and turgor;  MSK: No joint abnormalities noted. Ext: 2+ edema. No varicosities.  S/p amputation middle toes of R foot    Lab Results   Component Value Date    WBC 5.5 04/02/2020    HGB 10.0 (L) 04/02/2020    HCT 31.1 (L) 04/02/2020    MCV 83.6 04/02/2020     04/02/2020     Lab Results   Component Value Date    INR 1.71 (H) 04/02/2020    INR 1.72 (H) 04/01/2020    INR 2.43 kg/m². Barriers to Discharge: foot ulcers, weakness, endurance, medical comorbidities    POST ADMISSION PHYSICIAN EVALUATION  The patient has agreed to being admitted to our comprehensive inpatient  rehabilitation facility consisting of at least 180 minutes of therapy a day,  5 out of 7 days a week. The patient/family has a good understanding of our discharge process. The  patient has potential to make improvement and is in need of at least two of  the following multidisciplinary therapies including but not limited to  physical, occupational, respiratory, and speech, nutritional services, wound care, and prosthetics and orthotics. Given the patients complex condition  and risk of further medical complications, rehabilitation services cannot be  safely provided at a lower level of care such as a skilled nursing facility. I have compared the patients medical and functional status at the time of the  preadmission screening and the same on this date, and there are no significant changes. By signing this document, I acknowledge that I have personally performed a  full physical examination on this patient within 24 hours of admission to  this inpatient rehabilitation facility and have determined the patient to be  able to tolerate the above course of treatment at an intensive level for a  reasonable period of time. I will be completing a detailed individualized  Plan of Care for this patient by day four of the patients stay based upon the  Preadmission Screen, this Post-Admission Evaluation, and the therapy  evaluations. Rehabilitation Diagnosis:  Cardiac, 9.0, Cardiac    Assessment and Plan:  Acute systolic congestive heart failure. Stable after institution of HD as below. Aspiration pneumonia. Continue ceftin/flagyl 10 days from 3/28. Cdiff. Continue oral vancomycin x 14 days; stools becoming more formed and continent. ESRD. Nephrology following. HTN. Currently controlled; follow. Mild cognitive deficit. SLP consulted. Continue donepezil. DM. Lantus, SSI. Lower extremity cellulitis. Resoled. Chronic foot ulcers. Consult podiatry. Bowels: Schedule colace + senna. Follow bowel movements. Enema or suppository if needed. Bladder: Check PVR x 3. [facility] if PVR > 200ml or if any volume is > 500 ml. Sleep: Trazodone provided prn. Rodríguez Ross MD 4/2/2020, 10:43 AM

## 2020-04-02 NOTE — PROGRESS NOTES
Pharmacy to Dose Warfarin     Dx: Hx of DVT  Goal INR range 2-3   Home Warfarin dose: 3mg daily      Date                 INR                  Warfarin  3/10                 1.64                 4 mg  3/11                 1.62                 4 mg   3/12                 2.55                 hold  3/13                 3.99                 Hold  3/14                 4.61                 Hold  3/15                 3.15                 Hold   3/16                 3.05                 Hold                      3/17                 2.21                 Hold  3/19                  1.46                4mg   3/20                 1.52                 4 mg           3/21                 1.84                 2.5 mg   3/22                 2.21                 2.5 mg   3/23                 2.60                 2 mg  3/24                 1.95                 3.5 mg  3/25                 1.95                 3 mg  3/26                 2.15                 3 mg    3/27                 2.06                 3 mg  3/28                 2.27                 3 mg  3/29                 3.29                 Hold  3/30                 3.46                 Hold   3/31                 2.43                 3 mg  4/01                 1.72                 3 mg  4/02                 1.71                 4 mg     Recommend Warfarin 4 mg tonight.  Daily INR ordered  Lindbergh Kid PharmD  4/2/2020 at 8:30 AM

## 2020-04-02 NOTE — PROGRESS NOTES
diabetes mellitus, with necrosis of muscle ([facility] [address] Debility; Moderate malnutrition ([facility] [address] Pneumonia; and ESRD (end stage renal disease) ([facility] [address] on their problem list.  DATE ONSET: Admitted 4/2/20    Date of Eval: 4/2/2020   Evaluating Therapist: RUSTY Boyle    Primary Complaint: n/a    Pain:  Pain Assessment  Pain Assessment: 0-10  Pain Level: 4  Pain Type: Chronic pain  Pain Location: Foot  Pain Orientation: Right  Pain Descriptors: Aching  Non-Pharmaceutical Pain Intervention(s): Repositioned  Response to Pain Intervention: Patient Satisfied    Assessment:  Cognitive Diagnosis: The pt demonstrates mild cognitive deficits. She scored in the normal range on the [facility] Alternate version 8.2. She received a 27 out of 30. See results below. However, more significant deficits were noted during verbal problem solving tasks in which the pt tended to not consider all aspects of the given situation prior to responding, needing prompts to consider all aspects. She also demonstrated some mild short-term memory loss and observed periods of reduced attention. Considering that the pt's goal is to return home to take care of her 4 grandchildren (age 10-15) independently it is important to target her higher level cognitive abilities to ensure that she is successful at reaching that goal from a cognitive standpoint. See the rest of this report for more details.     MoCA Results:  Visuospatial/Executive: 4/5  Naming: 3/3  Attention: 2/2, 1/1, 3/3  Language: 1/2, 1/1  Abstraction: 2/2  Delayed Recall: 4/5  Orientation: 6/6  Total:27/30      Recommendations:  Requires SLP Intervention: Yes  Duration/Frequency of Treatment: 5 x week, 60 minutes a day  D/C Recommendations: 24 hour supervision/assistance     Plan:   Goals:  Short-term Goals  Timeframe for Short-term Goals: 10 days (4/12/20)  Goal 1: The pt will complete problem solving tasks with 90% accuracy, no cues  Goal 2: The pt will complete graded attention based tasks with 90% accuracy, min cues  Goal 3: The pt will recall 5 pieces of new information after a 10 minutes delay with mod cues to encode and no cues to recall  Long-term Goals  Goal 1: The pt will demonstrate [facility] cognitive function for successful return to home setting. Patient/family involved in developing goals and treatment plan: Yes; the pt. Subjective:   Previous level of function and limitations: The pt reportedly was living at home independently caring for her 4 grandchildren who she has custody of. Ages reportedly range from 10-13. General  Chart Reviewed: Yes  Patient assessed for rehabilitation services?: Yes  Family / Caregiver Present: No  Subjective  Subjective: The patient is seen in room seated upright in chair. Alert, pleasant, and cooperative. Vision  Vision: Impaired  Vision Exceptions: Wears glasses at all times  Hearing  Hearing: Exceptions to [facility]  Hearing Exceptions: Hard of hearing/hearing concerns; No hearing aid     Objective:  Oral/Motor  Oral Motor: Within functional limits    Auditory Comprehension  Comprehension: Exceptions  Conversation: Mild(Required information to be repeated frequently. The pt being [place] is a likely contributing factor)  Interfering Components: Hearing; Attention - sustained; Attention - selective  Effective Techniques: Repetition    Reading Comprehension  Reading Status: Unable to assess    Expression  Primary Mode of Expression: Verbal    Verbal Expression  Verbal Expression: Within functional limits    Written Expression  Dominant Hand: Right  Written Expression: Within Functional Limits    Motor Speech  Motor Speech: Within Functional Limits    Pragmatics/Social Functioning  Pragmatics: Within functional limits    Cognition:   Orientation  Overall Orientation Status: Within Normal Limits  Attention  Attention: Exceptions to [facility]  Alternating Attention: To be assessed in therapy  Divided Attention:  To be assessed in therapy  Selective

## 2020-04-02 NOTE — PROGRESS NOTES
CKD (chronic kidney disease), Clostridium difficile infection, Coronary artery disease, Diabetes mellitus ([facility] [address] Heart attack ([facility] [address] Hyperlipidemia, Hypertension, Obesity, and Skin ulcer of right foot with necrosis of muscle ([facility] [address] 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 Restrictions  Restrictions/Precautions  Restrictions/Precautions: General Precautions, Fall Risk, Weight Bearing, Contact Precautions  Required Braces or Orthoses?: Yes  Lower Extremity Weight Bearing Restrictions  Right Lower Extremity Weight Bearing: Weight Bearing As Tolerated  Left Lower Extremity Weight Bearing: Weight Bearing As Tolerated  Position Activity Restriction  Other position/activity restrictions: R IV, Vas-cath on HD, B boots, weight bearing as tolerated with offloading as much as possible to R heel. HD MWF   Vision/Hearing  Vision: Impaired  Vision Exceptions: Wears glasses at all times  Hearing: Exceptions to [facility]  Hearing Exceptions: Hard of hearing/hearing concerns; No hearing aid     Subjective  General  Patient assessed for rehabilitation services?: Yes  Referring Practitioner: Paula Mata MD  Referral Date : 04/01/20  Diagnosis: pna  Follows Commands: Within Functional Limits  General Comment  Comments: RN cleared pt to participate  Subjective  Subjective: 4-5/10 pain in B feet, chronic in nature.    Pain Screening  Patient Currently in Pain: Yes  Pain Assessment  Pain Assessment: 0-10  Pain Level: 4  Pain Type: Chronic pain  Pain Location: Foot  Pain Orientation: Right  Pain Descriptors: Aching  Non-Pharmaceutical Pain Intervention(s):

## 2020-04-02 NOTE — PROGRESS NOTES
supervision/assistance     Recommended Diet and Intervention  Diet Solids Recommendation: Regular  Liquid Consistency Recommendation: Thin  Recommended Form of Meds: Whole with water  Recommendations: Dysphagia treatment  Therapeutic Interventions: Bolus control exercises; Patient/Family education;Pharyngeal exercises;Diet tolerance monitoring; Laryngeal exercises    Compensatory Swallowing Strategies  Compensatory Swallowing Strategies: Small bites/sips;Eat/Feed slowly; No straws;Remain upright for 30-45 minutes after meals;Upright as possible for all oral intake    Treatment/Goals  Short-term Goals  Timeframe for Short-term Goals: 7 days (4/9/20)  Long-term Goals  Timeframe for Long-term Goals: 10 days (4/12/20)  Goal 1: The pt will consistently tolerate all solids and all liquids without dysphagia  Dysphagia Goals: The patient will tolerate recommended diet without observed clinical signs of aspiration; The patient will recall and perform compensatory strategies, with no cues. ;The patient will tolerate regular consistency solids 10/10. ;The patient will tolerate thin liquids without signs and symptoms of aspiration 10/10 via cup. General  Chart Reviewed: Yes  Subjective  Subjective: The pt was seen while siting in her room. She was generally pleasant but repeatedly stated that she does not feel like she needs speech therapy, denying dysphagia. However, she said that she is still doing her dysphagia exercises regularly at home. After reason for treatment was explained furtherr she began to agree to treatment  Behavior/Cognition: Alert; Cooperative  Follows Directions: Complex  Dentition: Dentures top;Dentures bottom  Patient Positioning: Upright in chair  Baseline Vocal Quality: Normal  Prior Dysphagia History: The pt was seen for an MBS on 1/13/20. A soft and bite sized diet with thin liquids was recommended. The pt reportedly received dysphagia treatment both in the [facility] ARU and reportedly via home health.  She

## 2020-04-02 NOTE — CONSULTS
 CKD (chronic kidney disease)     Clostridium difficile infection 03/28/2020    Coronary artery disease 9/16/2010    Diabetes mellitus ([facility]     Heart attack [facility]) age 43    no problems since then    Hyperlipidemia     Hypertension     Obesity     Skin ulcer of right foot with necrosis of muscle ([facility] [facility] 3/10/2020       PAST SURGICAL HISTORY    Past Surgical History:   Procedure Laterality Date    BRONCHOSCOPY N/A 11/12/2019    BRONCHOSCOPY WASHING performed by Tammi Vaca MD at [address] N/A 11/23/2019    BRONCHOSCOPY ALVEOLAR LAVAGE performed by Aminata Dutton MD at [address]  11/23/2019    BRONCHOSCOPY THERAPUTIC ASPIRATION INITIAL [facility]  11/23/2019    BRONCHOSCOPY FOREIGN BODY REMOVAL performed by Aminata Dutton MD at [address] [place] [address]      right    CATARACT REMOVAL WITH IMPLANT      CORONARY ANGIOPLASTY WITH STENT PLACEMENT  4/2012    DIAGNOSTIC CARDIAC CATH LAB PROCEDURE      HYSTERECTOMY      11/30/15    KNEE ARTHROSCOPY      left knee. not a TKR.  no metal.     TOE AMPUTATION Right 1/16/2020    RIGHT SECOND AND THIRD TOE AMPUTATION, RIGHT HEEL WOUND DEBRIDEMENT, RIGHT HALLUX TOE NAIL DEBRIDEMENT performed by Shane Garcia DPM at [facility] N/A 12/2/2019    EGD PEG PLACEMENT W/ ANESTHESIA performed by Sulema Kanner, MD at [facility]    Family History   Problem Relation Age of Onset    Diabetes Mother     Heart Failure Mother     Heart Disease Mother    Arceo Stroke Mother     Diabetes Sister     Diabetes Brother     Diabetes Maternal Grandmother     Asthma Son     Asthma Son     Diabetes Daughter     Heart Disease Daughter     Irritable Bowel Syndrome Daughter     Diabetes Brother     Cancer Neg Hx     Emphysema Neg Hx     Hypertension Neg Hx        SOCIAL HISTORY    Social Description Full thickness 3/29/2020  8:49 PM   Yellow%Wound Bed 10 3/31/2020  4:02 PM   Black%Wound Bed 90 3/31/2020  4:02 PM   Other%Wound Bed 100 3/20/2020  4:11 PM   Culture Taken No 3/31/2020  4:02 PM   Number of days: 39       Wound 03/31/20 Leg Right;Posterior;Mid (Active)   Wound Image   3/31/2020  4:02 PM   Wound Traumatic 4/1/2020  8:22 PM   Dressing Status Changed;Clean;Dry; Intact 4/2/2020  5:55 AM   Dressing Changed Changed/New 4/2/2020  5:55 AM   Dressing/Treatment Triad hydro;Dry dressing;Roll gauze 4/2/2020  5:55 AM   Wound Cleansed Rinsed/Irrigated with saline 4/2/2020  5:55 AM   Dressing Change Due 04/03/20 4/2/2020  5:55 AM   Wound Length (cm) 1 cm 3/31/2020  4:02 PM   Wound Width (cm) 0.5 cm 3/31/2020  4:02 PM   Wound Depth (cm) 0.1 cm 3/31/2020  4:02 PM   Wound Surface Area (cm^2) 0.5 cm^2 3/31/2020  4:02 PM   Wound Volume (cm^3) 0.05 cm^3 3/31/2020  4:02 PM   Distance Tunneling (cm) 0 cm 3/31/2020  4:02 PM   Tunneling Position ___ O'Clock 0 3/31/2020  4:02 PM   Undermining Starts ___ O'Clock 0 3/31/2020  4:02 PM   Undermining Ends___ O'Clock 0 3/31/2020  4:02 PM   Undermining Maxium Distance (cm) 0 3/31/2020  4:02 PM   Wound Assessment Black;Slough 3/31/2020  4:02 PM   Drainage Amount Small 3/31/2020  4:02 PM   Drainage Description Serosanguinous 3/31/2020  4:02 PM   Odor None 3/31/2020  4:02 PM   Margins Unattached edges; Defined edges 3/31/2020  4:02 PM   Yellow%Wound Bed 10 3/31/2020  4:02 PM   Black%Wound Bed 90 3/31/2020  4:02 PM   Culture Taken No 3/31/2020  4:02 PM   Number of days: 1       Wound 04/01/20 Sacrum old healing sheer (Active)   Dressing/Treatment Open to air;Protective barrier 4/1/2020  8:22 PM   Drainage Amount None 4/1/2020  8:22 PM   Tess-wound Assessment Pink;Dry 4/1/2020  8:22 PM   Number of days: 0     Incision 01/16/20 Foot Left (Active)   Number of days: 76       Plan   Plan of Care: Wound 03/31/20 Leg Right;Posterior;Mid-Dressing/Treatment: Triad hydro, Dry dressing,

## 2020-04-02 NOTE — CARE COORDINATION
into their home and is assisting the best she is able. Ms. Grace Greenwood states that her daughter has an LVAD in place and can provide limited assistance, her daughter also receives dialysis. Ms. Grace Greenwood states she is retired, she states she worked as a  and still does taxes on the side. Ms. Grace Greenwood denies any tobacco, alcohol or drug use at this time. She states her hobbies include her grandchildren and going to [facility]  **CODE STATUS** Full code updated on 4/1/2020  **LOSSES/CONCERNS/ROOM** Concerns include a referral made to [facility] and has a current court case in process. Ms. Grace Greenwood is now receiving dialysis and has been set up at [facility] M/W/F with an 1115am chair time. Transportation assistance requested from [facility] who states they can accommodate this at this time. Ms. Grace Greenwood resides in a private room while on the acute rehab unit.   **DENTAL/HEARING/VISION/MENTAL**  Ms. Grace Greenwood wears full dentures. She discloses no hearing issues and states she wears glasses for vision assistance. Mental health diagnoses include anxiety disorder with panic attack and she takes Sertraline, Donepezil and Alprazolam PRN for her mental health needs.   **INTENT** Intent is to return to home setting where patient lives with family.   **Goals for discharge:   DME: Has cane, rolling walker and manual wheelchair.   PCP: Dr. Jennifer BHAT [phone]  Home Health: to be determined.      Electronic continuity of care form is on the chart.  Case Management (CM) will continue to follow for recommendations from the treatment team. Please notify Case Management if needs or concerns arise.       CLAUS Hester

## 2020-04-03 NOTE — PROGRESS NOTES
Comment  Comments: RN cleared pt for OT  Vital Signs  Patient Currently in Pain: Denies   Orientation  Orientation  Overall Orientation Status: Within Functional Limits  Objective    ADL  Feeding: Setup; Beverage management        Balance  Sitting Balance: Minimal assistance  Standing Balance: Contact guard assistance(RW)  Standing Balance  Time: first session: x1 minute; second session: x2 minutes, x1-2 minutes, x2.5 minutes, x4 minutes, 3x3 minutes, x4 minutes, x1-2 minutes  Activity: first session: mobility in room to [facility]; second session: mobility in room/bathroom, standing for toileting and grooming tasks, mobility towards gym from room, standing x3 dynamic exercises, mobility from [employer] TV towards ARU, mobility WC to EOB   Comment: Pt with better balnace/endurance but need cues  Functional Mobility  Functional - Mobility Device: Rolling Walker  Activity: To/From therapy gym  Assist Level: Contact guard assistance  Functional Mobility Comments: CGA for mobility with cues for turns, use of RW  Toilet Transfers  Toilet - Technique: Ambulating  Equipment Used: Standard toilet  Toilet Transfer: Contact guard assistance  Toilet Transfers Comments: RW and grab bar  Bed mobility  Supine to Sit: Unable to assess  Sit to Supine: Unable to assess  Scooting: Unable to assess  Transfers  Stand Step Transfers: Stand by assistance;Contact guard assistance  Sit to stand: Stand by assistance;Contact guard assistance  Stand to sit: Stand by assistance;Contact guard assistance  Transfer Comments: SBA-CGA with min cues for hand placement prior to sitting and standing        Coordination  Gross Motor: fair coordination with reaching standing sinkside to gather items for grooming task, fair coordination and balance in stance for medicine ball exercises but fatigued easily  Fine Motor: fair coordination with opening containers for eating tasks              Cognition  Overall Cognitive Status: Exceptions  Arousal/Alertness: Appropriate responses to stimuli  Following Commands: Follows multistep commands with repitition; Follows one step commands with increased time  Attention Span: Difficulty dividing attention  Memory: Appears intact  Safety Judgement: Decreased awareness of need for safety  Problem Solving: Assistance required to correct errors made;Assistance required to identify errors made;Assistance required to generate solutions;Assistance required to implement solutions  Insights: Decreased awareness of deficits  Initiation: Requires cues for some  Sequencing: Requires cues for some  Cognition Comment: talks a lot but able to be re-directed easily     Perception  Overall Perceptual Status: WFL              Type of ROM/Therapeutic Exercise  Type of ROM/Therapeutic Exercise: Free weights  Comment: first session: 2# free weights seated in WC; second session: 2# medicine ball in stance (chest press, circles, diagonals, shoulder flexion, obliques) 2x10 with seated rest breaks PRN, digiflex 5# 2x10 palmar grasp and then isolated tip to tip pinch, IDing beads out of green theraputty  Exercises  Scapular Protraction: x10 no weight  Scapular Retraction: x10 no weight  Shoulder Depression: x10 no weight  Shoulder Elevation: 2x10 2#  Shoulder Flexion: 2x10 2#  Shoulder ABduction: 2x10 2#  Shoulder ADduction: 2x10 2#  Horizontal ABduction: 2x10 2#  Horizontal ADduction: 2x10 2#  Elbow Flexion: 2x10 2#  Elbow Extension: 2x10 2#  Supination: 2x20 2#  Pronation: 2x20 2#  Wrist Flexion: 2x20 2#  Wrist Extension: 2x20 2#  Other: 2x20 2# chest press, circles forwards and backwards                    Plan   Plan  Times per week: 5 out of 7 days  Times per day: Daily  Plan weeks: 10 days  Current Treatment Recommendations: Strengthening, Balance Training, Safety Education & Training, Self-Care / ADL, Functional Mobility Training, Endurance Training, Gait Training, Patient/Caregiver Education & Training, Equipment Evaluation, Education, & procurement, Home Management Training, Cognitive/Perceptual Training, Pain Management    Goals  Short term goals  Time Frame for Short term goals: 4/06/20  Short term goal 1: Pt will complete toilet transfer SBA with LRAD  Short term goal 2: Pt will complete LE dressing with Grant with LRAD and AE PRN  Short term goal 3: Pt will complete standing level ADLs with SBA for balance >3 minutes with LRAD  Short term goal 4: Pt will tolerate BUE ex 10-15 reps with rest breaks PRN to increase strength/endurance for ADLs and transfers/mobility  Long term goals  Time Frame for Long term goals : 4/11/14  Long term goal 1: Pt will complete functional mobility/transfers Sreekanth with LRAD for ADLs  Long term goal 2: Pt will complete toileting/toilet transfer with LRAD Sreekanth using DME PRN  Long term goal 3: Pt will complete tub transfer with LRAD and DME SBA  Long term goal 4: Pt will complete LB dressing with AE and LRAD setup  Long term goal 5: Pt will complete 1 simple IADL task SPV standing and/or mod I sitting with LRAD  Patient Goals   Patient goals :  \"Be able to walk again and farther\"       Therapy Time   Individual Concurrent Group Co-treatment   Time In 1100         Time Out 1130         Minutes 30         Timed Code Treatment Minutes: 30 Minutes     Second Session Therapy Time:   Individual Concurrent Group Co-treatment   Time In 1230         Time Out 1330         Minutes 60           Timed Code Treatment Minutes:  60 Minutes    Total Treatment Minutes:  [address], OTR/L

## 2020-04-03 NOTE — PROGRESS NOTES
miconazole   Topical BID    pantoprazole  40 mg Oral QAM AC    povidone-iodine   Topical Daily    povidone-iodine   Topical Daily    rosuvastatin  5 mg Oral Daily    sertraline  100 mg Oral Daily    vancomycin  125 mg Oral 4 times per day    vitamin B complex w/C  1 tablet Oral Daily    warfarin (COUMADIN) daily dosing (placeholder)   Other RX Placeholder    zinc oxide   Topical Daily         Labs:  Recent Labs     04/01/20  0640 04/02/20  0656 04/03/20  1019   WBC 6.0 5.5 8.0   HGB 10.0* 10.0* 10.8*   HCT 30.8* 31.1* 34.5*   MCV 84.3 83.6 86.3    213 228     Recent Labs     04/01/20  0640 04/02/20  0656   * 134*   K 4.3 4.2   CL 91* 96*   CO2 25 27   GLUCOSE 85 170*   PHOS 2.7  --    BUN 32* 17   CREATININE 3.7* 2.3*   LABGLOM 12* 21*   GFRAA 14* 25*           Assessment/Plan:    A/CKD now ESRD.  - HD started on 3/13/20 for worsening renal function associated with fluid overload not responding to high dose diuretics. [facility] [place] 3/19/20.  - Follows with Dr. Sandra Tanner in the office.  - HD MWF with UF as tolerated with prn albumin assistance & crit line monitoring  - Lower EDW to 62 kg or lower.     Acute on chronic CHF, combined with anasarca. - Urine PCR of 700mg/g.  - Severe pulmonary HTN.  - Fluid (1 L/day) and sodium restriction (2 grams/day).     Hypertension.  - Continue Metoprolol with parameters. - Start Losartan 25mg daily.     Anemia.  - Continue weekly ANKUR qMonday with HD.  - Ferritin low though unable to give IV iron with current infection.  - Hgb stable.     Hyponatremia.  - Monitor with UF     RLL PNA/C diff   - On antibiotics per primary service.     Set up at [facility] M2 shift after discharge  Okay for discharge     Please do not hesitate to contact me at [phone] if with questions. Thank you!     Don Calvert MD  4/3/2020  [facility]

## 2020-04-03 NOTE — PROGRESS NOTES
Nani Angelina  4/3/2020  [id number]    Chief Complaint: <principal problem not specified>    Subjective:   No issues overnight. No new complaints. Pain controlled. ROS: no nausea, vomiting, chest pain, shortness of breath, fever, chills  Objective:  Patient Vitals for the past 24 hrs:   BP Temp Temp src Pulse Resp SpO2   04/03/20 0730 (!) 185/82 98 °F (36.7 °C) Oral 66 18 97 %   04/02/20 2042 (!) 164/70 98.1 °F (36.7 °C) Oral 70 16 98 %     Gen: No distress, pleasant. HEENT: Normocephalic, atraumatic. CV: Regular rate and rhythm. Resp: No respiratory distress. Abd: Soft, nontender   Ext: No edema. Neuro: Alert, oriented, appropriately interactive. Wt Readings from Last 3 Encounters:   04/02/20 138 lb 3.2 oz (62.7 kg)   04/01/20 137 lb 5.6 oz (62.3 kg)   03/27/20 143 lb 4.8 oz (65 kg)       Laboratory data:   Lab Results   Component Value Date    WBC 8.0 04/03/2020    HGB 10.8 (L) 04/03/2020    HCT 34.5 (L) 04/03/2020    MCV 86.3 04/03/2020     04/03/2020       Lab Results   Component Value Date     04/03/2020    K 5.8 04/03/2020    K 4.2 04/02/2020    CL 93 04/03/2020    CO2 23 04/03/2020    BUN 32 04/03/2020    CREATININE 3.3 04/03/2020    GLUCOSE 236 04/03/2020    CALCIUM 9.2 04/03/2020        Therapy progress:  PT  Position Activity Restriction  Other position/activity restrictions: R IV, Vas-cath on HD, B boots, weight bearing as tolerated with offloading as much as possible to R heel.  HD MWF   Objective     Sit to Stand: (1 rep from EOB to RW, next rep from commode to RW)  Stand to sit: (1 rep RW to commode, 1 rep RW to recliner chair)  Bed to Chair: Contact guard assistance  Device: Rolling Walker  Assistance: Stand by assistance  Distance: 6 ft x 2  OT  PT Equipment Recommendations  Equipment Needed: No  Other: owns personal DME  Toilet - Technique: Ambulating  Equipment Used: Standard toilet  Toilet Transfers Comments: RW and grab bar  Assessment        SLP  Current Diet :

## 2020-04-03 NOTE — PROGRESS NOTES
5 pieces of new information after a 10 minutes delay with mod cues to encode and no cues to recall   Did not directly target    Other areas targeted: N/A    Education:   SLP reL    Safety Devices: [] Call light within reach  [x] Chair alarm activated  [] Bed alarm activated  [] Other: [] Call light within reach  [] Chair alarm activated  [] Bed alarm activated  [] Other:    Assessment: The patient is seen this date in room seated upright in chair. She is finishing up her breakfast as SLP enters the room. SLP assessing the patient with regular solids and thin liquids with no notable clinical s/s of aspiration. The patient presents with functional cognition at this time as characterized by ability to solve problems with generation of multiple solutions and ability to reason through complex daily tasks. SLP recommends to continue with current POC as the patient has a great many responsibilities at home and would benefit from continued cognitive treatment to ensure functional independence upon discharge. Plan: Continue as per plan of care. Additional Information:     Barriers toward progress: Limited family support  Discharge recommendations:  [] Home independently  [x] Home with assistance []  24 hour supervision  [] ECF [] Other:  Continued Tx Upon Discharge: ? [] Yes [x] No [] TBD based on progress while on ARU [] Vital Stim indicated [] Other:   Estimated discharge date: 04/08/1992    Interventions used this date:  [] Speech/Language Treatment  [] Instruction in HEP [] Group [x] Dysphagia Treatment [x] Cognitive Treatment   [] Other:       Total Time Breakdown / Charges    Time in Time out Total Time / units   Cognitive Tx 0845 0930 45 mins / 3 units   Speech Tx      Dysphagia Tx 0830 0845 15 mins / 1 unit       Electronically Signed by     Corinna Goldmann, M.A., [facility] #[id number]  Speech-Language Pathologist

## 2020-04-03 NOTE — PROGRESS NOTES
Physical Therapy  Facility/Department: [facility]  Daily Treatment Note  NAME: Shena Chase  : 1945  MRN: [id number]    Date of Service: 4/3/2020    Discharge Recommendations:  24 hour supervision or assist, Home with Home health PT        Assessment   Body structures, Functions, Activity limitations: Decreased functional mobility ; Decreased high-level IADLs;Decreased posture;Decreased ADL status; Decreased endurance;Decreased ROM; Decreased strength;Decreased sensation;Decreased balance;Decreased safe awareness  Assessment: Pt tolerated this session well. Pt with improved bed mobility requiring no assist, use of bed controls which she does have at home. Pt does demonstrate decreased RLE strength with ambulation. Pt with good endurance, strength and balance to negotiate 6 4\" steps with BUE support and CGA. Pt did become frustrated when discussing LOS today as she says she would like to leave the facility soon. Activity Tolerance  Activity Tolerance: Patient Tolerated treatment well  Activity Tolerance: Session completed on RA. Pt did demo fatgiue after transfers in bathroom and independent standing work. Returned to baseline upon sitting in recliner chair. Patient Diagnosis(es): There were no encounter diagnoses. has a past medical history of Allergic, Anemia, Angina, Arthritis, Asthma, Cancer ([address] CKD (chronic kidney disease), Clostridium difficile infection, Coronary artery disease, Diabetes mellitus ([address] Heart attack ([address] Hyperlipidemia, Hypertension, Obesity, and Skin ulcer of right foot with necrosis of muscle ([address] has a past surgical history that includes Diagnostic Cardiac Cath Lab Procedure; Cataract removal with implant; Carpal tunnel release; Knee arthroscopy; Coronary angioplasty with stent (2012); Hysterectomy; bronchoscopy (N/A, 2019); bronchoscopy (N/A, 2019); bronchoscopy (2019); bronchoscopy (2019);  Upper gastrointestinal endoscopy (N/A, length;Decreased arm swing;Decreased step height;Shuffles  Distance: 6 ft x 2     Balance  Posture: Fair  Sitting - Static: Good  Sitting - Dynamic: Good  Standing - Static: Good;-  Standing - Dynamic: Fair;+  Comments: Unsupported standing at sink for hand washing. Good stability, no LOB and good endurance for ~2 minutes  Exercises  Hip Flexion: Seated marches 20x alternating  Knee Long Arc Quad: 20x BLE   Ankle Pumps: 20x BLE(little to no DF on RLE)      Second session:  Ambulation: Walking with  ft with good marching form. Improved foot clearance, good BRANDON. Pt did fatigue at end of ambulation, Sp02 checked and at 99%. Stair negotiation: 4\" step side w/ BUE support on handrails leading with RLE with a step to pattern for 6 steps. CGA for safety. Pt did demo fatigue after ascending/descending. No break at top of stairs. SCIFIT: no resistance, maintaining ~88 steps/min throughout. Pt using all 4 extremities for reciprocal motion, strength and endurance. Standing dynamic balance with arms resting on tray table at sternum level. Reaching for cones and stacking/unstacking while verbalizing foods that are the color of the cones. Good transfer of object from L<> R passing through midline. Bilat gastroc stretch seated in WC 20\" x 5  Standing in parallel bars tapping dynadisc alternating feet for coordination. BUE support on bar in front. Goals  Short term goals  Time Frame for Short term goals: 5 days 4/6  Short term goal 1: pt will complete bed mobility wtih mod ind -MET  Short term goal 2: pt will compelte functional transfer with supv adn AD. Short term goal 3: pt will ambulate 75 ft with AD and SBA. Short term goal 4: Ambulate 10 feet with LRAD and mod I. Progressing 36' with RW CGA  Long term goals  Time Frame for Long term goals : 10 days 4/11  Long term goal 1: pt will complete functional transfer with mod idn and AD. Long term goal 2: pt will ambulate 100 ft with AD and supv.

## 2020-04-04 NOTE — PROGRESS NOTES
Physical Therapy  Facility/Department: [facility]  Daily Treatment Note  NAME: Julian Garcia  : 1945  MRN: [id number]    Date of Service: 2020    Discharge Recommendations:  24 hour supervision or assist, Home with Home health PT   PT Equipment Recommendations  Equipment Needed: No  Other: owns personal DME    Assessment   Body structures, Functions, Activity limitations: Decreased functional mobility ; Decreased high-level IADLs;Decreased posture;Decreased ADL status; Decreased endurance;Decreased ROM; Decreased strength;Decreased sensation;Decreased balance;Decreased safe awareness  Assessment: Pt seen in am for PT tx session, pt pleasant and agreeable and demonstrating continued improved performance with functional mobility and increased gait distances this session. Pt completes t/f with SBA, ambulates up to 122' with RW and SBA without LOB and participates in balance activities with CGA to Grant with RW. Pt is limited secondary to fatigue and RLE weakness requiring intermittent therapeutic rest breaks. Pt will benefit from continued skilled PT in ARU to address above deficits. Will continue to progress mobility as tolerated. Treatment Diagnosis: decreased independence with functional mobility. Specific instructions for Next Treatment: progress mobility as tolerated. Prognosis: Good  Decision Making: Medium Complexity  PT Education: Goals; Energy Conservation; Injury Prevention;PT Role;General Safety;Plan of Care;Gait Training;Home Exercise Program;Precautions; Equipment;Transfer Training;Pressure Relief; Functional Mobility Training  Patient Education: Patient verbalizes understanding, may benefit from reinforcement   Barriers to Learning: none  REQUIRES PT FOLLOW UP: Yes  Activity Tolerance  Activity Tolerance: Patient Tolerated treatment well  Activity Tolerance: Intermittent seated rest breaks to recover from fatigue with gait activities     Patient Diagnosis(es): There were no encounter diagnoses. has a past medical history of Allergic, Anemia, Angina, Arthritis, Asthma, Cancer ([address] CKD (chronic kidney disease), Clostridium difficile infection, Coronary artery disease, Diabetes mellitus ([address] Heart attack ([address] Hyperlipidemia, Hypertension, Obesity, and Skin ulcer of right foot with necrosis of muscle ([address] 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 Restrictions  Restrictions/Precautions  Restrictions/Precautions: General Precautions, Fall Risk, Weight Bearing, Contact Precautions  Required Braces or Orthoses?: Yes  Lower Extremity Weight Bearing Restrictions  Right Lower Extremity Weight Bearing: Weight Bearing As Tolerated  Left Lower Extremity Weight Bearing: Weight Bearing As Tolerated  Position Activity Restriction  Other position/activity restrictions: R IV, Vas-cath on HD, B boots, weight bearing as tolerated with offloading as much as possible to R heel. HD MWF   Subjective   General  Chart Reviewed: Yes  Response To Previous Treatment: Patient with no complaints from previous session.   Family / Caregiver Present: No  Referring Practitioner: Jes Robertson MD  Subjective  Subjective: Pt seated in chair on arrival, pleasant and agreeable to PT tx with no c/o pain  Pain Screening  Patient Currently in Pain: Denies  Vital Signs  Patient Currently in Pain: Denies       Orientation  Orientation  Overall Orientation Status: Within Functional Limits    Objective   Bed mobility  Comment: Pt seated in chair at start and end of session     Transfers  Sit to Stand: Stand by assistance  Stand to sit: Stand by assistance  Comment: Sit to/from stand recliner to RW and w/c to RW with SBA completed x 6 trials, intermittent cues to lock brakes on w/c prior to standing. Increased time required from recliner     Ambulation  Ambulation?: Yes  WB Status: off load R heel with off loading boot per wound care RN, WBAT LLE  Ambulation 1  Surface: level tile  Device: Rolling Walker  Assistance: Stand by assistance  Quality of Gait: Shortened step length, increased hip flexion for foot clearance on RLE, decreased dorsiflexion RLE, decreased heel strike RLE. Narrow BRANDON  Gait Deviations: Slow Caitlyn;Decreased step length;Decreased arm swing;Decreased step height;Shuffles  Distance: 15' x 2 + 58' + 122' + 10'  Comments: Distances limited secondary to fatigue/cramping in RLE  Stairs/Curb  Stairs?: Yes  Stairs  # Steps : 4  Stairs Height: 6\"  Rails: Bilateral  Assistance: Contact guard assistance  Comment: Pt ascends/descend 4 (6\") steps with BHR and CGA for balance with step to pattern. Balance  Comments: Pt completes x 2 bout dynamic standing balance with UUE support on RW and CGA to Grant for balance while performing bag reach/toss. Completed while standing on blue air-ex pad incorpating x 12 reps reaching within and outside BRANDON, crossing midline and bending at waist. Pt mildly unsteady/shaky but completes without overt LOB. Completed to improve proprioception, improve BLE strength/endurance and improve dynamic reaching to decrease risk of falls with functional mobility. Other exercises  Other exercises?: Yes  Other exercises 2: RLE seated passive gastroc stretch x 2 set x 90 second hold, pt cued for completion in bed to facilitate improved DF ROM for improved gait pattern  Other exercises 3: Seated RLE x 4 way ankle with orange TB: PF, eversion, inversion, DF (DF completed without TB through partial range secondary to decreased strength/ROM), pt instructed on completing of gravity eliminated AP/DF to facilitate improved strength in ankle DF. Goals  Short term goals  Time Frame for Short term goals: 5 days 4/6  Short term goal 1: pt will complete bed mobility wtih mod ind -MET  Short term goal 2: pt will compelte functional transfer with supv adn AD. Short term goal 3: pt will ambulate 75 ft with AD and SBA. GOAL MET 4/04  Short term goal 4: Ambulate 10 feet with LRAD and mod I. Progressing 36' with RW CGA  Long term goals  Time Frame for Long term goals : 10 days 4/11  Long term goal 1: pt will complete functional transfer with mod idn and AD. Long term goal 2: pt will ambulate 100 ft with AD and supv. Long term goal 3: pt will ascend/descend 4\" curb step wtih SBA and AD. Long term goal 4: pt will demonstrate ind with seated HEP to maintain functional strength. Patient Goals   Patient goals : \"get back to rehab and go home\"    Plan    Plan  Times per week: 5-7x/week  Times per day: Daily  Specific instructions for Next Treatment: progress mobility as tolerated. Current Treatment Recommendations: Strengthening, ADL/Self-care Training, Gait Training, Patient/Caregiver Education & Training, Stair training, Equipment Evaluation, Education, & procurement, Balance Training, Pain Management, Endurance Training, Functional Mobility Training, Home Exercise Program, Transfer Training, Safety Education & Training  Safety Devices  Type of devices:  All fall risk precautions in place, Nurse notified, Gait belt, Patient at risk for falls, Left in chair, Call light within reach, Chair alarm in place     Therapy Time   Individual Concurrent Group Co-treatment   Time In 1030         Time Out 1130         Minutes 60         Timed Code Treatment Minutes: [address] [facility], PT, DPT

## 2020-04-04 NOTE — PROGRESS NOTES
grab bar  Bed mobility  Supine to Sit: Modified independent     Cognition  Following Commands: Follows multistep commands with repitition; Follows one step commands with increased time  Attention Span: Difficulty dividing attention  Memory: Decreased short term memory  Insights: Decreased awareness of deficits  Initiation: Does not require cues  Sequencing: Requires cues for some        Plan   Plan  Times per week: 5 out of 7 days  Times per day: Daily  Plan weeks: 10 days  Current Treatment Recommendations: Strengthening, Balance Training, Safety Education & Training, Self-Care / ADL, Functional Mobility Training, Endurance Training, Gait Training, Patient/Caregiver Education & Training, Equipment Evaluation, Education, & procurement, Home Management Training, Cognitive/Perceptual Training, Pain Management       Goals  Short term goals  Time Frame for Short term goals: 4/06/20  Short term goal 1: Pt will complete toilet transfer SBA with LRAD  Short term goal 2: Pt will complete LE dressing with Grant with LRAD and AE PRN  Short term goal 3: Pt will complete standing level ADLs with SBA for balance >3 minutes with LRAD  Short term goal 4: Pt will tolerate BUE ex 10-15 reps with rest breaks PRN to increase strength/endurance for ADLs and transfers/mobility  Long term goals  Time Frame for Long term goals : 4/11/14  Long term goal 1: Pt will complete functional mobility/transfers Sreekanth with LRAD for ADLs  Long term goal 2: Pt will complete toileting/toilet transfer with LRAD Sreekanth using DME PRN  Long term goal 3: Pt will complete tub transfer with LRAD and DME SBA  Long term goal 4: Pt will complete LB dressing with AE and LRAD setup  Long term goal 5: Pt will complete 1 simple IADL task SPV standing and/or mod I sitting with LRAD  Patient Goals   Patient goals :  \"Be able to walk again and farther\"       Therapy Time   Individual Concurrent Group Co-treatment   Time In 0730         Time Out 0830         Minutes 60

## 2020-04-04 NOTE — PROGRESS NOTES
Julian Garcia  4/4/2020  [id number]    Chief Complaint: Weakness     Subjective:   Up with therapy  No new complaints  No events noted overnight     ROS: no nausea, vomiting, chest pain, shortness of breath, fever, chills  Objective:  Patient Vitals for the past 24 hrs:   BP Temp Temp src Pulse Resp SpO2 Weight   04/04/20 0921 137/62 98.2 °F (36.8 °C) Oral 70 18 100 % --   04/03/20 2139 133/63 97.9 °F (36.6 °C) Oral 63 16 99 % --   04/03/20 1757 (!) 154/75 97.8 °F (36.6 °C) -- 65 16 -- 134 lb 0.6 oz (60.8 kg)   04/03/20 1412 (!) 153/66 98.2 °F (36.8 °C) -- 68 16 -- 143 lb 4.8 oz (65 kg)   04/03/20 1215 (!) 161/53 -- -- 67 -- -- --     Gen: No distress, pleasant. HEENT: Normocephalic, atraumatic. CV: Regular rate and rhythm. Resp: No respiratory distress. Abd: Soft, nontender   Ext: No edema. Neuro: Alert, oriented, appropriately interactive. Wt Readings from Last 3 Encounters:   04/03/20 134 lb 0.6 oz (60.8 kg)   04/01/20 137 lb 5.6 oz (62.3 kg)   03/27/20 143 lb 4.8 oz (65 kg)       Laboratory data:   Lab Results   Component Value Date    WBC 8.0 04/03/2020    HGB 10.8 (L) 04/03/2020    HCT 34.5 (L) 04/03/2020    MCV 86.3 04/03/2020     04/03/2020       Lab Results   Component Value Date     04/03/2020    K 5.8 04/03/2020    K 4.2 04/02/2020    CL 93 04/03/2020    CO2 23 04/03/2020    BUN 32 04/03/2020    CREATININE 3.3 04/03/2020    GLUCOSE 236 04/03/2020    CALCIUM 9.2 04/03/2020        Therapy progress:  PT  Position Activity Restriction  Other position/activity restrictions: R IV, Vas-cath on HD, B boots, weight bearing as tolerated with offloading as much as possible to R heel.  HD MWF   Objective     Sit to Stand: (1 rep from EOB to RW, next rep from commode to RW)  Stand to sit: (1 rep RW to commode, 1 rep RW to recliner chair)  Bed to Chair: Contact guard assistance  Device: [address] [facility]: Stand by assistance  Distance: 6 ft x 2  OT  PT Equipment Recommendations  Equipment

## 2020-04-04 NOTE — PROGRESS NOTES
and patient declined  [x] N/A  [] Other: [] RN notified  [] Repositioned  [] Intervention offered and patient declined  [] N/A  [] Other:   Subjective     Pt seen at bedside with pt sitting up in bedside chair. Pt pleasant and eager to participate with SLP this date. Objective:  Goals  Short-term Goals  Timeframe for Short-term Goals: 7 days (4/9/20)  Dysphagia Goals     Goal 1: The patient will tolerate recommended diet without observed clinical signs of aspiration   Goal addressed. - Thin liquid trials via cup sip x 10. No overt s/s of aspiration noted. The patient will recall and perform compensatory strategies, with no cues. Goal addressed. - SLP reviewed with pt. Pt able to demonstrate comprehension of these recommendations. The patient will tolerate regular consistency solids 10/10. Not targeted this date    The patient will tolerate thin liquids without signs and symptoms of aspiration 10/10 via cup. Goal addressed. - Thin liquid trials via cup sip x 10. No overt s/s of aspiration noted. Short-term Goals  Timeframe for Short-term Goals: 10 days (4/12/20)  Cognitive Goals     Goal 1: The pt will complete problem solving tasks with 90% accuracy, no cues   Goal addressed. Addressed via - Deductive reasoning puzzle completed with 95% acc without cues from SLP; 100% acc with min cue from SLP. Goal 2: The pt will complete graded attention based tasks with 90% accuracy, min cues     Goal addressed. - Divided attention task: Pt completing word search and listening to letter list read aloud by SLP. Pt to keep tally of number of vowels read aloud. Pt tallied for 17 vowels, when there were actually 20 read aloud. Goal 3: The pt will recall 5 pieces of new information after a 10 minutes delay with mod cues to encode and no cues to recall   Goal addressed. - Picture associations: object to object. Pt required min cues to formulate associations between 2 unrelated images.   - Immediate

## 2020-04-05 NOTE — PROGRESS NOTES
Pharmacy to Dose Warfarin     Dx: Hx of DVT  Goal INR range 2-3   Home Warfarin dose: 3mg daily      Date                 INR                  Warfarin  3/10                 1.64                 4 mg  3/11                 1.62                 4 mg   3/12                 2.55                 hold  3/13                 3.99                 Hold  3/14                 4.61                 Hold  3/15                 3.15                 Hold   3/16                 3.05                 Hold                      3/17                 2.21                 Hold  3/19                  1.46                4mg   3/20                 1.52                 4 mg           3/21                 1.84                 2.5 mg   3/22                 2.21                 2.5 mg   3/23                 2.60                 2 mg  3/24                 1.95                 3.5 mg  3/25                 1.95                 3 mg  3/26                 2.15                 3 mg    3/27                 2.06                 3 mg  3/28                 2.27                 3 mg  3/29                 3.29                 Hold  3/30                 3.46                 Hold   3/31                 2.43                 3 mg  4/01                 1.72                 3 mg  4/02                 1.71                 4 mg  4/03                 2.05                 1 mg  4/04                 2.44                 1 mg  4/05                 2.08                 2 mg     Recommend Warfarin 2 mg tonight. DDI with Flagyl (end date 4/4)  Daily INR ordered. [facility]. Ph. 4/5/2020 8:46 AM

## 2020-04-06 NOTE — PROGRESS NOTES
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 Restrictions  Restrictions/Precautions  Restrictions/Precautions: General Precautions, Fall Risk, Weight Bearing, Contact Precautions  Required Braces or Orthoses?: Yes  Lower Extremity Weight Bearing Restrictions  Right Lower Extremity Weight Bearing: Weight Bearing As Tolerated  Left Lower Extremity Weight Bearing: Weight Bearing As Tolerated  Position Activity Restriction  Other position/activity restrictions: R IV, Vas-cath on HD, B boots, weight bearing as tolerated with offloading as much as possible to R heel. HD MWF   Subjective   General  Chart Reviewed: Yes, Labs, Orders, History and Physical, Progress Notes  Patient assessed for rehabilitation services?: Yes  Response to previous treatment: Patient with no complaints from previous session  Family / Caregiver Present: No  Referring Practitioner: Elisabeth Pallas, MD  Diagnosis: Acute systolic congestive heart failure; C-diff, covid rule out (-)  Subjective  Subjective: Pt in room in chair, requesting to shower \"if it isnt too much trouble\". Agreeable to OT. General Comment  Comments: RN cleared pt for OT  Pain Assessment  Pain Assessment: 0-10  Pain Level: 4  Pain Type: Chronic pain  Pain Location: Foot  Pain Orientation: Right  Pain Descriptors: Aching  Pain Frequency: Intermittent  Pain Onset: On-going  Clinical Progression: Not changed  Functional Pain Assessment: Prevents or interferes some active activities and ADLs  Non-Pharmaceutical Pain Intervention(s): Therapeutic touch; Therapeutic presence; Ambulation/Increased Activity;Repositioned  Response to Pain Intervention: Patient Satisfied  Vital Signs  Patient Currently in Pain: Yes   Orientation  Orientation  Overall Orientation Status: Within Functional Limits  Objective    ADL  Feeding: Beverage management;Setup  Grooming: Contact guard assistance;Verbal cueing; Increased time to complete;Supervision(standing x2 trials ~1-2 minute each CGA, sitting to finish due to R heel pain)  UE Bathing: Setup; Increased time to complete;Supervision(sitting in TTB)  LE Bathing: Increased time to complete;Verbal cueing;Minimal assistance(pt needing assistance for R foot bathing due to wound, sitting for LB bathing due to not having shoe on close SPV sitting in TTB)  UE Dressing: Setup;Verbal cueing; Increased time to complete(sitting on TTB)  LE Dressing: Moderate assistance; Increased time to complete(continues to need assistance with darco boots despite compensatory strategy attempts and use of reacher; pt able to thread/dethread BLE through pants/brief wiht reacher and increased time, CGA to manage up/down with GB; pt able to doff socks with reacher)  Toileting: Contact guard assistance(CGA for balance)  Additional Comments: Pt continues to demo fatigue with prolonged standing, educated on energy cosnervation and strategies to sit to complete most ADLs with standing only periodically. Pt educated on compensatory strategies and AE for LB bathing and dressing but continues to be limited by heel pain        Balance  Sitting Balance: Supervision()  Standing Balance: Contact guard assistance(RW and grab bar, mostly SBA (close) but fluctuates to CGA with turning)  Standing Balance  Time: x1 minute, 2x1-2 minutes, 2x1.5 minutes, 2x1-2 minutes, 2x45 seconds  Activity: mobility to bathroom, standing toileting, standing bathing/dressing, standing grooming trials, mobility to/from  in doorway  Comment: Pt with better balnace/endurance but need cues for safety, energy conservation and compensatory strategies  Functional Mobility  Functional - Mobility Device: Rolling Walker  Activity: To/from bathroom; To/From therapy gym  Assist Level: Contact guard SBA  Long term goal 4: Pt will complete LB dressing with AE and LRAD setup  Long term goal 5: Pt will complete 1 simple IADL task SPV standing and/or mod I sitting with LRAD  Patient Goals   Patient goals :  \"Be able to walk again and farther\"       Therapy Time   Individual Concurrent Group Co-treatment   Time In 0930         Time Out 1100         Minutes 90         Timed Code Treatment Minutes: [address], OTR/L

## 2020-04-06 NOTE — PROGRESS NOTES
Pharmacy to Dose Warfarin     Dx: Hx of DVT  Goal INR range 2-3   Home Warfarin dose: 3mg daily      Date                 INR                  Warfarin  3/10                 1.64                 4 mg  3/11                 1.62                 4 mg   3/12                 2.55                 hold  3/13                 3.99                 Hold  3/14                 4.61                 Hold  3/15                 3.15                 Hold   3/16                 3.05                 Hold                      3/17                 2.21                 Hold  3/19                  1.46                4mg   3/20                 1.52                 4 mg           3/21                 1.84                 2.5 mg   3/22                 2.21                 2.5 mg   3/23                 2.60                 2 mg  3/24                 1.95                 3.5 mg  3/25                 1.95                 3 mg  3/26                 2.15                 3 mg    3/27                 2.06                 3 mg  3/28                 2.27                 3 mg  3/29                 3.29                 Hold  3/30                 3.46                 Hold   3/31                 2.43                 3 mg  4/01                 1.72                 3 mg  4/02                 1.71                 4 mg  4/03                 2.05                 1 mg  4/04                 2.44                 1 mg  4/05                 2.08                 2 mg  4/6                   2. 10                 1 mg      Recommend Warfarin 1 mg tonight. DDI with Flagyl (end date 4/4)  Daily INR ordered.   Vanna Jordan, PharmD 4/6/2020 12:42 PM

## 2020-04-06 NOTE — PROGRESS NOTES
Sonia Fortune  4/6/2020  [id number]    Chief Complaint: Weakness     Subjective:   Sitting in bed working with therapy  No new complaints  No events noted overnight     ROS: no nausea, vomiting, chest pain, shortness of breath, fever, chills  Objective:  Patient Vitals for the past 24 hrs:   BP Temp Temp src Pulse Resp SpO2 Weight   04/06/20 0900 (!) 157/68 98 °F (36.7 °C) Oral 73 16 97 % --   04/06/20 0623 -- -- -- -- -- -- 140 lb 1.6 oz (63.5 kg)   04/05/20 2112 136/61 98.8 °F (37.1 °C) Oral 76 16 -- --   04/05/20 2111 -- -- -- -- -- 95 % --     Gen: No distress, pleasant. HEENT: Normocephalic, atraumatic. CV: Regular rate and rhythm. Resp: No respiratory distress. Abd: Soft, nontender   Ext: No edema. Neuro: Alert, oriented, appropriately interactive. Wt Readings from Last 3 Encounters:   04/06/20 140 lb 1.6 oz (63.5 kg)   04/01/20 137 lb 5.6 oz (62.3 kg)   03/27/20 143 lb 4.8 oz (65 kg)       Laboratory data:   Lab Results   Component Value Date    WBC 8.0 04/03/2020    HGB 10.8 (L) 04/03/2020    HCT 34.5 (L) 04/03/2020    MCV 86.3 04/03/2020     04/03/2020       Lab Results   Component Value Date     04/03/2020    K 5.8 04/03/2020    K 4.2 04/02/2020    CL 93 04/03/2020    CO2 23 04/03/2020    BUN 32 04/03/2020    CREATININE 3.3 04/03/2020    GLUCOSE 236 04/03/2020    CALCIUM 9.2 04/03/2020        Therapy progress:  PT  Position Activity Restriction  Other position/activity restrictions: R IV, Vas-cath on HD, B boots, weight bearing as tolerated with offloading as much as possible to R heel.  HD MWF   Objective     Sit to Stand: Supervision  Stand to sit: Supervision  Bed to Chair: Contact guard assistance  Device: [address]: Stand by assistance  Distance: 155 (10 ft carpet)  OT  PT Equipment Recommendations  Equipment Needed: No  Other: owns personal DME  Toilet - Technique: Ambulating  Equipment Used: Standard toilet  Toilet Transfers Comments: BRITTANI and melvin

## 2020-04-06 NOTE — PROGRESS NOTES
Physical Therapy  Facility/Department: [facility]  Daily Treatment Note  NAME: Julian Garcia  : 1945  MRN: [id number]    Date of Service: 2020    Discharge Recommendations:  24 hour supervision or assist, Home with Home health PT   PT Equipment Recommendations  Equipment Needed: No  Other: owns personal DME    Assessment   Body structures, Functions, Activity limitations: Decreased functional mobility ; Decreased high-level IADLs;Decreased posture;Decreased ADL status; Decreased endurance;Decreased ROM; Decreased strength;Decreased sensation;Decreased balance;Decreased safe awareness  Assessment: pt tolerated am session with no complaints of increased pain. toileted and completed ADLs at sink with no LOB and no UE support and supv. completed transfers with supv-mod ind and gait with RW with SBA-spuv due to occasional cue for safety and safe tile to carpet transition). pt reporting she wishes to d/c sooner as to be able to care for grandchildren, therapist notiifed  however recommended pt remain for [facility] due to multiple hosptial readmissions recently and to regain strength/ind. Treatment Diagnosis: decreased independence with functional mobility. Specific instructions for Next Treatment: progress mobility as tolerated. Prognosis: Good  Decision Making: Medium Complexity  PT Education: Goals; Energy Conservation; Injury Prevention;PT Role;General Safety;Plan of Care;Gait Training;Home Exercise Program;Precautions; Equipment;Transfer Training;Pressure Relief; Functional Mobility Training  Patient Education: Patient verbalizes understanding, may benefit from reinforcement   Barriers to Learning: none  REQUIRES PT FOLLOW UP: Yes  Activity Tolerance  Activity Tolerance: Patient Tolerated treatment well     Patient Diagnosis(es): There were no encounter diagnoses.      has a past medical history of Allergic, Anemia, Angina, Arthritis, Asthma, Cancer ([facility] [address] CKD (chronic kidney disease), Clostridium

## 2020-04-06 NOTE — PROGRESS NOTES
Nephrology Progress Note   [email or web address]      This patient is a 75year old female whom we are following for new ESRD. Subjective: The patient was seen and examined. Doing PT. Post-weight on 4/3/20 was 60.8kg. Family History: No family at bedside  ROS: No nausea or vomiting      Vitals:  BP (!) 157/68   Pulse 73   Temp 98 °F (36.7 °C) (Oral)   Resp 16   Ht 5' (1.524 m)   Wt 140 lb 1.6 oz (63.5 kg)   SpO2 97%   BMI 27.36 kg/m²   I/O last 3 completed shifts: In: 120 [P.O.:120]  Out: -   No intake/output data recorded. Physical Exam:  Physical Exam  Vitals signs reviewed. Constitutional:       General: She is not in acute distress. Appearance: Normal appearance. HENT:      Head: Normocephalic and atraumatic. Mouth/Throat:      Mouth: Mucous membranes are moist.   Eyes:      General: No scleral icterus. Conjunctiva/sclera: Conjunctivae normal.   Cardiovascular:      Rate and Rhythm: Normal rate. Heart sounds: No friction rub. Pulmonary:      Effort: Pulmonary effort is normal. No respiratory distress. Abdominal:      General: Bowel sounds are normal. There is no distension. Tenderness: There is no abdominal tenderness. Musculoskeletal:      Right lower leg: Edema present. Left lower leg: Edema present. Neurological:      Mental Status: She is alert.        Access: J TDC      Medications:   losartan  25 mg Oral Daily    aspirin  81 mg Oral Daily    darbepoetin brando-polysorbate  25 mcg Intravenous Weekly - Monday    donepezil  5 mg Oral Nightly    ferrous sulfate  325 mg Oral BID    insulin glargine  15 Units Subcutaneous Nightly    insulin lispro  0-12 Units Subcutaneous TID     insulin lispro  0-6 Units Subcutaneous Nightly    lactobacillus  1 capsule Oral Daily with breakfast    melatonin ER  3 mg Oral Nightly    metoprolol tartrate  50 mg Oral BID    miconazole   Topical BID    pantoprazole  40 mg Oral QAM AC    povidone-iodine   Topical

## 2020-04-07 NOTE — PROGRESS NOTES
beanbags. Other exercises  Other exercises 1: pt completed 2 sets of 5 min on SCIFIT level 1 with BUE and BLE for increased cardiorespiratory endurance and LE strenghtening. pt maintained rate of 75-84 steps/ min and utilized seated rest break between sets, Spo2 remains WNL on room air     Second Session:  Pt found seated in recliner. Continues to report 6/10 pain in R leg and foot,  Muscular soreness. Sit to stand with supv-mod idn from recliner to rw  Gait x 75 ft with RW, supv and offloading heel boots donned BLE. Demo's decreased velocity and pina, partial step through pattern. Donned 1.5# weights BLE, completed 2x10 BLE seated ankle pumps, LAQ, marches, hip abduction with TKE. W/c mobility x 150 ft with mod ind with 2 turns and navigates through doorway. Goals  Short term goals  Time Frame for Short term goals: 5 days 4/6  Short term goal 1: pt will complete bed mobility wtih mod ind -MET  Short term goal 2: pt will compelte functional transfer with supv adn AD. - GOAL MET 4/6. completes iwth supv. Short term goal 3: pt will ambulate 75 ft with AD and SBA. GOAL MET 4/04  Short term goal 4: Ambulate 10 feet with LRAD and mod I. Progressing 36' with RW CGA  Long term goals  Time Frame for Long term goals : 10 days 4/11  Long term goal 1: pt will complete functional transfer with mod idn and AD. Long term goal 2: pt will ambulate 100 ft with AD and supv. Long term goal 3: pt will ascend/descend 4\" curb step wtih SBA and AD. Long term goal 4: pt will demonstrate ind with seated HEP to maintain functional strength. Patient Goals   Patient goals : \"get back to rehab and go home\"    Plan    Plan  Times per week: 5-7x/week  Times per day: Daily  Specific instructions for Next Treatment: progress mobility as tolerated.    Current Treatment Recommendations: Strengthening, ADL/Self-care Training, Gait Training, Patient/Caregiver Education & Training, Stair training, Equipment Evaluation, Education, & procurement, Balance Training, Pain Management, Endurance Training, Functional Mobility Training, Home Exercise Program, Transfer Training, Safety Education & Training  Safety Devices  Type of devices:  All fall risk precautions in place, Nurse notified, Gait belt, Patient at risk for falls, Left in chair, Call light within reach, Chair alarm in place     Therapy Time   Individual Concurrent Group Co-treatment   Time In 0800         Time Out 0900         Minutes 60         Timed Code Treatment Minutes: Daniel 61 Time:   [facility]   Time In 1230         Time Out 1300         Minutes 30           Timed Code Treatment Minutes:  30    Total Treatment Minutes:  90    Tamara Linares, PT

## 2020-04-07 NOTE — PROGRESS NOTES
Speech Language Pathology  [facility]: ACUTE REHAB UNIT  SPEECH-LANGUAGE PATHOLOGY      [x] Daily  [] Weekly Care Conference Note  [] Discharge    J Luis  VAC:[id number]  Rehab Dx/Hx: Debility [R53.81]    Precautions: falls  Home situation: Lives at home with 4 grandchildren  ST Dx: [] Aphasia  [] Dysarthria  [] Apraxia   [] Oropharyngeal dysphagia [x] Cognitive Impairment  [] Other:   Date of Admit: 4/1/2020  Room #: 0165/0165-01    Current functional status (updated daily):         Pt being seen for : [] Speech/Language Treatment  [x] Dysphagia Treatment [x] Cognitive Treatment  [] Other:  Communication: [x]WFL  [] Aphasia  [] Dysarthria  [] Apraxia  [] Pragmatic Impairment [] Non-verbal  [] Hearing Loss  [] Other:   Cognition: [] WFL  [x] Mild  [] Moderate  [] Severe [] Unable to Assess  [] Other:  Memory: [] WFL  [x] Mild  [] Moderate  [] Severe [] Unable to Assess  [] Other:  Behavior: [x] Alert  [] Cooperative  [x]  Pleasant  [] Confused  [] Agitated  [] Uncooperative  [] Distractible [] Motivated  [] Self-Limiting [] Anxious  [] Other:  Endurance:  [x] Adequate for participation in SLP sessions  [] Reduced overall  [] Lethargic  [] Other:  Safety: [x] No concerns at this time  [] Reduced insight into deficits  []  Reduced safety awareness [] Not following call light procedures  [] Unable to Assess  [] Other:    Current Diet Order:DIET CARB CONTROL; Low Sodium (2 GM);  Daily Fluid Restriction: 1000 ml  Dietary Nutrition Supplements: Diabetic Oral Supplement  Swallowing Precautions: Sit up for all meals and thereafter for 30 minutes        Date: 4/7/2020      Tx session 1  [id number] Tx session 2  All goals addressed in session 1   Total Timed Code Min 60    Total Treatment Minutes 60    Individual Treatment Minutes 60    Group Treatment Minutes 0 0   Co-Treat Minutes 0 0   Variance/Reason:      Pain denies     Pain Intervention [] RN notified  [] Repositioned  [] Intervention offered and patient declined  [x] N/A  [] Other: [] RN notified  [] Repositioned  [] Intervention offered and patient declined  [] N/A  [] Other:   Subjective     Patient sitting upright in recliner this session and agreeable to treatment. Objective:  Goals  Short-term Goals  Timeframe for Short-term Goals: 7 days (4/9/20)  Dysphagia Goals     Goal 1: The patient will tolerate recommended diet without observed clinical signs of aspiration   Goal not addressed. The patient will recall and perform compensatory strategies, with no cues. Goal not addressed. The patient will tolerate regular consistency solids 10/10. Goal not addressed. The patient will tolerate thin liquids without signs and symptoms of aspiration 10/10 via cup. Goal not addressed. Short-term Goals  Timeframe for Short-term Goals: 10 days (4/12/20)  Cognitive Goals     Goal 1: The pt will complete problem solving tasks with 90% accuracy, no cues   Hypothetical problem solving situation - patient able to independently brainstorm 10 idea, but required mild-mod cues for safety awareness in analysis of solutions. Goal 2: The pt will complete graded attention based tasks with 90% accuracy, min cues     Patient educated of attention strategies (self talk) and able to use in spontaneous tasks. Goal 3: The pt will recall 5 pieces of new information after a 10 minutes delay with mod cues to encode and no cues to recall   Delayed recall of 5 items across 30 min. 80% accuracy with min verbal cues. Other areas targeted: Discussed external memory strategies and planning for grandchildren school activities    Problem solving for unauthorized spending on account.     -Patient able to state adequate solutions and implementation for both situations listed above.      Education:   Recall strategies, attention strategies, problem solving solutions     Safety Devices: [x] Call light within reach  [x] Chair alarm activated  [] Bed alarm

## 2020-04-07 NOTE — PROGRESS NOTES
Nephrology Progress Note   [email or web address]      This patient is a 75year old female whom we are following for new ESRD. Subjective: The patient was seen and examined. Doing PT. Post-weight on 4/6/20 was 60.1kg. Family History: No family at bedside  ROS: No nausea or vomiting, still with loose stool      Vitals:  BP (!) 163/77   Pulse 76   Temp 97.8 °F (36.6 °C) (Oral)   Resp 16   Ht 5' (1.524 m)   Wt 132 lb 7.9 oz (60.1 kg)   SpO2 98%   BMI 25.88 kg/m²   I/O last 3 completed shifts: In: 0 [P.O.:480]  Out: 3400   No intake/output data recorded. Physical Exam:  Physical Exam  Vitals signs reviewed. Constitutional:       General: She is not in acute distress. Appearance: Normal appearance. HENT:      Head: Normocephalic and atraumatic. Mouth/Throat:      Mouth: Mucous membranes are moist.   Eyes:      General: No scleral icterus. Conjunctiva/sclera: Conjunctivae normal.   Cardiovascular:      Rate and Rhythm: Normal rate. Heart sounds: No friction rub. Pulmonary:      Effort: Pulmonary effort is normal. No respiratory distress. Abdominal:      General: Bowel sounds are normal. There is no distension. Tenderness: There is no abdominal tenderness. Musculoskeletal:      Right lower leg: Edema present. Left lower leg: Edema present. Neurological:      Mental Status: She is alert.        Access: [facility] TDC      Medications:   warfarin  2 mg Oral Once    losartan  25 mg Oral Daily    aspirin  81 mg Oral Daily    darbepoetin brando-polysorbate  25 mcg Intravenous Weekly - Monday    donepezil  5 mg Oral Nightly    ferrous sulfate  325 mg Oral BID    insulin glargine  15 Units Subcutaneous Nightly    insulin lispro  0-12 Units Subcutaneous TID WC    insulin lispro  0-6 Units Subcutaneous Nightly    lactobacillus  1 capsule Oral Daily with breakfast    melatonin ER  3 mg Oral Nightly    metoprolol tartrate  50 mg Oral BID    miconazole   Topical BID    pantoprazole  40 mg Oral QAM AC    povidone-iodine   Topical Daily    povidone-iodine   Topical Daily    rosuvastatin  5 mg Oral Daily    sertraline  100 mg Oral Daily    vancomycin  125 mg Oral 4 times per day    vitamin B complex w/C  1 tablet Oral Daily    warfarin (COUMADIN) daily dosing (placeholder)   Other RX Placeholder    zinc oxide   Topical Daily         Labs:  Recent Labs     04/06/20  1132 04/06/20  1500   WBC 15.3* 13.3*   HGB 9.8* 9.3*   HCT 30.8* 28.7*   MCV 84.0 83.9    296     Recent Labs     04/06/20  1133 04/06/20  1500   * 132*   K 5.9* 5.2*   CL 93* 93*   CO2 24 24   GLUCOSE 135* 232*   PHOS 4.1 3.8   BUN 60* 61*   CREATININE 4.4* 4.4*   LABGLOM 10* 10*   GFRAA 12* 12*           Assessment/Plan:    A/CKD now ESRD.  - HD started on 3/13/20 for worsening renal function associated with fluid overload not responding to high dose diuretics. [facility].  - Follows with Dr. Tani Cutler in the office.  - HD MWF. Outpatient HDU is [facility]. - Continue to adjust TW as tolerated.     Acute on chronic CHF, combined with anasarca. - Urine PCR of 700mg/g.  - Severe pulmonary HTN.  - Fluid (1 L/day) and sodium restriction (2 grams/day).     Hypertension.  - Continue Metoprolol and Losartan.     Anemia.  - Continue weekly ANKUR qMonday with HD.  - Ferritin low though unable to give IV iron with current infection.     Hyponatremia.  - Monitor with UF     RLL PNA/C diff   - On antibiotics per primary service.     Okay for discharge from renal standpoint. Please do not hesitate to contact me at [phone] if with questions. Thank you!     Letty Hutchins MD  4/7/2020  [facility]

## 2020-04-07 NOTE — PROGRESS NOTES
[facility]  4/7/2020  [id number]    Chief Complaint: Weakness     Subjective:   Seen in gym working with therapy. No new c/o. ROS: no nausea, vomiting, chest pain, shortness of breath, fever, chills  Objective:  Patient Vitals for the past 24 hrs:   BP Temp Temp src Pulse Resp SpO2 Weight   04/07/20 0745 (!) 163/77 97.8 °F (36.6 °C) Oral 76 16 98 % --   04/06/20 2119 135/62 98.1 °F (36.7 °C) Oral 79 16 97 % --   04/06/20 1810 (!) 148/64 97.7 °F (36.5 °C) -- 68 18 -- 132 lb 7.9 oz (60.1 kg)   04/06/20 1421 (!) 140/62 97.8 °F (36.6 °C) -- 71 16 -- 139 lb 15.9 oz (63.5 kg)     Gen: No distress, pleasant. HEENT: Normocephalic, atraumatic. CV: Regular rate and rhythm. Resp: No respiratory distress. Abd: Soft, nontender   Ext: No edema. Neuro: Alert, oriented, appropriately interactive. Wt Readings from Last 3 Encounters:   04/06/20 132 lb 7.9 oz (60.1 kg)   04/01/20 137 lb 5.6 oz (62.3 kg)   03/27/20 143 lb 4.8 oz (65 kg)       Laboratory data:   Lab Results   Component Value Date    WBC 13.3 (H) 04/06/2020    HGB 9.3 (L) 04/06/2020    HCT 28.7 (L) 04/06/2020    MCV 83.9 04/06/2020     04/06/2020       Lab Results   Component Value Date     04/06/2020    K 5.2 04/06/2020    K 4.2 04/02/2020    CL 93 04/06/2020    CO2 24 04/06/2020    BUN 61 04/06/2020    CREATININE 4.4 04/06/2020    GLUCOSE 232 04/06/2020    CALCIUM 9.2 04/06/2020        Therapy progress:  PT  Position Activity Restriction  Other position/activity restrictions: R IV, Vas-cath on HD, B boots, weight bearing as tolerated with offloading as much as possible to R heel.  HD MWF   Objective     Sit to Stand: Modified independent, Supervision  Stand to sit: Modified independent, Supervision  Bed to Chair: Contact guard assistance  Device: [address] [facility]: Supervision  Distance: 10 ft , 12 ft   OT  PT Equipment Recommendations  Equipment Needed: No  Other: owns personal DME  Toilet - Technique: Ambulating  Equipment Used:

## 2020-04-07 NOTE — PROGRESS NOTES
Occupational Therapy  Facility/Department: [facility]  Daily Treatment Note  NAME: Pablo Piedra  : 1945  MRN: [id number]    Date of Service: 2020    Discharge Recommendations:  Home with Home health OT, 24 hour supervision or assist, Home with assist PRN  OT Equipment Recommendations  Other: Pt has all DME needed at home    Assessment   Performance deficits / Impairments: Decreased functional mobility ; Decreased endurance;Decreased high-level IADLs;Decreased balance;Decreased strength;Decreased ADL status  Assessment: Patient continues to be pleasant and agreeable, limited with R foot pain this date needing prolonged rest breaks during ADLs in room but increased abiltiy to tolerate dynamic/static standing tabletop in gym for FM coordination tasks. Pt with better balance needing less cues for sit <> stands but remains close SBA with mobility using RW in tighter spaces of room/bathroom. Pt anxious to return home, educated on home safety and OT POC regarding safety awareness, endurance, balance and AE for ADLs. Pt verbalizing understanding with therapeutic listening/support. Cont to rec 24 hour SPV with HHOT/aide at [place]. Cont OT POC. Second session: Pt in room, agreeable to OT. Requesting exercises in gym. Ambulated ~20 ft and then self propelling WC due to fatigue and R ankle pain. Pt completed BUE ex 3# dowel eliud 2x15-20 reps with rest breaks PRN (chest press, circles forwards/backwards, supination/pronation, wrist flexion/extension, elbow flexion/extension, tricep flexion/extension). Pt then tolerated BUE ex with orange theraband 2x10-15 reps (internal/external rotation, chest press, diagonals, elbow flexion/extension). Once back in room, OT went over green theraputty exercises again with HEP given to patient and patient was left IDing beads out of theraputty. Cont OT POC. Prognosis: Good  OT Education: OT Role;Plan of Care;Energy Conservation;Precautions; Equipment;Transfer Training;Home in R foot, requesting limiting mobility this session but good standing tolerance noted  Functional Mobility  Functional - Mobility Device: Rolling Walker  Activity: To/from bathroom  Assist Level: Contact guard assistance  Functional Mobility Comments: min cues for hand placement  Toilet Transfers  Toilet - Technique: Ambulating  Equipment Used: Standard toilet  Toilet Transfer: Stand by assistance  Toilet Transfers Comments: RW and grab bar  Bed mobility  Supine to Sit: Unable to assess  Sit to Supine: Unable to assess  Scooting: Unable to assess  Transfers  Stand Step Transfers: Stand by assistance  Sit to stand: Stand by assistance  Stand to sit: Stand by assistance  Transfer Comments: SBA with min cues for hand placement prior to sitting and standing        Coordination  Gross Motor: fair coordination with reaching while standing sinkside to gather items for grooming tasks and management of pants during toileting  Fine Motor: fair coordination with opening containers for eating tasks as well as completion of wordsearch with 1# weighted cuffs on B hands              Cognition  Overall Cognitive Status: Exceptions  Arousal/Alertness: Appropriate responses to stimuli  Following Commands: Follows multistep commands with repitition; Follows one step commands with increased time  Attention Span: Difficulty dividing attention  Memory: Decreased short term memory  Safety Judgement: Decreased awareness of need for safety  Problem Solving: Assistance required to correct errors made;Assistance required to identify errors made;Assistance required to generate solutions;Assistance required to implement solutions  Insights: Decreased awareness of deficits  Initiation: Does not require cues  Sequencing: Requires cues for some  Cognition Comment: talks a lot but able to be re-directed easily     Perception  Overall Perceptual Status: [facility]                                   Plan   Plan  Times per week: 5 out of 7 days  Times per

## 2020-04-08 NOTE — PROGRESS NOTES
Occupational Therapy  Facility/Department: [facility]  Daily Treatment Note  NAME: Terra Licona  : 1945  MRN: [id number]    Date of Service: 2020    Discharge Recommendations:  Home with Home health OT, 24 hour supervision or assist, Home with assist PRN  OT Equipment Recommendations  Other: Pt has all DME needed at home    Assessment   Performance deficits / Impairments: Decreased functional mobility ; Decreased endurance;Decreased high-level IADLs;Decreased balance;Decreased strength;Decreased ADL status; Decreased safe awareness;Decreased fine motor control  Assessment: Patient continues to be pleasant but still limited by R heel pain and needing prolonged and frequent rest breaks throughout ADLs, mobility in room/bathroom, and dynamic standing FM tasks. Pt tolerated standing with rest breaks, educated on home safety and energy conservation with tasks. Pt tolerated BUE ex 2x20 reps with 2# free weight with less need for rest breaks and no cues for following HEP handout. Pt performed 10 sit <> stands due to need for cues and assistance for most transfers this date. Better balance and sequencing after 5 reps. Cont OT POC. Second session: Pt in room, remains pleasant and agreeable. Pt ambulated in room to gather towels/washcloths at various locations and heights with RW, CGA at times for turning but mainly close SPV. Pt then stood to fold laundry, standing for a total of 9.5 minutes. Pt participated in orange theraband 2x20 reps chest press, diagonals, internal/external rotation with rest breaks PRN. Pt left in chair, filling out menu and awaiting HD. Cont OT POC. Prognosis: Good  OT Education: OT Role;Plan of Care;Energy Conservation;Precautions; Equipment;Transfer Training;Home Exercise Program  Patient Education: role of OT, OT POC, safety awareness/energy conservation, goals, DME needs  REQUIRES OT FOLLOW UP: Yes  Activity Tolerance  Activity Tolerance: Patient Tolerated treatment

## 2020-04-08 NOTE — PROGRESS NOTES
Terra Licona  4/8/2020  [id number]    Chief Complaint: Weakness     Subjective:   No issues overnight, no new c/o. ROS: no nausea, vomiting, chest pain, shortness of breath, fever, chills  Objective:  Patient Vitals for the past 24 hrs:   BP Temp Temp src Pulse Resp SpO2   04/08/20 0745 133/67 97.9 °F (36.6 °C) Oral 72 16 94 %   04/07/20 2045 (!) 162/65 98.2 °F (36.8 °C) Oral 82 18 98 %     Gen: No distress, pleasant. HEENT: Normocephalic, atraumatic. CV: Regular rate and rhythm. Resp: No respiratory distress. Abd: Soft, nontender   Ext: No edema. Neuro: Alert, oriented, appropriately interactive. Wt Readings from Last 3 Encounters:   04/06/20 132 lb 7.9 oz (60.1 kg)   04/01/20 137 lb 5.6 oz (62.3 kg)   03/27/20 143 lb 4.8 oz (65 kg)       Laboratory data:   Lab Results   Component Value Date    WBC 13.3 (H) 04/06/2020    HGB 9.3 (L) 04/06/2020    HCT 28.7 (L) 04/06/2020    MCV 83.9 04/06/2020     04/06/2020       Lab Results   Component Value Date     04/06/2020    K 5.2 04/06/2020    K 4.2 04/02/2020    CL 93 04/06/2020    CO2 24 04/06/2020    BUN 61 04/06/2020    CREATININE 4.4 04/06/2020    GLUCOSE 232 04/06/2020    CALCIUM 9.2 04/06/2020        Therapy progress:  PT  Position Activity Restriction  Other position/activity restrictions: R IV, Vas-cath on HD, B boots, weight bearing as tolerated with offloading as much as possible to R heel. HD MWF   Objective     Sit to Stand: (2 sets of 5 for increased hip extensor strengthening.  seated rest break between bouts)  Stand to sit: Supervision, Modified independent  Bed to Chair: Contact guard assistance  Device: Rolling Walker  Assistance: Supervision, Modified Independent  Distance: 10 ft, 12 ft   OT  PT Equipment Recommendations  Equipment Needed: No  Other: owns personal DME  Toilet - Technique: Ambulating  Equipment Used: Standard toilet  Toilet Transfers Comments: distant SPV with use of RW and grab bar  Assessment

## 2020-04-08 NOTE — PROGRESS NOTES
to 30 d/t continued progress across all goals. Pt in agreement. Plan: Continue as per plan of care. Additional Information:     Barriers toward progress: Limited family support  Discharge recommendations:  [] Home independently  [x] Home with assistance []  24 hour supervision  [] ECF [] Other:  Continued Tx Upon Discharge: ? [] Yes [x] No [] TBD based on progress while on ARU [] Vital Stim indicated [] Other:   Estimated discharge date: 4/14/20    Interventions used this date:  [] Speech/Language Treatment  [] Instruction in HEP [] Group [] Dysphagia Treatment [x] Cognitive Treatment   [] Other:       Total Time Breakdown / Charges    Time in Time out Total Time / units   Cognitive Tx 0900  1230 0930  1300 30 min / 2 units  30 min / 2 units   Speech Tx      Dysphagia Tx          Electronically Signed by     Treatment Session 1:  Osmar Aaron M.A., [facility]-SLP #[id number]  Speech-Language Pathologist    Treatment Session 2:   Marilin Myles M.S. [address] [facility]  Speech-language pathologist  [id number]

## 2020-04-08 NOTE — PROGRESS NOTES
Unstageable(Chronic wound)  · Current Nutrition Therapies:  · Oral Diet Orders: Carb Control 4 Carbs/Meal, 2gm Sodium, Fluid Restriction   · Oral Diet intake: 51-75%, %  · Oral Nutrition Supplement (ONS) Orders: Diabetic Oral Supplement  · ONS intake: Unable to assess  · Anthropometric Measures:  · Ht: 5' (152.4 cm)   · Current Body Wt: 132 lb 7.9 oz (60.1 kg)  · % Weight Change:  ,  Noted ~11 lb loss x 1 month per EMR (7.4% loss) - pt recently started HD, some weight loss may be d/t fluid  · Ideal Body Wt: 100 lb (45.4 kg)   · BMI Classification: BMI 25.0 - 29.9 Overweight    Nutrition Interventions:   Continue current diet, Continue current ONS, Start ONS  Continued Inpatient Monitoring    Nutrition Evaluation:   · Evaluation: Progressing toward goals   · Goals: Pt will have meal and ONS intakes 50% or greater during ARU stay    · Monitoring: Meal Intake, Supplement Intake, Weight, Wound Healing, Pertinent Labs      Electronically signed by Sergio Lebron RD, LD on 4/8/20 at 3:05 PM EDT    Contact Number: Office: [phone]; [address]: [id number]

## 2020-04-09 NOTE — PROGRESS NOTES
30 min / 2 units   Speech Tx      Dysphagia Tx          Electronically Signed by       Ocsar Sears.  [place], [address] [facility] XP#[id number]  Speech-Language Pathologist

## 2020-04-09 NOTE — PROGRESS NOTES
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 Restrictions  Restrictions/Precautions  Restrictions/Precautions: General Precautions, Fall Risk, Weight Bearing, Contact Precautions  Required Braces or Orthoses?: Yes  Lower Extremity Weight Bearing Restrictions  Right Lower Extremity Weight Bearing: Weight Bearing As Tolerated  Left Lower Extremity Weight Bearing: Weight Bearing As Tolerated  Position Activity Restriction  Other position/activity restrictions: R IV, Vas-cath on HD, B boots, weight bearing as tolerated with offloading as much as possible to R heel. HD MWF   Subjective   General  Chart Reviewed: Yes  Response To Previous Treatment: Patient reporting fatigue but able to participate. Family / Caregiver Present: No  Referring Practitioner: Jes Robertson MD  Subjective  Subjective: found seated EOB. denies need to go to restroom this am  General Comment  Comments: 2/10 pain in R foot. Pain Screening  Patient Currently in Pain: Yes  Pain Assessment  Pain Assessment: 0-10  Pain Level: 2  Pain Type: Chronic pain  Pain Location: Foot  Pain Orientation: Right  Pain Descriptors: Aching  Non-Pharmaceutical Pain Intervention(s): Repositioned; Therapeutic presence  Response to Pain Intervention: Patient Satisfied  Vital Signs  Patient Currently in Pain: Yes       Orientation  Orientation  Overall Orientation Status: Within Functional Limits  Cognition      Objective      Transfers  Sit to Stand: Modified independent  Stand to sit: Modified independent  Comment: from EOb and recliner to RW x multiple reps throughout session with mod ind  Ambulation  WB Status: off load R heel with off loading boot per wound care RN, WBAT LLE  Ambulation

## 2020-04-09 NOTE — PROGRESS NOTES
[facility]  Follow-up Progress Note       NAME:  Evelio Butler RECORD NUMBER:  [id number]  AGE:  75 y.o. GENDER:  female  :  1945  TODAY'S DATE:  2020    Subjective:  My right foot is throbbing a little bit today. Wound Identification:  Wound Type: Unstageable dry yellow eschar right heel.  Right dorsal foot with deflated and deroofed bulla healed (LDA completed). RIght 2nd and 3rd metatarsals have been amputated and incision line is closed. Right 1st and 4th dorsal toes with dry brown/red scabs present. Left 2-4th toes with dry red areas. Right buttocks with a dry scab from shearing. Contributing Factors: edema, diabetes, decreased mobility and obesity, neuropathy,PAD. Patient Goal of Care:  [x] Wound Healing  [] Odor Control   [] Palliative Care  [] Pain Control   [] Other:     Objective:  Sitting up in chair ready to eat lunch. Right foot dressings intact. BP (!) 166/76   Pulse 80   Temp 97.5 °F (36.4 °C) (Oral)   Resp 18   Ht 5' (1.524 m)   Wt 132 lb 7.9 oz (60.1 kg)   SpO2 94%   BMI 25.88 kg/m²   Choco Risk Score: Choco Scale Score: 16  Assessment:  Right posterior lower leg improving, less slough and more granulation tissue. Right heel no longer black, now red with some yellow slough still on wound bed. Tess wound white. Lower legs with some edema and redness. See photo's. Measurements:  Wound 19 Toe (Comment  which one) L Foot Toes 2 & 3 (Active)   Number of days: 132       Wound 19 Toe (Comment  which one) L 4th Toe (Active)   Number of days: 132       Wound 20 Foot Dorsal;Right open area to top of right foot (Active)   Wound Image   3/31/2020  4:02 PM   Wound Other 3/31/2020  4:02 PM   Offloading for Diabetic Foot Ulcers Offloading boot 3/21/2020  8:45 PM   Dressing Changed Changed/New 3/31/2020 12:00 AM   Wound Cleansed Rinsed/Irrigated with saline; Wound cleanser 3/30/2020  9:08 PM Triad hydro, Dry dressing, Roll gauze  Wound 02/23/20 Heel Right unstageable-Dressing/Treatment: Triad hydro, 4x4, Roll gauze  Wound 04/01/20 Buttocks old healing sheer, dry brown -Dressing/Treatment: Triad hydro, Open to air     Dressings changed by WOCN today. Wounds evaluated, measured and photo'ed    Recommend:  Clean right heel and left lower posterior mid leg with normal saline.  Apply triad paste to yellow/brown soft areas, cover with dry dressing and wrap with kerlix daily (PLEASE DO NOT PLACE FOAM DRESSING ON RIGHT HEEL). Paint left toes 2nd-4th, and right 4rd & great toes with betadine daily. Apply barrier wipe to left heel bid for pressure ulcer prevention.  Apply triad paste to wound on right inner buttocks BID.  Off load heels with multipodus boots when in bed. Darco shoes when out of bed. Wound care to follow. Call wound care for deterioration [phone], Pager [phone].     Specialty Bed Required : Yes isoflex mattress  [] Low Air Loss   [x] Pressure Redistribution  [] Fluid Immersion  [] Bariatric  [] Total Pressure Relief  [] Other:     Current Diet: Dietary Nutrition Supplements: Diabetic Oral Supplement  Dietary Nutrition Supplements: Renal Oral Supplement  DIET RENAL; Carb Control: 3 carb choices (45 gms)/meal; Low Sodium (2 GM); Daily Fluid Restriction: 1200 ml  Dietician consult:  Yes    Discharge Plan:  Placement for patient upon discharge: skilled nursing   Patient appropriate for Outpatient [address]: Yes follow up with podiatry Dr Lottie Borja at discharge.     Referrals:  []  following  [] [address] [facility]  [] Supplies  [] Other    Patient/Caregiver Teaching: Updated on wound improvement  Level of patient/caregiver understanding able to:   [x] Indicates understanding       [x] Needs reinforcement  [] Unsuccessful      [] Verbal Understanding  [] Demonstrated understanding       [] No evidence of learning  [] Refused teaching         [] N/A       Electronically signed by Juanis Rodriguez RN, MSN, Calin Inman on 4/9/2020 at 12:09 PM

## 2020-04-09 NOTE — PROGRESS NOTES
Conservation;Precautions; Equipment;Transfer Training;Home Exercise Program  Patient Education: role of OT, OT POC, safety awareness/energy conservation, goals, DME needs, therex  REQUIRES OT FOLLOW UP: Yes  Activity Tolerance  Activity Tolerance: Patient Tolerated treatment well;Patient limited by fatigue  Activity Tolerance: Pt c/o R foot pain, does not put too much weight on foot. Educated to keep elevated  Safety Devices  Safety Devices in place: Yes  Type of devices: Left in chair;Nurse notified;Gait belt;Call light within reach; Chair alarm in place  Restraints  Initially in place: No         Patient Diagnosis(es): There were no encounter diagnoses. has a past medical history of Allergic, Anemia, Angina, Arthritis, Asthma, Cancer ([facility] [address] CKD (chronic kidney disease), Clostridium difficile infection, Coronary artery disease, Diabetes mellitus ([address] Heart attack ([facility] [address] Hyperlipidemia, Hypertension, Obesity, and Skin ulcer of right foot with necrosis of muscle ([facility] [address] 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 Restrictions  Restrictions/Precautions  Restrictions/Precautions: General Precautions, Fall Risk, Weight Bearing, Contact Precautions  Required Braces or Orthoses?: Yes  Lower Extremity Weight Bearing Restrictions  Right Lower Extremity Weight Bearing: Weight Bearing As Tolerated  Left Lower Extremity Weight Bearing: Weight Bearing As Tolerated  Position Activity Restriction  Other position/activity restrictions: R IV, Vas-cath on HD, B boots, weight bearing as tolerated with offloading as much as possible to R heel.   MWF Subjective   General  Chart Reviewed: Yes, Labs, Orders, History and Physical, Progress Notes  Patient assessed for rehabilitation services?: Yes  Response to previous treatment: Patient with no complaints from previous session  Family / Caregiver Present: No  Referring Practitioner: Primitivo Gerber MD  Diagnosis: Acute systolic congestive heart failure; C-diff, covid rule out (-)  Subjective  Subjective: Pt remains agreeable and pleasant, requesting to urgently use bathroom upon arrival to room. General Comment  Comments: RN cleared pt for OT  Pain Assessment  Pain Assessment: 0-10  Pain Level: 6  Pain Type: Chronic pain;Acute pain  Pain Location: Foot  Pain Orientation: Right  Pain Descriptors: Burning;Constant; Discomfort  Pain Frequency: Continuous  Pain Onset: On-going  Clinical Progression: Not changed  Functional Pain Assessment: Prevents or interferes some active activities and ADLs  Non-Pharmaceutical Pain Intervention(s): Distraction; Therapeutic touch; Ambulation/Increased Activity; Therapeutic presence;Repositioned  Response to Pain Intervention: Patient Satisfied  Pre Treatment Pain Screening  Intervention List: Patient able to continue with treatment; Pt educated regarding timing of pain meds;Nurse/Physician notified  Vital Signs  Patient Currently in Pain: Yes   Orientation  Orientation  Overall Orientation Status: Within Functional Limits  Objective    ADL  Feeding: Beverage management; Independent  Grooming: Supervision; Increased time to complete;Verbal cueing(RW in stance, distant SPV)  Toileting: Modified independent ; Increased time to complete(RW)  Additional Comments: Pt is limited by R heel pain for prolonged mobility, better standing balance this date.  Pt able to complete toileting Sreekanth wiht good balance, tolerated standing for grooming        Balance  Sitting Balance: Modified independent (toilet and chair)  Standing Balance: Supervision(SPV (distant to close) wiht RW)  Standing Balance  Time: x2 min, 2x1-2 minutes, x4 minutes  Activity: mobility to/from bathroom, standing toileting, standing grooming  Comment: Pt with better balance/endurance but need cues for safety, energy conservation and compensatory strategies. Pt does have increased pain in R foot, requesting limiting mobility this session but good standing tolerance noted  Functional Mobility  Functional - Mobility Device: Rolling Walker  Activity: To/from bathroom; Retrieve items;Transport items  Assist Level: Supervision  Functional Mobility Comments: SPV (mostly distant but some close with turning) and cues for hand placement  Toilet Transfers  Toilet - Technique: Ambulating(RW)  Equipment Used: Standard toilet(RW and grab bars)  Toilet Transfer: Modified independent  Toilet Transfers Comments: Sreekanth with grab bar and RW  Bed mobility  Supine to Sit: Unable to assess  Sit to Supine: Unable to assess  Scooting: Unable to assess  Comment: In chair start and end of session  Transfers  Stand Step Transfers: Supervision  Sit to stand: Supervision  Stand to sit: Supervision  Transfer Comments: SPV (distant to close) with intermittent cues for hand placement prior to sitting and standing        Coordination  Gross Motor: fair coordination with reaching while standing sinkside to gather items for grooming tasks and management of pants during toileting; increased balance and tolerance  Fine Motor: fair coordination with opening containers for grooming/eating tasks in stance              Cognition  Overall Cognitive Status: Exceptions  Arousal/Alertness: Appropriate responses to stimuli  Following Commands: Follows multistep commands with repitition; Follows one step commands with increased time  Attention Span: Difficulty dividing attention  Memory: Decreased short term memory  Safety Judgement: Decreased awareness of need for safety  Problem Solving: Assistance required to correct errors made;Assistance required to identify errors made;Assistance

## 2020-04-09 NOTE — DISCHARGE INSTR - COC
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    CKD (chronic kidney disease) stage 3, GFR 30-59 ml/min ([facility]) N18.3    Cardiac arrest ([facility]) I46.9    Acute pulmonary edema ([facility]) J81.0    Pleural effusion J90    Elevated LFTs R94.5    Thrombocytopenia ([facility]) D69.6    Leukocytosis D72.829    Normocytic normochromic anemia D64.9    Acute encephalopathy G93.40    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    Metabolic encephalopathy P81.60    Cellulitis L03.90    Anasarca R60.1    Skin ulcer of right foot with necrosis of muscle ([facility] [address] L97.513    Diabetic polyneuropathy associated with type 2 diabetes mellitus ([facility]) E11.42    Diabetic ulcer of right heel associated with type 2 diabetes mellitus, with necrosis of muscle ([facility]) E11.621, L97.413    Debility R53.81    Moderate malnutrition ([facility]) E44.0    Pneumonia J18.9    ESRD (end stage renal disease) ([facility]) N18.6       Isolation/Infection:   Isolation          C Diff Contact        Patient Infection Status     Infection Onset Added Last Indicated Last Indicated By Review Planned Expiration Resolved Resolved By    C-diff (Clostridium difficile)  04/02/20 04/02/20 Mikayla Miller, RN 04/09/20       Resolved    COVID-19 Rule Out 03/28/20 03/28/20 03/29/20 Emergent Disease Panel (Ordered)   03/31/20 Rule-Out Test Resulted    C-diff Rule Out 03/28/20 03/28/20 03/28/20 Clostridium difficile toxin/antigen (Ordered)   03/28/20 Rule-Out Test Resulted          Nurse Assessment:  Last Vital Signs: BP (!) 166/76   Pulse 80   Temp 97.5 °F (36.4 °C) (Oral)   Resp 18   Ht 5' (1.524 m)   Wt 132 lb 7.9 oz (60.1 kg)   SpO2 94%   BMI 25.88 kg/m²     Last documented pain score (0-10 scale): Pain Level: 6  Last Weight:   Wt Readings from Last 1 Encounters:   04/09/20 132 lb 7.9 oz (60.1 kg)     Mental Status:  oriented and alert    IV Access:  - None  - Dialysis Catheter  - site  left and subclavian, insertion date: unknown- had prior to admission    Nursing Mobility/ADLs:  Walking   Assisted  Transfer  Assisted  Bathing  Assisted  Dressing  Assisted  Toileting  [address] [facility]   whole    Wound Care Documentation and Therapy:  Wound 11/29/19 Toe (Comment  which one) L Foot Toes 2 & 3 (Active)   Number of days: 132       Wound 11/29/19 Toe (Comment  which one) L 4th Toe (Active)   Number of days: 132       Wound 01/03/20 Foot Dorsal;Right open area to top of right foot (Active)   Wound Image   3/31/2020  4:02 PM   Wound Other 3/31/2020  4:02 PM   Dressing Changed Changed/New 3/31/2020 12:00 AM   Wound Cleansed Rinsed/Irrigated with saline; Wound cleanser 3/30/2020  9:08 PM   Dressing Change Due 03/30/20 3/29/2020  5:28 AM   Distance Tunneling (cm) 0 cm 3/31/2020  4:02 PM   Tunneling Position ___ O'Clock 0 3/31/2020  4:02 PM   Undermining Starts ___ O'Clock 0 3/31/2020  4:02 PM   Undermining Ends___ O'Clock 0 3/31/2020  4:02 PM   Undermining Maxium Distance (cm) 0 3/31/2020  4:02 PM   Drainage Amount None 4/1/2020 12:20 AM   Margins Attached edges; Defined edges 3/29/2020  8:49 PM   Tess-wound Assessment Red; Intact;Dry 3/29/2020  8:49 PM   Culture Taken No 3/31/2020  4:02 PM   Number of days: 96       Wound 02/23/20 Heel Right unstageable (Active)   Wound Image   4/9/2020 11:56 AM   Wound Pressure Unstageable 4/9/2020 11:56 AM   Offloading for Diabetic Foot Ulcers Offloading boot; Post op shoe 4/9/2020 11:56 AM   Dressing Status Changed 4/9/2020 11:56 AM 4/9/2020 11:56 AM   Change in Wound Size % (l*w) -2140 4/9/2020 11:56 AM   Wound Volume (cm^3) 1.12 cm^3 4/9/2020 11:56 AM   Wound Healing % -2140 4/9/2020 11:56 AM   Distance Tunneling (cm) 0 cm 4/9/2020 11:56 AM   Tunneling Position ___ O'Clock 0 4/9/2020 11:56 AM   Undermining Starts ___ O'Clock 0 4/9/2020 11:56 AM   Undermining Ends___ O'Clock 0 4/9/2020 11:56 AM   Undermining Maxium Distance (cm) 0 4/9/2020 11:56 AM   Wound Assessment Red;Brown 4/9/2020 11:56 AM   Drainage Amount Small 4/9/2020 11:56 AM   Drainage Description Serosanguinous 4/9/2020 11:56 AM   Odor None 4/9/2020 11:56 AM   Margins Attached edges; Defined edges 4/9/2020 11:56 AM   Tess-wound Assessment Edema; Red 4/9/2020 11:56 AM   Non-staged Wound Description Partial thickness 4/9/2020 11:56 AM   Red%Wound Bed 80 4/9/2020 11:56 AM   Yellow%Wound Bed 10 3/31/2020  4:02 PM   Black%Wound Bed 90 3/31/2020  4:02 PM   Other%Wound Bed 20 brown 4/9/2020 11:56 AM   Culture Taken No 4/9/2020 11:56 AM   Number of days: 8       Wound 04/01/20 Buttocks old healing sheer, dry brown  (Active)   Wound Image   4/9/2020 11:56 AM   Wound Other 4/9/2020 11:56 AM   Dressing Status Other (Comment) 4/9/2020 11:56 AM   Dressing/Treatment Triad hydro;Open to air 4/9/2020 11:56 AM   Wound Cleansed Rinsed/Irrigated with saline 4/9/2020 11:56 AM   Wound Length (cm) 1 cm 4/9/2020 11:56 AM   Wound Width (cm) 0.5 cm 4/9/2020 11:56 AM   Wound Depth (cm) 0 cm 4/9/2020 11:56 AM   Wound Surface Area (cm^2) 0.5 cm^2 4/9/2020 11:56 AM   Wound Volume (cm^3) 0 cm^3 4/9/2020 11:56 AM   Distance Tunneling (cm) 0 cm 4/9/2020 11:56 AM   Tunneling Position ___ O'Clock 0 4/9/2020 11:56 AM   Undermining Starts ___ O'Clock 0 4/9/2020 11:56 AM   Undermining Ends___ O'Clock 0 4/9/2020 11:56 AM   Undermining Maxium Distance (cm) 0 4/9/2020 11:56 AM   Wound Assessment Brown;Dry 4/9/2020 11:56 AM   Drainage Amount None 4/9/2020 11:56 AM   Odor None 4/9/2020 11:56 AM   Margins Attached

## 2020-04-10 NOTE — PROGRESS NOTES
Physical Therapy  Facility/Department: [facility]  Daily Treatment Note  NAME: Radames Tapia  : 1945  MRN: [id number]    Date of Service: 4/10/2020    Discharge Recommendations:  24 hour supervision or assist, Home with Home health PT        Assessment   Body structures, Functions, Activity limitations: Decreased functional mobility ; Decreased high-level IADLs;Decreased posture;Decreased ADL status; Decreased endurance;Decreased ROM; Decreased strength;Decreased sensation;Decreased balance;Decreased safe awareness  Assessment: Pt pleasant and agreeable to session. Grossly mod ind with transfers, supv for gait up to 80 ft with RW. Requested to sit at that point 2/2 report of right heel pain. Continues to demo fair (+) dyn stand balance with 1 UE support of walker. Conitnue to progress endurance as tolerated. Pt demos strong understanding and recall of there ex. Treatment Diagnosis: decreased independence with functional mobility. Specific instructions for Next Treatment: progress mobility as tolerated. Prognosis: Good  Decision Making: Medium Complexity  PT Education: Goals; Energy Conservation; Injury Prevention;PT Role;General Safety;Plan of Care;Gait Training;Home Exercise Program;Precautions; Equipment;Transfer Training;Pressure Relief; Functional Mobility Training  Patient Education: Patient verbalizes understanding, may benefit from reinforcement   Barriers to Learning: none  REQUIRES PT FOLLOW UP: Yes  Activity Tolerance  Activity Tolerance: Patient Tolerated treatment well;Patient limited by pain; Patient limited by fatigue  Activity Tolerance: limited by RLE fatigue and soreness     Patient Diagnosis(es): There were no encounter diagnoses.      has a past medical history of Allergic, Anemia, Angina, Arthritis, Asthma, Cancer ([facility] [address] CKD (chronic kidney disease), Clostridium difficile infection, Coronary artery disease, Diabetes mellitus ([facility] [address] Heart attack ([facility] [address] Hyperlipidemia, Hypertension, Obesity, and with mod ind; pt performed sit to stand 2x10 with mod I no LOB use of 1UE on bed to push up and 1 UE on walker   Ambulation  Ambulation?: Yes  WB Status: off load R heel with off loading boot per wound care RN, WBAT LLE  More Ambulation?: Yes  Ambulation 1  Surface: level tile  Device: Rolling Walker  Assistance: Modified Independent  Quality of Gait: Shortened step length, increased hip flexion for foot clearance on RLE, decreased dorsiflexion RLE, decreased heel strike RLE. Narrow BRANDON  Gait Deviations: Slow Caitlyn;Decreased step length;Decreased arm swing;Decreased step height;Shuffles  Distance: 80 ft  Comments: seated rest breaks between sets with multipel 180* changes of direction. Ambulation 2  Surface - 2: level tile  Device 2: Rolling Walker  Assistance 2: Supervision;Modified Independent  Quality of Gait 2: partial step through pattern, decreased R heel strike utilizing increased hip/knee flexion for compensatory strategy, narrow BRANDON, decreased velocity  Distance: 60 ft x2  Comments: completed in room with several tight 180* turns with appropriate BRANDON and speed to complete safely. Fatigued during second session reported complaint of R heel pain     Balance  Posture: Fair  Sitting - Static: Good  Sitting - Dynamic: Good;-  Standing - Static: Good  Standing - Dynamic: Fair;+  Comments: dyn stand activity with emphasis on standing tolerance. pt completed activty with 1 UE support and supv of therapist with shoulder width BRANDON and no LOB. pt tolerated 5 min, used RUE for writing activity. No LOB.   Exercises  Gluteal Sets: 2 x 10  Hip Flexion: 3x10 BLE    Hip Abduction: 3x10 BLE with TKE   Knee Long Arc Quad: 3x10 BLE   Ankle Pumps: 3x10 BLE   Comments: hip add pillow squeeze 3x10 completed seated                     Goals  Short term goals  Time Frame for Short term goals: 5 days 4/6  Short term goal 1: pt will complete bed mobility wtih mod ind -MET  Short term goal 2: pt will compelte functional transfer

## 2020-04-10 NOTE — PROGRESS NOTES
Consistency Recommendation: Thin    Body mass index is 25.88 kg/m². Rehabilitation Diagnosis:  Debility     Assessment and Plan:  Acute systolic congestive heart failure. Stable after institution of HD as below.      Aspiration pneumonia. Completed ceftin/flagyl 10 days from 3/28 - stop date 4/7/2020    Dysphagia. SLP.    Cdiff. Contact isolation. Continue oral vancomycin x 14 days total (stop 4/11/2020)     ESRD. Nephrology following. HD MWF     HTN. Currently with adequate controlled; follow.        Mild cognitive deficit. SLP consulted. Continue donepezil.      DM. Lantus, SSI. Control improving      Lower extremity cellulitis. Resolved     Hx of DVT - warfarin. Pharm to dose. Will repeat doppler and check for resolution before d/c of warfarin.      Chronic foot ulcers. Consulted podiatry.      Bowels: Schedule colace + senna. Follow bowel movements. Enema or suppository if needed.      Bladder: Check PVR x 3. [facility] if PVR > 200ml or if any volume is > 500 ml.      Sleep: Trazodone provided prn.      DME: owns  HALIE: 4/14 home with home health    Saniya Werner MD   4/10/2020, 2:24 PM

## 2020-04-10 NOTE — FLOWSHEET NOTE
Treatment time: 4 hours  Net UF: 3500 ml     Pre weight: 61.5 kg  Post weight:57.4 kg  EDW: 59.5 kg (Challenging)      04/10/20 1425 04/10/20 1845   Vital Signs   BP (!) 152/69 (!) 147/52   Temp 98.3 °F (36.8 °C) 97.8 °F (36.6 °C)   Pulse 73 75   Resp 18 18   SpO2 95 % 95 %   Height 5' (1.524 m) 5' (1.524 m)   Weight 135 lb 9.3 oz (61.5 kg) 126 lb 8.7 oz (57.4 kg)   Weight Method Standing scale Standing scale   Percent Weight Change 2.33 -6.67   Dry Weight 131 lb 2.8 oz (59.5 kg) 131 lb 2.8 oz (59.5 kg)     Access used: L TDC    Access function: Well with  ml/min and lines reversed      Medications or blood products given: ACD-A for dwells     Regular outpatient schedule: ANGELA Knight     Summary of response to treatment: Patient tolerated treatment well and without any complications. Unable to give report to RN after multiple attempts to call and copy of dialysis treatment record placed in chart, to be scanned into EMR.

## 2020-04-11 NOTE — PROGRESS NOTES
Gloria Dear  4/11/2020  [id number]    Chief Complaint: Weakness     Subjective:   Sitting on side of bed  Feeling stronger  No new complaints  No new events noted overnight    ROS: no nausea, vomiting, chest pain, shortness of breath, fever, chills  Objective:  Patient Vitals for the past 24 hrs:   BP Temp Temp src Pulse Resp SpO2 Height Weight   04/11/20 1000 (!) 162/67 98.1 °F (36.7 °C) Oral 82 16 95 % -- --   04/10/20 1945 (!) 157/63 97.8 °F (36.6 °C) Oral 83 18 96 % -- --   04/10/20 1845 (!) 147/52 97.8 °F (36.6 °C) -- 75 18 95 % 5' (1.524 m) 126 lb 8.7 oz (57.4 kg)     Gen: No distress, pleasant. HEENT: Normocephalic, atraumatic. CV: Regular rate and rhythm. Resp: No respiratory distress. Abd: Soft, nontender   Ext: No edema. Neuro: Alert, oriented, appropriately interactive. Wt Readings from Last 3 Encounters:   04/10/20 126 lb 8.7 oz (57.4 kg)   04/01/20 137 lb 5.6 oz (62.3 kg)   03/27/20 143 lb 4.8 oz (65 kg)       Laboratory data:   Lab Results   Component Value Date    WBC 10.2 04/10/2020    HGB 9.8 (L) 04/10/2020    HCT 30.1 (L) 04/10/2020    MCV 84.5 04/10/2020     04/10/2020       Lab Results   Component Value Date     04/10/2020    K 5.7 04/10/2020    K 4.2 04/02/2020    CL 93 04/10/2020    CO2 27 04/10/2020    BUN 33 04/10/2020    CREATININE 3.4 04/10/2020    GLUCOSE 156 04/10/2020    CALCIUM 9.4 04/10/2020        Therapy progress:  PT  Position Activity Restriction  Other position/activity restrictions: R IV, Vas-cath on HD, B boots, weight bearing as tolerated with offloading as much as possible to R heel.  HD MWF   Objective     Sit to Stand: Modified independent  Stand to sit: Modified independent  Bed to Chair: Stand by assistance  Device: Rolling Walker  Assistance: Modified Independent  Distance: 80 ft  OT  PT Equipment Recommendations  Equipment Needed: No  Other: owns personal DME  Toilet - Technique: Ambulating(RW)  Equipment Used: Standard toilet(RW and grab bars)  Toilet Transfers Comments: Sreekanth with grab bar and RW  Assessment        SLP  Current Diet : Regular  Current Liquid Diet : Thin  Diet Solids Recommendation: Regular  Liquid Consistency Recommendation: Thin    Body mass index is 24.71 kg/m². Rehabilitation Diagnosis:  Debility     Assessment and Plan:  Acute systolic congestive heart failure. Stable after institution of HD as below.      Aspiration pneumonia. Completed ceftin/flagyl 10 days from 3/28 - stop date 4/7/2020    Dysphagia. SLP.    Cdiff. Contact isolation. Continue oral vancomycin x 14 days total (stop 4/11/2020)     ESRD. Nephrology following. HD MWF     HTN. Currently with adequate controlled; follow.        Mild cognitive deficit. SLP consulted. Continue donepezil.      DM. Lantus, SSI. Control improving      Lower extremity cellulitis. Resolved     Hx of DVT - warfarin. Pharm to dose. Will repeat doppler and check for resolution before d/c of warfarin.      Chronic foot ulcers. Consulted podiatry.      Bowels: Schedule colace + senna. Follow bowel movements. Enema or suppository if needed.      Bladder: Check PVR x 3. [address] if PVR > 200ml or if any volume is > 500 ml.      Sleep: Trazodone provided prn. DME: owns  HALIE: 4/14 home with home [facility]    LUCIAN Rosado CNP   4/11/2020, 3:01 PM     The patient was seen in conjunction with the nurse practitioner with independent history, evaluation and examination performed on the same day as her encounter. I agree with the note which has been adjusted to reflect my findings. I have had face to face contact with the patient and performed a substantive portion of the E/M visit. In summary, bp running a bit high, no other c/o. Rodríguez Jackson MD 4/11/2020, 4:45 PM

## 2020-04-11 NOTE — PROGRESS NOTES
Nephrology Progress Note   [email or web address]      This patient is a 75year old female whom we are following for new ESRD. Subjective: The patient was seen and examined. Doing well  Making progress  Some swelling  No issues with HD or the line working     Family History: No family at bedside  ROS: No nausea or vomiting, no SOB       Vitals:  BP (!) 162/67   Pulse 82   Temp 98.1 °F (36.7 °C) (Oral)   Resp 16   Ht 5' (1.524 m)   Wt 126 lb 8.7 oz (57.4 kg)   SpO2 95%   BMI 24.71 kg/m²   I/O last 3 completed shifts: In: 480 [P.O.:480]  Out: 0   No intake/output data recorded. Physical Exam:  Physical Exam  Vitals signs reviewed. Constitutional:       General: She is not in acute distress. Appearance: Normal appearance. HENT:      Head: Normocephalic and atraumatic. Mouth/Throat:      Mouth: Mucous membranes are moist.   Eyes:      General: No scleral icterus. Conjunctiva/sclera: Conjunctivae normal.   Cardiovascular:      Rate and Rhythm: Normal rate. Heart sounds: No friction rub. Pulmonary:      Effort: Pulmonary effort is normal. No respiratory distress. Abdominal:      General: Bowel sounds are normal. There is no distension. Tenderness: There is no abdominal tenderness. Musculoskeletal:      Right lower leg: Edema present. Left lower leg: Edema present. Neurological:      Mental Status: She is alert.        Access: J TDC      Medications:   losartan  25 mg Oral Daily    aspirin  81 mg Oral Daily    darbepoetin brando-polysorbate  25 mcg Intravenous Weekly - Monday    donepezil  5 mg Oral Nightly    ferrous sulfate  325 mg Oral BID    insulin glargine  15 Units Subcutaneous Nightly    insulin lispro  0-12 Units Subcutaneous TID     insulin lispro  0-6 Units Subcutaneous Nightly    lactobacillus  1 capsule Oral Daily with breakfast    melatonin ER  3 mg Oral Nightly    metoprolol tartrate  50 mg Oral BID    miconazole   Topical BID    pantoprazole

## 2020-04-11 NOTE — PROGRESS NOTES
Pharmacy to Dose Warfarin     Dx: Hx of DVT  Goal INR range 2-3   Home Warfarin dose: 3mg daily      Date                 INR                  Warfarin  3/25                 1.95                 3 mg  3/26                 2.15                 3 mg    3/27                 2.06                 3 mg  3/28                 2.27                 3 mg  3/29                 3.29                 Hold  3/30                 3.46                 Hold   3/31                 2.43                 3 mg  4/01                 1.72                 3 mg  4/02                 1.71                 4 mg  4/03                 2.05                 1 mg  4/04                 2.44                 1 mg  4/05                 2.08                 2 mg  4/6                   2. 10                 1 mg   4/7                   2.01                 2 mg  4/8                   1.78                 3 mg  4/9                   1.65                 4 mg   4/10                 1.85                 4 mg   4/11                 2.36                 hold    Recommend Warfarin   holding tonight due to jump in INR 0f 0.51   Daily INR ordered.   Bobby Evans [facility] 4/11/2020 9:41 AM

## 2020-04-12 NOTE — PROGRESS NOTES
Pharmacy to Dose Warfarin     Dx: Hx of DVT  Goal INR range 2-3   Home Warfarin dose: 3mg daily      Date                 INR                  Warfarin  3/25                 1.95                 3 mg  3/26                 2.15                 3 mg    3/27                 2.06                 3 mg  3/28                 2.27                 3 mg  3/29                 3.29                 Hold  3/30                 3.46                 Hold   3/31                 2.43                 3 mg  4/01                 1.72                 3 mg  4/02                 1.71                 4 mg  4/03                 2.05                 1 mg  4/04                 2.44                 1 mg  4/05                 2.08                 2 mg  4/6                   2. 10                 1 mg   4/7                   2.01                 2 mg  4/8                   1.78                 3 mg  4/9                   1.65                 4 mg   4/10                 1.85                 4 mg   4/11                 2.36                 hold  4/10                 2.28                 1.5 mg  Recommend Warfarin 1.5 mg tonight due to jump in INR 0f 0.51   Daily INR ordered.   Quynh Krishnan/Jie. 4/12/20 9:32 AM EDT

## 2020-04-13 NOTE — PROGRESS NOTES
Physical Therapy  Physical Therapy  Discharge Summary    Name:Yesi Shen  VGS:[id number]  AGW:[birth year]  Treatment Diagnosis: decreased independence with functional mobility. Diagnosis: pna    Restrictions/Precautions  Restrictions/Precautions: General Precautions, Fall Risk, Weight Bearing, Contact Precautions  Required Braces or Orthoses?: Yes   Lower Extremity Weight Bearing Restrictions  Right Lower Extremity Weight Bearing: Weight Bearing As Tolerated  Left Lower Extremity Weight Bearing: Weight Bearing As Tolerated       Position Activity Restriction  Other position/activity restrictions: R IV, Vas-cath on HD, B boots, weight bearing as tolerated with offloading as much as possible to R heel. HD MWF      Goals:                  Short term goals  Time Frame for Short term goals: 5 days 4/6  Short term goal 1: pt will complete bed mobility wtih mod ind -MET  Short term goal 2: pt will compelte functional transfer with supv adn AD. - GOAL MET 4/6. completes iwth supv. Short term goal 3: pt will ambulate 75 ft with AD and SBA. GOAL MET 4/04  Short term goal 4: Ambulate 10 feet with LRAD and mod I. Progressing 36' with RW CGA            Long term goals  Time Frame for Long term goals : 10 days 4/11  Long term goal 1: pt will complete functional transfer with mod idn and AD. - GOAL MET. completes with mod ind and RW  Long term goal 2: pt will ambulate 100 ft with AD and supv. - GOAL NOT MET. limited this date to 48 ft with supv-mod ind due to c/o dizziness. Long term goal 3: pt will ascend/descend 4\" curb step wtih SBA and AD. - GOAL MET. completes with SBa and RW  Long term goal 4: pt will demonstrate ind with seated HEP to maintain functional strength. - GOAL MET. idn with seated HEP cnosisting of ankle pumps, LAQ, marches, hip abduction wiht TKE     Pt. Met 3/4 short term goals and 3/4 long term goals.         Discharge PT IRF:    CARE Score: 6                                   Pt. Currently ambulates 50-80

## 2020-04-13 NOTE — PROGRESS NOTES
Physical Therapy  Facility/Department: [facility]  Daily Treatment Note  NAME: Abimbola Rodriguez  : 1945  MRN: [id number]    Date of Service: 2020    Discharge Recommendations:  24 hour supervision or assist, Home with Home health PT   PT Equipment Recommendations  Equipment Needed: No  Other: owns personal DME    Assessment   Body structures, Functions, Activity limitations: Decreased functional mobility ; Decreased high-level IADLs;Decreased posture;Decreased ADL status; Decreased endurance;Decreased ROM; Decreased strength;Decreased sensation;Decreased balance;Decreased safe awareness  Assessment: pt set to d/c home with therapy recommending 24/7 assist and HHPT. pt grossly mod ind with bed mobility, mod ind with transfers, supv-mod ind with AD for gait of household distances and SBa for stair navigation. pt limtied this am by low blood sugars. improved stability noted once pt ate. pt ind with seated HEP consisting of 2x10 BLE seated ankle pumps, LAQ, marches, hip abduction with TKE. pt reports no questions/; concerns about d/c. pt exhibited 1 LOB wiht mod A to correct this am 2* c/o dizziness. Treatment Diagnosis: decreased independence with functional mobility. Specific instructions for Next Treatment: progress mobility as tolerated. Prognosis: Good  Decision Making: Medium Complexity  Patient Education: Patient verbalizes understanding, may benefit from reinforcement   Barriers to Learning: none  REQUIRES PT FOLLOW UP: Yes  Activity Tolerance  Activity Tolerance: Patient limited by fatigue;Patient limited by endurance  Activity Tolerance: pt c/o dizziness. am blood sugar= 82. pt also reports not recieving breakfast. Therapist provided pt with breakfast tray, pt ate while completing ther ex. pt reporting decrease in symptoms following eating. Patient Diagnosis(es): There were no encounter diagnoses.      has a past medical history of Allergic, Anemia, Angina, Arthritis, Asthma, Cancer ([facility] [address] CKD (chronic kidney disease), Clostridium difficile infection, Coronary artery disease, Diabetes mellitus ([facility] [address] Heart attack ([facility] [address] Hyperlipidemia, Hypertension, Obesity, and Skin ulcer of right foot with necrosis of muscle ([facility] [address] 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 Restrictions  Restrictions/Precautions  Restrictions/Precautions: General Precautions, Fall Risk, Weight Bearing, Contact Precautions  Required Braces or Orthoses?: Yes  Lower Extremity Weight Bearing Restrictions  Right Lower Extremity Weight Bearing: Weight Bearing As Tolerated  Left Lower Extremity Weight Bearing: Weight Bearing As Tolerated  Position Activity Restriction  Other position/activity restrictions: R IV, Vas-cath on HD, B boots, weight bearing as tolerated with offloading as much as possible to R heel. HD MWF   Subjective   General  Chart Reviewed: Yes  Response To Previous Treatment: Patient reporting fatigue but able to participate. Family / Caregiver Present: No  Referring Practitioner: Bambi Austin MD  Subjective  Subjective: found seated in recliner  General Comment  Comments: reports wozziness (Blood sugar 82 and pt had not recieved breakfast).  3/10 pain in R foot   Pain Screening  Patient Currently in Pain: Yes  Pain Assessment  Pain Assessment: 0-10  Pain Level: 3  Pain Type: Acute pain;Chronic pain  Pain Location: Foot  Pain Orientation: Right  Pain Descriptors: Aching  Non-Pharmaceutical Pain Intervention(s): Food;Emotional support  Response to Pain Intervention: Patient Satisfied  Vital Signs  Patient Currently in Pain: Yes

## 2020-04-13 NOTE — PROGRESS NOTES
Speech Language Pathology  [facility]: ACUTE REHAB UNIT  SPEECH-LANGUAGE PATHOLOGY      [x] Daily  [] Weekly Care Conference Note  [x] Discharge    [address]:[birth year]  ZYR:[id number]  Rehab Dx/Hx: Debility [R53.81]    Precautions: falls  Home situation: Lives at home with 4 grandchildren   Dx: [] Aphasia  [] Dysarthria  [] Apraxia   [] Oropharyngeal dysphagia [x] Cognitive Impairment  [] Other:   Date of Admit: 4/1/2020  Room #: 0165/0165-01    Current functional status (updated daily):         Pt being seen for : [] Speech/Language Treatment  [x] Dysphagia Treatment [x] Cognitive Treatment  [] Other:  Communication: [x]WFL  [] Aphasia  [] Dysarthria  [] Apraxia  [] Pragmatic Impairment [] Non-verbal  [] Hearing Loss  [] Other:   Cognition: [] WFL  [x] Mild  [] Moderate  [] Severe [] Unable to Assess  [] Other:  Memory: [] WFL  [x] Mild  [] Moderate  [] Severe [] Unable to Assess  [] Other:  Behavior: [x] Alert  [] Cooperative  [x]  Pleasant  [] Confused  [] Agitated  [] Uncooperative  [] Distractible [] Motivated  [] Self-Limiting [] Anxious  [] Other:  Endurance:  [x] Adequate for participation in SLP sessions  [] Reduced overall  [] Lethargic  [] Other:  Safety: [x] No concerns at this time  [] Reduced insight into deficits  []  Reduced safety awareness [] Not following call light procedures  [] Unable to Assess  [] Other:    Current Diet Order:Dietary Nutrition Supplements: Renal Oral Supplement  DIET RENAL; Low Sodium (2 GM);  Daily Fluid Restriction: 1000 ml; Low Potassium  Dietary Nutrition Supplements: Renal Oral Supplement  Swallowing Precautions: Sit up for all meals and thereafter for 30 minutes      Date: 4/13/2020      Tx session 1  [id number]  Marichuy Patricia MA, CCC-SLP Discharge Summary       Total Timed Code Min 20 0   Total Treatment Minutes 30 0   Individual Treatment Minutes 30 0   Group Treatment Minutes 0 0   Co-Treat Minutes 0 0   Variance/Reason:  n/a n/a   Pain The patient does not complain of pain at this time . No c/o pain   Pain Intervention [] RN notified  [] Repositioned  [] Intervention offered and patient declined  [x] N/A  [] Other: [] RN notified  [] Repositioned  [] Intervention offered and patient declined  [x] N/A  [] Other:   Subjective     The patient is seen in room seated upright in chair. She is pleasant and cooperative. Discharge Summary   Objective:  Goals  Short-term Goals  Timeframe for Short-term Goals: 7 days (4/9/20)  Dysphagia Goals     Goal 1: The patient will tolerate recommended diet without observed clinical signs of aspiration   SLP assessing the patient with regular solids and thin liquids. No clinical s/s of aspiration noted. Good oral clearance. Independent with compensatory strategy use. Goal met   The patient will recall and perform compensatory strategies, with no cues. Goal addressed. See above. Independent with all recommended compensatory strategies Goal met   The patient will tolerate regular consistency solids 10/10. Goal not addressed. See above. Adequate mastication and oral clearance noted with no clinical s/s of aspiration. Goal met   The patient will tolerate thin liquids without signs and symptoms of aspiration 10/10 via cup. Goal not addressed. See above. Assessed with thin liquids via cup and intake of whole pills with thin liquids via cup sip Timely swallow, good oral clearance, no anterior bolus loss. Dry vocal quality noted. No clinical s/s of aspiration noted. Goal met   Short-term Goals  Timeframe for Short-term Goals: 10 days (4/12/20)  Cognitive Goals     Goal 1: The pt will complete problem solving tasks with 90% accuracy, no cues   Functional Problem Solving:    SLP reviewing recommendations from OT/PT upon discharge. The patient is able to recall all recommendations including se of w/c in kitchen. Not using stove/oven, weight bearing for injured foot, etc. The patient recall all of this information given no cues. Reports will have aids and nurses coming to home upon discharge to help with homemaking and personal hygiene. Goal met      Goal 2: The pt will complete graded attention based tasks with 90% accuracy, min cues     Goal addressed. The patient completed alternating attention task (sorting cards with external distraction). She completed task with 100% acc given minimal cues. Goal met   Goal 3: The pt will recall 5 pieces of new information after a 10 minutes delay with mod cues to encode and no cues to recall   Did not directly target  Goal met  In recent hx, the patient completed recall task with 90% acc given min cues and 100% acc given mod cues. Other areas targeted: N/A n/a   Education:   External memory strategies. Safety Devices: [x] Call light within reach  [x] Chair alarm activated  [] Bed alarm activated  [x] Other: PT in room for next session [x] Call light within reach  [x] Chair alarm activated  [] Bed alarm activated  [x] Other: Pt's belongings within reach. Assessment: The patient is seen in room seated upright in chair. She is exciting and making plans for her discharge home tomorrow. Reports that her grand-son will pick her up if able and if not her daughter can pick her up in the evening. She exhibits ability to problem solve through functional situations. Reviewed discharge instructions from all disciplines with complete recall of recommendations independently    Discharge Summary: The patient has met all goals at this time. She exhibits strong ability to problem solve through high level and functional situations including finances, home health care, medication management, and childcare management. She reports that her grandchildren will remain with neighbors until she feels ready to have them return home. Her son, whom is disabled, will also continue to reside in nursing home until she feels ready for him to return home.  She is able to generate multiple solutions to various functional

## 2020-04-13 NOTE — DISCHARGE SUMMARY
w/c level. Pt. Met 4/4 short term goals and 3/5 long term goals. Current Functional Status:   ADL  Feeding: Beverage management, Independent  Grooming: Modified independent , Increased time to complete(standing at sink to comb hair )  UE Bathing: Modified independent (seated )  LE Bathing: Supervision, Increased time to complete, Setup  UE Dressing: Modified independent (seated to doff/don pull over shirt)  LE Dressing: Minimal assistance(partial assist for offloading boot straps only. Pt able to doff/don underwear/pants/socks using reacher )  Toileting: Modified independent , Increased time to complete(using intermittent UE support on grab bar and RW)  Additional Comments: Pt declined further ADL first session    Bed mobility  Rolling to Left: Independent  Rolling to Right: Independent  Supine to Sit: Independent  Sit to Supine: Independent  Scooting: Independent  Comment: Pt in chair start and end of session    Functional Transfers: Toilet Transfers  Toilet - Technique: Ambulating  Equipment Used: Standard toilet  Toilet Transfer: Modified independent  Toilet Transfers Comments: Sreekanth with grab bar and RW    Tub Transfers  Tub - Transfer From: Rolling walker  Tub - Transfer Type: To and From  Tub - Transfer To: Transfer tub bench  Tub - Technique: Ambulating  Tub Transfers: Stand by assistance  Tub Transfers Comments: RW for approach, simulated tub/shower using garbage can turned on side. Pt able to clear BLE over edge. Shower Transfers  Shower - Transfer From: Arsen Prophet - Transfer Type: To and From  Shower - Transfer To:  Transfer tub bench  Shower - Technique: Ambulating  Shower Transfers: Stand by assistance  Shower Transfers Comments: CGA with min cues for turning           Functional Mobility  Functional - Mobility Device: Rolling Walker  Activity: To/from bathroom  Assist Level: Supervision  Functional Mobility Comments: close SPV with manuevering and turning, cues PRN    Instrumental ADL's  Instrumental ADLs: Yes  Meal Prep  Meal Prep Level: Wheelchair  Meal Prep Level of Assistance: Modified independent  Meal Preparation: Mod I to retrieve items from refrigerator and cabinets from w/c level. Pt able to safely stand from w/c to retrieve item from upper cabinet. Perception  Overall Perceptual Status: WFL         UE Function:  Hand Dominance  Hand Dominance: Right          Assessment:   Assessment: Pt tolerated OT sessions well. Pt performing ADLs with Min A to Mod I. Pt requiring Min assist LB dressing to don offloading boots and supervision for LB bathing. Pt performing functional transfers with SBA to Mod I using grab bars and RW. Pt fatigues quickly requiring seated rest breaks to complete ADLs. Pt performing simple IADLs at w/c level. Pt has progressed from requiring Total A to Min A for ADLs and Min A for simple functional transfers at evaluation. During ARU stay pt has progressed with strength, activity tolerance, balance, safety, coordination. Pt continues to demonstrate performance component deficits in balance, activity tolerance, strength. Pt would benefit from continued [address] [place] [address] services and home health aide to continue to address performance component deficits and ensure safe transition to home environment. Prognosis: Good  Barriers to Learning: pt demonstrates and verbalizes understanding. REQUIRES OT FOLLOW UP: Yes  Discharge Recommendations: Home with Home health OT, 24 hour supervision or assist    Equipment Recommendations:  None. Pt currently owns all needed DME. Patient instructed not to use oven/stove for cooking at home. Pt instructed to perform IADLs from w/c level. Pt educated on need for direct SBA for bathing and tub transfers. Pt educated on need for offloading boots when in stance and ambulation.  Pt educated on discharge recommendation for [address] [place] [address], home health aide and 24/7 supervision/assist.  Pt demonstrates and verbalizes understanding of

## 2020-04-13 NOTE — PROGRESS NOTES
Pharmacy to Dose Warfarin     Dx: Hx of DVT  Goal INR range 2-3   Home Warfarin dose: 3mg daily      Date                 INR                  Warfarin  4/01                 1.72                 3 mg  4/02                 1.71                 4 mg  4/03                 2.05                 1 mg  4/04                 2.44                 1 mg  4/05                 2.08                 2 mg  4/6                   2. 10                 1 mg   4/7                   2.01                 2 mg  4/8                   1.78                 3 mg  4/9                   1.65                 4 mg   4/10                 1.85                 4 mg   4/11                 2.36                 hold  4/12                 2.28                 1.5 mg  4/13                 1.99                 3 mg    Recommend Warfarin 3 mg tonight. Daily INR ordered.   Nancy Amaya PharmD  4/13/2020 at 8:46 AM

## 2020-04-13 NOTE — PROGRESS NOTES
Occupational Therapy  Facility/Department: [facility] ARU  Discharge Treatment Note  NAME: Pablo Piedra  : 1945  MRN: [id number]    Date of Service: 2020    Discharge Recommendations:  Home with Home health OT, 24 hour supervision or assist  OT Equipment Recommendations  Other: Pt has all DME needed at home    Assessment   Performance deficits / Impairments: Decreased functional mobility ; Decreased endurance;Decreased high-level IADLs;Decreased balance;Decreased strength;Decreased ADL status; Decreased safe awareness;Decreased fine motor control      Assessment: Pt tolerated OT sessions well. Pt performing ADLs with Min A to Mod I. Pt requiring Min assist LB dressing to don offloading boots and supervision for LB bathing. Pt performing functional transfers with SBA to Mod I using grab bars and RW. Pt fatigues quickly requiring seated rest breaks to complete ADLs. Pt performing simple IADLs at w/c level. Pt has progressed from requiring Total A to Min A for ADLs and Min A for simple functional transfers at evaluation. During ARU stay pt has progressed with strength, activity tolerance, balance, safety, coordination. Pt continues to demonstrate performance component deficits in balance, activity tolerance, strength. Pt would benefit from continued [address] [place] [address] services and home health aide to continue to address performance component deficits and ensure safe transition to home environment. OT Education: OT Role;Plan of Care;Energy Conservation;Precautions; Equipment;Transfer Training;ADL Adaptive Strategies;IADL Safety  Patient Education: home safety concerns, OT discharge recommendations, safety with bathing, tub/shower transfers, kitchen mobility   Barriers to Learning: pt demonstrates and verbalizes understanding.        Activity Tolerance  Activity Tolerance: Patient Tolerated treatment well;Patient limited by fatigue  Safety Devices  Type of devices: Left in chair;Nurse notified;Gait belt;Call light within reach;Chair alarm in place         Patient Diagnosis(es): The encounter diagnosis was Skin ulcer of right foot with necrosis of muscle ([address] has a past medical history of Allergic, Anemia, Angina, Arthritis, Asthma, Cancer ([address] CKD (chronic kidney disease), Clostridium difficile infection, Coronary artery disease, Diabetes mellitus ([address] Heart attack ([address] Hyperlipidemia, Hypertension, Obesity, and Skin ulcer of right foot with necrosis of muscle ([address] 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 Restrictions  Restrictions/Precautions  Restrictions/Precautions: General Precautions, Fall Risk, Weight Bearing, Contact Precautions  Required Braces or Orthoses?: Yes  Lower Extremity Weight Bearing Restrictions  Right Lower Extremity Weight Bearing: Weight Bearing As Tolerated  Left Lower Extremity Weight Bearing: Weight Bearing As Tolerated  Position Activity Restriction  Other position/activity restrictions: Vas-cath on HD, B boots, weight bearing as tolerated with offloading as much as possible to R heel. HD MWF        Subjective   General  Chart Reviewed: Yes  Patient assessed for rehabilitation services?: Yes  Response to previous treatment: Patient with no complaints from previous session  Family / Caregiver Present: No  Referring Practitioner: Norma Clemente MD      Diagnosis: Acute systolic congestive heart failure; C-diff, aspiration pneumonia, dysphagia      Subjective  Subjective: Pt seated in chair upon OT arrival for both sessions. Pt reports feeling a \"little wobbly\" today.  Pt reports feeling confident about returning home dressing with Grant with LRAD and AE PRN- GOAL MET 4/13/20  Short term goal 3: Pt will complete standing level ADLs with SBA for balance >3 minutes with LRAD-GOAL MET 4/4, CTA  Short term goal 4: Pt will tolerate BUE ex 10-15 reps with rest breaks PRN to increase strength/endurance for ADLs and transfers/mobility-GOAL MET 4/6 CTA  Long term goals  Time Frame for Long term goals : 4/11/14  Long term goal 1: Pt will complete functional mobility/transfers Sreekanth with LRAD for ADLs; GOAL PARTIALLY MET. Pt requiring supervision to Mod I with functional transfers. Long term goal 2: Pt will complete toileting/toilet transfer with LRAD Sreekanth using DME PRN-GOAL MET 4/9/20  Long term goal 3: Pt will complete tub transfer with LRAD and DME SBA; GOAL MET 4/13/20  Long term goal 4: Pt will complete LB dressing with AE and LRAD setup; GOAL NOT MET, pt requiring assist to manage offloading boots only. Long term goal 5: Pt will complete 1 simple IADL task SPV standing and/or mod I sitting with LRAD; GOAL MET 4/13/20 Mod I simple meal prep at w/c level. Patient Goals   Patient goals :  \"Be able to walk again and farther\"       Therapy Time   Individual Concurrent Group Co-treatment   Time In 0900         Time Out 0930         Minutes 30         Timed Code Treatment Minutes: 30 Minutes     Second Session Therapy Time:   Individual Concurrent Group Co-treatment   Time In 1030         Time Out 1130         Minutes 60           Timed Code Treatment Minutes:  60 Minutes    Total Treatment Minutes:  [address], OT

## 2020-04-14 NOTE — PROGRESS NOTES
Patient is laying in bed, eyes closed, opens to sound of RN entering room. Alert & oriented x 4. VSS, afebrile. Head to toe nursing assessment completed, tolerated well but does c/o feeling very tired after having HD today. 2600ml removed, Pre-wt = 59.7kg & Post-wt = 57.1kg. Bed in lowest position with wheels locked, side rails up 2/4. Call light in easy reach, able to demonstrate proper use of call light. Denies pain. RN will continue to monitor.

## 2020-04-14 NOTE — DISCHARGE SUMMARY
Physical Medicine & Rehabilitation  Discharge Summary     Patient Identification:  Jus Moore  : 1945  Admit date: 2020  Discharge date:    Attending provider: Marcelo Braga MD        Primary care provider: Juan Carlos Rios MD     Discharge Diagnoses:   Patient Active Problem List   Diagnosis    Asthma    Coronary artery disease    Uncontrolled hypertension    Vertigo    Chest pain    Type 2 diabetes mellitus with hyperglycemia, with long-term current use of insulin ([address]    Acute asthma exacerbation    Chest pain    HTN (hypertension)    CAD (coronary artery disease)    Lipids abnormal    Osteoarthritis of both knees    Near syncope    Perennial allergic rhinitis    Primary localized osteoarthrosis, lower leg    Tibialis tendinitis    Osteoarthritis of spine with radiculopathy, lumbar region    Pain of both hip joints    Spinal stenosis, lumbar region, with neurogenic claudication    Right sided sciatica    Endometrial adenocarcinoma ([address]    Drainage from wound    S/P hysterectomy with oophorectomy    S/P total hysterectomy and BSO (bilateral salpingo-oophorectomy)    Chronic pain of left knee    Chronic pain of right knee    History of endometrial cancer    Menopausal state    Osteoporosis    Primary osteoarthritis of both knees    Vaginal atrophy    Overdose of insulin, accidental or unintentional, initial encounter    Hypoglycemia due to insulin    Hypoglycemia    Pulmonary nodule    Acute respiratory failure (HCC)    Respiratory arrest before cardiac arrest (HCC)    Acute respiratory failure with hypoxia and hypercapnia (HCC)    Spindle cell sarcoma (HCC)    CKD (chronic kidney disease) stage 3, GFR 30-59 ml/min (HCC)    Cardiac arrest (HCC)    Acute pulmonary edema (HCC)    Pleural effusion    Elevated LFTs    Thrombocytopenia (HCC)    Leukocytosis    Normocytic normochromic anemia    Acute encephalopathy    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

## 2020-04-14 NOTE — PLAN OF CARE
Fall precautions in place, bed alarm on, non-skid footwear applied, bed in lowest position  And call light in reach. Will continue  to monitor.
Fall precautions in place, bed alarm on, non-skid footwear applied, bed in lowest position  And call light in reach. Will continue  to monitor.
Increase function to baseline
Problem: Falls - Risk of:  Goal: Will remain free from falls  Description: Will remain free from falls  Outcome: Met This Shift     Problem: Daily Care:  Goal: Daily care needs are met  Description: Daily care needs are met  Outcome: Met This Shift     Problem: Skin Integrity:  Goal: Skin integrity will stabilize  Description: Skin integrity will stabilize  Outcome: Ongoing
Problem: Falls - Risk of:  Goal: Will remain free from falls  Description: Will remain free from falls  Outcome: Ongoing     Problem: Falls - Risk of:  Goal: Absence of physical injury  Description: Absence of physical injury  Outcome: Ongoing
Problem: Falls - Risk of:  Goal: Will remain free from falls  Description: Will remain free from falls  Outcome: Ongoing  Goal: Absence of physical injury  Description: Patient will remain free of falls throughout the duration of her hospital stay. Bed locked and in lowest position. Nonskid footwear and fall bracelet applied. Patient oriented to the room and call light system. Patient verbalizes understanding for the need to request assistance prior to ambulating. Bed alarm remains in place and activated. Patient denies any needs at this time, will continue to monitor.  Absence of physical injury   Outcome: Ongoing     Problem: Infection:  Goal: Will remain free from infection  Description: Will remain free from infection  Outcome: Ongoing     Problem: Skin Integrity:  Goal: Skin integrity will stabilize  Description: Skin integrity will stabilize  Outcome: Ongoing
Problem: Falls - Risk of:  Goal: Will remain free from falls  Description: Will remain free from falls  Outcome: Ongoing  Note: Fall precautions in place, bed alarm on, nonskid foot wear applied, bed in lowest position, and call light within reach. Will continue to monitor.
Problem: Nutrition  Goal: Optimal nutrition therapy  Outcome: Ongoing  Note: Nutrition Problem:  Moderate malnutrition  Intervention: Food and/or Nutrient Delivery: Continue current diet, Continue current ONS  Nutritional Goals: Pt will have meal and ONS intakes 50% or greater during ARU stay
Problem: Nutrition  Goal: Optimal nutrition therapy  Outcome: Ongoing  Note: Nutrition Problem:  Moderate malnutrition  Intervention: Food and/or Nutrient Delivery: Continue current diet, Continue current ONS, Start ONS  Nutritional Goals: Pt will have meal and ONS intakes 50% or greater during ARU stay
encephalopathy    Cellulitis    Anasarca    Skin ulcer of right foot with necrosis of muscle (HCC)    Diabetic polyneuropathy associated with type 2 diabetes mellitus ([address]    Diabetic ulcer of right heel associated with type 2 diabetes mellitus, with necrosis of muscle (HCC)    Debility    Moderate malnutrition (HCC)    Pneumonia    ESRD (end stage renal disease) ([address]       Rehabilitation Diagnosis:     Debility [R53.81]     ADMIT DATE:4/1/2020    Patient Goals: To return home. Admitting Impairments: Pt was [facility] 3/7 to [facility] with complaints of swelling and shortness of breath after apparent noncompliance with her home torsemide. She was doing well in rehab when she unfortunately developed a fever and was discharged to acute care due to suspicion for COVID. Her test was negative and she was treated for right lower lobe pneumonia resulting in Decreased functional mobility ; Decreased endurance;Decreased high-level IADLs;Decreased balance;Decreased strength;Decreased ADL status  Barriers: None  Participation: Good   CARE PLAN     NURSING:  Gisselle De La Garza while on this unit will:     [x] Be continent of bowel and bladder     [] Have an adequate number of bowel movements  [] Urinate with no urinary retention >300ml in bladder  [] Complete bladder protocol with rao removal  [x] Maintain O2 SATs at 90%  [x] Have pain managed while on ARU       [] Be pain free by discharge   [x] Have no skin breakdown while on ARU  [x] Have improved skin integrity via wound measurements  [x] Have no signs/symptoms of infection at the wound site  [x] Be free from injury during hospitalization   [] Complete education with patient/family with understanding demonstrated for:  [] Adjustment   [] Other:   Nursing interventions may include bowel/bladder training, education for medical assistive devices, medication education, O2 saturation management, energy conservation, stress management techniques, fall prevention, alarms protocol,

## 2020-04-14 NOTE — PROGRESS NOTES
Patient discharged home with [facility]. Reviewed discharge instructions with patient. All belongings packed and taken with patient to vehicle.

## 2020-04-14 NOTE — PROGRESS NOTES
Pharmacy to Dose Warfarin     Dx: Hx of DVT  Goal INR range 2-3   Home Warfarin dose: 3mg daily      Date                 INR                  Warfarin  4/01                 1.72                 3 mg  4/02                 1.71                 4 mg  4/03                 2.05                 1 mg  4/04                 2.44                 1 mg  4/05                 2.08                 2 mg  4/6                   2. 10                 1 mg   4/7                   2.01                 2 mg  4/8                   1.78                 3 mg  4/9                   1.65                 4 mg   4/10                 1.85                 4 mg   4/11                 2.36                 hold  4/12                 2.28                 1.5 mg  4/13                 1.99                 3 mg  4/14                 1.99                 3 mg     Recommend Warfarin 3 mg tonight. Daily INR ordered.   Coco Ocampo PharmD  4/14/2020 at 8:38 AM

## 2020-04-16 NOTE — CARE COORDINATION
Protect Yourself    Patient stated she was doing alright. Patient reported she woke up this morning and had some phlegm but thinks it is related to allergies and nasal drainage. Patient stated she reached out to her [facility] nurse and discussed. Patient denied any SOB, CP, or fever chills. Reviewed medications with patient and she reported she is taking as prescribed and will also review all medications with her [address] [facility]. Denies any acute needs at present time. Agreeable to f/u calls. Educated on the use of urgent care or physicians 24 hr access line if assistance is needed after hours & that they can always contact their home care provider to request a nurse visit even if it isn't their regularly scheduled day for their nurse to visit. Patient declined [facility] loop enrollment.     Tessa JACKSON, RN, [facility]  Care Transition Nurse   [phone]            Tessa JACKSON, RN, [facility]  Care Transition Nurse   [phone]

## 2020-04-24 PROBLEM — K52.9 COLITIS: Status: ACTIVE | Noted: 2020-01-01

## 2020-04-24 NOTE — ED NOTES
Patient states she has hemodialysis on Monday/Wednesday/Friday.      Shanice Shipley RN  04/24/20 1872

## 2020-04-24 NOTE — ED PROVIDER NOTES
daily      insulin lispro (HUMALOG) 100 UNIT/ML injection vial Inject 0-12 Units into the skin 3 times daily (with meals) **Medium Dose Corrective Algorithm**  Glucose: Dose:  If <139             No Insulin  140-199 2 Units  200-249 4 Units  250-299 6 Units  300-349 8 Units  350-400 10 Units  Above 400       12 Units  Qty: 1 vial, Refills: 3      donepezil (ARICEPT) 5 MG tablet Take 1 tablet by mouth nightly  Qty: 30 tablet, Refills: 3      ferrous sulfate 325 (65 Fe) MG tablet Take 325 mg by mouth 2 times daily      melatonin 3 MG TABS tablet Take 3 mg by mouth nightly      acetaminophen (TYLENOL) 325 MG tablet Take 2 tablets by mouth every 4 hours as needed for Pain or Fever  Qty: 120 tablet, Refills: 3      B Complex-C-Iron (SUPER B-COMPLEX/IRON/VITAMIN C PO) Take 1 tablet by mouth daily. ALLERGIES     Ativan [lorazepam]; Dilaudid [hydromorphone hcl]; Flexeril [cyclobenzaprine hcl]; Heparin; Hydromorphone; Penicillins; Soma [carisoprodol]; Sulfa antibiotics; and Vicodin [hydrocodone-acetaminophen]    FAMILY HISTORY       Family History   Problem Relation Age of Onset    Diabetes Mother     Heart Failure Mother     Heart Disease Mother     Stroke Mother     Diabetes Sister     Diabetes Brother     Diabetes Maternal Grandmother     Asthma Son     Asthma Son     Diabetes Daughter     Heart Disease Daughter     Irritable Bowel Syndrome Daughter     Diabetes Brother     Cancer Neg Hx     Emphysema Neg Hx     Hypertension Neg Hx           SOCIAL HISTORY       Social History     Socioeconomic History    Marital status:       Spouse name: None    Number of children: None    Years of education: None    Highest education level: None   Occupational History    Occupation:      Comment: [employer] most recently   Social Needs    Financial resource strain: None    Food insecurity     Worry: None     Inability: None    Transportation needs     Medical: None     Non-medical: None Tobacco Use    Smoking status: Former Smoker     Years: 13.00     Types: Cigarettes     Last attempt to quit: 1985     Years since quittin.3    Smokeless tobacco: Never Used   Substance and Sexual Activity    Alcohol use: No     Alcohol/week: 0.0 standard drinks    Drug use: No    Sexual activity: Not Currently     Partners: Male   Lifestyle    Physical activity     Days per week: None     Minutes per session: None    Stress: None   Relationships    Social connections     Talks on phone: None     Gets together: None     Attends Yarsani service: None     Active member of club or organization: None     Attends meetings of clubs or organizations: None     Relationship status: None    Intimate partner violence     Fear of current or ex partner: None     Emotionally abused: None     Physically abused: None     Forced sexual activity: None   Other Topics Concern    None   Social History Narrative    None       SCREENINGS    [place] Coma Scale  Eye Opening: Spontaneous  Best Verbal Response: Oriented  Best Motor Response: Obeys commands  Patrick Coma Scale Score: 15          PHYSICAL EXAM    (up to 7 for level 4, 8 or more for level 5)     ED Triage Vitals [20 1437]   BP Temp Temp src Pulse Resp SpO2 Height Weight   (!) 128/51 98 °F (36.7 °C) -- 76 16 100 % 4' 11\" (1.499 m) 124 lb (56.2 kg)       Physical Exam  Vitals signs and nursing note reviewed. Constitutional:       General: She is not in acute distress. Appearance: Normal appearance. HENT:      Head: Normocephalic and atraumatic. Nose: Nose normal. No congestion. Mouth/Throat:      Mouth: Mucous membranes are moist.   Eyes:      Conjunctiva/sclera: Conjunctivae normal.   Neck:      Musculoskeletal: Normal range of motion and neck supple. Cardiovascular:      Rate and Rhythm: Normal rate and regular rhythm. Pulses: Normal pulses. Heart sounds: Normal heart sounds. No murmur.    Pulmonary:      Effort: [phone]   POCT GLUCOSE - Abnormal; Notable for the following components:    POC Glucose 154 (*)     All other components within normal limits    Narrative:     Performed at:  [facility]) [facility]  [address] [place] [address],  [address] [place] [address], [address] [place] [address]   Phone [phone]   URINALYSIS   BASIC METABOLIC PANEL W/ REFLEX TO MG FOR LOW K   CBC WITH AUTO DIFFERENTIAL   PROTIME-INR   HEMOGLOBIN A1C   POCT GLUCOSE   POCT GLUCOSE       All other labs were within normal range or not returned as of this dictation. EMERGENCY DEPARTMENT COURSE and DIFFERENTIAL DIAGNOSIS/MDM:   Vitals:    Vitals:    04/24/20 1853 04/24/20 1854 04/24/20 2024 04/24/20 2100   BP: (!) 129/44 (!) 129/44 (!) 144/47 (!) 147/53   Pulse: 80  78 84   Resp: 16  16 16   Temp:    98.2 °F (36.8 °C)   TempSrc:    Oral   SpO2: 97% 98% 98% 99%   Weight:    124 lb (56.2 kg)   Height:    4' 11\" (1.499 m)       Patient evaluated and previous record reviewed. Patient presents with diarrhea and abdominal pain. Vital signs stable and within normal limits. Physical exam as documented above. Lab work-up notable for profound leukocytosis. CT scan concerning for diffuse colitis. As patient was recently treated for C. difficile concern for C. difficile colitis. Stool study pending so in the meantime patient covered with Flagyl IV. Patient was unable to dialyze today due to diarrhea and has become lightheaded and fatigued due to dehydration so will admit for further work-up and management. CONSULTS:  IP CONSULT TO HOSPITALIST  IP CONSULT TO GI  IP CONSULT TO NEPHROLOGY    PROCEDURES:  Unless otherwise noted below, none     Procedures      FINAL IMPRESSION      1. Diarrhea of presumed infectious origin    2. Colitis    3.  Nausea          DISPOSITION/PLAN   DISPOSITION Admitted 04/24/2020 07:07:54 PM      PATIENT REFERRED TO:  Shelly Cruz MD  [address] [facility] [address] [place] [address]  [phone]            DISCHARGE MEDICATIONS:  Current

## 2020-04-24 NOTE — ED NOTES
Patient states she feels like she could have a bowel movement. States she feels nauseated.       Alberto Smith RN  04/24/20 1914

## 2020-04-25 NOTE — FLOWSHEET NOTE
Treatment time: 3.5 hours     Net UF: 1 liter     Pre weight: 55.5   Post weight: 54.3   EDW: tbd    Access used: L TDC   Access function: good. Dressing changed, CDI     Medications or blood products given: Aranesp     Regular outpatient schedule: Annika MILLER    Summary of response to treatment: 1 liter removed. Pt tolerated tx well. Post VSS. Aranesp given. Inconvenient  X1 of bowel while on tx. Report given to primary nurse Bill SLAUGHTER     Copy of dialysis treatment record placed in chart, to be scanned into EMR.     04/25/20 1304 04/25/20 1640   Vital Signs   BP (!) 157/69 (!) 130/51   Temp 97.9 °F (36.6 °C) 97.8 °F (36.6 °C)   Pulse 72 75   Weight 122 lb 5.7 oz (55.5 kg) 119 lb 11.4 oz (54.3 kg)   Weight Method Bed scale  --    Treatment Initiation   Dialyze Hours 3.5  --    Treatment  Initiation Universal Precautions maintained;Lines secured to patient; Connections secured;Prime given;Venous Parameters set; Arterial Parameters set; Air foam detector engaged; Hemosafe Device; Dialysate;Saline line double clamped; Hemo-Safe Applied;F160  --    Post-Hemodialysis Assessment   Hemodialysis Intake (ml)  --  500 ml   Hemodialysis Output (ml)  --  1500 ml   NET Removed (ml)  --  1000 ml   Tolerated Treatment  --  Good

## 2020-04-25 NOTE — PLAN OF CARE
Problem: Nutrition  Goal: Optimal nutrition therapy  Outcome: Ongoing  Note: Nutrition Problem: Inadequate energy intake  Intervention: Food and/or Nutrient Delivery: Modify current diet, Start ONS  Nutritional Goals:  Tolerate diet advancement and consume greater than 50% of meals and ONS this admission

## 2020-04-25 NOTE — PROGRESS NOTES
4 Eyes Skin Assessment     The patient is being assess for  Admission    I agree that 2 RN's have performed a thorough Head to Toe Skin Assessment on the patient. ALL assessment sites listed below have been assessed. Areas assessed by both nurses:   [x]   Head, Face, and Ears   [x]   Shoulders, Back, and Chest  [x]   Arms, Elbows, and Hands   [x]   Coccyx, Sacrum, and Ischum  [x]   Legs, Feet, and Heels        Does the Patient have Skin Breakdown?   Yes a wound was noted on the Admission Assessment and an WOUND LDA was Initiated documentation include the Tess-wound, Wound Assessment, Measurements, Dressing Treatment, Drainage, and Color\",         Choco Prevention initiated:  Yes   Wound Care Orders initiated:  Yes      [address] nurse consulted for Pressure Injury (Stage 3,4, Unstageable, DTI, NWPT, and Complex wounds):  Yes      Nurse 1 eSignature: Electronically signed by Bere Monaco RN on 4/25/20 at 1:03 AM EDT    **SHARE this note so that the co-signing nurse is able to place an eSignature**    Nurse 2 eSignature: Electronically signed by Irish Rodas RN on 4/25/20 at 3:27 AM EDT

## 2020-04-25 NOTE — PROGRESS NOTES
consulted, appreciate recommendations  -Continue with liquid diet    Leukocytosis-secondary to above    Prolonged QTC - 511  - hold zofran  - repeat EKG in AM    ESRD on HD-MW F  -Nephrology consulted, will have HD today given missed session yesterday    Hypertension-continue home meds    Hyperlipidemia-continue statin CAD    CAD status post stent-continue home meds    Diabetes type 2-hold Lantus, monitor blood sugars, SSI,  -Hemoglobin A1c- 6.6      Right heel ulcer-continue wound care    DVT Prophylaxis: SCD  Diet: DIET CLEAR LIQUID;  Code Status: Full Code      Dispo - 2-3 days    Alvino Gomez MD

## 2020-04-25 NOTE — PROGRESS NOTES
Pt with 2x3 nonstagable wound to her right heel. No drainage noted. Wound covered with 4x4 and kerlix wrap. Pt denies pain at this time. Will monitor.

## 2020-04-26 NOTE — PROGRESS NOTES
Pt has only had one small, soft, formed BM today. Over all she is feeling much better and is tolerating her diet changes.

## 2020-04-26 NOTE — PROGRESS NOTES
[facility]       Progress Note           Subjective/   75y.o. year old female who we are seeing in consultation for ESRD. HPI:  HD done on 4/25 with 1 liter removed, post-weight of 54.3 kg. No sob. ROS:  Still with diarrhea though states better. Tolerating liquid diet. No fevers. Objective/   GEN:  Chronically ill, /67   Pulse 72   Temp 98.8 °F (37.1 °C) (Oral)   Resp 16   Ht 4' 11\" (1.499 m)   Wt 119 lb 11.4 oz (54.3 kg)   SpO2 96%   BMI 24.18 kg/m²   HEENT: non-icteric, no JVD  CV: S1, S2 without m/r/g; trace LE edema  RESP: CTA B without w/r/r; breathing wnl  ABD: +bs, soft, nt, no hsm  SKIN: warm, no rashes  ACCESS: L [facility]    Data/  Recent Labs     04/24/20  1445 04/25/20  0716 04/26/20  0805   WBC 19.6* 15.9* 10.5   HGB 10.5* 9.9* 9.0*   HCT 33.0* 30.6* 27.7*   MCV 85.6 85.2 85.6    324 266     Recent Labs     04/24/20  1445 04/25/20  0716 04/26/20  0805   * 137 136   K 4.1 3.3* 3.6   CL 95* 100 99   CO2 24 22 27   GLUCOSE 217* 127* 136*   MG  --  2.10  --    BUN 32* 40* 13   CREATININE 4.3* 4.8* 2.8*   LABGLOM 10* 9* 16*   GFRAA 12* 11* 20*       Assessment/Plan     ESRD on HD Mon-Wed-Fri.  - HD started on 3/13/20 for worsening renal function associated with fluid overload not responding to high dose diuretics. Natalio John Day TDC placed on 3/19/20.  - Follows with Dr. Samir Thompson in the office.  - Next HD tomorrow to put back on regular schedule, challenge outpatient EDW of 55.5 kg    - Outpatient HDU at [facility].     Hypertension.  - Continue Metoprolol and Losartan. - Monitor BP with UF with HD.     Anemia.  - Continue weekly ANKUR with HD - dosed on 4/25     Colitis.   - Suspected recurrent C. difficile colitis  - Plan per Admitting & GI

## 2020-04-26 NOTE — DISCHARGE INSTR - COC
Continuity of Care Form    Patient Name: Sandhya Hays   :  1945  MRN:  [id number]    Admit date:  2020  Discharge date:  ***    Code Status Order: Full Code   Advance Directives:   Advance Care Flowsheet Documentation     Date/Time Healthcare Directive Type of Healthcare Directive Copy in [facility] [address] Agent's Name Healthcare Agent's Phone Number    20 1930  Yes, patient has an advance directive for healthcare treatment  Durable power of  for health care -- -- -- --          Admitting Physician:  Siomara Parikh MD  PCP: Keisha Olson MD    Discharging Nurse: [facility] Unit/Room#: 6139/8893-97  Discharging Unit Phone Number: ***    Emergency Contact:   Extended Emergency Contact Information  Primary Emergency Contact: Darlyn ledesma, [address] Phone: [phone]  Relation: Child  Secondary Emergency Contact: [facility]  [email or web address] Phone: [phone]  Relation: Other   needed? No    Past Surgical History:  Past Surgical History:   Procedure Laterality Date    BRONCHOSCOPY N/A 2019    BRONCHOSCOPY WASHING performed by Siobhan Lilly MD at  [facility] N/A 2019    BRONCHOSCOPY ALVEOLAR LAVAGE performed by Jennifer Jordan MD at  [facility]  2019    BRONCHOSCOPY THERAPUTIC ASPIRATION INITIAL performed by Jennifer Jordan MD at  [facility]  2019    BRONCHOSCOPY FOREIGN BODY REMOVAL performed by Jennifer Jordan MD at [address] [place] [address]      right    CATARACT REMOVAL WITH IMPLANT      CORONARY ANGIOPLASTY WITH STENT PLACEMENT  2012    DIAGNOSTIC CARDIAC CATH LAB PROCEDURE      HYSTERECTOMY      11/30/15    KNEE ARTHROSCOPY      left knee. not a TKR.  no metal.     TOE AMPUTATION Right 2020    RIGHT SECOND AND THIRD TOE AMPUTATION, RIGHT HEEL WOUND knees M17.0    Vaginal atrophy N95.2    Overdose of insulin, accidental or unintentional, initial encounter T38.3X1A    Hypoglycemia due to insulin E16.0, T38.3X5A    Hypoglycemia E16.2    Pulmonary nodule R91.1    Acute respiratory failure ([facility]) J96.00    Respiratory arrest before cardiac arrest ([facility]) I46.9, R09.2    Acute respiratory failure with hypoxia and hypercapnia ([facility]) J96.01, J96.02    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45    Cellulitis L03.90    Anasarca R60.1    Skin ulcer of right foot with necrosis of muscle ([facility]) L97.513    Diabetic polyneuropathy associated with type 2 diabetes mellitus ([facility]) E11.42    Diabetic ulcer of right heel associated with type 2 diabetes mellitus, with necrosis of muscle ([facility]) E11.621, L97.413    Debility R53.81    Moderate malnutrition ([facility]) E44.0    Pneumonia J18.9    ESRD (end stage renal disease) ([facility]) N18.6    Colitis K52.9       Isolation/Infection:   Isolation          C Diff Contact        Patient Infection Status     Infection Onset Added Last Indicated Last Indicated By Review Planned Expiration Resolved Resolved By    C-diff (Clostridium difficile) 04/24/20 04/24/20 04/24/20 Clostridium Difficile Toxin/Antigen 05/01/20       Resolved    C-diff Rule Out 04/24/20 04/24/20 04/24/20 Clostridium Difficile Toxin/Antigen (Ordered)   04/24/20 Rule-Out Test Resulted    COVID-19 Rule Out 03/28/20 03/28/20 03/29/20 Emergent Disease Panel (Ordered)   03/31/20 Rule-Out Test Resulted    C-diff Rule Out 03/28/20 03/28/20 03/28/20 Clostridium difficile [P.O.:120]  Out: 1500     Safety Concerns:     [employer] Safety Concerns:688150199}    Impairments/Disabilities:      {[facility] Impairments/Disabilities:169774439}    Nutrition Therapy:  Current Nutrition Therapy:   [address] [employer] Diet List:229062386}    Routes of Feeding: {CHP DME Other Feedings:743973396}  Liquids: {Slp liquid thickness:46154}  Daily Fluid Restriction: {CHP DME Yes amt example:195433928}  Last Modified Barium Swallow with Video (Video Swallowing Test): {Done Not Done BLNW:437064590}    Treatments at the Time of Hospital Discharge:   Respiratory Treatments: ***  Oxygen Therapy:  {Therapy; copd oxygen:66810}  Ventilator:    {[facility] Vent SPUE:191848210}    Rehab Therapies: {THERAPEUTIC INTERVENTION:6082982162}  Weight Bearing Status/Restrictions: 50 PasswordBox  Weight Bearin}  Other Medical Equipment (for information only, NOT a DME order):  {EQUIPMENT:771288320}  Other Treatments: ***    Patient's personal belongings (please select all that are sent with patient):  {[facility] DME Belongings:645217199}    RN SIGNATURE:  {Esignature:755269757}    CASE MANAGEMENT/SOCIAL WORK SECTION    Inpatient Status Date: 20    Readmission Risk Assessment Score:  Readmission Risk              Risk of Unplanned Readmission:        56           Discharging to Facility/ Agency   · Name: [address] [place] [address]  · Address:  · Phone:[phone]  · Fax:[phone]    Dialysis Facility (if applicable)   · Name:  · Address:  · Dialysis Schedule:  · Phone:  · Fax:    / signature: Electronically signed by Juan Hutchins RN on 20 at 3:30 PM EDT    PHYSICIAN SECTION    Prognosis: Good    Condition at Discharge: Stable    Rehab Potential (if transferring to Rehab): Good    Recommended Labs or Other Treatments After Discharge:   Wash lower legs daily and apply moisturizing cream to lower legs and feet daily. Clean right heal with normal saline. Prep cristi wound with zinc moisture barrier.   Apply santyl (reuben thick) daily to

## 2020-04-26 NOTE — PROGRESS NOTES
Hospitalist Progress Note      PCP: Rosemarie Hummel MD    Date of Admission: 4/24/2020      Hospital Course:   Patient admitted with diarrhea and cramping abdominal pain secondary to recurrent C. difficile colitis. Subjective:    Patient seen and examined. Feeling a lot better today, diarrhea decreasing. Abdominal cramping improving. Some mild nausea this morning resolved. Medications:  Reviewed    Infusion Medications   Scheduled Medications    darbepoetin brando-polysorbate  25 mcg Intravenous Weekly    Fidaxomicin  200 mg Oral BID    aspirin  81 mg Oral Daily    donepezil  5 mg Oral Nightly    ferrous sulfate  325 mg Oral BID    lactobacillus  1 capsule Oral TID WC    losartan  50 mg Oral Daily    melatonin ER  3 mg Oral Nightly    metoprolol tartrate  50 mg Oral BID    pantoprazole  40 mg Oral QAM AC    rosuvastatin  5 mg Oral Daily    sertraline  100 mg Oral Daily    sodium chloride flush  10 mL Intravenous 2 times per day    insulin lispro  0-6 Units Subcutaneous TID WC    insulin lispro  0-3 Units Subcutaneous Nightly     PRN Meds: traMADol, prochlorperazine, sodium chloride flush      Intake/Output Summary (Last 24 hours) at 4/26/2020 0748  Last data filed at 4/25/2020 1640  Gross per 24 hour   Intake 500 ml   Output 1500 ml   Net -1000 ml       Physical Exam Performed:    /67   Pulse 72   Temp 98.8 °F (37.1 °C) (Oral)   Resp 16   Ht 4' 11\" (1.499 m)   Wt 119 lb 11.4 oz (54.3 kg)   SpO2 96%   BMI 24.18 kg/m²       General appearance: No apparent distress, alert and cooperative. HEENT: Conjunctivae/corneas clear, neck supple w/ full ROM  Respiratory:  Normal respiratory effort. Clear to auscultation, bilaterally without Rales/Wheezes/Rhonchi. Cardiovascular: Regular rate and rhythm, normal S1/S2, no murmurs  Abdomen: Soft, mild general TTP, no rebound, no rigidity, non-distended with normal bowel sounds.   Musculoskeletal: trace edema bilaterally  Neurologic:  No new full liquid diet and advance as tolerated     Leukocytosis-secondary to above    Prolonged QTC -   - hold zofran  - repeat EKG - 495    ESRD on HD-MW F  -Nephrology consulted    Hypertension-continue home meds    Hyperlipidemia-continue statin CAD    CAD status post stent-continue home meds    Diabetes type 2-  -Hemoglobin A1c- 6.6  - c/w ssi  - monitor BS as advancing diet, may consider resuming Lantus as patient is able to tolerate more      Right heel ulcer-continue wound care    DVT Prophylaxis: SCD  Diet: DIET FULL LIQUID;  Code Status: Full Code      Dispo - home tomorrow if able to advance diet and tolerating well    Mian Padilla MD

## 2020-04-26 NOTE — PROGRESS NOTES
Per nursing, patient was requesting that his pain medication be changed to PRN tramadol instead of morphine. OARRS noted. PRN morphine discontinued. PRN tramadol ordered.     LUCIAN Hutton NP  04/26/20  12:36 AM

## 2020-04-27 NOTE — PROGRESS NOTES
Julian Garcia is a 75 y.o. female patient.     Current Facility-Administered Medications   Medication Dose Route Frequency Provider Last Rate Last Dose    traMADol (ULTRAM) tablet 50 mg  50 mg Oral Q6H PRN LUCIAN Cornell NP   50 mg at 04/26/20 0046    glucose (GLUTOSE) 40 % oral gel 15 g  15 g Oral PRN Erica Sánchez MD        dextrose 50 % IV solution  12.5 g Intravenous PRN Erica Sánchez MD        glucagon (rDNA) injection 1 mg  1 mg Intramuscular PRN Erica Sánchez MD        dextrose 5 % solution  100 mL/hr Intravenous PRN Erica Sánchez MD        darbepoetin brando-polysorbate SE[facility]) injection 25 mcg  25 mcg Intravenous Weekly Angelina Davalos MD   25 mcg at 04/25/20 1544    prochlorperazine (COMPAZINE) tablet 5 mg  5 mg Oral Q6H PRN rEica Sánchez MD   5 mg at 04/26/20 2323    Fidaxomicin (DIFICID) tablet 200 mg  200 mg Oral BID Elgie Melissa Funes DO   200 mg at 04/26/20 2047    aspirin chewable tablet 81 mg  81 mg Oral Daily Haile Valente, MD   81 mg at 04/26/20 1031    donepezil (ARICEPT) tablet 5 mg  5 mg Oral Nightly Haile Mood, MD   5 mg at 04/26/20 2047    ferrous sulfate (IRON 325) tablet 325 mg  325 mg Oral BID Haile Mood, MD   325 mg at 04/26/20 2047    lactobacillus (CULTURELLE) capsule 1 capsule  1 capsule Oral TID WC Haile Mood, MD   1 capsule at 04/26/20 1659    losartan (COZAAR) tablet 50 mg  50 mg Oral Daily Haile Mood, MD   50 mg at 04/26/20 1031    melatonin ER tablet 3 mg  3 mg Oral Nightly Haile Mood, MD   Stopped at 04/26/20 2047    metoprolol tartrate (LOPRESSOR) tablet 50 mg  50 mg Oral BID Haile Mood, MD   50 mg at 04/26/20 2052    pantoprazole (PROTONIX) tablet 40 mg  40 mg Oral QAM AC Haile Mood, MD   40 mg at 04/27/20 8646    rosuvastatin (CRESTOR) tablet 5 mg  5 mg Oral Daily Haile Mood, MD   5 mg at 04/26/20 1031    sertraline (ZOLOFT) tablet 100 mg  100 mg Oral Daily Haile Valente MD   100 mg at 04/26/20 1033

## 2020-04-27 NOTE — CARE COORDINATION
CM spoke to Ascencion Ram and updated that pt is getting around in room mainly with Juan C Wallace. Therapy recs would be appreciated to determine discharge needs. Therapy orders placed.  CM following-Radha Woods RN

## 2020-04-27 NOTE — PROGRESS NOTES
Hospitalist Progress Note      PCP: Tree Huerta MD    Date of Admission: 4/24/2020      Hospital Course:   Patient admitted last night with diarrhea and cramping abdominal pain secondary to recurrent C. difficile colitis. Subjective:    Pt seen in bed. Pending HD. Feels much better today. No pain today    Medications:  Reviewed    Infusion Medications    dextrose       Scheduled Medications    darbepoetin brando-polysorbate  25 mcg Intravenous Weekly    Fidaxomicin  200 mg Oral BID    aspirin  81 mg Oral Daily    donepezil  5 mg Oral Nightly    ferrous sulfate  325 mg Oral BID    lactobacillus  1 capsule Oral TID WC    losartan  50 mg Oral Daily    melatonin ER  3 mg Oral Nightly    metoprolol tartrate  50 mg Oral BID    pantoprazole  40 mg Oral QAM AC    rosuvastatin  5 mg Oral Daily    sertraline  100 mg Oral Daily    sodium chloride flush  10 mL Intravenous 2 times per day    insulin lispro  0-6 Units Subcutaneous TID WC    insulin lispro  0-3 Units Subcutaneous Nightly     PRN Meds: traMADol, glucose, dextrose, glucagon (rDNA), dextrose, prochlorperazine, sodium chloride flush      Intake/Output Summary (Last 24 hours) at 4/27/2020 2712  Last data filed at 4/26/2020 1015  Gross per 24 hour   Intake 120 ml   Output --   Net 120 ml       Physical Exam Performed:    BP (!) 157/72   Pulse 70   Temp 98.5 °F (36.9 °C) (Oral)   Resp 14   Ht 4' 11\" (1.499 m)   Wt 119 lb 11.4 oz (54.3 kg)   SpO2 96%   BMI 24.18 kg/m²     General appearance: No apparent distress, alert and cooperative. HEENT: Conjunctivae/corneas clear, neck supple w/ full ROM  Respiratory:  Normal respiratory effort. Clear to auscultation, bilaterally without Rales/Wheezes/Rhonchi. Cardiovascular: Regular rate and rhythm, normal S1/S2, no murmurs  Abdomen: Soft, mild generalized tenderness, no rebound, no rigidity, non-distended with normal bowel sounds.   Musculoskeletal: No edema bilaterally  Neurologic:  No new focal

## 2020-04-27 NOTE — CONSULTS
[address] Continence Nurse  Consult Note       NAME:  Evelio Butler RECORD NUMBER:  [id number]  AGE: 75 y.o. GENDER: female  : 1945  TODAY'S DATE:  2020    Subjective  I was home for 11 days and had to come back to the hospital for diarrhea now with c-diff. I did not have the change to see the podiatrist since I left the hospital.   Reason for WOCN Evaluation and Assessment: unstagable right heel      Mónica Valentino is a 75 y.o. female referred by:   [] Physician  [x] Nursing  [] Other:     Wound Identification:  Wound Type: Moisture associated skin damage blanchable redness on right buttocks. New fissure (skin tear) lower coccyx). Unstageable pressure injury right heel (yellow now, black eschar off). Traumatic wound right posterior leg and right 4th dorsal toe healed (LDA completed). Dry scabs left 2-4 dorsal toes healed (LDA completed). Healed right dorsal foot and 2-3 MTP joints amputation site (LDA completed). Cellulitis in right leg and foot resolved. Small blanchable redness noted on left heel. Contributing Factors: edema, diabetes, decreased mobility and obesity, neuropathy,PAD. Wound History:   Known to wound care. History or 2-3 right metatarsal amputations by Dr Harmony Flaherty on 20. Post amputation had cellulitis right lower leg and unstageable pressure injury black eschar right heel. While in ARU on last admission 20-20, Dr Brittanie Farrar debrided right heel and was treating with triad paste. Now all the black eschar had been debrided off the wound bed and with 0.7 cm depth and wound bed with yellow slough. History of moisture associated skin damage from incontinence buttocks and perineum. Current Wound Care Treatment:  Dressing right heel.     Patient Goal of Care:  [x] Wound Healing  [] Odor Control  [] Palliative Care  [] Pain Control   [] Other:         PAST MEDICAL HISTORY        Diagnosis Date    Allergic     Anemia     Angina     with exertion    Arthritis (LANTUS) 100 UNIT/ML injection vial Inject 15 Units into the skin nightly 1 vial 3    povidone-iodine 10 % swab Apply topically. Off load right heel with off loading boot. Paint right heel daily with betadine. Place white foam around right heel wound (like a donut). Wrap with roll guaze.  pantoprazole (PROTONIX) 40 MG tablet Take 1 tablet by mouth every morning (before breakfast) 30 tablet 3    metoprolol tartrate (LOPRESSOR) 50 MG tablet Take 50 mg by mouth 2 times daily      rosuvastatin (CRESTOR) 5 MG tablet Take 5 mg by mouth daily      insulin lispro (HUMALOG) 100 UNIT/ML injection vial Inject 0-12 Units into the skin 3 times daily (with meals) **Medium Dose Corrective Algorithm**  Glucose: Dose:  If <139             No Insulin  140-199 2 Units  200-249 4 Units  250-299 6 Units  300-349 8 Units  350-400 10 Units  Above 400       12 Units 1 vial 3    donepezil (ARICEPT) 5 MG tablet Take 1 tablet by mouth nightly 30 tablet 3    ferrous sulfate 325 (65 Fe) MG tablet Take 325 mg by mouth 2 times daily      melatonin 3 MG TABS tablet Take 3 mg by mouth nightly      acetaminophen (TYLENOL) 325 MG tablet Take 2 tablets by mouth every 4 hours as needed for Pain or Fever 120 tablet 3    B Complex-C-Iron (SUPER B-COMPLEX/IRON/VITAMIN C PO) Take 1 tablet by mouth daily. Objective  Heels off loaded on pillows. Right heel dressing dry and intact. Transport here to take patient to dialysis.     BP (!) 157/72   Pulse 70   Temp 98.5 °F (36.9 °C) (Oral)   Resp 14   Ht 4' 11\" (1.499 m)   Wt 119 lb 11.4 oz (54.3 kg)   SpO2 97%   BMI 24.18 kg/m²     LABS:  WBC:    Lab Results   Component Value Date    WBC 9.2 04/27/2020     H/H:    Lab Results   Component Value Date    HGB 9.7 04/27/2020    HCT 29.9 04/27/2020     PTT:    Lab Results   Component Value Date    APTT 56.0 11/28/2019   [APTT}  PT/INR:    Lab Results   Component Value Date    PROTIME 14.2 04/25/2020    INR 1.22 04/25/2020     HgBA1c: thickness 4/27/2020  1:21 PM   Red%Wound Bed 85 4/26/2020 10:12 AM   Yellow%Wound Bed 100 4/27/2020  1:21 PM   Black%Wound Bed 90 4/26/2020 10:12 AM   Other%Wound Bed 5% white 4/9/2020 11:56 AM   Culture Taken No 4/27/2020  1:21 PM   Number of days: 64      Right heel:           Wound 04/27/20 Coccyx Lower small fissure red, open (Active)   Wound Skin Tear 4/27/2020  1:21 PM   Dressing/Treatment Moisture barrier 4/27/2020  1:21 PM   Wound Cleansed Not Cleansed 4/27/2020  1:21 PM   Wound Length (cm) 1 cm 4/27/2020  1:21 PM   Wound Width (cm) 0.2 cm 4/27/2020  1:21 PM   Wound Depth (cm) 0.1 cm 4/27/2020  1:21 PM   Wound Surface Area (cm^2) 0.2 cm^2 4/27/2020  1:21 PM   Wound Volume (cm^3) 0.02 cm^3 4/27/2020  1:21 PM   Tunneling Position ___ O'Clock 0 4/27/2020  1:21 PM   Undermining Starts ___ O'Clock 0 4/27/2020  1:21 PM   Undermining Ends___ O'Clock 0 4/27/2020  1:21 PM   Undermining Maxium Distance (cm) 0 4/27/2020  1:21 PM   Wound Assessment Red 4/27/2020  1:21 PM   Drainage Amount Scant 4/27/2020  1:21 PM   Drainage Description Serosanguinous 4/27/2020  1:21 PM   Odor None 4/27/2020  1:21 PM   Margins Attached edges; Defined edges 4/27/2020  1:21 PM   Tess-wound Assessment White 4/27/2020  1:21 PM   Non-staged Wound Description Partial thickness 4/27/2020  1:21 PM   Red%Wound Bed 100 4/27/2020  1:21 PM   Culture Taken No 4/27/2020  1:21 PM   Number of days: 0     Right posterior leg wounds healed:          Right anterior foot and amputation site healed        Left anterior toes healed:         Response to treatment:  Well tolerated by patient.      Pain Assessment:  Severity:  0 / 10  Quality of pain: N/A  Wound Pain Timing/Severity: none  Premedicated: No    Plan   Plan of Care: Wound 02/23/20 Heel Right unstageable-Dressing/Treatment: Moisture barrier, Moist to moist, 4x4, Roll gauze(santyl ordered to start later today.)  Wound 04/27/20 Coccyx Lower small fissure red, open-Dressing/Treatment: Moisture barrier Heel treatment order changed to santly for enzymatic debridement of yellow slough. Recommend:  Wash lower legs daily and apply moisturizing cream to lower legs and feet daily. Clean right heal with normal saline. Prep cristi wound with zinc moisture barrier. Apply santyl (reuben thick) daily to yellow areas in right heel wound bed, cover with one lightly normal saline soaked moist 2x2 dressing, then cover with 3-4 of the 4x4 guaze pads, secure with roll guaze or kerlix. Apply moisture barrier to buttocks and perineum bid and prn. Off load heels with multipodus boots or pillows when in bed. Wound care to follow. Call wound care for deterioration [phone], Pager [phone]. Specialty Bed Required : Yes  isoflex mattress in place  [] Low Air Loss   [x] Pressure Redistribution  [] Fluid Immersion  [] Bariatric  [] Total Pressure Relief  [] Other:     Current Diet: DIET RENAL;  Dietician consult:  Yes    Discharge Plan:  Placement for patient upon discharge: skilled nursing    Patient appropriate for Outpatient [facility]: Yes follow up with Dr Zandra Schrader for right heel unstageable pressure injury    Referrals:  []  / discharge planner  [] [address] [facility]  [] Supplies  [] Other    Patient/Caregiver Teaching: Showed patient past photos and wound progression. Instructed on new treatment for right heel wound.   Level of patient/caregiver understanding able to:   [x] Indicates understanding       [x] Needs reinforcement  [] Unsuccessful      [] Verbal Understanding  [] Demonstrated understanding       [] No evidence of learning  [] Refused teaching         [] N/A       Electronically signed by Be Kruger RN, MSN, Bhaskar Rosales on 4/27/2020 at 1:30 PM

## 2020-04-27 NOTE — PROGRESS NOTES
Physical Therapy    PT order received, chart reviewed. Per RN, pt still off floor for dialysis. Will re-attempt therapy evaluation on 4/28/20. Thank you.     Laina Krishnamurthy  PT, DPT #[id number]

## 2020-04-28 NOTE — CARE COORDINATION
CASE MANAGEMENT DISCHARGE SUMMARY      Discharge to: home with [address] ordered/agency: none    Transportation:    Family/car:   Medical Transport explained to [employer] Pt/family voice no agency preference. Agency used:   time:   Ambulance form completed: Yes    Confirmed discharge plan with:     Patient: yes     Family, name and contact number: Matilda Drake [phone] will have step son pick her up     Facility/Agency, name:  [facility] faxed ajay notified he will pull orders. Phone number for report to facility:      RN, name: Giovanni Townsend RN      Last run sheet faxed to [employer].

## 2020-04-28 NOTE — PROGRESS NOTES
PROGRESS NOTE  S:74 yrs Patient  admitted on 4/24/2020 with Colitis [K52.9]  Colitis [K52.9] . Today she feels better. Diarrhea improved. Had 1 formed stool today. complains of Abdominal Pain    Exam:   Vitals:    04/28/20 0806   BP: (!) 167/76   Pulse: 76   Resp: 16   Temp: 98.4 °F (36.9 °C)   SpO2: 95%      General appearance: alert, appears stated age and cooperative  HEENT: Neck supple with midline trachea  Neck: no adenopathy, no carotid bruit, no JVD and supple, symmetrical, trachea midline  Lungs: clear to auscultation bilaterally  Heart: regular rate and rhythm, S1, S2 normal, no murmur, click, rub or gallop  Abdomen: soft, non-tender; bowel sounds normal; no masses,  no organomegaly  Extremities: extremities normal, atraumatic, no cyanosis or edema     Medications: Reviewed    Labs:  CBC:   Recent Labs     04/26/20  0805 04/27/20  0546   WBC 10.5 9.2   HGB 9.0* 9.7*   HCT 27.7* 29.9*   MCV 85.6 85.8    280     BMP:   Recent Labs     04/26/20  0805 04/27/20  0546    129*   K 3.6 4.1   CL 99 93*   CO2 27 25   PHOS  --  2.2*   BUN 13 19   CREATININE 2.8* 3.7*         Impression:75year old female with history of HTN, HLD, DM, CAD, ESRD on HD admitted with recurrent c diff colitis    Recommendation:  1. Continue supportive care  2. Continue Dificid x 10d  3. Start probiotics  4. Advance to low fiber diet  5. PT/OT  6.  Will follow      Caden Allen MD  1:21 PM 4/28/2020

## 2020-04-28 NOTE — PROGRESS NOTES
Occupational Therapy   Occupational Therapy Initial Assessment  Date: 2020   Patient Name: Malgorzata Schneider  MRN: [id number]     : 1945    Date of Service: 2020    Discharge Recommendations:  Home with Home health OT, Home with assist PRN       Assessment   Performance deficits / Impairments: Decreased functional mobility ; Decreased ADL status; Decreased balance  Assessment: pt normally independent with basic ADL's & transfers from w/c level PTA; today requiring CGA with functional transfers; pt to benefit from skilled OT services while in acute care setting   OT Education: OT Role;Plan of Care  REQUIRES OT FOLLOW UP: Yes  Activity Tolerance  Activity Tolerance: Patient Tolerated treatment well  Activity Tolerance: vitals stable  Safety Devices  Safety Devices in place: Yes  Type of devices: Call light within reach; Chair alarm in place; Left in chair;Nurse notified           Patient Diagnosis(es): The primary encounter diagnosis was Diarrhea of presumed infectious origin. Diagnoses of Colitis and Nausea were also pertinent to this visit. has a past medical history of Allergic, Anemia, Angina, Arthritis, Asthma, Cancer ([address] Clostridium difficile infection, Coronary artery disease, Diabetes mellitus ([address] ESRD (end stage renal disease) on dialysis ([address] Heart attack ([address] Hyperlipidemia, Hypertension, Obesity, and Skin ulcer of right foot with necrosis of muscle ([address] has a past surgical history that includes Diagnostic Cardiac Cath Lab Procedure; Cataract removal with implant; Carpal tunnel release; Knee arthroscopy; Coronary angioplasty with stent (2012); Hysterectomy; bronchoscopy (N/A, 2019); bronchoscopy (N/A, 2019); bronchoscopy (2019); bronchoscopy (2019); Upper gastrointestinal endoscopy (N/A, 2019); and Toe amputation (Right, 2020).            Restrictions  Restrictions/Precautions  Restrictions/Precautions: Fall Risk, Contact Precautions, Weight washing face & hands)  LE Dressing: Independent(donning/doffing non-skid socks & kalpesh.  LE post op shoes)    RUE Tone: Normotonic  LUE Tone: Normotonic    Coordination  Movements Are Fluid And Coordinated: Yes     Bed mobility  Supine to Sit: Modified independent  Sit to Supine: Unable to assess(Left up in chair for lunch, pt agreeable)  Scooting: Modified independent  Transfers  Stand Pivot Transfers: Contact guard assistance  Sit to stand: Contact guard assistance  Stand to sit: Contact guard assistance     Vision - Basic Assessment  Prior Vision: Wears glasses all the time     Cognition  Overall Cognitive Status: [facility]             Plan   Plan  Times per week: 3-5x/ week in acute care  Current Treatment Recommendations: Strengthening, Balance Training, Safety Education & Training, Self-Care / ADL, Functional Mobility Training    AM-PAC Score        AM-PAC Inpatient Daily Activity Raw Score: 22 (04/28/20 1201)  AM-PAC Inpatient ADL T-Scale Score : 47.1 (04/28/20 1201)  ADL Inpatient CMS 0-100% Score: 25.8 (04/28/20 1201)  ADL Inpatient CMS G-Code Modifier : Venora Flavors (04/28/20 1201)    Goals  Short term goals  Time Frame for Short term goals: 3 days  Short term goal 1: supervision with functional/toilet transfers   Short term goal 2: supervision static standing 1-2 minutes for LE self care  Short term goal 3: tolerate 15-20 reps BUE light strengthening exercises  Patient Goals   Patient goals : home today if I see the Doctor       Therapy Time   Individual Concurrent Group Co-treatment   Time In [address]         Time Out 1150         Minutes [address] [place] [address]

## 2020-04-28 NOTE — PROGRESS NOTES
0-6 Units Subcutaneous TID WC    insulin lispro  0-3 Units Subcutaneous Nightly         Labs:  Recent Labs     04/26/20  0805 04/27/20  0546   WBC 10.5 9.2   HGB 9.0* 9.7*   HCT 27.7* 29.9*   MCV 85.6 85.8    280     Recent Labs     04/26/20  0805 04/27/20  0546    129*   K 3.6 4.1   CL 99 93*   CO2 27 25   GLUCOSE 136* 129*   PHOS  --  2.2*   BUN 13 19   CREATININE 2.8* 3.7*   LABGLOM 16* 12*   GFRAA 20* 14*           Assessment/Plan:    ESRD. [facility] Mon-Wed-Fri at [facility] via a [facility].  - TW of 55.5kg.     Hypertension.  - Continue Metoprolol and Losartan.     Anemia.  - Continue weekly ANKUR with HD.     Colitis. - Recurrent C. difficile colitis. On Dificid.  - Plan per Admitting & GI    Please do not hesitate to contact me at [phone] if with questions. Thank you!     Jhonny High MD  4/28/2020  [facility]

## 2020-04-28 NOTE — PROGRESS NOTES
Physical Therapy    Facility/Department: [place] [id number] - MED SURG/ORTHO  Initial Assessment    NAME: Abimbola Rodriguez  : 1945  MRN: [id number]    Date of Service: 2020    Discharge Recommendations:  Home with assist PRN, Home with Home health PT   PT Equipment Recommendations  Equipment Needed: No    Assessment   Body structures, Functions, Activity limitations: Decreased functional mobility ; Decreased strength;Decreased balance;Decreased endurance  Assessment: Pt referred for PT evaluation during current hospital stay with dx of C. diff colitis. Pt currently functioning just slightly below her baseline, requiring CGA/close SBA x 1 with use of walker for safe ambulation. Pt well-versed in transfer/gait technique while TTWB RLE and appears safe/steady with standing activity. Recommend pt return home with assist PRN from family and home PT. Treatment Diagnosis: Decreased (I) with mobility  Specific instructions for Next Treatment: Progress ther ex and mobility as tolerated  Prognosis: Good  Decision Making: Medium Complexity  PT Education: Goals;PT Role;Plan of Care;Transfer Training;Precautions; Equipment; Functional Mobility Training;Energy Conservation;Weight-bearing Education;General Safety;Gait Training;Disease Specific Education  Patient Education: Pt verbalizes understanding. REQUIRES PT FOLLOW UP: Yes  Activity Tolerance: Patient Tolerated treatment well       Patient Diagnosis(es): The primary encounter diagnosis was Diarrhea of presumed infectious origin. Diagnoses of Colitis and Nausea were also pertinent to this visit. has a past medical history of Allergic, Anemia, Angina, Arthritis, Asthma, Cancer ([address] Clostridium difficile infection, Coronary artery disease, Diabetes mellitus ([address] ESRD (end stage renal disease) on dialysis ([address] Heart attack ([address] Hyperlipidemia, Hypertension, Obesity, and Skin ulcer of right foot with necrosis of muscle ([address]    has a past surgical history that includes

## 2020-04-28 NOTE — PROGRESS NOTES
Skin assessed. Left heel protector placed on heel. Red and blanchable. Right heel dressed according to wound note. Both heels placed on pillows. Pt coccyx assessed and is barley blanchable. Applied zinc to coccyx and turned to right side with pillow. Will continue to assess and monitor.

## 2020-05-06 NOTE — CARE COORDINATION
Patient contacted regarding recent discharge and COVID-19 risk   Care Transition Nurse/ Ambulatory Care Manager contacted the patient by telephone to perform post discharge assessment. Verified name and  with patient as identifiers. Patient has following risk factors of: asthma, acute respiratory failure, diabetes and chronic kidney disease. CTN/ACM reviewed discharge instructions, medical action plan and red flags related to discharge diagnosis. Reviewed and educated them on any new and changed medications related to discharge diagnosis. Advised obtaining a 90-day supply of all daily and as-needed medications. Education provided regarding infection prevention, and signs and symptoms of COVID-19 and when to seek medical attention with patient who verbalized understanding. Discussed exposure protocols and quarantine from [address] you at higher risk for severe illness  and given an opportunity for questions and concerns. The patient agrees to contact the COVID-19 hotline [phone] or PCP office for questions related to their healthcare. CTN/ACM provided contact information for future reference. From CDC: Are you at higher risk for severe illness?  Wash your hands often.  Avoid close contact (6 feet, which is about two arm lengths) with people who are sick.  Put distance between yourself and other people if COVID-19 is spreading in your community.  Clean and disinfect frequently touched surfaces.  Avoid all cruise travel and non-essential air travel.  Call your healthcare professional if you have concerns about COVID-19 and your underlying condition or if you are sick. For more information on steps you can take to protect yourself, see CDC's How to [address] with patient who reported she is doing well and currently in dialysis. Patient denied any abd pain or diarrhea. Patient reported her appetite is improving.  Patient denied any further issues or concerns. CTN advised patient of use of urgent care or physicians 24 hr access line if assistance is needed after hours. Also advised that they can always contact their home care provider to request a nurse visit even if it isn't their regularly scheduled day for their nurse to visit.          Lito WILLETTN, RN, [facility]  Care Transition Nurse   [phone]

## 2020-05-17 NOTE — DISCHARGE SUMMARY
Hospital Medicine Discharge Summary    Patient ID: Robert Alaniz      Patient's PCP: Tosha Arriaga MD    Admit Date: 4/24/2020     Discharge Date: 4/28/2020      Admitting Physician: Nichol Aguirre MD     Discharge Physician: LUCIAN Arriola - CNP     Discharge Diagnoses: Active Hospital Problems    Diagnosis    Colitis [K52.9]       The patient was seen and examined on day of discharge and this discharge summary is in conjunction with any daily progress note from day of discharge. Hospital Course:     Patient admitted 4/24/2020with diarrhea and cramping abdominal pain secondary to recurrent C. difficile colitis.        Recurrent C. difficile colitis  -C. difficile toxin is positive  -CT scan shows diffuse infectious colitis with diffuse colonic wall thickening and inflammatory changes no obstruction or free air.  -Changed to Dificid x 10 days per GI  -GI has been consulted, appreciate recommendations  -Continue with Renal Diet     Leukocytosis-secondary to above, resolved     Prolonged QTC - 511  - repeat EKG in AM-ok     ESRD on HD-MW F  -Nephrology consulted, will have HD today then d/c     Hypertension-continue home meds   . Hyperlipidemia-continue statin CAD     CAD status post stent-continue home meds     Diabetes type 2-hold Lantus, monitor blood sugars, SSI,  -Hemoglobin A1c- 6.6         Physical Exam Performed:     BP (!) 167/76   Pulse 76   Temp 98.4 °F (36.9 °C) (Oral)   Resp 16   Ht 4' 11\" (1.499 m)   Wt 121 lb 11.2 oz (55.2 kg)   SpO2 95%   BMI 24.58 kg/m²       General appearance:  No apparent distress, appears stated age and cooperative. HEENT:  Normal cephalic, atraumatic without obvious deformity. Pupils equal, round, and reactive to light. Extra ocular muscles intact. Conjunctivae/corneas clear. Neck: Supple, with full range of motion. No jugular venous distention. Trachea midline. Respiratory:  Normal respiratory effort.  Clear to auscultation, bilaterally without Rales/Wheezes/Rhonchi. Cardiovascular:  Regular rate and rhythm with normal S1/S2 without murmurs, rubs or gallops. Abdomen: Soft, non-tender, non-distended with normal bowel sounds. Musculoskeletal:  No clubbing, cyanosis or edema bilaterally. Full range of motion without deformity. Skin: Skin color, texture, turgor normal.  No rashes or lesions. Neurologic:  Neurovascularly intact without any focal sensory/motor deficits. Cranial nerves: II-XII intact, grossly non-focal.  Psychiatric:  Alert and oriented, thought content appropriate, normal insight  Capillary Refill: Brisk,< 3 seconds   Peripheral Pulses: +2 palpable, equal bilaterally       Labs: For convenience and continuity at follow-up the following most recent labs are provided:      CBC:    Lab Results   Component Value Date    WBC 9.2 04/27/2020    HGB 9.7 04/27/2020    HCT 29.9 04/27/2020     04/27/2020       Renal:    Lab Results   Component Value Date     04/27/2020    K 4.1 04/27/2020    K 3.3 04/25/2020    CL 93 04/27/2020    CO2 25 04/27/2020    BUN 19 04/27/2020    CREATININE 3.7 04/27/2020    CALCIUM 8.6 04/27/2020    PHOS 2.2 04/27/2020         Significant Diagnostic Studies    Radiology:   CT ABDOMEN PELVIS W IV CONTRAST Additional Contrast? None   Final Result   1. Diffuse infectious or inflammatory colitis with diffuse colonic wall   thickening and pericolonic inflammatory changes. No obstruction or free air. 2. Bilateral noncalcified pulmonary nodules, more prevalent than comparison   CT 11/20/2019. CT chest 3/10/2020 for complete details. 3. Small right pleural effusion. 4. Cholelithiasis with no acute features. 5. 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                 Consults:     IP CONSULT TO HOSPITALIST  IP CONSULT TO GI  IP CONSULT TO NEPHROLOGY  IP CONSULT TO HOME CARE NEEDS    Disposition:  Home     Condition at Discharge: Stable    Discharge Instructions/Follow-up:    Future Appointments   Date Time Provider Alisa Stark   5/19/2020  1:00 PM [facility] VASCULAR, VASCULAR LAB ROOM 1 [facility] POLI [facility]         Code Status:  FC    Activity: activity as tolerated    Diet: renal diet      Discharge Medications:     Discharge Medication List as of 4/28/2020  2:43 PM           Details   Fidaxomicin (DIFICID) 200 MG TABS tablet Take 200 mg by mouth 2 times daily for 8 days, Disp-16 tablet, R-0Normal              Details   sertraline (ZOLOFT) 100 MG tablet Take 1 tablet by mouth daily, Disp-30 tablet, R-0Normal      lactobacillus (CULTURELLE) capsule Take 1 capsule by mouth 3 times daily (with meals), Disp-90 capsule, R-2Normal      losartan (COZAAR) 50 MG tablet Take 1 tablet by mouth daily, Disp-30 tablet, R-3Normal      sennosides-docusate sodium (SENOKOT-S) 8.6-50 MG tablet Take 1 tablet by mouth 2 times daily Hold while pt having diarrhea, only give if pt constipated, Disp-1 tablet, R-0NO PRINT      aspirin 81 MG chewable tablet Take 1 tablet by mouth daily, Disp-30 tablet, R-3DC to SNF      insulin glargine (LANTUS) 100 UNIT/ML injection vial Inject 15 Units into the skin nightly, Disp-1 vial, R-3DC to SNF      pantoprazole (PROTONIX) 40 MG tablet Take 1 tablet by mouth every morning (before breakfast), Disp-30 tablet, R-3DC to SNF      metoprolol tartrate (LOPRESSOR) 50 MG tablet Take 50 mg by mouth 2 times dailyHistorical Med      rosuvastatin (CRESTOR) 5 MG tablet Take 5 mg by mouth dailyHistorical Med      insulin lispro (HUMALOG) 100 UNIT/ML injection vial Inject 0-12 Units into the skin 3 times daily (with meals) **Medium Dose Corrective Algorithm**  Glucose: Dose:  If <139             No Insulin  140-199 2 Units  200-249 4 Units  250-299 6 Units  300-349 8 Units  350-400 10 Units  Above 400       12 Unit s, Disp-1 vial, R-3Print      donepezil (ARICEPT) 5 MG tablet Take 1 tablet by mouth nightly, Disp-30 tablet,

## 2020-05-29 NOTE — PROGRESS NOTES
MG tablet Take 325 mg by mouth 2 times daily   Yes Historical Provider, MD   melatonin 3 MG TABS tablet Take 3 mg by mouth nightly   Yes Historical Provider, MD   acetaminophen (TYLENOL) 325 MG tablet Take 2 tablets by mouth every 4 hours as needed for Pain or Fever 19  Yes Diego Yin MD   B Complex-C-Iron (SUPER B-COMPLEX/IRON/VITAMIN C PO) Take 1 tablet by mouth daily. 9/2/10  Yes Historical Provider, MD        Allergies:  Ativan [lorazepam]; Dilaudid [hydromorphone hcl]; Flexeril [cyclobenzaprine hcl]; Heparin; Hydromorphone; Penicillins; Soma [carisoprodol]; Sulfa antibiotics; and Vicodin [hydrocodone-acetaminophen]      Social History:      Social History     Socioeconomic History    Marital status:       Spouse name: Not on file    Number of children: Not on file    Years of education: Not on file    Highest education level: Not on file   Occupational History    Occupation:      Comment: [employer] most recently   Social Needs    Financial resource strain: Not on file    Food insecurity     Worry: Not on file     Inability: Not on file   [employer] needs     Medical: Not on file     Non-medical: Not on file   Tobacco Use    Smoking status: Former Smoker     Years: 13.00     Types: Cigarettes     Last attempt to quit: 1985     Years since quittin.4    Smokeless tobacco: Never Used   Substance and Sexual Activity    Alcohol use: No     Alcohol/week: 0.0 standard drinks    Drug use: No    Sexual activity: Not Currently     Partners: Male   Lifestyle    Physical activity     Days per week: Not on file     Minutes per session: Not on file    Stress: Not on file   Relationships    Social connections     Talks on phone: Not on file     Gets together: Not on file     Attends Christianity service: Not on file     Active member of club or organization: Not on file     Attends meetings of clubs or organizations: Not on file     Relationship status: Not on file   Noah Montes

## 2020-06-02 NOTE — PROGRESS NOTES
Obstructive Sleep Apnea (INOCENCIA) Screening     Patient:  Balwinder Farr    YOB: 1945      Medical Record #:  [id number]                     Date:  6/2/2020     1. Are you a loud and/or regular snorer? []  Yes       [x] No    2. Have you been observed to gasp or stop breathing during sleep? []  Yes       [x] No    3. Do you feel tired or groggy upon awakening or do you awaken with a headache?           []  Yes       [x] No    4. Are you often tired or fatigued during the wake time hours? []  Yes       [x] No    5. Do you fall asleep sitting, reading, watching TV or driving? []  Yes       [x] No    6. Do you often have problems with memory or concentration? []  Yes       [x] No    **If patient's score is ? 3 they are considered high risk for INOCENCIA. An Anesthesia provider will evaluate the patient and develop a plan of care the day of surgery. Note:  If the patient's BMI is more than 35 kg m¯² , has neck circumference > 40 cm, and/or high blood pressure the risk is greater (© American Sleep Apnea Association, 2006).

## 2020-06-25 PROBLEM — N18.6 END STAGE RENAL DISEASE (HCC): Status: ACTIVE | Noted: 2020-01-01

## 2020-06-25 NOTE — PROGRESS NOTES
VSS. Breath sounds clear. [facility] perm cath with jose m grossman,marcos, & I. Left forearm graft intact with surgical glue with bruit and thrill present. No bleeding, drainage, swelling to incisional site. Pt denies pain and nausea. Will continue to monitor.

## 2020-06-25 NOTE — ANESTHESIA PRE PROCEDURE
lower leg M17.10    Tibialis tendinitis M76.829    Osteoarthritis of spine with radiculopathy, lumbar region M47.26    Pain of both hip joints M25.551, M25.552    Spinal stenosis, lumbar region, with neurogenic claudication M48.062    Right sided sciatica M54.31    Endometrial adenocarcinoma ([facility] C54.1    Drainage from wound T14. 8XXA    S/P hysterectomy with oophorectomy Z90.710, Z90.721    S/P total hysterectomy and BSO (bilateral salpingo-oophorectomy) Z90.710, Z90.79, Z90.722    Chronic pain of left knee M25.562, G89.29    Chronic pain of right knee M25.561, G89.29    History of endometrial cancer Z85.42    Menopausal 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    CKD (chronic kidney disease) stage 3, GFR 30-59 ml/min (HCC) N18.3    Cardiac arrest (HCC) I46.9    Acute pulmonary edema (HCC) J81.0    Pleural effusion J90    Elevated LFTs R94.5    Thrombocytopenia ([facility]) D69.6    Leukocytosis D72.829    Normocytic normochromic anemia D64.9    Acute encephalopathy G93.40    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                                     Anesthesia Plan      general     ASA 3       Induction: intravenous. MIPS: Postoperative opioids intended and Prophylactic antiemetics administered. Anesthetic plan and risks discussed with patient. Plan discussed with CRNA. This pre-anesthesia assessment may be used as a history and physical.    DOS STAFF ADDENDUM:    Pt seen and examined, chart reviewed (including anesthesia, drug and allergy history). No interval changes to history and physical examination. Anesthetic plan, risks, benefits, alternatives, and personnel involved discussed with patient. Patient verbalized an understanding and agrees to proceed.       Paola De La Torre MD  June 25, 2020  9:33 AM          Paola De La Torre MD   6/25/2020

## 2020-06-25 NOTE — PROGRESS NOTES
Preoperative Screening for Elective Surgery/Invasive Procedures While COVID-19 present in the community     Have you tested positive or have been told to self-isolate for COVID-19 like symptoms within the past 28 days? NO   Do you currently have any of the following symptoms? o Fever >100.0 F or 99.9 F in immunocompromised patients? NO  o New onset cough, shortness of breath or difficulty breathing? NO  o New onset sore throat, myalgia (muscle aches and pains), headache, loss of taste/smell or diarrhea? NO   Have you had a potential exposure to COVID-19 within the past 14 days by:  o Close contact with a confirmed case? NO  o Close contact with a healthcare worker,  or essential infrastructure worker (grocery store, [employer], gas station, public utilities or transportation)? NO  o Do you reside in a congregate setting such as; skilled nursing facility, adult home, correctional facility, homeless shelter or other institutional setting? NO  o Have you had recent travel to a known COVID-19 hotspot? NO    Indicate if the patient has a positive screen by answering yes to one or more of the above questions. Patients who test positive or screen positive prior to surgery or on the day of surgery should be evaluated in conjunction with the surgeon/proceduralist/anesthesiologist to determine the urgency of the procedure.

## 2020-06-25 NOTE — ANESTHESIA POSTPROCEDURE EVALUATION
Department of Anesthesiology  Postprocedure Note    Patient: Isi Kearney  MRN: [id number]  YOB: 1945  Date of evaluation: 6/25/2020  Time:  4:05 PM     Procedure Summary     Date:  06/25/20 Room / Location:  [place] [address] ([facility]) / [facility]    Anesthesia Start:  9328 Anesthesia Stop:  0078    Procedure:  LEFT FOREARM LOOP DIALYSIS GRAFT (Left Arm Upper) Diagnosis:       End stage renal disease ([address]      (RENAL FAILURE)    Surgeon: Ronak Herring MD Responsible Provider:  Bob Gutierrez MD    Anesthesia Type:  general ASA Status:  3          Anesthesia Type: general    Shelly Phase I: Shelly Score: 8    Shelly Phase II: Shelly Score: 10    Last vitals: Reviewed and per EMR flowsheets.    Vitals:    06/25/20 1450 06/25/20 1455 06/25/20 1500 06/25/20 1505   BP: (!) 164/51 (!) 154/53 (!) 160/52 (!) 155/68   Pulse: 96 95 97 100   Resp: 14 13 14 16   Temp:    98.5 °F (36.9 °C)   TempSrc:    Oral   SpO2: 97% 93% 94% 98%   Weight:       Height:           Anesthesia Post Evaluation    Patient location during evaluation: bedside  Patient participation: complete - patient participated  Level of consciousness: awake and alert  Airway patency: patent  Nausea & Vomiting: no nausea  Complications: no  Cardiovascular status: hemodynamically stable  Respiratory status: acceptable  Hydration status: euvolemic

## 2020-06-25 NOTE — H&P
I have reviewed the history and physical (See note dated 5/29/2020) and examined the patient and find no relevant changes. I have reviewed with the patient and/or family the risks, benefits, and alternatives to the procedure.

## 2020-06-25 NOTE — PROGRESS NOTES
PATIENT READY for surgery. Call light is within reach. Patient brought by medicare transport. Trying to reach son to schedule to take her home.

## 2020-06-26 NOTE — OP NOTE
[facility]                 LebronOg hernandez                                 OPERATIVE REPORT    PATIENT NAME: Michael Engle                        :        1945  MED REC NO:   [id number]                          ROOM:  ACCOUNT NO:   [de-identified]                           ADMIT DATE: 2020  PROVIDER:     Yoan Mcginnis MD    DATE OF PROCEDURE:  2020    PREOPERATIVE DIAGNOSIS:  End-stage renal disease on hemodialysis. POSTOPERATIVE DIAGNOSIS:  End-stage renal disease on hemodialysis. PROCEDURE:  Left forearm loop dialysis graft from the brachial artery to  the basilic vein. ANESTHESIA:  General.    INDICATION:  The patient is a 78-year-old female with end-stage renal  disease on hemodialysis via a tunnel catheter. She was brought to the  operating room at this time to establish permanent forearm AV access  _____. The veins were too small to be usable as primary fistula, but  suitable for a synthetic dialysis graft. PROCEDURE:  The patient was brought to the operating room, placed in  supine position and general anesthesia induced, ultrasound was performed  of the left forearm. The brachial artery was identified just below the  antecubital crease as was the basilic vein. These were marked on the  skin. The arm was prepped and draped in sterile fashion. Small  transverse incision was made just below the antecubital crease in the  forearm. The brachial artery and the basilic vein were identified,  mobilized for several centimeters and encircled with vessel loops. The  counterincision was then made in the distal forearm through skin and  subcutaneous tissues. Using a Nita-Wick tunneling device, subcutaneous  tunnel was created and a 4 to 7 mm taper dialysis graft was placed  within the tunnels with the tapered end at the lateral or arterial site. Flow in the brachial artery was occluded constricting the vessel loops.    A

## 2020-07-17 NOTE — PROGRESS NOTES
Outpatient Post-Op Visit  PATIENT NAME: Ewelina Rice     TODAY'S DATE: 7/17/2020    SUBJECTIVE:    Pt is seen in f/u after forearm loop dialysis graft placement. She does note some coolness and weakness in hand but this is improving. She denies pain in hand. She also reports today some pain in right leg and she is thinking she might want to have something done about it. OBJECTIVE:   VITALS:  BP (!) 128/40 (Site: Right Upper Arm)   Ht 5' (1.524 m)   Wt 116 lb (52.6 kg)   BMI 22.65 kg/m²                   INCISIONs: clean, dry, no drainage, healed  Good bruit in graft. Lower extremity study 1/2020:            LIZ's not calculated due to patient inability to tolerate BP cuffs.     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 No stenosis identified.     Normal LLE multiphasic flow without visualized stenosis or occlusion. ASSESSMENT AND PLAN:  67 y.o. female status post Loop dialysis graft. Ok to cannulate. Discussed arterial studies- she would need angio with possible intervention as there is likely and iliac stenosis/occlusion. She will contact us if pain increases or severely limits ability to walk.     Robina Malagon MD, FACS

## 2020-07-29 NOTE — ED NOTES

## 2020-07-29 NOTE — ED NOTES
Pt called out crying states \"I can't find a tissue\". Went to bedside and patient spit up a scant amount of phlem and states \"I am so upset I can't find a tissue\". Pt given a box of tissues.       David Baker RN  07/29/20 1958

## 2020-07-29 NOTE — ED TRIAGE NOTES
Pt states she is suppose to have a colonscopy tomorrow. Pt very anxious about procedure. Pt states her friend  during a colonscopy and her daughter  in May. Pt states she took a ducolax and became nauseated and unable to finish bowel prep. Pt because tearful during triage about procedure.

## 2020-07-30 NOTE — ED PROVIDER NOTES
[facility] Emergency Department    CHIEF COMPLAINT  Anxiety      SHARED SERVICE VISIT  Evaluated by GUTIERREZ. My supervising physician was available for consultation. HISTORY OF PRESENT ILLNESS  Makayla Flores is a 67 y.o. female with a history of GERD who presents to the ED complaining of 4/10 \"sore and burning\" epigastric abdominal pain accompanied by nausea and vomiting x2. She denies chest pain, shortness of breath, and dizziness. Initially reported to nursing staff that she is here for anxiety related to colonoscopy which is scheduled for tomorrow morning. This is concerning for her as her friend recently  during a colonoscopy and she is already upset and stressed as her daughter also recently . States she took all of her home medications with the exception of her Phenergan which she \"was not sure she was allowed to take because of the scheduled procedure\". No other complaints, modifying factors or associated symptoms. Nursing notes reviewed.    Past Medical History:   Diagnosis Date    Allergic     Anemia     Angina     with exertion    Arthritis     Asthma 2010    Cancer ([address] 2015    ENDOMETRIAL    Clostridium difficile infection 2020, 2020    Coronary artery disease 2010    Diabetes mellitus ([address]     ESRD (end stage renal disease) on dialysis ([address]     MWF    Heart attack [facility]) age 43    no problems since then    Hemodialysis patient ([address]     Hyperlipidemia     Hypertension     Obesity     Skin ulcer of right foot with necrosis of muscle ([address] 3/10/2020     Past Surgical History:   Procedure Laterality Date    BRONCHOSCOPY N/A 2019    BRONCHOSCOPY WASHING performed by Silvio Hess MD at [address] [place] [address] N/A 2019    BRONCHOSCOPY ALVEOLAR LAVAGE performed by Luis Alberto Camp MD at [address] [place] [address]  2019    BRONCHOSCOPY THERAPUTIC ASPIRATION INITIAL performed by Ruddy Luong Jurgen Khan MD at [facility]  2019    BRONCHOSCOPY FOREIGN BODY REMOVAL performed by Luis Alberto Camp MD at [facility]      right    CORONARY ANGIOPLASTY WITH STENT PLACEMENT  2012    DIAGNOSTIC CARDIAC CATH LAB PROCEDURE      DIALYSIS FISTULA CREATION Left 2020    LEFT FOREARM LOOP DIALYSIS GRAFT performed by Randy De Anda MD at [address] [place] [address]      11/30/15    KNEE ARTHROSCOPY      left knee. not a TKR. no metal.     TOE AMPUTATION Right 2020    RIGHT SECOND AND THIRD TOE AMPUTATION, RIGHT HEEL WOUND DEBRIDEMENT, RIGHT HALLUX TOE NAIL DEBRIDEMENT performed by Michael Fortune DPM at [address] [facility] N/A 2019    EGD PEG PLACEMENT W/ ANESTHESIA performed by Judah Cardona MD at [address]     Family History   Problem Relation Age of Onset    Diabetes Mother     Heart Failure Mother     Heart Disease Mother     Stroke Mother     Diabetes Sister     Diabetes Brother     Diabetes Maternal Grandmother     Asthma Son     Asthma Son     Diabetes Daughter     Heart Disease Daughter     Irritable Bowel Syndrome Daughter     Diabetes Brother     Cancer Neg Hx     Emphysema Neg Hx     Hypertension Neg Hx      Social History     Socioeconomic History    Marital status:       Spouse name: Not on file    Number of children: Not on file    Years of education: Not on file    Highest education level: Not on file   Occupational History    Occupation:      Comment: [employer] most recently   Social Needs    Financial resource strain: Not on file    Food insecurity     Worry: Not on file     Inability: Not on file   [employer] needs     Medical: Not on file     Non-medical: Not on file   Tobacco Use    Smoking status: Former Smoker     Years: 13.00     Types: Cigarettes     Last attempt to quit: 1985     Years since quittin.5    Smokeless tobacco: Never Used Substance and Sexual Activity    Alcohol use: No     Alcohol/week: 0.0 standard drinks    Drug use: No    Sexual activity: Not Currently     Partners: Male   Lifestyle    Physical activity     Days per week: Not on file     Minutes per session: Not on file    Stress: Not on file   Relationships    Social connections     Talks on phone: Not on file     Gets together: Not on file     Attends Taoism service: Not on file     Active member of club or organization: Not on file     Attends meetings of clubs or organizations: Not on file     Relationship status: Not on file    Intimate partner violence     Fear of current or ex partner: Not on file     Emotionally abused: Not on file     Physically abused: Not on file     Forced sexual activity: Not on file   Other Topics Concern    Not on file   Social History Narrative    Not on file     Current Facility-Administered Medications   Medication Dose Route Frequency Provider Last Rate Last Dose    ondansetron (ZOFRAN) injection 4 mg  4 mg Intravenous Once LUCIAN Quinones CNP        aluminum & magnesium hydroxide-simethicone (MAALOX) 30 mL, lidocaine viscous hcl (XYLOCAINE) 5 mL (GI COCKTAIL)   Oral Once LUCIAN Malagon CNP         Current Outpatient Medications   Medication Sig Dispense Refill    hydrOXYzine (VISTARIL) 25 MG capsule Take 25 mg by mouth 3 times daily as needed for Itching      promethazine (PHENERGAN) 25 MG tablet Take 25 mg by mouth every 6 hours as needed for Nausea      traMADol (ULTRAM) 50 MG tablet Take 50 mg by mouth every 6 hours as needed for Pain.       sertraline (ZOLOFT) 100 MG tablet Take 1 tablet by mouth daily 30 tablet 0    lactobacillus (CULTURELLE) capsule Take 1 capsule by mouth 3 times daily (with meals) 90 capsule 2    losartan (COZAAR) 50 MG tablet Take 1 tablet by mouth daily (Patient taking differently: Take 100 mg by mouth daily 50 MG AFTERNOON AND 50 AT BEDTIME) 30 tablet 3    aspirin 81 MG chewable tablet Take 1 tablet by mouth daily 30 tablet 3    pantoprazole (PROTONIX) 40 MG tablet Take 1 tablet by mouth every morning (before breakfast) 30 tablet 3    metoprolol tartrate (LOPRESSOR) 50 MG tablet Take 50 mg by mouth 2 times daily      rosuvastatin (CRESTOR) 5 MG tablet Take 5 mg by mouth nightly       insulin lispro (HUMALOG) 100 UNIT/ML injection vial Inject 0-12 Units into the skin 3 times daily (with meals) **Medium Dose Corrective Algorithm**  Glucose: Dose:  If <139             No Insulin  140-199 2 Units  200-249 4 Units  250-299 6 Units  300-349 8 Units  350-400 10 Units  Above 400       12 Units 1 vial 3    donepezil (ARICEPT) 5 MG tablet Take 1 tablet by mouth nightly 30 tablet 3    B Complex-C-Iron (SUPER B-COMPLEX/IRON/VITAMIN C PO) Take 1 tablet by mouth daily. Allergies   Allergen Reactions    Ativan [Lorazepam]      DIZZY    Dilaudid [Hydromorphone Hcl] Other (See Comments)     Pt states it made her crazy    Flexeril [Cyclobenzaprine Hcl]      DOESN'T REMEMBER    Heparin      KEENAN     Hydromorphone      FEELS DRUNK    Soma [Carisoprodol]      FEELS OUT OF IT    Vicodin [Hydrocodone-Acetaminophen] Other (See Comments)     Per pt, makes her \"go crazy\"    Penicillins Rash    Sulfa Antibiotics Rash       REVIEW OF SYSTEMS  10 systems reviewed, pertinent positives per HPI otherwise noted to be negative    PHYSICAL EXAM  BP (!) 144/48   Pulse 85   Resp 11   SpO2 96%   GENERAL APPEARANCE: Awake and alert. Cooperative. She is in mild distress. HEAD: Normocephalic. Atraumatic. EYES: PERRL. EOM's grossly intact. ENT: Mucous membranes are moist.   NECK: Supple. HEART: RRR. No murmurs. LUNGS: Respirations unlabored. CTAB. Good air exchange. Speaking comfortably in full sentences. ABDOMEN: Soft. Non-distended. Non-tender. No guarding or rebound. There is no midline abdominal mass. No organomegaly. EXTREMITIES: No peripheral edema. Moves all extremities equally. All extremities neurovascularly intact. Equal radial pulses bilaterally. SKIN: Warm and dry. No acute rashes. NEUROLOGICAL: Alert and oriented. CN's 2-12 intact. No gross facial drooping. Strength 5/5, sensation intact. PSYCHIATRIC: She is anxious. RADIOLOGY  No results found. ED COURSE   Patient received a GI cocktail for pain, with good relief. Cardiac work-up was initiated due to patient's age, presentation and associated risk factors. Labs ordered:  CBC: Negative for leukocytosis with WBC 10.1, mild anemia RBCs 3.01, hemoglobin 8.7, hematocrit 7.2, RDW 16.5, neutrophils  absolute 8.9, lymphocytes absolute 0.4; otherwise unremarkable  CMP: Sodium 133, chloride 94, glucose 230; creatinine 1.9, GFR 26 (hx of chronic renal failure) albumin 2.9, albumin/globulin ratio 0.8, Alk phos 154; otherwise unremarkable  Lipase: WNL at 13  Troponin: Mildly elevated attributable to chronic renal [employer]      EKG: As interpreted by EMD.    Imaging ordered:  CXR: No acute cardiopulmonary disease      Reevaluation:  Patient reports feeling much better. She reports doing much better and is agreeable with discharge to home with PCP follow-up. A discussion was had with Mrs. Ivette Cortes regarding anxiety. Risk management discussed and shared decision making had with patient and/or surrogate. All questions were answered. Patient will follow up with PCP for further evaluation/treatment. Patient will return to ED for new/worsening symptoms. Patient was not sent home with prescriptions. CRITICAL CARE TIME  0 Minutes of critical care time spent not including separately billable procedures. MDM  Patient presents emergency department symptoms concerning for anxiety. Low suspicion for acute cardiopulmonary process including ACS, PE, or thoracic aortic dissection but less likely based on history and physical exam.    DISPOSITION  Patient was discharged to home in good condition.     Final impression:  Anxiety [facility]          Karen Chen, LUCIAN - CNP  08/05/20 [id number]

## 2020-08-13 NOTE — PLAN OF CARE
Patient her for initial wound care visit. She has wounds present on BLE, left arm, left heel, ischium.   Puracol Ag and Opticell Ag will be used on wounds, compression wrap to LLE, F/u with Dr. Felicia Salinas tomorrow as scheduled, Dr. Cosme Hartman will order a ROHO offloading cushion from [facility], f./u x 1 week, MD orders/D/C instructions reviewed with patient, all questions answered; copy of instructions given to patient

## 2020-08-14 PROBLEM — I73.9 PVD (PERIPHERAL VASCULAR DISEASE) (HCC): Status: ACTIVE | Noted: 2020-01-01

## 2020-08-14 NOTE — PROGRESS NOTES
Outpatient Follow Up Note    Leno Naranjo is 67 y.o. female who presents today for  follow up regarding:    Chief Complaint   Patient presents with    Post-Op Check     patient is sp fistula placement in June and Dr Olga Rosales sent patient here for poss infection. pamlr   PVD with Heel and toe ulcer. Patient S/P Left forearm loop dialysis graft. She developed redness and drainage at apex incision. Swab + MRSA. She has been started on IV Vanc with dialysis. She notes now without drainage , swelling or pain. She also has known PVD- in fact we discussed this at last appt in July. She has a R heel ulcer and 2nd toe ulcer. Edema is increased. She reports pain in foot.       Past Medical History:   Diagnosis Date    Anemia     Angina     with exertion    Arthritis     Asthma 9/16/2010    Cancer ([facility] 2015    ENDOMETRIAL    Clostridium difficile infection 4/24/2020, 03/28/2020    Coronary artery disease 9/16/2010    Diabetes mellitus ([facility]     ESRD (end stage renal disease) on dialysis ([facility]     MWF    Heart attack [facility]) age 43    no problems since then    Hemodialysis patient ([address]     Hyperlipidemia     Hypertension     Obesity     Skin ulcer of right foot with necrosis of muscle ([facility] 3/10/2020       Past Surgical History:   Procedure Laterality Date    BRONCHOSCOPY N/A 11/12/2019    BRONCHOSCOPY WASHING performed by Lynn Yoo MD at [address] [place] [address] N/A 11/23/2019    BRONCHOSCOPY ALVEOLAR LAVAGE performed by Suresh Connolly MD at [address] [place] [address]  11/23/2019    BRONCHOSCOPY THERAPUTIC ASPIRATION INITIAL performed by Suresh Connolly MD at [address] [place] [address]  11/23/2019    BRONCHOSCOPY FOREIGN BODY REMOVAL performed by Suresh Connolly MD at [address] [place] [address]      right    CATARACT REMOVAL Left     CATARACT REMOVAL Right     COLONOSCOPY      CORONARY ANGIOPLASTY WITH STENT PLACEMENT  4/2012    DIAGNOSTIC daily (with meals) **Medium Dose Corrective Algorithm**  Glucose: Dose:  If <139             No Insulin  140-199 2 Units  200-249 4 Units  250-299 6 Units  300-349 8 Units  350-400 10 Units  Above 400       12 Units 1 vial 3    donepezil (ARICEPT) 5 MG tablet Take 1 tablet by mouth nightly 30 tablet 3    B Complex-C-Iron (SUPER B-COMPLEX/IRON/VITAMIN C PO) Take 1 tablet by mouth daily. No current facility-administered medications for this visit. Allergies:  Ativan [lorazepam]; Dilaudid [hydromorphone hcl]; Flexeril [cyclobenzaprine hcl]; Heparin; Soma [carisoprodol]; Vicodin [hydrocodone-acetaminophen]; Penicillins; and Sulfa antibiotics    REVIEW OF SYSTEMS:   A 14 point review of systems was completed. Pertinent positives identified in the HPI, all other review of systems negative. Objective:   PHYSICAL EXAM:        VITALS:  BP (!) 110/50 (Site: Right Upper Arm)   Ht 5' (1.524 m)   Wt 115 lb (52.2 kg)   BMI 22.46 kg/m²   CONSTITUTIONAL: Cooperative, no apparent distress, and appears well nourished / developed  NEUROLOGIC:  Awake and oriented to person, place and time. PSYCH: Calm affect. SKIN: Warm and dry. HEENT: Sclera non-icteric, normocephalic, neck supple, normal carotid pulses with no bruits and thyroid normal size. LUNGS:  No increased work of breathing and clear to auscultation, no crackles or wheezing  CARDIOVASCULAR:  Regular rate and rhythm with no murmurs, gallops, rubs, or abnormal heart sounds, normal PMI. Pulses:    femoral DP PT   RIGHT - doppler doppler   LEFT 2 doppler doppler     ABDOMEN:  Normal bowel sounds, non-distended and non-tender to palpation  EXT: No edema, no calf tenderness. DATA:      Lower Extremity Arterial Testin2020  Right Impression    Patient unable to tolerate ankle/brachial indexes. The presence of monophasic waveforms at the common femoral artery level    indicates aorto-iliac inflow disease.     There is atherosclerotic plaque seen alternatives of catheter-based angiography. Patient freely consents and is eager to proceed. All questions and expectations addressed.         Irasema Marques MD, FACS

## 2020-08-17 PROBLEM — E11.621 DIABETIC ULCER OF TOE OF RIGHT FOOT ASSOCIATED WITH TYPE 2 DIABETES MELLITUS, WITH FAT LAYER EXPOSED (HCC): Status: ACTIVE | Noted: 2020-01-01

## 2020-08-17 PROBLEM — I46.9 RESPIRATORY ARREST BEFORE CARDIAC ARREST (HCC): Status: RESOLVED | Noted: 2019-01-01 | Resolved: 2020-01-01

## 2020-08-17 PROBLEM — C48.0 RETROPERITONEAL SARCOMA (HCC): Status: ACTIVE | Noted: 2019-01-01

## 2020-08-17 PROBLEM — N95.2 VAGINAL ATROPHY: Status: RESOLVED | Noted: 2017-07-31 | Resolved: 2020-01-01

## 2020-08-17 PROBLEM — D75.829 HEPARIN INDUCED THROMBOCYTOPENIA (HIT): Status: RESOLVED | Noted: 2019-01-01 | Resolved: 2020-01-01

## 2020-08-17 PROBLEM — K52.9 COLITIS: Status: RESOLVED | Noted: 2020-01-01 | Resolved: 2020-01-01

## 2020-08-17 PROBLEM — I96 GANGRENE OF TOE OF BOTH FEET (HCC): Status: RESOLVED | Noted: 2020-01-01 | Resolved: 2020-01-01

## 2020-08-17 PROBLEM — Z86.74 HX OF CARDIAC ARREST: Status: RESOLVED | Noted: 2020-01-01 | Resolved: 2020-01-01

## 2020-08-17 PROBLEM — J96.01 ACUTE RESPIRATORY FAILURE WITH HYPOXIA AND HYPERCAPNIA (HCC): Status: RESOLVED | Noted: 2019-01-01 | Resolved: 2020-01-01

## 2020-08-17 PROBLEM — R60.0 EDEMA OF BOTH LEGS: Status: ACTIVE | Noted: 2020-01-01

## 2020-08-17 PROBLEM — C49.9 SPINDLE CELL SARCOMA (HCC): Status: RESOLVED | Noted: 2019-01-01 | Resolved: 2020-01-01

## 2020-08-17 PROBLEM — I82.433 ACUTE BILATERAL DEEP VEIN THROMBOSIS (DVT) OF POPLITEAL VEINS (HCC): Status: RESOLVED | Noted: 2020-01-01 | Resolved: 2020-01-01

## 2020-08-17 PROBLEM — N18.6 ESRD (END STAGE RENAL DISEASE) (HCC): Status: RESOLVED | Noted: 2020-01-01 | Resolved: 2020-01-01

## 2020-08-17 PROBLEM — R79.89 ELEVATED LFTS: Status: RESOLVED | Noted: 2019-01-01 | Resolved: 2020-01-01

## 2020-08-17 PROBLEM — L03.90 CELLULITIS: Status: RESOLVED | Noted: 2020-01-01 | Resolved: 2020-01-01

## 2020-08-17 PROBLEM — I70.234 ATHEROSCLEROSIS OF NATIVE ARTERY OF RIGHT LOWER EXTREMITY WITH ULCERATION OF HEEL (HCC): Status: ACTIVE | Noted: 2020-01-01

## 2020-08-17 PROBLEM — L97.911 ULCERS OF BOTH LOWER LEGS, LIMITED TO BREAKDOWN OF SKIN (HCC): Status: ACTIVE | Noted: 2020-01-01

## 2020-08-17 PROBLEM — I27.20 MODERATE TO SEVERE PULMONARY HYPERTENSION (HCC): Status: ACTIVE | Noted: 2020-01-01

## 2020-08-17 PROBLEM — J96.00 ACUTE RESPIRATORY FAILURE (HCC): Status: RESOLVED | Noted: 2019-01-01 | Resolved: 2020-01-01

## 2020-08-17 PROBLEM — Z89.429 HISTORY OF AMPUTATION OF LESSER TOE (HCC): Status: ACTIVE | Noted: 2020-01-01

## 2020-08-17 PROBLEM — E16.0 HYPOGLYCEMIA DUE TO INSULIN: Status: RESOLVED | Noted: 2018-03-03 | Resolved: 2020-01-01

## 2020-08-17 PROBLEM — I34.0 NONRHEUMATIC MITRAL (VALVE) INSUFFICIENCY: Status: ACTIVE | Noted: 2017-02-07

## 2020-08-17 PROBLEM — K21.9 GASTROESOPHAGEAL REFLUX DISEASE WITHOUT ESOPHAGITIS: Status: ACTIVE | Noted: 2020-01-01

## 2020-08-17 PROBLEM — I82.5Z3 CHRONIC DEEP VEIN THROMBOSIS (DVT) OF DISTAL VEIN OF BOTH LOWER EXTREMITIES (HCC): Status: ACTIVE | Noted: 2020-01-01

## 2020-08-17 PROBLEM — I87.2 PERIPHERAL VENOUS INSUFFICIENCY: Status: ACTIVE | Noted: 2020-01-01

## 2020-08-17 PROBLEM — I48.91 ATRIAL FIBRILLATION (HCC): Status: ACTIVE | Noted: 2020-01-01

## 2020-08-17 PROBLEM — J18.9 PNEUMONIA: Status: RESOLVED | Noted: 2020-01-01 | Resolved: 2020-01-01

## 2020-08-17 PROBLEM — L97.513 SKIN ULCER OF RIGHT FOOT WITH NECROSIS OF MUSCLE (HCC): Status: RESOLVED | Noted: 2020-01-01 | Resolved: 2020-01-01

## 2020-08-17 PROBLEM — F41.0 ANXIETY DISORDER DUE TO GENERAL MEDICAL CONDITION WITH PANIC ATTACK: Status: ACTIVE | Noted: 2020-01-01

## 2020-08-17 PROBLEM — T38.3X5A HYPOGLYCEMIA DUE TO INSULIN: Status: RESOLVED | Noted: 2018-03-03 | Resolved: 2020-01-01

## 2020-08-17 PROBLEM — D69.6 THROMBOCYTOPENIA (HCC): Status: RESOLVED | Noted: 2019-01-01 | Resolved: 2020-01-01

## 2020-08-17 PROBLEM — J96.02 ACUTE RESPIRATORY FAILURE WITH HYPOXIA AND HYPERCAPNIA (HCC): Status: RESOLVED | Noted: 2019-01-01 | Resolved: 2020-01-01

## 2020-08-17 PROBLEM — S41.112A SKIN TEAR OF UPPER ARM WITHOUT COMPLICATION, LEFT, INITIAL ENCOUNTER: Status: ACTIVE | Noted: 2020-01-01

## 2020-08-17 PROBLEM — I82.433 ACUTE BILATERAL DEEP VEIN THROMBOSIS (DVT) OF POPLITEAL VEINS (HCC): Status: ACTIVE | Noted: 2020-01-01

## 2020-08-17 PROBLEM — Z89.429 HISTORY OF AMPUTATION OF LESSER TOE (HCC): Status: RESOLVED | Noted: 2020-01-01 | Resolved: 2020-01-01

## 2020-08-17 PROBLEM — L97.921 ULCERS OF BOTH LOWER LEGS, LIMITED TO BREAKDOWN OF SKIN (HCC): Status: ACTIVE | Noted: 2020-01-01

## 2020-08-17 PROBLEM — L97.512 DIABETIC ULCER OF TOE OF RIGHT FOOT ASSOCIATED WITH TYPE 2 DIABETES MELLITUS, WITH FAT LAYER EXPOSED (HCC): Status: ACTIVE | Noted: 2020-01-01

## 2020-08-17 PROBLEM — F06.4 ANXIETY DISORDER DUE TO GENERAL MEDICAL CONDITION WITH PANIC ATTACK: Status: ACTIVE | Noted: 2020-01-01

## 2020-08-17 PROBLEM — I96 GANGRENE OF TOE OF BOTH FEET (HCC): Status: ACTIVE | Noted: 2020-01-01

## 2020-08-17 PROBLEM — G93.41 METABOLIC ENCEPHALOPATHY: Status: RESOLVED | Noted: 2020-01-01 | Resolved: 2020-01-01

## 2020-08-17 PROBLEM — T38.3X1A OVERDOSE OF INSULIN, ACCIDENTAL OR UNINTENTIONAL, INITIAL ENCOUNTER: Status: RESOLVED | Noted: 2018-03-03 | Resolved: 2020-01-01

## 2020-08-17 PROBLEM — E16.2 HYPOGLYCEMIA: Status: RESOLVED | Noted: 2018-03-03 | Resolved: 2020-01-01

## 2020-08-17 PROBLEM — L89.312 PRESSURE ULCER OF RIGHT ISCHIUM, STAGE II (HCC): Status: ACTIVE | Noted: 2020-01-01

## 2020-08-17 PROBLEM — Z86.74 HX OF CARDIAC ARREST: Status: ACTIVE | Noted: 2020-01-01

## 2020-08-17 PROBLEM — K80.20 CHOLELITHIASIS: Status: ACTIVE | Noted: 2020-01-01

## 2020-08-17 PROBLEM — R09.2 RESPIRATORY ARREST BEFORE CARDIAC ARREST (HCC): Status: RESOLVED | Noted: 2019-01-01 | Resolved: 2020-01-01

## 2020-08-17 PROBLEM — N18.30 CKD (CHRONIC KIDNEY DISEASE) STAGE 3, GFR 30-59 ML/MIN (HCC): Chronic | Status: RESOLVED | Noted: 2019-01-01 | Resolved: 2020-01-01

## 2020-08-17 PROBLEM — D72.829 LEUKOCYTOSIS: Status: RESOLVED | Noted: 2019-01-01 | Resolved: 2020-01-01

## 2020-08-17 NOTE — CONSULTS
pulmonary edema (HCC), Acute respiratory failure with hypoxia and hypercapnia ([facility] Angina, Cardiac arrest ([facility] Cellulitis, Clostridium difficile infection, Diabetic foot ulcer ([facility] Endometrial adenocarcinoma ([facility] [facility] Gangrene of toe of both feet ([facility] [facility] 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 ([facility] MI (myocardial infarction) ([facility] [facility] Obesity, Pneumonia, Syncope, and Tobacco use. She has a past surgical history that includes Carpal tunnel release (Right); Knee arthroscopy; Coronary angioplasty with stent (4/2012); bronchoscopy (N/A, 11/12/2019); bronchoscopy (N/A, 11/23/2019); bronchoscopy (11/23/2019); bronchoscopy (11/23/2019); Upper gastrointestinal endoscopy (N/A, 12/2/2019); Toe amputation (Right, 1/16/2020); Diagnostic Cardiac Cath Lab Procedure; Dialysis fistula creation (Left, 6/25/2020); Colonoscopy; Cataract removal (Bilateral); and mansi and bso (cervix removed) (11/30/2015). Her family history includes Asthma in her son and son; Diabetes in her brother, brother, daughter, maternal grandmother, mother, and sister; Heart Disease in her daughter and mother; Heart Failure in her mother; Irritable Bowel Syndrome in her daughter; Stroke in her mother. Ms. Isaac Wong reports that she quit smoking about 35 years ago. Her smoking use included cigarettes. She quit after 13.00 years of use. She has never used smokeless tobacco. She reports that she does not drink alcohol or use drugs. Her current medication list consists of B Complex-C-Iron, aspirin, donepezil, hydrOXYzine, insulin lispro, lactobacillus, losartan, metoprolol tartrate, pantoprazole, promethazine, rosuvastatin, sertraline, and traMADol. Allergies: Ativan [lorazepam]; Dilaudid [hydromorphone hcl]; Flexeril [cyclobenzaprine hcl]; Heparin; Soma [carisoprodol]; Vicodin [hydrocodone-acetaminophen];  Penicillins; and Sulfa antibiotics    Pertinent items from the review of systems are discussed in the HPI; the remainder of the ROS was reviewed and is negative. Objective:     Vitals:    08/13/20 1428   BP: (!) 131/52   Pulse: 66   Resp: 18   Temp: 98.1 °F (36.7 °C)   TempSrc: Oral   Weight: 141 lb (64 kg)   Height: 4' 11\" (1.499 m)       Constitutional:  well-developed, fatigued, chronically ill appearing, NAD  Psychiatric:  oriented to person, place and time; mood and affect appropriate for the situation   Eyes:  pupils equal, round and reactive to light; sclerae anicteric, conjunctivae pale  Cardiovascular:  bilateral pedal pulses palpable, left much stronger than right; left foot warm, good cap refill, right cap refill focally more sluggish; moderate BL lower extremity pitting edema  Neuro: no allodynia around the ulcers; loss of pedal protective sensation (5.07 monofilament) at 4/10 right and 2/10 left foot sites tested. Lymphatic:  no inguinal or popliteal adenopathy, no angitis or fluctuance   Musculoskeletal:  no clubbing, cyanosis or petechiae; RLE and LLE with no gross effusions, joint misalignment or acute arthritis  Tess-ulcer skin: generally indurated and pink, but macerated at the right toe and forefoot, some radha pressure-related erythema of the ischium, and resolving ecchymosis of the left arm. Ulcer(s): two small partial thickness ulcerations of the left forearm near the elbow, partly pink-red, with some fibrin, modest serous exudate; right ischial ulcer stage 2, part pink-red, early buds of granulation, tender, some fibrin; left shin ulcer partial thickness, red, clean, with a draining bulla adjacent to it; a few small partial thickness right leg ulcers with moderate serous exudate, biofilm at one; right 4th toe ulcer full thickness, I think fibronecrotic fat tissue at base; heel with a bit of depth, also more fibronecrotic SQ tissue than anything pink or red, serous exudate; medial aspect of right 4th toe indurated, hyperkeratosis, very macerated. Photos also saved in electronic chart.     Today's Wound Measurements, per RN documentation:  Wound 08/13/20 #3, Right Leg Medial, Venous, Partial Thickness, Onset 8/1/20-Wound Length (cm): 1 cm  Wound 08/13/20 #4, Right Leg Lateral, Venous, Partial Thickness, Onset 8/1/20-Wound Length (cm): 0.8 cm  Wound 08/13/20 #5, Right 4th Toe Lateral, Diabetic Ulcer, Molina 1, Onset 8/1/20-Wound Length (cm): 0.7 cm  Wound 08/13/20 #6, Right Heel, Diabetic Ulcer, Molina 2, Onset 5/20-Wound Length (cm): 0.9 cm  Wound 08/13/20 #7, Left Pretib, Venous, Partial Thickness, Onset 8/1/20-Wound Length (cm): 0.5 cm  Wound 08/13/20 #8, Right Ischium, Pressure Injury, Stage 2, Onset 8/1/20-Wound Length (cm): 1 cm  Wound 08/13/20 #1, Left Upper Arm, Trauma, Partial Thickness, Onset 8/4/20-Wound Length (cm): 0.6 cm  Wound 08/13/20 #2, Left Lower Arm, Trauma, Full Thickness, Onset 8/4/20-Wound Length (cm): 4.8 cm    Wound 08/13/20 #3, Right Leg Medial, Venous, Partial Thickness, Onset 8/1/20-Wound Width (cm): 4 cm  Wound 08/13/20 #4, Right Leg Lateral, Venous, Partial Thickness, Onset 8/1/20-Wound Width (cm): 0.7 cm  Wound 08/13/20 #5, Right 4th Toe Lateral, Diabetic Ulcer, Molina 1, Onset 8/1/20-Wound Width (cm): 0.6 cm  Wound 08/13/20 #6, Right Heel, Diabetic Ulcer, Molina 2, Onset 5/20-Wound Width (cm): 1.5 cm  Wound 08/13/20 #7, Left Pretib, Venous, Partial Thickness, Onset 8/1/20-Wound Width (cm): 0.5 cm  Wound 08/13/20 #8, Right Ischium, Pressure Injury, Stage 2, Onset 8/1/20-Wound Width (cm): 2.5 cm  Wound 08/13/20 #1, Left Upper Arm, Trauma, Partial Thickness, Onset 8/4/20-Wound Width (cm): 0.7 cm  Wound 08/13/20 #2, Left Lower Arm, Trauma, Full Thickness, Onset 8/4/20-Wound Width (cm): 1.1 cm    Wound 08/13/20 #3, Right Leg Medial, Venous, Partial Thickness, Onset 8/1/20-Wound Depth (cm): 0.1 cm  Wound 08/13/20 #4, Right Leg Lateral, Venous, Partial Thickness, Onset 8/1/20-Wound Depth (cm): 0.1 cm  Wound 08/13/20 #5, Right 4th Toe Lateral, Diabetic Ulcer, Molina 1, Onset 8/1/20-Wound Depth (cm): 0.4 cm  Wound 08/13/20 #6, Right Heel, Diabetic Ulcer, Molina 2, Onset 5/20-Wound Depth (cm): 0.3 cm  Wound 08/13/20 #7, Left Pretib, Venous, Partial Thickness, Onset 8/1/20-Wound Depth (cm): 0.1 cm  Wound 08/13/20 #8, Right Ischium, Pressure Injury, Stage 2, Onset 8/1/20-Wound Depth (cm): 0.2 cm  Wound 08/13/20 #1, Left Upper Arm, Trauma, Partial Thickness, Onset 8/4/20-Wound Depth (cm): 0.1 cm  Wound 08/13/20 #2, Left Lower Arm, Trauma, Full Thickness, Onset 8/4/20-Wound Depth (cm): 0.1 cm  ______________________________    Lab Results   Component Value Date    LABALBU 2.9 (L) 07/29/2020     Lab Results   Component Value Date    CREATININE 1.9 (H) 07/29/2020     Lab Results   Component Value Date    HGB 8.7 (L) 07/29/2020     Lab Results   Component Value Date    LABA1C 6.6 04/24/2020 Jan 2020 LE arterial Duplex -- Patient unable to tolerate ankle/brachial indices. The presence of monophasic waveforms at the RIGHT common femoral artery level indicates aorto-iliac inflow disease. There is atherosclerotic plaque seen within the common femoral artery consistent with a < 50% stenosis. There is atherosclerotic plaque seen at the femoral to popliteal segment is consistent with a < 50% stenosis. The peroneal, posterior tibial, and anterior tibial arteries at patent with noted calcific plaque seen. Tri-phasic waveforms at the LEFT common femoral artery level suggests no evidence aorto-iliac inflow disease. There is atherosclerotic plaque seen within the common femoral artery consistent with a < 50% stenosis. There is atherosclerotic plaque seen at the femoral to popliteal segment is consistent with a < 50% stenosis. The peroneal, posterior tibial, and anterior tibial arteries at patent with noted calcific plaque seen. There is multiphasic flow seen throughout the left lower extremity.      Feb 2020 LE venous Doppler -- There is evidence of isolated, acute superficial venous thrombophlebitis involving the right small saphenous vein in the proximal calf only. There was limited visualization throughout the lower extremities bilaterally. Within the limits of this exam (edema, and shadowing from calcific arteries), there is no other evidence of deep or superficial venous thrombosis involving the lower extremities bilaterally.      Assessment:     Patient Active Problem List   Diagnosis Code    Asthma J45.909    Vertigo R42    Type 2 diabetes mellitus with diabetic polyneuropathy, with long-term current use of insulin ([facility]) E11.42, Z79.4    HTN (hypertension) I10    CAD (coronary artery disease) I25.10    Perennial allergic rhinitis J30.89    Osteoarthritis of spine with radiculopathy, lumbar region M47.26    Pain of both hip joints M25.551, M25.552    Spinal stenosis, lumbar region, with neurogenic claudication M48.062    Right sided sciatica M54.31    Chronic pain of both knees M25.561, M25.562, G89.29    Osteoporosis M81.0    Primary osteoarthritis of both knees M17.0    Pulmonary nodule R91.1    Normocytic normochromic anemia D64.9    Anasarca R60.1    Diabetic polyneuropathy associated with type 2 diabetes mellitus ([facility]) E11.42    Diabetic ulcer of right heel associated with type 2 diabetes mellitus, with necrosis of muscle ([facility]) E11.621, L97.413    Debility R53.81    Moderate malnutrition ([facility]) E44.0    End stage renal disease ([facility]) N18.6    Atherosclerosis of native artery of right lower extremity with ulceration of heel ([facility]) I70.234    Pressure ulcer of right ischium, stage II ([facility]) L89.312    Anxiety disorder due to general medical condition with panic attack F06.4, F41.0    Atrial fibrillation ([facility]) I48.91    Edema of both legs R60.0    Gastroesophageal reflux disease without esophagitis K21.9    Hyperlipidemia, mixed E78.2    Nonrheumatic mitral (valve) insufficiency I34.0    Orthostatic hypotension I95.1    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    Skin tear of upper arm without complication, left, initial encounter S41.112A    Ulcers of both lower legs, limited to breakdown of skin ([facility] L97.911, L97.921    Peripheral venous insufficiency I87.2    Diabetic ulcer of 4th toe of right foot associated with type 2 diabetes mellitus, with fat layer exposed ([facility] E11.621, L97.512    Chronic deep vein thrombosis (DVT) of distal vein of both lower extremities ([facility]) I82.5Z3    Cholelithiasis K80.20    Moderate to severe pulmonary hypertension (HCC) I27.20       Assessment of today's active condition(s): well-controlled DM2, neuropathy, unilateral significant PAD, chronic lower extremity edema, and a number of nonhealing ulcers, on the basis of DM, ischemia, edema, pressure, plus several with some necrotic tissue and at least surface bioburden. I think main goals for the next couple of weeks are going to be potential planning of RLE revascularization, some compression on the left leg, offloading of the ischium and right foot, and then some slight changes to local wound care. No clear signs of active soft tissue infection this week. Factors contributing to occurrence and/or persistence of the chronic ulcer include edema, venous stasis, diabetes, chronic pressure, decreased mobility, shear force, arterial insufficiency, decreased tissue oxygenation and malnutrition. Medical necessity of today's visit is shown by the above documentation. Sharp debridement is not indicated today, based upon the exam findings in the wound(s) above.      Discharge plan:     Treatment in the wound care center today, per RN documentation: Wound 08/13/20 #3, Right Leg Medial, Venous, Partial Thickness, Onset 8/1/20-Dressing/Treatment: Other (comment)(Triad, Opticel Ag, ABD, kerlix)  Wound 08/13/20 #4, Right Leg Lateral, Venous, Partial Thickness, Onset 8/1/20-Dressing/Treatment: Other (comment)(Triad, Opticel Ag, ABD, kerlix)  Wound 08/13/20 #5, Right 4th Toe Lateral, Diabetic Ulcer, Molina 1, Onset 8/1/20-Dressing/Treatment: Other RLE.    Keep working on glucose control and protein intake. I did not realize that her primary insurance is Medicare when we spoke today, so I mistakenly told her that we could order some dressing supplies from a local DME company, but her home-care company will actually be responsible for dressing supplies while they are seeing her. I will go ahead and order a ROHO cushion for her wheelchair, however, to better offload that ischial pressure ulcer. Home treatment: good handwashing before and after any dressing changes. Cleanse ulcer with saline or soap & water before dressing change. May use Vaseline (petrolatum), Aquaphor, Aveeno, CeraVe, Cetaphil, Eucerin, Lubriderm, etc for dry skin. Dressing type for home: basically as above, three times weekly, but I'd like her to try to change the right 4th toe dressing daily, because of the drainage and maceration. Written discharge instructions given to patient. Follow up in 1 week.     Electronically signed by Maureen Hancock MD on 8/17/2020 at 3:45 PM.

## 2020-08-20 NOTE — PLAN OF CARE
Wound debridement per Dr. Antonio Norris. Dressing to be applied per Dr. Darrel Sandifer on d/c summary. Patient reports [facility] is using Santyl on wounds, order on d/c instructions not to use Santyl,use only ordered dressings.   To have angio per Dr. Memo Vela 8/25/20, f/u as instructed, MD orders/D/C instructions reviewed with patient, all questions answered; copy of instructions given to patient

## 2020-08-24 NOTE — PROGRESS NOTES
improve after revasculasrized. Factors contributing to occurrence and/or persistence of the chronic ulcer include edema, venous stasis, diabetes, chronic pressure, decreased mobility, shear force, arterial insufficiency and decreased tissue oxygenation. Medical necessity of today's visit is shown by the above documentation. Sharp debridement is indicated today, based upon the exam findings in the wound(s) above. Procedure note:     Consent obtained. Time out performed per [facility]. Anesthetic  Anesthetic: 4% Lidocaine Cream     Using a curette and # 10 blade scalpel, I sharply debrided the left forearm and right buttock ulcer(s) down through and including the removal of dermis. The type(s) of tissue debrided included fibrin, biofilm and necrotic/eschar. Total Surface Area Debrided: 8 sq cm. The ulcers were then irrigated with normal saline solution. The procedure was completed with a small amount of bleeding, and hemostasis was with pressure. The patient tolerated the procedure well, with no significant complications. The patient's level of pain during and after the procedure was monitored. Post-debridement measurements, if different from pre-debridement, are in the flowsheet as well.     Discharge plan:     Treatment in the wound care center today, per RN documentation: Wound 08/13/20 #8, Right Ischium, Pressure Injury, Stage 2, Onset 8/1/20-Dressing/Treatment: (purachol w/ ag, mepilex border)  Wound 08/20/20 # 9, Right upper anterior leg, traumatic, onset 8/17/2020-Dressing/Treatment: (triad , silver alg,ABD, Kerlix, Spandigrip F)  Wound 08/13/20 #2, Left Lower Arm, Trauma, Full Thickness, Onset 8/4/20-Dressing/Treatment: (Purachol ag, gauze, rina, spandigrip D)  Wound 08/13/20 #7, Left Pretib, Venous, Partial Thickness, Onset 8/1/20-Dressing/Treatment: (Calmoseptine, Procellera,gauze, compri 2 LYTE)  Wound 08/13/20 #3, Right Leg Medial, Venous, Partial Thickness, Onset 8/1/20-Dressing/Treatment: (triad , silver alg,ABD, Kerlix, Spandigrip F)  Wound 08/13/20 #4, Right Leg Lateral, Venous, Partial Thickness, Onset 8/1/20-Dressing/Treatment: (triad , silver alg,ABD, Kerlix, Spandigrip F)  Wound 08/13/20 #5, Right 4th Toe Lateral, Diabetic Ulcer, Molina 1, Onset 8/1/20-Dressing/Treatment: (triad , silver alg,ABD, Kerlix, Spandigrip F)  Wound 08/13/20 #6, Right Heel, Diabetic Ulcer, Molina 2, Onset 5/20-Dressing/Treatment: (triad , silver alg,ABD, Kerlix, Spandigrip F). Per physician order, left application of multilayer compression wrap was performed in the wound care center today, to help manage edema, stasis dermatitis, and/or venous ulcers. Leave primary dressing and multi-layer wrap(s) in place until the next appointment. Also discussed ways to keep the wrap dry for a shower, including a plastic cast-guard, available in retail pharmacies. She should call before her next visit if there is any significant pain, significant strike-through of drainage to the surface of the wrap, or any significant sense of the wrap sliding down more than an inch or so, bunching-up and abrading her skin. I reminded the patient of the importance of weight management and smoking cessation, if applicable; also encouraged ambulation as tolerated, additional lower extremity exercises as instructed in our education sheet, leg elevation when at rest, and compliance with any recommended dietary, diuretic and compression therapies. Will discuss longer-term options for LLE compression therapy next week. Continue modified offloading surgical shoe for the right foot. Await RLE angio and intervention next week. Keep pushing with protein intake. I gave her an extra instruction sheet to help her and family / home-care be sure that her ROHO is properly inflated. Should use this as often as possible, whenever seated. Home treatment: good handwashing before and after any dressing changes.  Cleanse wound with saline or soap & water before dressing change. May use Vaseline (petrolatum), Aquaphor, Aveeno, CeraVe, Cetaphil, Eucerin, Lubriderm, etc for dry skin. Dressing type for home: basically as above, daily for the right 4th toe, TIW for the rest of the RLE, TIW and PRN for the ischial pressure ulcer. Written discharge instructions given to patient. Follow up in 1 week, assuming that things go well with her angiogram and she does not need to stay in the hospital for any reason.     Electronically signed by Maureen Hancock MD on 8/24/2020 at 10:52 AM.

## 2020-08-26 NOTE — ED PROVIDER NOTES
Evaluated by [id number] [facility] Provider    [facility]  ED    CHIEF COMPLAINT  Nausea (had cardiac cath today; c/o n/v)    HISTORY OF PRESENT ILLNESS  Caio Andrews is a 67 y.o. female who presents to the ED complaining of nausea and vomiting. She had stents placed in her legs Tuesday/yesterday and something else, it was a long surgery. It was with Dr. Tia Briseno. After she got home she started throwing up real bad, shaking, pain in her right leg and HA and neck ache. States she could not stand it any longer. Started getting some CP and called EMS. She is a wound care patient and does dialysis. She has wraps to her bilateral legs, left one to be removed Thursday at wound center and right was supposed to be changed by Marcos Dale today but patient got home to late. Reports her pain in her leg is better. CP was pretty hard and went away, thinks it was nerves and throwing up. HA better and neck pain better. The patient is currently rating their pain as 6/10 and describes it as an aching type of pain. Treatments tried prior to arrival in the ED: none. The patient denies other complaints, modifying factors or associated symptoms. The patient arrived to the ED via EMS transport.     PAST MEDICAL HISTORY    Past Medical History:   Diagnosis Date    Acute bilateral deep vein thrombosis (DVT) of popliteal veins ([address] 1/29/2020    Acute pulmonary edema (HCC)     Acute respiratory failure with hypoxia and hypercapnia ([address] 11/11/2019    Angina     with exertion    Cardiac arrest ([address]     Cellulitis 2/21/2020    Clostridium difficile infection 4/24/2020, 03/28/2020    Diabetic foot ulcer ([address] 03/10/2020    Endometrial adenocarcinoma ([address] 11/17/2015    Gangrene of toe of both feet ([address] 8/17/2020    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 ([address] 11/24/2019    MI (myocardial infarction) ([address] 1983    Obesity     Pneumonia 3/28/2020    Small saphenous vein thrombophlebitis, right 02/2020    Syncope 4/30/2012    Tobacco use        SURGICAL HISTORY    Past Surgical History:   Procedure Laterality Date    BRONCHOSCOPY N/A 11/12/2019    BRONCHOSCOPY WASHING performed by Heidi Duke MD at [address] [facility] N/A 11/23/2019    BRONCHOSCOPY ALVEOLAR LAVAGE performed by Xander Whittaker MD at [address] [facility]  11/23/2019    BRONCHOSCOPY THERAPUTIC ASPIRATION INITIAL performed by Xander Whittaker MD at [address] [facility]  11/23/2019    BRONCHOSCOPY FOREIGN BODY REMOVAL performed by Xander Whittaker MD at [address] [facility] Right     CATARACT REMOVAL Bilateral     COLONOSCOPY      CORONARY ANGIOPLASTY WITH STENT PLACEMENT  4/2012    DIAGNOSTIC CARDIAC CATH LAB PROCEDURE      DIALYSIS FISTULA CREATION Left 6/25/2020    LEFT FOREARM LOOP DIALYSIS GRAFT performed by Tawana Fernandez MD at [facility] ARTHROSCOPY      TYLOR AND BSO  11/30/2015    TOE AMPUTATION Right 1/16/2020    RIGHT SECOND AND THIRD TOE AMPUTATION, RIGHT HEEL WOUND DEBRIDEMENT, RIGHT HALLUX TOE NAIL DEBRIDEMENT performed by Shannon Wilson DPM at [facility] 12/2/2019    EGD PEG PLACEMENT W/ ANESTHESIA performed by Ania Dhaliwal MD at [facility]    Current Outpatient Rx   Medication Sig Dispense Refill    ondansetron (ZOFRAN ODT) 4 MG disintegrating tablet Take 1 tablet by mouth every 8 hours as needed for Nausea 20 tablet 0    clopidogrel (PLAVIX) 75 MG tablet Take 1 tablet by mouth daily 30 tablet 3    hydrOXYzine (VISTARIL) 25 MG capsule Take 25 mg by mouth 3 times daily as needed for Itching      promethazine (PHENERGAN) 25 MG tablet Take 25 mg by mouth every 6 hours as needed for Nausea      traMADol (ULTRAM) 50 MG tablet Take 50 mg by mouth every 6 hours as needed for Pain.       sertraline (ZOLOFT) 100 MG tablet Take 1 tablet by mouth daily 30 tablet 0    lactobacillus (CULTURELLE) capsule Neg Hx     Hypertension Neg Hx        SOCIAL HISTORY    Social History     Socioeconomic History    Marital status:      Spouse name: None    Number of children: None    Years of education: None    Highest education level: None   Occupational History    Occupation:      Comment: [employer] most recently   Social Needs    Financial resource strain: None    Food insecurity     Worry: None     Inability: None    Transportation needs     Medical: None     Non-medical: None   Tobacco Use    Smoking status: Former Smoker     Years: 13.00     Types: Cigarettes     Last attempt to quit: 1985     Years since quittin.6    Smokeless tobacco: Never Used   Substance and Sexual Activity    Alcohol use: No     Alcohol/week: 0.0 standard drinks    Drug use: No    Sexual activity: Not Currently     Partners: Male   Lifestyle    Physical activity     Days per week: None     Minutes per session: None    Stress: None   Relationships    Social connections     Talks on phone: None     Gets together: None     Attends Taoist service: None     Active member of club or organization: None     Attends meetings of clubs or organizations: None     Relationship status: None    Intimate partner violence     Fear of current or ex partner: None     Emotionally abused: None     Physically abused: None     Forced sexual activity: None   Other Topics Concern    None   Social History Narrative    None       REVIEW OFSYSTEMS    10systems reviewed, pertinent positives per HPI otherwise noted to be negative. PHYSICAL EXAM  Physical Exam  Vitals:    20 0218   BP: (!) 123/47   Pulse: 75   Resp: 21   Temp:    SpO2:      GENERAL: Patient is well-developed, well-nourished. Awake andalert. Cooperative. Resting in bed. Moderately distressed due to current symptoms. Not toxic. HEENT:  Normocephalic, atraumatic. Conjunctivaappear normal. Sclera is non-icteric.   External ears are normal.    NECK: Supple with normal ROM. Tracheamidline  LUNGS: Equal and symmetric chest rise. Breathing is unlabored. Speaking comfortably in fullsentences. Lungs are clear bilaterally to auscultation. Without wheezing, rales, or rhonchi. CADIOVASCULAR:  Regular rate and rhythm. Normal S1-S2 sounds. No murmurs, rubs, or gallops. Can not palpate lower extremity pulses due to dressings in place. Cap refill brisk to bilateral lower extremities/toes with normal color and temperature. GI: Soft, non-tender to palpation, nondistended with positive bowel sounds. No rebound tenderness, guarding or rigidity. NEgative Urbina's sign. Negative Rovsing's sign. No CVAT to palpation. No masses or hepatosplenomegaly to palpation. MUSCULOSKELETAL:  No gross deformities or trauma noted. Moving all extremities equally and appropriately. Normal ROM. SKIN: Warm/dry. Bilateral groin sites dry and intact. Soft to palpation and without swelling or bleeding. Bilateral lower extremities with dressings that are D&I. No rashes or lesions noted. PSYCHIATRIC: Mood and affect appropriate. Speech is clear and articulate. NEUROLOGIC: Alert and oriented. No focal motor or sensory deficits.     LABS   Results for orders placed or performed during the hospital encounter of 08/26/20   CBC Auto Differential   Result Value Ref Range    WBC 13.6 (H) 4.0 - 11.0 K/uL    RBC 3.85 (L) 4.00 - 5.20 M/uL    Hemoglobin 10.8 (L) 12.0 - 16.0 g/dL    Hematocrit 35.1 (L) 36.0 - 48.0 %    MCV 91.3 80.0 - 100.0 fL    MCH 28.1 26.0 - 34.0 pg    MCHC 30.7 (L) 31.0 - 36.0 g/dL    RDW 18.4 (H) 12.4 - 15.4 %    Platelets 677 530 - 614 K/uL    MPV 7.8 5.0 - 10.5 fL    Neutrophils % 92.0 %    Lymphocytes % 2.7 %    Monocytes % 4.0 %    Eosinophils % 0.5 %    Basophils % 0.8 %    Neutrophils Absolute 12.5 (H) 1.7 - 7.7 K/uL    Lymphocytes Absolute 0.4 (L) 1.0 - 5.1 K/uL    Monocytes Absolute 0.5 0.0 - 1.3 K/uL    Eosinophils Absolute 0.1 0.0 - 0.6 K/uL    Basophils Absolute 0.1 0.0 - 0.2 K/uL   Comprehensive Metabolic Panel w/ Reflex to MG   Result Value Ref Range    Sodium 133 (L) 136 - 145 mmol/L    Potassium reflex Magnesium 4.8 3.5 - 5.1 mmol/L    Chloride 95 (L) 99 - 110 mmol/L    CO2 21 21 - 32 mmol/L    Anion Gap 17 (H) 3 - 16    Glucose 340 (H) 70 - 99 mg/dL    BUN 38 (H) 7 - 20 mg/dL    CREATININE 3.6 (H) 0.6 - 1.2 mg/dL    GFR Non- 12 (A) >60    GFR  15 (A) >60    Calcium 9.3 8.3 - 10.6 mg/dL    Total Protein 7.2 6.4 - 8.2 g/dL    Alb 3.4 3.4 - 5.0 g/dL    Albumin/Globulin Ratio 0.9 (L) 1.1 - 2.2    Total Bilirubin 0.8 0.0 - 1.0 mg/dL    Alkaline Phosphatase 165 (H) 40 - 129 U/L    ALT 9 (L) 10 - 40 U/L    AST 19 15 - 37 U/L    Globulin 3.8 g/dL   Troponin   Result Value Ref Range    Troponin 0.12 (H) <0.01 ng/mL   Lipase   Result Value Ref Range    Lipase 16.0 13.0 - 60.0 U/L       RADIOLOGY    Xr Chest (2 Vw)    Result Date: 7/29/2020  EXAMINATION: TWO XRAY VIEWS OF THE CHEST 7/29/2020 8:48 pm COMPARISON: 03/28/2020 HISTORY: ORDERING SYSTEM PROVIDED HISTORY: abdominal pain TECHNOLOGIST PROVIDED HISTORY: Reason for exam:->abdominal pain Reason for Exam: abdominal pain Acuity: Acute Type of Exam: Initial FINDINGS: A left tunneled jugular venous catheter terminates over the superior right atrium. The cardiac silhouette is prominent. Thoracic aortic calcification is present. The pulmonary vessels are normal in caliber. No consolidation, pleural effusion or pneumothorax is seen. No acute cardiopulmonary disease. Xr Chest Portable    Result Date: 8/26/2020  EXAMINATION: ONE XRAY VIEW OF THE CHEST 8/26/2020 2:04 am COMPARISON: July 29, 2020 HISTORY: ORDERING SYSTEM PROVIDED HISTORY: CP TECHNOLOGIST PROVIDED HISTORY: Reason for exam:->CP Reason for Exam: Nausea (had cardiac cath today; c/o n/v) FINDINGS: No evidence of consolidation, edema or other acute pulmonary process. No evidence of acute process of the cardiac or mediastinal structures.  No pain, vomiting) that necessitate immediate return. CLINICAL IMPRESSION    1. Nausea and vomiting, intractability of vomiting not specified, unspecified vomiting type           Discharge Vitals:  Blood pressure (!) 123/47, pulse 75, temperature 99 °F (37.2 °C), temperature source Oral, resp. rate 21, SpO2 100 %, not currently breastfeeding. FOLLOW UP  Jocelyn Dewey MD  [address]  [phone]    Call in 1 day  For further evaluation    Anabel Hayes MD  [address] [place] [address]  [phone]    Call in 1 day  For further evaluation    [facility]  ED  [facility] [address] [phone]  Go to   If symptoms worsen      DISPOSITION  Patient was discharged to home in good condition. Comment: [employer] this report has been produced using speech recognition software and may contain errors related to that system including errors in grammar, punctuation, and spelling, as well as words and phrases that may beinappropriate. If there are any questions or concerns please feel free to contact the dictating provider for clarification.        LUCIAN Cox - CNP  08/26/20 [id number]

## 2020-08-26 NOTE — ED NOTES
Bed: 31  Expected date:   Expected time:   Means of arrival:   Comments:  [employer], [facility]  08/26/20 [id number]

## 2020-08-26 NOTE — ED NOTES
Fall risk screening completed.  Fall risk bracelet applied to patient.  Non-skid socks provided and placed on patient. [place] [facility] fall risk is indicated using  dome light .  Based on score, a bed alarm was indicated and applied.  The call light is within the patient's reach, and instructions for use were provided.  The bed is in the lowest position with wheels locked.  The patient has been advised to notify staff, using the call light, if there is a need to get up or use restroom.  The patient verbalized understanding of safety precautions and how to contact staff for assistance.          Ellen Caraballo RN  08/26/20 [id number]

## 2020-08-26 NOTE — ED NOTES
PT STATES SHE DOES NOT REGULARLY MAKE URINE DUE TO DIALYSIS BUT PT IS PLACE ON PURE WICK JUST IN CASE.       Carli Driver RN  08/26/20 [id number]

## 2020-08-28 PROBLEM — R19.7 DIARRHEA: Status: ACTIVE | Noted: 2020-01-01

## 2020-08-28 NOTE — ED NOTES
Pt calm and resting quietly with equal rise and fall of chest. Pt in NAD, RR even and unlabored. Side rails up, bed locked and in lowest position. Pt updated on plan of care. No needs at this time. Pt instructed on use of call light if needed.       Sofiya Darling RN  08/28/20 [id number]

## 2020-08-28 NOTE — ED NOTES
Bed: 22  Expected date:   Expected time:   Means of arrival:   Comments:  [facility]  08/28/20 [id number]

## 2020-08-28 NOTE — ED PROVIDER NOTES
[facility]  ED  EMERGENCY DEPARTMENT ENCOUNTER        Pt Name: Kary Bello  MRN: [id number]  Armsjessicagfpato 1945  Date of evaluation: 8/28/2020  Provider: Qasim Drake PA-C  PCP: Sissy Muller MD     I have seen and evaluated this patient with my supervising physician Elizabeth Leyva MD.    [address] [place] [address]       Chief Complaint   Patient presents with    Abdominal Pain     n/v/d since yesterday. pt reports that she has stents placed in BLE d/t compromized blood flow on Thursday.  Fatigue     d/t not being able to tolerate PO       HISTORY OF PRESENT ILLNESS   (Location, Timing/Onset, Context/Setting, Quality, Duration, Modifying Factors, Severity, Associated Signs and Symptoms)  Note limiting factors. Kary Bello is a 67 y.o. female patient presenting with nausea, vomiting and diarrhea. She states onset Wednesday not feeling well. States yesterday she experienced nausea with several episodes of emesis without hematemesis or bilious product. Food increase the nausea with subsequent vomiting. She began with diarrhea yesterday which has increased today. TNTC episodes of diarrhea with less vomiting with mild persistent nausea. She has had increasing generalized abdominal pain without localization. She reports no chest pain or shortness of breath, no fevers or chills. The patient is a hemodialysis patient Monday, Wednesday and Friday for hours at the [facility]. She missed dialysis today because of illness. The patient states this past Tuesday, August 25, 2020 she had bilateral leg stents placed by Dr. Lona Aragon for PVD. Areas are sore slightly bruised but not problematic overall. Patient states she was critically ill earlier this year and overall in hospital in rehab for about 6 months. She does state about 2 or 3 months ago she was at rehab, the [facility], at which time she developed C. difficile. This was treated.   She improved and subsequently had a colonoscopy by Dr. Greta Wong a month or 2 ago and she does not quite remember the results. This was done at their office on [facility]. She does tell me that she had a small amount of C. difficile remaining and treated with a course of antibiotic and it cleared up. The patient is also followed by wound care weekly for a healing wound on the right foot. She has extreme limited ambulation due to the wound/ulcer right foot and generalized weakness. The patient presenting for evaluation of nausea, vomiting, diarrhea-TNTC with concern for C. difficile recurrence. Patient also missing dialysis today x4 hours. She does believe they give her a dose of antibiotic with each dialysis treatment though she is not certain. Nursing Notes were all reviewed and agreed with or any disagreements were addressed in the HPI. REVIEW OF SYSTEMS    (2-9 systems for level 4, 10 or more for level 5)     Review of Systems    Positives and Pertinent negatives as per HPI. Except as noted above in the ROS, all other systems were reviewed and negative.        PAST MEDICAL HISTORY     Past Medical History:   Diagnosis Date    Acute bilateral deep vein thrombosis (DVT) of popliteal veins ([address] 1/29/2020    Acute pulmonary edema (HCC)     Acute respiratory failure with hypoxia and hypercapnia ([address] 11/11/2019    Angina     with exertion    Cardiac arrest ([address]     Cellulitis 2/21/2020    Clostridium difficile infection 4/24/2020, 03/28/2020    Diabetic foot ulcer ([address] 03/10/2020    Endometrial adenocarcinoma ([address] 11/17/2015    Gangrene of toe of both feet ([address] 8/17/2020    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 ([address] 11/24/2019    MI (myocardial infarction) ([address] 1983    Obesity     Pneumonia 3/28/2020    Small saphenous vein thrombophlebitis, right 02/2020    Syncope 4/30/2012    Tobacco use          SURGICAL HISTORY     Past Surgical History:   Procedure Laterality Date    BRONCHOSCOPY N/A 11/12/2019 BRONCHOSCOPY WASHING performed by Kassie Murphy MD at [address] [place] [address] N/A 11/23/2019    BRONCHOSCOPY ALVEOLAR LAVAGE performed by Valente Thacker MD at [address] [place] [address]  11/23/2019    BRONCHOSCOPY THERAPUTIC ASPIRATION INITIAL performed by Valente Thacker MD at [address] [place] [address]  11/23/2019    BRONCHOSCOPY FOREIGN BODY REMOVAL performed by Valente Thacker MD at [address] [facility] Right     CATARACT REMOVAL Bilateral     COLONOSCOPY      CORONARY ANGIOPLASTY WITH STENT PLACEMENT  4/2012    DIAGNOSTIC CARDIAC CATH LAB PROCEDURE      DIALYSIS FISTULA CREATION Left 6/25/2020    LEFT FOREARM LOOP DIALYSIS GRAFT performed by Darrian Archuleta MD at [address] [place] [address] ARTHROSCOPY      TYLOR AND BSO  11/30/2015    TOE AMPUTATION Right 1/16/2020    RIGHT SECOND AND THIRD TOE AMPUTATION, RIGHT HEEL WOUND DEBRIDEMENT, RIGHT HALLUX TOE NAIL DEBRIDEMENT performed by Narciso Cali DPM at [address] 12/2/2019    EGD PEG PLACEMENT W/ ANESTHESIA performed by Mary Mayer MD at [address] [facility] [place] Bilateral 08/25/2020    common iliac artery angioplasty and stenting         CURRENTMEDICATIONS       Previous Medications    ASPIRIN 81 MG CHEWABLE TABLET    Take 1 tablet by mouth daily    B COMPLEX-C-IRON (SUPER B-COMPLEX/IRON/VITAMIN C PO)    Take 1 tablet by mouth daily.     CLOPIDOGREL (PLAVIX) 75 MG TABLET    Take 1 tablet by mouth daily    DONEPEZIL (ARICEPT) 5 MG TABLET    Take 1 tablet by mouth nightly    HYDROXYZINE (VISTARIL) 25 MG CAPSULE    Take 25 mg by mouth 3 times daily as needed for Itching    INSULIN LISPRO (HUMALOG) 100 UNIT/ML INJECTION VIAL    Inject 0-12 Units into the skin 3 times daily (with meals) **Medium Dose Corrective Algorithm**  Glucose: Dose:  If <139             No Insulin  140-199 2 Units  200-249 4 Units  250-299 6 Units  300-349 8 Units  350-400 10 Units  Above 400       12 Units    LACTOBACILLUS (CULTURELLE) CAPSULE    Take 1 capsule by mouth 3 times daily (with meals)    LOSARTAN (COZAAR) 50 MG TABLET    Take 1 tablet by mouth daily    METOPROLOL TARTRATE (LOPRESSOR) 50 MG TABLET    Take 50 mg by mouth 2 times daily    ONDANSETRON (ZOFRAN ODT) 4 MG DISINTEGRATING TABLET    Take 1 tablet by mouth every 8 hours as needed for Nausea    PANTOPRAZOLE (PROTONIX) 40 MG TABLET    Take 1 tablet by mouth every morning (before breakfast)    PROMETHAZINE (PHENERGAN) 25 MG TABLET    Take 25 mg by mouth every 6 hours as needed for Nausea    ROSUVASTATIN (CRESTOR) 5 MG TABLET    Take 5 mg by mouth nightly     SERTRALINE (ZOLOFT) 100 MG TABLET    Take 1 tablet by mouth daily    TRAMADOL (ULTRAM) 50 MG TABLET    Take 50 mg by mouth every 6 hours as needed for Pain. ALLERGIES     Ativan [lorazepam]; Dilaudid [hydromorphone hcl]; Flexeril [cyclobenzaprine hcl]; Heparin; Soma [carisoprodol]; Vicodin [hydrocodone-acetaminophen];  Penicillins; and Sulfa antibiotics    FAMILYHISTORY       Family History   Problem Relation Age of Onset    Diabetes Mother     Heart Failure Mother     Heart Disease Mother     Stroke Mother     Diabetes Sister     Diabetes Brother     Diabetes Maternal Grandmother     Asthma Son     Asthma Son     Diabetes Daughter     Heart Disease Daughter     Irritable Bowel Syndrome Daughter     Diabetes Brother     Cancer Neg Hx     Emphysema Neg Hx     Hypertension Neg Hx           SOCIAL HISTORY       Social History     Tobacco Use    Smoking status: Former Smoker     Years: 13.00     Types: Cigarettes     Last attempt to quit: 1985     Years since quittin.6    Smokeless tobacco: Never Used   Substance Use Topics    Alcohol use: No     Alcohol/week: 0.0 standard drinks    Drug use: No       SCREENINGS    Patrick Coma Scale  Eye Opening: Spontaneous  Best Verbal Response: Oriented  Best Motor Response: Obeys commands  [place] Coma Scale Score: 15        PHYSICAL EXAM    (up to 7 for level 4, 8 or more for level 5)     ED Triage Vitals   BP Temp Temp Source Pulse Resp SpO2 Height Weight   08/28/20 1722 08/28/20 1722 08/28/20 1722 08/28/20 1722 08/28/20 1722 08/28/20 1722 08/28/20 1719 08/28/20 1719   (!) 113/59 97.8 °F (36.6 °C) Oral 61 16 94 % 4' 11\" (1.499 m) 130 lb (59 kg)       Physical Exam  Vitals signs and nursing note reviewed. Constitutional:       Appearance: Normal appearance. She is well-developed and normal weight. HENT:      Head: Normocephalic and atraumatic. Right Ear: External ear normal.      Left Ear: External ear normal.   Eyes:      General: No scleral icterus. Right eye: No discharge. Left eye: No discharge. Conjunctiva/sclera: Conjunctivae normal.   Neck:      Musculoskeletal: Normal range of motion and neck supple. Cardiovascular:      Rate and Rhythm: Normal rate and regular rhythm. Heart sounds: Normal heart sounds. Pulmonary:      Effort: Pulmonary effort is normal.      Breath sounds: Normal breath sounds. Abdominal:      General: Abdomen is flat. Bowel sounds are normal.      Palpations: Abdomen is soft. Tenderness: There is abdominal tenderness. Comments: Mild generalized abdominal tenderness without localization is appreciated. Musculoskeletal: Normal range of motion. Comments: Patient does have baseline lower extremity edema. The site of stent placement atraumatic. No sign of infection. She does have what appears to be compression on the lower extremities up to mid lower leg. Skin:     General: Skin is warm and dry. Findings: No rash. Neurological:      General: No focal deficit present. Mental Status: She is alert and oriented to person, place, and time. Mental status is at baseline. Psychiatric:         Mood and Affect: Mood normal.         Behavior: Behavior normal.         Thought Content:  Thought content normal.         Judgment: Judgment normal.         DIAGNOSTIC RESULTS   LABS:    Labs Reviewed   CBC WITH AUTO DIFFERENTIAL - Abnormal; Notable for the following components:       Result Value    WBC 12.9 (*)     RBC 3.53 (*)     Hemoglobin 9.9 (*)     Hematocrit 32.0 (*)     MCHC 30.9 (*)     RDW 18.4 (*)     Neutrophils Absolute 11.1 (*)     Lymphocytes Absolute 0.7 (*)     All other components within normal limits    Narrative:     Performed at:  [place] [address] [employer] [facility]   Phone [phone]   COMPREHENSIVE METABOLIC PANEL W/ REFLEX TO MG FOR LOW K - Abnormal; Notable for the following components:    Sodium 132 (*)     Chloride 91 (*)     Anion Gap 17 (*)     Glucose 299 (*)     BUN 47 (*)     CREATININE 4.5 (*)     GFR Non- 9 (*)     GFR  11 (*)     Alb 3.3 (*)     Albumin/Globulin Ratio 0.9 (*)     Alkaline Phosphatase 137 (*)     ALT 8 (*)     AST 14 (*)     All other components within normal limits    Narrative:     Performed at:  [place] [address] [employer] [facility]   Phone [phone]   LIPASE - Abnormal; Notable for the following components:    Lipase 4.0 (*)     All other components within normal limits    Narrative:     Performed at:  [place] [address] [employer]   Phone [phone]   TROPONIN - Abnormal; Notable for the following components:    Troponin 0.14 (*)     All other components within normal limits    Narrative:     Performed at:  [address], [facility]   Phone [phone] - Abnormal; Notable for the following components:    Pro-BNP >70,000 (*)     All other components within normal limits    Narrative:     Performed at:  [address], [facility]   Phone [phone] - Abnormal; Notable for the following components:    Protime 15.4 (*)     INR 1.32 (*)     All other components within normal limits    Narrative:     Performed at:  [address], [facility]   Phone [phone]   BLOOD GAS, VENOUS - Abnormal; Notable for the following components:    pH, Harish 7.298 (*)     Base Excess, Harish -3.2 (*)     Carboxyhemoglobin 2.8 (*)     All other components within normal limits    Narrative:     Performed at:  [address], [facility]   Phone [phone]   LACTIC ACID, PLASMA - Abnormal; Notable for the following components:    Lactic Acid 3.0 (*)     All other components within normal limits    Narrative:     Performed at:  [address], [facility]   Phone [phone]   CULTURE, BLOOD 2   CULTURE, BLOOD 1   GASTROINTESTINAL PANEL, MOLECULAR   C DIFF TOXIN/ANTIGEN   GIARDIA ANTIGEN   O&P PANEL (TRAVEL ASSOCIATED) #1   URINE RT REFLEX TO CULTURE   BETA-HYDROXYBUTYRATE   BLOOD OCCULT STOOL SCREEN #1       All other labs were within normal range or not returned as of this dictation. EKG: All EKG's are interpreted by the Emergency Department Physician in the absence of a cardiologist.  Please see their note for interpretation of EKG. RADIOLOGY:   Non-plain film images such as CT, Ultrasound and MRI are read by the radiologist. Plain radiographic images are visualized and preliminarily interpreted by the ED Provider with the below findings:    Chest x-ray shows cardiomegaly without acute process. Interpretation per the Radiologist below, if available at the time of this note:    CT ABDOMEN PELVIS WO CONTRAST Additional Contrast? None   Preliminary Result   1. Diffuse colonic wall thickening, similar to the previous study, possibly   an ongoing colitis.    2. 3rd spacing with small quantity of ascites, diffuse anasarca and trace   right pleural effusion. 3. Cholelithiasis with no acute features. 4. Increased size of pulmonary nodules suggesting metastatic disease. XR CHEST PORTABLE   Final Result   Cardiomegaly with no acute cardiopulmonary process demonstrated           Xr Chest Portable    Result Date: 8/28/2020  EXAMINATION: ONE XRAY VIEW OF THE CHEST 8/28/2020 5:20 pm COMPARISON: 08/26/2020 HISTORY: ORDERING SYSTEM PROVIDED HISTORY: Nausea, vomiting abdominal pain TECHNOLOGIST PROVIDED HISTORY: Reason for exam:->Nausea, vomiting abdominal pain Reason for Exam: N/V/D AND FATIGUE X 1 DAY; STENTS PLACED BLE IN 8/27/2020 Acuity: Acute Type of Exam: Initial Relevant Medical/Surgical History: SEE EPIC FINDINGS: Cardiomegaly. Pulmonary vasculature within normal limits. Lungs clear. Costophrenic angles sharp     Cardiomegaly with no acute cardiopulmonary process demonstrated     Xr Chest Portable    Result Date: 8/26/2020  EXAMINATION: ONE XRAY VIEW OF THE CHEST 8/26/2020 2:04 am COMPARISON: July 29, 2020 HISTORY: ORDERING SYSTEM PROVIDED HISTORY: CP TECHNOLOGIST PROVIDED HISTORY: Reason for exam:->CP Reason for Exam: Nausea (had cardiac cath today; c/o n/v) FINDINGS: No evidence of consolidation, edema or other acute pulmonary process. No evidence of acute process of the cardiac or mediastinal structures. No evidence of pneumothorax or pleural effusion. Unchanged enlargement of the cardiopericardial shadow     No evidence of acute cardiopulmonary disease.            PROCEDURES   Unless otherwise noted below, none     Procedures    CRITICAL CARE TIME   N/A    CONSULTS:  IP CONSULT TO HOSPITALIST  IP CONSULT TO NEPHROLOGY      EMERGENCY DEPARTMENT COURSE and DIFFERENTIAL DIAGNOSIS/MDM:   Vitals:    Vitals:    08/28/20 1900 08/28/20 1952 08/28/20 2035 08/28/20 2052   BP: 110/62 (!) 109/48 113/62 (!) 110/53   Pulse: 60 60 68 61   Resp: 13 16 21 17   Temp:       TempSrc:       SpO2: 100%   95%   Weight: Height:           Patient was given the following medications:  Medications   metronidazole (FLAGYL) 500 mg in NaCl 100 mL IVPB premix (500 mg Intravenous New Bag 8/28/20 2111)   ondansetron (ZOFRAN) injection 4 mg (4 mg Intravenous Given 8/28/20 1826)   0.9 % sodium chloride bolus (0 mLs Intravenous Stopped 8/28/20 1927)           Patient presenting with generalized abdominal pain progressive over the past 48-72 hours. Patient had bilateral leg stents placed by Dr. Amanda Jeffers Tuesday of this week. The patient beginning yesterday with progressive abdominal pain and generally with nausea vomiting yesterday on significant diarrhea today. Reports no blood or mucus. History of C. Difficile. Patient has hemodialysis with ESRD Monday, Wednesday and Friday 4 hours. Missed dialysis today. She will need dialysis tomorrow. ED evaluation laboratory studies; WBC 12.9 with a slight left shift. BUN 47 creatinine slightly increased from baseline up to 4.5 with GFR down to 9. The patient's troponin 0.14 and proBNP greater than 70,000 I suspect this related to ESRD and troponin at baseline. ABG showing a venous pH of 7.3 with BE -3.2. Lactate elevated 3.0. She is not on metformin. Abdominal pelvic CT scan without IV contrast shows diffuse colonic wall thickening similar to previous study to possibly represent ongoing colitis. Small ascites noted. Asymptomatic cholelithiasis noted. She does have increased size pulmonary nodules to suggest metastatic disease per radiology. Chest CT may benefit. ED treatment 500 mL saline, Zofran 4 mg IV x1 and metronidazole 500 mg IVPB x1. Pending studies; stool studies. This patient was evaluated by attending physician who recommends admission. At 9:30 PM I discussed case with hospitalist and the patient will be admitted. Discussion regarding choice of metronidazole versus oral vancomycin. FINAL IMPRESSION      1. Generalized abdominal pain    2.  Nausea vomiting and diarrhea    3. General weakness    4. Colitis    5. Hx of Clostridium difficile infection    6. Elevated lactic acid level    7. ESRD on hemodialysis ([facility]    8. Impaired mobility          DISPOSITION/PLAN   DISPOSITION        PATIENT REFERREDTO:  Ld Bolaños MD  [address] [address]  [phone]            DISCHARGE MEDICATIONS:  New Prescriptions    No medications on file       DISCONTINUED MEDICATIONS:  Discontinued Medications    No medications on file              (Please note that portions of this note were completed with a voice recognition program.  Efforts were made to edit the dictations but occasionally words are mis-transcribed. )    Ian Judge PA-C (electronically signed)           Ian Judge PA-C  08/28/20 6300

## 2020-08-28 NOTE — ED NOTES

## 2020-08-29 NOTE — PROGRESS NOTES
Sent the following to Dr. Madilyn Cockayne through a secure message:    8/29/20 5:07 AM   [phone] Hospital or Facility: [facility] A2 HEART FAILURE From: Brett Limon RE: Kimberly Benjamin RM: [id number] FYI: The patient's sodium level is 129. Would you like any interventions? Will look for orders. Thank you.

## 2020-08-29 NOTE — ED NOTES
[facility] @2110  Per Jossy ACOSTA  RE  Admission, abdominal pain, concern for C. difficile with history C. difficile, Flagyl given.    in ED @2130       Tab[address] [id number]  08/28/20 2131

## 2020-08-29 NOTE — FLOWSHEET NOTE
Treatment time: 3 hours  Net UF: 0 ml    Pre weight: 60.3 kg   Post weight: 60.2 kg  EDW: 59 kg    Access used: Left arm AVG  Access function: GOOD with  ml/min    Medications or blood products given: Lidocaine (ID), Aranesp    Regular outpatient schedule: TYLER ( missed HD on 08/28 so HD today)    Summary of response to treatment: Pt's asymptomatic hypotension, saline given and no UF. HD completed in full, hemostasis less than 10 minutes, thrill and bruit are present. Cirt Line: Initial Hct: 26.2;   End Profile : A; Refill ( Hct.1 -27.5  subtract Hct 2- 27.5): 0.0 ( no Refill); BV: -4.9 %      Copy of dialysis treatment record placed in chart, to be scanned into EMR.     08/29/20 1011 08/29/20 1339   Vital Signs   /60 (!) 116/58   Temp 97.7 °F (36.5 °C) 98.1 °F (36.7 °C)   Pulse 60 59   Resp 16 16   SpO2 91 % (!) 10 %   Weight 132 lb 15 oz (60.3 kg) 132 lb 11.5 oz (60.2 kg)   Weight Method Actual;Bed scale  (1 pillow, 1 sheet, and 1 pad) Actual;Bed scale   Percent Weight Change 0.41 -0.17   Dry Weight 130 lb 1.1 oz (59 kg)  --    Pain Assessment   Pain Level 2  --    Pain Type Acute pain  --    Patient's Stated Pain Goal No pain  --    Response to Pain Intervention Patient Satisfied  --    Post-Hemodialysis Assessment   Post-Treatment Procedures  --  Blood returned; Access bleeding time < 10 minutes   Machine Disinfection Process  --  Acid/Vinegar Clean;Heat Disinfect; Exterior Machine Disinfection   Rinseback Volume (ml)  --  400 ml   Total Liters Processed (l/min)  --  60 l/min   Dialyzer Clearance  --  Lightly streaked   Duration of Treatment (minutes)  --  180 minutes   Heparin amount administered during treatment (units)  --  0 units   Hemodialysis Intake (ml)  --  900 ml   Hemodialysis Output (ml)  --  900 ml   NET Removed (ml)  --  0 ml   Tolerated Treatment  --  Good   Bilateral Breath Sounds Clear;Diminished Clear;Diminished   Edema Right lower extremity; Left lower extremity Right lower extremity; Left lower extremity   RLE Edema +2;Pitting +2;Pitting   LLE Edema +2;Non-pitting +2;Non-pitting

## 2020-08-29 NOTE — ED NOTES
Nurse to nurse report given face to face. Questions answered, care transferred. Pt off unit via wheelchair in NAD, RR even and unlabored.       Neha Sherman RN  08/28/20 3546

## 2020-08-29 NOTE — PROGRESS NOTES
Hospitalist Progress Note      PCP: Nishant Choudhary MD    Date of Admission: 8/28/2020    Chief Complaint: Diarrhea     Hospital Course:   67 y.o. female with multiple medical problems including ESRD on HD, CAD, PAD with recent lower extremity stenting, previous history of C. difficile infection presents complaining of intractable diarrhea starting yesterday. She had too many episodes to count of watery nonbloody diarrhea starting yesterday and continuing through today up until about 1 hour prior to coming to the emergency room. She did have associated abdominal pain. She denied fevers or chills. She has not had diarrhea for several hours now. She has no abdominal pain currently. She is able to tolerate fluids. Subjective:   Pt is on 1 LPM. Afebrile. VSS. Currently undergoing HD. No N/V. Poor appetite. No dyspnea or chest pain. No abdominal pain. +diarrhea.        Medications:  Reviewed    Infusion Medications    argatroban 1.5 mcg/kg/min (08/29/20 0919)     Scheduled Medications    lidocaine        darbepoetin brando-polysorbate  40 mcg Intravenous Once in dialysis    aspirin  81 mg Oral Daily    clopidogrel  75 mg Oral Daily    donepezil  5 mg Oral Nightly    losartan  50 mg Oral QPM    metoprolol tartrate  50 mg Oral BID    pantoprazole  40 mg Oral QAM AC    rosuvastatin  5 mg Oral Nightly    sertraline  100 mg Oral Daily    sodium chloride flush  10 mL Intravenous 2 times per day     PRN Meds: lidocaine PF, hydrOXYzine, ondansetron, promethazine, traMADol, sodium chloride flush, acetaminophen **OR** acetaminophen, polyethylene glycol      Intake/Output Summary (Last 24 hours) at 8/29/2020 1139  Last data filed at 8/29/2020 1031  Gross per 24 hour   Intake 620 ml   Output --   Net 620 ml       Physical Exam Performed:    /60   Pulse 60   Temp 97.7 °F (36.5 °C)   Resp 16   Ht 4' 11\" (1.499 m)   Wt 132 lb 15 oz (60.3 kg)   SpO2 91%   BMI 26.85 kg/m²     General appearance: No apparent distress, appears stated age and cooperative. HEENT: Pupils equal, round, and reactive to light. Conjunctivae/corneas clear. Neck: Supple, with full range of motion. No jugular venous distention. Trachea midline. Respiratory:  Normal respiratory effort. Clear to auscultation, bilaterally without Rales/Wheezes/Rhonchi. Cardiovascular: Regular rate and rhythm with normal S1/S2 without murmurs, rubs or gallops. Abdomen: Soft, non-tender, non-distended with normal bowel sounds. Musculoskeletal: No clubbing, cyanosis or edema bilaterally. Full range of motion without deformity. Skin: Skin color, texture, turgor normal.  No rashes or lesions. Neurologic:  Neurovascularly intact without any focal sensory/motor deficits. Cranial nerves: II-XII intact, grossly non-focal.  Psychiatric: Alert and oriented, thought content appropriate, normal insight  Capillary Refill: Brisk,< 3 seconds   Peripheral Pulses: +2 palpable, equal bilaterally       Labs:   Recent Labs     08/28/20  1735   WBC 12.9*   HGB 9.9*   HCT 32.0*        Recent Labs     08/28/20  1735 08/29/20  0251   * 129*   K 4.3 3.9   CL 91* 92*   CO2 24 23   BUN 47* 50*   CREATININE 4.5* 4.9*   CALCIUM 9.3 8.8     Recent Labs     08/28/20  1735   AST 14*   ALT 8*   BILITOT 0.7   ALKPHOS 137*     Recent Labs     08/28/20  1735   INR 1.32*     Recent Labs     08/28/20  1735   TROPONINI 0.14*       Urinalysis:      Lab Results   Component Value Date    NITRU POSITIVE 03/08/2020    WBCUA 6-10 03/08/2020    BACTERIA 4+ 03/08/2020    RBCUA 3-4 03/08/2020    BLOODU TRACE-INTACT 03/08/2020    SPECGRAV 1.020 03/08/2020    GLUCOSEU Negative 03/08/2020    GLUCOSEU NEGATIVE 10/07/2011       Radiology:  CT ABDOMEN PELVIS WO CONTRAST Additional Contrast? None   Final Result   1. Diffuse colonic wall thickening, similar to the previous study, possibly   an ongoing colitis.    2. 3rd spacing with small quantity of ascites, diffuse anasarca and trace   right pleural effusion. 3. Cholelithiasis with no acute features. 4. Increased size of pulmonary nodules suggesting metastatic disease. XR CHEST PORTABLE   Final Result   Cardiomegaly with no acute cardiopulmonary process demonstrated             Assessment/Plan:    Active Hospital Problems    Diagnosis    Diarrhea [R19.7]       Diarrhea in pt with history of C. Diff:  - CT abdomen/pelvis showed diffuse colonic wall thickening, similar to previous study, possibly ongoing colitis. - Pt given dose of flagyl in ED. Will start Cipro and Flagyl for now. - Check stool studies for C. Diff, GI bacterial panel, ova & parasites. ESRD on hemodialysis M/W/F:  - Nephrology managing HD. Coronary artery disease:  - Continue aspirin, plavix, losartan, metoprolol and statin. Peripheral artery disease:  - S/p angioplasty and stenting of the bilateral common iliac arteries at the aortic bifurcation on 8/25/20.   - Continue aspirin, plavix and statin. Depression:   - Mood stable. Continue her home zoloft. History of soft tissue sarcoma:  - CT shows enlarging pulmonary nodules concerning for metastatic disease:  - Will consult Hem/Onc for further assistance. DVT Prophylaxis: On argatroban infusion (has history of KEENAN)  Diet: DIET CARDIAC;  Code Status: Full Code    PT/OT Eval Status:  Will order for tomorrow     Dispo - 1-2 days pending clinical course     LUCIAN Abdalla - CNP

## 2020-08-29 NOTE — PLAN OF CARE
Nutrition Problem #1: Inadequate oral intake  Intervention: Food and/or Nutrient Delivery: Continue Current Diet, Start Oral Nutrition Supplement  Nutritional Goals: Pt to consume >50% of all meals

## 2020-08-29 NOTE — CONSULTS
Thank you to requesting provider:  Dr. Dinh Cortes , for asking us to see Skye Poe  Reason for consultation:  ESRD  Chief Complaint:  Diarrhea     History of Presenting Illness      66 y/o female with history of ESRD on dialysis MWF admitted to the hospital with diarrhea. She was too sick to go to dialysis yesterday. She feels better. She says that diarrhea seems to be slowing down. She has a left FA graft which has been working well. On IV fluids for volume depletion. No SOB.        Past Medical/Surgical History      Active Ambulatory Problems     Diagnosis Date Noted    Asthma 09/16/2010    Vertigo 10/08/2011    Type 2 diabetes mellitus with diabetic polyneuropathy, with long-term current use of insulin ([address] 04/06/2012    HTN (hypertension) 10/22/2012    CAD (coronary artery disease) 10/22/2012    Perennial allergic rhinitis 04/15/2014    Osteoarthritis of spine with radiculopathy, lumbar region 10/20/2015    Pain of both hip joints 10/20/2015    Spinal stenosis, lumbar region, with neurogenic claudication 10/20/2015    Right sided sciatica 10/20/2015    Chronic pain of both knees 07/19/2016    Osteoporosis 01/30/2017    Primary osteoarthritis of both knees 02/28/2017    Pulmonary nodule 10/15/2019    Normocytic normochromic anemia 11/22/2019    Anasarca 03/07/2020    Diabetic polyneuropathy associated with type 2 diabetes mellitus ([address] 03/10/2020    Diabetic ulcer of right heel associated with type 2 diabetes mellitus, with necrosis of muscle ([address] 03/10/2020    Debility 03/23/2020    Moderate malnutrition ([address] 04/02/2020    End stage renal disease ([address] 06/25/2020    Atherosclerosis of native artery of right lower extremity with ulceration of heel ([address] 08/14/2020    Pressure ulcer of right ischium, stage II ([address] 08/17/2020    Anxiety disorder due to general medical condition with panic attack 04/15/2020    Atrial fibrillation ([address] 08/17/2020    Edema of both legs 01/29/2020  Gastroesophageal reflux disease without esophagitis 01/29/2020    Hyperlipidemia, mixed 03/11/2016    Nonrheumatic mitral (valve) insufficiency 02/07/2017    Orthostatic hypotension 04/23/2014    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    Skin tear of upper arm without complication, left, initial encounter 08/17/2020    Ulcers of both lower legs, limited to breakdown of skin ([address] 08/17/2020    Peripheral venous insufficiency 08/17/2020    Diabetic ulcer of 4th toe of right foot associated with type 2 diabetes mellitus, with fat layer exposed ([address] 08/17/2020    Chronic deep vein thrombosis (DVT) of distal vein of both lower extremities ([address] 08/17/2020    Cholelithiasis 08/17/2020    Moderate to severe pulmonary hypertension ([address] 08/17/2020    Atherosclerotic peripheral vascular disease with ulceration ([address]      Resolved Ambulatory Problems     Diagnosis Date Noted    Coronary artery disease 09/16/2010    Uncontrolled hypertension 10/08/2011    Chest pain 04/05/2012    Acute asthma exacerbation 04/06/2012    Chest pain 10/22/2012    Lipids abnormal 10/22/2012    Osteoarthritis of both knees 09/23/2013    Near syncope 03/13/2014    Primary localized osteoarthrosis, lower leg 03/23/2015    Tibialis tendinitis 09/14/2015    Endometrial adenocarcinoma ([address] 11/17/2015    Post-operative state 12/08/2015    Drainage from wound 12/08/2015    S/P hysterectomy with oophorectomy 12/08/2015    S/P total hysterectomy and BSO (bilateral salpingo-oophorectomy) 01/05/2016    Chronic pain of right knee 07/19/2016    History of endometrial cancer 07/25/2016    Menopausal state 07/25/2016    Osteoporosis screening 01/30/2017    Vaginal atrophy 07/31/2017    Overdose of insulin, accidental or unintentional, initial encounter 03/03/2018    Hypoglycemia due to insulin 03/03/2018    Hypoglycemia 03/03/2018    Acute respiratory failure ([address] 10/15/2019    Respiratory arrest before cardiac arrest [facility] 11/11/2019    Acute respiratory failure with hypoxia and hypercapnia (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 03/10/2020    Pneumonia 03/28/2020    ESRD (end stage renal disease) ([facility] 03/28/2020    Colitis 04/24/2020    Acute bilateral deep vein thrombosis (DVT) of popliteal veins ([facility] 01/29/2020    Chronic ischemic heart disease 03/11/2016    Endometrial cancer ([facility] 11/30/2015    Gangrene of toe of both feet ([facility] 08/17/2020    History of amputation of lesser toe ([facility] 01/29/2020    Hx of cardiac arrest 01/29/2020    Stented coronary artery 12/15/2015    Syncope 04/30/2012     Past Medical History:   Diagnosis Date    Angina     Clostridium difficile infection 4/24/2020, 03/28/2020    Diabetic foot ulcer ([facility] 03/10/2020    MI (myocardial infarction) ([facility] 1983    Obesity     Small saphenous vein thrombophlebitis, right 02/2020    Tobacco use          Review of Systems     Constitutional:  No weight loss, no fever/chills  Eyes:  No eye pain, no eye redness  Cardiovascular:  No chest pain, +edema  Respiratory:  No hemoptysis, no stridor  Gastrointestinal:  No blood in stool, no n/v, + diarrhea  Genitoruinary:  No hematuria, not making much urine   Musculoskeletal:  No joint swelling, no redness  Integumentary:  No Rash, no itching  Neurological:  No focal weakness, No new sensory deficit  Psychiatric:  No depression, no confusion  Endocrine:  No polyuria, no polydipsia       Medications      Reviewed in EMR range of movement  Chest:  CTAB, good respiratory effort, good air movement  CV:  Regular, no rub   Abdomen:  NTND, soft, +BS, no hepatosplenomegaly  Extremities:  + peripheral edema  Neurological:  Moving all four extremities, CN II-XII grossly intact  Lymphatics:  No palpable lymph nodes  Skin:  No rash, no jaundice  Psychiatric:  Normal insight and judgement, good recall  Access:  Left FA loop graft     Data     Recent Labs     08/28/20  1735   WBC 12.9*   HGB 9.9*   HCT 32.0*   MCV 90.5        Recent Labs     08/28/20  1735 08/29/20  0251   * 129*   K 4.3 3.9   CL 91* 92*   CO2 24 23   GLUCOSE 299* 231*   BUN 47* 50*   CREATININE 4.5* 4.9*   LABGLOM 9* 9*   GFRAA 11* 10*       Assessment:    ESRD:  On HD mwf, working AVG. Labs stable but did miss HD yesterday due to diarrhea    Diarrhea:  C.diff pending along with additional work up. Seems to have improved     Anemia of chronic disease:  Hb is near target    Plan:     Will arrange for dialysis today and then next week we can get her back to regular schedule  Follow labs  Hold IV fluids and monitor diarrhea and blood pressure     Thank you for asking us to participate in the management of your patient, please do not hesitate to contact me for any concerns regarding my recommendations as outlined above.    -----------------------------  Enid French M.D.   [facility]

## 2020-08-29 NOTE — PROGRESS NOTES
4 Eyes Skin Assessment     The patient is being assess for  Admission    I agree that 2 RN's have performed a thorough Head to Toe Skin Assessment on the patient. ALL assessment sites listed below have been assessed. Areas assessed by both nurses: Saman Roque and   [x]   Head, Face, and Ears   [x]   Shoulders, Back, and Chest  [x]   Arms, Elbows, and Hands   [x]   Coccyx, Sacrum, and Ischum  [x]   Legs, Feet, and Heels        Does the Patient have Skin Breakdown?   Yes a wound was noted on the Admission Assessment and an WOUND LDA was Initiated documentation include the Tess-wound, Wound Assessment, Measurements, Dressing Treatment, Drainage, and Color\",         Choco Prevention initiated:  Yes   Wound Care Orders initiated:  Yes      [id number]  nurse consulted for Pressure Injury (Stage 3,4, Unstageable, DTI, NWPT, and Complex wounds):  Yes      Nurse 1 eSignature: Electronically signed by Stefan Patricia RN on 8/29/20 at 1:05 AM EDT    **SHARE this note so that the co-signing nurse is able to place an eSignature**    Nurse 2 eSignature: Electronically signed by Addie Stroud RN on 8/29/20 at 1:06 AM EDT

## 2020-08-29 NOTE — CONSULTS
Pharmacy to Manage Argatroban Infusion per [facility]    Dx: Hx of DVT  Pt wt = 60.1 kg. Baseline aPTT = pending. IV Argatroban drip adjustments to be made as per Dose Adjustment Chart below. aPTT < 49      Increase infusion by 0.5mcg/kg/min  aPTT 49 - 76       Therapeutic range (no change)  aPTT 76.1 - 103   Decrease infusion by 0.5mcg/kg/min  aPTT > 103      Hold Argatroban. Notify physician. Recheck aPTT q2h until aPTT < 81 sec, then decrease infusion by 0.5mcg/kg/min and restart. Start argatroban infusion at 7.2 mL/hr (2 mcg/kg/min) now and check aPTT every 2 hours until therapeutic x2 then daily.   Marialuisa Manrique, PharmD  8/29/2020 12:04 AM

## 2020-08-29 NOTE — PROGRESS NOTES
Nutrition Assessment     Type and Reason for Visit: Initial, Positive Nutrition Screen(poor po, skin/wound, diarrhea)    Nutrition Recommendations/Plan:   1. Continue Cardiac diet  2. Monitor adequate po intake  3. Provide Glucerna ONS bid   4. Monitor nutrition adequacy, education needs, pertinent labs, bowel habits, wt changes, and clinical progress    Nutrition Assessment:  Pt admitted for intractable diarrhea. Pt w/ PMH of ESRD on HD, CAD, PAD with recent lower extremity stenting, previous history of C-Diff. Pt is at risk for nutritional compromise AEB reported decreased appetite PTA. Pt states her appetite has been poor d/t not feeling well and diarrhea. She did consume % of breakfast today. Favorable to Glucerna ONS to help her meet nutrition needs. Noted, wt fluctuations show wt loss over the last few months, some may be d/t recently starting HD and fluid changes. Pt denied need for diet education at this time. Malnutrition Assessment:  Malnutrition Status: At risk for malnutrition (Comment)     Estimated Daily Nutrient Needs:  Energy (kcal): 0294-1581; Weight Used for Energy Requirements:  Current(60kg)     Protein (g): 72-90(1.2-1.5); Weight Used for Protein Requirements:  Current(60kg)          Nutrition Related Findings: +2 non-pitting LLE edema; +2 pitting RLE edema      Current Nutrition Therapies:    DIET CARDIAC;     Anthropometric Measures:  · Height: 4' 11\" (149.9 cm)  · Current Body Wt: 132 lb (59.9 kg)   · BMI: 26.6    Nutrition Diagnosis:   · Inadequate oral intake related to increase demand for energy/nutrients as evidenced by poor intake prior to admission      Nutrition Interventions:   Food and/or Nutrient Delivery:  Continue Current Diet, Start Oral Nutrition Supplement  Nutrition Education/Counseling:  Education not indicated   Coordination of Nutrition Care:  Continued Inpatient Monitoring    Goals:  Pt to consume >50% of all meals       Nutrition Monitoring and Evaluation: Behavioral-Environmental Outcomes:      Food/Nutrient Intake Outcomes:  Food and Nutrient Intake, Supplement Intake  Physical Signs/Symptoms Outcomes:  Diarrhea, Weight     Discharge Planning:     Too soon to determine     Electronically signed by Calvin Keyes RD, LD on 8/29/20 at 12:21 PM EDT    Contact: [phone]

## 2020-08-29 NOTE — H&P
Hospital Medicine History & Physical      PCP: Seferino Zayas MD    Date of Admission: 8/28/2020    Date of Service: Pt seen/examined on 8/28/2020 at [facility]    Chief Complaint: Diarrhea      History Of Present Illness:    67 y.o. female with multiple medical problems including end-stage renal disease on hemodialysis, coronary artery disease, peripheral artery disease with recent lower extremity stenting, previous history of C. difficile infection presents complaining of intractable diarrhea starting yesterday. She had too many episodes to count of watery nonbloody diarrhea starting yesterday and continuing through today up until about 1 hour prior to coming to the emergency room. She did have associated abdominal pain. She denied fevers or chills. She has not had diarrhea for several hours now. She has no abdominal pain currently. She is able to tolerate fluids.     Past Medical History:        Diagnosis Date    Acute bilateral deep vein thrombosis (DVT) of popliteal veins ([address] 1/29/2020    Acute pulmonary edema (HCC)     Acute respiratory failure with hypoxia and hypercapnia ([address] 11/11/2019    Angina     with exertion    Cardiac arrest ([address]     Cellulitis 2/21/2020    Clostridium difficile infection 4/24/2020, 03/28/2020    Diabetic foot ulcer ([address] 03/10/2020    Endometrial adenocarcinoma ([address] 11/17/2015    Gangrene of toe of both feet ([address] 8/17/2020    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 ([address] 11/24/2019    MI (myocardial infarction) ([address] 1983    Obesity     Pneumonia 3/28/2020    Small saphenous vein thrombophlebitis, right 02/2020    Syncope 4/30/2012    Tobacco use        Past Surgical History:        Procedure Laterality Date    BRONCHOSCOPY N/A 11/12/2019    BRONCHOSCOPY WASHING performed by Ezequiel Richardson MD at [address] [place] [address] N/A 11/23/2019    BRONCHOSCOPY ALVEOLAR LAVAGE performed by Mati Best MD at [facility] BRONCHOSCOPY  11/23/2019    BRONCHOSCOPY THERAPUTIC ASPIRATION INITIAL performed by Codie Her MD at [address] [place] [address]  11/23/2019    BRONCHOSCOPY FOREIGN BODY REMOVAL performed by Codie Her MD at [address] [facility] Right     CATARACT REMOVAL Bilateral     COLONOSCOPY      CORONARY ANGIOPLASTY WITH STENT PLACEMENT  4/2012    DIAGNOSTIC CARDIAC CATH LAB PROCEDURE      DIALYSIS FISTULA CREATION Left 6/25/2020    LEFT FOREARM LOOP DIALYSIS GRAFT performed by Leeanne York MD at [address] [place] [address] ARTHROSCOPY      TYLOR AND BSO  11/30/2015    TOE AMPUTATION Right 1/16/2020    RIGHT SECOND AND THIRD TOE AMPUTATION, RIGHT HEEL WOUND DEBRIDEMENT, RIGHT HALLUX TOE NAIL DEBRIDEMENT performed by Sudhir Castillo DPM at [facility]. N/A 12/2/2019    EGD PEG PLACEMENT W/ ANESTHESIA performed by Donnie Bear MD at [address] [place] [address] 08/25/2020    common iliac artery angioplasty and stenting       Medications Prior to Admission:   Prior to Admission medications    Medication Sig Start Date End Date Taking? Authorizing Provider   ondansetron (ZOFRAN ODT) 4 MG disintegrating tablet Take 1 tablet by mouth every 8 hours as needed for Nausea 8/26/20   LUCIAN Raymundo - CNP   clopidogrel (PLAVIX) 75 MG tablet Take 1 tablet by mouth daily 8/26/20   Leeanne York MD   hydrOXYzine (VISTARIL) 25 MG capsule Take 25 mg by mouth 3 times daily as needed for Itching    Historical Provider, MD   promethazine (PHENERGAN) 25 MG tablet Take 25 mg by mouth every 6 hours as needed for Nausea    Historical Provider, MD   traMADol (ULTRAM) 50 MG tablet Take 50 mg by mouth every 6 hours as needed for Pain.     Historical Provider, MD   sertraline (ZOLOFT) 100 MG tablet Take 1 tablet by mouth daily 4/13/20   Marla Carrera MD   lactobacillus (CULTURELLE) capsule Take 1 capsule by mouth 3 times daily (with meals)  Patient taking differently: Take 1 capsule by mouth daily  4/13/20   Zach Carmichael MD   losartan (COZAAR) 50 MG tablet Take 1 tablet by mouth daily  Patient taking differently: Take 50 mg by mouth every evening  4/14/20   Zach Carmichael MD   aspirin 81 MG chewable tablet Take 1 tablet by mouth daily 3/23/20   Stella Low MD   pantoprazole (PROTONIX) 40 MG tablet Take 1 tablet by mouth every morning (before breakfast) 3/24/20   Stella Low MD   metoprolol tartrate (LOPRESSOR) 50 MG tablet Take 50 mg by mouth 2 times daily    Historical Provider, MD   rosuvastatin (CRESTOR) 5 MG tablet Take 5 mg by mouth nightly     Historical Provider, MD   insulin lispro (HUMALOG) 100 UNIT/ML injection vial Inject 0-12 Units into the skin 3 times daily (with meals) **Medium Dose Corrective Algorithm**  Glucose: Dose:  If <139             No Insulin  140-199 2 Units  200-249 4 Units  250-299 6 Units  300-349 8 Units  350-400 10 Units  Above 400       12 Units 1/23/20   Zach Carmichael MD   donepezil (ARICEPT) 5 MG tablet Take 1 tablet by mouth nightly 1/23/20   Zach Carmichael MD   B Complex-C-Iron (SUPER B-COMPLEX/IRON/VITAMIN C PO) Take 1 tablet by mouth daily. 9/2/10   Historical Provider, MD       Allergies:  Ativan [lorazepam]; Dilaudid [hydromorphone hcl]; Flexeril [cyclobenzaprine hcl]; Heparin; Soma [carisoprodol]; Vicodin [hydrocodone-acetaminophen];  Penicillins; and Sulfa antibiotics    Social History:      The patient currently lives with grandkids, is     TOBACCO: no    ETOH: no    DRUGS: no      Family History:      Positive as follows:        Problem Relation Age of Onset    Diabetes Mother     Heart Failure Mother     Heart Disease Mother    [facility] Stroke Mother     Diabetes Sister     Diabetes Brother     Diabetes Maternal Grandmother     Asthma Son     Asthma Son     Diabetes Daughter     Heart Disease Daughter     Irritable Bowel Syndrome Daughter     Diabetes Brother     Cancer Neg Hx     Results   Component Value Date    NITRU POSITIVE 03/08/2020    WBCUA 6-10 03/08/2020    BACTERIA 4+ 03/08/2020    RBCUA 3-4 03/08/2020    BLOODU TRACE-INTACT 03/08/2020    SPECGRAV 1.020 03/08/2020    GLUCOSEU Negative 03/08/2020    GLUCOSEU NEGATIVE 10/07/2011          Radiology:     CT ABDOMEN PELVIS WO CONTRAST Additional Contrast? None   Preliminary Result   1. Diffuse colonic wall thickening, similar to the previous study, possibly   an ongoing colitis. 2. 3rd spacing with small quantity of ascites, diffuse anasarca and trace   right pleural effusion. 3. Cholelithiasis with no acute features. 4. Increased size of pulmonary nodules suggesting metastatic disease. XR CHEST PORTABLE   Final Result   Cardiomegaly with no acute cardiopulmonary process demonstrated             ASSESSMENT  1. Intractable diarrhea with concern for C. difficile. Currently resolved. Received IV metronidazole in emergency department. 2. End-stage renal disease on hemodialysis Mondays Wednesdays Fridays. Missed today's session due to diarrhea. 3. Coronary artery disease  4. Peripheral artery disease  5. Hypertension  6. Depression  7. History of heparin-induced thrombocytopenia     PLAN  1. Admitting for observation and C. difficile testing if provide stool  2. Nephrology consult for morning as she missed her dialysis session today  3. Continue home aspirin, Plavix, statin, metoprolol, and losartan. 4. Argatroban   for DVT prophylaxis. Pharmacy to dose    DVT Prophylaxis:Argatroban   Diet: Renal  Code Status:Full code   Surrogate: [employer]    Dispo -admitting for observation, hemodialysis in morning. Anticipate less than 2 midnight stay       Patsy Cook MD    Thank you Linda Guerrero MD for the opportunity to be involved in this patient's care. If you have any questions or concerns please feel free to contact me at [phone].

## 2020-08-29 NOTE — PLAN OF CARE
Problem: Skin Integrity:  Goal: Absence of new skin breakdown  Description: Absence of new skin breakdown  Outcome: Ongoing

## 2020-08-30 PROBLEM — A04.72 CLOSTRIDIUM DIFFICILE ENTEROCOLITIS: Status: ACTIVE | Noted: 2020-01-01

## 2020-08-30 PROBLEM — R91.8 LUNG NODULES: Status: ACTIVE | Noted: 2020-01-01

## 2020-08-30 PROBLEM — S22.000A CLOSED COMPRESSION FRACTURE OF THORACIC VERTEBRA (HCC): Status: ACTIVE | Noted: 2020-01-01

## 2020-08-30 NOTE — PROGRESS NOTES
Secure message to [facility], NP    \"Pt is a diabetic. Takes insulin at home. Been here for a day with no diabetic orders. . Can we get these in for her? Please advise. Thank you. \"

## 2020-08-30 NOTE — PROGRESS NOTES
Physical Therapy    Facility/Department: [facility] A2 CARD TELEMETRY  Initial Assessment/Treatment    NAME: Gela Rios  : 1945  MRN: [id number]    Date of Service: 2020    Discharge Recommendations:  Home with Home health PT, 24 hour supervision or assist(CTA for need for HHPT pending progress)   PT Equipment Recommendations  Equipment Needed: No    Assessment   Body structures, Functions, Activity limitations: Decreased functional mobility ; Decreased balance;Decreased ROM; Decreased strength;Decreased endurance  Assessment: Pt is a 67 y.o. female admitted to [facility] secondary to c. diff colitis. Pt lives with family in multilevel house but is able to stay on first floor with level entry. Pt reports she is typically MI with functional mobility and gait within the home, pt reports she typically furniture walks within the home. Pt is currently functioning slightly below her baseline completing bed mobility with MI/CGA, t/f with CGA and ambulates 20' wtih RW with CGA without LOB. Pt will benefit from continued skilled PT in acute care setting to addres above deficits. Recommend pt d/c home with 24 hr assist and HHPT. Specific instructions for Next Treatment: Progress mobility as tolerated  Prognosis: Good  Decision Making: Low Complexity  PT Education: Goals; General Safety;Gait Training;PT Role;Plan of Care; Functional Mobility Training;Transfer Training; Injury Prevention  Patient Education: Pt verbalized understanding  Barriers to Learning: None  REQUIRES PT FOLLOW UP: Yes  Activity Tolerance  Activity Tolerance: Patient Tolerated treatment well       Patient Diagnosis(es): The primary encounter diagnosis was Generalized abdominal pain. Diagnoses of Nausea vomiting and diarrhea, General weakness, Colitis, Hx of Clostridium difficile infection, Elevated lactic acid level, ESRD on hemodialysis ([address] and Impaired mobility were also pertinent to this visit.      has a past medical history of Acute bilateral deep vein thrombosis (DVT) of popliteal veins ([facility] Acute pulmonary edema ([address] Acute respiratory failure with hypoxia and hypercapnia ([address] Angina, Cardiac arrest ([address] Cellulitis, Clostridium difficile infection, Diabetic foot ulcer ([address] Endometrial adenocarcinoma ([address] Gangrene of toe of both feet ([address] 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 ([facility] MI (myocardial infarction) ([address] Obesity, Pneumonia, Small saphenous vein thrombophlebitis, right, Syncope, and Tobacco use.   has a past surgical history that includes Carpal tunnel release (Right); Knee arthroscopy; Coronary angioplasty with stent (4/2012); bronchoscopy (N/A, 11/12/2019); bronchoscopy (N/A, 11/23/2019); bronchoscopy (11/23/2019); bronchoscopy (11/23/2019); Upper gastrointestinal endoscopy (N/A, 12/2/2019); Toe amputation (Right, 1/16/2020); Diagnostic Cardiac Cath Lab Procedure; Dialysis fistula creation (Left, 6/25/2020); Colonoscopy; Cataract removal (Bilateral); mansi and bso (cervix removed) (11/30/2015); and vascular surgery (Bilateral, 08/25/2020).     Restrictions  Restrictions/Precautions  Restrictions/Precautions: General Precautions, Isolation  Position Activity Restriction  Other position/activity restrictions: Contact plus (C. diff), up with assist  Vision/Hearing  Vision: Impaired  Vision Exceptions: Wears glasses at all times  Hearing: Within functional limits     Subjective  General  Chart Reviewed: Yes  Patient assessed for rehabilitation services?: Yes  Additional Pertinent Hx: ESRD on HD,  Response To Previous Treatment: Not applicable  Family / Caregiver Present: Yes  Referring Practitioner: LUCIAN Domingo CNP  Referral Date : 08/30/20  Diagnosis: C. diff colitis  Follows Commands: Within Functional Limits  General Comment  Comments: RN cleared pt for session  Subjective  Subjective: Pt seated in chair on arrival, pleasant and agreeable to PT evaluation  Pain Screening  Patient Currently in Pain: Yes  Pain Assessment  Pain Assessment: 0-10  Pain Type: Acute pain;Chronic pain  Pain Location: Buttocks  Pain Orientation: Right  Pain Descriptors: Aching;Burning  Non-Pharmaceutical Pain Intervention(s): Ambulation/Increased Activity;Repositioned; Therapeutic presence  Vital Signs  Patient Currently in Pain: Yes  Pre Treatment Pain Screening  Intervention List: Patient able to continue with treatment    Orientation  Orientation  Overall Orientation Status: Within Normal Limits  Social/Functional History  Social/Functional History  Lives With: Family(4 grandkids under 12 y. o)  Type of Home: House  Home Layout: Multi-level, Able to Live on Main level with bedroom/bathroom  Home Access: Ramped entrance  Bathroom Shower/Tub: Tub/Shower unit  Bathroom Toilet: Handicap height  Bathroom Equipment: Tub transfer bench, Grab bars in shower, Grab bars around toilet  Bathroom Accessibility: Wheelchair accessible  Home Equipment: [employer] 450 bed, Standard walker, Heber Amanda, Reacher, Sock aid, 4 wheeled walker  Receives Help From: Family, Home health(Nurse visits)  ADL Assistance: Needs assistance  Dressing:  Moderate assistance(shoes)  Homemaking Responsibilities: Yes  Meal Prep Responsibility: Primary  Dependent Care Responsibility: Primary  Ambulation Assistance: Independent(Without AD within the home, pt reports she furniture walks)  Transfer Assistance: Independent  Active : No  Cognition   Cognition  Overall Cognitive Status: WFL    Objective     Observation/Palpation  Posture: Fair  Edema: 1+ BLE    AROM RLE (degrees)  RLE General AROM: Hip and knee WFL, ankle DF ROM limited to neutral position  AROM LLE (degrees)  LLE AROM : WFL     Strength RLE  Comment: Grossly 4/5, R ankle DF 2+/5  Strength LLE  Comment: Grossly 4/5        Sensation  Overall Sensation Status: Impaired  Light Touch: Severe deficits in the RLE     Bed mobility  Rolling to Left: Modified independent  Rolling to Right: Modified independent  Supine to Sit: Modified independent(To L, HOB flat with BR)  Sit to Supine: Contact guard assistance(Increased time for BLE)  Scooting: Stand by assistance(To scoot to EOB)     Transfers  Sit to Stand: Contact guard assistance  Stand to sit: Contact guard assistance  Bed to Chair: Contact guard assistance  Comment: STS recliner to RW CGA, SPT recliner to/from EOB without AD CGA     Ambulation  Ambulation?: Yes  More Ambulation?: Yes  Ambulation 1  Surface: level tile  Device: Rolling Walker  Assistance: Contact guard assistance  Quality of Gait: Step to gait pattern, RLE drop foot, forward flexed trunk, downward gaze. Pt steady, no overt LOB  Gait Deviations: Slow Caitlyn;Decreased step length;Decreased step height  Distance: 20'  Ambulation 2  Surface - 2: level tile  Device 2: No device  Assistance 2: Minimal assistance  Quality of Gait 2: Step to gait pattern, LLE drop foot, forward flexed trunk. Pt unsteady with Grant for balance  Gait Deviations: Slow Caitlyn;Decreased step length; Increased BRANDON; Decreased step height;Shuffles  Distance: 10'  Comments: Completed d/t pt reporting she ambulates I without AD within home, however pt unsteady and then revealing during gait that she typically furniture walks and has at lease 1 UE support. Further gait without AD deferred.   Stairs/Curb  Stairs?: No     Balance  Posture: Fair  Sitting - Static: Good  Sitting - Dynamic: Good  Standing - Static: Fair  Standing - Dynamic: Fair  Comments: CGA with RW, CGA to Grant without AD     Exercises  Hip Flexion: Seated marches BLE x 12  Hip Abduction: Seated clamshells BLE x 12  Knee Long Arc Quad: Seated BLE x 12  Ankle Pumps: Seated BLE x 12  Comments: Cues for sequence and technique     Plan   Plan  Times per week: 3-5x/wk  Times per day: Daily  Specific instructions for Next Treatment: Progress mobility as tolerated  Current Treatment Recommendations: Strengthening, Home Exercise Program, Safety Education & Training, Balance Training, Endurance Training, Patient/Caregiver Education & Training, Functional Mobility Training, Transfer Training, Gait Training  Safety Devices  Type of devices: All fall risk precautions in place, Left in chair, Call light within reach, Chair alarm in place, Nurse notified, Gait belt  Restraints  Initially in place: No    AM-PAC Score     AM-PAC Inpatient Mobility without Stair Climbing Raw Score : 17 (08/30/20 1834)  AM-PAC Inpatient without Stair Climbing T-Scale Score : 48.47 (08/30/20 1834)  Mobility Inpatient CMS 0-100% Score: 32.72 (08/30/20 1834)  Mobility Inpatient without Stair CMS G-Code Modifier : Natalio Christian (08/30/20 1834)       Goals  Short term goals  Time Frame for Short term goals: 5 days 9/04/20 (unless otherwise specified)  Short term goal 1: Pt will complete supine to/from sit with I  Short term goal 2: Pt will complete sit to/from stand and bed <> chair t/f with LRAD with MI  Short term goal 3: Pt will ambulate x 48' with RW with MI without LOB  Short term goal 4: 9/02/20: Pt will participate in 15 reps BLE exercises to increase strength and increase I with functional mobility  Patient Goals   Patient goals : \"Go home\"       Therapy Time   Individual Concurrent Group Co-treatment   Time In 1602         Time Out 8314         Minutes 42         Timed Code Treatment Minutes: 32 Minutes(10 minutes for eval)     If pt is unable to be seen after this session, please let this note serve as discharge summary. Please see case management note for discharge disposition. Thank you.     Elaine Mayen, PT, DPT

## 2020-08-30 NOTE — PROGRESS NOTES
Secure Text sent to [facility], \"Micro called and PCR came back C-diff positive. Will look for orders if indicated.  Thanks\"  Jordin Bernal

## 2020-08-30 NOTE — PROGRESS NOTES
Hospitalist Progress Note      PCP: Karina Weaver MD    Date of Admission: 8/28/2020    Chief Complaint: Diarrhea     Hospital Course:   \"71 y.o. female with multiple medical problems including ESRD on HD, CAD, PAD with recent lower extremity stenting, previous history of C. difficile infection presents to [facility] complaining of intractable diarrhea starting yesterday. She had too many episodes to count of watery nonbloody diarrhea starting yesterday and continuing through today up until about 1 hour prior to coming to the emergency room. She did have associated abdominal pain. She denied fevers or chills. She has not had diarrhea for several hours now. She has no abdominal pain currently. She is able to tolerate fluids. \"    Subjective:   Pt is on RA. Afebrile. VSS. No dyspnea or chest pain. No N/V but has poor appetite. No abdominal pain. Continues to have diarrhea.      Medications:  Reviewed    Infusion Medications    dextrose       Scheduled Medications    vancomycin (VANCOCIN) oral solution  125 mg Oral 4 times per day    insulin lispro  0-6 Units Subcutaneous TID WC    insulin lispro  0-3 Units Subcutaneous Nightly    aspirin  81 mg Oral Daily    clopidogrel  75 mg Oral Daily    donepezil  5 mg Oral Nightly    losartan  50 mg Oral QPM    metoprolol tartrate  50 mg Oral BID    pantoprazole  40 mg Oral QAM AC    rosuvastatin  5 mg Oral Nightly    sertraline  100 mg Oral Daily    sodium chloride flush  10 mL Intravenous 2 times per day     PRN Meds: lidocaine PF, glucose, dextrose, glucagon (rDNA), dextrose, hydrOXYzine, ondansetron, promethazine, traMADol, sodium chloride flush, acetaminophen **OR** acetaminophen, polyethylene glycol      Intake/Output Summary (Last 24 hours) at 8/30/2020 1148  Last data filed at 8/30/2020 0923  Gross per 24 hour   Intake 1380 ml   Output 900 ml   Net 480 ml       Physical Exam Performed:    /65   Pulse 61   Temp 98 °F (36.7 °C) (Oral)   Resp 12   Ht 4' 11\" (1.499 m)   Wt 132 lb 11.5 oz (60.2 kg)   SpO2 91%   BMI 26.81 kg/m²     General appearance: No apparent distress, appears stated age and cooperative. HEENT: Pupils equal, round, and reactive to light. Conjunctivae/corneas clear. Neck: Supple, with full range of motion. No jugular venous distention. Trachea midline. Respiratory:  Normal respiratory effort. Clear to auscultation, bilaterally without Rales/Wheezes/Rhonchi. Cardiovascular: Regular rate and rhythm with normal S1/S2 without murmurs, rubs or gallops. Abdomen: Soft, non-tender, non-distended with normal bowel sounds. Musculoskeletal: No clubbing, cyanosis or edema bilaterally. Full range of motion without deformity. Skin: Skin color, texture, turgor normal.  No rashes or lesions. Neurologic:  Neurovascularly intact without any focal sensory/motor deficits.  Cranial nerves: II-XII intact, grossly non-focal.  Psychiatric: Alert and oriented, thought content appropriate, normal insight  Capillary Refill: Brisk,< 3 seconds   Peripheral Pulses: +2 palpable, equal bilaterally       Labs:   Recent Labs     08/28/20  1735 08/30/20  0839   WBC 12.9* 6.9   HGB 9.9* 10.0*   HCT 32.0* 31.9*    279     Recent Labs     08/28/20  1735 08/29/20  0251 08/30/20  0839   * 129* 131*   K 4.3 3.9 4.7   CL 91* 92* 95*   CO2 24 23 23   BUN 47* 50* 29*   CREATININE 4.5* 4.9* 3.4*   CALCIUM 9.3 8.8 8.6     Recent Labs     08/28/20  1735   AST 14*   ALT 8*   BILITOT 0.7   ALKPHOS 137*     Recent Labs     08/28/20  1735   INR 1.32*     Recent Labs     08/28/20  1735   TROPONINI 0.14*       Urinalysis:      Lab Results   Component Value Date    NITRU POSITIVE 03/08/2020    WBCUA 6-10 03/08/2020    BACTERIA 4+ 03/08/2020    RBCUA 3-4 03/08/2020    BLOODU TRACE-INTACT 03/08/2020    SPECGRAV 1.020 03/08/2020    GLUCOSEU Negative 03/08/2020    GLUCOSEU NEGATIVE 10/07/2011       Radiology:  CT CHEST WO CONTRAST   Final Result   Bilateral pulmonary nodules, measuring up to 6 mm. Several nodules   demonstrate progression compared with 03/08/2020. The nodules are presumably   metastatic. Small bilateral pleural effusions. New, mild anterior compression fracture of T5. This appears to be   osteoporotic. No osseous metastatic lesion is evident at this level. CT ABDOMEN PELVIS WO CONTRAST Additional Contrast? None   Final Result   1. Diffuse colonic wall thickening, similar to the previous study, possibly   an ongoing colitis. 2. 3rd spacing with small quantity of ascites, diffuse anasarca and trace   right pleural effusion. 3. Cholelithiasis with no acute features. 4. Increased size of pulmonary nodules suggesting metastatic disease. XR CHEST PORTABLE   Final Result   Cardiomegaly with no acute cardiopulmonary process demonstrated             Assessment/Plan:    Active Hospital Problems    Diagnosis    Diarrhea [R19.7]       Diarrhea secondary to C. Diff colitis:  - CT abdomen/pelvis showed diffuse colonic wall thickening, similar to previous study, possibly ongoing colitis.   - C. Diff toxin/antigen indeterminate. C. Diff toxin molecular was positive. -  Pt started on oral vancomycin on 8/30/20. History of soft tissue sarcoma:  - CT shows enlarging pulmonary nodules concerning for metastatic disease:  - Hem/Onc consulted/following. Recommending pulmonary consultation for possible biopsy. May need outpt PET. ESRD on hemodialysis M/W/F:  - Nephrology managing HD. Coronary artery disease:  - Stable. No complaints of angina.   - Continue aspirin, plavix, losartan, metoprolol and statin. Peripheral artery disease:  - S/p angioplasty and stenting of the bilateral common iliac arteries at the aortic bifurcation on 8/25/20 per Dr. Tad Stapleton.   - Continue aspirin, plavix and statin. Diabetes mellitus type 2:  - Controlled. A1C in April '20 was 6.6.  - Increase to medium dose SSI ACHS. - Carb-control diet.      Anemia: - Likely due to chronic disease/ESRD. No overt signs of bleeding. Hemoccult was neg.  - H&H is stable. Monitor closely. Depression:   - Mood stable. Continue her home zoloft.          DVT Prophylaxis: SCDs (pt has history of HIT)  Diet: Dietary Nutrition Supplements: Diabetic Oral Supplement  DIET CARDIAC; Carb Control: 5 carb choices (75 gms)/meal  Code Status: Full Code    PT/OT Eval Status: Ordered     Dispo - ~2 days     LUCIAN Barajas - CNP

## 2020-08-30 NOTE — PROGRESS NOTES
bronchoscopy (11/23/2019); bronchoscopy (11/23/2019); Upper gastrointestinal endoscopy (N/A, 12/2/2019); Toe amputation (Right, 1/16/2020); Diagnostic Cardiac Cath Lab Procedure; Dialysis fistula creation (Left, 6/25/2020); Colonoscopy; Cataract removal (Bilateral); mansi and bso (cervix removed) (11/30/2015); and vascular surgery (Bilateral, 08/25/2020). Restrictions  Restrictions/Precautions  Restrictions/Precautions: (c.diff)    Subjective   General  Additional Pertinent Hx: PVD right heel; s/p Abdominal aortogram,Right lower extremity angiogram, &Angioplasty and stenting of bilateral common iliac arteries at theaortic bifurcation 8-25-20  Family / Caregiver Present: No  Referring Practitioner: LUCIAN Perry CNP  Diagnosis: diarrhea, c.diff  General Comment  Comments: RN cleared pt for OT eval; pt resting in bed, requesting to be changed from stool incontinence, agreeable to therapy  Patient Currently in Pain: Denies  Vital Signs  Patient Currently in Pain: Denies  Social/Functional History  Social/Functional History  Lives With: Family(4 grandkids under 12 y. o)  Type of Home: House  Home Layout: Multi-level, Able to Live on Main level with bedroom/bathroom  Home Access: Ramped entrance  Bathroom Shower/Tub: Tub/Shower unit  Bathroom Toilet: Handicap height  Bathroom Equipment: Tub transfer bench, Grab bars in shower, Grab bars around toilet  Bathroom Accessibility: Wheelchair accessible  Home Equipment: [address] 450 bed, Standard walker, [place] Prophet, Reacher, Sock aid  [place] Help From: Family, Home health(Nurse visits)  ADL Assistance: Needs assistance  Dressing:  Moderate assistance(shoes)  Homemaking Responsibilities: Yes  Meal Prep Responsibility: Primary  Dependent Care Responsibility: Primary  Transfer Assistance: Independent  Active : No       Objective   Vision: Impaired  Vision Exceptions: Wears glasses at all times  Hearing: Within functional limits    Orientation  Overall Orientation Status: Within Functional Limits     Balance  Sitting Balance: Supervision  Standing Balance: Contact guard assistance(with RW for support stand-pivot transfers)  Standing Balance  Activity: bed-->chair  ADL  Feeding: Independent  Grooming: Setup(seated in chair to wash face & hands)  Toileting: Maximum assistance(due to incontinent of loose stool)  Tone RUE  RUE Tone: Normotonic  Tone LUE  LUE Tone: Normotonic  Coordination  Movements Are Fluid And Coordinated: Yes     Bed mobility  Supine to Sit: Modified independent  Sit to Supine: Unable to assess(Left up in chair, pt agreeable)  Transfers  Stand Pivot Transfers: Contact guard assistance(with RW for support)  Sit to stand: Contact guard assistance  Stand to sit: Contact guard assistance  Vision - Basic Assessment  Prior Vision: Wears glasses all the time  Cognition  Overall Cognitive Status: [facility]         Plan   Plan  Times per week: 3-5x/ week  Current Treatment Recommendations: Functional Mobility Training, Safety Education & Training, Self-Care / ADL    AM-PAC Score        AM-PAC Inpatient Daily Activity Raw Score: 14 (08/30/20 1323)  AM-PAC Inpatient ADL T-Scale Score : 33.39 (08/30/20 1323)  ADL Inpatient CMS 0-100% Score: 59.67 (08/30/20 1323)  ADL Inpatient CMS G-Code Modifier : CK (08/30/20 1323)    Goals  Short term goals  Time Frame for Short term goals: 3 days (9-02-20)  Short term goal 1: supervision with stand pivot transfers  Short term goal 2: independent with toileting  Patient Goals   Patient goals : be able to go home in the next day or two       Therapy Time   Individual Concurrent Group Co-treatment   Time In 1213         Time Out 1240         Minutes [address], [place]

## 2020-08-30 NOTE — PLAN OF CARE
Problem: Pain:  Goal: Pain level will decrease  Description: Pain level will decrease  Outcome: Ongoing     Problem: Metabolic:  Goal: Ability to maintain appropriate glucose levels will improve  Description: Ability to maintain appropriate glucose levels will improve  Outcome: Ongoing

## 2020-08-30 NOTE — CONSULTS
HEMATOLOGY/ONCOLOGY CONSULTATION:     8/30/2020 9:10 AM    REASON FOR CONSULT: History of sarcoma    PROVIDERS:  Kirsty Shah MD    CHIEF COMPLAINT:     Chief Complaint   Patient presents with    Abdominal Pain     n/v/d since yesterday. pt reports that she has stents placed in BLE d/t compromized blood flow on Thursday. 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 She received radiation but was not a surgical candidate. She has not followed up with oncology in a long time. The patient also has ESRD and is on dialysis, hx DVT, D diff, DM2, uterine cancer and COPD. She was admitted yesterday with worsening diarrhea which seemed to her like her previous C diff infection. She had also missed dialysis due to this. Her  Dif was indeterminate and she is currently on flagyl. She had a CT A/P which showed colitis. It also showed increased pulmonary nodules that appeared suspicious for metastatic disease.      PAST MEDICAL HISTORY:     Past Medical History:   Diagnosis Date    Acute bilateral deep vein thrombosis (DVT) of popliteal veins ([address] 1/29/2020    Acute pulmonary edema (HCC)     Acute respiratory failure with hypoxia and hypercapnia ([address] 11/11/2019    Angina     with exertion    Cardiac arrest ([address]     Cellulitis 2/21/2020    Clostridium difficile infection 4/24/2020, 03/28/2020    Diabetic foot ulcer ([address] 03/10/2020    Endometrial adenocarcinoma ([address] 11/17/2015    Gangrene of toe of both feet ([address] 8/17/2020    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 ([address] 11/24/2019    MI (myocardial infarction) ([address] 1983    Obesity     Pneumonia 3/28/2020    Small saphenous vein thrombophlebitis, right 02/2020    Syncope 4/30/2012    Tobacco use        PAST SURGICAL HISTORY:        Past Surgical History:   Procedure Laterality Date    BRONCHOSCOPY N/A 11/12/2019    BRONCHOSCOPY WASHING performed by Sanford Romero MD at [address] [place] [address] N/A 2019    BRONCHOSCOPY ALVEOLAR LAVAGE performed by Roxie Chávez MD at [address] [place] [address]  2019    BRONCHOSCOPY THERAPUTIC ASPIRATION INITIAL performed by Roxie Chávez MD at [address] [place] [address]  2019    BRONCHOSCOPY FOREIGN BODY REMOVAL performed by Roxie Chávez MD at [address] [place] [address] Right     CATARACT REMOVAL Bilateral     COLONOSCOPY      CORONARY ANGIOPLASTY WITH STENT PLACEMENT  2012    DIAGNOSTIC CARDIAC CATH LAB PROCEDURE      DIALYSIS FISTULA CREATION Left 2020    LEFT FOREARM LOOP DIALYSIS GRAFT performed by Mk Snyder MD at [address] [place] [address] ARTHROSCOPY      TYLOR AND BSO  2015    TOE AMPUTATION Right 2020    RIGHT SECOND AND THIRD TOE AMPUTATION, RIGHT HEEL WOUND DEBRIDEMENT, RIGHT HALLUX TOE NAIL DEBRIDEMENT performed by Xochitl Galdamez DPM at [address] [place] [address] N/A 2019    EGD PEG PLACEMENT W/ ANESTHESIA performed by Catalino Lau MD at [address] [place] [address] Bilateral 2020    common iliac artery angioplasty and stenting       SOCIAL HISTORY:     Social History     Socioeconomic History    Marital status:       Spouse name: Not on file    Number of children: Not on file    Years of education: Not on file    Highest education level: Not on file   Occupational History    Occupation:      Comment: [employer] most recently   Social Needs    Financial resource strain: Not on file    Food insecurity     Worry: Not on file     Inability: Not on file   [employer] needs     Medical: Not on file     Non-medical: Not on file   Tobacco Use    Smoking status: Former Smoker     Years: 13.00     Types: Cigarettes     Last attempt to quit: 1985     Years since quittin.6    Smokeless tobacco: Never Used   Substance and Sexual Activity    Alcohol use: disintegrating tablet Take 1 tablet by mouth every 8 hours as needed for Nausea 20 tablet 0    clopidogrel (PLAVIX) 75 MG tablet Take 1 tablet by mouth daily 30 tablet 3    hydrOXYzine (VISTARIL) 25 MG capsule Take 25 mg by mouth 3 times daily as needed for Itching      promethazine (PHENERGAN) 25 MG tablet Take 25 mg by mouth every 6 hours as needed for Nausea      traMADol (ULTRAM) 50 MG tablet Take 50 mg by mouth every 6 hours as needed for Pain.  sertraline (ZOLOFT) 100 MG tablet Take 1 tablet by mouth daily 30 tablet 0    lactobacillus (CULTURELLE) capsule Take 1 capsule by mouth 3 times daily (with meals) (Patient taking differently: Take 1 capsule by mouth daily ) 90 capsule 2    losartan (COZAAR) 50 MG tablet Take 1 tablet by mouth daily (Patient taking differently: Take 50 mg by mouth every evening ) 30 tablet 3    aspirin 81 MG chewable tablet Take 1 tablet by mouth daily 30 tablet 3    pantoprazole (PROTONIX) 40 MG tablet Take 1 tablet by mouth every morning (before breakfast) 30 tablet 3    metoprolol tartrate (LOPRESSOR) 50 MG tablet Take 50 mg by mouth 2 times daily      rosuvastatin (CRESTOR) 5 MG tablet Take 5 mg by mouth nightly       insulin lispro (HUMALOG) 100 UNIT/ML injection vial Inject 0-12 Units into the skin 3 times daily (with meals) **Medium Dose Corrective Algorithm**  Glucose: Dose:  If <139             No Insulin  140-199 2 Units  200-249 4 Units  250-299 6 Units  300-349 8 Units  350-400 10 Units  Above 400       12 Units 1 vial 3    donepezil (ARICEPT) 5 MG tablet Take 1 tablet by mouth nightly 30 tablet 3    B Complex-C-Iron (SUPER B-COMPLEX/IRON/VITAMIN C PO) Take 1 tablet by mouth daily. REVIEW OF SYSTEMS:       10 point ROS completed. Pertinent positives in HPI, otherwise negative.      PHYSICAL EXAM:       Vitals:    08/30/20 0815   BP: 112/65   Pulse: 61   Resp: 12   Temp: 98 °F (36.7 °C)   SpO2: 91%       General appearance: alert and cooperative  Head: Normocephalic, without obvious abnormality, atraumatic  Neck: No palpable lymphadenopathy in supraclavicular or cervical chains  Lungs: Clear to auscultation bilaterally, no audible rales, wheezes or crackles  Heart: Regular rate and rhythm, S1, S2 normal  Abdomen: Soft, non-tender; bowel sounds normal; no masses,  no organomegaly  Extremities: without cyanosis, clubbing, edema or asymmetry  Skin: No jaundice, purpura or petechiae      LABS:     Lab Results   Component Value Date    WBC 6.9 08/30/2020    HGB 10.0 (L) 08/30/2020    HCT 31.9 (L) 08/30/2020    MCV 90.7 08/30/2020     08/30/2020       Lab Results   Component Value Date    GLUCOSE 236 (H) 08/30/2020    BUN 29 (H) 08/30/2020    CREATININE 3.4 (H) 08/30/2020    K 4.7 08/30/2020    BCR 18 11/24/2015    PHOS 3.4 08/25/2020       Lab Results   Component Value Date    ALKPHOS 137 (H) 08/28/2020    ALT 8 (L) 08/28/2020    AST 14 (L) 08/28/2020    BILITOT 0.7 08/28/2020    BILIDIR <0.2 03/08/2020    PROT 7.1 08/28/2020       IMAGING:     Ct Abdomen Pelvis Wo Contrast Additional Contrast? None  Result Date: 8/29/2020  1. Diffuse colonic wall thickening, similar to the previous study, possibly an ongoing colitis. 2. 3rd spacing with small quantity of ascites, diffuse anasarca and trace right pleural effusion. 3. Cholelithiasis with no acute features. 4. Increased size of pulmonary nodules suggesting metastatic disease. Xr Chest Portable  Result Date: 8/28/2020  Cardiomegaly with no acute cardiopulmonary process demonstrated     Xr Chest Portable  Result Date: 8/26/2020  No evidence of acute cardiopulmonary disease.        ASSESSMENT:     Problem List Items Addressed This Visit     None      Visit Diagnoses     Generalized abdominal pain    -  Primary    Nausea vomiting and diarrhea        General weakness        Colitis        Hx of Clostridium difficile infection        Elevated lactic acid level        3.0    ESRD on hemod[facility] ([facility]        Monday,

## 2020-08-30 NOTE — PROGRESS NOTES
Progress Note    HISTORY     CC:  Diarrhea           We are following for ESRD      Subjective/   HPI:  Feeling better. Upset that she has c. Diff again. Had dialysis yesterday. No issues with dialysis. No new complaints     ROS:  Constitutional:  No fevers, No Chills, + weakness  Cardiovascular:  No palpations, no edema  Respiratory:  No wheezing, no cough  Skin:  No rash, no itching    Social Hx:  No family at bedside     Past Medical and Surgical History:  - Reviewed, no changes     EXAM       Objective/     Vitals:    08/30/20 0003 08/30/20 0021 08/30/20 0610 08/30/20 0815   BP: 118/62  (!) 108/56 112/65   Pulse: 70  69 61   Resp: 16  16 12   Temp: 97.5 °F (36.4 °C)  97.5 °F (36.4 °C) 98 °F (36.7 °C)   TempSrc: Axillary  Axillary Oral   SpO2: (!) 89% 93% 90% 91%   Weight:       Height:         24HR INTAKE/OUTPUT:      Intake/Output Summary (Last 24 hours) at 8/30/2020 1327  Last data filed at 8/30/2020 0923  Gross per 24 hour   Intake 1380 ml   Output 900 ml   Net 480 ml     Constitutional:  Alert, awake, no apparent distress  Eyes:  Pupils reactive, sclera clear   Neck:  Normal thyroid, no masses   Cardiovascular:  Regular, no rub  Respiratory:  No distress, no wheezing  Psychiatry:  Appropriate mood/affect, alert  Abdomen: +bs, soft, nt, no masses   Musculoskeletal: No LE edema, no clubbing   Lymphatics:  No LAD in neck, no supraclavicular nodes       MEDICAL DECISION MAKING       Data/  Recent Labs     08/28/20 1735 08/30/20  0839   WBC 12.9* 6.9   HGB 9.9* 10.0*   HCT 32.0* 31.9*   MCV 90.5 90.7    279     Recent Labs     08/28/20  1735 08/29/20  0251 08/30/20  0839   * 129* 131*   K 4.3 3.9 4.7   CL 91* 92* 95*   CO2 24 23 23   GLUCOSE 299* 231* 236*   BUN 47* 50* 29*   CREATININE 4.5* 4.9* 3.4*   LABGLOM 9* 9* 13*   GFRAA 11* 10* 16*       Assessment/     ESRD:  On HD mwf, working AVG.   Labs stable but did miss HD yesterday due to diarrhea     Diarrhea:  C.diff +     Anemia of chronic disease:  Hb is near target    Plan/     Oral Vancomycin   HD tomorrow per outpatient schedule   Follow labs   -----------------------------  Luisito Yoder M.D.   [facility]

## 2020-08-30 NOTE — CONSULTS
INPATIENT PULMONARY CRITICAL CARE CONSULT NOTE      Chief Complaint/Referring Provider:  Patient is being seen at the request of [facility]  for a consultation for progression of bilateral pulmonary nodules, presumably metastatic, history of soft tissue sarcoma, Hem/Onc recommending possible biopsy     Presenting HPI: patient came to the hospital with increasing abdominal discomfort nausea vomiting and diarrhea    Patient was recently evaluated by vascular surgery for peripheral vascular disease with ischemic ulceration of the right heel and right second toe and underwent-PROCEDURES PERFORMED:  1. Ultrasound-guided bilateral femoral artery access. 2.  Abdominal aortogram.  3.  Right lower extremity angiogram.  4.  Angioplasty and stenting of bilateral common iliac arteries at the  aortic bifurcation. Subsequently patient was discharged home but patient came with abdominal discomfort nausea vomiting diarrhea which was persistent and progressive    As per the admitting provider-75 y.o. female with multiple medical problems including end-stage renal disease on hemodialysis, coronary artery disease, peripheral artery disease with recent lower extremity stenting, previous history of C. difficile infection presents complaining of intractable diarrhea starting yesterday. She had too many episodes to count of watery nonbloody diarrhea starting yesterday and continuing through today up until about 1 hour prior to coming to the emergency room. She did have associated abdominal pain. She denied fevers or chills. She has not had diarrhea for several hours now. She has no abdominal pain currently.   Patient when seen this afternoon states that she has been having abdominal discomfort nausea vomiting diarrhea and she has been told that she has C. difficile colitis for which reason she was having the symptoms, patient states that as compared to when she came to the hospital for she is minimally better, patient does not have any significant nausea or vomiting now, patient on questioning further states that she does not have any increasing cough or expectoration or shortness of breath or wheezing, patient does not have any chest pain or palpitations, patient does not have any significant nasal congestion which is more than usual, patient does not have any epistaxis or hemoptysis, patient does not have any fever or chills, patient does not have any sore throat or difficulty in swallowing, no coughing or choking while eating, patient does not have any otalgia no ear discharge, patient does not have any hematochezia or melena, patient does not have any increasing leg edema, patient's leg pain is not significant at this time, patient has significant ecchymosis of the skin and patient states that she has easy bruisability, patient was alert and communicative, patient does not have any significant constitutional symptoms of concern, patient has history of end-stage renal disease and gets dialysis on Monday Wednesday and Friday, no other pertinent review of system of concern  Patient states that she had abdominal tumor which was found to be sarcoma in 2015/2016 at that time patient states that 3 different specialists saw her and it was said that if she goes for surgery that she may not make out of the OR and for which reason no surgery was done at that time but patient states that she was given radiation therapy without any chemotherapy or so           Patient Active Problem List    Diagnosis Date Noted    Asthma 09/16/2010     Priority: Low    Lung nodules 08/30/2020    Clostridium difficile enterocolitis 08/30/2020    Closed compression fracture of thoracic vertebra ([address] 08/30/2020    Diarrhea 08/28/2020    Atherosclerotic peripheral vascular disease with ulceration ([address]     Pressure ulcer of right ischium, stage II ([address] 08/17/2020    Atrial fibrillation ([address] 08/17/2020    Skin tear of upper arm without complication, left, initial encounter 08/17/2020    Ulcers of both lower legs, limited to breakdown of skin ([address] 08/17/2020    Peripheral venous insufficiency 08/17/2020    Diabetic ulcer of 4th toe of right foot associated with type 2 diabetes mellitus, with fat layer exposed ([address] 08/17/2020    Chronic deep vein thrombosis (DVT) of distal vein of both lower extremities ([address] 08/17/2020    Cholelithiasis 08/17/2020    Moderate to severe pulmonary hypertension ([address] 08/17/2020    Atherosclerosis of native artery of right lower extremity with ulceration of heel ([address] 08/14/2020    End stage renal disease ([address] 06/25/2020    Anxiety disorder due to general medical condition with panic attack 04/15/2020    Moderate malnutrition ([address] 04/02/2020    Debility 03/23/2020    Diabetic polyneuropathy associated with type 2 diabetes mellitus ([address] 03/10/2020    Diabetic ulcer of right heel associated with type 2 diabetes mellitus, with necrosis of muscle ([address] 03/10/2020    Anasarca 03/07/2020    Edema of both legs 01/29/2020    Gastroesophageal reflux disease without esophagitis 01/29/2020    Normocytic normochromic anemia 11/22/2019    Pulmonary nodule 10/15/2019    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    Primary osteoarthritis of both knees 02/28/2017    Nonrheumatic mitral (valve) insufficiency 02/07/2017    Osteoporosis 01/30/2017    Chronic pain of both knees 07/19/2016    Hyperlipidemia, mixed 03/11/2016    Osteoarthritis of spine with radiculopathy, lumbar region 10/20/2015    Pain of both hip joints 10/20/2015    Spinal stenosis, lumbar region, with neurogenic claudication 10/20/2015    Right sided sciatica 10/20/2015    Orthostatic hypotension 04/23/2014    Perennial allergic rhinitis 04/15/2014    HTN (hypertension) 10/22/2012    CAD (coronary artery disease) 10/22/2012    Type 2 diabetes mellitus with diabetic polyneuropathy, with long-term current use of insulin ([address] 04/06/2012    Vertigo 10/08/2011       Past Medical History:   Diagnosis Date    Acute bilateral deep vein thrombosis (DVT) of popliteal veins ([facility] 1/29/2020    Acute pulmonary edema (HCC)     Acute respiratory failure with hypoxia and hypercapnia ([address] 11/11/2019    Angina     with exertion    Cardiac arrest ([facility]     Cellulitis 2/21/2020    Clostridium difficile infection 4/24/2020, 03/28/2020    Diabetic foot ulcer ([facility] 03/10/2020    Endometrial adenocarcinoma ([facility] 11/17/2015    Gangrene of toe of both feet ([facility] 8/17/2020    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 ([facility] 11/24/2019    MI (myocardial infarction) ([facility] 1983    Obesity     Pneumonia 3/28/2020    Small saphenous vein thrombophlebitis, right 02/2020    Syncope 4/30/2012    Tobacco use         Past Surgical History:   Procedure Laterality Date    BRONCHOSCOPY N/A 11/12/2019    BRONCHOSCOPY WASHING performed by Quinn Pope MD at [address] [place] [address] N/A 11/23/2019    BRONCHOSCOPY ALVEOLAR LAVAGE performed by Tal Caballero MD at [address] [place] [address]  11/23/2019    BRONCHOSCOPY THERAPUTIC ASPIRATION INITIAL performed by Tal Caballero MD at [address] [place] [address]  11/23/2019    BRONCHOSCOPY FOREIGN BODY REMOVAL performed by Tal Caballero MD at [address] [place] [address] Right     CATARACT REMOVAL Bilateral     COLONOSCOPY      CORONARY ANGIOPLASTY WITH STENT PLACEMENT  4/2012    DIAGNOSTIC CARDIAC CATH LAB PROCEDURE      DIALYSIS FISTULA CREATION Left 6/25/2020    LEFT FOREARM LOOP DIALYSIS GRAFT performed by Krystle Raymond MD at [address] [place] [address] ARTHROSCOPY      TYLOR AND BSO  11/30/2015    TOE AMPUTATION Right 1/16/2020    RIGHT SECOND AND THIRD TOE AMPUTATION, RIGHT HEEL WOUND DEBRIDEMENT, RIGHT HALLUX TOE NAIL DEBRIDEMENT performed by George Soto DPM at [facility] 12/2/2019    EGD PEG PLACEMENT W/ ANESTHESIA performed by Barron Stapleton MD at [facility] Bilateral 2020    common iliac artery angioplasty and stenting        Family History   Problem Relation Age of Onset    Diabetes Mother     Heart Failure Mother     Heart Disease Mother     Stroke Mother     Diabetes Sister     Diabetes Brother     Diabetes Maternal Grandmother     Asthma Son     Asthma Son     Diabetes Daughter     Heart Disease Daughter     Irritable Bowel Syndrome Daughter     Diabetes Brother     Cancer Neg Hx     Emphysema Neg Hx     Hypertension Neg Hx         Social History     Tobacco Use    Smoking status: Former Smoker     Years: 13.00     Types: Cigarettes     Last attempt to quit: 1985     Years since quittin.6    Smokeless tobacco: Never Used   Substance Use Topics    Alcohol use: No     Alcohol/week: 0.0 standard drinks        Allergies   Allergen Reactions    Ativan [Lorazepam]      DIZZY    Dilaudid [Hydromorphone Hcl] Other (See Comments)     Pt states it made her crazy    Flexeril [Cyclobenzaprine Hcl]      DOESN'T REMEMBER    Heparin      KEENAN     Soma [Carisoprodol]      FEELS OUT OF IT    Vicodin [Hydrocodone-Acetaminophen] Other (See Comments)     Per pt, makes her \"go crazy\"    Penicillins Rash    Sulfa Antibiotics Rash               Physical Exam:  Blood pressure (!) 95/49, pulse 58, temperature 97.7 °F (36.5 °C), temperature source Oral, resp. rate 16, height 4' 11\" (1.499 m), weight 132 lb 11.5 oz (60.2 kg), SpO2 91 %, not currently breastfeeding.'   Constitutional:  No acute distress. HENT:  Oropharynx is clear and moist. No thyromegaly. Eyes:  Conjunctivae are normal. Pupils equal, round, and reactive to light. No scleral icterus. Neck: . No tracheal deviation present. No obvious thyroid mass. Cardiovascular: Normal rate, regular rhythm, LLSB murmur. No right ventricular heave. No lower extremity edema. Pulmonary/Chest: No wheezes. No rales.   Chest wall is not dull to percussion. No accessory muscle usage or stridor. Decreased breath sound density  Abdominal: Soft. Bowel sounds present. No distension or hernia. No significant apparent tenderness tenderness. Musculoskeletal: No cyanosis. No clubbing. No obvious joint deformity. Lymphadenopathy: No cervical or supraclavicular adenopathy. Skin: Skin is warm and dry. No rash or nodules on the exposed extremities. Heel ulcer along with ulcer of the metatarsals along with that patient had diffuse areas of ecchymosis of the upper extremity  Psychiatric: Normal mood and affect. Behavior is normal.  No anxiety. Neurologic: Alert, awake and oriented. PERRL. Speech fluent        Results:  CBC:   Recent Labs     08/28/20  1735 08/30/20  0839   WBC 12.9* 6.9   HGB 9.9* 10.0*   HCT 32.0* 31.9*   MCV 90.5 90.7    279     BMP:   Recent Labs     08/28/20  1735 08/29/20  0251 08/30/20  0839   * 129* 131*   K 4.3 3.9 4.7   CL 91* 92* 95*   CO2 24 23 23   BUN 47* 50* 29*   CREATININE 4.5* 4.9* 3.4*     LIVER PROFILE:   Recent Labs     08/28/20  1735   AST 14*   ALT 8*   LIPASE 4.0*   BILITOT 0.7   ALKPHOS 137*     PT/INR:   Recent Labs     08/28/20  1735   PROTIME 15.4*   INR 1.32*     APTT:   Recent Labs     08/29/20  0610 08/29/20  1029 08/29/20  1233   APTT 81.0* 86.0* 69.1*       Imaging:  I have reviewed radiology images personally. CT CHEST WO CONTRAST   Final Result   Bilateral pulmonary nodules, measuring up to 6 mm. Several nodules   demonstrate progression compared with 03/08/2020. The nodules are presumably   metastatic. Small bilateral pleural effusions. New, mild anterior compression fracture of T5. This appears to be   osteoporotic. No osseous metastatic lesion is evident at this level. CT ABDOMEN PELVIS WO CONTRAST Additional Contrast? None   Final Result   1. Diffuse colonic wall thickening, similar to the previous study, possibly   an ongoing colitis.    2. 3rd spacing with small quantity of ascites, diffuse anasarca and trace   right pleural effusion. 3. Cholelithiasis with no acute features. 4. Increased size of pulmonary nodules suggesting metastatic disease. XR CHEST PORTABLE   Final Result   Cardiomegaly with no acute cardiopulmonary process demonstrated           Ct Abdomen Pelvis Wo Contrast Additional Contrast? None    Result Date: 8/29/2020  EXAMINATION: CT OF THE ABDOMEN AND PELVIS WITHOUT CONTRAST 8/28/2020 5:53 pm TECHNIQUE: CT of the abdomen and pelvis was performed without the administration of intravenous contrast. Multiplanar reformatted images are provided for review. Dose modulation, iterative reconstruction, and/or weight based adjustment of the mA/kV was utilized to reduce the radiation dose to as low as reasonably achievable. COMPARISON: 04/24/2020. HISTORY: ORDERING SYSTEM PROVIDED HISTORY: Nausea, vomiting, abdominal pain TECHNOLOGIST PROVIDED HISTORY: Reason for exam:->Nausea, vomiting, abdominal pain Additional Contrast?->None Reason for Exam: bilateral leg stents 2 days ago; nausea, vomiting x 1 day Acuity: Acute Type of Exam: Initial FINDINGS: Lower Chest: No acute infiltrate at the lung bases. Trace right pleural effusion. Multiple noncalcified pulmonary nodules which demonstrate interval increase in size and are concerning for metastatic disease. Cardiomegaly with coronary vascular calcification. Organs: Cholelithiasis with no acute biliary findings. The unenhanced liver, spleen, pancreas and adrenal glands are unremarkable. No evidence of obstructive uropathy. Prominent renal vascular calcification. GI/Bowel: Mild colonic wall thickening, similar to the previous CT. The possibility of an ongoing colitis is raised. Mild retained stool throughout the colon. The appendix is unremarkable. No small bowel distension. The stomach and duodenal sweep are intact. Pelvis: Small amount of free pelvic fluid. The uterus is surgically absent.  The bladder is contracted with scattered gas and increased attenuation dependently. Peritoneum/Retroperitoneum: Severe aortoiliac atherosclerotic disease. Surgical stents span the distal abdominal aorta into the common iliac arteries bilaterally. No retroperitoneal adenopathy. Small quantity of upper abdominal ascites. Increased attenuation diffusely of the abdominal fat. Bones/Soft Tissues: Moderate diffuse anasarca. No focal subcutaneous soft tissue collection. Stable compression fractures of the superior endplate of L1, L2 and L3.     1. Diffuse colonic wall thickening, similar to the previous study, possibly an ongoing colitis. 2. 3rd spacing with small quantity of ascites, diffuse anasarca and trace right pleural effusion. 3. Cholelithiasis with no acute features. 4. Increased size of pulmonary nodules suggesting metastatic disease. Ct Chest Wo Contrast    Result Date: 8/30/2020  EXAMINATION: CT OF THE CHEST WITHOUT CONTRAST 8/30/2020 10:18 am TECHNIQUE: CT of the chest was performed without the administration of intravenous contrast. Multiplanar reformatted images are provided for review. Dose modulation, iterative reconstruction, and/or weight based adjustment of the mA/kV was utilized to reduce the radiation dose to as low as reasonably achievable. COMPARISON: Recent presentation for nausea and vomiting, with CT abdomen and pelvis on 08/28/2020. Pulmonary nodules increased. Endometrioid adenocarcinoma, 12/08/2015. HISTORY: ORDERING SYSTEM PROVIDED HISTORY: pulmonary nodules TECHNOLOGIST PROVIDED HISTORY: Reason for exam:->pulmonary nodules Reason for Exam: evaluate pulmonary nodules Acuity: Acute Type of Exam: Initial Relevant Medical/Surgical History: smoker FINDINGS: Mediastinum: There is no mediastinal or hilar adenopathy. Thoracic aortic, tracheobronchial and coronary artery calcification are present. There is moderate cardiomegaly. Lungs/pleura:  There are multiple pulmonary nodules, measuring 2-6 mm. Most nodules are stable. Examples of nodules with progression are noted on series 3 is noted below- Left upper lobe 3 mm on image number 33, previously 2 mm, Lingula 6 mm on image 59, previously 4.3 mm, and Left lower lobe 5 mm on image 87, previously 4.4 mm. Small bilateral pleural effusions remain present with overlying atelectasis. Upper Abdomen: The visualized adrenal glands and upper abdomen are unremarkable. Soft Tissues/Bones: At T5, there is a new anterior compression fracture along the superior endplate. No new lytic or blastic bone lesions are appreciated. The chest wall is unremarkable. Bilateral pulmonary nodules, measuring up to 6 mm. Several nodules demonstrate progression compared with 03/08/2020. The nodules are presumably metastatic. Small bilateral pleural effusions. New, mild anterior compression fracture of T5. This appears to be osteoporotic. No osseous metastatic lesion is evident at this level. Xr Chest Portable    Result Date: 8/28/2020  EXAMINATION: ONE XRAY VIEW OF THE CHEST 8/28/2020 5:20 pm COMPARISON: 08/26/2020 HISTORY: ORDERING SYSTEM PROVIDED HISTORY: Nausea, vomiting abdominal pain TECHNOLOGIST PROVIDED HISTORY: Reason for exam:->Nausea, vomiting abdominal pain Reason for Exam: N/V/D AND FATIGUE X 1 DAY; STENTS PLACED BLE IN 8/27/2020 Acuity: Acute Type of Exam: Initial Relevant Medical/Surgical History: SEE EPIC FINDINGS: Cardiomegaly. Pulmonary vasculature within normal limits. Lungs clear. Costophrenic angles sharp     Cardiomegaly with no acute cardiopulmonary process demonstrated     Xr Chest Portable    Result Date: 8/26/2020  EXAMINATION: ONE XRAY VIEW OF THE CHEST 8/26/2020 2:04 am COMPARISON: July 29, 2020 HISTORY: ORDERING SYSTEM PROVIDED HISTORY: CP TECHNOLOGIST PROVIDED HISTORY: Reason for exam:->CP Reason for Exam: Nausea (had cardiac cath today; c/o n/v) FINDINGS: No evidence of consolidation, edema or other acute pulmonary process.  No evidence of acute process of the cardiac or mediastinal structures. No evidence of pneumothorax or pleural effusion. Unchanged enlargement of the cardiopericardial shadow     No evidence of acute cardiopulmonary disease. Results for Buck Queen (MRN [id number]) as of 8/30/2020 18:07   Ref. Range 8/28/2020 17:35 8/29/2020 02:51 8/29/2020 19:53 8/30/2020 07:37 8/30/2020 08:39 8/30/2020 12:03 8/30/2020 16:26   Sodium Latest Ref Range: 136 - 145 mmol/L 132 (L) 129 (L)   131 (L)     Potassium Latest Ref Range: 3.5 - 5.1 mmol/L 4.3 3.9   4.7     Chloride Latest Ref Range: 99 - 110 mmol/L 91 (L) 92 (L)   95 (L)     CO2 Latest Ref Range: 21 - 32 mmol/L 24 23   23     BUN Latest Ref Range: 7 - 20 mg/dL 47 (H) 50 (H)   29 (H)     Creatinine Latest Ref Range: 0.6 - 1.2 mg/dL 4.5 (H) 4.9 (H)   3.4 (H)     Anion Gap Latest Ref Range: 3 - 16  17 (H) 14   13     GFR Non- Latest Ref Range: >60  9 (A) 9 (A)   13 (A)     GFR  Latest Ref Range: >60  11 (A) 10 (A)   16 (A)     Lactic Acid Latest Ref Range: 0.4 - 2.0 mmol/L 3.0 (H)         Glucose Latest Ref Range: 70 - 99 mg/dL 299 (H) 231 (H)   236 (H)     POC Glucose Latest Ref Range: 70 - 99 mg/dl   228 (H) 210 (H)  244 (H) 217 (H)   Calcium Latest Ref Range: 8.3 - 10.6 mg/dL 9.3 8.8   8.6     Total Protein Latest Ref Range: 6.4 - 8.2 g/dL 7.1           Results for Buck Queen (MRN [id number]) as of 8/30/2020 18:07   Ref.  Range 8/25/2020 12:51 8/26/2020 01:15 8/28/2020 17:35 8/30/2020 08:39   WBC Latest Ref Range: 4.0 - 11.0 K/uL 6.7 13.6 (H) 12.9 (H) 6.9   RBC Latest Ref Range: 4.00 - 5.20 M/uL 4.02 3.85 (L) 3.53 (L) 3.52 (L)   Hemoglobin Quant Latest Ref Range: 12.0 - 16.0 g/dL 11.2 (L) 10.8 (L) 9.9 (L) 10.0 (L)   Hematocrit Latest Ref Range: 36.0 - 48.0 % 36.1 35.1 (L) 32.0 (L) 31.9 (L)   MCV Latest Ref Range: 80.0 - 100.0 fL 89.7 91.3 90.5 90.7   MCH Latest Ref Range: 26.0 - 34.0 pg 27.9 28.1 28.0 28.4   MCHC Latest Ref Range: 31.0 - 36.0 g/dL 31.1 30.7 (L) 30.9 (L) 31.4   MPV Latest Ref Range: 5.0 - 10.5 fL 7.5 7.8 8.5 7.9   RDW Latest Ref Range: 12.4 - 15.4 % 18.7 (H) 18.4 (H) 18.4 (H) 19.6 (H)   Platelet Count Latest Ref Range: 135 - 450 K/uL 270 282 283 279     Results for Vinny Frias (MRN [id number]) as of 8/30/2020 18:07   Ref. Range 8/28/2020 17:35 8/29/2020 05:15 8/29/2020 18:01   CULTURE, BLOOD 2 Unknown Rpt     GASTROINTESTINAL PANEL, MOLECULAR Unknown  Rpt    Culture, Blood 2 Unknown No Growth to date. Any change in status will be called. Organism Unknown   C. difficile toxin B gene detected (A)   Occult Blood Screening Unknown  Result: Negative. .. BLOOD OCCULT STOOL SCREEN #1 Unknown  Rpt    C DIFF TOXIN/ANTIGEN Unknown   Rpt   O&P PANEL ([facility] [employer]) #1 Unknown  Rpt    C.diff Toxin/Antigen Unknown   Indeterminate see. ..   C. difficile toxin Molecular Unknown   POSITIVE FOR. .. (A)     Echocardiogram:Summary   Limited exam for RV and LV function. Left ventricular systolic function is normal with ejection fraction   estimated at 50-55%. There is mild diastolic septal flattening consistent with right ventricular   volume overload. Right ventricular systolic function is mildly reduced . Moderate to severe tricuspid regurgitation. Systolic pulmonary artery pressure (SPAP) is estimated at 61 mmHg consistent   with severe pulmonary hypertension (Right atrial pressure of 8 mmHg). There is a trivial to small pericardial effusion noted. There is a small right pleural effusion.     PFT:None in Epic        Assessment:  Active Problems:    Normocytic normochromic anemia    Diabetic ulcer of right heel associated with type 2 diabetes mellitus, with necrosis of muscle (HCC)    End stage renal disease (HCC)    Retroperitoneal sarcoma (HCC)    Ulcers of both lower legs, limited to breakdown of skin (HCC)    Peripheral venous insufficiency    Moderate to severe pulmonary hypertension (HCC)    Diarrhea    Lung nodules    Clostridium difficile enterocolitis    Closed compression fracture of thoracic vertebra (HCC)  Resolved Problems:    * No resolved hospital problems.  *          Plan:   · Oxygen supplementation, if required, to keep saturation between 90 and 94%  · Patient when seen was on room air oxygen  · Patient was shown the CT scan of the chest along with findings and differential diagnosis along with implications and options  · Patient has pulmonary nodules which are slightly bigger as compared to the previous time but the nodules are too small to biopsy  · Patient was told that differential diagnosis of the lung nodules especially in view of that she has a history of sarcoma in the past can include metastatic disease but they are not amenable to bronchoscopy biopsy and open lung biopsy can cause her to have more morbidity and mortality  · Can repeat a CT scan in 3 months time to assess the lung nodules if deemed appropriate  · Patient understands the implications of above statement  · Patient does not need any bronchodilators or steroids or antibiotics from palm standpoint of view  · Hemodialysis as per nephrology  · Patient's blood sugars are not controlled  · Patient is on sliding scale insulin at this time and consider giving long-acting insulin if deemed appropriate  · Consider probiotic along with p.o. vancomycin, if deemed appropriate  · Tach plus isolation  · Antihypertensives and antiplatelet therapy as per internal medicine team  · PUD prophylaxis    Case discussed with patient and nursing    No other recommendations from pulmonary/critical care standpoint -will sign off-please call on whenever necessary basis        Electronically signed by:  Sol Mcburney, MD    8/30/2020    6:14 PM.

## 2020-08-31 NOTE — FLOWSHEET NOTE
08/31/20 1040 08/31/20 1437   Treatment   Time On 1040  --    Time Off  --  1435   Treatment Goal 1000 1400   Vital Signs   BP (!) 109/55 (!) 122/54   Temp 97.6 °F (36.4 °C) 97.8 °F (36.6 °C)   Pulse 57 60   Resp 18 18   Weight 137 lb 5.6 oz (62.3 kg) 134 lb 0.6 oz (60.8 kg)   Weight Method Actual;Bed scale Bed scale   Percent Weight Change 3.49 -2.41   Dry Weight 130 lb 1.1 oz (59 kg)  --    Treatment Initiation   Dialyze Hours 4  --    Dialysis Bath   K+ (Potassium) 3  --    Ca+ (Calcium) 2.5  --    Na+ (Sodium) 138  --    HCO3 (Bicarb) 34  --    Post-Hemodialysis Assessment   Rinseback Volume (ml)  --  400 ml   Total Liters Processed (l/min)  --  74.7 l/min   Dialyzer Clearance  --  Moderately streaked   Duration of Treatment (minutes)  --  240 minutes   Heparin amount administered during treatment (units)  --  0 units   Hemodialysis Intake (ml)  --  0 ml   Hemodialysis Output (ml)  --  1800 ml   NET Removed (ml)  --  1400 ml   Tolerated Treatment  --  Good   Tolerated 4 hour hd tx without any difficulty. Net UF 1.4 Liters. BP steadily alice t/o tx enabling some UF as ordered. No meds given. No refill present per crit line. Report to floor RN and tx record faxed to floor for scan into the EMR.

## 2020-08-31 NOTE — DISCHARGE INSTR - COC
Continuity of Care Form    Patient Name: Earnest Toure   :  1945  MRN:  [id number]    Admit date:  2020  Discharge date: 20    Code Status Order: Full Code   Advance Directives:   [facility] Documentation     Date/Time Healthcare Directive Type of Healthcare Directive Copy in [address] [place] [address] [address] Agent's Name Healthcare Agent's Phone Number    20 [phone]  Yes, patient has an advance directive for healthcare treatment  Durable power of  for health care  No, copy requested from family  Healthcare power of   Krysta Jones  [phone]          Admitting Physician:  Charles Rodriguez MD  PCP: Marla Esqueda MD    Discharging Nurse: [facility] Unit/Room#: 0219/0219-01  Discharging Unit Phone Number: ***    Emergency Contact:   Extended Emergency Contact Information  Primary Emergency Contact: Rut Valadez, [address] Phone: [phone]  Mobile Phone: [phone]  Relation: Grandchild  Secondary Emergency Contact: [facility]  Home Phone: [phone]  Mobile Phone: [phone]  Relation: Other   needed?  No    Past Surgical History:  Past Surgical History:   Procedure Laterality Date    BRONCHOSCOPY N/A 2019    BRONCHOSCOPY WASHING performed by Danyelle Ram MD at [address] [place] [address] N/A 2019    BRONCHOSCOPY ALVEOLAR LAVAGE performed by Franky Santos MD at [address] [place] [address]  2019    BRONCHOSCOPY THERAPUTIC ASPIRATION INITIAL performed by Franky Santos MD at [address] [place] [address]  2019    BRONCHOSCOPY FOREIGN BODY REMOVAL performed by Franky Santos MD at [address] [place] [address] Right     CATARACT REMOVAL Bilateral     COLONOSCOPY      CORONARY ANGIOPLASTY WITH STENT PLACEMENT  2012    DIAGNOSTIC CARDIAC CATH LAB PROCEDURE      DIALYSIS FISTULA CREATION Left 2020    LEFT FOREARM LOOP DIALYSIS GRAFT performed by Bella Hardwick Felipe De Anda MD at [facility] ARTHROSCOPY      TYLOR AND BSO  11/30/2015    TOE AMPUTATION Right 1/16/2020    RIGHT SECOND AND THIRD TOE AMPUTATION, RIGHT HEEL WOUND DEBRIDEMENT, RIGHT HALLUX TOE NAIL DEBRIDEMENT performed by Shar Whiting DPM at [address] N/A 12/2/2019    EGD PEG PLACEMENT W/ ANESTHESIA performed by Festus Hassan MD at [address] Bilateral 08/25/2020    common iliac artery angioplasty and stenting       Immunization History:   Immunization History   Administered Date(s) Administered    Influenza Virus Vaccine 10/23/2012, 10/14/2014, 09/01/2015    Influenza Whole 11/01/2011    Influenza, High Dose (Fluzone 65 yrs and older) 10/13/2016    Pneumococcal Conjugate 13-valent (Cnkaskq07) 09/01/2015    Pneumococcal Conjugate 7-valent (Prevnar7) 10/01/2009, 11/01/2011    Tdap (Boostrix, Adacel) 05/13/2012       Active Problems:  Patient Active Problem List   Diagnosis Code    Asthma J45.909    Vertigo R42    Type 2 diabetes mellitus with diabetic polyneuropathy, with long-term current use of insulin (HCC) E11.42, Z79.4    HTN (hypertension) I10    CAD (coronary artery disease) I25.10    Perennial allergic rhinitis J30.89    Osteoarthritis of spine with radiculopathy, lumbar region M47.26    Pain of both hip joints M25.551, M25.552    Spinal stenosis, lumbar region, with neurogenic claudication M48.062    Right sided sciatica M54.31    Chronic pain of both knees M25.561, M25.562, G89.29    Osteoporosis M81.0    Primary osteoarthritis of both knees M17.0    Pulmonary nodule R91.1    Normocytic normochromic anemia D64.9    Anasarca R60.1    Diabetic polyneuropathy associated with type 2 diabetes mellitus (HCC) E11.42    Diabetic ulcer of right heel associated with type 2 diabetes mellitus, with necrosis of muscle ([facility]) E11.621, L97.413    Debility R53.81    Moderate malnutrition ([facility]) E44.0    End stage renal disease ([facility] [address] N18.6    Atherosclerosis of native artery of right lower extremity with ulceration of heel ([facility]) I70.234    Pressure ulcer of right ischium, stage II ([facility]) L89.312    Anxiety disorder due to general medical condition with panic attack F06.4, F41.0    Atrial fibrillation ([facility]) I48.91    Edema of both legs R60.0    Gastroesophageal reflux disease without esophagitis K21.9    Hyperlipidemia, mixed E78.2    Nonrheumatic mitral (valve) insufficiency I34.0    Orthostatic hypotension I95.1    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    Skin tear of upper arm without complication, left, initial encounter S41.112A    Ulcers of both lower legs, limited to breakdown of skin ([facility] [address] L97.911, L97.921    Peripheral venous insufficiency I87.2    Diabetic ulcer of 4th toe of right foot associated with type 2 diabetes mellitus, with fat layer exposed ([facility]) E11.621, L97.512    Chronic deep vein thrombosis (DVT) of distal vein of both lower extremities ([facility]) I82.5Z3    Cholelithiasis K80.20    Moderate to severe pulmonary hypertension ([facility]) I27.20    Atherosclerotic peripheral vascular disease with ulceration ([facility]) I70.209, L98.499    Diarrhea R19.7    Lung nodules R91.8    Clostridium difficile enterocolitis A04.72    Closed compression fracture of thoracic vertebra ([facility]) S22.000A       Isolation/Infection:   Isolation          C Diff Contact        Patient Infection Status     Infection Onset Added Last Indicated Last Indicated By Review Planned Expiration Resolved Resolved By    C-diff (Clostridium difficile) 08/29/20 08/30/20 08/29/20 C. difficile toxin Molecular 09/06/20       C-diff Rule Out  08/30/20 08/29/20 C. difficile toxin Molecular (Ordered)        Resolved    C-diff Rule Out 08/29/20 08/30/20 08/29/20 C. difficile toxin Molecular (Ordered)   08/30/20 Rule-Out Test Resulted    C-diff Rule Out 08/28/20 08/28/20 08/29/20 Clostridium difficile toxin/antigen (Ordered)   08/29/20 Rule-Out Test Resulted C-diff Rule Out 20 Clostridium Difficile Toxin/Antigen (Ordered)   20 Rule-Out Test Resulted    COVID-19 Rule Out 20 Emergent Disease Panel (Ordered)   20 Rule-Out Test Resulted    C-diff Rule Out 20 Clostridium difficile toxin/antigen (Ordered)   20 Rule-Out Test Resulted          Nurse Assessment:  Last Vital Signs: BP (!) 109/55   Pulse 57   Temp 97.6 °F (36.4 °C)   Resp 18   Ht 4' 11\" (1.499 m)   Wt 137 lb 5.6 oz (62.3 kg)   SpO2 93%   BMI 27.74 kg/m²     Last documented pain score (0-10 scale): Pain Level: 0  Last Weight:   Wt Readings from Last 1 Encounters:   20 137 lb 5.6 oz (62.3 kg)     Mental Status:  {IP PT MENTAL STATUS:65520}    IV Access:  { TONE IV ACCESS:490744039}    Nursing Mobility/ADLs:  Walking   {CHP DME LUEM:662185810}  Transfer  {CHP DME NLEL:394312596}  Bathing  {CHP DME ESPL:192879766}  Dressing  {CHP DME WOF}  Toileting  {CHP DME QDLX:901404801}  Feeding  {CHP DME ARHN:650217676}  Med Admin  {P DME IJGB:173653553}  Med Delivery   {[facility] MED Delivery:764159507}    Wound Care Documentation and Therapy:  Wound 20 #2, Left Lower Arm, Trauma, Full Thickness, Onset 20 (Active)   Wound Image   20 1445   Wound Traumatic 20 1449   Dressing Status Clean;Dry; Intact 20   Dressing Changed Changed/New 20   Dressing/Treatment Other (comment) 20 1649   Wound Cleansed Wound cleanser 20 1449   Wound Length (cm) 5 cm 20 1449   Wound Width (cm) 1.2 cm 20 1449   Wound Depth (cm) 0.1 cm 20 1449   Wound Surface Area (cm^2) 6 cm^2 20 1449   Change in Wound Size % (l*w) -13.64 20 1449   Wound Volume (cm^3) 0.6 cm^3 20 1449   Wound Healing % -13 20 1449   Post-Procedure Length (cm) 5 cm 20 1547   Post-Procedure Width (cm) 1.2 cm 20 1547   Post-Procedure Depth (cm) 0.1 cm 20 1547 Post-Procedure Surface Area (cm^2) 6 cm^2 08/20/20 1547   Post-Procedure Volume (cm^3) 0.6 cm^3 08/20/20 1547   Wound Assessment Fragile;Red;Yellow 08/30/20 2044   Drainage Amount Small 08/30/20 2044   Drainage Description Serous 08/30/20 2044   Odor None 08/30/20 2044   Tess-wound Assessment Ecchymosis 08/30/20 2044   Number of days: 17       Wound 08/13/20 #3, Right Leg Medial, Venous, Partial Thickness, Onset 8/1/20 (Active)   Wound Image   08/13/20 1445   Wound Venous 08/20/20 1449   Dressing Status Clean;Dry; Intact 08/31/20 0823   Dressing Changed Changed/New 08/31/20 0823   Dressing/Treatment Other (comment) 08/13/20 1649   Wound Cleansed Wound cleanser 08/20/20 1449   Wound Length (cm) 0.7 cm 08/20/20 1449   Wound Width (cm) 1 cm 08/20/20 1449   Wound Depth (cm) 0.1 cm 08/20/20 1449   Wound Surface Area (cm^2) 0.7 cm^2 08/20/20 1449   Change in Wound Size % (l*w) 82.5 08/20/20 1449   Wound Volume (cm^3) 0.07 cm^3 08/20/20 1449   Wound Healing % 82 08/20/20 1449   Wound Assessment Slough; Yellow;Drainage 08/31/20 0823   Drainage Amount Moderate 08/31/20 0823   Drainage Description Serous 08/31/20 0823   Odor None 08/31/20 0823   Tess-wound Assessment Pink 08/31/20 0823   Number of days: 17       Wound 08/13/20 #4, Right Leg Lateral, Venous, Partial Thickness, Onset 8/1/20 (Active)   Wound Image   08/13/20 1445   Wound Venous 08/20/20 1449   Dressing Status Clean;Dry; Intact 08/31/20 0823   Dressing Changed Changed/New 08/30/20 2044   Dressing/Treatment Other (comment) 08/13/20 1649   Wound Cleansed Wound cleanser 08/20/20 1449   Wound Length (cm) 1.2 cm 08/20/20 1449   Wound Width (cm) 0.5 cm 08/20/20 1449   Wound Depth (cm) 0.1 cm 08/20/20 1449   Wound Surface Area (cm^2) 0.6 cm^2 08/20/20 1449   Change in Wound Size % (l*w) -7.14 08/20/20 1449   Wound Volume (cm^3) 0.06 cm^3 08/20/20 1449   Wound Healing % 0 08/20/20 1449   Wound Assessment Yellow;Slough;Pink 08/30/20 2044   Drainage Amount Moderate 08/30/20 2044   Drainage Description Serous 08/30/20 2044   Odor None 08/30/20 2044   Tess-wound Assessment Pink 08/30/20 2044   Number of days: 17       Wound 08/13/20 #5, Right 4th Toe Lateral, Diabetic Ulcer, Mloina 1, Onset 8/1/20 (Active)   Wound Image   08/13/20 1445   Wound Diabetic Molina 1 08/20/20 1449   Offloading for Diabetic Foot Ulcers Other (comment) 08/13/20 1649   Dressing Status Clean;Dry; Intact 08/31/20 0823   Dressing Changed Changed/New 08/31/20 0823   Dressing/Treatment Other (comment) 08/13/20 1649   Wound Cleansed Wound cleanser 08/20/20 1449   Wound Length (cm) 0.5 cm 08/20/20 1449   Wound Width (cm) 0.6 cm 08/20/20 1449   Wound Depth (cm) 0.5 cm 08/20/20 1449   Wound Surface Area (cm^2) 0.3 cm^2 08/20/20 1449   Change in Wound Size % (l*w) 28.57 08/20/20 1449   Wound Volume (cm^3) 0.15 cm^3 08/20/20 1449   Wound Healing % 12 08/20/20 1449   Undermining Starts ___ O'Clock 12 08/20/20 1449   Undermining Ends___ O'Clock 12 08/20/20 1449   Undermining Maxium Distance (cm) . 3 08/20/20 1449   Wound Assessment Yellow;Slough;Pink 08/31/20 0823   Drainage Amount Moderate 08/31/20 0823   Drainage Description Serous 08/31/20 0823   Odor None 08/31/20 0823   Tess-wound Assessment Calloused;Pink;Maceration 08/30/20 2044   Number of days: 17       Wound 08/13/20 #6, Right Heel, Diabetic Ulcer, Molina 2, Onset 5/20 (Active)   Wound Image   08/13/20 1445   Wound Diabetic Molina 2 08/20/20 1449   Offloading for Diabetic Foot Ulcers Diabetic shoes/inserts 08/20/20 1547   Dressing Status Clean;Dry; Intact 08/30/20 2044   Dressing Changed Changed/New 08/30/20 2044   Dressing/Treatment Other (comment) 08/13/20 1649   Wound Cleansed Wound cleanser 08/20/20 1449   Wound Length (cm) 0.8 cm 08/20/20 1449   Wound Width (cm) 0.7 cm 08/20/20 1449   Wound Depth (cm) 0.4 cm 08/20/20 1449   Wound Surface Area (cm^2) 0.56 cm^2 08/20/20 1449   Change in Wound Size % (l*w) 58.52 08/20/20 1449   Wound Volume (cm^3) 0.22 cm^3 08/20/20 1449   Wound Healing % 45 08/20/20 1449   Wound Assessment Pink;Yellow;Slough 08/30/20 2044   Drainage Amount Moderate 08/30/20 2044   Drainage Description Serous 08/30/20 2044   Odor None 08/30/20 2044   Tess-wound Assessment Calloused;Pink 08/30/20 2044   Number of days: 17       Wound 08/13/20 #7, Left Pretib, Venous, Partial Thickness, Onset 8/1/20 (Active)   Wound Image   08/13/20 1445   Wound Venous 08/20/20 1449   Dressing Status Clean;Dry; Intact 08/30/20 2044   Dressing Changed Changed/New 08/30/20 2044   Dressing/Treatment Other (comment) 08/13/20 1649   Wound Cleansed Wound cleanser 08/20/20 1449   Wound Length (cm) 4.7 cm 08/20/20 1449   Wound Width (cm) 2.5 cm 08/20/20 1449   Wound Depth (cm) 0.1 cm 08/20/20 1449   Wound Surface Area (cm^2) 11.75 cm^2 08/20/20 1449   Change in Wound Size % (l*w) -4600 08/20/20 1449   Wound Volume (cm^3) 1.18 cm^3 08/20/20 1449   Wound Healing % -5800 08/20/20 1449   Wound Assessment Granulation tissue;Pink;Red;Drainage 08/30/20 2044   Drainage Amount Moderate 08/30/20 2044   Drainage Description Serous 08/30/20 2044   Odor None 08/30/20 2044   Tess-wound Assessment Pink;Red 08/30/20 2044   Number of days: 17       Wound 08/13/20 #8, Right Ischium, Pressure Injury, Stage 2, Onset 8/1/20 (Active)   Wound Image   08/13/20 1445   Wound Pressure Stage  2 08/20/20 1449   Dressing Status Clean;Dry; Intact 08/30/20 2044   Dressing Changed Changed/New 08/30/20 2044   Dressing/Treatment Other (comment) 08/13/20 1649   Wound Cleansed Wound cleanser 08/20/20 1449   Wound Length (cm) 1 cm 08/20/20 1449   Wound Width (cm) 2 cm 08/20/20 1449   Wound Depth (cm) 0.1 cm 08/20/20 1449   Wound Surface Area (cm^2) 2 cm^2 08/20/20 1449   Change in Wound Size % (l*w) 20 08/20/20 1449   Wound Volume (cm^3) 0.2 cm^3 08/20/20 1449   Wound Healing % 60 08/20/20 1449   Post-Procedure Length (cm) 1 cm 08/20/20 1547   Post-Procedure Width (cm) 2 cm 08/20/20 1547   Post-Procedure Depth (cm) 0.1 cm 08/20/20 1547   Post-Procedure Surface Area (cm^2) 2 cm^2 08/20/20 1547   Post-Procedure Volume (cm^3) 0.2 cm^3 08/20/20 1547   Wound Assessment Red;Yellow 08/30/20 2044   Drainage Amount Scant 08/30/20 2044   Drainage Description Serosanguinous 08/30/20 2044   Odor None 08/31/20 0823   Tess-wound Assessment Pink 08/30/20 2044   Number of days: 17       Wound 08/20/20 # 9, Right upper anterior leg, traumatic, onset 8/17/2020 (Active)   Wound Image   08/20/20 1449   Wound Traumatic 08/20/20 1449   Dressing Status Clean;Dry; Intact 08/31/20 0823   Dressing Changed Changed/New 08/31/20 0823   Wound Cleansed Rinsed/Irrigated with saline 08/20/20 1449   Wound Length (cm) 1.5 cm 08/20/20 1449   Wound Width (cm) 0.9 cm 08/20/20 1449   Wound Depth (cm) 0.1 cm 08/20/20 1449   Wound Surface Area (cm^2) 1.35 cm^2 08/20/20 1449   Wound Volume (cm^3) 0.14 cm^3 08/20/20 1449   Wound Assessment Pink;Yellow;Slough 08/31/20 0823   Drainage Amount Scant 08/31/20 0823   Drainage Description Serous 08/31/20 0823   Odor None 08/31/20 0823   Tess-wound Assessment Red 08/30/20 2044   Number of days: 10       Wound 08/29/20 Buttocks Inner;Left pressure injury (Active)   Wound Pressure Stage  3 08/29/20 0900   Dressing Status Dry; Intact; Clean 08/31/20 0823   Dressing Changed Changed/New 08/31/20 0823   Dressing/Treatment Foam 08/31/20 0823   Wound Cleansed Rinsed/Irrigated with saline 08/29/20 1833   Wound Assessment Red 08/31/20 0823   Drainage Amount None 08/31/20 0823   Odor None 08/31/20 0823   Margins Defined edges 08/30/20 2044   Tess-wound Assessment Clean;Dry 08/31/20 0823   Number of days: 2        Elimination:  Continence:   · Bowel: {YES / RA:96549}  · Bladder: {YES / UD:91417}  Urinary Catheter: {Urinary Catheter:939800582}   Colostomy/Ileostomy/Ileal Conduit: {YES / TE:78421}       Date of Last BM: ***    Intake/Output Summary (Last 24 hours) at 8/31/2020 1203  Last data filed at 8/30/2020 2210  Gross per 24 hour   Intake 360 ml Output 0 ml   Net 360 ml     I/O last 3 completed shifts:   In: 18 [P.O.:480]  Out: 0     Safety Concerns:     [address] [employer] Safety Concerns:717520680}    Impairments/Disabilities:      [facility] TONE Impairments/Disabilities:017300078}    Nutrition Therapy:  Current Nutrition Therapy:   [address] [employer] Diet List:571678442}    Routes of Feeding: {CHP DME Other Feedings:260779811}  Liquids: {Slp liquid thickness:93606}  Daily Fluid Restriction: {CHP DME Yes amt example:172606493}  Last Modified Barium Swallow with Video (Video Swallowing Test): {Done Not Done IJNN:688312095}    Treatments at the Time of Hospital Discharge:   Respiratory Treatments: ***  Oxygen Therapy:  {Therapy; copd oxygen:31140}  Ventilator:    {MH CC Vent WXRD:192994527}    Rehab Therapies: {THERAPEUTIC INTERVENTION:9782169677}  Weight Bearing Status/Restrictions: [facility] Weight Bearin}  Other Medical Equipment (for information only, NOT a DME order):  {EQUIPMENT:416906827}  Other Treatments: ***    Patient's personal belongings (please select all that are sent with patient):  {CHP DME Belongings:425708535}    RN SIGNATURE:  {Esignature:477212829}    CASE MANAGEMENT/SOCIAL WORK SECTION    Inpatient Status Date: 20    Readmission Risk Assessment Score:  Readmission Risk              Risk of Unplanned Readmission:        53           Discharging to   Agency   · Name: [facility]  · Address:  · Phone:[phone]  · Fax: [phone]    Dialysis Facility (if applicable)   · Name: [facility]  · Address:  · Dialysis Schedule: 1100  · Phone: [phone]  · Fax:    / signature: Electronically signed by Telma Hill RN on 20 at 1:13 PM EDT    PHYSICIAN SECTION    Prognosis: Good    Condition at Discharge: Stable    Rehab Potential (if transferring to Rehab): Good    Recommended Labs or Other Treatments After Discharge: PT/OT, skilled nursing    Physician Certification: I certify the above information and transfer of [facility]  is necessary for the continuing treatment of the diagnosis listed and that she requires Home Care for less 30 days.      Update Admission H&P: No change in H&P  Recommended Follow-up, Labs or Other Treatments After Discharge:    ***           PHYSICIAN SIGNATURE:  Electronically signed by Flavia Bhagat MD on 8/31/20 at 12:03 PM EDT

## 2020-08-31 NOTE — PROGRESS NOTES
Pt d/c'd home. Removed left arm IV and stopped bleeding. Catheter intact. Pt tolerated well. No redness noted at site. Notified [facility] and removed tele box. Reviewed d/c instructions, home meds, and  f/u information utilizing teach-back method. Scripts for vancomycin called in to [facility] in [facility]. given to patient. Patient verbalized understanding.

## 2020-08-31 NOTE — PLAN OF CARE
Problem: Falls - Risk of:  Goal: Will remain free from falls  Description: Will remain free from falls  Outcome: Ongoing     Problem: Skin Integrity:  Goal: Will show no infection signs and symptoms  Description: Will show no infection signs and symptoms  Outcome: Ongoing     Problem: Pain:  Goal: Pain level will decrease  Description: Pain level will decrease  8/31/2020 1117 by Tate Herbert RN  Outcome: Ongoing

## 2020-08-31 NOTE — PROGRESS NOTES
Progress Note    HISTORY     CC:  Diarrhea           We are following for ESRD      Subjective/   HPI:  Feeling better. Diarrhea improved. Fair appetite. Set for HD today. ROS:  Constitutional:  No fevers, No Chills, + weakness  Cardiovascular:  No palpations, no edema  Respiratory:  No wheezing, no cough  Skin:  No rash, no itching    Social Hx:  No family at bedside     Past Medical and Surgical History:  - Reviewed, no changes     EXAM       Objective/     Vitals:    08/30/20 1645 08/30/20 2042 08/31/20 0019 08/31/20 0747   BP: (!) 95/49 (!) 107/52 111/64 118/60   Pulse: 58 58 56 62   Resp: 16 16 16 18   Temp: 97.7 °F (36.5 °C) 97.4 °F (36.3 °C) 97.5 °F (36.4 °C) 97.5 °F (36.4 °C)   TempSrc: Oral Oral Axillary Oral   SpO2:  92% 93% 93%   Weight:       Height:         24HR INTAKE/OUTPUT:      Intake/Output Summary (Last 24 hours) at 8/31/2020 2610  Last data filed at 8/30/2020 2210  Gross per 24 hour   Intake 480 ml   Output 0 ml   Net 480 ml     Constitutional:  Alert, awake, no apparent distress. Resting in chair eating breakfast.  Eyes:  Pupils reactive, sclera clear   Neck:  Normal thyroid, no masses   Cardiovascular:  Regular, no rub  Respiratory:  No distress, no wheezing  Psychiatry:  Appropriate mood/affect, alert  Abdomen: +bs, soft, nt, no masses   Musculoskeletal: No LE edema, no clubbing   Lymphatics:  No LAD in neck, no supraclavicular nodes   Access: Left forearm AV loop graft +bruit. MEDICAL DECISION MAKING       Data/  Recent Labs     08/28/20 1735 08/30/20  0839   WBC 12.9* 6.9   HGB 9.9* 10.0*   HCT 32.0* 31.9*   MCV 90.5 90.7    279     Recent Labs     08/28/20  1735 08/29/20  0251 08/30/20  0839   * 129* 131*   K 4.3 3.9 4.7   CL 91* 92* 95*   CO2 24 23 23   GLUCOSE 299* 231* 236*   BUN 47* 50* 29*   CREATININE 4.5* 4.9* 3.4*   LABGLOM 9* 9* 13*   GFRAA 11* 10* 16*       Assessment/     ESRD:  On HD mwf, working left arm AV loop graft.   Her outpatient [facility] is [facility].   Plan for HD today.     Diarrhea:  C.diff +     Anemia of chronic disease:  Hgb is near target    Plan/     Oral Vancomycin   HD today  Ok for discharge from my standpoint after HD if cleared by other services.  -----------------------------  Patrick Grey MD  [facility]

## 2020-08-31 NOTE — PROGRESS NOTES
Occupational Therapy  Attempted OT tx. Pt is at dialysis. Cont OT.   Jacobo [facility] OT

## 2020-08-31 NOTE — CARE COORDINATION
CASE MANAGEMENT DISCHARGE SUMMARY      Discharge to: home    Precertification completed: NA    Hospital Exemption Notification (HENS) completed: NA    New Durable Medical Equipment ordered/agency: none    Transportation:    Family/car:   Confirmed discharge plan with: patient - A&O     Patient: yes      Family, name and contact number:  Patient alert and oriented - will call family    [facility]    [phone] for skilled, PT and OT  Referral called to [facility]  Who validates that patient is existing client    Kelby WILKES   Chair time 1100  Contacted intake to notify of patient discharge      Note: Discharging nurse to complete TONE, reconcile AVS, and place final copy with patient's discharge packet. RN to ensure that written prescriptions for  Level II medications are sent with patient to the facility as per protocol.     Tricia Gonzales RN

## 2020-08-31 NOTE — PROGRESS NOTES
ONCOLOGY HEMATOLOGY CARE PROGRESS NOTE      SUBJECTIVE:     NO issues. Feels quite well. Eating breakfast. No nausea or vomiting. She is aware her lung lesions are too small for bx and needs repeat scans in a few months. No family present this AM.       ROS:   The remaining 10 point review of symptoms is unremarkable. OBJECTIVE        Physical    VITALS:  /60   Pulse 62   Temp 97.5 °F (36.4 °C) (Oral)   Resp 18   Ht 4' 11\" (1.499 m)   Wt 132 lb 11.5 oz (60.2 kg)   SpO2 93%   BMI 26.81 kg/m²   TEMPERATURE:  Current - Temp: 97.5 °F (36.4 °C); Max - Temp  Av.5 °F (36.4 °C)  Min: 97.4 °F (36.3 °C)  Max: 97.7 °F (36.5 °C)  PULSE OXIMETRY RANGE: SpO2  Av.7 %  Min: 92 %  Max: 93 %  24HR INTAKE/OUTPUT:      Intake/Output Summary (Last 24 hours) at 2020 1011  Last data filed at 2020 2210  Gross per 24 hour   Intake 360 ml   Output 0 ml   Net 360 ml       CONSTITUTIONAL:  awake, alert, cooperative, no apparent distress, HEENT oral pharynx , no scleral icterus  HEMATOLOGIC/LYMPHATICS:  no cervical lymphadenopathy, no supraclavicular lymphadenopathy, no axillary lymphadenopathy and no inguinal lymphadenopathy  LUNGS:  No increased work of breathing, good air exchange, clear to auscultation bilaterally, no crackles or wheezing  CARDIOVASCULAR:  , regular rate and rhythm, normal S1 and S2, no S3 or S4, and no murmur noted  ABDOMEN:  No scars, normal bowel sounds, soft, non-distended, non-tender, no masses palpated, no hepatosplenomegally  MUSCULOSKELETAL:  There is no redness, warmth, or swelling of the joints. EXTREMETIES: No clubbing cynosis or edema  NEUROLOGIC:  Awake, alert, oriented to name, place and time. Cranial nerves II-XII are grossly intact. Motor is 5 out of 5 bilaterally.    SKIN:  no bruising or bleeding      Data      Recent Labs     20  1735 20  0839   WBC 12.9* 6.9   HGB 9.9* 10.0*   HCT 32.0* 31.9*    279   MCV 90.5 90.7 Recent Labs     08/28/20  1735 08/29/20  0251 08/30/20  0839   * 129* 131*   K 4.3 3.9 4.7   CL 91* 92* 95*   CO2 24 23 23   BUN 47* 50* 29*   CREATININE 4.5* 4.9* 3.4*     Recent Labs     08/28/20  1735   AST 14*   ALT 8*   BILITOT 0.7   ALKPHOS 137*       Magnesium:    Lab Results   Component Value Date    MG 1.90 07/29/2020    MG 2.10 04/25/2020    MG 1.70 02/22/2020         Problem List  Patient Active Problem List   Diagnosis    Asthma    Vertigo    Type 2 diabetes mellitus with diabetic polyneuropathy, with long-term current use of insulin ([address]    HTN (hypertension)    CAD (coronary artery disease)    Perennial allergic rhinitis    Osteoarthritis of spine with radiculopathy, lumbar region    Pain of both hip joints    Spinal stenosis, lumbar region, with neurogenic claudication    Right sided sciatica    Chronic pain of both knees    Osteoporosis    Primary osteoarthritis of both knees    Pulmonary nodule    Normocytic normochromic anemia    Anasarca    Diabetic polyneuropathy associated with type 2 diabetes mellitus ([address]    Diabetic ulcer of right heel associated with type 2 diabetes mellitus, with necrosis of muscle (HCC)    Debility    Moderate malnutrition (HCC)    End stage renal disease ([address]    Atherosclerosis of native artery of right lower extremity with ulceration of heel (HCC)    Pressure ulcer of right ischium, stage II ([address]    Anxiety disorder due to general medical condition with panic attack    Atrial fibrillation (HCC)    Edema of both legs    Gastroesophageal reflux disease without esophagitis    Hyperlipidemia, mixed    Nonrheumatic mitral (valve) insufficiency    Orthostatic hypotension    Retroperitoneal sarcoma (HCC)    Skin tear of upper arm without complication, left, initial encounter    Ulcers of both lower legs, limited to breakdown of skin ([address]    Peripheral venous insufficiency    Diabetic ulcer of 4th toe of right foot associated with type 2 diabetes mellitus, with fat layer exposed ([address]    Chronic deep vein thrombosis (DVT) of distal vein of both lower extremities (HCC)    Cholelithiasis    Moderate to severe pulmonary hypertension ([address]    Atherosclerotic peripheral vascular disease with ulceration (HCC)    Diarrhea    Lung nodules    Clostridium difficile enterocolitis    Closed compression fracture of thoracic vertebra (HCC)       ASSESSMENT AND PLAN:    Soft tissue sarcoma  Increased Pulmonary  Nodules     - treated at [facility]  - Diagnosed 2018 and s/p preoperative XRT Aug 2018  - following radiation, it was decided she was not a surgical candidate  - Staging scans July 2019 with non specific pulm nodules and stable retroperitoneal mass at 3 cm. 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   - pt has not routinely followed up with her oncologist or had serial scans  - CT A/P on admission 8/29/2020 incidentally showed an increase in pulmonary nodules suspicious for metastasis   - nodules could be reactive/inflammatory or metastatic  - pulm consult note reviewed   - repeat imaging in 3 months with CT vs PET- lesions too small for bronch bx and increased risk of pneumothorax with CT guided open bx     Diarrhea  - C diff indeterminate  - on flagyl     Anemia  -Probably anemia of chronic disease from ESRD  - Hgb appears at baseline  - transfuse for Hgb < 7- Hgb 10      ESRD  - hemodialysis per nephrology             ONCOLOGIC DISPOSITION: per IM , follow up with [facility] oncology  with CT vs PET scan in 3 months- patient aware.        Tatiana Lafleur, KERWIN  [facility]   Please contact through [facility]

## 2020-08-31 NOTE — PROGRESS NOTES
Physical Therapy  Facility/Department: [facility] A2 CARD TELEMETRY  Daily Treatment Note  NAME: Watt Lesches  : 1945  MRN: [id number]    Date of Service: 2020    Discharge Recommendations:  Home with Home health PT, 24 hour supervision or assist   PT Equipment Recommendations  Equipment Needed: No    Assessment   Body structures, Functions, Activity limitations: Decreased functional mobility ; Decreased balance;Decreased ROM; Decreased strength;Decreased endurance  Assessment: Pt tolerated therapy well this date. Progressing to SBA for transfers and gait training with RW. Pt also able to increase seated  ther ex reps without complaint. Did reports RLE pain with gait but states has been going on for past week after stents were placed. States her doctor is aware. Will continue to progress per POC. Treatment Diagnosis: decreased functional mobility  Specific instructions for Next Treatment: Progress mobility as tolerated  Prognosis: Good  Decision Making: Low Complexity  PT Education: Goals; General Safety;Gait Training;PT Role;Plan of Care; Functional Mobility Training;Transfer Training; Injury Prevention  Patient Education: pt educated on importance of OOB mobility and gait training to improve activity tolerance -- pt  verbalized understanding  Barriers to Learning: None  REQUIRES PT FOLLOW UP: Yes  Activity Tolerance  Activity Tolerance: Patient Tolerated treatment well     Patient Diagnosis(es): The primary encounter diagnosis was Generalized abdominal pain. Diagnoses of Nausea vomiting and diarrhea, General weakness, Colitis, Hx of Clostridium difficile infection, Elevated lactic acid level, ESRD on hemodialysis ([address] and Impaired mobility were also pertinent to this visit.      has a past medical history of Acute bilateral deep vein thrombosis (DVT) of popliteal veins (HCC), Acute pulmonary edema ([address] Acute respiratory failure with hypoxia and hypercapnia ([address] Angina, Cardiac arrest ([address] Cellulitis, Clostridium difficile infection, Diabetic foot ulcer ([facility] Endometrial adenocarcinoma ([facility] Gangrene of toe of both feet ([facility] 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 ([facility] MI (myocardial infarction) ([facility] Obesity, Pneumonia, Small saphenous vein thrombophlebitis, right, Syncope, and Tobacco use.   has a past surgical history that includes Carpal tunnel release (Right); Knee arthroscopy; Coronary angioplasty with stent (4/2012); bronchoscopy (N/A, 11/12/2019); bronchoscopy (N/A, 11/23/2019); bronchoscopy (11/23/2019); bronchoscopy (11/23/2019); Upper gastrointestinal endoscopy (N/A, 12/2/2019); Toe amputation (Right, 1/16/2020); Diagnostic Cardiac Cath Lab Procedure; Dialysis fistula creation (Left, 6/25/2020); Colonoscopy; Cataract removal (Bilateral); mansi and bso (cervix removed) (11/30/2015); and vascular surgery (Bilateral, 08/25/2020). Restrictions  Restrictions/Precautions  Restrictions/Precautions: General Precautions, Isolation  Position Activity Restriction  Other position/activity restrictions: Contact plus (C. diff), up with assist  Subjective   General  Chart Reviewed: Yes  Additional Pertinent Hx: ESRD on HD MWF  Response To Previous Treatment: Patient with no complaints from previous session. Family / Caregiver Present: No  Referring Practitioner: LUCIAN Agudelo CNP  Subjective  Subjective: Pt resting in bed on approach; pt agreeable to therapy.  States she is uncomfortable in bed and needs to change position  General Comment  Comments: RN cleared pt for session  Pain Screening  Patient Currently in Pain: Denies       Objective   Bed mobility  Supine to Sit: Modified independent  Sit to Supine: Unable to assess(pt up in chair at end of session)     Transfers  Sit to Stand: Stand by assistance  Stand to sit: Stand by assistance     Ambulation  Ambulation?: Yes  Ambulation 1  Surface: level tile  Device: Rolling Walker  Assistance: Stand by assistance  Quality of Gait: Step to gait pattern, RLE drop foot, forward flexed trunk, downward gaze. Pt steady, no overt LOB; Cues for posture throughout gait training  Gait Deviations: Slow Caitlyn;Decreased step length;Decreased step height  Distance: 30 ft  Comments: Distane limited d/t Cdiff precautions and pt reporting RLE pain        Exercises  Hip Flexion: Seated marches BLE x 15  Hip Abduction: Seated clamshells BLE x 15  Knee Long Arc Quad: Seated BLE x 15  Ankle Pumps: Seated BLE x 15  Comments: Cues for sequence and technique                                AM-PAC Score     AM-PAC Inpatient Mobility without Stair Climbing Raw Score : 18 (08/31/20 0924)  AM-PAC Inpatient without Stair Climbing T-Scale Score : 51.97 (08/31/20 0924)  Mobility Inpatient CMS 0-100% Score: 23.26 (08/31/20 0924)  Mobility Inpatient without Stair CMS G-Code Modifier : Dk Yadav (08/31/20 5664)       Goals  Short term goals  Time Frame for Short term goals: 5 days 9/04/20 (unless otherwise specified)  Short term goal 1: Pt will complete supine to/from sit with I -- Mod I with supine to sit 8/31  Short term goal 2: Pt will complete sit to/from stand and bed <> chair t/f with LRAD with MI -- SBA 8/31  Short term goal 3: Pt will ambulate x 50' with RW with MI without LOB -- SBA 30 ft 8/31  Short term goal 4: 9/02/20: Pt will participate in 15 reps BLE exercises to increase strength and increase I with functional mobility -- GOAL MET 8/31 and ongoing  Patient Goals   Patient goals : \"Go home\"    Plan    Plan  Times per week: 3-5x/wk  Times per day: Daily  Specific instructions for Next Treatment: Progress mobility as tolerated  Current Treatment Recommendations: Strengthening, Home Exercise Program, Safety Education & Training, Balance Training, Endurance Training, Patient/Caregiver Education & Training, Functional Mobility Training, Transfer Training, Gait Training  Safety Devices  Type of devices:  All fall risk precautions in place, Call light within reach, Chair alarm in place, Gait belt, Patient at risk for falls, Nurse notified, Left in chair  Restraints  Initially in place: No     Therapy Time   Individual Concurrent Group Co-treatment   Time In 0801         Time Out 0828         Minutes 27         Timed Code Treatment Minutes: [address] Evelin Butler PT, DPT #[id number]  If pt is unable to be seen after this session, please let this note serve as discharge summary. Please see case management note for discharge disposition. Thank you.

## 2020-08-31 NOTE — DISCHARGE SUMMARY
Hospital Medicine Discharge Summary    Patient ID: Nallely Summers      Patient's PCP: Corey Nguyen MD    Admit Date: 8/28/2020     Discharge Date:   08/31/20    Admitting Physician: Laurine Romberg, MD     Discharge Physician: Luis Miguel Toro MD     Discharge Diagnoses: Active Hospital Problems    Diagnosis    Lung nodules [R91.8]    Clostridium difficile enterocolitis [A04.72]    Closed compression fracture of thoracic vertebra (HCC) [S22.000A]    Diarrhea [R19.7]    Ulcers of both lower legs, limited to breakdown of skin ([address] [L97.911, L97.921]    Peripheral venous insufficiency [I87.2]    Moderate to severe pulmonary hypertension (HCC) [I27.20]    End stage renal disease (HCC) [N18.6]    Diabetic ulcer of right heel associated with type 2 diabetes mellitus, with necrosis of muscle (HCC) [Q42.295, L97.413]    Normocytic normochromic anemia [D64.9]    Retroperitoneal sarcoma ([address] [C48.0]       The patient was seen and examined on day of discharge and this discharge summary is in conjunction with any daily progress note from day of discharge. Hospital Course:   67 y. o. female with multiple medical problems including ESRD on HD, CAD, PAD with recent lower extremity stenting, previous history of C. difficile infection presents to [facility] complaining of intractable diarrhea starting yesterday. She had too many episodes to count of watery nonbloody diarrhea starting yesterday and continuing through today up until about 1 hour prior to coming to the emergency room. She did have associated abdominal pain. She denied fevers or chills. She has not had diarrhea for several hours now. She has no abdominal pain currently. She is able to tolerate fluids    Diarrhea secondary to C. Diff colitis:  - CT abdomen/pelvis showed diffuse colonic wall thickening, similar to previous study, possibly ongoing colitis.   - C. Diff toxin/antigen indeterminate. C. Diff toxin molecular was positive.    -  Pt started on oral vancomycin. Discharged on tapering dose  - Diarrhea improving on discharge     History of soft tissue sarcoma:  - CT shows enlarging pulmonary nodules concerning for metastatic disease:  - Hem/Onc consulted/following  - per Pulm, nodules too small for biopsy  - repeat CT chest in 3 months     ESRD on hemodialysis M/W/F:  - Nephrology managing HD.      Coronary artery disease:  - Stable. No complaints of angina.   - Continue aspirin, plavix, losartan, metoprolol and statin.      Peripheral artery disease:  - S/p angioplasty and stenting of the bilateral common iliac arteries at the aortic bifurcation on 8/25/20 per Dr. Rosalie Sequeira.   - Continue aspirin, plavix and statin.      Diabetes mellitus type 2:  - Controlled. A1C in April '20 was 6.6.  - Increase to medium dose SSI ACHS. - Carb-control diet.      Anemia:   - Likely due to chronic disease/ESRD. No overt signs of bleeding. Hemoccult was neg.  - H&H is stable. Monitor closely.      Depression:   - Mood stable. Continue her home zoloft. Physical Exam Performed:     BP (!) 122/54   Pulse 60   Temp 97.8 °F (36.6 °C)   Resp 18   Ht 4' 11\" (1.499 m)   Wt 134 lb 0.6 oz (60.8 kg)   SpO2 93%   BMI 27.07 kg/m²       General appearance:  No apparent distress, appears stated age and cooperative. HEENT:  Normal cephalic, atraumatic without obvious deformity. Pupils equal, round, and reactive to light. Extra ocular muscles intact. Conjunctivae/corneas clear. Neck: Supple, with full range of motion. No jugular venous distention. Trachea midline. Respiratory:  Normal respiratory effort. Clear to auscultation, bilaterally without Rales/Wheezes/Rhonchi. Cardiovascular:  Regular rate and rhythm with normal S1/S2 without murmurs, rubs or gallops. Abdomen: Soft, non-tender, non-distended with normal bowel sounds. Musculoskeletal:  No clubbing, cyanosis or edema bilaterally. Full range of motion without deformity.   Skin: Skin color, texture, turgor normal.  No rashes or lesions. Neurologic:  Neurovascularly intact without any focal sensory/motor deficits. Cranial nerves: II-XII intact, grossly non-focal.  Psychiatric:  Alert and oriented, thought content appropriate, normal insight  Capillary Refill: Brisk,< 3 seconds   Peripheral Pulses: +2 palpable, equal bilaterally       Labs: For convenience and continuity at follow-up the following most recent labs are provided:      CBC:    Lab Results   Component Value Date    WBC 5.8 08/31/2020    HGB 10.2 08/31/2020    HCT 32.5 08/31/2020     08/31/2020       Renal:    Lab Results   Component Value Date     08/31/2020    K 4.7 08/31/2020    CL 92 08/31/2020    CO2 22 08/31/2020    BUN 38 08/31/2020    CREATININE 4.6 08/31/2020    CALCIUM 8.4 08/31/2020    PHOS 3.4 08/25/2020         Significant Diagnostic Studies    Radiology:   CT CHEST WO CONTRAST   Final Result   Bilateral pulmonary nodules, measuring up to 6 mm. Several nodules   demonstrate progression compared with 03/08/2020. The nodules are presumably   metastatic. Small bilateral pleural effusions. New, mild anterior compression fracture of T5. This appears to be   osteoporotic. No osseous metastatic lesion is evident at this level. CT ABDOMEN PELVIS WO CONTRAST Additional Contrast? None   Final Result   1. Diffuse colonic wall thickening, similar to the previous study, possibly   an ongoing colitis. 2. 3rd spacing with small quantity of ascites, diffuse anasarca and trace   right pleural effusion. 3. Cholelithiasis with no acute features. 4. Increased size of pulmonary nodules suggesting metastatic disease.          XR CHEST PORTABLE   Final Result   Cardiomegaly with no acute cardiopulmonary process demonstrated                Consults:     IP CONSULT TO HOSPITALIST  IP CONSULT TO NEPHROLOGY  IP CONSULT TO HEM/ONC  IP CONSULT TO PHARMACY  IP CONSULT TO PULMONOLOGY  IP CONSULT TO HOME CARE NEEDS    Disposition:  [facility] Condition at Discharge: Stable    Discharge Instructions/Follow-up:  Follow up with PCP, hem. onc within 1-2 weeks    Code Status:  Full Code     Activity: activity as tolerated    Diet: cardiac diet      Discharge Medications:     Current Discharge Medication List           Details   vancomycin (VANCOCIN) 125 MG capsule Take one tablet every 6 hours for two weeks. Then take one table every 12 hours for one week. Then take one tablet every 24 hours for one week. Then take one tablet every 48 hours for two weeks then stop  Qty: 112 capsule, Refills: 0              Details   ondansetron (ZOFRAN ODT) 4 MG disintegrating tablet Take 1 tablet by mouth every 8 hours as needed for Nausea  Qty: 20 tablet, Refills: 0      clopidogrel (PLAVIX) 75 MG tablet Take 1 tablet by mouth daily  Qty: 30 tablet, Refills: 3      hydrOXYzine (VISTARIL) 25 MG capsule Take 25 mg by mouth 3 times daily as needed for Itching      promethazine (PHENERGAN) 25 MG tablet Take 25 mg by mouth every 6 hours as needed for Nausea      traMADol (ULTRAM) 50 MG tablet Take 50 mg by mouth every 6 hours as needed for Pain.       sertraline (ZOLOFT) 100 MG tablet Take 1 tablet by mouth daily  Qty: 30 tablet, Refills: 0      lactobacillus (CULTURELLE) capsule Take 1 capsule by mouth 3 times daily (with meals)  Qty: 90 capsule, Refills: 2      losartan (COZAAR) 50 MG tablet Take 1 tablet by mouth daily  Qty: 30 tablet, Refills: 3      aspirin 81 MG chewable tablet Take 1 tablet by mouth daily  Qty: 30 tablet, Refills: 3      pantoprazole (PROTONIX) 40 MG tablet Take 1 tablet by mouth every morning (before breakfast)  Qty: 30 tablet, Refills: 3      metoprolol tartrate (LOPRESSOR) 50 MG tablet Take 50 mg by mouth 2 times daily      rosuvastatin (CRESTOR) 5 MG tablet Take 5 mg by mouth nightly       insulin lispro (HUMALOG) 100 UNIT/ML injection vial Inject 0-12 Units into the skin 3 times daily (with meals) **Medium Dose Corrective Algorithm**  Glucose: Dose:  If <139             No Insulin  140-199 2 Units  200-249 4 Units  250-299 6 Units  300-349 8 Units  350-400 10 Units  Above 400       12 Units  Qty: 1 vial, Refills: 3      donepezil (ARICEPT) 5 MG tablet Take 1 tablet by mouth nightly  Qty: 30 tablet, Refills: 3      B Complex-C-Iron (SUPER B-COMPLEX/IRON/VITAMIN C PO) Take 1 tablet by mouth daily. Time Spent on discharge is more than 30 minutes in the examination, evaluation, counseling and review of medications and discharge plan. Signed:    Shalom Thao MD   8/31/2020      Thank you Henna Hutchins MD for the opportunity to be involved in this patient's care. If you have any questions or concerns please feel free to contact me at [phone].

## 2020-09-03 NOTE — PLAN OF CARE
Wound debridement to right heel wound and right ischial wound. Patient has been in hospital over past week and compression dressing was not changed. LLE:  spandagrip med compression, Right heel,calf,shin-Triad,Opticell Ag,ABD,Kerlix Low comp. Spandagrip, change 3 x weekly, right 4th toe,Betadine,roll gauze daily. This toe has decompensated since last visit. Dr. Danilo Hoffmann will speak with Dr. Kajal Gilbert. Right ischium collagen with silver, bordered gauze 3 x weekly, LLE-Collagen with silver, roll gauze every other day.   F/u x 1 week, MD orders/D/C instructions reviewed with patient, all questions answered; copy of instructions given to patient

## 2020-09-07 PROBLEM — R19.7 DIARRHEA: Status: RESOLVED | Noted: 2020-01-01 | Resolved: 2020-01-01

## 2020-09-07 PROBLEM — L97.513 DIABETIC ULCER OF TOE OF RIGHT FOOT ASSOCIATED WITH TYPE 2 DIABETES MELLITUS, WITH NECROSIS OF MUSCLE (HCC): Status: ACTIVE | Noted: 2020-01-01

## 2020-09-07 NOTE — PROGRESS NOTES
[facility] Progress Note    Danielle Winn     : 1945    DATE OF VISIT:  9/3/2020    Subjective: Danielle Winn is a 67 y.o. female who has a diabetic ulcer located on the foot (right, heel) and right, 4th toe. She's not been here for a couple of weeks, after a recent vascular procedure (BL iliac angioplasty and stents), some significant post-procedure right thigh pain, and then an episode of significant diarrhea, diagnosed with Cdiff. Feeling very worn out, but better than last week. Current complaint of pain in this ulcer? yes (the heel; the toe is actually numb)  Quality of pain: aching and sharp  Timing: intermittent and stable  Severity: mild  Associated Signs/Symptoms:  swelling, drainage (light, clear) and numbness  Other significant symptoms or pertinent ulcer history: no current F/C/D, diarrhea improved, no N/V, trying to continue with good oral intake. Still has some lower extremity swelling, R>L, but left lower leg ulcer healed, even after she came out of compression a week or two ago. Additional ulcer(s) noted? yes. Left arm skin tear, buttock pressure ulcer, a small right great toe ulcer, and a few small venous-type ulcers on the right leg. Ms. Akira Bacon has a past medical history of Acute bilateral deep vein thrombosis (DVT) of popliteal veins ([address] Acute pulmonary edema ([address] Acute respiratory failure with hypoxia and hypercapnia ([address] Angina, C. difficile diarrhea, Cardiac arrest ([address] Cellulitis, Clostridium difficile infection, Diabetic foot ulcer ([address] Endometrial adenocarcinoma ([address] Gangrene of toe of both feet ([address] 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 ([address] MI (myocardial infarction) ([address] Obesity, Pneumonia, Small saphenous vein thrombophlebitis, right, Syncope, and Tobacco use.     She has a past surgical history that includes Carpal tunnel release (Right); Knee arthroscopy; Coronary angioplasty with stent (2012); bronchoscopy (N/A, 11/12/2019); bronchoscopy (N/A, 11/23/2019); bronchoscopy (11/23/2019); bronchoscopy (11/23/2019); Upper gastrointestinal endoscopy (N/A, 12/2/2019); Toe amputation (Right, 1/16/2020); Diagnostic Cardiac Cath Lab Procedure; Dialysis fistula creation (Left, 6/25/2020); Colonoscopy; Cataract removal (Bilateral); mansi and bso (cervix removed) (11/30/2015); vascular surgery (Bilateral, 08/25/2020); angioplasty (08/2020); and IR ANGIOPLASTY ILIAC INIT VESSEL W STENT (08/2020). Her family history includes Asthma in her son and son; Diabetes in her brother, brother, daughter, maternal grandmother, mother, and sister; Heart Disease in her daughter and mother; Heart Failure in her mother; Irritable Bowel Syndrome in her daughter; Stroke in her mother. Ms. Alton Donato reports that she quit smoking about 35 years ago. Her smoking use included cigarettes. She quit after 13.00 years of use. She has never used smokeless tobacco. She reports that she does not drink alcohol or use drugs. Her current medication list consists of B Complex-C-Iron, aspirin, clopidogrel, donepezil, hydrOXYzine, insulin lispro, lactobacillus, losartan, metoprolol tartrate, ondansetron, pantoprazole, promethazine, rosuvastatin, sertraline, traMADol, and vancomycin. Allergies: Ativan [lorazepam]; Dilaudid [hydromorphone hcl]; Flexeril [cyclobenzaprine hcl]; Heparin; Soma [carisoprodol]; Vicodin [hydrocodone-acetaminophen]; Penicillins; and Sulfa antibiotics    Pertinent items from the review of systems are discussed in the HPI; the remainder of the ROS was reviewed and is negative.      Objective:     Vitals:    09/03/20 1247   BP: (!) 103/48   Pulse: 56   Resp: 18   Temp: 97.7 °F (36.5 °C)   TempSrc: Oral   Weight: 137 lb (62.1 kg)   Height: 5' (1.524 m)       Constitutional:  well-developed, well-nourished, chronically ill appearing, fatigued, NAD  Psychiatric:  oriented to person, place and time; mood and affect appropriate for the situation   Eyes: Medial CLUSTER, Venous, Partial Thickness, Onset 8/1/20-Wound Length (cm): 14 cm  Wound 08/13/20 #4, Right Leg Lateral CLUSTER, Venous, Partial Thickness, Onset 8/1/20-Wound Length (cm): 2.2 cm  Wound 08/13/20 #5, Right 4th Toe Lateral, Diabetic Ulcer, Molina 1, Onset 8/1/20-Wound Length (cm): 0.7 cm    Wound 08/13/20 #6, Right Heel, Diabetic Ulcer, Molina 2, Onset 5/20-Wound Width (cm): 0.7 cm  [REMOVED] Wound 08/13/20 #7, Left Pretib, Venous, Partial Thickness, Onset 8/1/20-Wound Width (cm): 0 cm  Wound 08/13/20 #8, Right Ischium, Pressure Injury, Stage 2, Onset 8/1/20-Wound Width (cm): 0.9 cm  Wound 09/03/20 #10, Right Great Toe, Diabetic Ulcer, Molina 1, Onset 9/1/20-Wound Width (cm): 0.5 cm  Wound 08/20/20 # 9, Right upper anterior leg, traumatic, onset 8/17/2020-Wound Width (cm): 0.1 cm  Wound 08/13/20 #2, Left Lower Arm, Trauma, Full Thickness, Onset 8/4/20-Wound Width (cm): 0.9 cm  Wound 08/13/20 #3, Right Leg Medial CLUSTER, Venous, Partial Thickness, Onset 8/1/20-Wound Width (cm): 6 cm  Wound 08/13/20 #4, Right Leg Lateral CLUSTER, Venous, Partial Thickness, Onset 8/1/20-Wound Width (cm): 2.5 cm  Wound 08/13/20 #5, Right 4th Toe Lateral, Diabetic Ulcer, Molina 1, Onset 8/1/20-Wound Width (cm): 0.5 cm    Wound 08/13/20 #6, Right Heel, Diabetic Ulcer, Molina 2, Onset 5/20-Wound Depth (cm): 0.3 cm  [REMOVED] Wound 08/13/20 #7, Left Pretib, Venous, Partial Thickness, Onset 8/1/20-Wound Depth (cm): 0 cm  Wound 08/13/20 #8, Right Ischium, Pressure Injury, Stage 2, Onset 8/1/20-Wound Depth (cm): 0.1 cm  Wound 09/03/20 #10, Right Great Toe, Diabetic Ulcer, Molina 1, Onset 9/1/20-Wound Depth (cm): 0.1 cm  Wound 08/20/20 # 9, Right upper anterior leg, traumatic, onset 8/17/2020-Wound Depth (cm): 0.1 cm  Wound 08/13/20 #2, Left Lower Arm, Trauma, Full Thickness, Onset 8/4/20-Wound Depth (cm): 0.1 cm  Wound 08/13/20 #3, Right Leg Medial CLUSTER, Venous, Partial Thickness, Onset 8/1/20-Wound Depth (cm): 0.1 cm  Wound right buttock and right lower leg ulcer(s) down through and including the removal of dermis. The type(s) of tissue debrided included fibrin, biofilm and necrotic/eschar. Total Surface Area Debrided: 2 sq cm. Using a curette, I sharply debrided the foot (right, heel) ulcer(s) down through and including the removal of subcutaneous tissue. The type(s) of tissue debrided included fibrin, biofilm and necrotic/eschar. Total Surface Area Debrided: 1 sq cm. The ulcers were then irrigated with normal saline solution. The procedure was completed with a small amount of bleeding, and hemostasis was with pressure. The patient tolerated the procedure well, with no significant complications. The patient's level of pain during and after the procedure was monitored. Post-debridement measurements, if different from pre-debridement, are in the flowsheet as well.      Discharge plan:     Treatment in the wound care center today, per RN documentation: Wound 08/13/20 #6, Right Heel, Diabetic Ulcer, Molina 2, Onset 5/20-Dressing/Treatment: Other (comment)(Triad, OpticelAG, 4x4, Kerlix, E spandagrip)  [REMOVED] Wound 08/13/20 #7, Left Pretib, Venous, Partial Thickness, Onset 8/1/20-Dressing/Treatment: Other (comment)(single E spandagrip)  Wound 08/13/20 #8, Right Ischium, Pressure Injury, Stage 2, Onset 8/1/20-Dressing/Treatment: Other (comment)(Puracol Ag,mepilex border)  Wound 09/03/20 #10, Right Great Toe, Diabetic Ulcer, Molina 1, Onset 9/1/20-Dressing/Treatment: Other (comment)(Betadine, 2x2, roll gauze)  Wound 08/20/20 # 9, Right upper anterior leg, traumatic, onset 8/17/2020-Dressing/Treatment: Other (comment)(Triad, OpticelAG, ABD, Kerlix, E spandagrip)  Wound 08/13/20 #2, Left Lower Arm, Trauma, Full Thickness, Onset 8/4/20-Dressing/Treatment: Other (comment)(Puracol Ag,4x4's,rina,single D dermafit)  Wound 08/13/20 #3, Right Leg Medial CLUSTER, Venous, Partial Thickness, Onset 8/1/20-Dressing/Treatment: Other (comment)(Triad, OpticelAG, ABD, Kerlix, E spandagrip)  Wound 08/13/20 #4, Right Leg Lateral CLUSTER, Venous, Partial Thickness, Onset 8/1/20-Dressing/Treatment: Other (comment)(Triad, OpticelAG, ABD, Kerlix, E spandagrip)  Wound 08/13/20 #5, Right 4th Toe Lateral, Diabetic Ulcer, Molina 1, Onset 8/1/20-Dressing/Treatment: Other (comment)(Betadine, 2x2, roll gauze). Spoke to Dr. Gordon Castro today regarding her recent vascular procedure and the progression of that 4th toe, recommending toe amputation. She will try to have Mrs. Hien Pierre come in to their office within the next week or so, to discuss surgery. Patient in agreement, was very understanding about the whole situation. I reminded the patient of the importance of weight management and smoking cessation, if applicable; also encouraged ambulation as tolerated, additional lower extremity exercises as instructed in our education sheet, leg elevation when at rest, and compliance with any recommended dietary, diuretic and compression therapies. Continue to work on protein intake, glucose control, offloading (offloading boot in bed, ROHO cushion in wheelchair, frequent position changes in bed). Will work on some longer term plans (diabetic shoes and inserts, compression), once the toe amputation is done and healed. Home treatment: good handwashing before and after any dressing changes. Cleanse ulcer with saline or soap & water before dressing change. May use Vaseline (petrolatum), Aquaphor, Aveeno, CeraVe, Cetaphil, Eucerin, Lubriderm, etc for dry skin. Dressing type for home: as above, every couple of days for the left arm, right ischium, right leg and heel, and daily for the right toes. Written discharge instructions given to patient. Follow up in 1 week, unless DPM follow-up and surgical plans interfere with that.     Electronically signed by Maureen Hancock MD on 9/7/2020 at 6:38 PM.

## 2020-09-10 NOTE — PLAN OF CARE
Wound debridement today per Dr. Christian Alvarado, new wound to right dorsal great toe, she will be seeing her podiatrist today.   Discussed importance of elevation of BLE t/o day, concerns re: right great and 4th toe with coolness, discoloration, f/u x 1 week, MD orders/D/C instructions reviewed with patient, all questions answered; copy of instructions given to patient

## 2020-09-14 PROBLEM — L97.514 DIABETIC ULCER OF TOE OF RIGHT FOOT ASSOCIATED WITH TYPE 2 DIABETES MELLITUS, WITH NECROSIS OF BONE (HCC): Status: ACTIVE | Noted: 2020-01-01

## 2020-09-14 NOTE — PROGRESS NOTES
Sulfa antibiotics    Pertinent items from the review of systems are discussed in the HPI; the remainder of the ROS was reviewed and is negative. Objective:     Vitals:    09/10/20 1256   BP: (!) 140/70   Pulse: 56   Resp: 18   Temp: 97.9 °F (36.6 °C)   TempSrc: Oral   Weight: 130 lb 9.6 oz (59.2 kg)   Height: 5' (1.524 m)       Constitutional:  well-developed, well-nourished, chronically ill appearing, fatigued, NAD  Psychiatric:  oriented to person, place and time; mood and affect appropriate for the situation   Eyes:  pupils equal, round and reactive to light; sclerae anicteric, conjunctivae not pale  Cardiovascular:  bilateral pedal pulses Dopplerable, forefeet quite cool, cap refill borderline; moderate BL lower extremity stage 2 lymphedema, significantly increased from 1-2 weeks ago  Lymphatic:  no inguinal or popliteal adenopathy, no angitis or cellulitis, I think just some reactive erythema of the right midfoot and forefoot, related to her edema, wounds and drainage   Musculoskeletal:  no clubbing, cyanosis or petechiae; RLE and LLE with no gross effusions, joint misalignment or acute arthritis  Tess-ulcer skin: generally pink and indurated, but cool with a bit of reactive erythema at the right foot, some hyperkeratosis at the heel and toes, macerated at the foot as well. Ulcer(s): left arm skin tear smaller, healthy, red, a bit dry; another small presumably traumatic wound at wrist, yellow, fibrotic, dry; pressure ulcer at ischium slowly smaller, more granular, less fibrin; lower leg wounds mostly partial thickness, some unhealthy dermal exposure, fibrosis, biofilm; right 4th toe cool, dark, necrotic, exposed bone; great toe does not look as good as last week -- light purple, cool, damp, some lysing epidermal tissue. Photos also saved in electronic chart.     Today's Wound Measurements, per RN documentation:  Wound 09/10/20 #11, Right great toe - top of toe, venous, partial thickness, onset 9/10/2020-Wound Length (cm): 0.7 cm  Wound 09/10/20 #12, Left lateral leg/left pretib, venous,-Wound Length (cm): 1 cm  Wound 08/13/20 #8, Right Ischium, Pressure Injury, Stage 2, Onset 8/1/20-Wound Length (cm): 1.6 cm  Wound 08/13/20 #2, Left Lower Arm, Trauma, Full Thickness, Onset 8/4/20-Wound Length (cm): 2 cm  Wound 09/03/20 #10, Right Great Toe, Diabetic Ulcer, Molina 1, Onset 9/1/20-Wound Length (cm): 0.6 cm  Wound 08/13/20 #5, Right 4th Toe Lateral, Diabetic Ulcer, Molina 1, Onset 8/1/20-Wound Length (cm): 1.4 cm  Wound 08/13/20 #3, Right Leg Medial CLUSTER, Venous, Partial Thickness, Onset 8/1/20-Wound Length (cm): 14 cm  Wound 08/13/20 #4, Right Leg Lateral CLUSTER, Venous, Partial Thickness, Onset 8/1/20-Wound Length (cm): 2 cm  Wound 08/13/20 #6, Right Heel, Diabetic Ulcer, Molina 2, Onset 5/20-Wound Length (cm): 1.4 cm  [REMOVED] Wound 08/20/20 # 9, Right upper anterior leg, traumatic, onset 8/17/2020-Wound Length (cm): 0 cm    Wound 09/10/20 #11, Right great toe - top of toe, venous, partial thickness, onset 9/10/2020-Wound Width (cm): 0.8 cm  Wound 09/10/20 #12, Left lateral leg/left pretib, venous,-Wound Width (cm): 2 cm  Wound 08/13/20 #8, Right Ischium, Pressure Injury, Stage 2, Onset 8/1/20-Wound Width (cm): 0.6 cm  Wound 08/13/20 #2, Left Lower Arm, Trauma, Full Thickness, Onset 8/4/20-Wound Width (cm): 0.3 cm  Wound 09/03/20 #10, Right Great Toe, Diabetic Ulcer, Molina 1, Onset 9/1/20-Wound Width (cm): 0.9 cm  Wound 08/13/20 #5, Right 4th Toe Lateral, Diabetic Ulcer, Molina 1, Onset 8/1/20-Wound Width (cm): 1.9 cm  Wound 08/13/20 #3, Right Leg Medial CLUSTER, Venous, Partial Thickness, Onset 8/1/20-Wound Width (cm): 6 cm  Wound 08/13/20 #4, Right Leg Lateral CLUSTER, Venous, Partial Thickness, Onset 8/1/20-Wound Width (cm): 2.4 cm  Wound 08/13/20 #6, Right Heel, Diabetic Ulcer, Molina 2, Onset 5/20-Wound Width (cm): 1.3 cm  [REMOVED] Wound 08/20/20 # 9, Right upper anterior leg, traumatic, onset 8/17/2020-Wound Width (cm): 0 cm    Wound 09/10/20 #11, Right great toe - top of toe, venous, partial thickness, onset 9/10/2020-Wound Depth (cm): 0.1 cm  Wound 09/10/20 #12, Left lateral leg/left pretib, venous,-Wound Depth (cm): 0.1 cm  Wound 08/13/20 #8, Right Ischium, Pressure Injury, Stage 2, Onset 8/1/20-Wound Depth (cm): 0.1 cm  Wound 08/13/20 #2, Left Lower Arm, Trauma, Full Thickness, Onset 8/4/20-Wound Depth (cm): 0.1 cm  Wound 09/03/20 #10, Right Great Toe, Diabetic Ulcer, Molina 1, Onset 9/1/20-Wound Depth (cm): 0.1 cm  Wound 08/13/20 #5, Right 4th Toe Lateral, Diabetic Ulcer, Molina 1, Onset 8/1/20-Wound Depth (cm): 0.3 cm  Wound 08/13/20 #3, Right Leg Medial CLUSTER, Venous, Partial Thickness, Onset 8/1/20-Wound Depth (cm): 0.1 cm  Wound 08/13/20 #4, Right Leg Lateral CLUSTER, Venous, Partial Thickness, Onset 8/1/20-Wound Depth (cm): 0.2 cm  Wound 08/13/20 #6, Right Heel, Diabetic Ulcer, Molina 2, Onset 5/20-Wound Depth (cm): 0.4 cm  [REMOVED] Wound 08/20/20 # 9, Right upper anterior leg, traumatic, onset 8/17/2020-Wound Depth (cm): 0 cm  _____________________________    Lab Results   Component Value Date    LABALBU 2.8 (L) 08/31/2020     Lab Results   Component Value Date    CREATININE 4.6 (H) 08/31/2020     Lab Results   Component Value Date    HGB 10.2 (L) 08/31/2020     Lab Results   Component Value Date    LABA1C 6.6 04/24/2020     Assessment:     Patient Active Problem List   Diagnosis Code    Asthma J45.909    Vertigo R42    Type 2 diabetes mellitus with diabetic polyneuropathy, with long-term current use of insulin (HCC) E11.42, Z79.4    HTN (hypertension) I10    CAD (coronary artery disease) I25.10    Perennial allergic rhinitis J30.89    Osteoarthritis of spine with radiculopathy, lumbar region M47.26    Pain of both hip joints M25.551, M25.552    Spinal stenosis, lumbar region, with neurogenic claudication M48.062    Right sided sciatica M54.31    Chronic pain of both knees M25.561, M25.562, G89.29    Osteoporosis M81.0    Primary osteoarthritis of both knees M17.0    Pulmonary nodule R91.1    Normocytic normochromic anemia D64.9    Anasarca R60.1    Diabetic polyneuropathy associated with type 2 diabetes mellitus (HCC) E11.42    Diabetic ulcer of right heel associated with type 2 diabetes mellitus, with necrosis of muscle (HCC) E11.621, L97.413    Debility R53.81    Moderate malnutrition (HCC) E44.0    End stage renal disease (HCC) N18.6    Atherosclerosis of native artery of right lower extremity with ulceration of heel ([facility]) I70.234    Pressure ulcer of right ischium, stage II ([facility]) L89.312    Anxiety disorder due to general medical condition with panic attack F06.4, F41.0    Atrial fibrillation ([facility]) I48.91    Edema of both legs R60.0    Gastroesophageal reflux disease without esophagitis K21.9    Hyperlipidemia, mixed E78.2    Nonrheumatic mitral (valve) insufficiency I34.0    Orthostatic hypotension I95.1    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    Skin tear of upper arm without complication, left, initial encounter S41.112A    Ulcer of right leg, limited to breakdown of skin ([address] L97.911    Peripheral venous insufficiency I87.2    Diabetic ulcer of toe of right foot associated with type 2 diabetes mellitus, with necrosis of bone (HCC) E11.621, L97.514    Chronic deep vein thrombosis (DVT) of distal vein of both lower extremities ([facility]) I82.5Z3    Cholelithiasis K80.20    Moderate to severe pulmonary hypertension ([facility]) I27.20    Atherosclerotic peripheral vascular disease with ulceration ([facility]) I70.209, L98.499    Lung nodules R91.8    Clostridium difficile enterocolitis A04.72    Closed compression fracture of thoracic vertebra ([facility]) S22.000A       Assessment of today's active condition(s): well controlled Dm2, but neuropathy, PAD (recent revascularization but NANCY occluded), also venous stasis and lymphedema, multiple nonhealing wounds.  Left arm should do fine with simple local care; buttock pressure ulcer should do ok with offloading and local care; lower leg ulcers primarily need more elevation and compression and drainage absorption; will be easy to compress the left side more this week, but the right will need to wait until toe surgery, and maybe even until those surgical sites are healed; right heel could do fine if offloaded and not too moist; 4th toe needs amputation, and with the way the great toe looks today, I think amputation of all three remaining toes might be needed, if not a TMA because of distal ischemia; no signs of soft tissue infection today. Factors contributing to occurrence and/or persistence of the chronic ulcer include venous stasis, lymphedema, diabetes, chronic pressure, decreased mobility, shear force, arterial insufficiency, decreased tissue oxygenation, immunosuppression and malnutrition. Medical necessity of today's visit is shown by the above documentation. Sharp debridement is indicated today, based upon the exam findings in the ulcer(s) above. Procedure note:     Consent obtained. Time out performed per [facility]  policy. Anesthetic  Anesthetic: 4% Lidocaine Cream     Using a curette, #15 blade scalpel and forceps, I sharply debrided the right buttock, right, anterior lower leg and foot (right, heel) ulcer(s) down through and including the removal of dermis. The type(s) of tissue debrided included fibrin, biofilm, necrotic/eschar and callus. Total Surface Area Debrided: 3 sq cm. The ulcers were then irrigated with normal saline solution. The procedure was completed with a small amount of bleeding, and hemostasis was with pressure. The patient tolerated the procedure well, with no significant complications. The patient's level of pain during and after the procedure was monitored. Post-debridement measurements, if different from pre-debridement, are in the flowsheet as well.      Discharge plan:     Treatment in the wound care center today, per RN documentation: Wound 09/10/20 #11, Right great toe - top of toe, venous, partial thickness, onset 9/10/2020-Dressing/Treatment: (betadine gauze)  Wound 09/10/20 #12, Left lateral leg/left pretib, venous,-Dressing/Treatment: (opticel ag 4x4's compri 2 lite spandagrip)  Wound 08/13/20 #8, Right Ischium, Pressure Injury, Stage 2, Onset 8/1/20-Dressing/Treatment: (collagen mepilex border)  Wound 08/13/20 #2, Left Lower Arm, Trauma, Full Thickness, Onset 8/4/20-Dressing/Treatment: (polysporin benzoin mepilex border)  Wound 09/03/20 #10, Right Great Toe, Diabetic Ulcer, Molina 1, Onset 9/1/20-Dressing/Treatment: (betadine gauze rina)  Wound 08/13/20 #5, Right 4th Toe Lateral, Diabetic Ulcer, Molina 1, Onset 8/1/20-Dressing/Treatment: (betadine 2x2 rina)  Wound 08/13/20 #3, Right Leg Medial CLUSTER, Venous, Partial Thickness, Onset 8/1/20-Dressing/Treatment: (triad opticell ag ABD kerlix spandagrip)  Wound 08/13/20 #4, Right Leg Lateral CLUSTER, Venous, Partial Thickness, Onset 8/1/20-Dressing/Treatment: (triad opticell ag ABD kerlix spandagrip)  Wound 08/13/20 #6, Right Heel, Diabetic Ulcer, Molina 2, Onset 5/20-Dressing/Treatment: (triad opticell ag ABD kerlix spandagrip). Per physician order, left application of multilayer compression wrap was performed in the wound care center today, to help manage edema, stasis dermatitis, and/or venous ulcers. Leave primary dressing and multi-layer wrap(s) in place until the next appointment. Also discussed ways to keep the wrap dry for a shower, including a plastic cast-guard, available in retail pharmacies. She should call before her next visit if there is any significant pain, significant strike-through of drainage to the surface of the wrap, or any significant sense of the wrap sliding down more than an inch or so, bunching-up and abrading her skin.      I reminded the patient of the importance of weight management and smoking cessation, if applicable; also encouraged ambulation as tolerated, additional lower extremity exercises as instructed in our education sheet, leg elevation when at rest, and compliance with any recommended dietary, diuretic and compression therapies. Apart from whatever the podiatric surgical plans are, I think the most important thing she can do this week is elevate her legs more -- will help her swelling, drainage, distal maceration, probably her pain, decrease risk of further infection, etc. Next week will discuss options for long-term daily compression on the LLE; plan the same for the right side in the near future, when the foot is more stable. Continue to use ROHO for offloading while seated. Padded surgical shoe for ambulation. Medline HeelMedix boot in bed. Keep left arm dressings in place for the week. Keep left lower leg wrap in place for the week. Await podiatry plans re: 4th toe amp, perhaps great and 5th toes, perhaps even a more proximal level. Home treatment: good handwashing before and after any dressing changes. Cleanse ulcer with saline or soap & water before dressing change. May use Vaseline (petrolatum), Aquaphor, Aveeno, CeraVe, Cetaphil, Eucerin, Lubriderm, etc for dry skin. Dressing type for home: as above for the right lower extremity, once daily. Written discharge instructions given to patient. Follow up in 1 week, unless the timing of podiatric surgery interferes with that.     Electronically signed by Lorne Thacker MD on 9/14/2020 at 1:13 PM.

## 2020-09-16 PROBLEM — D69.6 THROMBOCYTOPENIA (HCC): Status: ACTIVE | Noted: 2020-01-01

## 2020-09-16 PROBLEM — J96.01 ACUTE RESPIRATORY FAILURE WITH HYPOXIA AND HYPERCAPNIA (HCC): Status: ACTIVE | Noted: 2020-01-01

## 2020-09-16 PROBLEM — R91.8 PULMONARY INFILTRATES: Status: ACTIVE | Noted: 2020-01-01

## 2020-09-16 PROBLEM — R79.89 ELEVATED BRAIN NATRIURETIC PEPTIDE (BNP) LEVEL: Status: ACTIVE | Noted: 2020-01-01

## 2020-09-16 PROBLEM — J96.01 ACUTE RESPIRATORY FAILURE WITH HYPOXIA (HCC): Status: ACTIVE | Noted: 2020-01-01

## 2020-09-16 PROBLEM — J96.02 ACUTE RESPIRATORY FAILURE WITH HYPOXIA AND HYPERCAPNIA (HCC): Status: ACTIVE | Noted: 2020-01-01

## 2020-09-16 PROBLEM — R74.8 ELEVATED LIPASE: Status: ACTIVE | Noted: 2020-01-01

## 2020-09-16 PROBLEM — I07.1 TRICUSPID REGURGITATION: Status: ACTIVE | Noted: 2020-01-01

## 2020-09-16 PROBLEM — E44.0 MODERATE PROTEIN-CALORIE MALNUTRITION (HCC): Status: ACTIVE | Noted: 2020-01-01

## 2020-09-16 NOTE — CONSULTS
Cholelithiasis 08/17/2020    Moderate to severe pulmonary hypertension ([address] 08/17/2020    Atherosclerosis of native artery of right lower extremity with ulceration of heel ([address] 08/14/2020    End stage renal disease ([address] 06/25/2020    Anxiety disorder due to general medical condition with panic attack 04/15/2020    Moderate malnutrition ([address] 04/02/2020    Debility 03/23/2020    Diabetic polyneuropathy associated with type 2 diabetes mellitus ([address] 03/10/2020    Diabetic ulcer of right heel associated with type 2 diabetes mellitus, with necrosis of muscle ([address] 03/10/2020    Anasarca 03/07/2020    Edema of both legs 01/29/2020    Gastroesophageal reflux disease without esophagitis 01/29/2020    Normocytic normochromic anemia 11/22/2019    Pulmonary nodule 10/15/2019    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    Primary osteoarthritis of both knees 02/28/2017    Nonrheumatic mitral (valve) insufficiency 02/07/2017    Osteoporosis 01/30/2017    Chronic pain of both knees 07/19/2016    Hyperlipidemia, mixed 03/11/2016    Osteoarthritis of spine with radiculopathy, lumbar region 10/20/2015    Pain of both hip joints 10/20/2015    Spinal stenosis, lumbar region, with neurogenic claudication 10/20/2015    Right sided sciatica 10/20/2015    Orthostatic hypotension 04/23/2014    Perennial allergic rhinitis 04/15/2014    HTN (hypertension) 10/22/2012    CAD (coronary artery disease) 10/22/2012    Type 2 diabetes mellitus with diabetic polyneuropathy, with long-term current use of insulin ([address] 04/06/2012    Vertigo 10/08/2011       Past Medical History:   Diagnosis Date    Acute bilateral deep vein thrombosis (DVT) of popliteal veins ([address] 1/29/2020    Acute pulmonary edema (HCC)     Acute respiratory failure with hypoxia and hypercapnia ([address] 11/11/2019    Angina     with exertion    C. difficile diarrhea 08/2020    Cardiac arrest ([address]     Cellulitis 2/21/2020    Clostridium difficile infection 8/29/2020, 4/24/2020, 03/28/2020    Diabetic foot ulcer ([facility] 03/10/2020    Endometrial adenocarcinoma ([facility] 11/17/2015    ESRD (end stage renal disease) on dialysis ([facility]     Mon-Wed-Fri    Gangrene of toe of both feet ([facility] 8/17/2020    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 ([facility] 11/24/2019    MI (myocardial infarction) ([facility] 1983    Obesity     Pneumonia 3/28/2020    Small saphenous vein thrombophlebitis, right 02/2020    Syncope 4/30/2012    Tobacco use         Past Surgical History:   Procedure Laterality Date    ANGIOPLASTY  08/2020    BRONCHOSCOPY N/A 11/12/2019    BRONCHOSCOPY WASHING performed by Yarely Hurtado MD at [address] [place] [address] N/A 11/23/2019    BRONCHOSCOPY ALVEOLAR LAVAGE performed by Matthew Dumont MD at [address] [place] [address]  11/23/2019    BRONCHOSCOPY THERAPUTIC ASPIRATION INITIAL performed by Matthew Dumont MD at [address] [place] [address]  11/23/2019    BRONCHOSCOPY FOREIGN BODY REMOVAL performed by Matthew Dumont MD at [address] [place] [address] Right     CATARACT REMOVAL Bilateral     COLONOSCOPY      CORONARY ANGIOPLASTY WITH STENT PLACEMENT  4/2012    DIAGNOSTIC CARDIAC CATH LAB PROCEDURE      DIALYSIS FISTULA CREATION Left 6/25/2020    LEFT FOREARM LOOP DIALYSIS GRAFT performed by Josefina Collier MD at [facility] [address] [place] [address]  08/2020    KNEE ARTHROSCOPY      TYLOR AND BSO  11/30/2015    TOE AMPUTATION Right 1/16/2020    RIGHT SECOND AND THIRD TOE AMPUTATION, RIGHT HEEL WOUND DEBRIDEMENT, RIGHT HALLUX TOE NAIL DEBRIDEMENT performed by Robby Allen DPM at [facility] N/A 12/2/2019    EGD PEG PLACEMENT W/ ANESTHESIA performed by Blossom Fisher MD at [facility] 08/25/2020    common iliac artery angioplasty and stenting        Family History   Problem Relation Age of Onset    Diabetes Mother     Heart Failure Mother     Heart Disease Mother     Stroke Mother     Diabetes Sister     Diabetes Brother     Diabetes Maternal Grandmother     Asthma Son     Asthma Son     Diabetes Daughter     Heart Disease Daughter     Irritable Bowel Syndrome Daughter     Diabetes Brother     Cancer Neg Hx     Emphysema Neg Hx     Hypertension Neg Hx         Social History     Tobacco Use    Smoking status: Former Smoker     Years: 13.00     Types: Cigarettes     Last attempt to quit: 1985     Years since quittin.7    Smokeless tobacco: Never Used   Substance Use Topics    Alcohol use: No     Alcohol/week: 0.0 standard drinks        Allergies   Allergen Reactions    Ativan [Lorazepam]      DIZZY    Dilaudid [Hydromorphone Hcl] Other (See Comments)     Pt states it made her crazy    Flexeril [Cyclobenzaprine Hcl]      DOESN'T REMEMBER    Heparin      KEENAN     Soma [Carisoprodol]      FEELS OUT OF IT    Vicodin [Hydrocodone-Acetaminophen] Other (See Comments)     Per pt, makes her \"go crazy\"    Penicillins Rash    Sulfa Antibiotics Rash               Physical Exam:  Blood pressure (!) 98/51, pulse 88, temperature 98 °F (36.7 °C), temperature source Oral, resp. rate 10, height 4' 11\" (1.499 m), weight 122 lb 2.2 oz (55.4 kg), SpO2 98 %, not currently breastfeeding.'     Constitutional: In profound respiratory distress when seen, patient was having paradoxical breathing and very shallow breaths  HENT:  Oropharynx is clear and moist. No thyromegaly. Eyes:  Conjunctivae are normal. Pupils equal, round, and reactive to light. No scleral icterus. Neck: . No tracheal deviation present. No obvious thyroid mass. Cardiovascular: Normal rate, regular rhythm, left lower sternal border. No right ventricular heave. 1+ lower extremity edema. Pulmonary/Chest: No wheezes. Bilateral rales. Chest wall is not dull to percussion. No accessory muscle usage or stridor. Abdominal: Soft. Bowel sounds present.   Slight abdominal distention with anterior abdominal wall edema. No tenderness. Musculoskeletal: No cyanosis. No clubbing. 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 Skin: Gangrene of fingers along with that patient has skin wound especially in the distal aspect of right leg  Neurologic: Could not be objectively evaluated because of patient's clinical status which was precarious        Results:  CBC:   Recent Labs     09/16/20  0340 09/16/20  1827   WBC 8.7  --    HGB 12.0 11.9*   HCT 37.9  --    MCV 89.6  --    *  --      BMP:   Recent Labs     09/16/20  0530   *   K 3.2*   CL 90*   CO2 23   BUN 30*   CREATININE 4.1*     LIVER PROFILE:   Recent Labs     09/16/20  0530   AST 26   ALT 11   LIPASE 292.0*   BILITOT 1.3*   ALKPHOS 192*       Imaging:  I have reviewed radiology images personally. XR CHEST PORTABLE   Final Result   Suspected multifocal pneumonia in the right lung. Probable small right   pleural effusion. XR CHEST PORTABLE    (Results Pending)   XR CHEST PORTABLE    (Results Pending)     Ct Abdomen Pelvis Wo Contrast Additional Contrast? None    Result Date: 8/29/2020  EXAMINATION: CT OF THE ABDOMEN AND PELVIS WITHOUT CONTRAST 8/28/2020 5:53 pm TECHNIQUE: CT of the abdomen and pelvis was performed without the administration of intravenous contrast. Multiplanar reformatted images are provided for review. Dose modulation, iterative reconstruction, and/or weight based adjustment of the mA/kV was utilized to reduce the radiation dose to as low as reasonably achievable. COMPARISON: 04/24/2020.  HISTORY: ORDERING SYSTEM PROVIDED HISTORY: Nausea, vomiting, abdominal pain TECHNOLOGIST PROVIDED HISTORY: Reason for exam:->Nausea, vomiting, abdominal pain Additional Contrast?->None Reason for Exam: bilateral leg stents 2 days ago; nausea, vomiting x 1 day Acuity: Acute Type of Exam: Initial FINDINGS: Lower Chest: No acute infiltrate at the lung bases. Trace right pleural effusion. Multiple noncalcified pulmonary nodules which demonstrate interval increase in size and are concerning for metastatic disease. Cardiomegaly with coronary vascular calcification. Organs: Cholelithiasis with no acute biliary findings. The unenhanced liver, spleen, pancreas and adrenal glands are unremarkable. No evidence of obstructive uropathy. Prominent renal vascular calcification. GI/Bowel: Mild colonic wall thickening, similar to the previous CT. The possibility of an ongoing colitis is raised. Mild retained stool throughout the colon. The appendix is unremarkable. No small bowel distension. The stomach and duodenal sweep are intact. Pelvis: Small amount of free pelvic fluid. The uterus is surgically absent. The bladder is contracted with scattered gas and increased attenuation dependently. Peritoneum/Retroperitoneum: Severe aortoiliac atherosclerotic disease. Surgical stents span the distal abdominal aorta into the common iliac arteries bilaterally. No retroperitoneal adenopathy. Small quantity of upper abdominal ascites. Increased attenuation diffusely of the abdominal fat. Bones/Soft Tissues: Moderate diffuse anasarca. No focal subcutaneous soft tissue collection. Stable compression fractures of the superior endplate of L1, L2 and L3.     1. Diffuse colonic wall thickening, similar to the previous study, possibly an ongoing colitis. 2. 3rd spacing with small quantity of ascites, diffuse anasarca and trace right pleural effusion. 3. Cholelithiasis with no acute features. 4. Increased size of pulmonary nodules suggesting metastatic disease.      Ct Chest Wo Contrast    Result Date: 8/30/2020  EXAMINATION: CT OF THE CHEST WITHOUT CONTRAST 8/30/2020 10:18 am TECHNIQUE: CT of the chest was performed without the administration of intravenous contrast. Multiplanar reformatted images are provided for review. Dose modulation, iterative reconstruction, and/or weight based adjustment of the mA/kV was utilized to reduce the radiation dose to as low as reasonably achievable. COMPARISON: Recent presentation for nausea and vomiting, with CT abdomen and pelvis on 08/28/2020. Pulmonary nodules increased. Endometrioid adenocarcinoma, 12/08/2015. HISTORY: ORDERING SYSTEM PROVIDED HISTORY: pulmonary nodules TECHNOLOGIST PROVIDED HISTORY: Reason for exam:->pulmonary nodules Reason for Exam: evaluate pulmonary nodules Acuity: Acute Type of Exam: Initial Relevant Medical/Surgical History: smoker FINDINGS: Mediastinum: There is no mediastinal or hilar adenopathy. Thoracic aortic, tracheobronchial and coronary artery calcification are present. There is moderate cardiomegaly. Lungs/pleura: There are multiple pulmonary nodules, measuring 2-6 mm. Most nodules are stable. Examples of nodules with progression are noted on series 3 is noted below- Left upper lobe 3 mm on image number 33, previously 2 mm, Lingula 6 mm on image 59, previously 4.3 mm, and Left lower lobe 5 mm on image 87, previously 4.4 mm. Small bilateral pleural effusions remain present with overlying atelectasis. Upper Abdomen: The visualized adrenal glands and upper abdomen are unremarkable. Soft Tissues/Bones: At T5, there is a new anterior compression fracture along the superior endplate. No new lytic or blastic bone lesions are appreciated. The chest wall is unremarkable. Bilateral pulmonary nodules, measuring up to 6 mm. Several nodules demonstrate progression compared with 03/08/2020. The nodules are presumably metastatic. Small bilateral pleural effusions. New, mild anterior compression fracture of T5. This appears to be osteoporotic. No osseous metastatic lesion is evident at this level.      Xr Chest Portable    Result Date: 9/16/2020  EXAMINATION: ONE XRAY VIEW OF THE CHEST 9/16/2020 3:39 am COMPARISON: 08/28/2020 HISTORY: ORDERING SYSTEM PROVIDED HISTORY: cough, SOB TECHNOLOGIST PROVIDED HISTORY: Reason for exam:->cough, SOB Reason for Exam: Cough for a couple weeks, SOB FINDINGS: Cardiomegaly. Aortic calcifications. New right basilar opacity and right upper lung opacity concerning for multifocal pneumonia. Probable small right pleural effusion. No pneumothorax. Suspected multifocal pneumonia in the right lung. Probable small right pleural effusion. Xr Chest Portable    Result Date: 8/28/2020  EXAMINATION: ONE XRAY VIEW OF THE CHEST 8/28/2020 5:20 pm COMPARISON: 08/26/2020 HISTORY: ORDERING SYSTEM PROVIDED HISTORY: Nausea, vomiting abdominal pain TECHNOLOGIST PROVIDED HISTORY: Reason for exam:->Nausea, vomiting abdominal pain Reason for Exam: N/V/D AND FATIGUE X 1 DAY; STENTS PLACED BLE IN 8/27/2020 Acuity: Acute Type of Exam: Initial Relevant Medical/Surgical History: SEE EPIC FINDINGS: Cardiomegaly. Pulmonary vasculature within normal limits. Lungs clear. Costophrenic angles sharp     Cardiomegaly with no acute cardiopulmonary process demonstrated     Xr Chest Portable    Result Date: 8/26/2020  EXAMINATION: ONE XRAY VIEW OF THE CHEST 8/26/2020 2:04 am COMPARISON: July 29, 2020 HISTORY: ORDERING SYSTEM PROVIDED HISTORY: CP TECHNOLOGIST PROVIDED HISTORY: Reason for exam:->CP Reason for Exam: Nausea (had cardiac cath today; c/o n/v) FINDINGS: No evidence of consolidation, edema or other acute pulmonary process. No evidence of acute process of the cardiac or mediastinal structures. No evidence of pneumothorax or pleural effusion. Unchanged enlargement of the cardiopericardial shadow     No evidence of acute cardiopulmonary disease. Results for Valentine Ogden (MRN [id number]) as of 9/16/2020 22:04   Ref.  Range 9/16/2020 05:30 9/16/2020 11:31 9/16/2020 13:19 9/16/2020 14:54 9/16/2020 16:50 9/16/2020 18:27   Sodium Latest Ref Range: 136 - 145 mmol/L 126 (L)        Potassium Latest Ref Range: 3.5 - 5.1 mmol/L 3.2 (L)        Chloride Latest Ref Range: 99 - 110 mmol/L 90 (L)        CO2 Latest Ref Range: 21 - 32 mmol/L 23        BUN Latest Ref Range: 7 - 20 mg/dL 30 (H)        Creatinine Latest Ref Range: 0.6 - 1.2 mg/dL 4.1 (H)        Anion Gap Latest Ref Range: 3 - 16  13        Calcium, Ion Latest Ref Range: 1.12 - 1.32 mmol/L      1.14   GFR Non- Latest Ref Range: >60  11 (A)        GFR  Latest Ref Range: >60  13 (A)        Lactic Acid, Sepsis Latest Ref Range: 0.4 - 1.9 mmol/L  0.7 0.8      Lactate, ser/plas Latest Ref Range: 0.40 - 2.00 mmol/L      4.86 (HH)   Glucose Latest Ref Range: 70 - 99 mg/dL 139 (H)        POC Glucose Latest Ref Range: 70 - 99 mg/dl    70 59 (L) 166 (H)   Calcium Latest Ref Range: 8.3 - 10.6 mg/dL 8.4        Total Protein Latest Ref Range: 6.4 - 8.2 g/dL 5.9 (L)        Procalcitonin Latest Ref Range: 0.00 - 0.15 ng/mL 19.42 (H)        POC Sodium Latest Ref Range: 136 - 145 mmol/L      132 (L)   POC Potassium Latest Ref Range: 3.5 - 5.1 mmol/L      3.6   POC Hematocrit Latest Ref Range: 36.0 - 48.0 %      35.0 (L)     Results for Radha Diggs (MRN [id number]) as of 9/16/2020 22:04   Ref. Range 8/28/2020 17:35 9/16/2020 05:30   Pro-BNP Latest Ref Range: 0 - 449 pg/mL >70,000 (H) >70,000 (H)   Troponin Latest Ref Range: <0.01 ng/mL 0.14 (H) 0.07 (H)     Results for Radha Diggs (MRN [id number]) as of 9/16/2020 22:04   Ref.  Range 8/30/2020 08:39 8/31/2020 10:35 9/16/2020 03:40   WBC Latest Ref Range: 4.0 - 11.0 K/uL 6.9 5.8 8.7   RBC Latest Ref Range: 4.00 - 5.20 M/uL 3.52 (L) 3.57 (L) 4.23   Hemoglobin Quant Latest Ref Range: 12.0 - 16.0 g/dL 10.0 (L) 10.2 (L) 12.0   Hematocrit Latest Ref Range: 36.0 - 48.0 % 31.9 (L) 32.5 (L) 37.9   MCV Latest Ref Range: 80.0 - 100.0 fL 90.7 90.9 89.6   MCH Latest Ref Range: 26.0 - 34.0 pg 28.4 28.7 28.4   MCHC Latest Ref Range: 31.0 - 36.0 g/dL 31.4 31.5 31.7   MPV Latest Ref Range: 5.0 - 10.5 fL 7.9 8.1 9.2   RDW Latest Ref Range: 12.4 - 15.4 % 19.6 (H) 19.3 (H) 21.0 (H)   Platelet Count Latest Ref Range: 135 - 450 K/uL 279 270 130 (L)     Results for Ciara Todd (MRN [id number]) as of 9/16/2020 22:04   Ref. Range 8/26/2020 01:15 8/28/2020 17:35 8/31/2020 10:35 9/16/2020 05:30   Albumin Latest Ref Range: 3.4 - 5.0 g/dL 3.4 3.3 (L) 2.8 (L) 2.3 (L)   Globulin Latest Units: g/dL 3.8 3.8 3.5 3.6   Albumin/Globulin Ratio Latest Ref Range: 1.1 - 2.2  0.9 (L) 0.9 (L) 0.8 (L) 0.6 (L)   Alk Phos Latest Ref Range: 40 - 129 U/L 165 (H) 137 (H) 183 (H) 192 (H)   ALT Latest Ref Range: 10 - 40 U/L 9 (L) 8 (L) 11 11   AST Latest Ref Range: 15 - 37 U/L 19 14 (L) 25 26   Bilirubin Latest Ref Range: 0.0 - 1.0 mg/dL 0.8 0.7 0.7 1.3 (H)   Lipase Latest Ref Range: 13.0 - 60.0 U/L 16.0 4.0 (L)  292.0 (H)   Total Protein Latest Ref Range: 6.4 - 8.2 g/dL 7.2 7.1 6.3 (L) 5.9 (L)     Results for Ciara Todd (MRN [id number]) as of 9/16/2020 22:04   Ref.  Range 8/28/2020 17:35 9/16/2020 05:30 9/16/2020 18:27   Carboxyhemoglobin Latest Ref Range: 0.0 - 1.5 % 2.8 (H) 4.9 (H)    O2 Therapy Unknown Unknown Unknown    pH, Arterial Latest Ref Range: 7.350 - 7.450    7.189 (LL)   pCO2, Arterial Latest Ref Range: 35.0 - 45.0 mm Hg   55.6 (H)   pO2, Arterial Latest Ref Range: 75.0 - 108.0 mm Hg   234.2 (H)   HCO3, Arterial Latest Ref Range: 21.0 - 29.0 mmol/L   21.2   TCO2 (calc), Art Latest Ref Range: Not Established mmol/L   23   Base Excess, Arterial Latest Ref Range: -3 - 3    -7 (L)   O2 Sat, Arterial Latest Ref Range: 93 - 100 %   100   pH, Harish Latest Ref Range: 7.350 - 7.450  7.298 (L) 7.392    pCO2, Harish Latest Ref Range: 40.0 - 50.0 mmHg 48.8 35.0 (L)    pO2, Harish Latest Ref Range: 25.0 - 40.0 mmHg 37.9 83.8 (H)    HCO3, Venous Latest Ref Range: 23.0 - 29.0 mmol/L 23.4 20.8 (L)    TC02 (Calc), Harish Latest Ref Range: Not Established mmol/L 25 22    Base Excess, Harish Latest Ref Range: -3.0 - 3.0 mmol/L -3.2 (L) -3.5 (L)    O2 Content, Harish Latest Ref Range: Not Established VOL % 10 16    MetHgb, Harish Latest Ref Range: <1.5 % 0.3 0.4    O2 Sat, Harish Latest Ref Range: Not Established % 66 97    Sample Type Unknown   ART     CT OF THE CHEST WITHOUT CONTRAST 8/30/2020 10:18 am         TECHNIQUE:    CT of the chest was performed without the administration of intravenous    contrast. Multiplanar reformatted images are provided for review. Dose    modulation, iterative reconstruction, and/or weight based adjustment of the    mA/kV was utilized to reduce the radiation dose to as low as reasonably    achievable.         COMPARISON:    Recent presentation for nausea and vomiting, with CT abdomen and pelvis on    08/28/2020.  Pulmonary nodules increased.  Endometrioid adenocarcinoma,    12/08/2015.         HISTORY:    ORDERING SYSTEM PROVIDED HISTORY: pulmonary nodules    TECHNOLOGIST PROVIDED HISTORY:    Reason for exam:->pulmonary nodules    Reason for Exam: evaluate pulmonary nodules    Acuity: Acute    Type of Exam: Initial    Relevant Medical/Surgical History: smoker         FINDINGS:    Mediastinum: There is no mediastinal or hilar adenopathy.  Thoracic aortic,    tracheobronchial and coronary artery calcification are present.  There is    moderate cardiomegaly.         Lungs/pleura: There are multiple pulmonary nodules, measuring 2-6 mm.  Most    nodules are stable.  Examples of nodules with progression are noted on series    3 is noted below-         Left upper lobe 3 mm on image number 33, previously 2 mm,         Lingula 6 mm on image 59, previously 4.3 mm, and         Left lower lobe 5 mm on image 87, previously 4.4 mm.         Small bilateral pleural effusions remain present with overlying atelectasis.         Upper Abdomen: The visualized adrenal glands and upper abdomen are    unremarkable.         Soft Tissues/Bones: At T5, there is a new anterior compression fracture along    the superior endplate.  No new lytic or blastic bone lesions are appreciated.     The chest wall is unremarkable.              Impression    Bilateral pulmonary nodules, measuring up to 6 mm.  Several nodules    demonstrate progression compared with 03/08/2020.  The nodules are presumably    metastatic.         Small bilateral pleural effusions.         New, mild anterior compression fracture of T5.  This appears to be    osteoporotic.  No osseous metastatic lesion is evident at this level. Echocardiogram:March 2020-Conclusions      Summary   Limited exam for RV and LV function. Left ventricular systolic function is normal with ejection fraction   estimated at 50-55%. There is mild diastolic septal flattening consistent with right ventricular   volume overload. Right ventricular systolic function is mildly reduced . Moderate to severe tricuspid regurgitation. Systolic pulmonary artery pressure (SPAP) is estimated at 61 mmHg consistent   with severe pulmonary hypertension (Right atrial pressure of 8 mmHg). There is a trivial to small pericardial effusion noted. There is a small right pleural effusion. Results for Maranda Carcamo (MRN [id number]) as of 9/16/2020 22:04   Ref. Range 4/24/2020 19:27 6/22/2020 09:58 8/28/2020 17:25 8/28/2020 17:35 8/28/2020 18:13 8/29/2020 05:15 8/29/2020 18:01 9/16/2020 11:31   Culture, Blood 2 Unknown    No Growth after 4 days of incubation. Organism Unknown       C. difficile toxin B gene detected (A)    Occult Blood Screening Unknown      Result: Negative. .. BLOOD OCCULT STOOL SCREEN #1 Unknown      Rpt     C DIFF TOXIN/ANTIGEN Unknown Rpt (A)      Rpt    Shiga Toxin I Unknown     Not Detected      Shiga Toxin II Unknown     Not Detected      Giardia Ag, Stl Unknown      Negative. .. O&P PANEL ([facility]) #1 Unknown      Rpt     GIARDIA ANTIGEN Unknown      Rpt     C.diff Toxin/Antigen Unknown POSITIVE for Clos. .. (A)      Indeterminate see. ..    C. difficile toxin Molecular Unknown       POSITIVE FOR. .. (A)    E Coli Shigella/Enteroinvasive PCR Unknown     Not Detected      Salmonella PCR Unknown     Not Detected      SARS-CoV-2 Latest Ref Range: Not Detected   Not Detected               Assessment:  Active Problems:    Type 2 diabetes mellitus with diabetic polyneuropathy, with long-term current use of insulin (HCC)    Normocytic normochromic anemia    Anasarca    End stage renal disease (HCC)    Retroperitoneal sarcoma (HCC)    Ulcer of right leg, limited to breakdown of skin (HCC)    Peripheral venous insufficiency    Moderate to severe pulmonary hypertension (HCC)    Clostridium difficile enterocolitis    Closed compression fracture of thoracic vertebra (HCC)    Acute respiratory failure with hypoxia (HCC)    Elevated brain natriuretic peptide (BNP) level    Acute respiratory failure with hypoxia and hypercapnia (HCC)    Elevated lipase    Tricuspid regurgitation    Moderate protein-calorie malnutrition (HCC)    Pulmonary infiltrates    Thrombocytopenia (HCC)  Resolved Problems:    * No resolved hospital problems.  *          Plan:   · Responded to rapid response act and patient was emergently transferred to the ICU  · Emergent central line, arterial line and intubation done  · Initial ventilator settings given to the respiratory therapist  · Titration of ventilator as per repeat ABG  · Patient was started on IV pressors to maintain hemodynamics as patient's blood pressure was quite low-initially Levophed infusion was started but subsequently patient may require vasopressin infusion as patient's blood pressure in the lower side  · Patient has a pulmonary infiltrates on the x-ray chest on admission-COVID-19 testing being sent  · Patient appears to have anasarca along with profoundly elevated BNP  · Patient could not tolerate the dialysis and fluid removal this morning  · Patient's clinical status deterioration was discussed with nephrology and patient may require CRRT-nephrology to decide upon in the morning as  discussed with Dr. Tate Roca   · Patient got 1 dose of meropenem along with Flagyl and methylprednisone this morning  · Patient also was supposedly given albumin transfusion with dialysis  · Patient has severe tricuspid regurgitation along with pulmonary hypertension  · Patient also has reduced right ventricular systolic function  · Bronchodilators on PRN basis  · Pulmonary toilet  · Will contemplate bronchoscopy-if patient's clinical status deteriorates or if the COVID-19 test is negative  · Patient also has elevated lipase levels with indication that patient may have acute pancreatitis  · Wound care as per protocol  · Patient may require amputation if not improving  · Patient has small lung nodules and has history of sarcoma in the lung nodules may be metastatic in nature but cannot be biopsied because of the size  · Insulins as per IM  · Blood glucose monitoring with sliding scale insulin  · Monitor for any worsening hypoglycemia  · Patient has history of recurrent and recent C. difficile colitis and has been on enteral vancomycin which can be continued  · Contact plus isolation to be maintained  · Patient being provisionally kept in droplet plus precautions till the COVID-19 testing is available  · Patient has significant drop platelet count and stat heparin-induced thrombocytopenia panel being sent    Patient's clinical status has deteriorated and patient's short-term as well as long-term prognosis appears to be guarded at this time  There is a possibility that patient may not survive this insult  Case discussed with floor nursing and ICU team    Patient was seen evaluated and managed multiple times during the course of the day    Critical care time spent on the patient was 90 minutes exclusive of any procedures    Will request palliative care consult to establish the goals of care and CODE STATUS        Electronically signed by:  Vik Johnson MD    9/16/2020    10:17 PM.

## 2020-09-16 NOTE — ED PROVIDER NOTES
CHIEF COMPLAINT  Shortness of breath    HISTORY OF PRESENT ILLNESS  Rene Ordaz is a 67 y.o. female presents to the ED with cough, clear/white sputum occasionally, shortness of breath, the past 2 weeks, she does go to dialysis Monday Wednesday Friday, and has been keeping up with this, but has worsening dyspnea, feeling nauseous, like she might throw up, she denies actually vomiting today, states she is not on oxygen at home, lives with her 4 grandchildren, no recent changes in bowels or bladder, she does not make urine, she has a left forearm fistula, no headache or neck stiffness, no significant chest pain, no significant abdominal pain, she goes to wound care weekly for her lower legs. Diabetic ulcers, states that she needs the last 2 toes taken off of her right foot, reports that have been improving per Dr. Isabela Tapia, states she has been taking her chronic medications, she has not had any fevers, no known covered exposures, last discharge from hospital 8/31/2020, no other complaints, modifying factors or associated symptoms. I have reviewed the following from the nursing documentation.     Past Medical History:   Diagnosis Date    Acute bilateral deep vein thrombosis (DVT) of popliteal veins ([address] 1/29/2020    Acute pulmonary edema (HCC)     Acute respiratory failure with hypoxia and hypercapnia (HCC) 11/11/2019    Angina     with exertion    C. difficile diarrhea 08/2020    Cardiac arrest ([address]     Cellulitis 2/21/2020    Clostridium difficile infection 8/29/2020, 4/24/2020, 03/28/2020    Diabetic foot ulcer ([address] 03/10/2020    Endometrial adenocarcinoma ([address] 11/17/2015    Gangrene of toe of both feet ([address] 8/17/2020    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 ([address] 11/24/2019    MI (myocardial infarction) ([address] 1983    Obesity     Pneumonia 3/28/2020    Small saphenous vein thrombophlebitis, right 02/2020    Syncope 4/30/2012    Tobacco use      Past Surgical History:   Procedure Laterality Date    ANGIOPLASTY  08/2020    BRONCHOSCOPY N/A 11/12/2019    BRONCHOSCOPY WASHING performed by Norvel Cockayne, MD at [address] [place] [address] N/A 11/23/2019    BRONCHOSCOPY ALVEOLAR LAVAGE performed by Monica Pearson MD at [address] [place] [address]  11/23/2019    BRONCHOSCOPY THERAPUTIC ASPIRATION INITIAL performed by Monica Pearson MD at [address] [place] [address]  11/23/2019    BRONCHOSCOPY FOREIGN BODY REMOVAL performed by Monica Pearson MD at [address] [place] [address] Right     CATARACT REMOVAL Bilateral     COLONOSCOPY      CORONARY ANGIOPLASTY WITH STENT PLACEMENT  4/2012    DIAGNOSTIC CARDIAC CATH LAB PROCEDURE      DIALYSIS FISTULA CREATION Left 6/25/2020    LEFT FOREARM LOOP DIALYSIS GRAFT performed by Kevin Ching MD at [address] DARSHANA GOINS  08/2020    KNEE ARTHROSCOPY      TYLOR AND BSO  11/30/2015    TOE AMPUTATION Right 1/16/2020    RIGHT SECOND AND THIRD TOE AMPUTATION, RIGHT HEEL WOUND DEBRIDEMENT, RIGHT HALLUX TOE NAIL DEBRIDEMENT performed by Ny Nelson DPM at [place] [address] ENDOSCOPY N/A 12/2/2019    EGD PEG PLACEMENT W/ ANESTHESIA performed by Jud Tello MD at [address] [place] [address] Bilateral 08/25/2020    common iliac artery angioplasty and stenting     Family History   Problem Relation Age of Onset    Diabetes Mother     Heart Failure Mother     Heart Disease Mother     Stroke Mother     Diabetes Sister     Diabetes Brother     Diabetes Maternal Grandmother     Asthma Son     Asthma Son     Diabetes Daughter     Heart Disease Daughter     Irritable Bowel Syndrome Daughter     Diabetes Brother     Cancer Neg Hx     Emphysema Neg Hx     Hypertension Neg Hx      Social History     Socioeconomic History    Marital status:       Spouse name: Not on file    Number of children: Not on file    Years of education: Not on file    Highest education level: Not on file   Occupational History    Occupation:      Comment: [employer] most recently   Social Needs    Financial resource strain: Not on file    Food insecurity     Worry: Not on file     Inability: Not on file   [employer] needs     Medical: Not on file     Non-medical: Not on file   Tobacco Use    Smoking status: Former Smoker     Years: 13.00     Types: Cigarettes     Last attempt to quit: 1985     Years since quittin.7    Smokeless tobacco: Never Used   Substance and Sexual Activity    Alcohol use: No     Alcohol/week: 0.0 standard drinks    Drug use: No    Sexual activity: Not Currently     Partners: Male   Lifestyle    Physical activity     Days per week: Not on file     Minutes per session: Not on file    Stress: Not on file   Relationships    Social connections     Talks on phone: Not on file     Gets together: Not on file     Attends [employer] service: Not on file     Active member of club or organization: Not on file     Attends meetings of clubs or organizations: Not on file     Relationship status: Not on file    Intimate partner violence     Fear of current or ex partner: Not on file     Emotionally abused: Not on file     Physically abused: Not on file     Forced sexual activity: Not on file   Other Topics Concern    Not on file   Social History Narrative    Not on file     Current Facility-Administered Medications   Medication Dose Route Frequency Provider Last Rate Last Dose    vancomycin (VANCOCIN) capsule 125 mg  125 mg Oral Q48H Bela Glover MD        vancomycin 1000 mg IVPB in 250 mL D5W addavial  1,000 mg Intravenous Once Staci Gardner, DO        meropenem (MERREM) 500 mg IVPB (mini-bag)  500 mg Intravenous Once Staci Gardner, DO         Current Outpatient Medications   Medication Sig Dispense Refill    vancomycin (VANCOCIN) 125 MG capsule Take one tablet every 6 hours for two weeks. Then take one table every 12 hours for one week. Then take one tablet every 24 hours for one week. Then take one tablet every 48 hours for two weeks then stop 112 capsule 0    ondansetron (ZOFRAN ODT) 4 MG disintegrating tablet Take 1 tablet by mouth every 8 hours as needed for Nausea 20 tablet 0    clopidogrel (PLAVIX) 75 MG tablet Take 1 tablet by mouth daily 30 tablet 3    hydrOXYzine (VISTARIL) 25 MG capsule Take 25 mg by mouth 3 times daily as needed for Itching      promethazine (PHENERGAN) 25 MG tablet Take 25 mg by mouth every 6 hours as needed for Nausea      traMADol (ULTRAM) 50 MG tablet Take 50 mg by mouth every 6 hours as needed for Pain.  sertraline (ZOLOFT) 100 MG tablet Take 1 tablet by mouth daily 30 tablet 0    lactobacillus (CULTURELLE) capsule Take 1 capsule by mouth 3 times daily (with meals) (Patient taking differently: Take 1 capsule by mouth daily ) 90 capsule 2    losartan (COZAAR) 50 MG tablet Take 1 tablet by mouth daily (Patient taking differently: Take 50 mg by mouth every evening ) 30 tablet 3    aspirin 81 MG chewable tablet Take 1 tablet by mouth daily 30 tablet 3    pantoprazole (PROTONIX) 40 MG tablet Take 1 tablet by mouth every morning (before breakfast) 30 tablet 3    metoprolol tartrate (LOPRESSOR) 50 MG tablet Take 50 mg by mouth 2 times daily      rosuvastatin (CRESTOR) 5 MG tablet Take 5 mg by mouth nightly       insulin lispro (HUMALOG) 100 UNIT/ML injection vial Inject 0-12 Units into the skin 3 times daily (with meals) **Medium Dose Corrective Algorithm**  Glucose: Dose:  If <139             No Insulin  140-199 2 Units  200-249 4 Units  250-299 6 Units  300-349 8 Units  350-400 10 Units  Above 400       12 Units 1 vial 3    donepezil (ARICEPT) 5 MG tablet Take 1 tablet by mouth nightly 30 tablet 3    B Complex-C-Iron (SUPER B-COMPLEX/IRON/VITAMIN C PO) Take 1 tablet by mouth daily.        Allergies   Allergen Reactions    Ativan [Lorazepam]      DIZZY    Dilaudid [Hydromorphone Hcl] Other (See Comments)     Pt states it made her crazy    Flexeril [Cyclobenzaprine Hcl]      DOESN'T REMEMBER    Heparin      KEENAN     Soma [Carisoprodol]      FEELS OUT OF IT    Vicodin [Hydrocodone-Acetaminophen] Other (See Comments)     Per pt, makes her \"go crazy\"    Penicillins Rash    Sulfa Antibiotics Rash       REVIEW OF SYSTEMS  10 systems reviewed, pertinent positives per HPI otherwise noted to be negative. PHYSICAL EXAM  BP (!) 100/51   Pulse 78   Temp 99 °F (37.2 °C) (Oral)   Resp 18   Ht 4' 11\" (1.499 m)   Wt 129 lb (58.5 kg)   SpO2 95%   BMI 26.05 kg/m²   GENERAL APPEARANCE:. Cooperative. No acute distress, but ill-appearing, sleeps leaning over, slumped in the bed, frail hEAD: Normocephalic. Atraumatic. EYES: PERRL. EOM's grossly intact. Arcus senilis, wearing glasses  ENT: Mucous membranes are moist.   NECK: Supple. No rigidity, thoracic kyphosis and cervical lordosis, no step-offs  HEART: RRR. No murmurs  LUNGS: Respirations nonlabored, borderline tachypneic around 20. Lungs are with coarse breath sounds, but no significant crackles or rhonchi were, but diminished in bases bilaterally. Poor inspiratory depth  ABDOMEN: Soft. Non-distended. Minimal generalized tenderness. No guarding or rebound. Normal bowel sounds. EXTREMITIES:  Moves all extremities equally. All extremities neurovascularly intact. Left forearm fistula dressing, I did not palpate thrill, no significant edema, lower extremities with 2+ pitting peripheral edema, was wearing dressings on bilateral lower extremities, right 2 digits that were remaining had black/dried appearing distal digits, distal pulses palpable, distal sensation intact with some paresthesias, wounds on lower extremities appear similar to previous images from wound care, no evidence of induration/cellulitis/abscess/fluctuance  SKIN: Warm and dry. No acute rashes.    NEUROLOGICAL: Sleeping when entering the room, but awakens easily to voice or touch, but falls asleep easily when not aroused. No gross facial drooping. Strength 5/5, sensation intact. No truncal ataxia. Normal speech  PSYCHIATRIC: Normal mood and affect. LABS  I have reviewed all labs for this visit.    Results for orders placed or performed during the hospital encounter of 09/16/20   CBC Auto Differential   Result Value Ref Range    WBC 8.7 4.0 - 11.0 K/uL    RBC 4.23 4.00 - 5.20 M/uL    Hemoglobin 12.0 12.0 - 16.0 g/dL    Hematocrit 37.9 36.0 - 48.0 %    MCV 89.6 80.0 - 100.0 fL    MCH 28.4 26.0 - 34.0 pg    MCHC 31.7 31.0 - 36.0 g/dL    RDW 21.0 (H) 12.4 - 15.4 %    Platelets 695 (L) 057 - 450 K/uL    MPV 9.2 5.0 - 10.5 fL    Neutrophils % 81.4 %    Lymphocytes % 3.4 %    Monocytes % 3.3 %    Eosinophils % 11.5 %    Basophils % 0.4 %    Neutrophils Absolute 7.0 1.7 - 7.7 K/uL    Lymphocytes Absolute 0.3 (L) 1.0 - 5.1 K/uL    Monocytes Absolute 0.3 0.0 - 1.3 K/uL    Eosinophils Absolute 1.0 (H) 0.0 - 0.6 K/uL    Basophils Absolute 0.0 0.0 - 0.2 K/uL   Troponin   Result Value Ref Range    Troponin 0.07 (H) <0.01 ng/mL   Brain Natriuretic Peptide   Result Value Ref Range    Pro-BNP >70,000 (H) 0 - 449 pg/mL   Blood Gas, Venous   Result Value Ref Range    pH, Harish 7.392 7.350 - 7.450    pCO2, Harish 35.0 (L) 40.0 - 50.0 mmHg    pO2, Harish 83.8 (H) 25.0 - 40.0 mmHg    HCO3, Venous 20.8 (L) 23.0 - 29.0 mmol/L    Base Excess, Harish -3.5 (L) -3.0 - 3.0 mmol/L    O2 Sat, Harish 97 Not Established %    Carboxyhemoglobin 4.9 (H) 0.0 - 1.5 %    MetHgb, Harish 0.4 <1.5 %    TC02 (Calc), Harish 22 Not Established mmol/L    O2 Content, Harish 16 Not Established VOL %    O2 Therapy Unknown    Comprehensive Metabolic Panel   Result Value Ref Range    Sodium 126 (L) 136 - 145 mmol/L    Potassium 3.2 (L) 3.5 - 5.1 mmol/L    Chloride 90 (L) 99 - 110 mmol/L    CO2 23 21 - 32 mmol/L    Anion Gap 13 3 - 16    Glucose 139 (H) 70 - 99 mg/dL    BUN 30 (H) 7 - 20 mg/dL    CREATININE 4.1 (H) 0.6 - 1.2 mg/dL    GFR Non- 11 (A) order HCAP abx, she has multiple electrolyte abnormalities, but is due for dialysis this morning, discussed that they could remedy the abnormalities with dialysis treatment, elevated troponin likely due to renal failure, she is anuric, thus Lasix/diuretics will not improve her electrolytes, she can get her abx after dialysis, no 30cc/kg bolus d/t renal failure, patient declined, hospitalist agreed to accept for admission. Orders Placed This Encounter   Procedures    Culture, Blood 1    Culture, Blood 2    XR CHEST PORTABLE    CBC Auto Differential    Troponin    Brain Natriuretic Peptide    Blood Gas, Venous    Comprehensive Metabolic Panel    Lipase    Procalcitonin    Lactate, Sepsis    Inpatient consult to Hospitalist    Inpatient consult to Nephrology    EKG 12 Lead    Insert peripheral IV    Hemodialysis inpatient    PATIENT STATUS (FROM ED OR OR/PROCEDURAL) Inpatient     Orders Placed This Encounter   Medications    ondansetron (ZOFRAN) injection 4 mg    vancomycin (VANCOCIN) capsule 125 mg    vancomycin 1000 mg IVPB in 250 mL D5W addavial    meropenem (MERREM) 500 mg IVPB (mini-bag)     ED Course as of Sep 16 1007   Wed Sep 16, 2020   0512 EKG interpretation by me: Normal sinus rhythm, nonspecific intraventricular block, likely old septal infarct, rate 71, QTc prolonged at 512, no ST segment or T wave changes indicative of acute ischemia   EKG 12 Lead [SY]   0630 Removed oxygen nasal cannula from the patient, she desatted to 87% on room air and is not normally on home oxygen.    [SY]   0248 Discussed patient with Dr. Shantal Penaloza, who agreed to accept for admission.    [SY]      ED Course User Index  [SY] Sonal Michelle DO     The total critical care time spent while evaluating and treating this patient was at least 31 minutes. This excludes time spent doing separately billable procedures.   This includes time at the bedside, data interpretation, medication management, obtaining critical history from collateral sources if the patient is unable to provide it directly, and physician consultation. Specifics of interventions taken and potentially life-threatening diagnostic considerations are listed above in the medical decision making. CLINICAL IMPRESSION  1. Acute respiratory failure with hypoxia ([address]    2. Cough    3. Acute renal failure on dialysis ([address]    4. Nausea    5. Acute pancreatitis, unspecified complication status, unspecified pancreatitis type    6. Elevated troponin    7. Elevated brain natriuretic peptide (BNP) level        Blood pressure (!) 100/51, pulse 78, temperature 99 °F (37.2 °C), temperature source Oral, resp. rate 18, height 4' 11\" (1.499 m), weight 129 lb (58.5 kg), SpO2 95 %, not currently breastfeeding. DISPOSITION  Dyan Ayala was admitted in stable condition.                   Victorina Batista DO  09/16/20 1009

## 2020-09-16 NOTE — ED NOTES
Ps Nephrology, Dr Alanis Banuelos @ [phone]  Re: Pt admitted, holding for room. The hospitalist Dr Dot Sifuentes requested Nephrology added to tx team. \"ESRD on HD qMWF. Due for HD at 10am today at [facility]. In ER for dyspnea presumed due to volume overload. New low-level O2 requirement. \"   No Call Back required     Марина Mendez  09/16/20 2571

## 2020-09-16 NOTE — PROGRESS NOTES
BRONCHOSCOPY WASHING performed by Lynn Yoo MD at [address] [place] [address] N/A 11/23/2019    BRONCHOSCOPY ALVEOLAR LAVAGE performed by Suresh Connolly MD at [address] [place] [address]  11/23/2019    BRONCHOSCOPY THERAPUTIC ASPIRATION INITIAL performed by Suresh Connolly MD at [address] [place] [address]  11/23/2019    BRONCHOSCOPY FOREIGN BODY REMOVAL performed by Suresh Connolly MD at [address] [place] [address] Right     CATARACT REMOVAL Bilateral     COLONOSCOPY      CORONARY ANGIOPLASTY WITH STENT PLACEMENT  4/2012    DIAGNOSTIC CARDIAC CATH LAB PROCEDURE      DIALYSIS FISTULA CREATION Left 6/25/2020    LEFT FOREARM LOOP DIALYSIS GRAFT performed by Mckayla Bautista MD at [facility] [address]  08/2020    KNEE ARTHROSCOPY      TYLOR AND BSO  11/30/2015    TOE AMPUTATION Right 1/16/2020    RIGHT SECOND AND THIRD TOE AMPUTATION, RIGHT HEEL WOUND DEBRIDEMENT, RIGHT HALLUX TOE NAIL DEBRIDEMENT performed by Randi Murillo DPM at [address] N/A 12/2/2019    EGD PEG PLACEMENT W/ ANESTHESIA performed by Charbel Howard MD at [address] [place] [address] 08/25/2020    common iliac artery angioplasty and stenting       Level of Consciousness: Alert, Follows Commands but Disoriented = 1    Level of Activity: Mostly sedentary, minimal walking = 2    Respiratory Pattern: Regular Pattern; RR 8-20 = 0    Breath Sounds: Diminshed bilaterally and/or crackles = 2    Sputum   ,  ,    Cough: Strong, spontaneous, non-productive = 0    Vital Signs   BP (!) 86/46   Pulse 81   Temp 98.2 °F (36.8 °C) (Oral)   Resp 18   Ht 4' 11\" (1.499 m)   Wt 134 lb 14.7 oz (61.2 kg)   SpO2 100%   BMI 27.25 kg/m²   SPO2 (COPD values may differ): 3    Peak Flow (asthma only): not applicable = 0    RSI: 7-8 = BID and Q4HPRN (every four hours as needed) for dyspnea        Plan       Goals: medication delivery    Patient/caregiver adjusted as defined by the patients Respiratory Severity Index (RSI) score. 2. If the patient does not have documented COPD, consider discontinuing anticholinergics when RSI is less than 9.  3. If the bronchospasm worsens (increased RSI), then the bronchodilator frequency can be increased to a maximum of every 4 hours. If greater than every 4 hours is required, the physician will be contacted. 4. If the bronchospasm improves, the frequency of the bronchodilator can be decreased, based on the patient's RSI, but not less than home treatment regimen frequency. 5. Bronchodilator(s) will be discontinued if patient has a RSI less than 9 and has received no scheduled or as needed treatment for 72  Hrs. Patients Ordered on a Mucolytic Agent:    1. Must always be administered with a bronchodilator. 2. Discontinue if patient experiences worsened bronchospasm, or secretions have lessened to the point that the patient is able to clear them with a cough. Anti-inflammatory and Combination Medications:    1. If the patient lacks prior history of lung disease, is not using inhaled anti-inflammatory medication at home, and lacks wheezing by examination or by history for at least 24 hours, contact physician for possible discontinuation.

## 2020-09-16 NOTE — PROGRESS NOTES
4 Eyes Skin Assessment     The patient is being assess for  Transfer to New Unit    I agree that 2 RN's have performed a thorough Head to Toe Skin Assessment on the patient. ALL assessment sites listed below have been assessed. Areas assessed by both nurses:   [x]   Head, Face, and Ears   [x]   Shoulders, Back, and Chest  [x]   Arms, Elbows, and Hands   [x]   Coccyx, Sacrum, and IschIum  [x]   Legs, Feet, and Heels        Does the Patient have Skin Breakdown?   Yes [facility] WOUND CARE was Initiated documentation include the Tess-wound, Wound Assessment, Measurements, Dressing Treatment, Drainage, and Color\",         Choco Prevention initiated:  Yes   Wound Care Orders initiated:  Yes      [address]  nurse consulted for Pressure Injury (Stage 3,4, Unstageable, DTI, NWPT, and Complex wounds), New and Established Ostomies:  Yes      Nurse 1 eSignature: Electronically signed by Dilip Mendez RN on 9/16/20 at 7:28 PM EDT    **SHARE this note so that the co-signing nurse is able to place an eSignature**    Nurse 2 eSignature: {Esignature:[id number]}

## 2020-09-16 NOTE — PROGRESS NOTES
Sent secure message via perfect serve to Dr Shea Felder is not following commands to swallow do you want to change midodrine to something IV? Also no orders for hypoglycemia pt blood sugar is 59.  Can you please add  thank you

## 2020-09-16 NOTE — CONSULTS
[facility]  Consult Note           Reason for Consult:  End stage renal disease  Requesting Physician:  Dr. Shantal Penaloza    Chief Complaint:  Shortness of breath  History Obtained From:  patient, electronic medical record    History of Present Ilness:    67year old female with ESRD on HD MWF admitted with shortness of breath. We have been asked to assist in further dialysis care. States she has been sob with clear sputum production over past two weeks. Has had reduced intake with vomiting today. Denies any fevers, abdominal pain, chest pain, or back pain. Last had HD on 9/14 with even fluid balance due to hypotension, post-weight of 60.2 kg. Started on vancomycin since 9/14 at HD.     Past Medical History:    Past Medical History:   Diagnosis Date    Acute bilateral deep vein thrombosis (DVT) of popliteal veins ([address] 1/29/2020    Acute pulmonary edema (HCC)     Acute respiratory failure with hypoxia and hypercapnia (HCC) 11/11/2019    Angina     with exertion    C. difficile diarrhea 08/2020    Cardiac arrest ([address]     Cellulitis 2/21/2020    Clostridium difficile infection 8/29/2020, 4/24/2020, 03/28/2020    Diabetic foot ulcer ([address] 03/10/2020    Endometrial adenocarcinoma ([address] 11/17/2015    ESRD (end stage renal disease) on dialysis ([address]     Mon-Wed-Fri    Gangrene of toe of both feet ([address] 8/17/2020    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 ([address] 11/24/2019    MI (myocardial infarction) ([address] 1983    Obesity     Pneumonia 3/28/2020    Small saphenous vein thrombophlebitis, right 02/2020    Syncope 4/30/2012    Tobacco use        Past Surgical History:        Procedure Laterality Date    ANGIOPLASTY  08/2020    BRONCHOSCOPY N/A 11/12/2019    BRONCHOSCOPY WASHING performed by Serjio Decker MD at [address] N/A 11/23/2019    BRONCHOSCOPY ALVEOLAR LAVAGE performed by Bree Oliveira MD at [address]  11/23/2019 BRONCHOSCOPY THERAPUTIC ASPIRATION INITIAL performed by Sundeep Wyatt MD at [address]  11/23/2019    BRONCHOSCOPY FOREIGN BODY REMOVAL performed by Sundeep Wyatt MD at [facility] Right     CATARACT REMOVAL Bilateral     COLONOSCOPY      CORONARY ANGIOPLASTY WITH STENT PLACEMENT  4/2012    DIAGNOSTIC CARDIAC CATH LAB PROCEDURE      DIALYSIS FISTULA CREATION Left 6/25/2020    LEFT FOREARM LOOP DIALYSIS GRAFT performed by Vijay Carrasco MD at [facility] [address]  08/2020    KNEE ARTHROSCOPY      TYLOR AND BSO  11/30/2015    TOE AMPUTATION Right 1/16/2020    RIGHT SECOND AND THIRD TOE AMPUTATION, RIGHT HEEL WOUND DEBRIDEMENT, RIGHT HALLUX TOE NAIL DEBRIDEMENT performed by Fabiola Sanchez DPM at [facility] N/A 12/2/2019    EGD PEG PLACEMENT W/ ANESTHESIA performed by Nicko Carty MD at [address] Bilateral 08/25/2020    common iliac artery angioplasty and stenting       Home Medications:    No current facility-administered medications on file prior to encounter. Current Outpatient Medications on File Prior to Encounter   Medication Sig Dispense Refill    vancomycin (VANCOCIN) 125 MG capsule Take one tablet every 6 hours for two weeks. Then take one table every 12 hours for one week. Then take one tablet every 24 hours for one week.  Then take one tablet every 48 hours for two weeks then stop 112 capsule 0    ondansetron (ZOFRAN ODT) 4 MG disintegrating tablet Take 1 tablet by mouth every 8 hours as needed for Nausea 20 tablet 0    clopidogrel (PLAVIX) 75 MG tablet Take 1 tablet by mouth daily 30 tablet 3    hydrOXYzine (VISTARIL) 25 MG capsule Take 25 mg by mouth 3 times daily as needed for Itching      promethazine (PHENERGAN) 25 MG tablet Take 25 mg by mouth every 6 hours as needed for Nausea      traMADol (ULTRAM) 50 MG tablet Take 50 mg by mouth every 6 hours as needed for Pain.  sertraline (ZOLOFT) 100 MG tablet Take 1 tablet by mouth daily 30 tablet 0    lactobacillus (CULTURELLE) capsule Take 1 capsule by mouth 3 times daily (with meals) (Patient taking differently: Take 1 capsule by mouth daily ) 90 capsule 2    losartan (COZAAR) 50 MG tablet Take 1 tablet by mouth daily (Patient taking differently: Take 50 mg by mouth every evening ) 30 tablet 3    aspirin 81 MG chewable tablet Take 1 tablet by mouth daily 30 tablet 3    pantoprazole (PROTONIX) 40 MG tablet Take 1 tablet by mouth every morning (before breakfast) 30 tablet 3    metoprolol tartrate (LOPRESSOR) 50 MG tablet Take 50 mg by mouth 2 times daily      rosuvastatin (CRESTOR) 5 MG tablet Take 5 mg by mouth nightly       insulin lispro (HUMALOG) 100 UNIT/ML injection vial Inject 0-12 Units into the skin 3 times daily (with meals) **Medium Dose Corrective Algorithm**  Glucose: Dose:  If <139             No Insulin  140-199 2 Units  200-249 4 Units  250-299 6 Units  300-349 8 Units  350-400 10 Units  Above 400       12 Units 1 vial 3    donepezil (ARICEPT) 5 MG tablet Take 1 tablet by mouth nightly 30 tablet 3    B Complex-C-Iron (SUPER B-COMPLEX/IRON/VITAMIN C PO) Take 1 tablet by mouth daily. Allergies:  Ativan [lorazepam]; Dilaudid [hydromorphone hcl]; Flexeril [cyclobenzaprine hcl]; Heparin; Soma [carisoprodol]; Vicodin [hydrocodone-acetaminophen]; Penicillins; and Sulfa antibiotics    Social History:    Social History     Socioeconomic History    Marital status:       Spouse name: Not on file    Number of children: Not on file    Years of education: Not on file    Highest education level: Not on file   Occupational History    Occupation:      Comment: [employer] most recently   Social Needs    Financial resource strain: Not on file    Food insecurity     Worry: Not on file     Inability: Not on file   [employer] needs     Medical: Not on file     Non-medical: Not on file   Tobacco Use    Smoking status: Former Smoker     Years: 13.00     Types: Cigarettes     Last attempt to quit: 1985     Years since quittin.7    Smokeless tobacco: Never Used   Substance and Sexual Activity    Alcohol use: No     Alcohol/week: 0.0 standard drinks    Drug use: No    Sexual activity: Not Currently     Partners: Male   Lifestyle    Physical activity     Days per week: Not on file     Minutes per session: Not on file    Stress: Not on file   Relationships    Social connections     Talks on phone: Not on file     Gets together: Not on file     Attends Islam service: Not on file     Active member of club or organization: Not on file     Attends meetings of clubs or organizations: Not on file     Relationship status: Not on file    Intimate partner violence     Fear of current or ex partner: Not on file     Emotionally abused: Not on file     Physically abused: Not on file     Forced sexual activity: Not on file   Other Topics Concern    Not on file   Social History Narrative    Not on file       Family History:   Family History   Problem Relation Age of Onset    Diabetes Mother     Heart Failure Mother     Heart Disease Mother     Stroke Mother     Diabetes Sister     Diabetes Brother     Diabetes Maternal Grandmother     Asthma Son     Asthma Son     Diabetes Daughter     Heart Disease Daughter     Irritable Bowel Syndrome Daughter     Diabetes Brother     Cancer Neg Hx     Emphysema Neg Hx     Hypertension Neg Hx        Review of Systems:   Pertinent positives stated above in HPI. Remainder of 10 point review of systems were reviewed and were negative.     Physical exam:   Constitutional:  VITALS:  BP (!) 90/50   Pulse 74   Temp 99 °F (37.2 °C)   Resp 18   Ht 4' 11\" (1.499 m)   Wt 134 lb 11.2 oz (61.1 kg)   SpO2 95%   BMI 27.21 kg/m²   Gen: alert, awake, ill-appearing  Skin: no rash, turgor wnl  Heent:  eomi, mmm  Neck: no bruits or jvd noted, thyroid normal  Cardiovascular:  S1, S2 without m/r/g  Respiratory: CTA B without w/r/r; respiratory effort normal  Abdomen:  +bs, soft, nt, nd, no hepatosplenomegaly  Ext: no lower extremity edema  Access: LUE AVG  Psychiatric: mood and affect appropriate; judgement and insight intact  Musculoskeletal:  Rom, muscular strength intact; digits, nails normal    Data/  CBC:   Lab Results   Component Value Date    WBC 8.7 09/16/2020    RBC 4.23 09/16/2020    RBC 4.31 12/08/2015    HGB 12.0 09/16/2020    HCT 37.9 09/16/2020    MCV 89.6 09/16/2020    MCH 28.4 09/16/2020    MCHC 31.7 09/16/2020    RDW 21.0 09/16/2020     09/16/2020    MPV 9.2 09/16/2020     BMP:    Lab Results   Component Value Date     09/16/2020    K 3.2 09/16/2020    K 4.7 08/31/2020    CL 90 09/16/2020    CO2 23 09/16/2020    BUN 30 09/16/2020    LABALBU 2.3 09/16/2020    CREATININE 4.1 09/16/2020    CALCIUM 8.4 09/16/2020    GFRAA 13 09/16/2020    GFRAA 57 10/21/2012    LABGLOM 11 09/16/2020    GLUCOSE 139 09/16/2020         Assessment/    - End stage renal disease - on HD Mon-Wed-Fri    - Multi-focal PNA - started on vancomycin, meropenem, blood cultures GNR's from HD unit on 9/14, plans per Admitting    - Hypotension - in setting of above    - Anemia of chronic disease - Hb at target, on epogen 7K qHD      Plan/    - HD today per schedule with UF as tolerated with prn albumin, midodrine, & crit line monitoring - EDW of 58.5 kg  - Hold ANKUR dosing with HD today  - Monitor vancomycin levels with dosing  - Hold losartan & metoprolol  - Trend labs, bp's, & cultures       Thank you for the consultation. Please do not hesitate to call with questions.     [employer]

## 2020-09-16 NOTE — ED NOTES

## 2020-09-16 NOTE — PROGRESS NOTES
Patient transferred to C2. Patient BP decreased, Corrina Chase MD notified. Patient lethargic and requiring more than 10L O2. Patient intubated and sedated. A-line and CVC placed. Patient cleaned, turned and repositioned. Assessment completed. Open areas to buttocks and both lower extremities. Mepilex placed to bottom. Wound to extremities open to air. Wound consult placed. Patient condition improving. Shana Marlow RN given report. Monitor patient progress.

## 2020-09-16 NOTE — PROGRESS NOTES
Patient transferred to C2 241 from A2 per Dr. Anushka Hopkins. CVC placed in the right femoral artery for better access. Patient intubated @ [phone] with a # 7.5 ETT @ the 21 fatemeh. OG also placed. Per Dr. Anushka Hopkins, Levophed, Propofol, and Fentanyl gtt ordered.

## 2020-09-16 NOTE — CODE DOCUMENTATION
Patient to stay on unit, Rm 207. 12L high flow nasal cannula. Patient able to answer name and birth date. More alert.

## 2020-09-16 NOTE — FLOWSHEET NOTE
Treatment time: 3 hours  Net UF: -10 ml     Pre weight: 61.1 kg   Post weight: 61.2 kg  EDW: 58.5 kg     Access used: LFA AVG  Access function: Good with  ml/min     Medications or blood products given: Albumin     Regular outpatient schedule: MWF     Summary of response to treatment:      09/16/20 1021 09/16/20 1336   Vital Signs   BP (!) 90/50 (!) 102/33   Temp 99 °F (37.2 °C) 99 °F (37.2 °C)   Pulse 74 80   Resp 18 18   Weight 134 lb 11.2 oz (61.1 kg) 134 lb 14.7 oz (61.2 kg)   Weight Method Bed scale Actual;Bed scale   Percent Weight Change 4.42 0.16   Pain Assessment   Pain Assessment  --  0-10   Pain Level  --  0   Post-Hemodialysis Assessment   Post-Treatment Procedures  --  Blood returned; Access bleeding time < 10 minutes   Machine Disinfection Process  --  Acid/Vinegar Clean;Heat Disinfect; Exterior Machine Disinfection   Rinseback Volume (ml)  --  400 ml   Total Liters Processed (l/min)  --  59.7 l/min   Dialyzer Clearance  --  Moderately streaked   Duration of Treatment (minutes)  --  185 minutes   Heparin amount administered during treatment (units)  --  0 units   Hemodialysis Intake (ml)  --  1150 ml   Hemodialysis Output (ml)  --  1140 ml   NET Removed (ml)  --  -10 ml   Tolerated Treatment  --  Poor   Copy of dialysis treatment record placed in chart, to be scanned into EMR. Unable to tolerate fluid removal, albumin administered for BP support.  Report called to Maximus Basilio.

## 2020-09-16 NOTE — ED NOTES
[facility] @ [id number]  Re; hypoxic resp failure, CKD needing dialysis, acute pancreatitis  Dr Arcadio Price responded @ [address]  09/16/20 1998

## 2020-09-16 NOTE — ED NOTES
Pt appears very anxious, states \"I can't breathe I am going to die. \" When pt talks O2 % falls to 87%, writer placed pt on 2.5L NC, will continue to monitor.      Rena Mark RN  09/16/20 7126

## 2020-09-16 NOTE — PROGRESS NOTES
09/16/20 1755   Vent Information   $Ventilation $Initial Day   Skin Assessment Clean, dry, & intact   Equipment Changed HME   Vent Type 840   Vent Mode AC/VC   Vt Ordered 450 mL   Rate Set 14 bmp   Peak Flow 50 L/min   FiO2  100 %   Sensitivity 3   PEEP/CPAP 5   Humidification Source HME   Vent Patient Data   Peak Inspiratory Pressure 32 cmH2O   Mean Airway Pressure 12 cmH20   Rate Measured 20 br/min   Vt Exhaled 479 mL   Minute Volume 8.9 Liters   I:E Ratio 1:1.8   Plateau Pressure 33 TQE01   Static Compliance 17.12 mL/cmH2O   Dynamic Compliance 17.74 mL/cmH2O   Cough/Sputum   Sputum How Obtained None   Additional Respiratory  Assessments   $End Tidal CO2   (no capsule to capture)   Alarm Settings   High Pressure Alarm 40 cmH2O   Low Minute Volume Alarm 2 L/min   High Respiratory Rate 40 br/min   Low Exhaled Vt  200 mL   Patient Observation   Observations ambu bag and mask at bedside, apnea parameters on, alarms on and audible   ETT (adult)   Placement Date/Time: 09/16/20 1755   Preoxygenation: Yes  Mask Ventilation: Ventilated by mask (1)  Technique: Video laryngoscopy  Type: Cuffed  Tube Size: 7.5 mm  Laryngoscope: GlideScope  Blade Size: 4  Location: Oral  Insertion attempts: 1  Placeme. ..   $ Intubation emergent  $ Yes   Secured at 22 cm   Measured From [address] [place] [address],[address] By Commercial tube vela   Site Condition Dry   Cuff Pressure 32 cm H2O

## 2020-09-16 NOTE — H&P
THERAPUTIC ASPIRATION INITIAL performed by Abdias Hussein MD at [address]  11/23/2019    BRONCHOSCOPY FOREIGN BODY REMOVAL performed by Abdias Hussein MD at [address] Right     CATARACT REMOVAL Bilateral     COLONOSCOPY      CORONARY ANGIOPLASTY WITH STENT PLACEMENT  4/2012    DIAGNOSTIC CARDIAC CATH LAB PROCEDURE      DIALYSIS FISTULA CREATION Left 6/25/2020    LEFT FOREARM LOOP DIALYSIS GRAFT performed by Jessica Gaming MD at [facility] [address]  08/2020    KNEE ARTHROSCOPY      TYLOR AND BSO  11/30/2015    TOE AMPUTATION Right 1/16/2020    RIGHT SECOND AND THIRD TOE AMPUTATION, RIGHT HEEL WOUND DEBRIDEMENT, RIGHT HALLUX TOE NAIL DEBRIDEMENT performed by Marbin Castaneda DPM at [facility] N/A 12/2/2019    EGD PEG PLACEMENT W/ ANESTHESIA performed by Meño Hilario MD at [address] [place] [address]/2020    common iliac artery angioplasty and stenting       Medications Prior to Admission:      Prior to Admission medications    Medication Sig Start Date End Date Taking? Authorizing Provider   vancomycin (VANCOCIN) 125 MG capsule Take one tablet every 6 hours for two weeks. Then take one table every 12 hours for one week. Then take one tablet every 24 hours for one week.  Then take one tablet every 48 hours for two weeks then stop 8/31/20  Yes Nicole Garcia MD   ondansetron (ZOFRAN ODT) 4 MG disintegrating tablet Take 1 tablet by mouth every 8 hours as needed for Nausea 8/26/20  Yes Carrie Jaimes APRN - CNP   clopidogrel (PLAVIX) 75 MG tablet Take 1 tablet by mouth daily 8/26/20  Yes Jessica Gaming MD   hydrOXYzine (VISTARIL) 25 MG capsule Take 25 mg by mouth 3 times daily as needed for Itching   Yes Historical Provider, MD   promethazine (PHENERGAN) 25 MG tablet Take 25 mg by mouth every 6 hours as needed for Nausea   Yes Historical Provider, MD   traMADol (ULTRAM) 50 MG tablet Take 50 mg by mouth every 6 hours as needed for Pain. Yes Historical Provider, MD   sertraline (ZOLOFT) 100 MG tablet Take 1 tablet by mouth daily 4/13/20  Yes Matti Stone MD   lactobacillus (CULTURELLE) capsule Take 1 capsule by mouth 3 times daily (with meals)  Patient taking differently: Take 1 capsule by mouth daily  4/13/20  Yes Matti Stone MD   losartan (COZAAR) 50 MG tablet Take 1 tablet by mouth daily  Patient taking differently: Take 50 mg by mouth every evening  4/14/20  Yes Matti Stone MD   aspirin 81 MG chewable tablet Take 1 tablet by mouth daily 3/23/20  Yes Cordell Ramos MD   pantoprazole (PROTONIX) 40 MG tablet Take 1 tablet by mouth every morning (before breakfast) 3/24/20  Yes Cordell Ramos MD   metoprolol tartrate (LOPRESSOR) 50 MG tablet Take 50 mg by mouth 2 times daily   Yes Historical Provider, MD   rosuvastatin (CRESTOR) 5 MG tablet Take 5 mg by mouth nightly    Yes Historical Provider, MD   insulin lispro (HUMALOG) 100 UNIT/ML injection vial Inject 0-12 Units into the skin 3 times daily (with meals) **Medium Dose Corrective Algorithm**  Glucose: Dose:  If <139             No Insulin  140-199 2 Units  200-249 4 Units  250-299 6 Units  300-349 8 Units  350-400 10 Units  Above 400       12 Units 1/23/20  Yes Matti Stone MD   donepezil (ARICEPT) 5 MG tablet Take 1 tablet by mouth nightly 1/23/20  Yes Matti Stone MD   B Complex-C-Iron (SUPER B-COMPLEX/IRON/VITAMIN C PO) Take 1 tablet by mouth daily. 9/2/10  Yes Historical Provider, MD       Allergies:  Ativan [lorazepam]; Dilaudid [hydromorphone hcl]; Flexeril [cyclobenzaprine hcl]; Heparin; Soma [carisoprodol]; Vicodin [hydrocodone-acetaminophen]; Penicillins; and Sulfa antibiotics    Social History:      TOBACCO:   reports that she quit smoking about 35 years ago. Her smoking use included cigarettes. She quit after 13.00 years of use.  She has never used smokeless tobacco.  ETOH:   reports no history of alcohol use. Family History:          Problem Relation Age of Onset    Diabetes Mother     Heart Failure Mother     Heart Disease Mother     Stroke Mother     Diabetes Sister     Diabetes Brother     Diabetes Maternal Grandmother     Asthma Son     Asthma Son     Diabetes Daughter     Heart Disease Daughter     Irritable Bowel Syndrome Daughter     Diabetes Brother     Cancer Neg Hx     Emphysema Neg Hx     Hypertension Neg Hx        REVIEW OF SYSTEMS:   Pertinent positives as noted in the HPI. All other systems reviewed and negative. PHYSICAL EXAM:    BP (!) 89/54   Pulse 81   Temp 98.2 °F (36.8 °C) (Oral)   Resp 18   Ht 4' 11\" (1.499 m)   Wt 134 lb 14.7 oz (61.2 kg)   SpO2 97%   BMI 27.25 kg/m²     Gen/overall appearance: Not in acute distress. Somnolent but arousable. Poor historian. Head: Normocephalic, atraumatic  Eyes: EOMI, no scleral icterus  CVS: regular rate and rhythm, Normal S1S2  Pulm: Diminished. No crackles/wheezes  Gastrointestinal: Soft, nontender, nondistended, no guarding or rebound  Extremities: bilat LE edema  Neuro: No gross focal deficits noted  Skin: Warm, dry    Labs:     Recent Labs     09/16/20  0340   WBC 8.7   HGB 12.0   HCT 37.9   *     Recent Labs     09/16/20  0530   *   K 3.2*   CL 90*   CO2 23   BUN 30*   CREATININE 4.1*   CALCIUM 8.4     Recent Labs     09/16/20  0530   AST 26   ALT 11   BILITOT 1.3*   ALKPHOS 192*     No results for input(s): INR in the last 72 hours.   Recent Labs     09/16/20  0530   TROPONINI 0.07*       Urinalysis:      Lab Results   Component Value Date    NITRU POSITIVE 03/08/2020    WBCUA 6-10 03/08/2020    BACTERIA 4+ 03/08/2020    RBCUA 3-4 03/08/2020    BLOODU TRACE-INTACT 03/08/2020    SPECGRAV 1.020 03/08/2020    GLUCOSEU Negative 03/08/2020    GLUCOSEU NEGATIVE 10/07/2011         ASSESSMENT:    Active Hospital Problems    Diagnosis Date Noted    Acute respiratory failure with hypoxia ([address] [J96.01] 09/16/2020    End stage renal disease ([facility] [N18.6] 06/25/2020       Questionable HCAP. CXR with multifocal opacities on admission. Hypotension. Possibly early septic shock. Unclear BP baseline.  - on merrem and vanco  - follow up cultures  - respiratory care  - O2 per protocol  - respiratory care    ESRD on HD. CXR with small R pleural effusion  - HD per nephro recs  - strict ins/outs  - monitor lytes    Acute metabolic encephalopathy 2/2 above    Recent C. Diff colitis. - continue with po vancomycin course    History of soft tissue sarcoma:  - CT shows enlarging pulmonary nodules concerning for metastatic disease:  - Hem/Onc following as outpt  - per Pulm, nodules too small for biopsy  - repeat CT chest in 3 months    PMH of CAD, PAD, DM, chronic anemia likely AOCD    DVT Prophylaxis: SCDs; hx of questionable HIT  Diet: DIET RENAL;  Code Status: Full Code    Dispo - Dario Patrick MD    Thank you Bertrand Fried MD for the opportunity to be involved in this patient's care.

## 2020-09-16 NOTE — PROGRESS NOTES
Sent secure message to Dr. Saloni Garcia via perfect serve Pt finished 500 bolus for low BP repeat 77/46 pt 97% on 12L. Pt has crackles in lungs. Do you want to transfer higher level?  please advise

## 2020-09-16 NOTE — PROGRESS NOTES
Upon entering room pt very lethargic and moaning. BP 75/42 P 53 O2 84 on 4L  Pt was increased to 6 L respiratory called for high flow. Rapid called due to pt O2 and unable to wake easily.

## 2020-09-17 PROBLEM — E87.20 METABOLIC ACIDEMIA: Status: ACTIVE | Noted: 2020-01-01

## 2020-09-17 PROBLEM — E87.20 LACTIC ACIDOSIS: Status: ACTIVE | Noted: 2020-01-01

## 2020-09-17 PROBLEM — R78.81 GRAM-NEGATIVE BACTEREMIA: Status: ACTIVE | Noted: 2020-01-01

## 2020-09-17 NOTE — CONSULTS
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 ESRD MWF on HD via AVF  Infection of feet with dry gangrene 1/4 toes of R foot  History of C diff infection 3/2020  PAD s/p revasc / stenting kalpesh common iliac arteries 8/25/20     Admission 8/28-8/31/20 with recurrent C diff colitis. DC with taper of vanc. [facility] collected at HD and was given a dose of IV Vanc at HD on 9/14/20 per EMR. ED 9/16/20 - SOB, cough, anorexia, emesis. CXR with multifocal opacities  Admitted with presumptive diagnosis pneumonia     She was taken for HD shortly after admission and decompensated with hypotension, respiratory failure requiring intubation. Currently on vent at 90/5. On levophed and vasopressin. Anuric. No diarrhea per RN. BC collected on admission are positive for Serratia marcescens. We are consulted for recommendations.         Current abx:  Meropenem 1g q12  Po vanc        Past Surgical History:       Diagnosis Date    Acute bilateral deep vein thrombosis (DVT) of popliteal veins ([address] 1/29/2020    Acute pulmonary edema (HCC)     Acute respiratory failure with hypoxia and hypercapnia ([address] 11/11/2019    Angina     with exertion    C. difficile diarrhea 08/2020    Cardiac arrest ([address]     Cellulitis 2/21/2020    Clostridium difficile infection 8/29/2020, 4/24/2020, 03/28/2020    Diabetic foot ulcer ([address] 03/10/2020    Endometrial adenocarcinoma ([address] 11/17/2015    ESRD (end stage renal disease) on dialysis ([address]     Mon-Wed-Fri    Gangrene of toe of both feet ([address] 8/17/2020    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 ([address] 11/24/2019    acetaminophen (TYLENOL) suppository 650 mg, 650 mg, Rectal, Q6H PRN  ipratropium-albuterol (DUONEB) nebulizer solution 1 ampule, 1 ampule, Inhalation, Q4H PRN  vancomycin (VANCOCIN) oral solution 125 mg, 125 mg, Oral, 2 times per day **FOLLOWED BY** [START ON 9/22/2020] vancomycin (VANCOCIN) oral solution 125 mg, 125 mg, Oral, Daily **FOLLOWED BY** [START ON 9/29/2020] vancomycin (VANCOCIN) oral solution 125 mg, 125 mg, Oral, Every Other Day  [Held by provider] midodrine (PROAMATINE) tablet 10 mg, 10 mg, Oral, TID WC  glucose (GLUTOSE) 40 % oral gel 15 g, 15 g, Oral, PRN  dextrose 50 % IV solution, 12.5 g, Intravenous, PRN  glucagon (rDNA) injection 1 mg, 1 mg, Intramuscular, PRN  dextrose 5 % solution, 100 mL/hr, Intravenous, PRN  norepinephrine (LEVOPHED) 16 mg in dextrose 5 % 250 mL infusion, 2 mcg/min, Intravenous, Continuous  propofol injection, 10 mcg/kg/min, Intravenous, Titrated  fentaNYL (SUBLIMAZE) 1,000 mcg in sodium chloride 0.9 % 100 mL infusion, 25 mcg/hr, Intravenous, Continuous  vasopressin 20 Units in dextrose 5 % 100 mL infusion, 0.04 Units/min, Intravenous, Continuous  pantoprazole (PROTONIX) injection 40 mg, 40 mg, Intravenous, Daily      Allergies   Allergen Reactions    Ativan [Lorazepam]      DIZZY    Dilaudid [Hydromorphone Hcl] Other (See Comments)     Pt states it made her crazy    Flexeril [Cyclobenzaprine Hcl]      DOESN'T REMEMBER    Heparin      KEENAN     Soma [Carisoprodol]      FEELS OUT OF IT    Vicodin [Hydrocodone-Acetaminophen] Other (See Comments)     Per pt, makes her \"go crazy\"    Penicillins Rash    Sulfa Antibiotics Rash        REVIEW OF SYSTEMS:    Unable to obtain       Objective:   PHYSICAL EXAM:      VITALS:  BP (!) 112/55   Pulse 91   Temp 97.4 °F (36.3 °C) (Axillary)   Resp 11   Ht 4' 11\" (1.499 m)   Wt 126 lb 5.2 oz (57.3 kg)   SpO2 96%   BMI 25.51 kg/m²      24HR INTAKE/OUTPUT:      Intake/Output Summary (Last 24 hours) at 9/17/2020 4932  Last data filed at 9/17/2020 1400  Gross per 24 hour   Intake 990.37 ml   Output --   Net 990.37 ml     CONSTITUTIONAL:   Frail elderly female  Sedated, on vent via ETT   HEENT: NCAT, pupils pinpoint, sclera anicteric, conjunctiva palel. NECK:  Supple, symmetrical, trachea midline, no adenopathy  LUNGS:   Coarse ventilated breath sounds kalpesh   CARDIOVASCULAR:  RRR with murmur LSB   ABDOMEN:  normal bowel sounds, soft, flat  MUSCULOSKELETAL: No obvious misalignment or effusion of the joints. No clubbing, cyanosis of the digits. SKIN:   Multiple wounds   Dry gangrene R hallux and 4th toe  Multiple venous wounds LE  NEUROLOGIC:  Unable to assess  ACCESS:  Femoral CVC, vas cath, art line       DATA:    Old records have been reviewed    CBC:  Recent Labs     09/16/20  0340 09/16/20  1827 09/17/20  0450   WBC 8.7  --  3.6*   RBC 4.23  --  4.10   HGB 12.0 11.9* 11.6*   HCT 37.9  --  37.7   *  --  63*   MCV 89.6  --  91.7   MCH 28.4  --  28.3   MCHC 31.7  --  30.9*   RDW 21.0*  --  21.0*      BMP:  Recent Labs     09/16/20  0530 09/17/20  0450   * 130*   K 3.2* 4.0   CL 90* 95*   CO2 23 13*   BUN 30* 24*   CREATININE 4.1* 3.8*   CALCIUM 8.4 8.0*   GLUCOSE 139* 107*        Cultures:   9/16 BC x2 S marcescens    COVID PCR neg    BAL cultures ordered      Radiology Review:  All pertinent images / reports were reviewed as a part of this visit. CXR 9/16/20   Impression    Suspected multifocal pneumonia in the right lung.  Probable small right    pleural effusion. CT chest 8/30/20   Impression    Bilateral pulmonary nodules, measuring up to 6 mm.  Several nodules    demonstrate progression compared with 03/08/2020.  The nodules are presumably    metastatic.         Small bilateral pleural effusions.         New, mild anterior compression fracture of T5.  This appears to be    osteoporotic.  No osseous metastatic lesion is evident at this level. CT a/p 8/28/20   Impression    1.  Diffuse colonic wall thickening, similar to the previous study, possibly    an ongoing colitis. 2. 3rd spacing with small quantity of ascites, diffuse anasarca and trace    right pleural effusion. 3. Cholelithiasis with no acute features. 4. Increased size of pulmonary nodules suggesting metastatic disease.         Assessment:     Patient Active Problem List   Diagnosis    Asthma    Vertigo    Type 2 diabetes mellitus with diabetic polyneuropathy, with long-term current use of insulin ([address]    HTN (hypertension)    CAD (coronary artery disease)    Perennial allergic rhinitis    Osteoarthritis of spine with radiculopathy, lumbar region    Pain of both hip joints    Spinal stenosis, lumbar region, with neurogenic claudication    Right sided sciatica    Chronic pain of both knees    Osteoporosis    Primary osteoarthritis of both knees    Pulmonary nodule    Normocytic normochromic anemia    Anasarca    Diabetic polyneuropathy associated with type 2 diabetes mellitus ([address]    Diabetic ulcer of right heel associated with type 2 diabetes mellitus, with necrosis of muscle ([address]    Debility    Moderate malnutrition (HCC)    End stage renal disease ([address]    Atherosclerosis of native artery of right lower extremity with ulceration of heel (HCC)    Pressure ulcer of right ischium, stage II ([address]    Anxiety disorder due to general medical condition with panic attack    Atrial fibrillation (HCC)    Edema of both legs    Gastroesophageal reflux disease without esophagitis    Hyperlipidemia, mixed    Nonrheumatic mitral (valve) insufficiency    Orthostatic hypotension    Retroperitoneal sarcoma (HCC)    Skin tear of upper arm without complication, left, initial encounter    Ulcer of right leg, limited to breakdown of skin ([address]    Peripheral venous insufficiency    Diabetic ulcer of toe of right foot associated with type 2 diabetes mellitus, with necrosis of bone ([address]    Chronic deep vein thrombosis (DVT) of distal vein of both lower extremities [facility]    Cholelithiasis    Moderate to severe pulmonary hypertension ([facility]    Atherosclerotic peripheral vascular disease with ulceration (HCC)    Lung nodules    Clostridium difficile enterocolitis    Closed compression fracture of thoracic vertebra (HCC)    Acute respiratory failure with hypoxia (HCC)    Elevated brain natriuretic peptide (BNP) level    Acute respiratory failure with hypoxia and hypercapnia (HCC)    Elevated lipase    Tricuspid regurgitation    Moderate protein-calorie malnutrition (HCC)    Pulmonary infiltrates    Thrombocytopenia (HCC)    Septic shock (HCC)       Heaven Coats is a 74yof with multiple medical problems    Severe sepsis     Serratia marcescens bacteremia, +BC 9/16/20 and also reportedly at the HD unit  Source of bacteremia - pneumonia vs intra-abd source / colitis with GI translocation. Foot wounds unlikely to be the source. AVF not overtly infected. Dry gangrene R 1/4 toes  S/p revascularization 8/2020    Recurrent C diff infection     Leukopenia and thrombocytopenia likely from acute infection / gram negative bacteremia     ESRD     Elevated lipase     COVID PCR negative    Recs  Continue meropenem for bacteremia pending final culture result  Continue po vanc due to high risk of relapse   Needs CT abd as soon as feasible to look for r/o abd bacteremia   Trend LFTs   Continue local wound care    Discontinue COVID specific isolation     Prognosis poor  No family in the room      D/w RN       Reyes Morale, M.D. Thank you for the opportunity to participate in the care of your patient.     Please do not hesitate to contact me:   [phone] office  [phone] mobile

## 2020-09-17 NOTE — PROGRESS NOTES
09/16/20 2033   Vent Information   Skin Assessment Clean, dry, & intact   Equipment Changed HME   Vent Type 840   Vent Mode AC/VC+   Vt Ordered 450 mL   Rate Set 18 bmp   FiO2  60 %   SpO2 98 %   SpO2/FiO2 ratio 163.33   Sensitivity 3   PEEP/CPAP 5   I Time/ I Time % 1 s   Humidification Source HME   Vent Patient Data   Peak Inspiratory Pressure 30 cmH2O   Mean Airway Pressure 12 cmH20   Rate Measured 18 br/min   Vt Exhaled 488 mL   Minute Volume 8.4 Liters   I:E Ratio 1:2.3   Plateau Pressure 25 [id number]   Static Compliance 24 mL/cmH2O   Dynamic Compliance 20 mL/cmH2O   Cough/Sputum   Sputum How Obtained Endotracheal;Suctioned   Cough Productive   Sputum Amount Small   Sputum Color Cloudy;Brown;Red   Tenacity Thin   Spontaneous Breathing Trial (SBT) RT Doc   Pulse 88   Breath Sounds   Right Upper Lobe Diminished   Right Middle Lobe Diminished   Right Lower Lobe Diminished   Left Upper Lobe Diminished   Left Lower Lobe Diminished   Additional Respiratory  Assessments   Resp 10   Position Semi-Fernandez's   Cuff Pressure (cm H2O) 30 cm H2O   Alarm Settings   High Pressure Alarm 40 cmH2O   Low Minute Volume Alarm 2 L/min   High Respiratory Rate 40 br/min   Low Exhaled Vt  200 mL   ETT (adult)   Placement Date/Time: 09/16/20 6445   Preoxygenation: Yes  Mask Ventilation: Ventilated by mask (1)  Technique: Video laryngoscopy  Type: Cuffed  Tube Size: 7.5 mm  Laryngoscope: GlideScope  Blade Size: 4  Location: Oral  Insertion attempts: 1  Placeme. ..    Secured at 22 cm   Measured From [address] [place] [address],[address] By [employer] tube vela   Site Condition Dry   Cuff Pressure 30 cm H2O

## 2020-09-17 NOTE — PROGRESS NOTES
Pt O2 65%. Dr. Hazel Gutierres called bedside. MD bagging pt. ABG obtained. Pulse ox changed to forehead. Will continue to monitor.

## 2020-09-17 NOTE — PROGRESS NOTES
09/17/20 1609   Vent Information   Skin Assessment Clean, dry, & intact   Vent Type 840   Vent Mode AC/VC+   Vt Ordered 450 mL   Rate Set 18 bmp   Pressure Support 0 cmH20   FiO2  90 %   Sensitivity 3   PEEP/CPAP 5   I Time/ I Time % 1 s   Humidification Source HME   Vent Patient Data   Peak Inspiratory Pressure 30 cmH2O   Mean Airway Pressure 13 cmH20   Rate Measured 18 br/min   Vt Exhaled 467 mL   Minute Volume 8.28 Liters   I:E Ratio 1:2.30   Plateau Pressure 26 [id number]   Static Compliance 22.23 mL/cmH2O   Dynamic Compliance 18.68 mL/cmH2O   Cough/Sputum   Sputum How Obtained Endotracheal;Oral;Suctioned   Cough Productive   Sputum Amount Moderate   Sputum Color Cloudy;Brown   Tenacity Thick; Thin   Spontaneous Breathing Trial (SBT) RT Doc   Pulse 91   Breath Sounds   Right Upper Lobe Diminished   Right Middle Lobe Diminished   Right Lower Lobe Diminished   Left Upper Lobe Diminished   Left Lower Lobe Diminished   Additional Respiratory  Assessments   Resp 11   Alarm Settings   High Pressure Alarm 40 cmH2O   Low Minute Volume Alarm 2 L/min   High Respiratory Rate 45 br/min   Low Exhaled Vt  200 mL   ETT (adult)   Placement Date/Time: 09/16/20 2375   Preoxygenation: Yes  Mask Ventilation: Ventilated by mask (1)  Technique: Video laryngoscopy  Type: Cuffed  Tube Size: 7.5 mm  Laryngoscope: GlideScope  Blade Size: 4  Location: Oral  Insertion attempts: 1  Placeme. ..    Secured at 22 cm   Measured From [address] [place] [address],[address] By [employer] tube vela   Site Condition Dry   Cuff Pressure 30 cm H2O

## 2020-09-17 NOTE — CARE COORDINATION
Chart review completed. Patient a 67year old female, admitted for respiratory failure. Hospital day 1, currently in ICU level of care on vent following a rapid response 7/16/20 on A2. Covid-19 testing NEGATIVE. Pt known to writer from previous admissions. Pt friend Tila Henry very involved in care. [facility] RN met with Tila Henry today and learned that she is not family and has no legal POA rights. Call was placed to sister MYNOR who is willing to act on patient behalf. Continued palliative care/goals of care discussions to take place. Per Chart review pt has had a long health history and was improving prior to admission but had voiced to sister sadness with her current state. Pt currently remains in critical condition on vent. CM will follow for today and follow up with family tomorrow once goals of care are more clear on potential needs. Please advise should any needs arise.  Marcella Hoover RN

## 2020-09-17 NOTE — PROGRESS NOTES
09/17/20 0919   Vent Information   Skin Assessment Clean, dry, & intact   Vent Type 840   Vent Mode AC/VC+   Vt Ordered 450 mL   Rate Set 18 bmp   Pressure Support 0 cmH20   FiO2  50 %   SpO2 98 %   SpO2/FiO2 ratio 196   Sensitivity 3   PEEP/CPAP 5   I Time/ I Time % 1 s   Humidification Source HME   Vent Patient Data   Peak Inspiratory Pressure 29 cmH2O   Mean Airway Pressure 13 cmH20   Rate Measured 18 br/min   Vt Exhaled 473 mL   Minute Volume 8.61 Liters   I:E Ratio 1:2.30   Plateau Pressure 24 [id number]   Static Compliance 24.89 mL/cmH2O   Dynamic Compliance 19.71 mL/cmH2O   Cough/Sputum   Sputum How Obtained Endotracheal;Suctioned   Cough Productive   Sputum Amount Moderate   Sputum Color Cloudy;Brown   Spontaneous Breathing Trial (SBT) RT Doc   Pulse 110   Breath Sounds   Right Upper Lobe Diminished   Right Middle Lobe Diminished   Right Lower Lobe Diminished   Left Upper Lobe Diminished   Left Lower Lobe Diminished   Additional Respiratory  Assessments   Resp 18   Cuff Pressure (cm H2O) 30 cm H2O   Alarm Settings   High Pressure Alarm 40 cmH2O   Low Minute Volume Alarm 2 L/min   High Respiratory Rate 45 br/min   Low Exhaled Vt  200 mL   Patient Observation   Observations ambu bag and mask at bedside, apnea parameters on, alarms on and audible   ETT (adult)   Placement Date/Time: 09/16/20 1755   Preoxygenation: Yes  Mask Ventilation: Ventilated by mask (1)  Technique: Video laryngoscopy  Type: Cuffed  Tube Size: 7.5 mm  Laryngoscope: GlideScope  Blade Size: 4  Location: Oral  Insertion attempts: 1  Placeme. ..    Secured at 22 cm   Measured From [address] [place] [address],[address] By [employer] tube vela   Site Condition Dry   Cuff Pressure 30 cm H2O

## 2020-09-17 NOTE — PROGRESS NOTES
09/17/20 1938   Vent Information   Skin Assessment Clean, dry, & intact   Vent Type 840   Vent Mode AC/PC   Pressure Ordered 28   Rate Set 18 bmp   FiO2  75 %   SpO2 100 %   SpO2/FiO2 ratio 133.33   Sensitivity 3   PEEP/CPAP 5   Humidification Source HME   Vent Patient Data   Peak Inspiratory Pressure 33 cmH2O   Mean Airway Pressure 13 cmH20   Rate Measured 18 br/min   Vt Exhaled 467 mL   Minute Volume 8.36 Liters   I:E Ratio 1:2.3   Cough/Sputum   Sputum How Obtained Endotracheal;Oral;Suctioned   Cough Productive   Sputum Amount Moderate   Sputum Color Cloudy;Brown   Tenacity Thick; Thin   Spontaneous Breathing Trial (SBT) RT Doc   Pulse 86   Breath Sounds   Right Upper Lobe Coarse Crackles   Right Middle Lobe Coarse Crackles   Right Lower Lobe Diminished   Left Upper Lobe Coarse Crackles   Left Lower Lobe Diminished   Additional Respiratory  Assessments   Resp (!) 0   Alarm Settings   High Pressure Alarm 40 cmH2O   Low Minute Volume Alarm 2 L/min   Apnea (secs) 20 secs   High Respiratory Rate 45 br/min   Low Exhaled Vt  200 mL   ETT (adult)   Placement Date/Time: 09/16/20 1755   Preoxygenation: Yes  Mask Ventilation: Ventilated by mask (1)  Technique: Video laryngoscopy  Type: Cuffed  Tube Size: 7.5 mm  Laryngoscope: GlideScope  Blade Size: 4  Location: Oral  Insertion attempts: 1  Placeme. ..    Secured at 22 cm   Measured From Lips   ET Placement Right   Secured By Commercial tube vela   Site Condition Dry   Cuff Pressure 30 cm H2O

## 2020-09-17 NOTE — PROCEDURES
ENDOTRACHEAL INTUBATION    INDICATION: Life threatening respiratory failure    TIME OUT: taken    SEDATION: etomidate and versed    PROCEDURE: Using video laryngoscopy, the vocal cords were well visualized and a 7.5 mm endotracheal tube was place directly through the cords. Good breath sounds auscultated bilaterally without sounds over abdomen. Good return of CO2 on the monitor. CXR is pending.     EBL-0    Rickey Hutchinson MD

## 2020-09-17 NOTE — PROGRESS NOTES
INPATIENT PULMONARY CRITICAL CARE PROGRESS NOTE      Reason for visit    septic shock      SUBJECTIVE: Patient when seen this morning continues to be critically ill on mechanical ventilator support, patient had thick copious secretions in the endotracheal tube which were difficult to suction out, patient was on 50% oxygen on the ventilator when seen, patient was profoundly hypotensive and patient was on 2 IV pressors in the form of Levophed and vasopressin to maintain hemodynamics; patient had a T-max of 99 °F, patient has sinus tachycardia on the monitor, patient's blood sugars were trending lower,no other ROS could be obtained ; patient's clinical status is precarious and has worsened as compared to last evening and has the potential for further decompensation             Physical Exam:  Blood pressure (!) 112/55, pulse 91, temperature 97.4 °F (36.3 °C), temperature source Axillary, resp. rate 11, height 4' 11\" (1.499 m), weight 126 lb 5.2 oz (57.3 kg), SpO2 96 %, not currently breastfeeding.'     Constitutional: No respiratory distress on mechanical ventilatory support   HENT:  Oropharynx is clear and moist. No thyromegaly. Eyes:  Conjunctivae are normal. Pupils equal, round, and reactive to light. No scleral icterus. Neck: . No tracheal deviation present. No obvious thyroid mass. Cardiovascular: Normal rate, regular rhythm, left lower sternal border. No right ventricular heave. 1+ lower extremity edema. Pulmonary/Chest: No wheezes. Bilateral rales. Chest wall is not dull to percussion. No accessory muscle usage or stridor. Abdominal: Soft. Bowel sounds present. Slight abdominal distention with anterior abdominal wall edema. No tenderness. Musculoskeletal: No cyanosis. No clubbing. 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    Skin: Gangrene of fingers along with that patient has skin wound especially in the distal aspect of right leg  Neurologic: Intubated and sedated            Results:  CBC:   Recent Labs     09/16/20  0340 09/16/20  1827 09/17/20  0450   WBC 8.7  --  3.6*   HGB 12.0 11.9* 11.6*   HCT 37.9  --  37.7   MCV 89.6  --  91.7   *  --  63*     BMP:   Recent Labs     09/16/20  0530 09/17/20  0450 09/17/20  1612   * 130* 130*   K 3.2* 4.0 4.2   CL 90* 95* 95*   CO2 23 13* 18*   PHOS  --  4.1 3.4   BUN 30* 24* 20   CREATININE 4.1* 3.8* 2.8*     LIVER PROFILE:   Recent Labs     09/16/20  0530   AST 26   ALT 11   LIPASE 292.0*   BILITOT 1.3*   ALKPHOS 192*       Imaging:  I have reviewed radiology images personally. XR CHEST PORTABLE   Final Result   Right-sided central venous catheter in place terminating in the SVC      Otherwise, unchanged chest         XR CHEST PORTABLE   Final Result   Endotracheal tube tip is approximately 2 cm from the champ and could be   retracted approximately 2 cm. Developing left basilar atelectasis or airspace disease. Combination of   airspace disease and pleural fluid within the right chest is not   significantly changed. XR CHEST PORTABLE   Final Result   1. Endotracheal tube tip 3.4 cm above the champ. 2. Gastric tube courses beyond the field of view. 3. Ground-glass opacities involving the right hemithorax may reflect alone,   or in combination, worsening pneumonia, layering pleural effusion and/or   atelectasis. 4. Suspected right effusion. The findings were sent to the Radiology Results [id number] at 11:31   pm on 9/16/2020to be communicated to a licensed caregiver. XR CHEST PORTABLE   Final Result   Suspected multifocal pneumonia in the right lung. Probable small right   pleural effusion.          XR CHEST PORTABLE    (Results Pending)     Ct Abdomen Pelvis Wo Contrast Additional Contrast? None    Result Date: 8/29/2020  EXAMINATION: CT OF THE ABDOMEN AND PELVIS WITHOUT CONTRAST 8/28/2020 5:53 pm TECHNIQUE: CT of the abdomen and pelvis was performed without the administration of intravenous contrast. Multiplanar reformatted images are provided for review. Dose modulation, iterative reconstruction, and/or weight based adjustment of the mA/kV was utilized to reduce the radiation dose to as low as reasonably achievable. COMPARISON: 04/24/2020. HISTORY: ORDERING SYSTEM PROVIDED HISTORY: Nausea, vomiting, abdominal pain TECHNOLOGIST PROVIDED HISTORY: Reason for exam:->Nausea, vomiting, abdominal pain Additional Contrast?->None Reason for Exam: bilateral leg stents 2 days ago; nausea, vomiting x 1 day Acuity: Acute Type of Exam: Initial FINDINGS: Lower Chest: No acute infiltrate at the lung bases. Trace right pleural effusion. Multiple noncalcified pulmonary nodules which demonstrate interval increase in size and are concerning for metastatic disease. Cardiomegaly with coronary vascular calcification. Organs: Cholelithiasis with no acute biliary findings. The unenhanced liver, spleen, pancreas and adrenal glands are unremarkable. No evidence of obstructive uropathy. Prominent renal vascular calcification. GI/Bowel: Mild colonic wall thickening, similar to the previous CT. The possibility of an ongoing colitis is raised. Mild retained stool throughout the colon. The appendix is unremarkable. No small bowel distension. The stomach and duodenal sweep are intact. Pelvis: Small amount of free pelvic fluid. The uterus is surgically absent. The bladder is contracted with scattered gas and increased attenuation dependently. Peritoneum/Retroperitoneum: Severe aortoiliac atherosclerotic disease. Surgical stents span the distal abdominal aorta into the common iliac arteries bilaterally. No retroperitoneal adenopathy. Small quantity of upper abdominal ascites. Increased attenuation diffusely of the abdominal fat. Bones/Soft Tissues: Moderate diffuse anasarca.   No focal Tissues/Bones: At T5, there is a new anterior compression fracture along the superior endplate. No new lytic or blastic bone lesions are appreciated. The chest wall is unremarkable. Bilateral pulmonary nodules, measuring up to 6 mm. Several nodules demonstrate progression compared with 03/08/2020. The nodules are presumably metastatic. Small bilateral pleural effusions. New, mild anterior compression fracture of T5. This appears to be osteoporotic. No osseous metastatic lesion is evident at this level. Xr Chest Portable    Result Date: 9/17/2020  EXAMINATION: ONE XRAY VIEW OF THE CHEST 9/17/2020 12:29 pm COMPARISON: 09/17/2020 at 5:59 a.m. HISTORY: ORDERING SYSTEM PROVIDED HISTORY: right vas cath placement TECHNOLOGIST PROVIDED HISTORY: Reason for exam:->right vas cath placement FINDINGS: Right-sided central venous catheter in place terminating in the SVC. Endotracheal tube and NG tube remain in place. The heart size is stable. Persistent pulmonary opacification in the right lung and left lower lobe, not changed. Right-sided central venous catheter in place terminating in the SVC Otherwise, unchanged chest     Xr Chest Portable    Result Date: 9/17/2020  EXAMINATION: ONE XRAY VIEW OF THE CHEST 9/17/2020 5:08 am COMPARISON: 09/16/2020 HISTORY: ORDERING SYSTEM PROVIDED HISTORY: RF TECHNOLOGIST PROVIDED HISTORY: Reason for exam:->RF Reason for Exam: RF FINDINGS: Nasogastric tube extends to the left upper quadrant. Endotracheal tube has tip approximately 2 cm proximal to the champ. Heterogeneous opacity is again demonstrated throughout the right lung. Hazy opacity is also seen superimposed, similar to prior. There is decreased definition of the left hemidiaphragm as compared to prior with retrocardiac opacity. No gross pneumothorax. Cardiac and mediastinal silhouettes are similar to prior. Calcification of aorta. Support apparatus is seen projecting over the left chest and mediastinum. Endotracheal tube tip is approximately 2 cm from the champ and could be retracted approximately 2 cm. Developing left basilar atelectasis or airspace disease. Combination of airspace disease and pleural fluid within the right chest is not significantly changed. Xr Chest Portable    Result Date: 9/16/2020  EXAMINATION: ONE XRAY VIEW OF THE CHEST 9/16/2020 10:11 pm COMPARISON: 09/16/2020 from 18 hours prior HISTORY: ORDERING SYSTEM PROVIDED HISTORY: S/P Intubation TECHNOLOGIST PROVIDED HISTORY: Reason for exam:->S/P Intubation Reason for Exam: ett Acuity: Unknown Type of Exam: Unknown FINDINGS: Monitor wires overlying the mediastinum limits evaluation. The endotracheal tube tip is seen approximately 3.4 above the champ. A transesophageal gastric tube courses beyond the field of view. Ground-glass attenuation overlying the right hemithorax is significantly increased from the comparison study. Stable enlargement of the cardiac silhouette. The left lung is clear. No acute or aggressive osseous lesions. 1. Endotracheal tube tip 3.4 cm above the champ. 2. Gastric tube courses beyond the field of view. 3. Ground-glass opacities involving the right hemithorax may reflect alone, or in combination, worsening pneumonia, layering pleural effusion and/or atelectasis. 4. Suspected right effusion. The findings were sent to the Radiology Results [id number] at 11:31 pm on 9/16/2020to be communicated to a licensed caregiver. Xr Chest Portable    Result Date: 9/16/2020  EXAMINATION: ONE XRAY VIEW OF THE CHEST 9/16/2020 3:39 am COMPARISON: 08/28/2020 HISTORY: ORDERING SYSTEM PROVIDED HISTORY: cough, SOB TECHNOLOGIST PROVIDED HISTORY: Reason for exam:->cough, SOB Reason for Exam: Cough for a couple weeks, SOB FINDINGS: Cardiomegaly. Aortic calcifications. New right basilar opacity and right upper lung opacity concerning for multifocal pneumonia. Probable small right pleural effusion.   No pneumothorax. Suspected multifocal pneumonia in the right lung. Probable small right pleural effusion. Xr Chest Portable    Result Date: 8/28/2020  EXAMINATION: ONE XRAY VIEW OF THE CHEST 8/28/2020 5:20 pm COMPARISON: 08/26/2020 HISTORY: ORDERING SYSTEM PROVIDED HISTORY: Nausea, vomiting abdominal pain TECHNOLOGIST PROVIDED HISTORY: Reason for exam:->Nausea, vomiting abdominal pain Reason for Exam: N/V/D AND FATIGUE X 1 DAY; STENTS PLACED BLE IN 8/27/2020 Acuity: Acute Type of Exam: Initial Relevant Medical/Surgical History: SEE EPIC FINDINGS: Cardiomegaly. Pulmonary vasculature within normal limits. Lungs clear. Costophrenic angles sharp     Cardiomegaly with no acute cardiopulmonary process demonstrated     Xr Chest Portable    Result Date: 8/26/2020  EXAMINATION: ONE XRAY VIEW OF THE CHEST 8/26/2020 2:04 am COMPARISON: July 29, 2020 HISTORY: ORDERING SYSTEM PROVIDED HISTORY: CP TECHNOLOGIST PROVIDED HISTORY: Reason for exam:->CP Reason for Exam: Nausea (had cardiac cath today; c/o n/v) FINDINGS: No evidence of consolidation, edema or other acute pulmonary process. No evidence of acute process of the cardiac or mediastinal structures. No evidence of pneumothorax or pleural effusion. Unchanged enlargement of the cardiopericardial shadow     No evidence of acute cardiopulmonary disease. Results for Delmi Lance (MRN [id number]) as of 9/17/2020 17:22   Ref.  Range 9/16/2020 05:30 9/16/2020 11:31 9/16/2020 13:19 9/16/2020 14:54 9/16/2020 16:50 9/16/2020 18:27 9/16/2020 21:44 9/17/2020 04:50   Sodium Latest Ref Range: 136 - 145 mmol/L 126 (L)       130 (L)   Potassium Latest Ref Range: 3.5 - 5.1 mmol/L 3.2 (L)       4.0   Chloride Latest Ref Range: 99 - 110 mmol/L 90 (L)       95 (L)   CO2 Latest Ref Range: 21 - 32 mmol/L 23       13 (LL)   BUN Latest Ref Range: 7 - 20 mg/dL 30 (H)       24 (H)   Creatinine Latest Ref Range: 0.6 - 1.2 mg/dL 4.1 (H)       3.8 (H)   Anion Gap Latest Ref Range: 3 - 16  13 22 (H)   Calcium, Ion Latest Ref Range: 1.12 - 1.32 mmol/L      1.14     GFR Non- Latest Ref Range: >60  11 (A)       12 (A)   GFR  Latest Ref Range: >60  13 (A)       14 (A)   Lactic Acid, Sepsis Latest Ref Range: 0.4 - 1.9 mmol/L  0.7 0.8        Lactate, ser/plas Latest Ref Range: 0.40 - 2.00 mmol/L      4.86 (HH)     Glucose Latest Ref Range: 70 - 99 mg/dL 139 (H)       107 (H)   POC Glucose Latest Ref Range: 70 - 99 mg/dl    70 59 (L) 166 (H) 121 (H)    Calcium Latest Ref Range: 8.3 - 10.6 mg/dL 8.4       8.0 (L)   Phosphorus Latest Ref Range: 2.5 - 4.9 mg/dL        4.1   Total Protein Latest Ref Range: 6.4 - 8.2 g/dL 5.9 (L)          Procalcitonin Latest Ref Range: 0.00 - 0.15 ng/mL 19.42 (H)          POC Sodium Latest Ref Range: 136 - 145 mmol/L      132 (L)     POC Potassium Latest Ref Range: 3.5 - 5.1 mmol/L      3.6     POC Hematocrit Latest Ref Range: 36.0 - 48.0 %      35.0 (L)       Results for Dipika Ansari (MRN [id number]) as of 9/17/2020 17:22   Ref.  Range 8/30/2020 08:39 8/31/2020 10:35 9/16/2020 03:40 9/16/2020 18:27 9/17/2020 04:50   WBC Latest Ref Range: 4.0 - 11.0 K/uL 6.9 5.8 8.7  3.6 (L)   RBC Latest Ref Range: 4.00 - 5.20 M/uL 3.52 (L) 3.57 (L) 4.23  4.10   Hemoglobin Quant Latest Ref Range: 12.0 - 16.0 g/dL 10.0 (L) 10.2 (L) 12.0 11.9 (L) 11.6 (L)   Hematocrit Latest Ref Range: 36.0 - 48.0 % 31.9 (L) 32.5 (L) 37.9  37.7   POC Hematocrit Latest Ref Range: 36.0 - 48.0 %    35.0 (L)    MCV Latest Ref Range: 80.0 - 100.0 fL 90.7 90.9 89.6  91.7   MCH Latest Ref Range: 26.0 - 34.0 pg 28.4 28.7 28.4  28.3   MCHC Latest Ref Range: 31.0 - 36.0 g/dL 31.4 31.5 31.7  30.9 (L)   MPV Latest Ref Range: 5.0 - 10.5 fL 7.9 8.1 9.2  10.3   RDW Latest Ref Range: 12.4 - 15.4 % 19.6 (H) 19.3 (H) 21.0 (H)  21.0 (H)   Platelet Count Latest Ref Range: 135 - 450 K/uL 279 270 130 (L)  63 (L)   Neutrophils % Latest Units: %   81.4       Results for Dipika Ansari (MRN [id number]) as of (Calc), Harish Latest Ref Range: Not Established mmol/L 25 22     Base Excess, Harish Latest Ref Range: -3.0 - 3.0 mmol/L -3.2 (L) -3.5 (L)     O2 Content, Harish Latest Ref Range: Not Established VOL % 10 16     MetHgb, Harish Latest Ref Range: <1.5 % 0.3 0.4     O2 Sat, Harish Latest Ref Range: Not Established % 66 97     Sample Type Unknown   ART        Assessment:  Active Problems:    Type 2 diabetes mellitus with diabetic polyneuropathy, with long-term current use of insulin (HCC)    Normocytic normochromic anemia    Anasarca    End stage renal disease (HCC)    Retroperitoneal sarcoma (HCC)    Ulcer of right leg, limited to breakdown of skin (HCC)    Peripheral venous insufficiency    Moderate to severe pulmonary hypertension (HCC)    Clostridium difficile enterocolitis    Closed compression fracture of thoracic vertebra (HCC)    Acute respiratory failure with hypoxia (HCC)    Elevated brain natriuretic peptide (BNP) level    Acute respiratory failure with hypoxia and hypercapnia (HCC)    Elevated lipase    Tricuspid regurgitation    Moderate protein-calorie malnutrition (HCC)    Pulmonary infiltrates    Thrombocytopenia (HCC)    Septic shock (HCC)    Gram-negative bacteremia  Resolved Problems:    * No resolved hospital problems.  *          Plan:   · Ventilatory support to keep SaO2 between 90-94% ONLY  · Ventilatory settings and waveforms reviewed   · Ventilator changes made  · Pulmonary toilet  · Needs Bronchoscopy for diagnostic and therapeutic purpsoses on urgent basis   · Patient appears to have anasarca along with profoundly elevated BNP  · Patient could not tolerate the dialysis and fluid removal this morning  · Patient's clinical status deterioration was discussed with nephrology and patient may require CRRT-patient is more acidotic this morning and patient has worsening hemodynamics with requirement for 2 pressors to maintain blood pressure  · Patient was given additional bicarbonate IVP  · Vas-Cath inserted  · Arterial line had to be replaced   · Patient has severe tricuspid regurgitation along with pulmonary hypertension  · Patient also has reduced right ventricular systolic function  · Bronchodilators on PRN basis  · Pulmonary toilet  · Patient also has elevated lipase levels with indication that patient may have acute pancreatitis  · Wound care as per protocol  · Patient may require amputation if not improving  · Patient has small lung nodules and has history of sarcoma in the lung nodules may be metastatic in nature but cannot be biopsied because of the size  · Insulins as per IM  · Blood glucose monitoring with sliding scale insulin  · Monitor for any worsening hypoglycemia  · Patient has history of recurrent and recent C. difficile colitis and has been on enteral vancomycin which can be continued  · Patient has gram-negative bacteremia with possibility of Serratia infection  · ID was consulted and patient was started on meropenem  · Contact plus isolation to be maintained  · Patient being provisionally kept in droplet plus precautions till the COVID-19 testing is available  · Patient has significant drop platelet count and stat heparin-induced thrombocytopenia panel being sent which was negative but patient's platelet counts are dropping further which may be secondary to sepsis and bacteremia along with septic shock  · Monitor for any worsening bleeding     Patient's clinical status has deteriorated and patient's short-term as well as long-term prognosis appears to be guarded at this time  There is a possibility that patient may not survive this insult  Case discussed with  ICU team     Patient was seen evaluated and managed multiple times during the course of the day     Critical care time spent on the patient was 90 minutes exclusive of any procedures     Palliative care consult to establish the goals of care and CODE STATUS has been ordered             Electronically signed by:  Flower Wells MD 9/17/2020    5:24 PM.

## 2020-09-17 NOTE — PROGRESS NOTES
Hospitalist Progress Note      PCP: Leonie Strong MD    Date of Admission: 9/16/2020    Chief Complaint: Dyspnea      Subjective:   Pt with worsening hypotension and dyspnea overnight and subsequently intubated. Sedated on vent      Medications:  Reviewed    Infusion Medications    dextrose      norepinephrine 20 mcg/min (09/16/20 1827)    propofol 5 mcg/kg/min (09/16/20 1831)    fentaNYL (SUBLIMAZE) infusion 25 mcg/hr (09/16/20 1813)    vasopressin (Septic Shock) infusion 0.03 Units/min (09/16/20 2146)     Scheduled Medications    meropenem  500 mg Intravenous Q24H    carboxymethylcellulose PF  1 drop Both Eyes 6 times per day    chlorhexidine  15 mL Mouth/Throat BID    insulin lispro  0-6 Units Subcutaneous Q4H    albumin human  25 g Intravenous Once    sodium chloride flush  10 mL Intravenous 2 times per day    vancomycin  125 mg Oral 2 times per day    Followed by   Katya Cake ON 9/22/2020] vancomycin  125 mg Oral Daily    Followed by   Katya Cake ON 9/29/2020] vancomycin  125 mg Oral Every Other Day    [Held by provider] midodrine  10 mg Oral TID WC    pantoprazole  40 mg Intravenous Daily     PRN Meds: sodium chloride flush, acetaminophen **OR** acetaminophen, ipratropium-albuterol, glucose, dextrose, glucagon (rDNA), dextrose      Intake/Output Summary (Last 24 hours) at 9/17/2020 1142  Last data filed at 9/17/2020 0600  Gross per 24 hour   Intake 1541.37 ml   Output 1140 ml   Net 401.37 ml       Exam:    BP (!) 112/55   Pulse 110   Temp 97.4 °F (36.3 °C) (Axillary)   Resp 10   Ht 4' 11\" (1.499 m)   Wt 122 lb 2.2 oz (55.4 kg)   SpO2 96%   BMI 24.67 kg/m²     Gen/overall appearance: intubated and sedated. Head: Normocephalic, atraumatic  Eyes: EOMI, no scleral icterus  CVS: regular rate and rhythm, Normal S1S2  Pulm: Coarse breath sounds bilaterally  Gastrointestinal: Soft, nontender, nondistended, no guarding or rebound  Extremities: bilat LE edema.  No erythema or warmth  Neuro: sedated  Skin: Warm, dry    Labs:   Recent Labs     09/16/20  0340 09/16/20  1827 09/17/20  0450   WBC 8.7  --  3.6*   HGB 12.0 11.9* 11.6*   HCT 37.9  --  37.7   *  --  63*     Recent Labs     09/16/20  0530 09/17/20  0450   * 130*   K 3.2* 4.0   CL 90* 95*   CO2 23 13*   BUN 30* 24*   CREATININE 4.1* 3.8*   CALCIUM 8.4 8.0*   PHOS  --  4.1     Recent Labs     09/16/20  0530   AST 26   ALT 11   BILITOT 1.3*   ALKPHOS 192*     No results for input(s): INR in the last 72 hours.   Recent Labs     09/16/20  0530   TROPONINI 0.07*       Assessment/Plan:    Active Hospital Problems    Diagnosis Date Noted    Acute respiratory failure with hypoxia (HCC) [J96.01] 09/16/2020    Elevated brain natriuretic peptide (BNP) level [R79.89] 09/16/2020    Acute respiratory failure with hypoxia and hypercapnia (HCC) [J96.01, J96.02] 09/16/2020    Elevated lipase [R74.8] 09/16/2020    Tricuspid regurgitation [I07.1] 09/16/2020    Moderate protein-calorie malnutrition (HCC) [E44.0] 09/16/2020    Pulmonary infiltrates [R91.8] 09/16/2020    Thrombocytopenia ([address] [D69.6] 09/16/2020    Septic shock (HCC) [A41.9, R65.21]     Clostridium difficile enterocolitis [A04.72] 08/30/2020    Closed compression fracture of thoracic vertebra ([address] [S22.000A] 08/30/2020    Ulcer of right leg, limited to breakdown of skin ([address] [L97.911] 08/17/2020    Moderate to severe pulmonary hypertension ([address] [I27.20] 08/17/2020    Peripheral venous insufficiency [I87.2] 08/17/2020    End stage renal disease ([address] [N18.6] 06/25/2020    Anasarca [R60.1] 03/07/2020    Normocytic normochromic anemia [D64.9] 11/22/2019    Retroperitoneal sarcoma ([facility] [C48.0] 10/08/2019    Type 2 diabetes mellitus with diabetic polyneuropathy, with long-term current use of insulin ([facility] [E11.42, Z79.4] 04/06/2012     Septic shock 2/2 HCAP pneumonia  GNB bacteremia  Rule out COVID  Acute hypoxic respiratory failure 2/2 intubation 9/16  - ICU care  - wean vent as tolerated  - on pressor support with levophed  - merrem and vanco  - ID consult  - repeat BCx in 1-2 days  - respiratory care  - O2 per protocol  - respiratory care     ESRD on HD. CXR with small R pleural effusion  - HD per nephro recs  - strict ins/outs  - monitor lytes     Acute metabolic encephalopathy 2/2 above     Recent C. Diff colitis.   - continue with po vancomycin course     History of soft tissue sarcoma:  - CT shows enlarging pulmonary nodules concerning for metastatic disease:  - Hem/Onc following as outpt  - per Pulm, nodules too small for biopsy  - repeat CT chest in 3 months     PMH of CAD, PAD, DM, chronic anemia likely AOCD    Diet: DIET RENAL;  Code Status: Full Code    Dispo - Inpt; prognosis is guarded    Maria Dolores Joiner MD

## 2020-09-17 NOTE — PROGRESS NOTES
[facility]       Progress Note           Subjective/   67y.o. year old female who we are seeing in consultation for ESRD. HPI:  Last had HD on 9/16 with even fluid balance, post-weight of 61.2 kg. Hypotensive after HD, rapid called, remains on pressors. ROS:  Remains on on mechanical ventilation. No fevers. Objective/   GEN:  Chronically ill, BP (!) 112/55   Pulse 110   Temp 97.4 °F (36.3 °C) (Axillary)   Resp 10   Ht 4' 11\" (1.499 m)   Wt 122 lb 2.2 oz (55.4 kg)   SpO2 96%   BMI 24.67 kg/m²   Exam deferred to IM as means to protect PPE due to shortage & due to CORONAVIRUS risk.   Access: ERLIN MADRID    Data/  Recent Labs     09/16/20  0340 09/16/20  1827 09/17/20  0450   WBC 8.7  --  3.6*   HGB 12.0 11.9* 11.6*   HCT 37.9  --  37.7   MCV 89.6  --  91.7   *  --  63*     Recent Labs     09/16/20  0530 09/17/20  0450   * 130*   K 3.2* 4.0   CL 90* 95*   CO2 23 13*   GLUCOSE 139* 107*   PHOS  --  4.1   BUN 30* 24*   CREATININE 4.1* 3.8*   LABGLOM 11* 12*   GFRAA 13* 14*       Assessment/     - End stage renal disease - on HD Mon-Wed-Fri     - Multi-focal PNA - started on meropenem, blood cultures GNR's from HD unit on 9/14,   Singh marcescens from blood cultures here     - Hypotension - in setting of above, requiring pressors    - Anion-gap metabolic acidosis - in setting of sepsis with elevated lactate     - Anemia of chronic disease - Hb at target, on epogen 7K qHD       Plan/     - Transition to CRRT today with even fluid balance  - Continue pressors to maintain MAP of 65  - Monitor for ANKUR needs  - Close monitoring of labs, bp's

## 2020-09-17 NOTE — PROCEDURES
Vas Cath Line Placement Procedure Note    Indication: need for CRRT    Consent: The family members were counseled regarding the procedure, its indications, risks, potential complications and alternatives, and any questions were answered. Consent was obtained to proceed. Procedure: The patient was positioned appropriately and the skin over the right internal jugular vein was prepped with Chloraprep and draped in a sterile fashion. Local anesthesia was obtained by infiltration using 1% Lidocaine without epinephrine. A large bore needle was used to identify the vein under ultrasound guidance. A guide wire was then inserted into the vein through the needle. A 16 cm temporary dialysis cathter was then inserted into the vessel over the guide wire using the Seldinger technique. All ports showed good, free flowing blood return and were flushed with saline solution. The catheter was then securely fastened to the skin with sutures and with an adhesive dressing and covered with a sterile dressing. A post procedure X-ray was ordered and is still pending at this time. The patient tolerated the procedure well. Complications: None. EBL less than 4 cc. Summer Penaloza, APRN-CNP

## 2020-09-17 NOTE — PROGRESS NOTES
Four eyes skin assessment completed with previous nurse during bedside report. Shift assessment complete, see flow sheet. VSS. Lines checked and verified, alarms on and audible. Repositioned patient, sacral Mepilex in place. Will continue to monitor. KERWIN Wheeler bedside. Updated on labs.     MAR verified:   norepinephrine      dextrose      norepinephrine 20 mcg/min (09/16/20 1827)    propofol 5 mcg/kg/min (09/16/20 1831)    fentaNYL (SUBLIMAZE) infusion 25 mcg/hr (09/16/20 1813)    vasopressin (Septic Shock) infusion 0.03 Units/min (09/16/20 2146)       Electronically signed by Jamie Lowry RN on 9/17/2020 at 8:26 AM

## 2020-09-17 NOTE — PLAN OF CARE
Palliative Care Progress Note  Palliative Care Admit date:  9/17/2020    Advance Directives:  Full code status at this time. Jason Cortes is very much aware of the components of resuscitation and lilkelihood for lack of meaningful benefit to pt if such efforts were employed. Jason Cortes will d/w siblings. Plan of care/goals:  rec'd return call from pts sister, Yolie Plascencia, this afternoon. She shared that there are seven (7) other siblings living but that she and Majo Galloway have the most regular & frequent communication w/ pt. Eva had spoken w/ pt recently PTA and Kyleigh was \"very depressed about the likelihood she needed to have more toes removed. \"   She shared that pt expressed her ongoing hope for \"being able to get out of that w/c and walk\" but they never had specific discussion re: [address] or code wishes. Explained the [place] order of decision making to Jason Cortes and she has committed to being the [facility] primary contact and she will also call the other siblings to see their willingness to assist in healthcare decision making w/ her if pt cannot make decisions for herself. Plan:  Jason Cortes is agreeable to f/u d/w writer tomorrow and is agreeable to assist w/ decision making as needed.          Reason for consult:    _X_ Advance Care Planning  ___ Transition of Care Planning  ___ Psychosocial/Spiritual Support  ___ Symptom Management                                                                                                                        Muna Walter RN

## 2020-09-17 NOTE — PROGRESS NOTES
09/17/20 1238   Vent Information   Vent Type 840   Vent Mode AC/VC+   Vt Ordered 450 mL   Rate Set 18 bmp   Pressure Support 0 cmH20   FiO2  50 %   Sensitivity 3   PEEP/CPAP 5   I Time/ I Time % 1 s   Humidification Source HME   Vent Patient Data   Peak Inspiratory Pressure 31 cmH2O   Mean Airway Pressure 12 cmH20   Rate Measured 18 br/min   Vt Exhaled 460 mL   Minute Volume 8.33 Liters   I:E Ratio 1:2.30   Spontaneous Breathing Trial (SBT) RT Doc   Pulse 104   Additional Respiratory  Assessments   Resp 19   Alarm Settings   High Pressure Alarm 40 cmH2O   Low Minute Volume Alarm 2 L/min   High Respiratory Rate 45 br/min   Low Exhaled Vt  200 mL   ETT (adult)   Placement Date/Time: 09/16/20 1755   Preoxygenation: Yes  Mask Ventilation: Ventilated by mask (1)  Technique: Video laryngoscopy  Type: Cuffed  Tube Size: 7.5 mm  Laryngoscope: GlideScope  Blade Size: 4  Location: Oral  Insertion attempts: 1  Placeme. ..    Secured at 22 cm   Measured From Lips   ET Placement Right   Secured By Commercial tube vela   Site Condition Dry

## 2020-09-17 NOTE — PROCEDURES
Arterial Catheter Insertion Procedure Note  Procedure: Insertion of arterial catheter    Indications:  Frequent blood gases, Hypotension, Shock    Consent: Unable to be obtained due to the emergent nature of this procedure. Procedure Details   patent. A time out was performed. Under sterile conditions the skin above the Right Femoral artery was prepped with chlorhexidine and covered with a sterile drape. Gown, mask, gloves used. Local anesthesia with 1% lidocaine was applied to the skin and subcutaneous tissues. A 22-gauge needle was inserted into the Right Femoral artery  A guide wire was then passed easily through the catheter which was then advanced with no resistance. Good pulsatile blood flow noted. The catheter was sutured into place, dressing applied. Findings: There were no changes to vital signs. Patient tolerated procedure well.     EBL < 5 ml     Rickey Hutchinson MD

## 2020-09-17 NOTE — PROCEDURES
Arterial Line Placement Procedure Note                     Indication: arterial blood gases, mechanical ventilation and severe hypotension    Consent: Unable to be obtained due to the emergent nature of this procedure. Jimenez's Test: Was not performed    Procedure: The skin over the right brachial artery was prepped with Chloraprep and draped in a sterile fashion. Local anesthesia was not performed due to the emergent nature of this procedure. A 20 gauge angiocath was then inserted, using a modified Seldinger technique, into the vessel. The transducer set was then attached and securely fastened to the skin with sutures and with an adhesive dressing. Waveforms on the monitor were observed and found to be adequate. The patient had good distal perfusion after the procedure. The site was then dressed in a sterile fashion. The patient tolerated the procedure well. Complications: None. EBL less than 2 cc. 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 Current right femoral art line was malfunctioning and needed to be removed. Summer Gallardo, APRN-CNP

## 2020-09-18 PROBLEM — J15.6 PNEUMONIA DUE TO SERRATIA MARCESCENS (HCC): Status: ACTIVE | Noted: 2020-01-01

## 2020-09-18 PROBLEM — R74.01 ELEVATED AST (SGOT): Status: ACTIVE | Noted: 2020-01-01

## 2020-09-18 PROBLEM — E80.6 HYPERBILIRUBINEMIA: Status: ACTIVE | Noted: 2020-01-01

## 2020-09-18 NOTE — PROCEDURES
Bronchoscopy note    Patient with acute respiratory failure, diffuse pulmonary infiltrates, aspiration in the airways, sepsis with septic shock, COVID-19 negative, given the patient's clinical status and radiology it was decided to do a bronchoscopy for diagnostic and therapeutic purposes on an urgent basis, for that reason the endoscopy team was requested to come to the bedside, patient was already on IV sedation to maintain patient ventilator synchrony and did not require any extra sedation for the procedure, the bronchoscope was introduced through the endotracheal tube using a T-piece adapter, patient was found to have thick mucous plugs in the distal endotracheal tube, patient's entire tracheobronchial tree was full of thick mucous plugs which were quite tenacious viscous and bilious in nature, the bronchoscope was wedged into the right lower lobe bronchus and BAL was done from an area which was sent for various culture and cytology, the rest of the tracheobronchial tree was therapeutically aspirated using Mucomyst and saline, patient did not have any apparent endobronchial lesions patient tolerated the procedure well and did not have any apparent complications  Estimated blood loss was 0  Further management depending on patient's clinical status and the bronchoscopy results    Rickey Hutchinson MD

## 2020-09-18 NOTE — PLAN OF CARE
Palliative Care Progress Note  Palliative Care Admit date:  9/17/2020    Advance Directives:  Eva reports having spoken w/ \"all of my siblings last night and we all agree, w/ all the suffering Isatu Loredo has been through, she would not want to be resuscitated. Pat Landaverde has been educated as to the components of resuscitation and the current interventions of MV & pressor support that we will continue for now. Plan of care/goals:  After speaking w/ Pulm & shift RN, [address] [place] spoke w/ pts sister, Pat Landaverde, to apprise of current medical challenges identified by provider's and the consensus of her poor px. We discussed that pt may not survive this event and that it may be prudent to make a decision to w/d current life-prolonging efforts. Eva shared that she and their sister, Bradley Marsh last night and Isatu Loredo had told both of us she's tired and ready. \"        The sister's would like to see pt before she dies. They live ~3 hours away and will call w/ an ETA for today. Plan:  Amended code status to 'limited' to reflect NO chest comp, NO elec shock & NO ACLS/code medications. Maintain current interventions for now as family hopeful to see pt once they arrive today. Confirmed for Eva that we would certainly allow Ben Leyden and Linnea in to see pt. She also has a grandson, Mann Rodriguez, who may want to see her. ADDENDUM @ 1450:  rec'd call from Pat Landaverde who stated that she and her  would be here, hopefully, before 1900. She is aware of the increased MV FiO2. Family unsure if pt executed a HCPOA; informed Eva that we only have copies of her financial poa and her Living Will.           Reason for consult:    _X_ Advance Care Planning  ___ Transition of Care Planning  ___ Psychosocial/Spiritual Support  ___ Symptom Management                                                                                                Pennie Lowe RN

## 2020-09-18 NOTE — FLOWSHEET NOTE
09/18/20 1907   Encounter Summary   Services provided to: Family   Referral/Consult From: Nurse   Support System Family members   Continue Visiting   (9-18, support at death, prayer)   Complexity of Encounter Low   Length of Encounter 15 minutes   Grief and Life Adjustment   Type Death   Assessment Calm; Approachable   Intervention Active listening;Prayer;Sustaining presence/ Ministry of presence; Discussed death   Outcome Engaged in conversation;Expressed feelings/needs/concerns;Receptive    offered support to family after death.

## 2020-09-18 NOTE — PROGRESS NOTES
CRRT filter clotted off. Levo increased. Labs not drawn per critical care team. Pt BP will not support CRRT. RN updated MD and Mau Mccann. RN will call Erica

## 2020-09-18 NOTE — PROGRESS NOTES
09/18/20 0036   Vent Information   $Ventilation $Subsequent Day   Skin Assessment Clean, dry, & intact   Vent Type 840   Vent Mode AC/PC   Vt Ordered 450 mL   Pressure Ordered 28   Rate Set 18 bmp   FiO2  75 %   SpO2 100 %   SpO2/FiO2 ratio 133.33   Sensitivity 3   PEEP/CPAP 5   I Time/ I Time % 1 s   Humidification Source HME   Vent Patient Data   Peak Inspiratory Pressure 32 cmH2O   Mean Airway Pressure 14 cmH20   Rate Measured 18 br/min   Vt Exhaled 471 mL   Minute Volume 8.5 Liters   I:E Ratio 1:2.3   Cough/Sputum   Sputum How Obtained Endotracheal;Suctioned   Cough Productive   Sputum Amount Moderate   Sputum Color Cloudy;Brown   Tenacity Thick   Spontaneous Breathing Trial (SBT) RT Doc   Pulse 89   Breath Sounds   Right Upper Lobe Diminished   Right Middle Lobe Diminished   Right Lower Lobe Diminished   Left Upper Lobe Diminished   Left Lower Lobe Diminished   Additional Respiratory  Assessments   Resp 18   Position Semi-Fernandez's   Cuff Pressure (cm H2O) 30 cm H2O   Alarm Settings   High Pressure Alarm 40 cmH2O   Low Minute Volume Alarm 2 L/min   High Respiratory Rate 45 br/min   Low Exhaled Vt  200 mL   ETT (adult)   Placement Date/Time: 09/16/20 2115   Preoxygenation: Yes  Mask Ventilation: Ventilated by mask (1)  Technique: Video laryngoscopy  Type: Cuffed  Tube Size: 7.5 mm  Laryngoscope: GlideScope  Blade Size: 4  Location: Oral  Insertion attempts: 1  Placeme. ..    Secured at 22 cm   Measured From Lips   ET Placement Left   Secured By Commercial tube vela   Site Condition Dry   Cuff Pressure 30 cm H2O

## 2020-09-18 NOTE — PLAN OF CARE
parameters  Outcome: Ongoing     Problem: Cardiac Output - Decreased:  Goal: Hemodynamic stability will improve  Description: Hemodynamic stability will improve  Outcome: Ongoing     Problem: Fluid Volume - Imbalance:  Goal: Absence of imbalanced fluid volume signs and symptoms  Description: Absence of imbalanced fluid volume signs and symptoms  Outcome: Ongoing     Problem: Gas Exchange - Impaired:  Goal: Levels of oxygenation will improve  Description: Levels of oxygenation will improve  Outcome: Ongoing     Problem: Mental Status - Impaired:  Goal: Mental status will be restored to baseline  Description: Mental status will be restored to baseline  Outcome: Ongoing     Problem: Nutrition Deficit:  Goal: Ability to achieve adequate nutritional intake will improve  Description: Ability to achieve adequate nutritional intake will improve  Outcome: Ongoing     Problem: Pain:  Description: Pain management should include both nonpharmacologic and pharmacologic interventions.   Goal: Pain level will decrease  Description: Pain level will decrease  Outcome: Ongoing  Goal: Recognizes and communicates pain  Description: Recognizes and communicates pain  Outcome: Ongoing  Goal: Control of acute pain  Description: Control of acute pain  Outcome: Ongoing  Goal: Control of chronic pain  Description: Control of chronic pain  Outcome: Ongoing     Problem: Serum Glucose Level - Abnormal:  Goal: Ability to maintain appropriate glucose levels will improve to within specified parameters  Description: Ability to maintain appropriate glucose levels will improve to within specified parameters  Outcome: Ongoing     Problem: Skin Integrity - Impaired:  Goal: Will show no infection signs and symptoms  Description: Will show no infection signs and symptoms  Outcome: Ongoing  Goal: Absence of new skin breakdown  Description: Absence of new skin breakdown  Outcome: Ongoing     Problem: Sleep Pattern Disturbance:  Goal: Appears well-rested  Description: Appears well-rested  Outcome: Ongoing     Problem: Tissue Perfusion, Altered:  Goal: Circulatory function within specified parameters  Description: Circulatory function within specified parameters  Outcome: Ongoing     Problem: Tissue Perfusion - Cardiopulmonary, Altered:  Goal: Absence of angina  Description: Absence of angina  Outcome: Ongoing  Goal: Hemodynamic stability will improve  Description: Hemodynamic stability will improve  Outcome: Ongoing

## 2020-09-18 NOTE — PROGRESS NOTES
Comprehensive Nutrition Assessment    Type and Reason for Visit:  Initial / New Vent assessment     Nutrition Recommendations/Plan:   1. NPO  2. Guarded prognosis per EMR. Monitor for improvements in hemodynamic status and ability to initiate nutrition support if/when medically appropriate       Nutrition Assessment:  Evaluated per [facility] 2/2 New Vent. Pt with worsening prognosis. Intubated, sedated, and hypotensive requiring 2 pressors. LA elevated to 13.2 and platelet count of 12. Hypoglycemic on dextrose. MDR completed. Pt with poor prognosis. Palliative care following. Family to visit with pt and likely to w/d care. No nutrition recommendations at this time d/t hemodynamic instability. Malnutrition Assessment:  Malnutrition Status: At risk for malnutrition (Comment)    Due to current CDC guidelines recommending 6-ft distancing for social isolation for COVID19 prevention, NFPE/malnutrition assessment was deferred at this time. Estimated Daily Nutrient Needs:  Energy (kcal):  3308-3311; Weight Used for Energy Requirements:  Current(57 kg)     Protein (g):  ; Weight Used for Protein Requirements:  Current(1.2-2)        Fluid (ml/day):  MD    Nutrition Related Findings:  Intubated, sedated      Current Nutrition Therapies:    Diet NPO Effective Now    Anthropometric Measures:  · Height: 4' 11\" (149.9 cm)  · Current Body Weight: 126 lb (57.2 kg)   · Ideal Body Weight: 95 lbs  · BMI Categories: Overweight (BMI 25.0-29. 9)       Nutrition Diagnosis:   · Inadequate energy intake related to catabolic illness, impaired respiratory function as evidenced by NPO or clear liquid status due to medical condition, intubation      Nutrition Interventions:   Food and/or Nutrient Delivery:  Continue NPO  Nutrition Education/Counseling:  No recommendation at this time   Coordination of Nutrition Care:  No recommendation at this time    Goals:   Tolerate most appropriate form of nutrition therapy when medically appropriate       Nutrition Monitoring and Evaluation:   Food/Nutrient Intake Outcomes:  Diet Advancement/Tolerance  Physical Signs/Symptoms Outcomes:  Hemodynamic Status     Discharge Planning: Too soon to determine     Electronically signed by Parisa Arce.  Aileen Goldman RD, LD on 9/18/20 at 12:28 PM EDT    Contact: [phone]

## 2020-09-18 NOTE — PROGRESS NOTES
Ca Winn, family friend, bedside. KERWIN Wheeler updated on plan of care. RN under the impression Jermaine Cool is on her way (3 hrs away). All questions answered. Will continue to monitor.

## 2020-09-18 NOTE — PROGRESS NOTES
INPATIENT PULMONARY CRITICAL CARE PROGRESS NOTE      Reason for visit    septic shock      SUBJECTIVE: Patient when seen this morning continues to be critically ill on mechanical ventilator support, patient's oxygen requirements have gone up and patient was on 100% oxygen on the ventilator when seen, patient also underwent bronchoscopy yesterday and large amount of thick mucous plugs were lavaged out, patient is hypothermic now,, patient was profoundly hypotensive and patient continues to be on 2 IV pressors in the form of Levophed and vasopressin to maintain hemodynamics;  patient has sinus rhythm on the monitor, patient's blood sugars were trending lower,no other ROS could be obtained ; distal aspect of right lower extremity is mottled and gangrenous, patient continues to be on CRRT, patient's blood sugars were trending lower this morning and patient had to be given D50 W, patient also was more acidotic and had to be given bicarb this morning, patient's clinical status is precarious and has worsened as compared to last evening and has the potential for further decompensation             Physical Exam:  Blood pressure (!) 109/51, pulse 85, temperature 93 °F (33.9 °C), temperature source Esophageal, resp. rate 18, height 4' 11\" (1.499 m), weight 126 lb 5.2 oz (57.3 kg), SpO2 (!) 83 %, not currently breastfeeding.'     Constitutional: No respiratory distress on mechanical ventilatory support   HENT:  Oropharynx is clear and moist. No thyromegaly. Eyes:  Conjunctivae are normal. Pupils equal, round, and reactive to light. No scleral icterus. Neck: . No tracheal deviation present. No obvious thyroid mass. Cardiovascular: Normal rate, regular rhythm, left lower sternal border. No right ventricular heave. 1+ lower extremity edema. Pulmonary/Chest: No wheezes. Bilateral rales. Chest wall is not dull to percussion. No accessory muscle usage or stridor. Abdominal: Soft. Bowel sounds present.   Slight abdominal distention with anterior abdominal wall edema. No tenderness. Musculoskeletal: No cyanosis. No clubbing. 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 Skin: Gangrene of fingers along with that patient has skin wound especially in the distal aspect of right leg  Neurologic: Intubated and sedated            Results:  CBC:   Recent Labs     09/16/20  0340 09/16/20  1827 09/17/20  0450 09/18/20  0640   WBC 8.7  --  3.6* 6.7   HGB 12.0 11.9* 11.6* 11.3*   HCT 37.9  --  37.7 37.4   MCV 89.6  --  91.7 92.9   *  --  63* 12*     BMP:   Recent Labs     09/17/20  1612 09/18/20  0010 09/18/20  0640   * 131* 133*   K 4.2 4.5 4.9   CL 95* 97* 99   CO2 18* 19* 15*   PHOS 3.4 2.8 3.0   BUN 20 15 12   CREATININE 2.8* 1.9* 1.2     LIVER PROFILE:   Recent Labs     09/16/20  0530 09/18/20  0640   AST 26 112*   ALT 11 19   LIPASE 292.0*  --    BILITOT 1.3* 5.9*   ALKPHOS 192* 134*       Imaging:  I have reviewed radiology images personally. XR CHEST PORTABLE   Final Result   Vascular congestion and basilar airspace disease with suspected right pleural   effusion. These findings are unchanged from recent studies. XR CHEST PORTABLE   Final Result   Right-sided central venous catheter in place terminating in the SVC      Otherwise, unchanged chest         XR CHEST PORTABLE   Final Result   Endotracheal tube tip is approximately 2 cm from the champ and could be   retracted approximately 2 cm. Developing left basilar atelectasis or airspace disease. Combination of   airspace disease and pleural fluid within the right chest is not   significantly changed. XR CHEST PORTABLE   Final Result   1. Endotracheal tube tip 3.4 cm above the champ.    2. Gastric tube courses beyond the field of view. 3. Ground-glass opacities involving the right hemithorax may reflect alone,   or in combination, worsening pneumonia, layering pleural effusion and/or   atelectasis. 4. Suspected right effusion. The findings were sent to the Radiology Results [address] at 11:31   pm on 9/16/2020to be communicated to a licensed caregiver. XR CHEST PORTABLE   Final Result   Suspected multifocal pneumonia in the right lung. Probable small right   pleural effusion. XR CHEST PORTABLE    (Results Pending)     Results for Nish Platt (MRN [id number]) as of 9/18/2020 15:07   Ref. Range 9/18/2020 06:40 9/18/2020 08:20   Sodium Latest Ref Range: 136 - 145 mmol/L 133 (L)    Potassium Latest Ref Range: 3.5 - 5.1 mmol/L 4.9    Chloride Latest Ref Range: 99 - 110 mmol/L 99    CO2 Latest Ref Range: 21 - 32 mmol/L 15 (L)    BUN Latest Ref Range: 7 - 20 mg/dL 12    Creatinine Latest Ref Range: 0.6 - 1.2 mg/dL 1.2    Anion Gap Latest Ref Range: 3 - 16  19 (H)    Calcium, Ion Latest Ref Range: 1.12 - 1.32 mmol/L 1.03 (L)    GFR Non- Latest Ref Range: >60  44 (A)    GFR  Latest Ref Range: >60  53 (A)    Lactic Acid Latest Ref Range: 0.4 - 2.0 mmol/L  13.2 (HH)   Glucose Latest Ref Range: 70 - 99 mg/dL 61 (L)    Calcium Latest Ref Range: 8.3 - 10.6 mg/dL 7.4 (L)    Phosphorus Latest Ref Range: 2.5 - 4.9 mg/dL 3.0    Total Protein Latest Ref Range: 6.4 - 8.2 g/dL 4.3 (L)      Results for Nish Platt (MRN [id number]) as of 9/18/2020 15:07   Ref. Range 9/18/2020 08:22 9/18/2020 09:45 9/18/2020 11:53   POC Glucose Latest Ref Range: 70 - 99 mg/dl 45 (LL) 80 78     Results for Nish Platt (MRN [id number]) as of 9/18/2020 15:07   Ref.  Range 9/16/2020 03:40 9/16/2020 18:27 9/17/2020 04:50 9/18/2020 06:40   WBC Latest Ref Range: 4.0 - 11.0 K/uL 8.7  3.6 (L) 6.7   RBC Latest Ref Range: 4.00 - 5.20 M/uL 4.23  4.10 4.03   Hemoglobin Quant Latest Ref Range: 12.0 - 16.0 g/dL 12.0 11.9 (L) 11.6 (L) 11.3 (L)   Hematocrit Latest Ref Range: 36.0 - 48.0 % 37.9  37.7 37.4   POC Hematocrit Latest Ref Range: 36.0 - 48.0 %  35.0 (L)     MCV Latest Ref Range: 80.0 - 100.0 fL 89.6  91.7 92.9   MCH Latest Ref Range: 26.0 - 34.0 pg 28.4  28.3 28.2   MCHC Latest Ref Range: 31.0 - 36.0 g/dL 31.7  30.9 (L) 30.3 (L)   MPV Latest Ref Range: 5.0 - 10.5 fL 9.2  10.3 12.6 (H)   RDW Latest Ref Range: 12.4 - 15.4 % 21.0 (H)  21.0 (H) 21.7 (H)   Platelet Count Latest Ref Range: 135 - 450 K/uL 130 (L)  63 (L) 12 (LL)   Neutrophils % Latest Units: % 81.4   59.0   Lymphocyte % Latest Units: % 3.4   5.0   Monocytes % Latest Units: % 3.3   10.0   Eosinophils % Latest Units: % 11.5   1.0   Basophils % Latest Units: % 0.4   1.0       Results for Danny Kenyon (MRN [id number]) as of 9/18/2020 15:07   Ref. Range 9/16/2020 11:31 9/16/2020 11:31 9/16/2020 22:00 9/17/2020 14:15   CULTURE, FUNGUS Unknown    Rpt   CULTURE, RESPIRATORY Unknown    Rpt (A)   Culture, Blood 2 Unknown POSITIVE for. .. See additional report for complete BCID panel. Gram Stain Result Unknown    3+ WBC's (Polymor. .. Organism Unknown Serratia marcescens (A) Serratia marcescens DNA Detected (A)  Serratia marcescens (A)   CULTURE, RESPIRATORY Unknown    >100,000 CFU/ml. .. CULTURE WITH SMEAR, ACID FAST BACILLIUS Unknown    Rpt   AFB Smear Unknown    No AFB observed by Fluorescent stain   Fungus Stain Unknown    No Fungal elements seen     Results for Danny Kenyon (MRN [id number]) as of 9/18/2020 15:07   Ref. Range 8/29/2020 06:10 8/29/2020 10:29 8/29/2020 12:33 9/17/2020 04:50   Heparin Induced Plt Ab Latest Ref Range: Negative     Negative   aPTT Latest Ref Range: 24.2 - 36.2 sec 81.0 (H) 86.0 (H) 69.1 (H)      Results for Danny Kenyon (MRN [id number]) as of 9/18/2020 15:07   Ref.  Range 9/16/2020 18:27 9/17/2020 06:05 9/17/2020 16:12 9/17/2020 18:04 9/18/2020 00:10 9/18/2020 06:40   O2 Therapy Unknown  Unknown    Unknown Hemoglobin, Art, Extended Latest Ref Range: 12.0 - 16.0 g/dL  12.6    11.8 (L)   pH, Arterial Latest Ref Range: 7.350 - 7.450  7.189 (LL) 7.163 (LL)  7.266 (L)  7.193 (LL)   pCO2, Arterial Latest Ref Range: 35.0 - 45.0 mmHg 55.6 (H) 39.1  43.7  40.5   pO2, Arterial Latest Ref Range: 75.0 - 108.0 mmHg 234.2 (H) 82.7  368.4 (H)  77.1   HCO3, Arterial Latest Ref Range: 21.0 - 29.0 mmol/L 21.2 13.7 (L)  19.9 (L)  15.2 (L)   TCO2 (calc), Art Latest Ref Range: Not Established mmol/L 23 14.9  21  16.5   Base Excess, Arterial Latest Ref Range: -3.0 - 3.0 mmol/L -7 (L) -14.1 (L)  -7 (L)  -12.2 (L)   O2 Sat, Arterial Latest Ref Range: >92 % 100 94.5  100  92.5 (L)   O2 Content, Arterial Latest Ref Range: Not Established mL/dL  17    15   Methemoglobin, Arterial Latest Ref Range: <1.5 %  0.4    0.5   Carboxyhgb, Arterial Latest Ref Range: 0.0 - 1.5 %  1.2    0.9   pH, Harish Latest Ref Range: 7.350 - 7.450    7.248 (L)  7.295 (L) 7.207 (L)     ONE XRAY VIEW OF THE CHEST         9/18/2020 6:37 am         COMPARISON:    Prior study(s) most recent 09/17/2020.         HISTORY:    ORDERING SYSTEM PROVIDED HISTORY: RF    TECHNOLOGIST PROVIDED HISTORY:    Reason for exam:->RF         FINDINGS:    Right IJ catheter extends to the mid to lower superior vena cava unchanged. Endotracheal tube is satisfactory, tip approximately 2.7 cm above the champ. NG tube extends well into the stomach.  The heart is enlarged.  There is    bilateral airspace disease more evident in the lower lungs.  This obscures    the diaphragm bilaterally and is similar to the prior study.  Pleural    effusion is suspected greater on the right.              Impression    Vascular congestion and basilar airspace disease with suspected right pleural    effusion.  These findings are unchanged from recent studies. Results for Enrico Lord (MRN [id number]) as of 9/18/2020 15:07   Ref.  Range 9/16/2020 05:30 9/17/2020 04:50 9/17/2020 16:12 9/18/2020 00:10 9/18/2020 06:40   Albumin Latest Ref Range: 3.4 - 5.0 g/dL 2.3 (L) 2.5 (L) 2.2 (L) 2.0 (L) 1.7 (L)   Globulin Latest Units: g/dL 3.6    2.6   Albumin/Globulin Ratio Latest Ref Range: 1.1 - 2.2  0.6 (L)    0.7 (L)   Alk Phos Latest Ref Range: 40 - 129 U/L 192 (H)    134 (H)   ALT Latest Ref Range: 10 - 40 U/L 11    19   AST Latest Ref Range: 15 - 37 U/L 26    112 (H)   Bilirubin Latest Ref Range: 0.0 - 1.0 mg/dL 1.3 (H)    5.9 (H)   Lipase Latest Ref Range: 13.0 - 60.0 U/L 292.0 (H)       Total Protein Latest Ref Range: 6.4 - 8.2 g/dL 5.9 (L)    4.3 (L)     Assessment:  Active Problems:    Type 2 diabetes mellitus with diabetic polyneuropathy, with long-term current use of insulin (HCC)    Normocytic normochromic anemia    Anasarca    End stage renal disease (HCC)    Retroperitoneal sarcoma (HCC)    Ulcer of right leg, limited to breakdown of skin (HCC)    Peripheral venous insufficiency    Moderate to severe pulmonary hypertension (HCC)    Clostridium difficile enterocolitis    Closed compression fracture of thoracic vertebra (HCC)    Acute respiratory failure with hypoxia (HCC)    Elevated brain natriuretic peptide (BNP) level    Acute respiratory failure with hypoxia and hypercapnia (HCC)    Elevated lipase    Tricuspid regurgitation    Moderate protein-calorie malnutrition (HCC)    Pulmonary infiltrates    Thrombocytopenia (HCC)    Septic shock (HCC)    Gram-negative bacteremia    Lactic acidosis    Metabolic acidemia    Acute pancreatitis    Pneumonia due to Serratia marcescens (HCC)    Elevated AST (SGOT)    Hyperbilirubinemia  Resolved Problems:    * No resolved hospital problems.  *          Plan:   · Ventilatory support to keep SaO2 between 90-94% ONLY  · Ventilatory settings and waveforms reviewed   · Ventilator changes made  · Pulmonary toilet  · S/p bronchoscopy -showing Serratia pneumonia   · CRRT as per nephrology  · Patient continues to be profoundly acidotic and 2 ampoules of bicarb given  · Patient has worsening limb ischemia and may need amputation but patient not a candidate for surgery   · Patient has worsening lactic acidosis and patient may have ischemic gut   · Patient also developing worsening LFT and hyperbilirubinemia   · Patient's platelets have dropped profoundly   · Patient may be developing DIC  · Patient has severe tricuspid regurgitation along with pulmonary hypertension  · Patient also has reduced right ventricular systolic function  · Bronchodilators on PRN basis  · Pulmonary toilet  · Patient also has elevated lipase levels with indication that patient may have acute pancreatitis  · Wound care as per protocol  · Patient has small lung nodules and has history of sarcoma in the lung nodules may be metastatic in nature but cannot be biopsied because of the size  · Insulins as per IM  · Blood glucose monitoring with sliding scale insulin  · Patient now hypoglycemic and was given D50W and if persistent -nursing requested to start D10W infusion   · Patient has history of recurrent and recent C. difficile colitis and has been on enteral vancomycin which can be continued  · Patient has gram-negative bacteremia with possibility of Serratia infection  · ID was consulted and patient was started on meropenem  · Contact plus isolation to be maintained  · COVID -19 negative   · Patient has significant drop platelet count and stat heparin-induced thrombocytopenia panel being sent which was negative but patient's platelet counts are dropping further which may be secondary to sepsis and bacteremia along with septic shock  · Monitor for any worsening bleeding     Patient's clinical status has deteriorated and patient's short-term as well as long-term prognosis appears to be guarded at this time  There is a distinct possibility that the patient may not survive -will discuss with family about patient's clinical status and clinical irreversibility-after discussion ,patient was made limited code and family coming

## 2020-09-18 NOTE — PROGRESS NOTES
Eva called and updated . She stated she was still \"packing her belongings. \" all questions answered.

## 2020-09-18 NOTE — PROGRESS NOTES
Pt has no apical pulse, no blood pressure and no spontaneous respirations. Verified by 2 RN's. Time of death 1. Attending physician and consults called.    Eva called and updated

## 2020-09-18 NOTE — PROGRESS NOTES
[facility]       Progress Note           Subjective/   67y.o. year old female who we are seeing in consultation for ESRD. HPI:  Last had HD on 9/16 with even fluid balance, post-weight of 61.2 kg. Started on CRRT since 9/17 with hemodynamic issues requiring pressors. Seen on CRRT. Orders confirmed. ROS:  Remains on on mechanical ventilation. No fevers.     Objective/   GEN:  Chronically ill, BP (!) 107/41   Pulse 107   Temp 97 °F (36.1 °C) (Esophageal)   Resp 18   Ht 4' 11\" (1.499 m)   Wt 126 lb 5.2 oz (57.3 kg)   SpO2 (!) 87%   BMI 25.51 kg/m²   HEENT: non-icteric, no JVD  CV: S1, S2 without m/r/g; + LE edema  RESP: CTA B without w/r/r; mechanically ventilated  ABD: +bs, soft, nt, no hsm  SKIN: warm, no rashes  Access: LUE AVG, R IJ vascath (9/17)    Data/  Recent Labs     09/16/20  0340 09/16/20  1827 09/17/20  0450 09/18/20  0640   WBC 8.7  --  3.6* 6.7   HGB 12.0 11.9* 11.6* 11.3*   HCT 37.9  --  37.7 37.4   MCV 89.6  --  91.7 92.9   *  --  63* 12*     Recent Labs     09/17/20  1612 09/18/20  0010 09/18/20  0640   * 131* 133*   K 4.2 4.5 4.9   CL 95* 97* 99   CO2 18* 19* 15*   GLUCOSE 88 68* 61*   PHOS 3.4 2.8 3.0   MG 2.00 2.10  --    BUN 20 15 12   CREATININE 2.8* 1.9* 1.2   LABGLOM 16* 26* 44*   GFRAA 20* 31* 53*       Assessment/     - End stage renal disease - on HD Mon-Wed-Fri     - Multi-focal PNA - started on meropenem, Singh marcescens from blood cultures   here and at HD unit     - Hypotension - in setting of above, requiring pressors    - Anion-gap metabolic acidosis - in setting of sepsis with elevated lactate     - Anemia of chronic disease - Hb at target, on epogen 7K qHD       Plan/     - Continue CRRT with even fluid balance  - Start IVF's with bicarbonate replacement  - Continue pressors to maintain MAP of 65  - Monitor for ANKUR needs  - Close monitoring of labs, bp's    Overall prognosis poor

## 2020-09-18 NOTE — PROGRESS NOTES
09/18/20 1516   Vent Information   Skin Assessment Clean, dry, & intact   Vent Type 840   Vent Mode AC/PC   Pressure Ordered 28   Rate Set 18 bmp   FiO2  100 %   Sensitivity 3   PEEP/CPAP 5   I Time/ I Time % 1 s   Humidification Source HME   Vent Patient Data   Peak Inspiratory Pressure 33 cmH2O   Mean Airway Pressure 13 cmH20   Rate Measured 18 br/min   Vt Exhaled 399 mL   Minute Volume 7.19 Liters   I:E Ratio 1:2.3   Plateau Pressure 30 STW54   Static Compliance 15.96 mL/cmH2O   Dynamic Compliance 14.25 mL/cmH2O   Cough/Sputum   Sputum How Obtained Endotracheal   Cough Productive   Sputum Amount Small   Sputum Color Brown   Tenacity Thick; Thin   Spontaneous Breathing Trial (SBT) RT Doc   Pulse 73   Breath Sounds   Right Upper Lobe Diminished   Right Middle Lobe Diminished   Right Lower Lobe Diminished   Left Upper Lobe Diminished   Left Lower Lobe Diminished   Additional Respiratory  Assessments   Resp 16   Cuff Pressure (cm H2O) 30 cm H2O   Alarm Settings   High Pressure Alarm 40 cmH2O   Low Minute Volume Alarm 2 L/min   Apnea (secs) 20 secs   High Respiratory Rate 45 br/min   Low Exhaled Vt  200 mL   ETT (adult)   Placement Date/Time: 09/16/20 0335   Preoxygenation: Yes  Mask Ventilation: Ventilated by mask (1)  Technique: Video laryngoscopy  Type: Cuffed  Tube Size: 7.5 mm  Laryngoscope: GlideScope  Blade Size: 4  Location: Oral  Insertion attempts: 1  Placeme. ..    Secured at 22 cm   Measured From Lips   ET Placement Right   Secured By Commercial tube vela   Cuff Pressure 30 cm H2O

## 2020-09-18 NOTE — PROGRESS NOTES
Hospitalist Progress Note      PCP: Melvia Kehr, MD    Date of Admission: 9/16/2020    Chief Complaint: Dyspnea      Subjective: Intubated and sedated on vent. On levophed and vasopressin  Currently on CRRT.     Medications:  Reviewed    Infusion Medications    IV infusion builder 150 mL/hr at 09/18/20 1141    prismaSATE BGK 4/2.5 1,000 mL/hr (09/18/20 0429)    prismaSATE BGK 4/2.5 1,000 mL/hr (09/18/20 0429)    prismaSATE BGK 4/2.5 500 mL/hr (09/18/20 0620)    dextrose      norepinephrine 24 mcg/min (09/18/20 0319)    propofol 20 mcg/kg/min (09/18/20 0620)    fentaNYL (SUBLIMAZE) infusion 25 mcg/hr (09/18/20 0121)    vasopressin (Septic Shock) infusion 0.04 Units/min (09/18/20 0252)     Scheduled Medications    hydrocortisone sodium succinate PF  50 mg Intravenous Q6H    thiamine (VITAMIN B1) IVPB  200 mg Intravenous Q12H    ascorbic acid  1,500 mg Intravenous Q6H    carboxymethylcellulose PF  1 drop Both Eyes 6 times per day    chlorhexidine  15 mL Mouth/Throat BID    insulin lispro  0-6 Units Subcutaneous Q4H    meropenem  1 g Intravenous Q12H    albumin human  25 g Intravenous Once    sodium chloride flush  10 mL Intravenous 2 times per day    vancomycin  125 mg Oral 2 times per day    Followed by   Benito Weiner ON 9/22/2020] vancomycin  125 mg Oral Daily    Followed by   Benito Weiner ON 9/29/2020] vancomycin  125 mg Oral Every Other Day    [Held by provider] midodrine  10 mg Oral TID WC    pantoprazole  40 mg Intravenous Daily     PRN Meds: magnesium sulfate, calcium gluconate **OR** calcium gluconate **OR** calcium gluconate **OR** calcium gluconate, sodium phosphate IVPB **OR** sodium phosphate IVPB **OR** sodium phosphate IVPB **OR** sodium phosphate IVPB, sodium chloride flush, acetaminophen **OR** acetaminophen, ipratropium-albuterol, glucose, dextrose, glucagon (rDNA), dextrose      Intake/Output Summary (Last 24 hours) at 9/18/2020 1353  Last data filed at 9/17/2020 1400  Gross per 24 hour   Intake 599 ml   Output --   Net 599 ml       Exam:    BP (!) 109/51   Pulse 85   Temp 93 °F (33.9 °C) (Esophageal)   Resp 18   Ht 4' 11\" (1.499 m)   Wt 126 lb 5.2 oz (57.3 kg)   SpO2 (!) 83%   BMI 25.51 kg/m²     Gen/overall appearance: intubated and sedated. Head: Normocephalic, atraumatic  Eyes: EOMI, no scleral icterus  CVS: regular rate and rhythm, Normal S1S2  Pulm: Coarse breath sounds bilaterally  Gastrointestinal: Soft, nontender, nondistended, no guarding or rebound  Extremities: bilat LE edema. No erythema or warmth  Neuro: sedated  Skin: Warm, dry    Labs:   Recent Labs     09/16/20  0340 09/16/20  1827 09/17/20  0450 09/18/20  0640   WBC 8.7  --  3.6* 6.7   HGB 12.0 11.9* 11.6* 11.3*   HCT 37.9  --  37.7 37.4   *  --  63* 12*     Recent Labs     09/17/20  1612 09/18/20  0010 09/18/20  0640   * 131* 133*   K 4.2 4.5 4.9   CL 95* 97* 99   CO2 18* 19* 15*   BUN 20 15 12   CREATININE 2.8* 1.9* 1.2   CALCIUM 7.6* 7.3* 7.4*   PHOS 3.4 2.8 3.0     Recent Labs     09/16/20  0530 09/18/20  0640   AST 26 112*   ALT 11 19   BILITOT 1.3* 5.9*   ALKPHOS 192* 134*     No results for input(s): INR in the last 72 hours.   Recent Labs     09/16/20  0530   TROPONINI 0.07*       Assessment/Plan:    Active Hospital Problems    Diagnosis Date Noted    Gram-negative bacteremia [R78.81] 09/17/2020    Lactic acidosis [E87.2] 79/35/4840    Metabolic acidemia [J67.4] 09/17/2020    Acute pancreatitis [K85.90]     Acute respiratory failure with hypoxia (HCC) [J96.01] 09/16/2020    Elevated brain natriuretic peptide (BNP) level [R79.89] 09/16/2020    Acute respiratory failure with hypoxia and hypercapnia (HCC) [J96.01, J96.02] 09/16/2020    Elevated lipase [R74.8] 09/16/2020    Tricuspid regurgitation [I07.1] 09/16/2020    Moderate protein-calorie malnutrition ([address] [E44.0] 09/16/2020    Pulmonary infiltrates [R91.8] 09/16/2020    Thrombocytopenia ([address] [D69.6] 09/16/2020    Septic shock ([facility] [A41.9, R65.21]     Clostridium difficile enterocolitis [A04.72] 08/30/2020    Closed compression fracture of thoracic vertebra ([address] [S22.000A] 08/30/2020    Ulcer of right leg, limited to breakdown of skin ([address] [L97.911] 08/17/2020    Moderate to severe pulmonary hypertension ([address] [I27.20] 08/17/2020    Peripheral venous insufficiency [I87.2] 08/17/2020    End stage renal disease ([address] [N18.6] 06/25/2020    Anasarca [R60.1] 03/07/2020    Normocytic normochromic anemia [D64.9] 11/22/2019    Retroperitoneal sarcoma ([address] [C48.0] 10/08/2019    Type 2 diabetes mellitus with diabetic polyneuropathy, with long-term current use of insulin ([facility] [E11.42, Z79.4] 04/06/2012     Septic shock 2/2 HCAP pneumonia  Serratia bacteremia  COVID negative  Acute hypoxic respiratory failure 2/2 above. S/p intubation 9/16  - ICU care  - wean vent as tolerated  - on pressor support with levophed and vasopressin; titrate as tolerated  - IV following for abx management; on merrem and vanco  - respiratory care  - O2 per protocol  - respiratory care     Severe metabolic acidosis; current on CRRT  History of ESRD on HD  - strict ins/outs  - monitor lytes  - nephro following     Acute metabolic encephalopathy 2/2 above     Recent C. Diff colitis.   - continue with po vancomycin course     History of soft tissue sarcoma:  - CT shows enlarging pulmonary nodules concerning for metastatic disease:  - Hem/Onc following as outpt  - per Pulm, nodules too small for biopsy  - repeat CT chest in 3 months     PMH of CAD, PAD, DM, chronic anemia likely AOCD    Diet: Diet NPO Effective Now  Code Status: Limited    Dispo - Inpt; prognosis is guarded    Kody House MD

## 2020-09-18 NOTE — PROGRESS NOTES
09/18/20 1253   Vent Information   Vent Type 840   Vent Mode AC/PC   Pressure Ordered 28   Rate Set 18 bmp   FiO2  100 %   Sensitivity 3   PEEP/CPAP 5   I Time/ I Time % 1 s   Humidification Source HME   Vent Patient Data   Peak Inspiratory Pressure 34 cmH2O   Mean Airway Pressure 13 cmH20   Rate Measured 18 br/min   Vt Exhaled 461 mL   Minute Volume 8.25 Liters   I:E Ratio 1:2   Spontaneous Breathing Trial (SBT) RT Doc   Pulse 85   Breath Sounds   Right Upper Lobe Diminished   Right Middle Lobe Diminished   Right Lower Lobe Diminished   Left Upper Lobe Diminished   Left Lower Lobe Diminished   Additional Respiratory  Assessments   Resp 18   Alarm Settings   High Pressure Alarm 40 cmH2O   Low Minute Volume Alarm 2 L/min   Resp Rate Low Alarm 45   Low Exhaled Vt  200 mL   ETT (adult)   Placement Date/Time: 09/16/20 1755   Preoxygenation: Yes  Mask Ventilation: Ventilated by mask (1)  Technique: Video laryngoscopy  Type: Cuffed  Tube Size: 7.5 mm  Laryngoscope: GlideScope  Blade Size: 4  Location: Oral  Insertion attempts: 1  Placeme. ..    Measured From Lips   ET Placement Left   Secured By Commercial tube vela

## 2020-09-18 NOTE — PROGRESS NOTES
09/18/20 0850   Vent Information   Vent Type 840   Vent Mode AC/PC   Pressure Ordered 28   Rate Set 18 bmp   FiO2  60 %   Sensitivity 3   PEEP/CPAP 5   I Time/ I Time % 1 s   Humidification Source HME   Vent Patient Data   Peak Inspiratory Pressure 33 cmH2O   Mean Airway Pressure 14 cmH20   Rate Measured 18 br/min   Vt Exhaled 497 mL   Minute Volume 9.1 Liters   I:E Ratio 1:2.3   Plateau Pressure 26 MIA98   Cough/Sputum   Sputum How Obtained Endotracheal   Cough Productive   Sputum Amount Small   Sputum Color Brown   Tenacity Thick   Spontaneous Breathing Trial (SBT) RT Doc   Pulse 107   Breath Sounds   Right Upper Lobe Diminished   Right Middle Lobe Diminished   Right Lower Lobe Diminished   Left Upper Lobe Diminished   Left Lower Lobe Diminished   Additional Respiratory  Assessments   Cuff Pressure (cm H2O) 30 cm H2O   Alarm Settings   High Pressure Alarm 40 cmH2O   Low Minute Volume Alarm 2 L/min   High Respiratory Rate 45 br/min   Low Exhaled Vt  200 mL   ETT (adult)   Placement Date/Time: 09/16/20 1755   Preoxygenation: Yes  Mask Ventilation: Ventilated by mask (1)  Technique: Video laryngoscopy  Type: Cuffed  Tube Size: 7.5 mm  Laryngoscope: GlideScope  Blade Size: 4  Location: Oral  Insertion attempts: 1  Placeme. ..    Secured at 22 cm   Measured From [address] [place] [address],[address] By Commercial tube vela   Cuff Pressure 30 cm H2O

## 2020-09-18 NOTE — PROGRESS NOTES
09/18/20 0443   Vent Information   Skin Assessment Clean, dry, & intact   Vent Type 840   Vent Mode AC/PC   Vt Ordered 450 mL   Pressure Ordered 28   Rate Set 18 bmp   FiO2  60 %   SpO2 93 %   SpO2/FiO2 ratio 155   Sensitivity 3   PEEP/CPAP 5   I Time/ I Time % 1 s   Humidification Source HME   Vent Patient Data   Peak Inspiratory Pressure 33 cmH2O   Mean Airway Pressure 13 cmH20   Rate Measured 18 br/min   Vt Exhaled 436 mL   Minute Volume 7.87 Liters   I:E Ratio 1:2.3   Cough/Sputum   Sputum How Obtained Endotracheal;Suctioned   Cough Productive   Sputum Amount Small   Sputum Color Red;Cloudy;Brown   Tenacity Thick; Thin   Spontaneous Breathing Trial (SBT) RT Doc   Pulse 89   Breath Sounds   Right Upper Lobe Diminished   Right Middle Lobe Diminished   Right Lower Lobe Diminished   Left Upper Lobe Diminished   Left Lower Lobe Diminished   Additional Respiratory  Assessments   Resp 18   Position Semi-Fernandez's   Cuff Pressure (cm H2O) 30 cm H2O   Alarm Settings   High Pressure Alarm 40 cmH2O   Low Minute Volume Alarm 2 L/min   High Respiratory Rate 45 br/min   Low Exhaled Vt  200 mL   ETT (adult)   Placement Date/Time: 09/16/20 0235   Preoxygenation: Yes  Mask Ventilation: Ventilated by mask (1)  Technique: Video laryngoscopy  Type: Cuffed  Tube Size: 7.5 mm  Laryngoscope: GlideScope  Blade Size: 4  Location: Oral  Insertion attempts: 1  Placeme. ..    Secured at 22 cm   Measured From Lips   ET Placement Right   Secured By Commercial tube vela   Site Condition Dry   Cuff Pressure 30 cm H2O

## 2020-09-18 NOTE — PROGRESS NOTES
Mateus Casillas MD   Patient: Kelvin Brandon     9/18/20 12:48 AM   [phone] Hospital or Facility: [facility] From: Claire Sebastian RE: Leyla Connolly RM: [phone] FYI panic lactic acid. 8.9. lactic was 4.86 at 1830. chronic HD pt on CRRT. positive blood cultures, gangrenous right foot. ID following pt.  Need Callback: NO CALLBACK REQ CCU FYI  Unread

## 2020-09-19 LAB
CULTURE, RESPIRATORY: ABNORMAL
CULTURE, RESPIRATORY: ABNORMAL
GRAM STAIN RESULT: ABNORMAL
INFLUENZA A BY PCR: NOT DETECTED
INFLUENZA B BY PCR: NOT DETECTED
ORGANISM: ABNORMAL
RSV BY PCR: NOT DETECTED
RSV SOURCE: NORMAL

## 2020-09-21 NOTE — PROGRESS NOTES
Required reporting for death within 7days of removal of 2 point wrist restraints completed- added to facility database

## 2020-10-19 LAB
FUNGUS (MYCOLOGY) CULTURE: ABNORMAL
FUNGUS STAIN: ABNORMAL
ORGANISM: ABNORMAL

## 2020-11-03 LAB
AFB CULTURE (MYCOBACTERIA): NORMAL
AFB SMEAR: NORMAL

## 2021-02-02 NOTE — CONSULTS
Pharmacy to Dose Warfarin    Dx: Hx of DVT  Goal INR range 2-3   Home Warfarin dose: 3mg daily   No coumadin given since the admission    Date  INR  Warfarin  3/10                 1.64                 4 mg      Recommend Warfarin 4mg tonight x1. Daily INR ordered. Rx will continue to manage therapy per  Dr. Taylor Stephens consult order. O-Z Flap Text: The defect edges were debeveled with a #15 scalpel blade.  Given the location of the defect, shape of the defect and the proximity to free margins an O-Z flap was deemed most appropriate.  Using a sterile surgical marker, an appropriate transposition flap was drawn incorporating the defect and placing the expected incisions within the relaxed skin tension lines where possible. The area thus outlined was incised deep to adipose tissue with a #15 scalpel blade.  The skin margins were undermined to an appropriate distance in all directions utilizing iris scissors.

## 2022-08-26 NOTE — PATIENT CARE CONFERENCE
----- Message from Kecia Stone MA sent at 8/26/2022  3:55 PM CDT -----  No signs of a UTI;  Pt. recently in [facility] ([facility]) for a bleeding in her lower stomach. She stated a vein had burst in her stomach.   ----- Message -----  From: Jody Murray NP  Sent: 8/26/2022   3:46 PM CDT  To: Kecia Stone MA    Pls call pt. She did not see portal message from yesterday:  Hello,     Your kidney function looks a bit worse. How have you been feeling? Have you been sick at all (nausea, vomiting, diarrhea)? Have you been on any new medications recently or NSAIDs like ibuprofen, advil, aleve, naproxen, or any headache powders?  Do you have any pain in your middle back or around your sides?     Your blood sugar is also pretty high. Please follow up with your PCP or endocrinologist about blood sugar management.       Do you have any symptoms of urinary tract infection (burning when you urinate, pain over your bladder, increased urgency and/or frequency, bad-smelling urine, increased confusion)? Your urine looks like it could be infected, but it is recommended to only use antibiotics if you are having symptoms.    Please let me know if you have any questions or concerns.     Thanks,  JOS Guidry        
None    Team Members Present at Conference:  : Katerina DEVLIN, [facility]   Occupational Therapist: Teresa Ken, OTR/L  Physical Therapist: Rocio Izaguirre PT, DPT   Speech Therapist: Munira Crain, [id number] [facility]  Nurse: Sheeba Iraheta RN  Dietician: Eitan Capone RDN, LD  Psychiatry: N/A    Family members present at conference: N/A    I led this team conference and I approve the established interdisciplinary plan of care as documented within the medical record of Jimmie Sheriff. MD: Teja Dominguez.  Hannah Saenz MD 4/8/2020, 11:07 AM

## (undated) DEVICE — SINGLE USE BIOPSY VALVE MAJ-210: Brand: SINGLE USE BIOPSY VALVE (STERILE)

## (undated) DEVICE — BANDAGE COMPR ELASTIC 5 YDX4 IN LT

## (undated) DEVICE — Z DISCONTINUED USE 2276105 GOWN PROTCT UNIV CHST W28IN L49IN SL 24IN BLU SPUNBOND FLM

## (undated) DEVICE — SUTURE GOR TX SZ 4-0 L30IN NONABSORBABLE L13MM THC-13 1/2 5M12A

## (undated) DEVICE — GLOVE SURG SZ 8 L11.77IN FNGR THK9.8MIL STRW LTX POLYMER

## (undated) DEVICE — SUTURE VCRL + SZ 3-0 L27IN ABSRB UD L26MM SH 1/2 CIR VCP416H

## (undated) DEVICE — CANNULA NSL AD L7FT DIV O2 CO2 W/ M LUERLOCK TRMPT CONN

## (undated) DEVICE — TUBING, SUCTION, 3/16" X 20', STRAIGHT: Brand: MEDLINE

## (undated) DEVICE — CONMED SCOPE SAVER BITE BLOCK, 20X27 MM: Brand: SCOPE SAVER

## (undated) DEVICE — PEG STANDARD SYSTEM: Brand: ENTAKE

## (undated) DEVICE — TUBING, SUCTION, 3/16" X 12', STRAIGHT: Brand: MEDLINE

## (undated) DEVICE — Device

## (undated) DEVICE — SYRINGE MED 10ML SLIP TIP BLNT FILL AND LUERLOCK DISP

## (undated) DEVICE — PACK PROC ORTH LO EXT IX CUST

## (undated) DEVICE — INTENDED TO BE USED TO OCCLUDE, RETRACT AND IDENTIFY ARTERIES, VEINS, TENDONS AND NERVES IN SURGICAL PROCEDURES: Brand: STERION®  VESSEL LOOP

## (undated) DEVICE — YANKAUER,BULB TIP,W/O VENT,RIGID,STERILE: Brand: MEDLINE

## (undated) DEVICE — TUBING, SUCTION, 3/16" X 6', STRAIGHT: Brand: MEDLINE

## (undated) DEVICE — BANDAGE ROLL,100% COTTON, 8 PLY, LARGE: Brand: KERLIX

## (undated) DEVICE — SINGLE USE SUCTION VALVE MAJ-209: Brand: SINGLE USE SUCTION VALVE (STERILE)

## (undated) DEVICE — SYRINGE MED 50ML LUERSLIP TIP

## (undated) DEVICE — BOWL MED M 16OZ PLAS CAP GRAD

## (undated) DEVICE — PENROSE DRAIN 12" X 1/4: Brand: CARDINAL HEALTH

## (undated) DEVICE — SMARTGOWN SURGICAL GOWN, XL: Brand: CONVERTORS

## (undated) DEVICE — GLOVE ORANGE PI 7   MSG9070

## (undated) DEVICE — PUNCH AORT DIA4MM LNG HNDL

## (undated) DEVICE — GOWN SIRUS NONREIN LG W/TWL: Brand: MEDLINE INDUSTRIES, INC.

## (undated) DEVICE — FRAME EYEWR PROTCT ASST CLR DISP SAFEVIEW

## (undated) DEVICE — 10FR FRAZIER SUCTION HANDLE: Brand: CARDINAL HEALTH

## (undated) DEVICE — ADAPTER TBNG DIA15MM SWVL FBROPT BRONCHSCP TERM 2 AXIS PEEP

## (undated) DEVICE — ENDO CARRY-ON PROCEDURE KIT INCLUDES SUCTION TUBING, LUBRICANT, GAUZE, BIOHAZARD STICKER, TRANSPORT PAD AND INTERCEPT BEDSIDE KIT.: Brand: ENDO CARRY-ON PROCEDURE KIT

## (undated) DEVICE — TRAP,MUCUS SPECIMEN, 80CC: Brand: MEDLINE

## (undated) DEVICE — CANNULA,OXY,ADULT,SUPERSOFT,W/7'TUB,SC: Brand: MEDLINE INDUSTRIES, INC.

## (undated) DEVICE — SUTURE PERMA-HAND SZ 2-0 L30IN NONABSORBABLE BLK L26MM SH K833H

## (undated) DEVICE — FOGARTY - HYDRAGRIP SURGICAL - CLAMP INSERTS: Brand: FOGARTY SOFTJAW

## (undated) DEVICE — BINDER ABD 4 PANEL LG 12 IN 46-62 IN UNISX LINING ELASTIC

## (undated) DEVICE — SMARTGOWN SURGICAL GOWN, LARGE: Brand: CONVERTORS

## (undated) DEVICE — INTENDED FOR TISSUE SEPARATION, AND OTHER PROCEDURES THAT REQUIRE A SHARP SURGICAL BLADE TO PUNCTURE OR CUT.: Brand: BARD-PARKER ® STAINLESS STEEL BLADES

## (undated) DEVICE — SOLUTION IV IRRIG POUR BRL 0.9% SODIUM CHL 2F7124

## (undated) DEVICE — HOLDER RESTRAINT LIMB UNIV FOAM PR D RNG SINGLE STRP LF

## (undated) DEVICE — SUTURE NONABSORBABLE MONOFILAMENT 3-0 PS-1 18 IN BLK ETHILON 1663H

## (undated) DEVICE — GOWN SIRUS NONREIN XL W/TWL: Brand: MEDLINE INDUSTRIES, INC.

## (undated) DEVICE — BOWL: 16OZ PEELPOUCH 75/CS 16/PLT: Brand: MEDEGEN MEDICAL PRODUCTS, LLC

## (undated) DEVICE — SUTURE NONABSORBABLE MONOFILAMENT 6-0 BV-1 1X30 IN PROLENE 8709H

## (undated) DEVICE — LINER,SOFT,SUCTION CANISTER,1500CC: Brand: MEDLINE

## (undated) DEVICE — Z DISCONTINUED USE 2277125 SYRINGE NEEDLELESS 10 CC LUER LCK SLIP STRL LTX FREE

## (undated) DEVICE — SUTURE MCRYL SZ 4-0 L18IN ABSRB UD L19MM PS-2 3/8 CIR PRIM Y496G

## (undated) DEVICE — SYRINGE, LUER SLIP, STERILE, 60ML: Brand: MEDLINE

## (undated) DEVICE — ELECTRODE,RADIOTRANSLUCENT,FOAM,3PK: Brand: MEDLINE

## (undated) DEVICE — Z DISCONTINUED PER MEDLINE TRAY SKIN PREP DRY W/ PREM GLV APPLICATORS GZ TWL OVERWRAP

## (undated) DEVICE — SHEET,DRAPE,53X77,STERILE: Brand: MEDLINE